# Patient Record
Sex: FEMALE | Race: WHITE | NOT HISPANIC OR LATINO | Employment: OTHER | URBAN - METROPOLITAN AREA
[De-identification: names, ages, dates, MRNs, and addresses within clinical notes are randomized per-mention and may not be internally consistent; named-entity substitution may affect disease eponyms.]

---

## 2017-09-15 ENCOUNTER — TELEPHONE (OUTPATIENT)
Dept: PRIMARY CARE CLINIC | Facility: CLINIC | Age: 77
End: 2017-09-15

## 2017-09-15 NOTE — TELEPHONE ENCOUNTER
----- Message from Bethany Scott sent at 9/15/2017 11:01 AM CDT -----  Contact: Patient  Pauline, patient [phone], Calling for refill on Rx . Does not want to give me the names of the medicines, just wants to talk to the nurse. Please advise. Thanks.

## 2017-09-15 NOTE — TELEPHONE ENCOUNTER
Spoke to pt, she just needed to know the names of the gastro dr and heart dr that Dr Waldrop referred her too. ([facility])

## 2018-01-23 RX ORDER — SULFASALAZINE 500 MG/1
TABLET ORAL
Qty: 180 TABLET | Refills: 1 | Status: SHIPPED | OUTPATIENT
Start: 2018-01-23 | End: 2018-08-31 | Stop reason: SDUPTHER

## 2018-01-23 RX ORDER — OXYBUTYNIN CHLORIDE 10 MG/1
TABLET, EXTENDED RELEASE ORAL
Qty: 90 TABLET | Refills: 1 | Status: SHIPPED | OUTPATIENT
Start: 2018-01-23 | End: 2018-08-31 | Stop reason: SDUPTHER

## 2018-01-23 RX ORDER — FOLIC ACID 1 MG/1
TABLET ORAL
Qty: 90 TABLET | Refills: 1 | Status: SHIPPED | OUTPATIENT
Start: 2018-01-23 | End: 2019-02-04 | Stop reason: SDUPTHER

## 2018-01-23 RX ORDER — AMLODIPINE BESYLATE 10 MG/1
TABLET ORAL
Qty: 90 TABLET | Refills: 1 | Status: SHIPPED | OUTPATIENT
Start: 2018-01-23 | End: 2018-08-09 | Stop reason: SDUPTHER

## 2018-03-26 RX ORDER — CARVEDILOL 6.25 MG/1
TABLET ORAL
Qty: 180 TABLET | Refills: 0 | Status: SHIPPED | OUTPATIENT
Start: 2018-03-26 | End: 2022-06-27 | Stop reason: SDUPTHER

## 2018-04-19 ENCOUNTER — OFFICE VISIT (OUTPATIENT)
Dept: PRIMARY CARE CLINIC | Facility: CLINIC | Age: 78
End: 2018-04-19
Payer: MEDICARE

## 2018-04-19 VITALS
BODY MASS INDEX: 36.91 KG/M2 | HEIGHT: 60 IN | TEMPERATURE: 98 F | DIASTOLIC BLOOD PRESSURE: 75 MMHG | SYSTOLIC BLOOD PRESSURE: 125 MMHG | OXYGEN SATURATION: 96 % | HEART RATE: 74 BPM | WEIGHT: 188 LBS | RESPIRATION RATE: 18 BRPM

## 2018-04-19 DIAGNOSIS — Z12.31 ENCOUNTER FOR SCREENING MAMMOGRAM FOR BREAST CANCER: ICD-10-CM

## 2018-04-19 DIAGNOSIS — K52.9 GASTROENTERITIS: Primary | ICD-10-CM

## 2018-04-19 DIAGNOSIS — M89.9 DISORDER OF BONE: ICD-10-CM

## 2018-04-19 DIAGNOSIS — M17.0 PRIMARY OSTEOARTHRITIS OF BOTH KNEES: ICD-10-CM

## 2018-04-19 DIAGNOSIS — Z13.820 OSTEOPOROSIS SCREENING: ICD-10-CM

## 2018-04-19 DIAGNOSIS — Z23 NEED FOR VACCINATION: ICD-10-CM

## 2018-04-19 PROBLEM — E03.9 HYPOTHYROIDISM: Status: ACTIVE | Noted: 2018-04-19

## 2018-04-19 PROBLEM — E78.5 HYPERLIPIDEMIA: Status: ACTIVE | Noted: 2018-04-19

## 2018-04-19 PROBLEM — I10 ESSENTIAL HYPERTENSION, BENIGN: Status: ACTIVE | Noted: 2018-04-19

## 2018-04-19 PROBLEM — K50.90 CROHN DISEASE: Status: ACTIVE | Noted: 2018-04-19

## 2018-04-19 PROBLEM — Z95.0 PACEMAKER: Status: ACTIVE | Noted: 2018-04-19

## 2018-04-19 PROBLEM — K21.9 GERD (GASTROESOPHAGEAL REFLUX DISEASE): Status: ACTIVE | Noted: 2018-04-19

## 2018-04-19 PROBLEM — I48.91 ATRIAL FIBRILLATION: Status: ACTIVE | Noted: 2018-04-19

## 2018-04-19 PROBLEM — N32.81 OAB (OVERACTIVE BLADDER): Status: ACTIVE | Noted: 2018-04-19

## 2018-04-19 PROCEDURE — G0009 ADMIN PNEUMOCOCCAL VACCINE: HCPCS | Mod: S$GLB,,, | Performed by: FAMILY MEDICINE

## 2018-04-19 PROCEDURE — 99999 PR PBB SHADOW E&M-EST. PATIENT-LVL III: CPT | Mod: PBBFAC,,, | Performed by: FAMILY MEDICINE

## 2018-04-19 PROCEDURE — 99214 OFFICE O/P EST MOD 30 MIN: CPT | Mod: S$GLB,,, | Performed by: FAMILY MEDICINE

## 2018-04-19 PROCEDURE — 90670 PCV13 VACCINE IM: CPT | Mod: S$GLB,,, | Performed by: FAMILY MEDICINE

## 2018-04-19 RX ORDER — LEVOTHYROXINE SODIUM 50 UG/1
1 TABLET ORAL DAILY
COMMUNITY
Start: 2018-04-02 | End: 2019-06-06 | Stop reason: ALTCHOICE

## 2018-04-19 RX ORDER — ATORVASTATIN CALCIUM 20 MG/1
1 TABLET, FILM COATED ORAL DAILY
COMMUNITY
Start: 2018-03-24 | End: 2022-06-27 | Stop reason: SDUPTHER

## 2018-04-19 RX ORDER — ESOMEPRAZOLE MAGNESIUM 40 MG/1
1 CAPSULE, DELAYED RELEASE ORAL DAILY
COMMUNITY
Start: 2018-02-20 | End: 2018-08-17 | Stop reason: SDUPTHER

## 2018-04-19 RX ORDER — SOTALOL HYDROCHLORIDE 80 MG/1
120 TABLET ORAL 2 TIMES DAILY
COMMUNITY
Start: 2018-04-03 | End: 2019-02-14

## 2018-04-19 RX ORDER — LEVOTHYROXINE SODIUM 200 UG/1
1 TABLET ORAL DAILY
COMMUNITY
Start: 2018-01-28 | End: 2018-04-22 | Stop reason: SDUPTHER

## 2018-04-19 RX ORDER — ASPIRIN 81 MG/1
81 TABLET ORAL DAILY
COMMUNITY
End: 2019-06-06 | Stop reason: ALTCHOICE

## 2018-04-19 NOTE — PROGRESS NOTES
Pt ID verified by  and Name. Allergies verified with pt. Prevnar 13 0.5mL vaccine administered to L Deltoid. Pt tolerated well. No adverse reactions noted.

## 2018-04-19 NOTE — PROGRESS NOTES
Subjective:       Patient ID: Pauline Cronin is a 78 y.o. female.    Chief Complaint: Diarrhea (x3 days); Headache (x3 days); and Mammogram (patient wants an order to get her mammogram done )    Had labs recently done at Dr. Leone's office, reportedly normal.      Diarrhea    This is a new problem. The current episode started in the past 7 days. The problem occurs 5 to 10 times per day. The problem has been gradually improving. The stool consistency is described as watery. The patient states that diarrhea does not awaken her from sleep. Associated symptoms include arthralgias, headaches and increased flatus. Pertinent negatives include no abdominal pain, chills, fever, sweats or vomiting. There are no known risk factors. She has tried nothing for the symptoms. Her past medical history is significant for inflammatory bowel disease.   Arthritis   Presents for initial visit. The disease course has been worsening. She complains of pain and stiffness. Affected locations include the left knee and right knee. Associated symptoms include diarrhea. Pertinent negatives include no fever or rash. There is no history of chronic back pain, lupus or rheumatoid arthritis.   Past treatments include nothing. Factors aggravating her arthritis include sitting and climbing stairs.     Review of Systems   Constitutional: Negative for chills and fever.   HENT: Negative for trouble swallowing.    Eyes: Negative for visual disturbance.   Respiratory: Negative for shortness of breath.    Cardiovascular: Negative for chest pain.   Gastrointestinal: Positive for diarrhea and flatus. Negative for abdominal pain, blood in stool and vomiting.   Genitourinary: Negative for difficulty urinating.   Musculoskeletal: Positive for arthralgias, arthritis and stiffness.   Skin: Negative for rash.   Neurological: Positive for headaches.   Psychiatric/Behavioral: Negative for agitation and confusion.       Objective:      Vitals:    04/19/18 1243   BP:  125/75   BP Location: Left arm   Patient Position: Sitting   BP Method: Large (Automatic)   Pulse: 74   Resp: 18   Temp: 98 °F (36.7 °C)   TempSrc: Oral   SpO2: 96%   Weight: 85.3 kg (188 lb)   Height: 5' (1.524 m)     Physical Exam   Constitutional: She is oriented to person, place, and time. She appears well-developed and well-nourished.   HENT:   Head: Normocephalic and atraumatic.   Eyes: EOM are normal.   Neck: Neck supple. No JVD present.   Cardiovascular: Normal rate and regular rhythm.    Murmur heard.   Systolic murmur is present with a grade of 4/6   Pulmonary/Chest: Effort normal and breath sounds normal.   Abdominal: Soft. Bowel sounds are normal. She exhibits no distension. There is no tenderness.   Musculoskeletal: She exhibits no edema.        Right knee: She exhibits decreased range of motion. She exhibits no swelling, no effusion and no deformity.        Left knee: She exhibits decreased range of motion. She exhibits no swelling, no effusion and no deformity.   Neurological: She is alert and oriented to person, place, and time.   Skin: Skin is warm and dry.   Psychiatric: She has a normal mood and affect. Her behavior is normal.   Nursing note and vitals reviewed.      Assessment:       1. Gastroenteritis    2. Encounter for screening mammogram for breast cancer    3. Osteoporosis screening    4. Disorder of bone     5. Need for vaccination    6. Primary osteoarthritis of both knees        Plan:       Gastroenteritis  Comments:  likely viral, treat symptomatically    Encounter for screening mammogram for breast cancer  -     Mammo Digital Screening Bilat without CA; Future; Expected date: 05/03/2018    Osteoporosis screening  -     DXA Bone Density Spine And Hip; Future; Expected date: 04/26/2018    Disorder of bone   -     DXA Bone Density Spine And Hip; Future; Expected date: 04/26/2018    Need for vaccination  -     Pneumococcal Conjugate Vaccine (13 Valent) (IM)    Primary osteoarthritis of both  knees  Comments:  can try OTC glucosamine/chondroitin      Medication List with Changes/Refills   Current Medications    AMLODIPINE (NORVASC) 10 MG TABLET    TAKE ONE TABLET BY MOUTH ONCE DAILY    ASPIRIN (ECOTRIN) 81 MG EC TABLET    Take 81 mg by mouth once daily.    ATORVASTATIN (LIPITOR) 20 MG TABLET    Take 1 tablet by mouth once daily.    CARVEDILOL (COREG) 6.25 MG TABLET    TAKE ONE TABLET BY MOUTH TWICE DAILY WITH FOOD    ESOMEPRAZOLE (NEXIUM) 40 MG CAPSULE    Take 1 capsule by mouth once daily.    FOLIC ACID (FOLVITE) 1 MG TABLET    TAKE ONE TABLET BY MOUTH ONCE DAILY    LEVOTHYROXINE (SYNTHROID) 200 MCG TABLET    Take 1 tablet by mouth once daily.    LEVOTHYROXINE (SYNTHROID) 50 MCG TABLET    Take 1 tablet by mouth once daily.    OXYBUTYNIN (DITROPAN-XL) 10 MG 24 HR TABLET    TAKE ONE TABLET BY MOUTH ONCE DAILY    SOTALOL (BETAPACE) 80 MG TABLET    Take 1 tablet by mouth 2 (two) times daily.    SULFASALAZINE (AZULFIDINE) 500 MG TAB    TAKE ONE TABLET BY MOUTH TWICE DAILY

## 2018-04-23 RX ORDER — LEVOTHYROXINE SODIUM 200 UG/1
TABLET ORAL
Qty: 90 TABLET | Refills: 3 | Status: SHIPPED | OUTPATIENT
Start: 2018-04-23 | End: 2019-06-06 | Stop reason: ALTCHOICE

## 2018-06-04 DIAGNOSIS — M81.0 OSTEOPOROSIS, UNSPECIFIED OSTEOPOROSIS TYPE, UNSPECIFIED PATHOLOGICAL FRACTURE PRESENCE: ICD-10-CM

## 2018-06-04 RX ORDER — ALENDRONATE SODIUM 70 MG/1
70 TABLET ORAL
Qty: 12 TABLET | Refills: 3 | Status: SHIPPED | OUTPATIENT
Start: 2018-06-04 | End: 2019-04-28 | Stop reason: SDUPTHER

## 2018-06-21 ENCOUNTER — OFFICE VISIT (OUTPATIENT)
Dept: PRIMARY CARE CLINIC | Facility: CLINIC | Age: 78
End: 2018-06-21
Payer: MEDICARE

## 2018-06-21 VITALS
BODY MASS INDEX: 36.32 KG/M2 | SYSTOLIC BLOOD PRESSURE: 154 MMHG | HEART RATE: 87 BPM | RESPIRATION RATE: 18 BRPM | WEIGHT: 185 LBS | DIASTOLIC BLOOD PRESSURE: 78 MMHG | OXYGEN SATURATION: 97 % | TEMPERATURE: 98 F | HEIGHT: 60 IN

## 2018-06-21 DIAGNOSIS — R13.10 DYSPHAGIA, UNSPECIFIED TYPE: Primary | ICD-10-CM

## 2018-06-21 PROCEDURE — 99999 PR PBB SHADOW E&M-EST. PATIENT-LVL IV: CPT | Mod: PBBFAC,,, | Performed by: FAMILY MEDICINE

## 2018-06-21 PROCEDURE — 99213 OFFICE O/P EST LOW 20 MIN: CPT | Mod: S$GLB,,, | Performed by: FAMILY MEDICINE

## 2018-06-21 NOTE — PROGRESS NOTES
"Subjective:       Patient ID: Pauline Cronin is a 78 y.o. female.    Chief Complaint: Gastroesophageal Reflux (patient says it hurts to swallow anything solid) and Nausea    Complains of worsening dysphagia with pills and solids for the past few weeks. Has persistent sensation that something is "stuck" in her esophagus. No prior episodes. No choking or SoB. No trouble with liquids. Takes Nexium regularly      Review of Systems   Constitutional: Negative for fever.   HENT: Positive for trouble swallowing.    Eyes: Negative for visual disturbance.   Respiratory: Negative for shortness of breath.    Cardiovascular: Negative for chest pain.   Gastrointestinal: Positive for nausea. Negative for abdominal distention, abdominal pain, blood in stool and vomiting.   Genitourinary: Negative for difficulty urinating.   Skin: Negative for rash.   Hematological: Does not bruise/bleed easily.       Objective:      Vitals:    06/21/18 0946   BP: (!) 154/78   BP Location: Left arm   Patient Position: Sitting   BP Method: Large (Automatic)   Pulse: 87   Resp: 18   Temp: 97.7 °F (36.5 °C)   TempSrc: Oral   SpO2: 97%   Weight: 83.9 kg (185 lb)   Height: 5' (1.524 m)     Physical Exam   Constitutional: She is oriented to person, place, and time. She appears well-developed and well-nourished.   HENT:   Head: Normocephalic and atraumatic.   Neck: Neck supple. No JVD present.   Cardiovascular: Normal rate, regular rhythm and normal heart sounds.    Pulmonary/Chest: Effort normal and breath sounds normal.   Abdominal: Soft. Bowel sounds are normal. She exhibits no distension. There is no tenderness.   Musculoskeletal: She exhibits no edema.   Neurological: She is alert and oriented to person, place, and time.   Skin: Skin is warm and dry.   Psychiatric: She has a normal mood and affect. Her behavior is normal.   Nursing note and vitals reviewed.      Assessment:       1. Dysphagia, unspecified type        Plan:       Dysphagia, " unspecified type  Comments:  Needs EGD. Advised to take small bites, chew food well, drink liquids after swallowing solids.  Orders:  -     Cancel: Ambulatory referral to Gastroenterology  -     Ambulatory Referral to Gastroenterology      Medication List with Changes/Refills   Current Medications    ALENDRONATE (FOSAMAX) 70 MG TABLET    Take 1 tablet (70 mg total) by mouth every 7 days.    AMLODIPINE (NORVASC) 10 MG TABLET    TAKE ONE TABLET BY MOUTH ONCE DAILY    ASPIRIN (ECOTRIN) 81 MG EC TABLET    Take 81 mg by mouth once daily.    ATORVASTATIN (LIPITOR) 20 MG TABLET    Take 1 tablet by mouth once daily.    CARVEDILOL (COREG) 6.25 MG TABLET    TAKE ONE TABLET BY MOUTH TWICE DAILY WITH FOOD    ESOMEPRAZOLE (NEXIUM) 40 MG CAPSULE    Take 1 capsule by mouth once daily.    FOLIC ACID (FOLVITE) 1 MG TABLET    TAKE ONE TABLET BY MOUTH ONCE DAILY    LEVOTHYROXINE (SYNTHROID) 200 MCG TABLET    TAKE 1 TABLET BY MOUTH ON AN EMPTY STOMACH IN THE MORNING ONCE DAILY FOR 90 DAYS    LEVOTHYROXINE (SYNTHROID) 50 MCG TABLET    Take 1 tablet by mouth once daily.    OXYBUTYNIN (DITROPAN-XL) 10 MG 24 HR TABLET    TAKE ONE TABLET BY MOUTH ONCE DAILY    SOTALOL (BETAPACE) 80 MG TABLET    Take 1 tablet by mouth 2 (two) times daily.    SULFASALAZINE (AZULFIDINE) 500 MG TAB    TAKE ONE TABLET BY MOUTH TWICE DAILY

## 2018-07-03 ENCOUNTER — OFFICE VISIT (OUTPATIENT)
Dept: GASTROENTEROLOGY | Facility: CLINIC | Age: 78
End: 2018-07-03
Payer: MEDICARE

## 2018-07-03 VITALS
BODY MASS INDEX: 36.36 KG/M2 | DIASTOLIC BLOOD PRESSURE: 66 MMHG | HEART RATE: 81 BPM | WEIGHT: 185.19 LBS | HEIGHT: 60 IN | SYSTOLIC BLOOD PRESSURE: 142 MMHG

## 2018-07-03 DIAGNOSIS — Z51.81 ENCOUNTER FOR MONITORING LONG-TERM PROTON PUMP INHIBITOR THERAPY: ICD-10-CM

## 2018-07-03 DIAGNOSIS — Z79.899 ON SULFASALAZINE THERAPY: ICD-10-CM

## 2018-07-03 DIAGNOSIS — Z98.890 HISTORY OF INTESTINAL SURGERY: ICD-10-CM

## 2018-07-03 DIAGNOSIS — Z87.19 HISTORY OF CROHN'S DISEASE: ICD-10-CM

## 2018-07-03 DIAGNOSIS — K52.9 CHRONIC DIARRHEA: ICD-10-CM

## 2018-07-03 DIAGNOSIS — Z87.19 HISTORY OF GASTROESOPHAGEAL REFLUX (GERD): ICD-10-CM

## 2018-07-03 DIAGNOSIS — R13.14 PHARYNGOESOPHAGEAL DYSPHAGIA: Primary | ICD-10-CM

## 2018-07-03 DIAGNOSIS — Z79.899 ENCOUNTER FOR MONITORING LONG-TERM PROTON PUMP INHIBITOR THERAPY: ICD-10-CM

## 2018-07-03 PROCEDURE — 99214 OFFICE O/P EST MOD 30 MIN: CPT | Mod: S$GLB,,, | Performed by: NURSE PRACTITIONER

## 2018-07-03 PROCEDURE — 99999 PR PBB SHADOW E&M-EST. PATIENT-LVL IV: CPT | Mod: PBBFAC,,, | Performed by: NURSE PRACTITIONER

## 2018-07-03 NOTE — PROGRESS NOTES
Subjective:       Patient ID: Pauline Cronin is a 78 y.o. female Body mass index is 36.17 kg/m².    Chief Complaint: Dysphagia (diff swallowing solids/pills, feels lie it gets stuck)    This patient is new to me.  Referring Provider:  Dr. Waldrop for dysphagia    GI Problem   The primary symptoms include diarrhea. Primary symptoms do not include fever, weight loss, fatigue, abdominal pain, nausea, vomiting, melena, hematemesis or hematochezia. Primary symptoms comment: CHIEF COMPLAINT: DYSPHAGIA. The illness began more than 7 days ago (started 2 weeks ago). The problem has been rapidly improving.   The diarrhea began more than 1 week ago (chronic, unchanged). The diarrhea is semi-solid and watery. The diarrhea occurs 5 to 10 times per day. Risk factors: denies recent antibiotic/hospitalization, foreign travel, ill contacts, or suspect food intake.   The illness is also significant for dysphagia (CHIEF COMPLAINT: with food and pills; eating softer diet now; denies problems with liquids). The illness does not include chills, odynophagia or constipation. Significant associated medical issues include inflammatory bowel disease (diagnosed in her 20's; SULFASALAZINE 500 MG BID), GERD (controlled on NEXIUM 40 MG DAILY) and bowel resection (in her 20's). Associated medical issues do not include PUD or irritable bowel syndrome.     Review of Systems   Constitutional: Negative for appetite change, chills, fatigue, fever, unexpected weight change and weight loss.   HENT: Negative for sore throat and trouble swallowing.    Respiratory: Negative for cough, choking and shortness of breath.    Cardiovascular: Negative for chest pain.   Gastrointestinal: Positive for diarrhea and dysphagia (CHIEF COMPLAINT: with food and pills; eating softer diet now; denies problems with liquids). Negative for abdominal pain, anal bleeding, blood in stool, constipation, hematemesis, hematochezia, melena, nausea, rectal pain and vomiting.    Neurological: Negative for weakness.       Past Medical History:   Diagnosis Date    Atrial fibrillation     Crohn disease diagnosed in her 20's    GERD (gastroesophageal reflux disease)     Hyperlipidemia     Hypertension      Past Surgical History:   Procedure Laterality Date    APPENDECTOMY      COLONOSCOPY  ~2008    normal findings per patient report    HYSTERECTOMY      SMALL INTESTINE SURGERY  in her 20's    for crohn's    TONSILLECTOMY      UPPER GASTROINTESTINAL ENDOSCOPY  ~2008    normal findings per patient report     Family History   Problem Relation Age of Onset    Cancer Mother     Breast cancer Mother     Crohn's disease Other     Colon cancer Neg Hx     Colon polyps Neg Hx     Esophageal cancer Neg Hx     Stomach cancer Neg Hx     Ulcerative colitis Neg Hx      Wt Readings from Last 10 Encounters:   07/03/18 84 kg (185 lb 3 oz)   06/21/18 83.9 kg (185 lb)   04/19/18 85.3 kg (188 lb)     Objective:      Physical Exam   Constitutional: She is oriented to person, place, and time. She appears well-developed and well-nourished. No distress.   HENT:   Mouth/Throat: Oropharynx is clear and moist and mucous membranes are normal. No oral lesions. No oropharyngeal exudate.   Eyes: Conjunctivae are normal. Pupils are equal, round, and reactive to light. No scleral icterus.   Cardiovascular: Normal rate.    Pulmonary/Chest: Effort normal and breath sounds normal. No respiratory distress. She has no wheezes.   Abdominal: Soft. Normal appearance and bowel sounds are normal. She exhibits no distension, no abdominal bruit and no mass. There is no hepatosplenomegaly. There is no tenderness. There is no rigidity, no rebound, no guarding, no tenderness at McBurney's point and negative Anne's sign. No hernia.   Well-healed surgical scars noted.   Neurological: She is alert and oriented to person, place, and time.   Skin: Skin is warm and dry. No rash noted. She is not diaphoretic. No erythema. No  pallor.   Non-jaundiced   Psychiatric: She has a normal mood and affect. Her behavior is normal. Judgment and thought content normal.   Nursing note and vitals reviewed.      Assessment:       1. Pharyngoesophageal dysphagia    2. History of Crohn's disease    3. History of intestinal surgery    4. History of gastroesophageal reflux (GERD)    5. Chronic diarrhea    6. Encounter for monitoring long-term proton pump inhibitor therapy    7. On sulfasalazine therapy        Plan:       Pharyngoesophageal dysphagia  -     Case request GI: EGD (ESOPHAGOGASTRODUODENOSCOPY), - schedule EGD, discussed procedure with patient and possible esophageal dilation may be performed during procedure if indicated, patient verbalized understanding  - educated patient to eat smaller more frequent meals and to eat slowly and advised to eat a soft diet.  - possible UGI/esophagram/esophageal manometry if symptoms persist    History of Crohn's disease  -     CBC auto differential; Future; Expected date: 07/03/2018  -     Comprehensive metabolic panel; Future; Expected date: 07/03/2018  -     Sedimentation rate; Future; Expected date: 07/03/2018  -     C-reactive protein; Future; Expected date: 07/03/2018  -     Folate; Future; Expected date: 07/03/2018  - schedule Colonoscopy, discussed procedure with the patient, patient verbalized understanding  - schedule EGD, discussed procedure with patient, patient verbalized understanding  - CONTINUE SULFASALAZINE 500 MG BID    History of gastroesophageal reflux (GERD)  -     Case request GI: EGD (ESOPHAGOGASTRODUODENOSCOPY), - schedule EGD, discussed procedure with patient, patient verbalized understanding  - CONTINUE NEXIUM 40 MG ONCE DAILY AS DIRECTED, - take in the morning 30-60 minutes before breakfast, discussed about possible long term use of medication (prefer to use lowest effective dose or discontinuing if possible) and discussed the risks & benefits with taking a reflux medication long term,  and to take OTC calcium and vitamin d supplements as directed (such as Citracal +D), pt verbalized understanding  -discussed about the different types of medications used to treat reflux and how to use them, antacids can be used PRN for breakthrough heartburn symptoms by reducing stomach acid that is already produced, H2 blockers (zantac) work by limiting the amount acid production, & PPI's work to block acid production and are taken daily, patient verbalized understanding.  -Educated patient on lifestyle modifications to help control/reduce reflux/abdominal pain including: avoid large meals, avoid eating within 2-3 hours of bedtime (avoid late night eating & lying down soon after eating), elevate head of bed if nocturnal symptoms are present, smoking cessation (if current smoker), & weight loss (if overweight).   -Educated to avoid known foods which trigger reflux symptoms & to minimize/avoid high-fat foods, chocolate, caffeine, citrus, alcohol, & tomato products.  -Advised to avoid/limit use of NSAID's, since they can cause GI upset, bleeding, and/or ulcers. If needed, take with food.     Chronic diarrhea & History of intestinal surgery  -     CBC auto differential; Future; Expected date: 07/03/2018  -     Comprehensive metabolic panel; Future; Expected date: 07/03/2018  -     Sedimentation rate; Future; Expected date: 07/03/2018  -     C-reactive protein; Future; Expected date: 07/03/2018  -     Folate; Future; Expected date: 07/03/2018  -     Stool Exam-Ova,Cysts,Parasites; Future; Expected date: 07/03/2018  -     Adenovirus Antigen EIA, Stool; Future; Expected date: 07/03/2018  -     Fecal fat, qualitative; Future; Expected date: 07/03/2018  -     Giardia / Cryptosporidum, EIA; Future; Expected date: 07/03/2018  -     Occult blood x 1, stool; Future; Expected date: 07/03/2018  -     Rotavirus antigen, stool; Future; Expected date: 07/03/2018  -     pH, stool; Future; Expected date: 07/03/2018  -     WBC, Stool;  Future; Expected date: 07/03/2018  -     Stool culture; Future; Expected date: 07/03/2018  -     Clostridium difficile EIA; Future; Expected date: 07/03/2018  -     Calprotectin; Future; Expected date: 07/03/2018  - recommended OTC probiotic, such as Culturelle, as directed  - avoid lactose  - schedule Colonoscopy, discussed procedure with the patient, patient verbalized understanding  - schedule EGD, discussed procedure with patient, patient verbalized understanding      Encounter for monitoring long-term proton pump inhibitor therapy  -     CBC auto differential; Future; Expected date: 07/03/2018  -     Comprehensive metabolic panel; Future; Expected date: 07/03/2018  -     Vitamin B12; Future; Expected date: 07/03/2018  -     Magnesium; Future; Expected date: 07/03/2018  -     Case request GI: EGD (ESOPHAGOGASTRODUODENOSCOPY), - schedule EGD, discussed procedure with patient, patient verbalized understanding  - discussed with patient about long term use of reflux medications (preference to use lowest effective dose or discontinuing if possible), the risk and benefits of using these medications long term, the risk of untreated GERD such as samuel's esophagus, and recommend a diet high in calcium and/or taking OTC calcium and vitamin d supplements as directed (such as Citracal +D), pt verbalized understanding.  - recommend annual monitoring with blood work to include CMP, CBC, vitamin B12, and magnesium.    On sulfasalazine therapy  -     Folate; Future; Expected date: 07/03/2018    Follow-up in about 1 month (around 8/3/2018), or if symptoms worsen or fail to improve.      If no improvement in symptoms or symptoms worsen, call/follow-up at clinic or go to ER.

## 2018-07-03 NOTE — PATIENT INSTRUCTIONS
Discharge Instructions: Eating a Soft Diet  You have been prescribed a soft diet (also called gastrointestinal soft diet or bland diet). This reduces the amount of work your digestive tract has to do. It also reduces the chance that your digestive tract will be irritated by the food you eat. A soft diet is prescribed for people with digestive problems. The diet consists of foods that are tender, mildly seasoned, and easy to digest. While on this diet, you should not eat fried or spicy foods, or raw fruits and vegetables. Also avoid alcoholic beverages.  General guidelines  · Eat in a calm, relaxed atmosphere. How you eat may be as important as what you eat. Dont rush while eating. Chew your food slowly and thoroughly, and swallow slowly.  · Eat small frequent meals throughout the day, but dont eat within 2 hours of bedtime.  · Avoid any foods that cause discomfort.  · Dont use NSAIDs (nonsteroidal anti-inflammatory drugs), such as aspirin, and ibuprofen. Also avoid medicine that contain aspirin. NSAIDs can cause ulcers and delay or prevent ulcer healing.  · Use antacids as needed, but keep in mind that magnesium-containing antacids may cause diarrhea.  Foods to eat  · Cream of wheat and cream of rice  · Cooked white rice  · Mashed potatoes, and boiled potatoes without skin  · Plain pasta and noodles  · Plain white crackers (such as no-salt soda crackers)  · White bread  · Applesauce  · Cooked fruits without skins or seeds  · Mild juices, such as apple and grape  · Bananas  · Cooked or mashed vegetables without stems and seeds  ¨ Carrots  ¨ Summer squash (zucchini, yellow squash)  ¨ Winter squash (acorn, butternut, spaghetti squash)  · Cottage cheese  · Mild hard or soft cheeses  · Custard  · Yogurt without seeds or nuts  · Milk (you may need lactose-free milk)  · Ice cream without seeds or nuts  · Smooth peanut butter  · Eggs  · Fish, turkey, chicken, or other meat that is not tough or stringy  · Tofu  Foods to  avoid  · Nuts and seeds  · Snack foods, such as the following:  ¨ Chocolate-containing snacks, candy, pastries, or cakes.  ¨ Potato chips (plain, barbecued, or other flavors)  ¨ Taco chips or nachos  ¨ Corn chips  ¨ Popcorn, popcorn cakes, or rice cakes  ¨ Crackers with nuts, seeds, or spicy seasonings  ¨ French fries  · Fried or greasy foods  · Whole-grain breads, rolls, and crackers  · Breads and rolls with nuts, seeds, or bran  · Bran and granola cereals  · Berries with seeds, such as strawberries, raspberries, and blackberries  · Acidic fruits, such as oranges, grapefruits, kavitha, limes, and pineapples  · Raw vegetables  · Mild or hot peppers  · Sauerkraut and pickled vegetables  · Tomatoes or tomato products, such as tomato paste, tomato sauce, and tomato juice  · Barbecue sauce  · Spicy or flavored cheeses, such as jalapeño and black pepper cheese  · Crunchy peanut butter  · Dried cooked beans, such as romero, kidney, or navy beans  · The following meats:  ¨ Fried or greasy meats  ¨ Processed, spicy meats, such as sausage, grove, ham, and lunch meats  ¨ Ribs and other meats with barbecue sauce  ¨ Tough or stringy meats, such as corned beef or beef jerky  Fluids to avoid  · Alcoholic beverages  · Coffee and regular teas  · Belén and other drinks with caffeine  · Cranberry, orange, pineapple, and grapefruit juice  · Lemonade  · Vegetable juice  · Whole milk, if you are lactose intolerant  Follow-up  Make a follow-up appointment with a dietitian as directed by our staff.  Date Last Reviewed: 6/21/2015  © 1226-0534 [employer] [address] [place] [address], [place] [address]. All rights reserved. This information is not intended as a substitute for professional medical care. Always follow your healthcare professional's instructions.        Crohns Disease    Crohns disease is inflammation of the intestinal tract that comes and goes in flare-ups. This is a chronic (long-term) illness. During a flare-up,  "intense abdominal pain and fever may be felt. Mucus, blood, or pus may appear in the stool. Between flare ups, inflammation lessens and there usually are no symptoms. Crohns disease is a form of inflammatory bowel disease (IBD).   Symptoms of Crohn's disease may include:  · Abdominal cramps and pain  · Diarrhea, usually bloody, alternating with constipation  · Mucus in stools  · Rectal bleeding  · Rectal pain  · Fever  · Low energy  · Decreased appetite and weight loss  No one knows what exactly causes Crohn's disease, and there is no cure. The goal of treatment is to control and relieve symptoms and prevent complications, so you can lead a full and active life. No single treatment works for everyone, but many things can be done to help.  Diet  Foods did not cause your Crohn's, but they can affect it. Unfortunately, there is no one diet that works for everyone. Below are some things to try. Keep a food log to figure out what you are sensitive to.  · Eat more slowly and smaller amounts at a time, but more often. Remember, you can always eat more, but cannot eat less once you've eaten too much.  · High fiber foods are complicated. While they may help constipation they can make the bloating, cramping, gas, and diarrhea worse.  · Try avoiding dairy products, sometimes this helps  · Try cutting out foods that are high in fat and fatty meats  · Eat less sugar  · Bloating or passing excess gas may be controlled. Be careful with "gassy" vegetables and fruits like beans, cabbage, broccoli, and cauliflower.  · Be careful of carbonated beverages and fruit juices. They can make the bloating and diarrhea worse.  · Caffeine, alcohol, and stimulants may worsen symptoms. These include coffee, tea, sodas, energy drinks, and chocolate.  Lifestyle  Although stress does not cause Crohn's, it is often a factor in flare-ups. It can also affect how you feel about and cope with your condition.  · Look for factors that seem to worsen your " symptoms such as stress and emotions.  · Counseling can often help relieve stress. So can self-help measures such as exercise, yoga, and meditation.  · Depression can be a part of this illness and antidepressants may be prescribed. This may actually help with diarrhea, constipation, and cramping, as well as depression.  · Smoking doesn't cause Crohn's, but can make the symptoms worse.  Medicines  Your healthcare provider may prescribe medicines. If so, take them as directed. For acute flare-ups, prescription medicines can be prescribed. Contact your provider if you need this.  · Ask your healthcare provider before taking any antidiarrheal medicines.  · Avoid anti-inflammatory medicines like ibuprofen or naproxen.  · Consider nutritional supplements. This is especially true if the diarrhea is prolonged, or you aren't eating or are losing weight.  Follow-up care  Follow up with your healthcare provider, or as advised. If a stool sample was taken or cultures were done, you will be told if your treatment needs to change. Call as directed for the results.  Support  Support groups for persons Crohns disease can be a source of useful information on how others are coping with this illness. They are available in person, on the phone, or via the Internet. Contact the following resources for more information.  · Crohns and Colitis [employer] [place] 396.271.3924 www.ccfa.org  · National Digestive Diseases Information Clearinghouse (NDDIC) 772.373.2725 www.digestive.niddk.nih.gov  When to seek medical advice  Call your healthcare provider right away if any of these occur:  · Fever of 100.4ºF (38ºC) or higher, or as directed by your healthcare provider  · Abdominal pain that doesn't get better when you take the usual measures  · Mucus, pus, or blood in the stool (dark or bright red)  · Repeated vomiting  · Abdominal swelling and pain that doesn't go away after a few hours  Call 911  Call 911 if any of these  occur:  · Trouble breathing  · Confusion  · Extreme sleepiness or trouble waking up  · Fainting or loss of consciousness  · Rapid heart rate  Date Last Reviewed: 8/31/2015  © 3783-9686 [employer]. [address] [place] [address], [place] [address]. All rights reserved. This information is not intended as a substitute for professional medical care. Always follow your healthcare professional's instructions.

## 2018-07-03 NOTE — LETTER
July 3, 2018      Ga Waldrop MD  [address]  [address]  [facility] [address]           [facility]  [address]  [place] [address]  Phone: [phone]          Patient: Pauline Cronin   MR Number: [id number]   YOB: 1940   Date of Visit: 7/3/2018       Dear Dr. Ga Waldrop:    Thank you for referring Pauline Cronin to me for evaluation. Attached you will find relevant portions of my assessment and plan of care.    If you have questions, please do not hesitate to call me. I look forward to following Pauline Cronin along with you.    Sincerely,    Darinel Hatch LPN    Enclosure  CC:  No Recipients    If you would like to receive this communication electronically, please contact [email or web address] or [phone] to request more information on [facility] access.    For providers and/or their staff who would like to refer a patient to [facility], please contact us through our one-stop-shop provider referral line, [facility], at [phone].    If you feel you have received this communication in error or would no longer like to receive these types of communications, please e-mail [email or web address]

## 2018-07-05 ENCOUNTER — TELEPHONE (OUTPATIENT)
Dept: GASTROENTEROLOGY | Facility: CLINIC | Age: 78
End: 2018-07-05

## 2018-07-05 NOTE — TELEPHONE ENCOUNTER
----- Message from RT Anthony sent at 7/5/2018  1:31 PM CDT -----  Contact: [phone] [facility]  Called [phone] [facility], pt's stool specimen had to be rejected it was all over the bag and needs to be repeated, thanks.

## 2018-07-09 ENCOUNTER — TELEPHONE (OUTPATIENT)
Dept: GASTROENTEROLOGY | Facility: CLINIC | Age: 78
End: 2018-07-09

## 2018-07-09 DIAGNOSIS — R74.01 ELEVATED AST (SGOT): ICD-10-CM

## 2018-07-09 DIAGNOSIS — R79.0 LOW MAGNESIUM LEVEL: Primary | ICD-10-CM

## 2018-07-09 RX ORDER — MAGNESIUM GLUCONATE 27.5 (500)
27.5 TABLET ORAL 2 TIMES DAILY
Refills: 0 | COMMUNITY
Start: 2018-07-09 | End: 2020-02-12

## 2018-07-09 NOTE — TELEPHONE ENCOUNTER
Please call to inform & review the results with the patient- blood work showed decreased magnesium level at 1.5 (normal 1.6-2.6) otherwise, normal electrolytes; recommend taking OTC magnesium supplement such as:    magnesium gluconate 27.5 mg (500 mg) Tab 27.5 mg, 2 times daily   Advise not to take at th same time as fosamax and synthroid (separate administration times). Recommend follow-up with Primary Care Provider for continued evaluation and management of this finding.  Kidney function levels &blood counts were unremarkable (no anemia); CRP was normal and elevated sed rate: these are inflammatory markers and can elevate with your history of Crohn's. Continue with colonoscopy as previously discussed (please schedule if not done so already). Liver function levels showed mild elevation in AST otherwise normal findings: minimize/avoid alcohol and tylenol products, & repeat level in 4-6 weeks.    Stool studies are pending, will notify once results are received and reviewed.   Continue with previous recommendations. If no improvement in symptoms or symptoms worsen, call/follow-up at clinic or go to ER.  Please release results to patient's mychart once you have discussed results and recommendations with patient.  Thanks,  Jennifer FREEMAN

## 2018-07-10 NOTE — TELEPHONE ENCOUNTER
----- Message from Aleena Layton sent at 7/10/2018  1:32 PM CDT -----  Contact: PT  Type:  Patient Returning Call    Who Called:  Pauline   Who Left Message for Patient:  Sheree   Does the patient know what this is regarding?:  n/a  Best Call Back Number:    Additional Information:   n/a

## 2018-07-13 ENCOUNTER — TELEPHONE (OUTPATIENT)
Dept: GASTROENTEROLOGY | Facility: CLINIC | Age: 78
End: 2018-07-13

## 2018-07-13 NOTE — TELEPHONE ENCOUNTER
Phone call from NO lab  Not able to add the addition orders for stool.  Specimen already discarded.

## 2018-07-16 ENCOUNTER — TELEPHONE (OUTPATIENT)
Dept: GASTROENTEROLOGY | Facility: CLINIC | Age: 78
End: 2018-07-16

## 2018-07-16 NOTE — TELEPHONE ENCOUNTER
Please call to inform & review the results with the patient- Some stool studies are still pending (occult blood, WBC & calprotectin; we will notify you once received and reviewed, but lab did not that the specimen wasted out in the bag and they may not have had enough to perform the stool studies), but the ones that have come back show negative/normal findings.  Continue with previous recommendations, including EGD and colonoscopy.    If no improvement in symptoms or symptoms worsen, call/follow-up at clinic or go to ER.  Please release results to patient's mychart once you have discussed results and recommendations with patient.  Thanks,  Jennifer FREEMAN

## 2018-07-17 ENCOUNTER — HOSPITAL ENCOUNTER (OUTPATIENT)
Facility: HOSPITAL | Age: 78
Discharge: HOME | End: 2018-07-17
Attending: INTERNAL MEDICINE | Admitting: INTERNAL MEDICINE
Payer: MEDICARE

## 2018-07-17 ENCOUNTER — ANESTHESIA (OUTPATIENT)
Dept: ENDOSCOPY | Facility: HOSPITAL | Age: 78
End: 2018-07-17
Payer: MEDICARE

## 2018-07-17 ENCOUNTER — ANESTHESIA EVENT (OUTPATIENT)
Dept: ENDOSCOPY | Facility: HOSPITAL | Age: 78
End: 2018-07-17
Payer: MEDICARE

## 2018-07-17 ENCOUNTER — SURGERY (OUTPATIENT)
Age: 78
End: 2018-07-17

## 2018-07-17 DIAGNOSIS — R13.10 DYSPHAGIA: ICD-10-CM

## 2018-07-17 PROCEDURE — 43248 EGD GUIDE WIRE INSERTION: CPT | Performed by: INTERNAL MEDICINE

## 2018-07-17 PROCEDURE — 37000008 HC ANESTHESIA 1ST 15 MINUTES: Performed by: INTERNAL MEDICINE

## 2018-07-17 PROCEDURE — 43239 EGD BIOPSY SINGLE/MULTIPLE: CPT | Mod: 59,,, | Performed by: INTERNAL MEDICINE

## 2018-07-17 PROCEDURE — D9220A PRA ANESTHESIA: Mod: CRNA,,, | Performed by: NURSE ANESTHETIST, CERTIFIED REGISTERED

## 2018-07-17 PROCEDURE — 43248 EGD GUIDE WIRE INSERTION: CPT | Mod: ,,, | Performed by: INTERNAL MEDICINE

## 2018-07-17 PROCEDURE — 63600175 PHARM REV CODE 636 W HCPCS: Performed by: NURSE ANESTHETIST, CERTIFIED REGISTERED

## 2018-07-17 PROCEDURE — D9220A PRA ANESTHESIA: Mod: ANES,,, | Performed by: ANESTHESIOLOGY

## 2018-07-17 PROCEDURE — 25000003 PHARM REV CODE 250: Performed by: INTERNAL MEDICINE

## 2018-07-17 PROCEDURE — 43239 EGD BIOPSY SINGLE/MULTIPLE: CPT | Performed by: INTERNAL MEDICINE

## 2018-07-17 PROCEDURE — 27201012 HC FORCEPS, HOT/COLD, DISP: Performed by: INTERNAL MEDICINE

## 2018-07-17 PROCEDURE — 88305 TISSUE EXAM BY PATHOLOGIST: CPT | Performed by: PATHOLOGY

## 2018-07-17 PROCEDURE — 88342 IMHCHEM/IMCYTCHM 1ST ANTB: CPT | Mod: 26,,, | Performed by: PATHOLOGY

## 2018-07-17 PROCEDURE — 37000009 HC ANESTHESIA EA ADD 15 MINS: Performed by: INTERNAL MEDICINE

## 2018-07-17 PROCEDURE — 25000003 PHARM REV CODE 250: Performed by: NURSE ANESTHETIST, CERTIFIED REGISTERED

## 2018-07-17 RX ORDER — PROPOFOL 10 MG/ML
VIAL (ML) INTRAVENOUS
Status: DISCONTINUED | OUTPATIENT
Start: 2018-07-17 | End: 2018-07-17

## 2018-07-17 RX ORDER — LIDOCAINE HYDROCHLORIDE 10 MG/ML
INJECTION, SOLUTION INTRAVENOUS
Status: DISCONTINUED | OUTPATIENT
Start: 2018-07-17 | End: 2018-07-17

## 2018-07-17 RX ORDER — SODIUM CHLORIDE 9 MG/ML
INJECTION, SOLUTION INTRAVENOUS CONTINUOUS
Status: DISCONTINUED | OUTPATIENT
Start: 2018-07-17 | End: 2018-07-17 | Stop reason: HOSPADM

## 2018-07-17 RX ADMIN — PROPOFOL 200 MG: 10 INJECTION, EMULSION INTRAVENOUS at 10:07

## 2018-07-17 RX ADMIN — SODIUM CHLORIDE 1000 ML: 0.9 INJECTION, SOLUTION INTRAVENOUS at 10:07

## 2018-07-17 RX ADMIN — LIDOCAINE HYDROCHLORIDE 50 MG: 10 INJECTION, SOLUTION INTRAVENOUS at 10:07

## 2018-07-17 RX ADMIN — PROPOFOL 50 MG: 10 INJECTION, EMULSION INTRAVENOUS at 10:07

## 2018-07-17 NOTE — DISCHARGE INSTRUCTIONS
Gastritis (Adult)    Gastritis is inflammation and irritation of the stomach lining. It can be present for a short time (acute) or be long lasting (chronic). Gastritis is often caused by infection with bacteria called H pylori. More than a third of people in the [place] have this bacteria in their bodies. In many cases, H pylori causes no problems or symptoms. In some people, though, the infection irritates the stomach lining and causes gastritis. Other causes of stomach irritation include drinking alcohol or taking pain-relieving medicines called NSAIDs (such as aspirin or ibuprofen).   Symptoms of gastritis can include:  · Abdominal pain or bloating  · Loss of appetite  · Nausea or vomiting  · Vomiting blood or having black stools  · Feeling more tired than usual  An inflamed and irritated stomach lining is more likely to develop a sore called an ulcer. To help prevent this, gastritis should be treated.  Home care  If needed, medicines may be prescribed. If you have H pylori infection, treating it will likely relieve your symptoms. Other changes can help reduce stomach irritation and help it heal.  · If you have been prescribed medicines for H pylori infection, take them as directed. Take all of the medicine until it is finished or your healthcare provider tells you to stop, even if you feel better.  · Your healthcare provider may recommend avoiding NSAIDs. If you take daily aspirin for your heart or other medical reasons, do not stop without talking to your healthcare provider first.  · Avoid drinking alcohol.  · Stop smoking. Smoking can irritate the stomach and delay healing. As much as possible, stay away from second hand smoke.  Follow-up care  Follow up with your healthcare provider, or as advised by our staff. Testing may be needed to check for inflammation or an ulcer.  When to seek medical advice  Call your healthcare provider for any of the following:  · Stomach pain that gets worse or moves to the lower  "right abdomen (appendix area)  · Chest pain that appears or gets worse, or spreads to the back, neck, shoulder, or arm  · Frequent vomiting (cant keep down liquids)  · Blood in the stool or vomit (red or black in color)  · Feeling weak or dizzy  · Fever of 100.4ºF (38ºC) or higher, or as directed by your healthcare provider  Date Last Reviewed: 6/22/2015  © 9423-3794 [employer] [address] [place] [address], [place] [address]. All rights reserved. This information is not intended as a substitute for professional medical care. Always follow your healthcare professional's instructions.      Discharge Instructions: After Your Surgery/Procedure  Youve just had surgery. During surgery you were given medicine called anesthesia to keep you relaxed and free of pain. After surgery you may have some pain or nausea. This is common. Here are some tips for feeling better and getting well after surgery.     Stay on schedule with your medication.   Going home  Your doctor or nurse will show you how to take care of yourself when you go home. He or she will also answer your questions. Have an adult family member or friend drive you home.      For your safety we recommend these precaution for the first 24 hours after your procedure:  · Do not drive or use heavy equipment.  · Do not make important decisions or sign legal papers.  · Do not drink alcohol.  · Have someone stay with you, if needed. He or she can watch for problems and help keep you safe.  · Your concentration, balance, coordination, and judgement may be impaired for many hours after anesthesia.  Use caution when ambulating or standing up.     · You may feel weak and "washed out" after anesthesia and surgery.      Subtle residual effects of general anesthesia or sedation with regional / local anesthesia can last more than 24 hours.  Rest for the remainder of the day or longer if your Doctor/Surgeon has advised you to do so.  Although you may feel normal within " the first 24 hours, your reflexes and mental ability may be impaired without you realizing it.  You may feel dizzy, lightheaded or sleepy for 24 hours or longer.      Be sure to go to all follow-up visits with your doctor. And rest after your surgery for as long as your doctor tells you to.  Coping with pain  If you have pain after surgery, pain medicine will help you feel better. Take it as told, before pain becomes severe. Also, ask your doctor or pharmacist about other ways to control pain. This might be with heat, ice, or relaxation. And follow any other instructions your surgeon or nurse gives you.  Tips for taking pain medicine  To get the best relief possible, remember these points:  · Pain medicines can upset your stomach. Taking them with a little food may help.  · Most pain relievers taken by mouth need at least 20 to 30 minutes to start to work.  · Taking medicine on a schedule can help you remember to take it. Try to time your medicine so that you can take it before starting an activity. This might be before you get dressed, go for a walk, or sit down for dinner.  · Constipation is a common side effect of pain medicines. Call your doctor before taking any medicines such as laxatives or stool softeners to help ease constipation. Also ask if you should skip any foods. Drinking lots of fluids and eating foods such as fruits and vegetables that are high in fiber can also help. Remember, do not take laxatives unless your surgeon has prescribed them.  · Drinking alcohol and taking pain medicine can cause dizziness and slow your breathing. It can even be deadly. Do not drink alcohol while taking pain medicine.  · Pain medicine can make you react more slowly to things. Do not drive or run machinery while taking pain medicine.  Your health care provider may tell you to take acetaminophen to help ease your pain. Ask him or her how much you are supposed to take each day. Acetaminophen or other pain relievers may  interact with your prescription medicines or other over-the-counter (OTC) drugs. Some prescription medicines have acetaminophen and other ingredients. Using both prescription and OTC acetaminophen for pain can cause you to overdose. Read the labels on your OTC medicines with care. This will help you to clearly know the list of ingredients, how much to take, and any warnings. It may also help you not take too much acetaminophen. If you have questions or do not understand the information, ask your pharmacist or health care provider to explain it to you before you take the OTC medicine.  Managing nausea  Some people have an upset stomach after surgery. This is often because of anesthesia, pain, or pain medicine, or the stress of surgery. These tips will help you handle nausea and eat healthy foods as you get better. If you were on a special food plan before surgery, ask your doctor if you should follow it while you get better. These tips may help:  · Do not push yourself to eat. Your body will tell you when to eat and how much.  · Start off with clear liquids and soup. They are easier to digest.  · Next try semi-solid foods, such as mashed potatoes, applesauce, and gelatin, as you feel ready.  · Slowly move to solid foods. Dont eat fatty, rich, or spicy foods at first.  · Do not force yourself to have 3 large meals a day. Instead eat smaller amounts more often.  · Take pain medicines with a small amount of solid food, such as crackers or toast, to avoid nausea.     Call your surgeon if  · You still have pain an hour after taking medicine. The medicine may not be strong enough.  · You feel too sleepy, dizzy, or groggy. The medicine may be too strong.  · You have side effects like nausea, vomiting, or skin changes, such as rash, itching, or hives.       If you have obstructive sleep apnea  You were given anesthesia medicine during surgery to keep you comfortable and free of pain. After surgery, you may have more apnea  spells because of this medicine and other medicines you were given. The spells may last longer than usual.   At home:  · Keep using the continuous positive airway pressure (CPAP) device when you sleep. Unless your health care provider tells you not to, use it when you sleep, day or night. CPAP is a common device used to treat obstructive sleep apnea.  · Talk with your provider before taking any pain medicine, muscle relaxants, or sedatives. Your provider will tell you about the possible dangers of taking these medicines.  © 9316-9461 [employer]. [address] [place] [address]. All rights reserved. This information is not intended as a substitute for professional medical care. Always follow your healthcare professional's instructions.

## 2018-07-17 NOTE — ANESTHESIA PREPROCEDURE EVALUATION
07/17/2018  Pauline Cronin is a 78 y.o., female.    Anesthesia Evaluation    I have reviewed the Patient Summary Reports.    I have reviewed the Nursing Notes.   I have reviewed the Medications.     Review of Systems  Anesthesia Hx:  Denies Family Hx of Anesthesia complications.   Denies Personal Hx of Anesthesia complications.   Cardiovascular:   Hypertension Dysrhythmias atrial fibrillation ECG has been reviewed.    Hepatic/GI:   GERD    Neurological:   Denies Neuromuscular Disease.    Endocrine:   Hypothyroidism        Physical Exam  General:  Obesity    Airway/Jaw/Neck:  Airway Findings: Mouth Opening: Normal Tongue: Normal  General Airway Assessment: Adult  Mallampati: III  Improves to II with phonation.  TM Distance: Normal, at least 6 cm      Dental:  Dental Findings: In tact, upper front caps   Chest/Lungs:  Chest/Lungs Clear    Heart/Vascular:  Heart Findings: Normal            Anesthesia Plan  Type of Anesthesia, risks & benefits discussed:  Anesthesia Type:  general  Patient's Preference:   Intra-op Monitoring Plan: standard ASA monitors  Intra-op Monitoring Plan Comments:   Post Op Pain Control Plan:   Post Op Pain Control Plan Comments:   Induction:   IV  Beta Blocker:  Patient is on a Beta-Blocker and has received one dose within the past 24 hours (No further documentation required).       Informed Consent: Patient understands risks and agrees with Anesthesia plan.  Questions answered. Anesthesia consent signed with patient.  ASA Score: 2     Day of Surgery Review of History & Physical:    H&P update referred to the provider.         Ready For Surgery From Anesthesia Perspective.

## 2018-07-17 NOTE — ANESTHESIA POSTPROCEDURE EVALUATION
Anesthesia Post Evaluation    Patient: Pauline Cronin    Procedure(s) Performed: Procedure(s) (LRB):  EGD (ESOPHAGOGASTRODUODENOSCOPY) (N/A)    Final Anesthesia Type: general  Patient location during evaluation: PACU  Patient participation: Yes- Able to Participate  Level of consciousness: awake and alert  Post-procedure vital signs: reviewed and stable  Pain management: adequate  Airway patency: patent  PONV status at discharge: No PONV  Anesthetic complications: no      Cardiovascular status: blood pressure returned to baseline  Respiratory status: unassisted  Hydration status: euvolemic  Follow-up not needed.        Visit Vitals  /65 (BP Location: Left arm, Patient Position: Lying)   Pulse 70   Temp 36.7 °C (98.1 °F) (Oral)   Resp 18   Ht 5' (1.524 m)   Wt 83.9 kg (185 lb)   SpO2 96%   Breastfeeding? No   BMI 36.13 kg/m²       Pain/Aziza Score: Pain Assessment Performed: Yes (7/17/2018 11:04 AM)  Presence of Pain: denies (7/17/2018 11:04 AM)  Aziza Score: 10 (7/17/2018 11:04 AM)  Modified Aziza Score: 20 (7/17/2018 11:04 AM)

## 2018-07-17 NOTE — PROVATION PATIENT INSTRUCTIONS
Discharge Summary/Instructions after an Endoscopic Procedure  Patient Name: Pauline Cronin  Patient MRN: [id number]  Patient YOB: 1940  Tuesday, July 17, 2018  Alondra Casiano MD  RESTRICTIONS:  During your procedure today, you received medications for sedation.  These   medications may affect your judgment, balance and coordination.  Therefore,   for 24 hours, you have the following restrictions:   - DO NOT drive a car, operate machinery, make legal/financial decisions,   sign important papers or drink alcohol.    ACTIVITY:  Today: no heavy lifting, straining or running due to procedural   sedation/anesthesia.  The following day: return to full activity including work.  DIET:  Eat and drink normally unless instructed otherwise.     TREATMENT FOR COMMON SIDE EFFECTS:  - Mild abdominal pain, nausea, belching, bloating or excessive gas:  rest,   eat lightly and use a heating pad.  - Sore Throat: treat with throat lozenges and/or gargle with warm salt   water.  - Because air was used during the procedure, expelling large amounts of air   from your rectum or belching is normal.  - If a bowel prep was taken, you may not have a bowel movement for 1-3 days.    This is normal.  SYMPTOMS TO WATCH FOR AND REPORT TO YOUR PHYSICIAN:  1. Abdominal pain or bloating, other than gas cramps.  2. Chest pain.  3. Back pain.  4. Signs of infection such as: chills or fever occurring within 24 hours   after the procedure.  5. Rectal bleeding, which would show as bright red, maroon, or black stools.   (A tablespoon of blood from the rectum is not serious, especially if   hemorrhoids are present.)  6. Vomiting.  7. Weakness or dizziness.  GO DIRECTLY TO THE NEAREST EMERGENCY ROOM IF YOU HAVE ANY OF THE FOLLOWING:      Difficulty breathing              Chills and/or fever over 101 F   Persistent vomiting and/or vomiting blood   Severe abdominal pain   Severe chest pain   Black, tarry stools   Bleeding- more than one  tablespoon   Any other symptom or condition that you feel may need urgent attention  Your doctor recommends these additional instructions:  If any biopsies were taken, your doctors clinic will contact you in 1 to 2   weeks with any results.  - Discharge patient to home (with escort).   - Patient has a contact number available for emergencies.  The signs and   symptoms of potential delayed complications were discussed with the   patient.  Return to normal activities tomorrow.  Written discharge   instructions were provided to the patient.   - Resume previous diet.   - Continue present medications.   - Await pathology results.   - Repeat upper endoscopy PRN for retreatment.   - Return to nurse practitioner PRN.  For questions, problems or results please call your physician - Alondra Casiano MD at Work:  [phone].  [facility], EMERGENCY ROOM PHONE NUMBER: [phone]  IF A COMPLICATION OR EMERGENCY SITUATION ARISES AND YOU ARE UNABLE TO REACH   YOUR PHYSICIAN - GO DIRECTLY TO THE EMERGENCY ROOM.  Alondra Casiano MD  7/17/2018 10:51:08 AM  This report has been verified and signed electronically.  PROVATION

## 2018-07-18 VITALS
TEMPERATURE: 98 F | OXYGEN SATURATION: 96 % | WEIGHT: 185 LBS | HEART RATE: 70 BPM | DIASTOLIC BLOOD PRESSURE: 65 MMHG | HEIGHT: 60 IN | RESPIRATION RATE: 18 BRPM | BODY MASS INDEX: 36.32 KG/M2 | SYSTOLIC BLOOD PRESSURE: 139 MMHG

## 2018-08-09 RX ORDER — AMLODIPINE BESYLATE 10 MG/1
TABLET ORAL
Qty: 90 TABLET | Refills: 1 | Status: SHIPPED | OUTPATIENT
Start: 2018-08-09 | End: 2019-02-04 | Stop reason: SDUPTHER

## 2018-08-17 RX ORDER — ESOMEPRAZOLE MAGNESIUM 40 MG/1
CAPSULE, DELAYED RELEASE ORAL
Qty: 30 CAPSULE | Refills: 5 | Status: SHIPPED | OUTPATIENT
Start: 2018-08-17 | End: 2019-03-24 | Stop reason: SDUPTHER

## 2018-08-31 RX ORDER — SULFASALAZINE 500 MG/1
TABLET ORAL
Qty: 180 TABLET | Refills: 1 | Status: SHIPPED | OUTPATIENT
Start: 2018-08-31 | End: 2019-03-12 | Stop reason: SDUPTHER

## 2018-08-31 RX ORDER — OXYBUTYNIN CHLORIDE 10 MG/1
TABLET, EXTENDED RELEASE ORAL
Qty: 90 TABLET | Refills: 1 | Status: SHIPPED | OUTPATIENT
Start: 2018-08-31 | End: 2019-08-29

## 2019-01-10 ENCOUNTER — CLINICAL SUPPORT (OUTPATIENT)
Dept: PRIMARY CARE CLINIC | Facility: CLINIC | Age: 79
End: 2019-01-10
Payer: MEDICARE

## 2019-01-10 DIAGNOSIS — Z23 NEED FOR PROPHYLACTIC VACCINATION AND INOCULATION AGAINST INFLUENZA: Primary | ICD-10-CM

## 2019-01-10 PROCEDURE — 90662 FLU VACCINE - HIGH DOSE (65+) PRESERVATIVE FREE IM: ICD-10-PCS | Mod: S$GLB,,, | Performed by: FAMILY MEDICINE

## 2019-01-10 PROCEDURE — 90662 IIV NO PRSV INCREASED AG IM: CPT | Mod: S$GLB,,, | Performed by: FAMILY MEDICINE

## 2019-01-10 PROCEDURE — G0008 ADMIN INFLUENZA VIRUS VAC: HCPCS | Mod: S$GLB,,, | Performed by: FAMILY MEDICINE

## 2019-01-10 PROCEDURE — G0008 FLU VACCINE - HIGH DOSE (65+) PRESERVATIVE FREE IM: ICD-10-PCS | Mod: S$GLB,,, | Performed by: FAMILY MEDICINE

## 2019-01-10 NOTE — PROGRESS NOTES
Patient ID verified by name and . NKDA. Influenza High Dose vaccine administered IM in right deltoid using aseptic technique. Aspirated with no blood noted. Patient tolerated well. Given per physicians order. No adverse reaction noted.

## 2019-02-04 RX ORDER — FOLIC ACID 1 MG/1
TABLET ORAL
Qty: 90 TABLET | Refills: 1 | Status: SHIPPED | OUTPATIENT
Start: 2019-02-04 | End: 2019-08-07 | Stop reason: SDUPTHER

## 2019-02-05 RX ORDER — AMLODIPINE BESYLATE 10 MG/1
TABLET ORAL
Qty: 90 TABLET | Refills: 1 | Status: SHIPPED | OUTPATIENT
Start: 2019-02-05 | End: 2019-08-07 | Stop reason: SDUPTHER

## 2019-02-13 ENCOUNTER — TELEPHONE (OUTPATIENT)
Dept: PRIMARY CARE CLINIC | Facility: CLINIC | Age: 79
End: 2019-02-13

## 2019-02-13 NOTE — TELEPHONE ENCOUNTER
----- Message from Bethany Scott sent at 2/13/2019 12:10 PM CST -----  Contact: Patient  Type:  Patient Returning Call    Who Called:  deysi Carpenter  Who Left Message for Patient:  ?  Does the patient know what this is regarding?:  Appointment  Best Call Back Number:  [phone]  Additional Information:  Missed your call, please call her back. Thanks.

## 2019-02-13 NOTE — TELEPHONE ENCOUNTER
Patient says her urine is really dark, like an timmy, and urinary frequency. I offered to run a urine today but she felt she needed to see the drQuinn Appointment made for her to see Dr. Waldrop tomorrow.

## 2019-02-13 NOTE — TELEPHONE ENCOUNTER
----- Message from Erika Pettit sent at 2/13/2019 10:06 AM CST -----  Type:  Same Day Appointment Request    Caller is requesting a same day appointment.  Caller declined first available appointment listed below.      Name of Caller:Patient  When is the first available appointment?  2/14/19  Symptoms:  Stomach pains/change in color of urine, dark  Best Call Back Number:  [phone] (home)     Additional Information:   Na

## 2019-02-14 ENCOUNTER — OFFICE VISIT (OUTPATIENT)
Dept: PRIMARY CARE CLINIC | Facility: CLINIC | Age: 79
End: 2019-02-14
Payer: MEDICARE

## 2019-02-14 VITALS
BODY MASS INDEX: 35.93 KG/M2 | HEART RATE: 81 BPM | WEIGHT: 183 LBS | RESPIRATION RATE: 18 BRPM | DIASTOLIC BLOOD PRESSURE: 78 MMHG | HEIGHT: 60 IN | TEMPERATURE: 98 F | SYSTOLIC BLOOD PRESSURE: 129 MMHG | OXYGEN SATURATION: 96 %

## 2019-02-14 DIAGNOSIS — R31.0 GROSS HEMATURIA: Primary | ICD-10-CM

## 2019-02-14 DIAGNOSIS — K50.919 CROHN'S DISEASE WITH COMPLICATION, UNSPECIFIED GASTROINTESTINAL TRACT LOCATION: ICD-10-CM

## 2019-02-14 DIAGNOSIS — I48.0 PAROXYSMAL ATRIAL FIBRILLATION: ICD-10-CM

## 2019-02-14 DIAGNOSIS — E66.01 SEVERE OBESITY (BMI 35.0-39.9) WITH COMORBIDITY: ICD-10-CM

## 2019-02-14 LAB
BILIRUB SERPL-MCNC: ABNORMAL MG/DL
BLOOD URINE, POC: 250
COLOR, POC UA: ABNORMAL
GLUCOSE UR QL STRIP: ABNORMAL
KETONES UR QL STRIP: ABNORMAL
LEUKOCYTE ESTERASE URINE, POC: ABNORMAL
NITRITE, POC UA: ABNORMAL
PH, POC UA: ABNORMAL
PROTEIN, POC: ABNORMAL
SPECIFIC GRAVITY, POC UA: 1.02
UROBILINOGEN, POC UA: ABNORMAL

## 2019-02-14 PROCEDURE — 87077 CULTURE AEROBIC IDENTIFY: CPT | Mod: 59

## 2019-02-14 PROCEDURE — 81001 URINALYSIS AUTO W/SCOPE: CPT | Mod: S$GLB,,, | Performed by: FAMILY MEDICINE

## 2019-02-14 PROCEDURE — 1101F PT FALLS ASSESS-DOCD LE1/YR: CPT | Mod: CPTII,S$GLB,, | Performed by: FAMILY MEDICINE

## 2019-02-14 PROCEDURE — 99999 PR PBB SHADOW E&M-EST. PATIENT-LVL IV: CPT | Mod: PBBFAC,,, | Performed by: FAMILY MEDICINE

## 2019-02-14 PROCEDURE — 87088 URINE BACTERIA CULTURE: CPT

## 2019-02-14 PROCEDURE — 1101F PR PT FALLS ASSESS DOC 0-1 FALLS W/OUT INJ PAST YR: ICD-10-PCS | Mod: CPTII,S$GLB,, | Performed by: FAMILY MEDICINE

## 2019-02-14 PROCEDURE — 87186 SC STD MICRODIL/AGAR DIL: CPT

## 2019-02-14 PROCEDURE — 99214 OFFICE O/P EST MOD 30 MIN: CPT | Mod: 25,S$GLB,, | Performed by: FAMILY MEDICINE

## 2019-02-14 PROCEDURE — 99999 PR PBB SHADOW E&M-EST. PATIENT-LVL IV: ICD-10-PCS | Mod: PBBFAC,,, | Performed by: FAMILY MEDICINE

## 2019-02-14 PROCEDURE — 87086 URINE CULTURE/COLONY COUNT: CPT

## 2019-02-14 PROCEDURE — 81001 POCT URINALYSIS, DIPSTICK OR TABLET REAGENT, AUTOMATED, WITH MICROSCOP: ICD-10-PCS | Mod: S$GLB,,, | Performed by: FAMILY MEDICINE

## 2019-02-14 PROCEDURE — 99214 PR OFFICE/OUTPT VISIT, EST, LEVL IV, 30-39 MIN: ICD-10-PCS | Mod: 25,S$GLB,, | Performed by: FAMILY MEDICINE

## 2019-02-14 RX ORDER — APIXABAN 5 MG/1
1 TABLET, FILM COATED ORAL 2 TIMES DAILY
Status: ON HOLD | COMMUNITY
Start: 2019-02-04 | End: 2021-05-10 | Stop reason: HOSPADM

## 2019-02-14 NOTE — PROGRESS NOTES
Subjective:       Patient ID: Pauline Cronin is a 78 y.o. female.    Chief Complaint: Hematuria (Patient is complaining of frequent urination and dark colored urine )    Hematuria   This is a new problem. The current episode started 1 to 4 weeks ago. The problem has been waxing and waning since onset. She describes the hematuria as gross hematuria. She reports no clotting in her urine stream. She is experiencing no pain. She describes her urine color as tea colored. Irritative symptoms do not include frequency, nocturia or urgency. Pertinent negatives include no abdominal pain, chills, dysuria, fever, flank pain, hesitancy, nausea or vomiting. Her past medical history is significant for hypertension and kidney stones. There is no history of  trauma, recent infection, STDs or UTI.     Review of Systems   Constitutional: Negative for chills and fever.   HENT: Negative for nosebleeds and trouble swallowing.    Respiratory: Negative for shortness of breath.    Cardiovascular: Negative for chest pain.   Gastrointestinal: Negative for abdominal pain, nausea and vomiting.   Genitourinary: Positive for hematuria. Negative for difficulty urinating, dysuria, flank pain, frequency, hesitancy, nocturia, pelvic pain and urgency.   Allergic/Immunologic: Negative for immunocompromised state.   Hematological: Bruises/bleeds easily.   Psychiatric/Behavioral: Negative for agitation and confusion.       Objective:      Vitals:    02/14/19 1206   BP: 129/78   BP Location: Left arm   Patient Position: Sitting   BP Method: Large (Automatic)   Pulse: 81   Resp: 18   Temp: 97.9 °F (36.6 °C)   TempSrc: Oral   SpO2: 96%   Weight: 83 kg (183 lb)   Height: 5' (1.524 m)     Physical Exam   Constitutional: She is oriented to person, place, and time. She appears well-developed and well-nourished.   HENT:   Head: Normocephalic and atraumatic.   Cardiovascular: Normal rate, regular rhythm and normal heart sounds.   Pulmonary/Chest: Effort normal  and breath sounds normal.   Abdominal: Soft. Bowel sounds are normal. She exhibits no distension. There is no tenderness. There is no CVA tenderness.   Musculoskeletal: She exhibits no edema.   Neurological: She is alert and oriented to person, place, and time.   Skin: Skin is warm and dry.   Nursing note and vitals reviewed.      Assessment:       1. Gross hematuria    2. Paroxysmal atrial fibrillation    3. Crohn's disease with complication, unspecified gastrointestinal tract location    4. Severe obesity (BMI 35.0-39.9) with comorbidity        Plan:       Gross hematuria  -     Urine culture  -     POCT urinalysis, dipstick or tablet reag  -     Ambulatory referral to Urology  -     US Retroperitoneal Complete (Kidney and; Future; Expected date: 02/14/2019  -     CBC auto differential; Future; Expected date: 02/14/2019  -     Comprehensive metabolic panel; Future; Expected date: 02/14/2019  No evidence of infection, but will send for culture.  Needs renal imaging and evaluation by Urology.  Paroxysmal atrial fibrillation  Comments:  followed by Dr. Leone    Crohn's disease with complication, unspecified gastrointestinal tract location  Comments:  no recent flares    Severe obesity (BMI 35.0-39.9) with comorbidity  Comments:  Limit carbohydrate intake to facilitate weight loss      Medication List with Changes/Refills   Current Medications    ALENDRONATE (FOSAMAX) 70 MG TABLET    Take 1 tablet (70 mg total) by mouth every 7 days.    AMLODIPINE (NORVASC) 10 MG TABLET    TAKE 1 TABLET BY MOUTH ONCE DAILY    ASPIRIN (ECOTRIN) 81 MG EC TABLET    Take 81 mg by mouth once daily.    ATORVASTATIN (LIPITOR) 20 MG TABLET    Take 1 tablet by mouth once daily.    CARVEDILOL (COREG) 6.25 MG TABLET    TAKE ONE TABLET BY MOUTH TWICE DAILY WITH FOOD    ELIQUIS 5 MG TAB    Take 1 tablet by mouth 2 (two) times daily.    ESOMEPRAZOLE (NEXIUM) 40 MG CAPSULE    TAKE ONE CAPSULE BY MOUTH ONCE DAILY    FOLIC ACID (FOLVITE) 1 MG TABLET     TAKE ONE TABLET BY MOUTH ONCE DAILY    LEVOTHYROXINE (SYNTHROID) 200 MCG TABLET    TAKE 1 TABLET BY MOUTH ON AN EMPTY STOMACH IN THE MORNING ONCE DAILY FOR 90 DAYS    LEVOTHYROXINE (SYNTHROID) 50 MCG TABLET    Take 1 tablet by mouth once daily.    MAGNESIUM GLUCONATE 27.5 MG (500 MG) TAB    Take 27.5 mg by mouth 2 (two) times daily.    OXYBUTYNIN (DITROPAN-XL) 10 MG 24 HR TABLET    TAKE ONE TABLET BY MOUTH ONCE DAILY    SOTALOL (BETAPACE) 120 MG TAB    Take 120 mg by mouth every 12 (twelve) hours.    SULFASALAZINE (AZULFIDINE) 500 MG TAB    TAKE ONE TABLET BY MOUTH TWICE DAILY   Discontinued Medications    SOTALOL (BETAPACE) 80 MG TABLET    Take 120 tablets by mouth 2 (two) times daily.

## 2019-02-18 RX ORDER — AMOXICILLIN AND CLAVULANATE POTASSIUM 500; 125 MG/1; MG/1
1 TABLET, FILM COATED ORAL 2 TIMES DAILY
Qty: 14 TABLET | Refills: 0 | Status: SHIPPED | OUTPATIENT
Start: 2019-02-18 | End: 2019-02-25

## 2019-02-20 LAB
BACTERIA UR CULT: NORMAL
BACTERIA UR CULT: NORMAL

## 2019-02-22 ENCOUNTER — TELEPHONE (OUTPATIENT)
Dept: PRIMARY CARE CLINIC | Facility: CLINIC | Age: 79
End: 2019-02-22

## 2019-02-22 RX ORDER — FLUCONAZOLE 150 MG/1
150 TABLET ORAL ONCE
Qty: 1 TABLET | Refills: 0 | Status: SHIPPED | OUTPATIENT
Start: 2019-02-22 | End: 2019-02-22

## 2019-02-22 RX ORDER — DIPHENOXYLATE HYDROCHLORIDE AND ATROPINE SULFATE 2.5; .025 MG/1; MG/1
1 TABLET ORAL 4 TIMES DAILY PRN
Qty: 20 TABLET | Refills: 0 | Status: SHIPPED | OUTPATIENT
Start: 2019-02-22 | End: 2019-10-06 | Stop reason: SDUPTHER

## 2019-02-22 NOTE — TELEPHONE ENCOUNTER
No acute abnormalities on renal ultrasound.  Prescription for Lomotil and Diflucan sent to the pharmacy.

## 2019-02-22 NOTE — TELEPHONE ENCOUNTER
I will let the patient know that the medication can cause diarrhea but it seems like she has developed a yeast infection as well. Can you send something for her?

## 2019-02-22 NOTE — TELEPHONE ENCOUNTER
----- Message from Bethany Scott sent at 2/22/2019 10:20 AM CST -----  Contact: Patient  Type: Needs Medical Advice    Who Called:  deysi Carpenter  Symptoms (please be specific):  Diarrhea, vaginal itching  How long has patient had these symptoms:  Severl days  Pharmacy name and phone #:    [facility] [id number]  [place], [place] - [address]  [address]  [facility] [address]  Phone: [phone] Fax: [phone]  Best Call Back Number: [phone]  Additional Information: Calling because Rx amoxicillin-clavulanate 500-125mg (AUGMENTIN) 500-125 mg Tab is causing diarrhea and vaginal itching. Please call her. Thanks.

## 2019-02-26 ENCOUNTER — TELEPHONE (OUTPATIENT)
Dept: PRIMARY CARE CLINIC | Facility: CLINIC | Age: 79
End: 2019-02-26

## 2019-02-26 DIAGNOSIS — R31.0 GROSS HEMATURIA: Primary | ICD-10-CM

## 2019-02-26 NOTE — TELEPHONE ENCOUNTER
Spoke with patient says she thinks the amoxicillin that she was given did not help the blood in her urine at all. She is still noticing blood in her urine atleast twice a day. She has an appointment with Dr. Benitez on 3/15 but needs to know what to do in the mean time. She is not experiencing any pain just the blood in the urine

## 2019-02-26 NOTE — TELEPHONE ENCOUNTER
----- Message from Marilou Palmer sent at 2/26/2019 11:10 AM CST -----  Contact: self   Placed call to pod, patient want to speak with a nurse regarding still having blood in her urine need some medical advice please call back at [phone] (home)

## 2019-02-27 NOTE — TELEPHONE ENCOUNTER
Spoke with patient notified her that Dr. Waldrop wants her to come in and do a repeat urine to make sure her uti resolved. She says she has not had anymore blood in her urine since yesterday evening. Says she would like to wait and see if her symptoms return then she will come do another urine sample. Nothing further discussed.

## 2019-03-07 ENCOUNTER — TELEPHONE (OUTPATIENT)
Dept: PRIMARY CARE CLINIC | Facility: CLINIC | Age: 79
End: 2019-03-07

## 2019-03-07 NOTE — TELEPHONE ENCOUNTER
----- Message from Bethany Scott sent at 3/7/2019  3:23 PM CST -----  Contact: Patient  Type: Needs Medical Advice    Who Called:  Pauline patient  Symptoms (please be specific):  Blood in urine  How long has patient had these symptoms:  Today  Pharmacy name and phone #:    [facility] [id number] - [place] - [address]  [address]  [place] [address]  Phone: [phone] Fax: [phone]  Best Call Back Number: [phone]  Additional Information: Calling because she just finished the antibiotic and has started with blood in her urine again. Please advise. Thanks.

## 2019-03-07 NOTE — TELEPHONE ENCOUNTER
Patient says was finished her antibiotics last week but now has blood in her urine. She says she does not have blood in her urine every time. She is not complaining of any other symptoms. She has an appointment with uro/gyn on the 15th. Anything she should do in the meantime?

## 2019-03-08 ENCOUNTER — CLINICAL SUPPORT (OUTPATIENT)
Dept: PRIMARY CARE CLINIC | Facility: CLINIC | Age: 79
End: 2019-03-08
Payer: MEDICARE

## 2019-03-08 DIAGNOSIS — R31.0 GROSS HEMATURIA: ICD-10-CM

## 2019-03-08 PROCEDURE — 87186 SC STD MICRODIL/AGAR DIL: CPT

## 2019-03-08 PROCEDURE — 87077 CULTURE AEROBIC IDENTIFY: CPT | Mod: 59

## 2019-03-08 PROCEDURE — 87086 URINE CULTURE/COLONY COUNT: CPT

## 2019-03-08 PROCEDURE — 87088 URINE BACTERIA CULTURE: CPT

## 2019-03-11 LAB
BACTERIA UR CULT: NORMAL
BACTERIA UR CULT: NORMAL

## 2019-03-11 RX ORDER — SULFAMETHOXAZOLE AND TRIMETHOPRIM 800; 160 MG/1; MG/1
1 TABLET ORAL 2 TIMES DAILY
Qty: 14 TABLET | Refills: 0 | Status: SHIPPED | OUTPATIENT
Start: 2019-03-11 | End: 2019-03-18

## 2019-03-12 RX ORDER — SULFASALAZINE 500 MG/1
TABLET ORAL
Qty: 180 TABLET | Refills: 1 | Status: SHIPPED | OUTPATIENT
Start: 2019-03-12 | End: 2019-09-14 | Stop reason: SDUPTHER

## 2019-03-25 RX ORDER — ESOMEPRAZOLE MAGNESIUM 40 MG/1
CAPSULE, DELAYED RELEASE ORAL
Qty: 30 CAPSULE | Refills: 5 | Status: SHIPPED | OUTPATIENT
Start: 2019-03-25 | End: 2019-09-22 | Stop reason: SDUPTHER

## 2019-04-28 DIAGNOSIS — M81.0 OSTEOPOROSIS, UNSPECIFIED OSTEOPOROSIS TYPE, UNSPECIFIED PATHOLOGICAL FRACTURE PRESENCE: ICD-10-CM

## 2019-04-29 RX ORDER — ALENDRONATE SODIUM 70 MG/1
TABLET ORAL
Qty: 12 TABLET | Refills: 3 | Status: SHIPPED | OUTPATIENT
Start: 2019-04-29 | End: 2020-04-13

## 2019-05-17 ENCOUNTER — TELEPHONE (OUTPATIENT)
Dept: PRIMARY CARE CLINIC | Facility: CLINIC | Age: 79
End: 2019-05-17

## 2019-05-17 DIAGNOSIS — Z12.39 BREAST CANCER SCREENING: Primary | ICD-10-CM

## 2019-05-17 NOTE — TELEPHONE ENCOUNTER
----- Message from Francia Fisher sent at 5/17/2019  1:08 PM CDT -----  Pt stopped in needs a order to have her yearly mammo done.

## 2019-06-05 ENCOUNTER — TELEPHONE (OUTPATIENT)
Dept: PRIMARY CARE CLINIC | Facility: CLINIC | Age: 79
End: 2019-06-05

## 2019-06-05 NOTE — TELEPHONE ENCOUNTER
When giving patient results of mammogram, patient stated that Dr. Leone's office had called her with lab work and stated that she needed her thyroid medication changed. Called [facility] per patient request. Spoke with Rodger at [facility] she is faxing lab work over to our office.

## 2019-06-06 ENCOUNTER — OFFICE VISIT (OUTPATIENT)
Dept: PRIMARY CARE CLINIC | Facility: CLINIC | Age: 79
End: 2019-06-06
Payer: MEDICARE

## 2019-06-06 VITALS
WEIGHT: 184.63 LBS | OXYGEN SATURATION: 96 % | BODY MASS INDEX: 36.25 KG/M2 | SYSTOLIC BLOOD PRESSURE: 139 MMHG | DIASTOLIC BLOOD PRESSURE: 71 MMHG | HEIGHT: 60 IN | RESPIRATION RATE: 18 BRPM | TEMPERATURE: 98 F | HEART RATE: 80 BPM

## 2019-06-06 DIAGNOSIS — E03.9 HYPOTHYROIDISM, UNSPECIFIED TYPE: Primary | ICD-10-CM

## 2019-06-06 DIAGNOSIS — R31.29 MICROSCOPIC HEMATURIA: ICD-10-CM

## 2019-06-06 DIAGNOSIS — Z23 NEED FOR VACCINATION: ICD-10-CM

## 2019-06-06 PROCEDURE — G0009 ADMIN PNEUMOCOCCAL VACCINE: HCPCS | Mod: S$GLB,,, | Performed by: NURSE PRACTITIONER

## 2019-06-06 PROCEDURE — 90732 PNEUMOCOCCAL POLYSACCHARIDE VACCINE 23-VALENT =>2YO SQ IM: ICD-10-PCS | Mod: S$GLB,,, | Performed by: NURSE PRACTITIONER

## 2019-06-06 PROCEDURE — 1101F PT FALLS ASSESS-DOCD LE1/YR: CPT | Mod: CPTII,S$GLB,, | Performed by: NURSE PRACTITIONER

## 2019-06-06 PROCEDURE — 99213 OFFICE O/P EST LOW 20 MIN: CPT | Mod: 25,S$GLB,, | Performed by: NURSE PRACTITIONER

## 2019-06-06 PROCEDURE — 1101F PR PT FALLS ASSESS DOC 0-1 FALLS W/OUT INJ PAST YR: ICD-10-PCS | Mod: CPTII,S$GLB,, | Performed by: NURSE PRACTITIONER

## 2019-06-06 PROCEDURE — 90732 PPSV23 VACC 2 YRS+ SUBQ/IM: CPT | Mod: S$GLB,,, | Performed by: NURSE PRACTITIONER

## 2019-06-06 PROCEDURE — 99999 PR PBB SHADOW E&M-EST. PATIENT-LVL IV: CPT | Mod: PBBFAC,,, | Performed by: NURSE PRACTITIONER

## 2019-06-06 PROCEDURE — 99999 PR PBB SHADOW E&M-EST. PATIENT-LVL IV: ICD-10-PCS | Mod: PBBFAC,,, | Performed by: NURSE PRACTITIONER

## 2019-06-06 PROCEDURE — G0009 PNEUMOCOCCAL POLYSACCHARIDE VACCINE 23-VALENT =>2YO SQ IM: ICD-10-PCS | Mod: S$GLB,,, | Performed by: NURSE PRACTITIONER

## 2019-06-06 PROCEDURE — 99213 PR OFFICE/OUTPT VISIT, EST, LEVL III, 20-29 MIN: ICD-10-PCS | Mod: 25,S$GLB,, | Performed by: NURSE PRACTITIONER

## 2019-06-06 RX ORDER — LEVOTHYROXINE SODIUM 150 UG/1
1 TABLET ORAL DAILY
COMMUNITY
Start: 2019-05-21 | End: 2021-07-23 | Stop reason: SDUPTHER

## 2019-06-06 NOTE — PROGRESS NOTES
Verified pt ID using name and . NKDA. Administered pneumonia 23 in right deltoid per physician order using aseptic technique. Aspirated and no blood return noted. Pt tolerated well with no adverse reactions noted.

## 2019-06-06 NOTE — PROGRESS NOTES
Chief Complaint  Chief Complaint   Patient presents with    Follow-up     lab work       HPI    Pauline Cronin is a 79 y.o. female that presents for medication adjustment.    Hypothyroidism-patient recently changed from 250-220 to 175 mcg of Synthroid due to decreased TSH on lab work.  Presents to clinic with Cardiology lab work with noted TSH of 0.17.  Patient states that she had been on 175 mcg of Synthroid for the lab work.  Had been on this dosage for approximately 2 weeks before the recheck.  Presents to clinic today to recheck -sent from Dr. Leone is office for lab work.  Discussed with patient that is not appropriate to recheck TSH, T4 when only on a new medication dosage for 2 weeks.  Should ideally check between 4-6 weeks on new dosage.  Patient reports no side effects on current dosage.  Continues to complain of generalized fatigue.  Unchanged since her dosage weaned.  No heat or cold intolerance.  No hair loss.        PAST MEDICAL HISTORY:  Past Medical History:   Diagnosis Date    Atrial fibrillation     Crohn disease diagnosed in her 20's    GERD (gastroesophageal reflux disease)     Hyperlipidemia     Hypertension        PAST SURGICAL HISTORY:  Past Surgical History:   Procedure Laterality Date    APPENDECTOMY      COLONOSCOPY  ~2008    normal findings per patient report    EGD (ESOPHAGOGASTRODUODENOSCOPY) N/A 7/17/2018    Per[facility] ENDO    HYSTERECTOMY      SMALL INTESTINE SURGERY  in her 20's    for crohn's    TONSILLECTOMY      UPPER GASTROINTESTINAL ENDOSCOPY  ~2008    normal findings per patient report       SOCIAL HISTORY:  Social History     Socioeconomic History    Marital status:      Spouse name: Not on file    Number of children: Not on file    Years of education: Not on file    Highest education level: Not on file   Occupational History    Not on file   Social Needs    Financial resource strain: Not on file    Food insecurity:      Worry: Not on file     Inability: Not on file    Transportation needs:     Medical: Not on file     Non-medical: Not on file   Tobacco Use    Smoking status: Never Smoker    Smokeless tobacco: Never Used   Substance and Sexual Activity    Alcohol use: No    Drug use: No    Sexual activity: Never   Lifestyle    Physical activity:     Days per week: Not on file     Minutes per session: Not on file    Stress: Not on file   Relationships    Social connections:     Talks on phone: Not on file     Gets together: Not on file     Attends Quaker service: Not on file     Active member of club or organization: Not on file     Attends meetings of clubs or organizations: Not on file     Relationship status: Not on file   Other Topics Concern    Not on file   Social History Narrative    Not on file       FAMILY HISTORY:  Family History   Problem Relation Age of Onset    Cancer Mother     Breast cancer Mother     Crohn's disease Other     Colon cancer Neg Hx     Colon polyps Neg Hx     Esophageal cancer Neg Hx     Stomach cancer Neg Hx     Ulcerative colitis Neg Hx        ALLERGIES AND MEDICATIONS: updated and reviewed.  Review of patient's allergies indicates:  No Known Allergies  Current Outpatient Medications   Medication Sig Dispense Refill    alendronate (FOSAMAX) 70 MG tablet TAKE 1 TABLET BY MOUTH ONCE A WEEK 12 tablet 3    amLODIPine (NORVASC) 10 MG tablet TAKE 1 TABLET BY MOUTH ONCE DAILY 90 tablet 1    atorvastatin (LIPITOR) 20 MG tablet Take 1 tablet by mouth once daily.      carvedilol (COREG) 6.25 MG tablet TAKE ONE TABLET BY MOUTH TWICE DAILY WITH FOOD 180 tablet 0    diphenoxylate-atropine 2.5-0.025 mg (LOMOTIL) 2.5-0.025 mg per tablet Take 1 tablet by mouth 4 (four) times daily as needed for Diarrhea. 20 tablet 0    ELIQUIS 5 mg Tab Take 1 tablet by mouth 2 (two) times daily.      esomeprazole (NEXIUM) 40 MG capsule TAKE 1 CAPSULE BY MOUTH ONCE DAILY 30 capsule 5    folic acid  (FOLVITE) 1 MG tablet TAKE ONE TABLET BY MOUTH ONCE DAILY 90 tablet 1    levothyroxine (SYNTHROID, LEVOTHROID) 175 MCG tablet Take 1 tablet by mouth once daily.      magnesium gluconate 27.5 mg (500 mg) Tab Take 27.5 mg by mouth 2 (two) times daily.  0    oxybutynin (DITROPAN-XL) 10 MG 24 hr tablet TAKE ONE TABLET BY MOUTH ONCE DAILY 90 tablet 1    sotalol (BETAPACE) 120 MG Tab Take 120 mg by mouth every 12 (twelve) hours.      sulfaSALAzine (AZULFIDINE) 500 mg Tab TAKE 1 TABLET BY MOUTH TWICE DAILY 180 tablet 1     No current facility-administered medications for this visit.          ROS  Review of Systems   Constitutional: Positive for fatigue. Negative for chills and fever.   HENT: Negative for ear pain, postnasal drip and sinus pain.    Respiratory: Negative for cough and shortness of breath.    Cardiovascular: Positive for palpitations. Negative for chest pain.   Gastrointestinal: Negative for diarrhea, nausea and vomiting.   Endocrine: Negative for cold intolerance and heat intolerance.           PHYSICAL EXAM  Vitals:    06/06/19 1051   BP: 139/71   BP Location: Right arm   Patient Position: Sitting   BP Method: Large (Automatic)   Pulse: 80   Resp: 18   Temp: 98.1 °F (36.7 °C)   TempSrc: Oral   SpO2: 96%   Weight: 83.7 kg (184 lb 9.6 oz)   Height: 5' (1.524 m)    Body mass index is 36.05 kg/m².  Weight: 83.7 kg (184 lb 9.6 oz)   Height: 5' (152.4 cm)     Physical Exam   Constitutional: She is oriented to person, place, and time. She appears well-developed and well-nourished.   HENT:   Head: Normocephalic.   Right Ear: Tympanic membrane normal.   Left Ear: Tympanic membrane normal.   Mouth/Throat: Uvula is midline, oropharynx is clear and moist and mucous membranes are normal.   Eyes: Conjunctivae are normal.   Cardiovascular: Normal rate, regular rhythm, normal heart sounds and normal pulses.   No murmur heard.  Pulses:       Radial pulses are 2+ on the right side, and 2+ on the left side.   No LE  swelling noted   Pulmonary/Chest: Effort normal and breath sounds normal. She has no wheezes.   Abdominal: Soft. Bowel sounds are normal. There is no tenderness.   Musculoskeletal: She exhibits no edema.   Lymphadenopathy:     She has no cervical adenopathy.   Neurological: She is alert and oriented to person, place, and time.   Skin: Skin is warm and dry. No rash noted.   Psychiatric: She has a normal mood and affect.         Health Maintenance       Date Due Completion Date    TETANUS VACCINE 03/26/1958 ---    Shingles Vaccine (2 of 3) 09/11/2017 7/17/2017    Pneumococcal Vaccine (65+ Low/Medium Risk) (2 of 2 - PPSV23) 04/19/2019 4/19/2018    Influenza Vaccine 08/01/2019 1/10/2019    DEXA SCAN 05/14/2021 5/14/2018    Lipid Panel 01/14/2024 1/14/2019 (Done)    Override on 1/14/2019: Done    Override on 3/19/2018: Done (Dr. Leone's office)            Assessment & Plan    Problem List Items Addressed This Visit        Unprioritized    Hypothyroidism - Primary    Relevant Medications    levothyroxine (SYNTHROID, LEVOTHROID) 175 MCG tablet  Recheck thyroid levels in 4 weeks for a total of 6 weeks from dosage change.    Other Relevant Orders    TSH    T4    T3      Other Visit Diagnoses     Microscopic hematuria        Relevant Orders    POCT URINE DIPSTICK WITHOUT MICROSCOPE  Patient unable to urinate at this time.  Recheck urine for hematuria at follow-up visit with Dr. Waldrop.    Need for vaccination        Relevant Orders    (In Office Administered) Pneumococcal Polysaccharide Vaccine (23 Valent) (SQ/IM)  Pneumonia vaccine today.          Follow-up: Follow up in about 1 month (around 7/4/2019) for Four weeks with Benjie.    Machelle Arnold    Medication List with Changes/Refills   Current Medications    ALENDRONATE (FOSAMAX) 70 MG TABLET    TAKE 1 TABLET BY MOUTH ONCE A WEEK    AMLODIPINE (NORVASC) 10 MG TABLET    TAKE 1 TABLET BY MOUTH ONCE DAILY    ATORVASTATIN (LIPITOR) 20 MG TABLET    Take 1 tablet by mouth  once daily.    CARVEDILOL (COREG) 6.25 MG TABLET    TAKE ONE TABLET BY MOUTH TWICE DAILY WITH FOOD    DIPHENOXYLATE-ATROPINE 2.5-0.025 MG (LOMOTIL) 2.5-0.025 MG PER TABLET    Take 1 tablet by mouth 4 (four) times daily as needed for Diarrhea.    ELIQUIS 5 MG TAB    Take 1 tablet by mouth 2 (two) times daily.    ESOMEPRAZOLE (NEXIUM) 40 MG CAPSULE    TAKE 1 CAPSULE BY MOUTH ONCE DAILY    FOLIC ACID (FOLVITE) 1 MG TABLET    TAKE ONE TABLET BY MOUTH ONCE DAILY    LEVOTHYROXINE (SYNTHROID, LEVOTHROID) 175 MCG TABLET    Take 1 tablet by mouth once daily.    MAGNESIUM GLUCONATE 27.5 MG (500 MG) TAB    Take 27.5 mg by mouth 2 (two) times daily.    OXYBUTYNIN (DITROPAN-XL) 10 MG 24 HR TABLET    TAKE ONE TABLET BY MOUTH ONCE DAILY    SOTALOL (BETAPACE) 120 MG TAB    Take 120 mg by mouth every 12 (twelve) hours.    SULFASALAZINE (AZULFIDINE) 500 MG TAB    TAKE 1 TABLET BY MOUTH TWICE DAILY   Discontinued Medications    ASPIRIN (ECOTRIN) 81 MG EC TABLET    Take 81 mg by mouth once daily.    LEVOTHYROXINE (SYNTHROID) 200 MCG TABLET    TAKE 1 TABLET BY MOUTH ON AN EMPTY STOMACH IN THE MORNING ONCE DAILY FOR 90 DAYS    LEVOTHYROXINE (SYNTHROID) 50 MCG TABLET    Take 1 tablet by mouth once daily.

## 2019-07-10 ENCOUNTER — TELEPHONE (OUTPATIENT)
Dept: GASTROENTEROLOGY | Facility: CLINIC | Age: 79
End: 2019-07-10

## 2019-07-10 NOTE — TELEPHONE ENCOUNTER
"----- Message from Boxee sent at 7/10/2019  3:50 PM CDT -----  Regarding: Cancellation of Order # [id number]  Order number [id number] for the procedure WBC, STOOL [[id number]] has  been canceled by [facility] [[id number]]. This procedure  was ordered by ARIEL Coronado [[id number]] on Jul 3, 2018   for the patient Pauline Cronin [[id number]]. The reason for   cancellation was "None".    This was a future order.  "

## 2019-07-10 NOTE — TELEPHONE ENCOUNTER
""  Canceled Other Luis, Soft [facility] Interface 7/10/19 1550   WBC Stool was cancelled on 07/10/2019 at 15:50 by PATIENCE; Specimen not sent to lab for testing      "  &  "Canceled Other Luis, Soft Lab Interface 7/10/19 1549 specimen wasted out in bag doctor office notified and will call pt back. Occult Blood, Stool (diagnostic) was cancelled on 07/10/2019 at 15:49 by PATIENCE; Specimen not sent to lab for testing"  "

## 2019-08-07 RX ORDER — FOLIC ACID 1 MG/1
TABLET ORAL
Qty: 90 TABLET | Refills: 1 | Status: SHIPPED | OUTPATIENT
Start: 2019-08-07 | End: 2020-01-31

## 2019-08-07 RX ORDER — AMLODIPINE BESYLATE 10 MG/1
TABLET ORAL
Qty: 90 TABLET | Refills: 1 | Status: SHIPPED | OUTPATIENT
Start: 2019-08-07 | End: 2020-01-31

## 2019-08-29 ENCOUNTER — TELEPHONE (OUTPATIENT)
Dept: PRIMARY CARE CLINIC | Facility: CLINIC | Age: 79
End: 2019-08-29

## 2019-08-29 RX ORDER — OXYBUTYNIN CHLORIDE 15 MG/1
15 TABLET, EXTENDED RELEASE ORAL DAILY
Qty: 30 TABLET | Refills: 5 | Status: SHIPPED | OUTPATIENT
Start: 2019-08-29 | End: 2020-03-03

## 2019-08-29 NOTE — TELEPHONE ENCOUNTER
Can patient have dosage increased of oxybutynin (DITROPAN-XL) 10 MG. Patient states it is not working anymore, and she is starting to have leakage. Please advise

## 2019-08-29 NOTE — TELEPHONE ENCOUNTER
----- Message from Sri Rosales sent at 8/29/2019 11:36 AM CDT -----  Contact: pt [phone]  Patient is calling for an RX refill or new RX.  Is this a refill or new RX:  New   RX name and strength: oxybutynin (DITROPAN-XL) 10 MG 24 hr tablet  Directions (copy/paste from chart):  TAKE ONE TABLET BY MOUTH ONCE DAILY  Is this a 30 day or 90 day RX:  30  Local pharmacy or mail order pharmacy:  Local   Pharmacy name and phone # (copy/paste from chart):   [facility] [address] - [place], [place] - [address] [phone] (Phone)  [phone] (Fax)  Comments:  Pt requesting to have dosage increased.  Please advise

## 2019-09-16 RX ORDER — SULFASALAZINE 500 MG/1
TABLET ORAL
Qty: 180 TABLET | Refills: 1 | Status: SHIPPED | OUTPATIENT
Start: 2019-09-16 | End: 2020-03-23

## 2019-09-23 RX ORDER — ESOMEPRAZOLE MAGNESIUM 40 MG/1
CAPSULE, DELAYED RELEASE ORAL
Qty: 30 CAPSULE | Refills: 5 | Status: SHIPPED | OUTPATIENT
Start: 2019-09-23 | End: 2020-03-23

## 2019-10-07 RX ORDER — DIPHENOXYLATE HYDROCHLORIDE AND ATROPINE SULFATE 2.5; .025 MG/1; MG/1
TABLET ORAL
Qty: 20 TABLET | Refills: 0 | Status: SHIPPED | OUTPATIENT
Start: 2019-10-07 | End: 2020-02-03

## 2019-10-24 LAB
A1C: 5.5
CHOLESTEROL, TOTAL: 140
HDLC SERPL-MCNC: 58 MG/DL
LDLC SERPL CALC-MCNC: 50 MG/DL
TRIGL SERPL-MCNC: 158 MG/DL

## 2020-01-23 ENCOUNTER — CLINICAL SUPPORT (OUTPATIENT)
Dept: PRIMARY CARE CLINIC | Facility: CLINIC | Age: 80
End: 2020-01-23
Payer: MEDICARE

## 2020-01-23 ENCOUNTER — OFFICE VISIT (OUTPATIENT)
Dept: PRIMARY CARE CLINIC | Facility: CLINIC | Age: 80
End: 2020-01-23
Payer: MEDICARE

## 2020-01-23 VITALS
DIASTOLIC BLOOD PRESSURE: 66 MMHG | HEART RATE: 87 BPM | SYSTOLIC BLOOD PRESSURE: 130 MMHG | OXYGEN SATURATION: 94 % | WEIGHT: 189 LBS | BODY MASS INDEX: 37.11 KG/M2 | RESPIRATION RATE: 18 BRPM | HEIGHT: 60 IN | TEMPERATURE: 98 F

## 2020-01-23 DIAGNOSIS — E03.9 HYPOTHYROIDISM, UNSPECIFIED TYPE: ICD-10-CM

## 2020-01-23 DIAGNOSIS — I48.0 PAROXYSMAL ATRIAL FIBRILLATION: ICD-10-CM

## 2020-01-23 DIAGNOSIS — R31.0 GROSS HEMATURIA: ICD-10-CM

## 2020-01-23 DIAGNOSIS — K50.919 CROHN'S DISEASE WITH COMPLICATION, UNSPECIFIED GASTROINTESTINAL TRACT LOCATION: ICD-10-CM

## 2020-01-23 DIAGNOSIS — R31.0 GROSS HEMATURIA: Primary | ICD-10-CM

## 2020-01-23 DIAGNOSIS — E66.01 SEVERE OBESITY (BMI 35.0-39.9) WITH COMORBIDITY: ICD-10-CM

## 2020-01-23 LAB
ALBUMIN SERPL BCP-MCNC: 4.3 G/DL (ref 3.5–5.2)
ALP SERPL-CCNC: 40 U/L (ref 38–126)
ALT SERPL W/O P-5'-P-CCNC: 27 U/L (ref 14–54)
ANION GAP SERPL CALC-SCNC: 10 MMOL/L (ref 8–16)
AST SERPL-CCNC: 36 U/L (ref 15–41)
BASOPHILS # BLD AUTO: 0 K/UL (ref 0–0.2)
BASOPHILS NFR BLD: 0.8 % (ref 0–1.9)
BILIRUB SERPL-MCNC: 0.9 MG/DL (ref 0.3–1.2)
BILIRUB SERPL-MCNC: ABNORMAL MG/DL
BLOOD URINE, POC: 250
BUN SERPL-MCNC: 11 MG/DL (ref 8–23)
CALCIUM SERPL-MCNC: 9.6 MG/DL (ref 8.6–10)
CHLORIDE SERPL-SCNC: 103 MMOL/L (ref 101–111)
CO2 SERPL-SCNC: 25 MMOL/L (ref 23–29)
COLOR, POC UA: ABNORMAL
CREAT SERPL-MCNC: 0.8 MG/DL (ref 0.5–1.4)
DIFFERENTIAL METHOD: ABNORMAL
EOSINOPHIL # BLD AUTO: 0.1 K/UL (ref 0–0.5)
EOSINOPHIL NFR BLD: 2.6 % (ref 0–8)
ERYTHROCYTE [DISTWIDTH] IN BLOOD BY AUTOMATED COUNT: 14.7 % (ref 11.5–14.5)
EST. GFR  (AFRICAN AMERICAN): >60 ML/MIN/1.73 M^2
EST. GFR  (NON AFRICAN AMERICAN): >60 ML/MIN/1.73 M^2
GLUCOSE SERPL-MCNC: 97 MG/DL (ref 74–118)
GLUCOSE UR QL STRIP: ABNORMAL
HCT VFR BLD AUTO: 44.1 % (ref 37–48.5)
HGB BLD-MCNC: 14.7 G/DL (ref 12–16)
KETONES UR QL STRIP: ABNORMAL
LEUKOCYTE ESTERASE URINE, POC: ABNORMAL
LYMPHOCYTES # BLD AUTO: 1.2 K/UL (ref 1–4.8)
LYMPHOCYTES NFR BLD: 29.9 % (ref 18–48)
MCH RBC QN AUTO: 31.7 PG (ref 27–31)
MCHC RBC AUTO-ENTMCNC: 33.4 G/DL (ref 32–36)
MCV RBC AUTO: 95 FL (ref 82–98)
MONOCYTES # BLD AUTO: 0.4 K/UL (ref 0.3–1)
MONOCYTES NFR BLD: 9.3 % (ref 4–15)
NEUTROPHILS # BLD AUTO: 2.4 K/UL (ref 1.8–7.7)
NEUTROPHILS NFR BLD: 57.4 % (ref 38–73)
NITRITE, POC UA: ABNORMAL
PH, POC UA: ABNORMAL
PLATELET # BLD AUTO: 191 K/UL (ref 150–350)
PMV BLD AUTO: 10.4 FL (ref 9.2–12.9)
POTASSIUM SERPL-SCNC: 3.7 MMOL/L (ref 3.5–5.1)
PROT SERPL-MCNC: 7.7 G/DL (ref 6–8.4)
PROTEIN, POC: ABNORMAL
RBC # BLD AUTO: 4.64 M/UL (ref 4–5.4)
SODIUM SERPL-SCNC: 138 MMOL/L (ref 136–145)
SPECIFIC GRAVITY, POC UA: 1
T3 SERPL-MCNC: 66 NG/DL (ref 60–180)
T4 SERPL-MCNC: 17 UG/DL (ref 4.5–11.5)
TSH SERPL DL<=0.005 MIU/L-ACNC: 0.81 UIU/ML (ref 0.45–5.33)
UROBILINOGEN, POC UA: ABNORMAL
WBC # BLD AUTO: 4.2 K/UL (ref 3.9–12.7)

## 2020-01-23 PROCEDURE — 36415 COLL VENOUS BLD VENIPUNCTURE: CPT | Mod: S$GLB,,, | Performed by: FAMILY MEDICINE

## 2020-01-23 PROCEDURE — 1101F PR PT FALLS ASSESS DOC 0-1 FALLS W/OUT INJ PAST YR: ICD-10-PCS | Mod: CPTII,S$GLB,, | Performed by: FAMILY MEDICINE

## 2020-01-23 PROCEDURE — 85025 COMPLETE CBC W/AUTO DIFF WBC: CPT

## 2020-01-23 PROCEDURE — 81001 POCT URINALYSIS, DIPSTICK OR TABLET REAGENT, AUTOMATED, WITH MICROSCOP: ICD-10-PCS | Mod: S$GLB,,, | Performed by: FAMILY MEDICINE

## 2020-01-23 PROCEDURE — 84436 ASSAY OF TOTAL THYROXINE: CPT

## 2020-01-23 PROCEDURE — 99214 PR OFFICE/OUTPT VISIT, EST, LEVL IV, 30-39 MIN: ICD-10-PCS | Mod: 25,S$GLB,, | Performed by: FAMILY MEDICINE

## 2020-01-23 PROCEDURE — 1126F PR PAIN SEVERITY QUANTIFIED, NO PAIN PRESENT: ICD-10-PCS | Mod: S$GLB,,, | Performed by: FAMILY MEDICINE

## 2020-01-23 PROCEDURE — 1159F MED LIST DOCD IN RCRD: CPT | Mod: S$GLB,,, | Performed by: FAMILY MEDICINE

## 2020-01-23 PROCEDURE — 84443 ASSAY THYROID STIM HORMONE: CPT

## 2020-01-23 PROCEDURE — 99214 OFFICE O/P EST MOD 30 MIN: CPT | Mod: 25,S$GLB,, | Performed by: FAMILY MEDICINE

## 2020-01-23 PROCEDURE — 84480 ASSAY TRIIODOTHYRONINE (T3): CPT

## 2020-01-23 PROCEDURE — 1126F AMNT PAIN NOTED NONE PRSNT: CPT | Mod: S$GLB,,, | Performed by: FAMILY MEDICINE

## 2020-01-23 PROCEDURE — 87086 URINE CULTURE/COLONY COUNT: CPT

## 2020-01-23 PROCEDURE — 99999 PR PBB SHADOW E&M-EST. PATIENT-LVL III: ICD-10-PCS | Mod: PBBFAC,,, | Performed by: FAMILY MEDICINE

## 2020-01-23 PROCEDURE — 1159F PR MEDICATION LIST DOCUMENTED IN MEDICAL RECORD: ICD-10-PCS | Mod: S$GLB,,, | Performed by: FAMILY MEDICINE

## 2020-01-23 PROCEDURE — 1101F PT FALLS ASSESS-DOCD LE1/YR: CPT | Mod: CPTII,S$GLB,, | Performed by: FAMILY MEDICINE

## 2020-01-23 PROCEDURE — 99999 PR PBB SHADOW E&M-EST. PATIENT-LVL III: CPT | Mod: PBBFAC,,, | Performed by: FAMILY MEDICINE

## 2020-01-23 PROCEDURE — 80053 COMPREHEN METABOLIC PANEL: CPT

## 2020-01-23 PROCEDURE — 81001 URINALYSIS AUTO W/SCOPE: CPT | Mod: S$GLB,,, | Performed by: FAMILY MEDICINE

## 2020-01-23 PROCEDURE — 36415 PR COLLECTION VENOUS BLOOD,VENIPUNCTURE: ICD-10-PCS | Mod: S$GLB,,, | Performed by: FAMILY MEDICINE

## 2020-01-23 NOTE — PROGRESS NOTES
"Subjective:       Patient ID: Pauline Cronin is a 79 y.o. female.    Chief Complaint: Headache (x3 days) and Hematuria    Hematuria   This is a recurrent problem. The current episode started in the past 7 days. The problem has been waxing and waning since onset. She describes the hematuria as gross hematuria. She reports no clotting in her urine stream. She is experiencing no pain. She describes her urine color as dark red. Irritative symptoms do not include frequency, nocturia or urgency. Associated symptoms include nausea. Pertinent negatives include no abdominal pain, chills, dysuria, fever, flank pain or vomiting. Her past medical history is significant for hypertension and kidney stones. There is no history of STDs.   Similar episode last year, ultimately had UTI. Renal US normal at that time, no recurrence until the past 3 days, says she just feels "yucky"  Review of Systems   Constitutional: Negative for chills and fever.   Respiratory: Negative for shortness of breath.    Cardiovascular: Negative for chest pain and palpitations.   Gastrointestinal: Positive for nausea. Negative for abdominal pain and vomiting.   Genitourinary: Positive for hematuria. Negative for dysuria, flank pain, frequency, nocturia and urgency.   Neurological: Positive for headaches.       Objective:      Vitals:    01/23/20 1000   BP: 130/66   BP Location: Left arm   Patient Position: Sitting   BP Method: Large (Manual)   Pulse: 87   Resp: 18   Temp: 97.8 °F (36.6 °C)   TempSrc: Oral   SpO2: (!) 94%   Weight: 85.7 kg (189 lb)   Height: 5' (1.524 m)     Physical Exam   Constitutional: She is oriented to person, place, and time. She appears well-developed and well-nourished.   HENT:   Head: Normocephalic and atraumatic.   Cardiovascular: Normal rate, regular rhythm and normal heart sounds.   Pulmonary/Chest: Effort normal and breath sounds normal.   Abdominal: Soft. She exhibits no distension. There is no tenderness.   Musculoskeletal: " She exhibits no edema.   Neurological: She is alert and oriented to person, place, and time.   Skin: Skin is warm and dry.   Psychiatric: She has a normal mood and affect. Her behavior is normal.   Nursing note and vitals reviewed.      Lab Results   Component Value Date    WBC 4.80 02/14/2019    HGB 14.6 02/14/2019    HCT 43.6 02/14/2019     02/14/2019    ALT 24 02/14/2019    AST 32 02/14/2019     02/14/2019    K 3.4 (L) 02/14/2019     02/14/2019    CREATININE 0.7 02/14/2019    BUN 9 02/14/2019    CO2 28 02/14/2019      Assessment:       1. Gross hematuria    2. Severe obesity (BMI 35.0-39.9) with comorbidity    3. Crohn's disease with complication, unspecified gastrointestinal tract location    4. Paroxysmal atrial fibrillation    5. Hypothyroidism, unspecified type        Plan:       Gross hematuria  -     POCT urinalysis, dipstick or tablet reag  -     Urine culture  -     CBC auto differential; Future; Expected date: 01/23/2020  -     Comprehensive metabolic panel; Future; Expected date: 01/23/2020   If culture negative and symptoms persist, will need CT urogram and urology eval  Severe obesity (BMI 35.0-39.9) with comorbidity  Low carb diet  Crohn's disease with complication, unspecified gastrointestinal tract location  -     CBC auto differential; Future; Expected date: 01/23/2020  -     Comprehensive metabolic panel; Future; Expected date: 01/23/2020  Stable, no recent flares  Paroxysmal atrial fibrillation  Stabled, followed regularly by Dr. Leone  Hypothyroidism, unspecified type  -     TSH; Future; Expected date: 01/23/2020      Medication List with Changes/Refills   Current Medications    ALENDRONATE (FOSAMAX) 70 MG TABLET    TAKE 1 TABLET BY MOUTH ONCE A WEEK    AMLODIPINE (NORVASC) 10 MG TABLET    TAKE 1 TABLET BY MOUTH ONCE DAILY    ATORVASTATIN (LIPITOR) 20 MG TABLET    Take 1 tablet by mouth once daily.    CARVEDILOL (COREG) 6.25 MG TABLET    TAKE ONE TABLET BY MOUTH TWICE DAILY  WITH FOOD    DIPHENOXYLATE-ATROPINE 2.5-0.025 MG (LOMOTIL) 2.5-0.025 MG PER TABLET    TAKE 1 TABLET BY MOUTH 4 TIMES DAILY AS NEEDED FOR DIARRHEA    ELIQUIS 5 MG TAB    Take 1 tablet by mouth 2 (two) times daily.    ESOMEPRAZOLE (NEXIUM) 40 MG CAPSULE    TAKE 1 CAPSULE BY MOUTH ONCE DAILY    FOLIC ACID (FOLVITE) 1 MG TABLET    TAKE 1 TABLET BY MOUTH ONCE DAILY    LEVOTHYROXINE (SYNTHROID, LEVOTHROID) 175 MCG TABLET    Take 1 tablet by mouth once daily.    MAGNESIUM GLUCONATE 27.5 MG (500 MG) TAB    Take 27.5 mg by mouth 2 (two) times daily.    OXYBUTYNIN (DITROPAN XL) 15 MG TR24    Take 1 tablet (15 mg total) by mouth once daily.    SOTALOL (BETAPACE) 120 MG TAB    Take 120 mg by mouth every 12 (twelve) hours.    SULFASALAZINE (AZULFIDINE) 500 MG TAB    TAKE 1 TABLET BY MOUTH TWICE DAILY

## 2020-01-25 LAB
BACTERIA UR CULT: NORMAL
BACTERIA UR CULT: NORMAL

## 2020-01-31 RX ORDER — FOLIC ACID 1 MG/1
TABLET ORAL
Qty: 90 TABLET | Refills: 1 | Status: SHIPPED | OUTPATIENT
Start: 2020-01-31 | End: 2020-08-03

## 2020-01-31 RX ORDER — AMLODIPINE BESYLATE 10 MG/1
TABLET ORAL
Qty: 90 TABLET | Refills: 1 | Status: SHIPPED | OUTPATIENT
Start: 2020-01-31 | End: 2020-08-03

## 2020-02-03 ENCOUNTER — PATIENT OUTREACH (OUTPATIENT)
Dept: ADMINISTRATIVE | Facility: HOSPITAL | Age: 80
End: 2020-02-03

## 2020-02-03 ENCOUNTER — OFFICE VISIT (OUTPATIENT)
Dept: PRIMARY CARE CLINIC | Facility: CLINIC | Age: 80
End: 2020-02-03
Payer: MEDICARE

## 2020-02-03 ENCOUNTER — CLINICAL SUPPORT (OUTPATIENT)
Dept: PRIMARY CARE CLINIC | Facility: CLINIC | Age: 80
End: 2020-02-03
Payer: MEDICARE

## 2020-02-03 VITALS
HEIGHT: 60 IN | HEART RATE: 81 BPM | TEMPERATURE: 98 F | BODY MASS INDEX: 37.27 KG/M2 | OXYGEN SATURATION: 95 % | SYSTOLIC BLOOD PRESSURE: 126 MMHG | DIASTOLIC BLOOD PRESSURE: 60 MMHG | RESPIRATION RATE: 18 BRPM | WEIGHT: 189.81 LBS

## 2020-02-03 DIAGNOSIS — R31.0 HEMATURIA, GROSS: Primary | ICD-10-CM

## 2020-02-03 DIAGNOSIS — Z79.01 CHRONIC ANTICOAGULATION: ICD-10-CM

## 2020-02-03 DIAGNOSIS — I48.0 PAROXYSMAL ATRIAL FIBRILLATION: ICD-10-CM

## 2020-02-03 DIAGNOSIS — K50.919 CROHN'S DISEASE WITH COMPLICATION, UNSPECIFIED GASTROINTESTINAL TRACT LOCATION: ICD-10-CM

## 2020-02-03 PROCEDURE — 99214 OFFICE O/P EST MOD 30 MIN: CPT | Mod: S$GLB,,, | Performed by: NURSE PRACTITIONER

## 2020-02-03 PROCEDURE — 1159F MED LIST DOCD IN RCRD: CPT | Mod: S$GLB,,, | Performed by: NURSE PRACTITIONER

## 2020-02-03 PROCEDURE — 1126F PR PAIN SEVERITY QUANTIFIED, NO PAIN PRESENT: ICD-10-PCS | Mod: S$GLB,,, | Performed by: NURSE PRACTITIONER

## 2020-02-03 PROCEDURE — 1101F PT FALLS ASSESS-DOCD LE1/YR: CPT | Mod: CPTII,S$GLB,, | Performed by: NURSE PRACTITIONER

## 2020-02-03 PROCEDURE — 1126F AMNT PAIN NOTED NONE PRSNT: CPT | Mod: S$GLB,,, | Performed by: NURSE PRACTITIONER

## 2020-02-03 PROCEDURE — 99214 PR OFFICE/OUTPT VISIT, EST, LEVL IV, 30-39 MIN: ICD-10-PCS | Mod: S$GLB,,, | Performed by: NURSE PRACTITIONER

## 2020-02-03 PROCEDURE — 99999 PR PBB SHADOW E&M-EST. PATIENT-LVL V: CPT | Mod: PBBFAC,,, | Performed by: NURSE PRACTITIONER

## 2020-02-03 PROCEDURE — 87086 URINE CULTURE/COLONY COUNT: CPT

## 2020-02-03 PROCEDURE — 1159F PR MEDICATION LIST DOCUMENTED IN MEDICAL RECORD: ICD-10-PCS | Mod: S$GLB,,, | Performed by: NURSE PRACTITIONER

## 2020-02-03 PROCEDURE — 99999 PR PBB SHADOW E&M-EST. PATIENT-LVL V: ICD-10-PCS | Mod: PBBFAC,,, | Performed by: NURSE PRACTITIONER

## 2020-02-03 PROCEDURE — 1101F PR PT FALLS ASSESS DOC 0-1 FALLS W/OUT INJ PAST YR: ICD-10-PCS | Mod: CPTII,S$GLB,, | Performed by: NURSE PRACTITIONER

## 2020-02-03 NOTE — LETTER
AUTHORIZATION FOR RELEASE OF   CONFIDENTIAL INFORMATION    Dear Dr. Leone,    We are seeing Pauline Cronin, date of birth 1940, in the clinic at [facility]. Ga Waldrop MD is the patient's PCP. Pauline Cronin has an outstanding lab/procedure at the time we reviewed her chart. In order to help keep her health information updated, she has authorized us to request the following medical record(s):        (  )  MAMMOGRAM                                      (  )  COLONOSCOPY      (  )  PAP SMEAR                                          ( X )  OUTSIDE LAB RESULTS     (  )  DEXA SCAN                                          (  )  EYE EXAM            (  )  FOOT EXAM                                          (  )  ENTIRE RECORD     (  )  OUTSIDE IMMUNIZATIONS                 (  )  _______________         Please fax records to [facility], Ryan M Truxillo, MD, [phone]      If you have any questions, please contact Bucky Larson LPN at [phone].           Patient Name: Pauline Cronin  : 1940  Patient Phone #: [phone]

## 2020-02-03 NOTE — PROGRESS NOTES
Subjective:      Pauline Cronin is a 79 y.o. female who was referred by Machelle Arnold NP for evaluation of hematuria.      Hematuria  Patient complains of gross hematuria. Onset of hematuria was 2 years ago and was unknown in onset. She reports taking an antibiotic last year and hematuria resolved. She began to notice blood in the urine again a couple of months ago. There is a history of nephrolithiasis. She reports lithotripsy x 2 for kidney stones. She reports that she has not passed a stone or had symptoms of kidney stone in over 8 years. There is not a history of urologic trauma. She denies LUTS-- no dysuria, no urgency, no frequency. She denies fever, chills, nausea, vomiting and flank pain.   She does not see blood in the urine with every void.  Patient denies history of Agent Orange exposure, chronic Kelley catheter,  surgeries, occupational exposure, tobacco use and trauma. Prior workup has been UA'S, US, spontaneous passage of urinary stones, surgical removal of stones.  She reports history of Crohn's disease, she denies blood in stool.     Current Anticoagulation:  Eliquis 5 mg    --For atrial fibrillation   --Managed by Dr Leone     Urine Culture:  2/3/20- Multiple organisms isolated. None in predominance.      The following portions of the patient's history were reviewed and updated as appropriate: allergies, current medications, past family history, past medical history, past social history, past surgical history and problem list.    Review of Systems  Constitutional: no fever or chills  ENT: no nasal congestion or sore throat  Respiratory: no cough or shortness of breath  Cardiovascular: no chest pain or palpitations  Gastrointestinal: no nausea or vomiting, tolerating diet  Genitourinary: as per HPI  Hematologic/Lymphatic: no easy bruising or lymphadenopathy  Musculoskeletal: no arthralgias or myalgias  Neurological: no seizures or tremors  Behavioral/Psych: no auditory or visual hallucinations      Objective:   Vital Signs:/77 (BP Location: Left arm, Patient Position: Sitting, BP Method: Large (Automatic))   Pulse 77   Ht 5' (1.524 m)   Wt 86.2 kg (190 lb 0.6 oz)   BMI 37.11 kg/m²     Physical Exam   General: alert and oriented, no acute distress  Head: normocephalic, atraumatic  Neck: supple, no lymphadenopathy, normal ROM, no masses  Respiratory: Symmetric expansion, non-labored breathing  Cardiovascular: regular rate and rhythm, nomal pulses, no peripheral edema  Abdomen: soft, non tender, non distended  Pelvic: deferred   Lymphatic: no inguinal nodes  Skin: normal coloration and turgor, no rashes, no suspicious skin lesions noted  Neuro: alert and oriented x3, no gross deficits  Psych: normal judgment and insight, normal mood/affect and non-anxious    Physical Exam    Lab Review   Urinalysis demonstrates positive for red blood cells, protein  Lab Results   Component Value Date    WBC 4.20 01/23/2020    HGB 14.7 01/23/2020    HCT 44.1 01/23/2020    MCV 95 01/23/2020     01/23/2020     Lab Results   Component Value Date    CREATININE 0.8 01/23/2020    BUN 11 01/23/2020     Bladder Scan PVR: 37cc     Imaging    US retroperitoneal  2/14/19  Impression       1. Small center pelvic cyst or dilated calices; no overall hydronephrosis, no  stone seen at this time.  2. No left renal or bladder abnormality seen.      Electronically signed by: Alexandro Masters II, MD  Date: 02/19/2019  Time: 12:37       Assessment:     1. Gross hematuria        Plan:     Orders Placed This Encounter    Urine culture    CT Urogram Abd Pelvis W WO    POCT URINE DIPSTICK WITHOUT MICROSCOPE    POCT Bladder Scan    --We discussed causes of hematuria including but limited to: infection, inflammation, kidney stones, tumor of  system, and trauma. A complete hematuria workup includes a 1) urine culture which will check for infection, 2) urine cytology which will identify abnormal/cancer cell, 3) CT urogram This test is a 3  phase study: non-contrast for stones, nephrographic phase for masses, and delayed phase to evaluate for filling defects in the collecting system. 4) A cystoscopy  will provide direct visualization of the bladder .   --Urologic cancer is found 5% of the time when the initial presentation is microscopic hematuria. When gross hematuria (blood in the urine that you can see) is present, there is a 23% chance of discovering urologic cancer.  If this workup is negative, we will plan to check UA yearly; after two consecutive negative UA then we do not have to continue workup.      --She is unable to provide specimen for culture. Will place order for lab collect and she can drop off at lab when she is able to provide specimen.  --Will proceed with CT urogram; follow up in 2 weeks to discuss results. Will schedule cystoscope at that time based on CT urogram results.

## 2020-02-03 NOTE — PROGRESS NOTES
Chief Complaint  Chief Complaint   Patient presents with    Hematuria       HPI    Pauline Cronin is a 79 y.o. female that presents for hematuria.    Patient seen 1/23 by Dr. Waldrop for similar complaints. POCT completed with gross hematuria. Sent for culture with no predominant noted bacteria on the culture. Of note, patient was seen in February 2019 with similar complaints and results. Presents to clinic reporting recurrence of hematuria. Reports intermittent hematuria since she was seen two weeks ago, now worsening. Presents to clinic with tea colored urine. No pain with urination. Urinating frequency with no dribbling. No fever or chills. Does report intermittent fatigue. Weight described as stable. On eliquis 5 mg BID (managed per Dr. Leone) for atrial fibrillation as she has been for several years. No noted blood her stool. Does report a h/o crohn's disease with worsening diarrhea. Previously under the care of Dr. Lane wishing to establish care at this time.     PAST MEDICAL HISTORY:  Past Medical History:   Diagnosis Date    Atrial fibrillation     Crohn disease diagnosed in her 20's    GERD (gastroesophageal reflux disease)     Hyperlipidemia     Hypertension        PAST SURGICAL HISTORY:  Past Surgical History:   Procedure Laterality Date    APPENDECTOMY      COLONOSCOPY  ~2008    normal findings per patient report    ESOPHAGOGASTRODUODENOSCOPY N/A 7/17/2018    Procedure: EGD (ESOPHAGOGASTRODUODENOSCOPY);  Surgeon: Alondra Casiano MD;  Location[facility];  Service: Endoscopy;  Laterality: N/A;    HYSTERECTOMY      SMALL INTESTINE SURGERY  in her 20's    for crohn's    TONSILLECTOMY      UPPER GASTROINTESTINAL ENDOSCOPY  ~2008    normal findings per patient report       SOCIAL HISTORY:  Social History     Socioeconomic History    Marital status:      Spouse name: Not on file    Number of children: Not on file    Years of education: Not on file    Highest education level: Not  on file   Occupational History    Not on file   Social Needs    Financial resource strain: Not on file    Food insecurity:     Worry: Not on file     Inability: Not on file    Transportation needs:     Medical: Not on file     Non-medical: Not on file   Tobacco Use    Smoking status: Never Smoker    Smokeless tobacco: Never Used   Substance and Sexual Activity    Alcohol use: No    Drug use: No    Sexual activity: Never   Lifestyle    Physical activity:     Days per week: Not on file     Minutes per session: Not on file    Stress: Not on file   Relationships    Social connections:     Talks on phone: Not on file     Gets together: Not on file     Attends Sabianism service: Not on file     Active member of club or organization: Not on file     Attends meetings of clubs or organizations: Not on file     Relationship status: Not on file   Other Topics Concern    Not on file   Social History Narrative    Not on file       FAMILY HISTORY:  Family History   Problem Relation Age of Onset    Cancer Mother     Breast cancer Mother     Crohn's disease Other     Colon cancer Neg Hx     Colon polyps Neg Hx     Esophageal cancer Neg Hx     Stomach cancer Neg Hx     Ulcerative colitis Neg Hx        ALLERGIES AND MEDICATIONS: updated and reviewed.  Review of patient's allergies indicates:  No Known Allergies  Current Outpatient Medications   Medication Sig Dispense Refill    alendronate (FOSAMAX) 70 MG tablet TAKE 1 TABLET BY MOUTH ONCE A WEEK 12 tablet 3    amLODIPine (NORVASC) 10 MG tablet TAKE 1 TABLET BY MOUTH ONCE DAILY 90 tablet 1    atorvastatin (LIPITOR) 20 MG tablet Take 1 tablet by mouth once daily.      carvedilol (COREG) 6.25 MG tablet TAKE ONE TABLET BY MOUTH TWICE DAILY WITH FOOD 180 tablet 0    ELIQUIS 5 mg Tab Take 1 tablet by mouth 2 (two) times daily.      esomeprazole (NEXIUM) 40 MG capsule TAKE 1 CAPSULE BY MOUTH ONCE DAILY 30 capsule 5    folic acid (FOLVITE) 1 MG tablet TAKE 1  TABLET BY MOUTH ONCE DAILY 90 tablet 1    levothyroxine (SYNTHROID, LEVOTHROID) 175 MCG tablet Take 1 tablet by mouth once daily.      magnesium gluconate 27.5 mg (500 mg) Tab Take 27.5 mg by mouth 2 (two) times daily.  0    oxybutynin (DITROPAN XL) 15 MG TR24 Take 1 tablet (15 mg total) by mouth once daily. 30 tablet 5    sotalol (BETAPACE) 120 MG Tab Take 120 mg by mouth every 12 (twelve) hours.      sulfaSALAzine (AZULFIDINE) 500 mg Tab TAKE 1 TABLET BY MOUTH TWICE DAILY 180 tablet 1     No current facility-administered medications for this visit.          ROS  Review of Systems   Constitutional: Negative for chills and fever.   HENT: Negative for ear pain, postnasal drip and sinus pain.    Respiratory: Negative for cough, shortness of breath and wheezing.    Cardiovascular: Negative for chest pain.   Gastrointestinal: Positive for abdominal pain. Negative for diarrhea, nausea and vomiting.   Genitourinary: Positive for hematuria. Negative for difficulty urinating, dysuria, flank pain, frequency and urgency.           PHYSICAL EXAM  Vitals:    02/03/20 1049   BP: 126/60   BP Location: Right arm   Patient Position: Sitting   BP Method: Large (Manual)   Pulse: 81   Resp: 18   Temp: 97.8 °F (36.6 °C)   TempSrc: Oral   SpO2: 95%   Weight: 86.1 kg (189 lb 13.1 oz)   Height: 5' (1.524 m)    Body mass index is 37.07 kg/m².  Weight: 86.1 kg (189 lb 13.1 oz)   Height: 5' (152.4 cm)     Physical Exam   Constitutional: She is oriented to person, place, and time. She appears well-developed and well-nourished.   HENT:   Head: Normocephalic.   Right Ear: Tympanic membrane normal.   Left Ear: Tympanic membrane normal.   Mouth/Throat: Uvula is midline, oropharynx is clear and moist and mucous membranes are normal.   Eyes: Conjunctivae are normal.   Cardiovascular: Normal rate, regular rhythm and normal pulses.   Murmur heard.  Pulses:       Radial pulses are 2+ on the right side, and 2+ on the left side.   No LE swelling  noted   Pulmonary/Chest: Effort normal and breath sounds normal. She has no wheezes.   Abdominal: Soft. Bowel sounds are normal. There is no tenderness.   No abdominal or suprapubic tenderness to palpation.    Genitourinary:   Genitourinary Comments: Urine tea colored.    Musculoskeletal: She exhibits no edema.   Lymphadenopathy:     She has no cervical adenopathy.   Neurological: She is alert and oriented to person, place, and time.   Skin: Skin is warm and dry. No rash noted.   Psychiatric: She has a normal mood and affect.         Health Maintenance       Date Due Completion Date    TETANUS VACCINE 03/26/1959 ---    Shingles Vaccine (2 of 3) 09/11/2017 7/17/2017    DEXA SCAN 05/14/2021 5/14/2018    Lipid Panel 01/14/2024 1/14/2019 (Done)    Override on 1/14/2019: Done    Override on 3/19/2018: Done (Dr. Leone's office)            Assessment & Plan    Problem List Items Addressed This Visit     None          Follow-up: No follow-ups on file.    Machelle Arnold    Medication List with Changes/Refills   Current Medications    ALENDRONATE (FOSAMAX) 70 MG TABLET    TAKE 1 TABLET BY MOUTH ONCE A WEEK    AMLODIPINE (NORVASC) 10 MG TABLET    TAKE 1 TABLET BY MOUTH ONCE DAILY    ATORVASTATIN (LIPITOR) 20 MG TABLET    Take 1 tablet by mouth once daily.    CARVEDILOL (COREG) 6.25 MG TABLET    TAKE ONE TABLET BY MOUTH TWICE DAILY WITH FOOD    ELIQUIS 5 MG TAB    Take 1 tablet by mouth 2 (two) times daily.    ESOMEPRAZOLE (NEXIUM) 40 MG CAPSULE    TAKE 1 CAPSULE BY MOUTH ONCE DAILY    FOLIC ACID (FOLVITE) 1 MG TABLET    TAKE 1 TABLET BY MOUTH ONCE DAILY    LEVOTHYROXINE (SYNTHROID, LEVOTHROID) 175 MCG TABLET    Take 1 tablet by mouth once daily.    MAGNESIUM GLUCONATE 27.5 MG (500 MG) TAB    Take 27.5 mg by mouth 2 (two) times daily.    OXYBUTYNIN (DITROPAN XL) 15 MG TR24    Take 1 tablet (15 mg total) by mouth once daily.    SOTALOL (BETAPACE) 120 MG TAB    Take 120 mg by mouth every 12 (twelve) hours.    SULFASALAZINE  (AZULFIDINE) 500 MG TAB    TAKE 1 TABLET BY MOUTH TWICE DAILY   Discontinued Medications    DIPHENOXYLATE-ATROPINE 2.5-0.025 MG (LOMOTIL) 2.5-0.025 MG PER TABLET    TAKE 1 TABLET BY MOUTH 4 TIMES DAILY AS NEEDED FOR DIARRHEA

## 2020-02-04 ENCOUNTER — PATIENT OUTREACH (OUTPATIENT)
Dept: ADMINISTRATIVE | Facility: OTHER | Age: 80
End: 2020-02-04

## 2020-02-04 NOTE — PROGRESS NOTES
Immunizations reviewed. Legacy reviewed. Care Everywhere reviewed. EFAX sent to Dr. Leone to obtain most recent labs.  Pre-visit chart review completed.

## 2020-02-05 ENCOUNTER — OFFICE VISIT (OUTPATIENT)
Dept: UROLOGY | Facility: CLINIC | Age: 80
End: 2020-02-05
Payer: MEDICARE

## 2020-02-05 ENCOUNTER — TELEPHONE (OUTPATIENT)
Dept: PRIMARY CARE CLINIC | Facility: CLINIC | Age: 80
End: 2020-02-05

## 2020-02-05 VITALS
HEART RATE: 77 BPM | BODY MASS INDEX: 37.31 KG/M2 | SYSTOLIC BLOOD PRESSURE: 137 MMHG | WEIGHT: 190.06 LBS | DIASTOLIC BLOOD PRESSURE: 77 MMHG | HEIGHT: 60 IN

## 2020-02-05 DIAGNOSIS — R31.0 GROSS HEMATURIA: ICD-10-CM

## 2020-02-05 LAB
BACTERIA UR CULT: NORMAL
BACTERIA UR CULT: NORMAL
BILIRUB SERPL-MCNC: ABNORMAL MG/DL
BLOOD URINE, POC: 250
COLOR, POC UA: ABNORMAL
GLUCOSE UR QL STRIP: ABNORMAL
KETONES UR QL STRIP: ABNORMAL
LEUKOCYTE ESTERASE URINE, POC: ABNORMAL
NITRITE, POC UA: ABNORMAL
PH, POC UA: 6
POC RESIDUAL URINE VOLUME: 37 ML (ref 0–100)
PROTEIN, POC: 30
SPECIFIC GRAVITY, POC UA: 1020
UROBILINOGEN, POC UA: ABNORMAL

## 2020-02-05 PROCEDURE — 1126F PR PAIN SEVERITY QUANTIFIED, NO PAIN PRESENT: ICD-10-PCS | Mod: S$GLB,,, | Performed by: NURSE PRACTITIONER

## 2020-02-05 PROCEDURE — 99214 PR OFFICE/OUTPT VISIT, EST, LEVL IV, 30-39 MIN: ICD-10-PCS | Mod: 25,S$GLB,, | Performed by: NURSE PRACTITIONER

## 2020-02-05 PROCEDURE — 1159F PR MEDICATION LIST DOCUMENTED IN MEDICAL RECORD: ICD-10-PCS | Mod: S$GLB,,, | Performed by: NURSE PRACTITIONER

## 2020-02-05 PROCEDURE — 81002 POCT URINE DIPSTICK WITHOUT MICROSCOPE: ICD-10-PCS | Mod: S$GLB,,, | Performed by: NURSE PRACTITIONER

## 2020-02-05 PROCEDURE — 51798 POCT BLADDER SCAN: ICD-10-PCS | Mod: S$GLB,,, | Performed by: NURSE PRACTITIONER

## 2020-02-05 PROCEDURE — 99999 PR PBB SHADOW E&M-EST. PATIENT-LVL IV: ICD-10-PCS | Mod: PBBFAC,,, | Performed by: NURSE PRACTITIONER

## 2020-02-05 PROCEDURE — 1101F PT FALLS ASSESS-DOCD LE1/YR: CPT | Mod: CPTII,S$GLB,, | Performed by: NURSE PRACTITIONER

## 2020-02-05 PROCEDURE — 81002 URINALYSIS NONAUTO W/O SCOPE: CPT | Mod: S$GLB,,, | Performed by: NURSE PRACTITIONER

## 2020-02-05 PROCEDURE — 1101F PR PT FALLS ASSESS DOC 0-1 FALLS W/OUT INJ PAST YR: ICD-10-PCS | Mod: CPTII,S$GLB,, | Performed by: NURSE PRACTITIONER

## 2020-02-05 PROCEDURE — 99214 OFFICE O/P EST MOD 30 MIN: CPT | Mod: 25,S$GLB,, | Performed by: NURSE PRACTITIONER

## 2020-02-05 PROCEDURE — 99999 PR PBB SHADOW E&M-EST. PATIENT-LVL IV: CPT | Mod: PBBFAC,,, | Performed by: NURSE PRACTITIONER

## 2020-02-05 PROCEDURE — 1126F AMNT PAIN NOTED NONE PRSNT: CPT | Mod: S$GLB,,, | Performed by: NURSE PRACTITIONER

## 2020-02-05 PROCEDURE — 51798 US URINE CAPACITY MEASURE: CPT | Mod: S$GLB,,, | Performed by: NURSE PRACTITIONER

## 2020-02-05 PROCEDURE — 1159F MED LIST DOCD IN RCRD: CPT | Mod: S$GLB,,, | Performed by: NURSE PRACTITIONER

## 2020-02-05 NOTE — LETTER
February 5, 2020      Machelle Arnold, RENATO  [address] [place] [address]           [facility]  [address], [facility] [address]  [place] [address]  Phone: [phone]  Fax: [phone]          Patient: Pauline Cronin   MR Number: [id number]   YOB: 1940   Date of Visit: 2/5/2020       Dear Machelle Arnold:    Thank you for referring Pauline Cronin to me for evaluation. Attached you will find relevant portions of my assessment and plan of care.    If you have questions, please do not hesitate to call me. I look forward to following Pauline Cronin along with you.    Sincerely,    Nicole Garduno, RENATO    Enclosure  CC:  No Recipients    If you would like to receive this communication electronically, please contact [email or web address] or [phone] to request more information on [employer] access.    For providers and/or their staff who would like to refer a patient to [facility], please contact us through our one-stop-shop provider referral line, [facility], at [phone].    If you feel you have received this communication in error or would no longer like to receive these types of communications, please e-mail [email or web address]

## 2020-02-05 NOTE — PATIENT INSTRUCTIONS
Blood in the Urine    Blood in the urine (hematuria) has many possible causes. If it occurs after an injury (such as a car accident or fall), it is most often a sign of bruising to the kidney or bladder. Common causes of blood in the urine include urinary tract infections, kidney stones, inflammation, tumors, or certain other diseases of the kidney or bladder. Menstruation can cause blood to appear in the urine sample, although it is not coming from the urinary tract.  If only a trace amount of blood is present, it will show up on the urine test, even though the urine may be yellow and not pink or red. This may occur with any of the above conditions, as well as heavy exercise or high fever. In this case, your doctor may want to repeat the urine test on another day. This will show if the blood is still present. If it is, then other tests can be done to find out the cause.  Home care  Follow these home care guidelines:  · If your urine does not appear bloody (pink, brown or red) then you do not need to restrict your activity in any way.  · If you can see blood in your urine, rest and avoid heavy exertion until your next exam. Do not use aspirin, blood thinners, or anti-platelet or anti-inflammatory medicines. These include ibuprofen and naproxen. These thin the blood and may increase bleeding.  Follow-up care  Follow up with your healthcare provider, or as advised. If you were injured and had blood in your urine, you should have a repeat urine test in 1 to 2 days. Contact your doctor for this test.  A radiologist will review any X-rays that were taken. You will be told of any new findings that may affect your care.  When to seek medical advice  Call your healthcare provider right away if any of these occur:  · Bright red blood or blood clots in the urine (if you did not have this before)  · Weakness, dizziness or fainting  · New groin, abdominal, or back pain  · Fever of 100.4ºF (38ºC) or higher, or as directed by  your healthcare provider  · Repeated vomiting  · Bleeding from the nose or gums or easy bruising  Date Last Reviewed: 9/1/2016  © 4333-4072 [employer]. [address] [place] [address], [place] [address]. All rights reserved. This information is not intended as a substitute for professional medical care. Always follow your healthcare professional's instructions.

## 2020-02-05 NOTE — TELEPHONE ENCOUNTER
Notified pt that Urine culture with multiple organisms isolated non in predominance.  Please repeat urine specimen if patient continues to have symptoms.  Patient should have an upcoming appointment with Urology.  Please ensure she keeps the appointment.  Pt went today and needs CT Scan.  ----- Message from Machelle Arnold NP sent at 2/5/2020 10:47 AM CST -----  Urine culture with multiple organisms isolated non in predominance.  Please repeat urine specimen if patient continues to have symptoms.  Patient should have an upcoming appointment with Urology.  Please ensure she keeps the appointment.

## 2020-02-06 ENCOUNTER — TELEPHONE (OUTPATIENT)
Dept: PULMONOLOGY | Facility: CLINIC | Age: 80
End: 2020-02-06

## 2020-02-06 NOTE — TELEPHONE ENCOUNTER
Spoke to the pt who had concerns about her CT on Monday. She says that she thought she was getting IV contrast but her paperwork says she'll be drinking. She's concerned that she may have diarrhea. She was informed that that would not be a problem.

## 2020-02-06 NOTE — TELEPHONE ENCOUNTER
----- Message from Clarissa Granados sent at 2/6/2020 11:36 AM CST -----  Contact: pt @[phone]  Pt called in to get an understanding of what the exam prep is. Please call back to explain @[phone]

## 2020-02-11 ENCOUNTER — PATIENT OUTREACH (OUTPATIENT)
Dept: ADMINISTRATIVE | Facility: OTHER | Age: 80
End: 2020-02-11

## 2020-02-11 ENCOUNTER — TELEPHONE (OUTPATIENT)
Dept: UROLOGY | Facility: CLINIC | Age: 80
End: 2020-02-11

## 2020-02-11 NOTE — PROGRESS NOTES
"Subjective:      Pauline Cronin is a 79 y.o. female who returns today regarding her hematuria.    Ms. Cronin returns today to discuss results from recent hematuria workup. She has been experiencing painless, intermittent gross hematuria for about 1 year.  CT urogram revealed "1.5 cm stone in a dilated right renal pelvis mild right caliectasis." She reports a history of renal stones treated with ESWL x1 and ureteroscopy x 1.  She currently denies dysuria, flank pain, N/V, fever and chills. She continues to experience intermittent gross hematuria. She denies urgency and frequency. She is currently taking Eliquis 5 mg PO BID for A. Fib.     The following portions of the patient's history were reviewed and updated as appropriate: allergies, current medications, past family history, past medical history, past social history, past surgical history and problem list.    Review of Systems  A comprehensive multipoint review of systems was negative except as otherwise stated in the HPI.     Objective:   Vitals: /73 (BP Location: Right arm, Patient Position: Sitting, BP Method: Large (Automatic))   Pulse 88   Resp 20   Ht 5' (1.524 m)   Wt 86.5 kg (190 lb 12.9 oz)   SpO2 95%   BMI 37.26 kg/m²     Physical Exam   General: alert and oriented, no acute distress  Respiratory: Symmetric expansion, non-labored breathing  Cardiovascular: no peripheral edema  Abdomen: soft, non distended  Skin: normal coloration and turgor, no rashes, no suspicious skin lesions noted  Neuro: no gross deficits  Psych: normal judgment and insight, normal mood/affect and non-anxious    Lab Review   Urinalysis demonstrates-- no specimen today   Lab Results   Component Value Date    WBC 4.20 01/23/2020    HGB 14.7 01/23/2020    HCT 44.1 01/23/2020    MCV 95 01/23/2020     01/23/2020     Lab Results   Component Value Date    CREATININE 0.8 01/23/2020    BUN 11 01/23/2020       Imaging     CT Urogram     Impression       1. 1.5 cm stone in " a dilated right renal pelvis mild right caliectasis also.  2. Cholelithiasis with possible gallbladder wall thickening.  Suggest gallbladder ultrasound.      Electronically signed by: Alexandro Masters II, MD  Date: 02/10/2020  Time: 16:01         Assessment and Plan:   1. Gross hematuria    2. Nephrolithiasis    --We discussed definitive renal stone treatment.  She wishes to proceed with ureteroscopy. The risks and benefits of ureteroscopy were discussed with the patient in detail.  The risks include but are not limited to burning with urination, bleeding, infection, pain, incomplete fragmentation of the stone, need for further procedures, injury to the kidney, ureter, bladder and need for open surgery.  The patient was informed that they may require a ureteral stent and that stents can cause irritative voiding symptoms.  They also understand that ureteral stents are temporary and must be removed or exchanged in a timely fashion as they can calcify and make more stones and become difficult to remove. Alternative treatments were also discussed with the patient in detail to include ESWL, percutaneous treatment of the stone, open surgery or observation. Patient understands these risks and has agreed to proceed with surgery.     --Discussed findings with Dr. Stanton Kruse; will proceed with ureteroscopy on February 18.  --RTC prior to surgery to sign consents.

## 2020-02-11 NOTE — TELEPHONE ENCOUNTER
----- Message from Nicole Garduno NP sent at 2/11/2020 10:43 AM CST -----  Please call pt to go over results

## 2020-02-12 ENCOUNTER — OFFICE VISIT (OUTPATIENT)
Dept: UROLOGY | Facility: CLINIC | Age: 80
End: 2020-02-12
Payer: MEDICARE

## 2020-02-12 ENCOUNTER — TELEPHONE (OUTPATIENT)
Dept: UROLOGY | Facility: CLINIC | Age: 80
End: 2020-02-12

## 2020-02-12 VITALS
RESPIRATION RATE: 20 BRPM | HEIGHT: 60 IN | BODY MASS INDEX: 37.46 KG/M2 | WEIGHT: 190.81 LBS | OXYGEN SATURATION: 95 % | SYSTOLIC BLOOD PRESSURE: 126 MMHG | DIASTOLIC BLOOD PRESSURE: 73 MMHG | HEART RATE: 88 BPM

## 2020-02-12 DIAGNOSIS — N20.0 NEPHROLITHIASIS: ICD-10-CM

## 2020-02-12 DIAGNOSIS — R31.0 GROSS HEMATURIA: Primary | ICD-10-CM

## 2020-02-12 PROCEDURE — 1159F PR MEDICATION LIST DOCUMENTED IN MEDICAL RECORD: ICD-10-PCS | Mod: S$GLB,,, | Performed by: NURSE PRACTITIONER

## 2020-02-12 PROCEDURE — 99999 PR PBB SHADOW E&M-EST. PATIENT-LVL V: ICD-10-PCS | Mod: PBBFAC,,, | Performed by: NURSE PRACTITIONER

## 2020-02-12 PROCEDURE — 99214 PR OFFICE/OUTPT VISIT, EST, LEVL IV, 30-39 MIN: ICD-10-PCS | Mod: S$GLB,,, | Performed by: NURSE PRACTITIONER

## 2020-02-12 PROCEDURE — 1159F MED LIST DOCD IN RCRD: CPT | Mod: S$GLB,,, | Performed by: NURSE PRACTITIONER

## 2020-02-12 PROCEDURE — 99214 OFFICE O/P EST MOD 30 MIN: CPT | Mod: S$GLB,,, | Performed by: NURSE PRACTITIONER

## 2020-02-12 PROCEDURE — 99999 PR PBB SHADOW E&M-EST. PATIENT-LVL V: CPT | Mod: PBBFAC,,, | Performed by: NURSE PRACTITIONER

## 2020-02-12 PROCEDURE — 1101F PT FALLS ASSESS-DOCD LE1/YR: CPT | Mod: CPTII,S$GLB,, | Performed by: NURSE PRACTITIONER

## 2020-02-12 PROCEDURE — 1101F PR PT FALLS ASSESS DOC 0-1 FALLS W/OUT INJ PAST YR: ICD-10-PCS | Mod: CPTII,S$GLB,, | Performed by: NURSE PRACTITIONER

## 2020-02-12 RX ORDER — CIPROFLOXACIN 2 MG/ML
400 INJECTION, SOLUTION INTRAVENOUS
Status: CANCELLED | OUTPATIENT
Start: 2020-02-12

## 2020-02-12 NOTE — PATIENT INSTRUCTIONS
Treating Kidney Stones: Ureteroscopic Stone Removal    Ureteroscopic stone removal may be done before, after, or instead of other treatments. If you need this procedure, your healthcare provider will discuss its risks and possible complications. You will be told how to prepare. And you will be told about anesthesia that will keep you pain-free during treatment.     A ureteroscope lets your doctor see your stone before removing it.   Removing the stone through the ureter  Ureteroscopic stone removal extracts a small stone in your ureter without an incision. Your doctor places a viewing tube (ureteroscope) in your ureter. A wire basket inserted through the tube removes the stone. Sometimes, a laser or a mechanical device is used to break up the stone. A soft tube may be left in your ureter briefly to drain urine.     The stone may be fragmented. The stone is then withdrawn or passed.   Your recovery  This is an outpatient or overnight procedure. For a few days after surgery, you may feel some pain when you urinate. Or you may need to urinate more often, or have bloody urine. You may have a ureteral stent. This is a soft tube that prevents blockage from swelling after the procedure. The stent is removed when the swelling goes down, often within days. Follow up as instructed to check for any new stones.  When to call your healthcare provider  Call your healthcare provider right away if:  · You have sudden pain or flank pain  · You have a fever over 100.4°F (38°C)  · You have nausea that lasts for days  · You have heavy bleeding when you urinate  · You have heavy bleeding through your drainage tube  · You have swelling or redness around your incision   Date Last Reviewed: 2/1/2017 © 2000-2017 [employer]. [address] [place] [address], [place] [address]. All rights reserved. This information is not intended as a substitute for professional medical care. Always follow your healthcare professional's  instructions.

## 2020-02-12 NOTE — TELEPHONE ENCOUNTER
----- Message from Nicole Garduno NP sent at 2/12/2020 11:08 AM CST -----  Can you call Ms. Cronin and schedule a pre-op appt for her ureteroscopy either tomorrow or Friday?    Thanks,  Nicole

## 2020-02-12 NOTE — TELEPHONE ENCOUNTER
----- Message from Nicole Garduno NP sent at 2/12/2020 11:01 AM CST -----  Good morning Norah,    I have put in a case request for this pt to have ureteroscopy with Dr. Kruse on Feb 18 following his first case. I will have her sign consents in the office and scan them to her chart. Once the surgery is confirmed can you contact her with the instruction? Or if you want to send me the pre-op instructions I can do that here as well.  I want to make sure I am not missing anything... you look at the order and make sure everything is ok?     Thanks so much,   Nicole

## 2020-02-13 ENCOUNTER — TELEPHONE (OUTPATIENT)
Dept: UROLOGY | Facility: CLINIC | Age: 80
End: 2020-02-13

## 2020-02-13 ENCOUNTER — ANESTHESIA EVENT (OUTPATIENT)
Dept: SURGERY | Facility: OTHER | Age: 80
End: 2020-02-13
Payer: MEDICARE

## 2020-02-13 ENCOUNTER — HOSPITAL ENCOUNTER (OUTPATIENT)
Dept: PREADMISSION TESTING | Facility: OTHER | Age: 80
Discharge: HOME | End: 2020-02-13
Attending: UROLOGY
Payer: MEDICARE

## 2020-02-13 VITALS
OXYGEN SATURATION: 97 % | SYSTOLIC BLOOD PRESSURE: 142 MMHG | HEIGHT: 60 IN | DIASTOLIC BLOOD PRESSURE: 70 MMHG | WEIGHT: 190 LBS | HEART RATE: 68 BPM | BODY MASS INDEX: 37.3 KG/M2 | RESPIRATION RATE: 16 BRPM | TEMPERATURE: 99 F

## 2020-02-13 RX ORDER — PREGABALIN 50 MG/1
50 CAPSULE ORAL
Status: CANCELLED | OUTPATIENT
Start: 2020-02-13 | End: 2020-02-13

## 2020-02-13 RX ORDER — ACETAMINOPHEN 500 MG
1000 TABLET ORAL
Status: CANCELLED | OUTPATIENT
Start: 2020-02-13 | End: 2020-02-13

## 2020-02-13 RX ORDER — LIDOCAINE HYDROCHLORIDE 10 MG/ML
0.5 INJECTION, SOLUTION EPIDURAL; INFILTRATION; INTRACAUDAL; PERINEURAL ONCE
Status: CANCELLED | OUTPATIENT
Start: 2020-02-13 | End: 2020-02-13

## 2020-02-13 RX ORDER — SODIUM CHLORIDE, SODIUM LACTATE, POTASSIUM CHLORIDE, CALCIUM CHLORIDE 600; 310; 30; 20 MG/100ML; MG/100ML; MG/100ML; MG/100ML
INJECTION, SOLUTION INTRAVENOUS CONTINUOUS
Status: CANCELLED | OUTPATIENT
Start: 2020-02-13

## 2020-02-13 NOTE — TELEPHONE ENCOUNTER
Spoke with patient and informed her that she still had to be seen to sign surgical consent forms.

## 2020-02-13 NOTE — TELEPHONE ENCOUNTER
----- Message from Sammi Mota sent at 2/13/2020  9:12 AM CST -----  Contact: LORELEI CADET    Type:  Patient Returning Call    Who Called: LORELEI CADET     Who Left Message for Patient:Norah     Does the patient know what this is regarding?yes     Best Call Back Number:[phone]    Additional Information:

## 2020-02-13 NOTE — TELEPHONE ENCOUNTER
SPOKE TO PATIENT AND INFORMED SURGERY DATE IS CORRECT SHE CHECKED WITH HER G-DAUGHTER TO SEE IF SHE WAS ABLE TO BRING HER.

## 2020-02-13 NOTE — ANESTHESIA PREPROCEDURE EVALUATION
02/13/2020  Pauline Cronin is a 79 y.o., female.    Anesthesia Evaluation    I have reviewed the Patient Summary Reports.    I have reviewed the Nursing Notes.   I have reviewed the Medications.     Review of Systems  Anesthesia Hx:  No problems with previous Anesthesia  History of prior surgery of interest to airway management or planning: Denies Family Hx of Anesthesia complications.   Denies Personal Hx of Anesthesia complications.   Social:  Non-Smoker    Hematology/Oncology:     Oncology Normal   Hematology Comments: On Eliquis   EENT/Dental:EENT/Dental Normal   Cardiovascular:   Pacemaker Dysrhythmias atrial fibrillation Hx of a fib,pacer rhythm now   Pulmonary:  Pulmonary Normal    Renal/:   renal calculi    Hepatic/GI:   GERD, well controlled    Musculoskeletal:  Musculoskeletal Normal    Neurological:  Neurology Normal    Endocrine:   Hypothyroidism    Dermatological:  Skin Normal    Psych:  Psychiatric Normal           Physical Exam  General:  Obesity    Airway/Jaw/Neck:  Airway Findings: Mouth Opening: Normal Tongue: Normal  General Airway Assessment: Adult  Mallampati: II      Dental:  Dental Findings: Molar caps        Mental Status:  Mental Status Findings:  Cooperative         Anesthesia Plan  Type of Anesthesia, risks & benefits discussed:  Anesthesia Type:  general  Patient's Preference:   Intra-op Monitoring Plan: standard ASA monitors  Intra-op Monitoring Plan Comments:   Post Op Pain Control Plan: multimodal analgesia and per primary service following discharge from PACU  Post Op Pain Control Plan Comments:   Induction:   IV  Beta Blocker:         Informed Consent: Patient understands risks and agrees with Anesthesia plan.  Questions answered. Anesthesia consent signed with patient.  ASA Score: 3     Day of Surgery Review of History & Physical:    H&P update referred to the surgeon.      Anesthesia Plan Notes: Labs in epic ok,pacer,Dr Kruse says ok to stay on Eliquis,wears hearing aids        Ready For Surgery From Anesthesia Perspective.

## 2020-02-13 NOTE — DISCHARGE INSTRUCTIONS
PRE-ADMIT TESTING -  [phone]    [address]  [place] [facility]          Your surgery has been scheduled at [facility]. We are pleased to have the opportunity to serve you. For Further Information please call [phone].    On the day of surgery please report to the Information Desk on the 1st floor.    · CONTACT YOUR PHYSICIAN'S OFFICE THE DAY PRIOR TO YOUR SURGERY TO OBTAIN YOUR ARRIVAL TIME.     · The evening before surgery do not eat anything after 9 p.m. ( this includes hard candy, chewing gum and mints).  You may only have GATORADE, POWERADE AND WATER  from 9 p.m. until you leave your home.   DO NOT DRINK ANY LIQUIDS ON THE WAY TO THE HOSPITAL.      SPECIAL MEDICATION INSTRUCTIONS: TAKE medications checked off by the Anesthesiologist on your Medication List.    Angiogram Patients: Take medications as instructed by your physician, including aspirin.     Surgery Patients:    If you take ASPIRIN - Your PHYSICIAN/SURGEON will need to inform you IF/OR when you need to stop taking aspirin prior to your surgery.     Do Not take any medications containing IBUPROFEN.  Do Not Wear any make-up or dark nail polish   (especially eye make-up) to surgery. If you come to surgery with makeup on you will be required to remove the makeup or nail polish.    Do not shave your surgical area at least 5 days prior to your surgery. The surgical prep will be performed at the hospital according to Infection Control regulations.    Leave all valuables at home.   Do Not wear any jewelry or watches, including any metal in body piercings. Jewelry must be removed prior to coming to the hospital.  There is a possibility that rings that are unable to be removed may be cut off if they are on the surgical extremity.    Contact Lens must be removed before surgery. Either do not wear the contact lens or bring a case and solution for storage.  Please bring a container for eyeglasses or dentures as required.  Bring  any paperwork your physician has provided, such as consent forms,  history and physicals, doctor's orders, etc.   Bring comfortable clothes that are loose fitting to wear upon discharge. Take into consideration the type of surgery being performed.  Maintain your diet as advised per your physician the day prior to surgery.      Adequate rest the night before surgery is advised.   Park in the Parking lot behind the hospital or in the [facility] across the street from the parking lot. Parking is complimentary.  If you will be discharged the same day as your procedure, please arrange for a responsible adult to drive you home or to accompany you if traveling by taxi.   YOU WILL NOT BE PERMITTED TO DRIVE OR TO LEAVE THE HOSPITAL ALONE AFTER SURGERY.   It is strongly recommended that you arrange for someone to remain with you for the first 24 hrs following your surgery.    The Surgeon will speak to your family/visitor after your surgery regarding the outcome of your surgery and post op care.  The Surgeon may speak to you after your surgery, but there is a possibility you may not remember the details.  Please check with your family members regarding the conversation with the Surgeon.    We strongly recommend whoever is bringing you home be present for discharge instructions.  This will ensure a thorough understanding for your post op home care.    EACH PATIENT IS ALLOWED TWO FAMILY MEMBERS OR VISITORS IN THE ROOM AND IN THE WAITING ROOMS WHILE YOU ARE IN SURGERY. ALL CHILDREN MUST ALWAYS BE ACCOMPANIED BY AN ADULT.    Thank you for your cooperation.  The Staff of [facility].                Bathing Instructions with Hibiclens     Shower the evening before and morning of your procedure with Hibiclens:   Wash your face with water and your regular face wash/soap   Apply Hibiclens directly on your skin or on a wet washcloth and wash gently. When showering: Move away from the shower stream when  applying Hibiclens to avoid rinsing off too soon.   Rinse thoroughly with warm water   Do not dilute Hibiclens         Dry off as usual, do not use any deodorant, powder, body lotions, perfume, after shave or cologne.

## 2020-02-13 NOTE — TELEPHONE ENCOUNTER
----- Message from Karen Ko MA sent at 2/13/2020  3:00 PM CST -----  Contact: [phone]     Pt said she got a message to come in tomorrow to sign her consents but she said she already did that at the [facility]

## 2020-02-14 ENCOUNTER — OFFICE VISIT (OUTPATIENT)
Dept: UROLOGY | Facility: CLINIC | Age: 80
End: 2020-02-14
Payer: MEDICARE

## 2020-02-14 VITALS
HEART RATE: 88 BPM | SYSTOLIC BLOOD PRESSURE: 126 MMHG | WEIGHT: 190.06 LBS | DIASTOLIC BLOOD PRESSURE: 73 MMHG | OXYGEN SATURATION: 96 % | BODY MASS INDEX: 37.11 KG/M2

## 2020-02-14 DIAGNOSIS — N20.0 NEPHROLITHIASIS: ICD-10-CM

## 2020-02-14 DIAGNOSIS — R31.0 GROSS HEMATURIA: ICD-10-CM

## 2020-02-14 DIAGNOSIS — N32.89 BLADDER SPASM: Primary | ICD-10-CM

## 2020-02-14 PROCEDURE — 1159F MED LIST DOCD IN RCRD: CPT | Mod: S$GLB,,, | Performed by: NURSE PRACTITIONER

## 2020-02-14 PROCEDURE — 99213 OFFICE O/P EST LOW 20 MIN: CPT | Mod: S$GLB,,, | Performed by: NURSE PRACTITIONER

## 2020-02-14 PROCEDURE — 99999 PR PBB SHADOW E&M-EST. PATIENT-LVL III: ICD-10-PCS | Mod: PBBFAC,,, | Performed by: NURSE PRACTITIONER

## 2020-02-14 PROCEDURE — 99213 PR OFFICE/OUTPT VISIT, EST, LEVL III, 20-29 MIN: ICD-10-PCS | Mod: S$GLB,,, | Performed by: NURSE PRACTITIONER

## 2020-02-14 PROCEDURE — 1101F PR PT FALLS ASSESS DOC 0-1 FALLS W/OUT INJ PAST YR: ICD-10-PCS | Mod: CPTII,S$GLB,, | Performed by: NURSE PRACTITIONER

## 2020-02-14 PROCEDURE — 1101F PT FALLS ASSESS-DOCD LE1/YR: CPT | Mod: CPTII,S$GLB,, | Performed by: NURSE PRACTITIONER

## 2020-02-14 PROCEDURE — 1159F PR MEDICATION LIST DOCUMENTED IN MEDICAL RECORD: ICD-10-PCS | Mod: S$GLB,,, | Performed by: NURSE PRACTITIONER

## 2020-02-14 PROCEDURE — 99999 PR PBB SHADOW E&M-EST. PATIENT-LVL III: CPT | Mod: PBBFAC,,, | Performed by: NURSE PRACTITIONER

## 2020-02-14 RX ORDER — PHENAZOPYRIDINE HYDROCHLORIDE 100 MG/1
200 TABLET, FILM COATED ORAL 3 TIMES DAILY PRN
Qty: 30 TABLET | Refills: 0 | Status: SHIPPED | OUTPATIENT
Start: 2020-02-14 | End: 2020-02-24 | Stop reason: SDUPTHER

## 2020-02-14 NOTE — PATIENT INSTRUCTIONS
Treating Kidney Stones: Ureteroscopic Stone Removal    Ureteroscopic stone removal may be done before, after, or instead of other treatments. If you need this procedure, your healthcare provider will discuss its risks and possible complications. You will be told how to prepare. And you will be told about anesthesia that will keep you pain-free during treatment.     A ureteroscope lets your doctor see your stone before removing it.   Removing the stone through the ureter  Ureteroscopic stone removal extracts a small stone in your ureter without an incision. Your doctor places a viewing tube (ureteroscope) in your ureter. A wire basket inserted through the tube removes the stone. Sometimes, a laser or a mechanical device is used to break up the stone. A soft tube may be left in your ureter briefly to drain urine.     The stone may be fragmented. The stone is then withdrawn or passed.   Your recovery  This is an outpatient or overnight procedure. For a few days after surgery, you may feel some pain when you urinate. Or you may need to urinate more often, or have bloody urine. You may have a ureteral stent. This is a soft tube that prevents blockage from swelling after the procedure. The stent is removed when the swelling goes down, often within days. Follow up as instructed to check for any new stones.  When to call your healthcare provider  Call your healthcare provider right away if:  · You have sudden pain or flank pain  · You have a fever over 100.4°F (38°C)  · You have nausea that lasts for days  · You have heavy bleeding when you urinate  · You have heavy bleeding through your drainage tube  · You have swelling or redness around your incision   Date Last Reviewed: 2/1/2017 © 2000-2017 [employer]. [address] [place] [address], [place] [address]. All rights reserved. This information is not intended as a substitute for professional medical care. Always follow your healthcare professional's  instructions.

## 2020-02-14 NOTE — PROGRESS NOTES
"Subjective:      Pauline Cronin is a 79 y.o. female who returns today to sign consent for ureteroscopy.    Ms. Cronin returns today to sign consents for ureteroscopy scheduled on 2/18 with Dr. Stanton Kruse at [facility]. She has completed her anesthesia pre-op.     history to date:  She has been experiencing painless, intermittent gross hematuria for about 1 year.  CT urogram revealed "1.5 cm stone in a dilated right renal pelvis mild right caliectasis." She reports a history of renal stones treated with ESWL x1 and ureteroscopy x 1.  She currently denies dysuria, flank pain, N/V, fever and chills. She continues to experience intermittent gross hematuria. She denies urgency and frequency. She is currently taking Eliquis 5 mg PO BID for A. Fib. She wishes to proceed with ureteroscopy.     The following portions of the patient's history were reviewed and updated as appropriate: allergies, current medications, past family history, past medical history, past social history, past surgical history and problem list.    Review of Systems  A comprehensive multipoint review of systems was negative except as otherwise stated in the HPI.     Objective:   Vitals: /73 (BP Location: Left arm, Patient Position: Sitting, BP Method: Large (Automatic))   Pulse 88   Wt 86.2 kg (190 lb 0.6 oz)   SpO2 96%   BMI 37.11 kg/m²     Physical Exam   General: alert and oriented, no acute distress  Respiratory: Symmetric expansion, non-labored breathing  Cardiovascular: no peripheral edema  Abdomen: soft, non distended  Skin: normal coloration and turgor, no rashes, no suspicious skin lesions noted  Neuro: no gross deficits  Psych: normal judgment and insight, normal mood/affect and non-anxious    Lab Review   Urinalysis demonstrates no specimen today  Lab Results   Component Value Date    WBC 4.20 01/23/2020    HGB 14.7 01/23/2020    HCT 44.1 01/23/2020    MCV 95 01/23/2020     01/23/2020     Lab Results   Component " Value Date    CREATININE 0.8 01/23/2020    BUN 11 01/23/2020       Imaging     Assessment and Plan:   1. Bladder spasm  - phenazopyridine (PYRIDIUM) 100 MG tablet; Take 2 tablets (200 mg total) by mouth 3 (three) times daily as needed for Pain.  Dispense: 30 tablet; Refill: 0  --Pt request medication for bladder spasms post op. She has had ureteroscopy with stent placement in the past. She reports very painful spasms related to stent and would like to have medication ready post procedure. She is currently taking Oxybutynin 15mg PO daily for urinary incontinence.

## 2020-02-17 ENCOUNTER — TELEPHONE (OUTPATIENT)
Dept: UROLOGY | Facility: CLINIC | Age: 80
End: 2020-02-17

## 2020-02-18 ENCOUNTER — ANESTHESIA (OUTPATIENT)
Dept: SURGERY | Facility: OTHER | Age: 80
End: 2020-02-18
Payer: MEDICARE

## 2020-02-18 ENCOUNTER — HOSPITAL ENCOUNTER (OUTPATIENT)
Facility: OTHER | Age: 80
Discharge: HOME | End: 2020-02-18
Attending: UROLOGY | Admitting: UROLOGY
Payer: MEDICARE

## 2020-02-18 VITALS
SYSTOLIC BLOOD PRESSURE: 159 MMHG | WEIGHT: 190 LBS | RESPIRATION RATE: 16 BRPM | BODY MASS INDEX: 37.3 KG/M2 | DIASTOLIC BLOOD PRESSURE: 79 MMHG | HEART RATE: 74 BPM | TEMPERATURE: 98 F | HEIGHT: 60 IN | OXYGEN SATURATION: 96 %

## 2020-02-18 DIAGNOSIS — N20.0 NEPHROLITHIASIS: ICD-10-CM

## 2020-02-18 PROCEDURE — C2617 STENT, NON-COR, TEM W/O DEL: HCPCS | Performed by: UROLOGY

## 2020-02-18 PROCEDURE — 74420 PR  X-RAY RETROGRADE PYELOGRAM: ICD-10-PCS | Mod: 26,,, | Performed by: UROLOGY

## 2020-02-18 PROCEDURE — 52356 CYSTO/URETERO W/LITHOTRIPSY: CPT | Mod: RT,,, | Performed by: UROLOGY

## 2020-02-18 PROCEDURE — 71000015 HC POSTOP RECOV 1ST HR: Performed by: UROLOGY

## 2020-02-18 PROCEDURE — 37000008 HC ANESTHESIA 1ST 15 MINUTES: Performed by: UROLOGY

## 2020-02-18 PROCEDURE — 36000707: Performed by: UROLOGY

## 2020-02-18 PROCEDURE — 25000003 PHARM REV CODE 250: Performed by: ANESTHESIOLOGY

## 2020-02-18 PROCEDURE — 25500020 PHARM REV CODE 255: Performed by: UROLOGY

## 2020-02-18 PROCEDURE — 63600175 PHARM REV CODE 636 W HCPCS: Performed by: NURSE PRACTITIONER

## 2020-02-18 PROCEDURE — C1769 GUIDE WIRE: HCPCS | Performed by: UROLOGY

## 2020-02-18 PROCEDURE — C1894 INTRO/SHEATH, NON-LASER: HCPCS | Performed by: UROLOGY

## 2020-02-18 PROCEDURE — 52356 PR CYSTO/URETERO W/LITHOTRIPSY: ICD-10-PCS | Mod: RT,,, | Performed by: UROLOGY

## 2020-02-18 PROCEDURE — 71000016 HC POSTOP RECOV ADDL HR: Performed by: UROLOGY

## 2020-02-18 PROCEDURE — 27201423 OPTIME MED/SURG SUP & DEVICES STERILE SUPPLY: Performed by: UROLOGY

## 2020-02-18 PROCEDURE — 63600175 PHARM REV CODE 636 W HCPCS: Performed by: ANESTHESIOLOGY

## 2020-02-18 PROCEDURE — 37000009 HC ANESTHESIA EA ADD 15 MINS: Performed by: UROLOGY

## 2020-02-18 PROCEDURE — 63600175 PHARM REV CODE 636 W HCPCS: Performed by: NURSE ANESTHETIST, CERTIFIED REGISTERED

## 2020-02-18 PROCEDURE — 36000706: Performed by: UROLOGY

## 2020-02-18 PROCEDURE — 25000003 PHARM REV CODE 250: Performed by: NURSE ANESTHETIST, CERTIFIED REGISTERED

## 2020-02-18 PROCEDURE — 25000003 PHARM REV CODE 250: Performed by: UROLOGY

## 2020-02-18 PROCEDURE — 71000033 HC RECOVERY, INTIAL HOUR: Performed by: UROLOGY

## 2020-02-18 PROCEDURE — C1758 CATHETER, URETERAL: HCPCS | Performed by: UROLOGY

## 2020-02-18 PROCEDURE — 74420 UROGRAPHY RTRGR +-KUB: CPT | Mod: 26,,, | Performed by: UROLOGY

## 2020-02-18 DEVICE — STENT URETERAL UNIV 6FR 22CM: Type: IMPLANTABLE DEVICE | Site: URETER | Status: FUNCTIONAL

## 2020-02-18 RX ORDER — LIDOCAINE HYDROCHLORIDE 20 MG/ML
INJECTION INTRAVENOUS
Status: DISCONTINUED | OUTPATIENT
Start: 2020-02-18 | End: 2020-02-18

## 2020-02-18 RX ORDER — PROPOFOL 10 MG/ML
VIAL (ML) INTRAVENOUS
Status: DISCONTINUED | OUTPATIENT
Start: 2020-02-18 | End: 2020-02-18

## 2020-02-18 RX ORDER — CIPROFLOXACIN 2 MG/ML
400 INJECTION, SOLUTION INTRAVENOUS
Status: COMPLETED | OUTPATIENT
Start: 2020-02-18 | End: 2020-02-18

## 2020-02-18 RX ORDER — TAMSULOSIN HYDROCHLORIDE 0.4 MG/1
0.4 CAPSULE ORAL NIGHTLY
Qty: 7 CAPSULE | Refills: 0 | Status: SHIPPED | OUTPATIENT
Start: 2020-02-18 | End: 2021-03-25

## 2020-02-18 RX ORDER — OXYCODONE HYDROCHLORIDE 5 MG/1
5 TABLET ORAL
Status: DISCONTINUED | OUTPATIENT
Start: 2020-02-18 | End: 2020-02-18 | Stop reason: HOSPADM

## 2020-02-18 RX ORDER — LIDOCAINE HYDROCHLORIDE 10 MG/ML
0.5 INJECTION, SOLUTION EPIDURAL; INFILTRATION; INTRACAUDAL; PERINEURAL ONCE
Status: DISCONTINUED | OUTPATIENT
Start: 2020-02-18 | End: 2020-02-18 | Stop reason: HOSPADM

## 2020-02-18 RX ORDER — FENTANYL CITRATE 50 UG/ML
INJECTION, SOLUTION INTRAMUSCULAR; INTRAVENOUS
Status: DISCONTINUED | OUTPATIENT
Start: 2020-02-18 | End: 2020-02-18

## 2020-02-18 RX ORDER — DIPHENHYDRAMINE HYDROCHLORIDE 50 MG/ML
12.5 INJECTION INTRAMUSCULAR; INTRAVENOUS EVERY 30 MIN PRN
Status: DISCONTINUED | OUTPATIENT
Start: 2020-02-18 | End: 2020-02-18 | Stop reason: HOSPADM

## 2020-02-18 RX ORDER — SULFAMETHOXAZOLE AND TRIMETHOPRIM 800; 160 MG/1; MG/1
1 TABLET ORAL 2 TIMES DAILY
Qty: 4 TABLET | Refills: 0 | Status: SHIPPED | OUTPATIENT
Start: 2020-02-18 | End: 2020-02-20

## 2020-02-18 RX ORDER — HYDROMORPHONE HYDROCHLORIDE 2 MG/ML
1 INJECTION, SOLUTION INTRAMUSCULAR; INTRAVENOUS; SUBCUTANEOUS EVERY 4 HOURS PRN
Status: DISCONTINUED | OUTPATIENT
Start: 2020-02-18 | End: 2020-02-18 | Stop reason: HOSPADM

## 2020-02-18 RX ORDER — SODIUM CHLORIDE 0.9 % (FLUSH) 0.9 %
3 SYRINGE (ML) INJECTION
Status: DISCONTINUED | OUTPATIENT
Start: 2020-02-18 | End: 2020-02-18 | Stop reason: HOSPADM

## 2020-02-18 RX ORDER — HYDROCODONE BITARTRATE AND ACETAMINOPHEN 5; 325 MG/1; MG/1
1 TABLET ORAL EVERY 4 HOURS PRN
Status: DISCONTINUED | OUTPATIENT
Start: 2020-02-18 | End: 2020-02-18 | Stop reason: HOSPADM

## 2020-02-18 RX ORDER — ONDANSETRON 2 MG/ML
4 INJECTION INTRAMUSCULAR; INTRAVENOUS EVERY 12 HOURS PRN
Status: DISCONTINUED | OUTPATIENT
Start: 2020-02-18 | End: 2020-02-18 | Stop reason: HOSPADM

## 2020-02-18 RX ORDER — MEPERIDINE HYDROCHLORIDE 25 MG/ML
12.5 INJECTION INTRAMUSCULAR; INTRAVENOUS; SUBCUTANEOUS ONCE AS NEEDED
Status: DISCONTINUED | OUTPATIENT
Start: 2020-02-18 | End: 2020-02-18 | Stop reason: HOSPADM

## 2020-02-18 RX ORDER — ONDANSETRON 2 MG/ML
4 INJECTION INTRAMUSCULAR; INTRAVENOUS DAILY PRN
Status: DISCONTINUED | OUTPATIENT
Start: 2020-02-18 | End: 2020-02-18 | Stop reason: HOSPADM

## 2020-02-18 RX ORDER — PHENAZOPYRIDINE HYDROCHLORIDE 200 MG/1
200 TABLET, FILM COATED ORAL ONCE AS NEEDED
Status: DISCONTINUED | OUTPATIENT
Start: 2020-02-18 | End: 2020-02-18 | Stop reason: HOSPADM

## 2020-02-18 RX ORDER — SODIUM CHLORIDE, SODIUM LACTATE, POTASSIUM CHLORIDE, CALCIUM CHLORIDE 600; 310; 30; 20 MG/100ML; MG/100ML; MG/100ML; MG/100ML
INJECTION, SOLUTION INTRAVENOUS CONTINUOUS
Status: DISCONTINUED | OUTPATIENT
Start: 2020-02-18 | End: 2020-02-18 | Stop reason: HOSPADM

## 2020-02-18 RX ORDER — OXYBUTYNIN CHLORIDE 5 MG/1
5 TABLET ORAL ONCE
Status: COMPLETED | OUTPATIENT
Start: 2020-02-18 | End: 2020-02-18

## 2020-02-18 RX ORDER — ACETAMINOPHEN 500 MG
1000 TABLET ORAL
Status: COMPLETED | OUTPATIENT
Start: 2020-02-18 | End: 2020-02-18

## 2020-02-18 RX ORDER — ATROPA BELLADONNA AND OPIUM 16.2; 6 MG/1; MG/1
SUPPOSITORY RECTAL
Status: DISCONTINUED | OUTPATIENT
Start: 2020-02-18 | End: 2020-02-18 | Stop reason: HOSPADM

## 2020-02-18 RX ORDER — ONDANSETRON 2 MG/ML
INJECTION INTRAMUSCULAR; INTRAVENOUS
Status: DISCONTINUED | OUTPATIENT
Start: 2020-02-18 | End: 2020-02-18

## 2020-02-18 RX ORDER — EPHEDRINE SULFATE 50 MG/ML
INJECTION, SOLUTION INTRAVENOUS
Status: DISCONTINUED | OUTPATIENT
Start: 2020-02-18 | End: 2020-02-18

## 2020-02-18 RX ORDER — PREGABALIN 50 MG/1
50 CAPSULE ORAL
Status: COMPLETED | OUTPATIENT
Start: 2020-02-18 | End: 2020-02-18

## 2020-02-18 RX ORDER — ATROPA BELLADONNA AND OPIUM 16.2; 6 MG/1; MG/1
60 SUPPOSITORY RECTAL ONCE AS NEEDED
Status: DISCONTINUED | OUTPATIENT
Start: 2020-02-18 | End: 2020-02-18 | Stop reason: HOSPADM

## 2020-02-18 RX ORDER — HYDROMORPHONE HYDROCHLORIDE 2 MG/ML
0.4 INJECTION, SOLUTION INTRAMUSCULAR; INTRAVENOUS; SUBCUTANEOUS EVERY 5 MIN PRN
Status: DISCONTINUED | OUTPATIENT
Start: 2020-02-18 | End: 2020-02-18 | Stop reason: HOSPADM

## 2020-02-18 RX ADMIN — OXYBUTYNIN CHLORIDE 5 MG: 5 TABLET ORAL at 02:02

## 2020-02-18 RX ADMIN — EPHEDRINE SULFATE 10 MG: 50 INJECTION INTRAMUSCULAR; INTRAVENOUS; SUBCUTANEOUS at 11:02

## 2020-02-18 RX ADMIN — CIPROFLOXACIN 400 MG: 2 INJECTION, SOLUTION INTRAVENOUS at 11:02

## 2020-02-18 RX ADMIN — SODIUM CHLORIDE, SODIUM LACTATE, POTASSIUM CHLORIDE, AND CALCIUM CHLORIDE: 600; 310; 30; 20 INJECTION, SOLUTION INTRAVENOUS at 09:02

## 2020-02-18 RX ADMIN — FENTANYL CITRATE 25 MCG: 50 INJECTION, SOLUTION INTRAMUSCULAR; INTRAVENOUS at 11:02

## 2020-02-18 RX ADMIN — PREGABALIN 50 MG: 50 CAPSULE ORAL at 09:02

## 2020-02-18 RX ADMIN — CARBOXYMETHYLCELLULOSE SODIUM 2 DROP: 2.5 SOLUTION/ DROPS OPHTHALMIC at 11:02

## 2020-02-18 RX ADMIN — ACETAMINOPHEN 1000 MG: 500 TABLET, FILM COATED ORAL at 09:02

## 2020-02-18 RX ADMIN — EPHEDRINE SULFATE 15 MG: 50 INJECTION INTRAMUSCULAR; INTRAVENOUS; SUBCUTANEOUS at 11:02

## 2020-02-18 RX ADMIN — PROPOFOL 200 MG: 10 INJECTION, EMULSION INTRAVENOUS at 11:02

## 2020-02-18 RX ADMIN — LIDOCAINE HYDROCHLORIDE 50 MG: 20 INJECTION, SOLUTION INTRAVENOUS at 11:02

## 2020-02-18 RX ADMIN — ONDANSETRON 4 MG: 2 INJECTION INTRAMUSCULAR; INTRAVENOUS at 12:02

## 2020-02-18 NOTE — PLAN OF CARE
Pauline Cronin has met all discharge criteria from Phase II. Vital Signs are stable, ambulating  without difficulty. Discharge instructions given, patient verbalized understanding. Discharged from facility via wheelchair in stable condition.

## 2020-02-18 NOTE — PLAN OF CARE
Patient c/o bladder spasms.  MD Called, orders noted.  Also relayed to MD that no string was visualized for patient to remove.  Nursing communication added to d/c instructions that if patient does not visualize string by Friday 2/21/20, then she is to call the office and make an appointment for removal the following week.

## 2020-02-18 NOTE — TRANSFER OF CARE
Anesthesia Transfer of Care Note    Patient: Pauline Cronin    Procedure(s) Performed: Procedure(s) (LRB):  PYELOGRAM, RETROGRADE (Right)  CYSTOURETEROSCOPY, WITH HOLMIUM LASER LITHOTRIPSY OF URETERAL CALCULUS AND STENT INSERTION (Right)    Patient location: PACU    Anesthesia Type: general    Transport from OR: Transported from OR on 2-3 L/min O2 by NC with adequate spontaneous ventilation    Post pain: adequate analgesia    Post assessment: no apparent anesthetic complications    Post vital signs: stable    Level of consciousness: responds to stimulation    Nausea/Vomiting: no nausea/vomiting    Complications: none    Transfer of care protocol was followed      Last vitals:   Visit Vitals  BP (!) 145/67 (BP Location: Right arm, Patient Position: Lying)   Pulse 70   Temp 36.3 °C (97.4 °F) (Oral)   Resp 16   Ht 5' (1.524 m)   Wt 86.2 kg (190 lb)   SpO2 95%   Breastfeeding? No   BMI 37.11 kg/m²

## 2020-02-18 NOTE — OP NOTE
Operative Note       Surgery Date: 2/18/2020     Surgeon: Stanton Kruse MD    Assistant Surgeon: None    Pre-op Diagnosis:  Nephrolithiasis [N20.0]    Post-op Diagnosis: Post-Op Diagnosis Codes:     * Nephrolithiasis [N20.0]    Procedures:  Procedure(s) (LRB):  PYELOGRAM, RETROGRADE (Right)  CYSTOURETEROSCOPY, WITH HOLMIUM LASER LITHOTRIPSY OF URETERAL CALCULUS AND STENT INSERTION (Right)    Anesthesia: General    Indications for Procedure:  The patient is a 79 y.o. year old female with a large right renal stone.  She presents today for surgical treatment with urteroscopy.  The full risks and benefits of the procedure have been explained and detail and she wishes to proceed.    Procedure Description:  The patient was brought to the operative suite and placed under general anesthesia. She was placed in lithotomy position and prepped and draped in usual sterile fashion.  Time out was performed prior to starting the procedure.     film was obtained, which confirmed the location of the stone in the right renal pelvis.  Rigid cystoscopy was performed without abnormality noted in the urethra or bladder.  The right ureteral orifice was identified and Motion wire was advanced into the renal pelvis under fluoroscopic guidance without difficulty.  Rigid ureteroscopy was then performed alongside the wire to rule out the presence of a small distal ureteral stone.  The distal half of the ureter was visualized and no stones were noted.  There was some narrowing of the distal ureter which I was able to dilate gently with the ureteroscope.  The scope was then removed leaving the wire in place.    A 2nd wire was then advanced into the renal pelvis using a dual-lumen ureteral catheter. A 28 cm ureteral access sheath was then placed over the 2nd wire under fluoroscopic guidance, leaving the initial wire in place as a safety wire. Flexible ureteroscopy was then performed and the stone was identified within the renal pelvis.  Using  the scope, I was able to easily relocate the stone into an upper pole calyx.  Laser lithotripsy was then performed using the Holmium laser and the stone was fragmented into multiple pieces.  Further lithotripsy was performed using the [employer] settings until only small submillimeter fragments remained. The entire renal pelvis was then inspected and all calices were noted to be void of any large or potentially obstructing stone fragments. The ureteral access sheath was removed under direct vision with no injuries or additional stones identified.     The dual-lumen catheter was again placed over the wire.  Contrast was instilled through the 2nd lumen to perform retrograde pyelogram.  The ureter demonstrated normal caliber with no evidence of extravasation, filling defect.    Under fluoroscopic guidance, the guidewire was then exchanged for a 6x22 double-J ureteral stent. Good coils were confirmed within the renal pelvis and the bladder using fluoroscopy and cytoscopy. The string was not removed from the stent prior to placement. The bladder was then emptied and the cystoscope removed. The patient was then awakened and transferred to PACU in stable condition. She will remove the stent at home Monday and follow-up with me in 1 month.       Complications: No    Estimated Blood Loss: 0cc         Specimens Removed:   Specimen (12h ago, onward)    None          Implants:   Implant Name Type Inv. Item Serial No.  Lot No. LRB No. Used   STENT URETERAL UNIV 6FR 22CM - [id number]  STENT URETERAL UNIV 6FR 22CM  [employer] [id number] Right 1              Disposition: PACU - hemodynamically stable.           Condition: Good    Attestation:  I performed the procedure.

## 2020-02-18 NOTE — ANESTHESIA POSTPROCEDURE EVALUATION
Anesthesia Post Evaluation    Patient: Pauline Cronin    Procedure(s) Performed: Procedure(s) (LRB):  PYELOGRAM, RETROGRADE (Right)  CYSTOURETEROSCOPY, WITH HOLMIUM LASER LITHOTRIPSY OF URETERAL CALCULUS AND STENT INSERTION (Right)    Final Anesthesia Type: general    Patient location during evaluation: PACU  Patient participation: Yes- Able to Participate  Level of consciousness: awake and alert  Post-procedure vital signs: reviewed and stable  Pain management: adequate  Airway patency: patent    PONV status at discharge: No PONV  Anesthetic complications: no      Cardiovascular status: blood pressure returned to baseline  Respiratory status: unassisted and room air  Hydration status: euvolemic  Follow-up not needed.          Vitals Value Taken Time   /60 2/18/2020 12:59 PM   Temp 36.3 °C (97.4 °F) 2/18/2020 12:59 PM   Pulse 69 2/18/2020  1:08 PM   Resp 16 2/18/2020 12:59 PM   SpO2 91 % 2/18/2020  1:08 PM   Vitals shown include unvalidated device data.      Event Time     Out of Recovery 13:11:45          Pain/Aziza Score: Pain Rating Prior to Med Admin: 0 (2/18/2020  9:04 AM)  Aziza Score: 9 (2/18/2020 12:52 PM)

## 2020-02-18 NOTE — BRIEF OP NOTE
[facility]  Brief Operative Note     SUMMARY     Surgery Date: 2/18/2020     Surgeon(s) and Role:     * Jovan Kruse MD - Primary    Assisting Surgeon: None    Pre-op Diagnosis:  Nephrolithiasis [N20.0]    Post-op Diagnosis:  Post-Op Diagnosis Codes:     * Nephrolithiasis [N20.0]    Procedure(s) (LRB):  PYELOGRAM, RETROGRADE (Right)  CYSTOURETEROSCOPY, WITH HOLMIUM LASER LITHOTRIPSY OF URETERAL CALCULUS AND STENT INSERTION (Right)    Anesthesia: * No anesthesia type entered *    Description of the findings of the procedure: No ureteral stones. 1.5cm stone in right renal pelvis. Fragmented with laser into passable fragments. 6x22 JJ stent placed with strings.    Estimated Blood Loss: 0cc         Specimens:   Specimen (12h ago, onward)    None          Discharge Note    SUMMARY     Admit Date: 2/18/2020    Discharge Date and Time: 2/18/2020    Hospital Course: Patient was admitted for elective outpatient procedure, which was uncomplicated and well tolerated.      Final Diagnosis: Post-Op Diagnosis Codes:     * Nephrolithiasis [N20.0]    Disposition: Home or Self Care    Follow Up/Patient Instructions:     Medications:  Reconciled Home Medications:   Current Discharge Medication List      START taking these medications    Details   sulfamethoxazole-trimethoprim 800-160mg (BACTRIM DS) 800-160 mg Tab Take 1 tablet by mouth 2 (two) times daily. for 2 days  Qty: 4 tablet, Refills: 0      tamsulosin (FLOMAX) 0.4 mg Cap Take 1 capsule (0.4 mg total) by mouth every evening.  Qty: 7 capsule, Refills: 0         CONTINUE these medications which have NOT CHANGED    Details   amLODIPine (NORVASC) 10 MG tablet TAKE 1 TABLET BY MOUTH ONCE DAILY  Qty: 90 tablet, Refills: 1      atorvastatin (LIPITOR) 20 MG tablet Take 1 tablet by mouth once daily.      carvedilol (COREG) 6.25 MG tablet TAKE ONE TABLET BY MOUTH TWICE DAILY WITH FOOD  Qty: 180 tablet, Refills: 0      ELIQUIS 5 mg Tab Take 1 tablet by mouth 2 (two)  times daily.      esomeprazole (NEXIUM) 40 MG capsule TAKE 1 CAPSULE BY MOUTH ONCE DAILY  Qty: 30 capsule, Refills: 5    Comments: Please consider 90 day supplies to promote better adherence      folic acid (FOLVITE) 1 MG tablet TAKE 1 TABLET BY MOUTH ONCE DAILY  Qty: 90 tablet, Refills: 1      levothyroxine (SYNTHROID, LEVOTHROID) 175 MCG tablet Take 1 tablet by mouth once daily.    Associated Diagnoses: Hypothyroidism, unspecified type      oxybutynin (DITROPAN XL) 15 MG TR24 Take 1 tablet (15 mg total) by mouth once daily.  Qty: 30 tablet, Refills: 5      sotalol (BETAPACE) 120 MG Tab Take 120 mg by mouth every 12 (twelve) hours.      sulfaSALAzine (AZULFIDINE) 500 mg Tab TAKE 1 TABLET BY MOUTH TWICE DAILY  Qty: 180 tablet, Refills: 1      alendronate (FOSAMAX) 70 MG tablet TAKE 1 TABLET BY MOUTH ONCE A WEEK  Qty: 12 tablet, Refills: 3    Comments: Please consider 90 day supplies to promote better adherence  Associated Diagnoses: Osteoporosis, unspecified osteoporosis type, unspecified pathological fracture presence      phenazopyridine (PYRIDIUM) 100 MG tablet Take 2 tablets (200 mg total) by mouth 3 (three) times daily as needed for Pain.  Qty: 30 tablet, Refills: 0    Associated Diagnoses: Bladder spasm           Discharge Procedure Orders   Diet general     Follow-up Information     Nicole Garduno NP In 1 month.    Specialty:  Urology  Why:  For post-operative follow-up   Contact information:  [address] [place] [address] [phone]

## 2020-02-18 NOTE — DISCHARGE INSTRUCTIONS
**If string is not visualized by Friday 2/21/20.  Call office to make an appointment to have string removed. **    Patient may remove stent at home by pulling string Monday.  Call office with any questions or concerns.     Treating Kidney Stones: Ureteroscopic Stone Removal  Your recovery  This is an outpatient or overnight procedure. For a few days after surgery, you may feel some pain when you urinate. Or you may need to urinate more often, or have bloody urine. You may have a ureteral stent. This is a soft tube that prevents blockage from swelling after the procedure. The stent is removed when the swelling goes down, often within days. Follow up as instructed to check for any new stones.    When to call your healthcare provider  Call your healthcare provider right away if:  · You have sudden pain or flank pain  · You have a fever over 100.4°F (38°C)  · You have nausea that lasts for days  · You have heavy bleeding when you urinate  · You have heavy bleeding through your drainage tube  · You have swelling or redness around your incision       Discharge Instructions: After Your Surgery  Youve just had surgery. During surgery, you were given medicine called anesthesia to keep you relaxed and free of pain. After surgery, you may have some pain or nausea. This is common. Here are some tips for feeling better and getting well after surgery.     Stay on schedule with your medicine.   Going home  Your healthcare provider will show you how to take care of yourself when you go home. He or she will also answer your questions. Have an adult family member or friend drive you home. For the first 24 hours after your surgery:  · Do not drive or use heavy equipment.  · Do not make important decisions or sign legal papers.  · Do not drink alcohol.  · Have someone stay with you, if needed. He or she can watch for problems and help keep you safe.  Be sure to go to all follow-up visits with your healthcare provider. And rest after  your surgery for as long as your healthcare provider tells you to.    Coping with pain  If you have pain after surgery, pain medicine will help you feel better. Take it as told, before pain becomes severe. Also, ask your healthcare provider or pharmacist about other ways to control pain. This might be with heat, ice, or relaxation. And follow any other instructions your surgeon or nurse gives you.    Tips for taking pain medicine  To get the best relief possible, remember these points:  · Pain medicines can upset your stomach. Taking them with a little food may help.  · Most pain relievers taken by mouth need at least 20 to 30 minutes to start to work.  · Taking medicine on a schedule can help you remember to take it. Try to time your medicine so that you can take it before starting an activity. This might be before you get dressed, go for a walk, or sit down for dinner.  · Constipation is a common side effect of pain medicines. Call your healthcare provider before taking any medicines such as laxatives or stool softeners to help ease constipation. Also ask if you should skip any foods. Drinking lots of fluids and eating foods such as fruits and vegetables that are high in fiber can also help. Remember, do not take laxatives unless your surgeon has prescribed them.  · Drinking alcohol and taking pain medicine can cause dizziness and slow your breathing. It can even be deadly. Do not drink alcohol while taking pain medicine.  · Pain medicine can make you react more slowly to things. Do not drive or run machinery while taking pain medicine.  Your healthcare provider may tell you to take acetaminophen to help ease your pain. Ask him or her how much you are supposed to take each day. Acetaminophen or other pain relievers may interact with your prescription medicines or other over-the-counter (OTC) medicines. Some prescription medicines have acetaminophen and other ingredients. Using both prescription and OTC  acetaminophen for pain can cause you to overdose. Read the labels on your OTC medicines with care. This will help you to clearly know the list of ingredients, how much to take, and any warnings. It may also help you not take too much acetaminophen. If you have questions or do not understand the information, ask your pharmacist or healthcare provider to explain it to you before you take the OTC medicine.    Managing nausea  Some people have an upset stomach after surgery. This is often because of anesthesia, pain, or pain medicine, or the stress of surgery. These tips will help you handle nausea and eat healthy foods as you get better. If you were on a special food plan before surgery, ask your healthcare provider if you should follow it while you get better. These tips may help:  · Do not push yourself to eat. Your body will tell you when to eat and how much.  · Start off with clear liquids and soup. They are easier to digest.  · Next try semi-solid foods, such as mashed potatoes, applesauce, and gelatin, as you feel ready.  · Slowly move to solid foods. Dont eat fatty, rich, or spicy foods at first.  · Do not force yourself to have 3 large meals a day. Instead eat smaller amounts more often.  · Take pain medicines with a small amount of solid food, such as crackers or toast, to avoid nausea.     Call your surgeon if  · You still have pain an hour after taking medicine. The medicine may not be strong enough.  · You feel too sleepy, dizzy, or groggy. The medicine may be too strong.  · You have side effects like nausea, vomiting, or skin changes, such as rash, itching, or hives.       If you have obstructive sleep apnea  You were given anesthesia medicine during surgery to keep you comfortable and free of pain. After surgery, you may have more apnea spells because of this medicine and other medicines you were given. The spells may last longer than usual.   At home:  · Keep using the continuous positive airway  pressure (CPAP) device when you sleep. Unless your healthcare provider tells you not to, use it when you sleep, day or night. CPAP is a common device used to treat obstructive sleep apnea.  · Talk with your provider before taking any pain medicine, muscle relaxants, or sedatives. Your provider will tell you about the possible dangers of taking these medicines.

## 2020-02-19 ENCOUNTER — TELEPHONE (OUTPATIENT)
Dept: UROLOGY | Facility: CLINIC | Age: 80
End: 2020-02-19

## 2020-02-19 NOTE — TELEPHONE ENCOUNTER
----- Message from Nick Suazo MA sent at 2/18/2020  3:45 PM CST -----  To call and make a one month follow up appointment.

## 2020-02-19 NOTE — TELEPHONE ENCOUNTER
----- Message from Alyson Burger MA sent at 2/18/2020  1:13 PM CST -----  Regarding: FW: Follow up appt   Michela WALTER    Can you schedule appt.  Thanks  Alyson  ----- Message -----  From: Nicole Garduno NP  Sent: 2/18/2020  12:26 PM CST  To: [facility] Staff  Subject: Follow up appt                                   Ms. Cronin had surgery today with Dr. Kruse. Can we call her before Friday and set up a 1 month follow up appt. She will need renal US prior to the appt. I will put that order in.    ThanksNicole   ----- Message -----  From: Jovan Kruse MD  Sent: 2/18/2020  12:09 PM CST  To: Nicole Garduno NP    Her surgery went great. She's going to remove the stent at home Monday. Can you make sure she gets FU in 1 month (with you) with an US prior? I'm not sure how I get that scheduled down there.

## 2020-02-20 ENCOUNTER — TELEPHONE (OUTPATIENT)
Dept: UROLOGY | Facility: CLINIC | Age: 80
End: 2020-02-20

## 2020-02-20 ENCOUNTER — PATIENT OUTREACH (OUTPATIENT)
Dept: ADMINISTRATIVE | Facility: OTHER | Age: 80
End: 2020-02-20

## 2020-02-20 NOTE — TELEPHONE ENCOUNTER
----- Message from Nicole Garduno NP sent at 2/20/2020  8:20 AM CST -----  Urine culture did not reveal infection. No intervention needed at this time

## 2020-02-20 NOTE — TELEPHONE ENCOUNTER
----- Message from Amber Salazar sent at 2/20/2020  8:47 AM CST -----  Contact: patient  Please call above patient at [phone] returning a call to the nurse thanks.

## 2020-02-20 NOTE — TELEPHONE ENCOUNTER
Spoke with pt and she had a verbal understanding that her   Urine culture did not reveal infection. No intervention needed at this time    Pt is requesting Nicole to speak with Dr. Kruse to remove her stent Friday instead of Monday. 2/20/20 benji

## 2020-02-20 NOTE — TELEPHONE ENCOUNTER
Spoke with pt and she had a verbal understanding that her   Urine culture did not reveal infection. No intervention needed at this time      2/20/20 benji

## 2020-02-21 ENCOUNTER — OFFICE VISIT (OUTPATIENT)
Dept: UROLOGY | Facility: CLINIC | Age: 80
End: 2020-02-21
Payer: MEDICARE

## 2020-02-21 VITALS
DIASTOLIC BLOOD PRESSURE: 72 MMHG | WEIGHT: 188.94 LBS | BODY MASS INDEX: 36.9 KG/M2 | SYSTOLIC BLOOD PRESSURE: 127 MMHG | HEART RATE: 80 BPM | OXYGEN SATURATION: 96 %

## 2020-02-21 DIAGNOSIS — N32.89 BLADDER SPASM: ICD-10-CM

## 2020-02-21 DIAGNOSIS — N20.0 NEPHROLITHIASIS: Primary | ICD-10-CM

## 2020-02-21 PROCEDURE — 1159F PR MEDICATION LIST DOCUMENTED IN MEDICAL RECORD: ICD-10-PCS | Mod: S$GLB,,, | Performed by: NURSE PRACTITIONER

## 2020-02-21 PROCEDURE — 1101F PT FALLS ASSESS-DOCD LE1/YR: CPT | Mod: CPTII,S$GLB,, | Performed by: NURSE PRACTITIONER

## 2020-02-21 PROCEDURE — 1126F AMNT PAIN NOTED NONE PRSNT: CPT | Mod: S$GLB,,, | Performed by: NURSE PRACTITIONER

## 2020-02-21 PROCEDURE — 99213 OFFICE O/P EST LOW 20 MIN: CPT | Mod: S$GLB,,, | Performed by: NURSE PRACTITIONER

## 2020-02-21 PROCEDURE — 1126F PR PAIN SEVERITY QUANTIFIED, NO PAIN PRESENT: ICD-10-PCS | Mod: S$GLB,,, | Performed by: NURSE PRACTITIONER

## 2020-02-21 PROCEDURE — 99999 PR PBB SHADOW E&M-EST. PATIENT-LVL IV: CPT | Mod: PBBFAC,,, | Performed by: NURSE PRACTITIONER

## 2020-02-21 PROCEDURE — 99999 PR PBB SHADOW E&M-EST. PATIENT-LVL IV: ICD-10-PCS | Mod: PBBFAC,,, | Performed by: NURSE PRACTITIONER

## 2020-02-21 PROCEDURE — 1159F MED LIST DOCD IN RCRD: CPT | Mod: S$GLB,,, | Performed by: NURSE PRACTITIONER

## 2020-02-21 PROCEDURE — 1101F PR PT FALLS ASSESS DOC 0-1 FALLS W/OUT INJ PAST YR: ICD-10-PCS | Mod: CPTII,S$GLB,, | Performed by: NURSE PRACTITIONER

## 2020-02-21 PROCEDURE — 99213 PR OFFICE/OUTPT VISIT, EST, LEVL III, 20-29 MIN: ICD-10-PCS | Mod: S$GLB,,, | Performed by: NURSE PRACTITIONER

## 2020-02-21 NOTE — PATIENT INSTRUCTIONS
Treating Kidney Stones: Ureteroscopic Stone Removal    Ureteroscopic stone removal may be done before, after, or instead of other treatments. If you need this procedure, your healthcare provider will discuss its risks and possible complications. You will be told how to prepare. And you will be told about anesthesia that will keep you pain-free during treatment.     A ureteroscope lets your doctor see your stone before removing it.   Removing the stone through the ureter  Ureteroscopic stone removal extracts a small stone in your ureter without an incision. Your doctor places a viewing tube (ureteroscope) in your ureter. A wire basket inserted through the tube removes the stone. Sometimes, a laser or a mechanical device is used to break up the stone. A soft tube may be left in your ureter briefly to drain urine.     The stone may be fragmented. The stone is then withdrawn or passed.   Your recovery  This is an outpatient or overnight procedure. For a few days after surgery, you may feel some pain when you urinate. Or you may need to urinate more often, or have bloody urine. You may have a ureteral stent. This is a soft tube that prevents blockage from swelling after the procedure. The stent is removed when the swelling goes down, often within days. Follow up as instructed to check for any new stones.  When to call your healthcare provider  Call your healthcare provider right away if:  · You have sudden pain or flank pain  · You have a fever over 100.4°F (38°C)  · You have nausea that lasts for days  · You have heavy bleeding when you urinate  · You have heavy bleeding through your drainage tube  · You have swelling or redness around your incision   Date Last Reviewed: 2/1/2017 © 2000-2017 [employer]. [address] [place] [address], [place] [address]. All rights reserved. This information is not intended as a substitute for professional medical care. Always follow your healthcare professional's  instructions.

## 2020-02-21 NOTE — PROGRESS NOTES
Subjective:      Pauline Cronin is a 79 y.o. female who returns today regarding her nephrolithiasis.    Ms. Cronin is s/p ureteroscopy for management of 1.5cm right renal pelvis stone on 2/18/20. She was discharged with plans to pull stent herself on Monday 2/24. She presents today to discuss follow up management and to visualize stent string so that she can pull the stent at home next week. She reports feeling well. Reports occasional bladder spasms which are controlled with medication. She denies significiant discomfort from stent. She does report hematuria; remains on eliquis 5 mg BID.    The following portions of the patient's history were reviewed and updated as appropriate: allergies, current medications, past family history, past medical history, past social history, past surgical history and problem list.    Review of Systems  A comprehensive multipoint review of systems was negative except as otherwise stated in the HPI.     Objective:   Vitals: /72 (BP Location: Right arm, Patient Position: Sitting, BP Method: Large (Automatic))   Pulse 80   Wt 85.7 kg (188 lb 15 oz)   SpO2 96%   BMI 36.90 kg/m²     Physical Exam   General: alert and oriented, no acute distress  Respiratory: Symmetric expansion, non-labored breathing  Cardiovascular: no peripheral edema  Abdomen: soft, non distended  Skin: normal coloration and turgor, no rashes, no suspicious skin lesions noted  Neuro: no gross deficits  Psych: normal judgment and insight, normal mood/affect and non-anxious    Lab Review   Urinalysis demonstrates no specimen today  Lab Results   Component Value Date    WBC 4.20 01/23/2020    HGB 14.7 01/23/2020    HCT 44.1 01/23/2020    MCV 95 01/23/2020     01/23/2020     Lab Results   Component Value Date    CREATININE 0.8 01/23/2020    BUN 11 01/23/2020       Imaging     Assessment and Plan:   1. Nephrolithiasis  2. Bladder spasm     --Pt was placed in lithotomy for visulaization of stent strings. Pt  is unable to see and reach strings. Suture string was tied to renal stent strings so that pt can reach strings. We reviewed stent removal instructions. All questions answered.   --Continue current medications   --For any complications the pt was instructed to contact [facility] for appt.   --Follow up in 3 weeks with renal ultrasound

## 2020-02-23 ENCOUNTER — PATIENT OUTREACH (OUTPATIENT)
Dept: ADMINISTRATIVE | Facility: HOSPITAL | Age: 80
End: 2020-02-23

## 2020-02-24 ENCOUNTER — TELEPHONE (OUTPATIENT)
Dept: UROLOGY | Facility: CLINIC | Age: 80
End: 2020-02-24

## 2020-02-24 DIAGNOSIS — N32.89 BLADDER SPASM: ICD-10-CM

## 2020-02-24 RX ORDER — PHENAZOPYRIDINE HYDROCHLORIDE 100 MG/1
100 TABLET, FILM COATED ORAL 3 TIMES DAILY PRN
Qty: 30 TABLET | Refills: 0 | Status: SHIPPED | OUTPATIENT
Start: 2020-02-24 | End: 2020-03-05

## 2020-02-24 NOTE — TELEPHONE ENCOUNTER
----- Message from Duarte Alvarez sent at 2/24/2020  1:32 PM CST -----  Contact: pt  Please call pt at [phone] after calling the pharmacy    Refill request for phenazopyridine (PYRIDIUM) 100 MG tablet    Use [facility] - [place], [place] - [address] [facility]    Patient is out of medication    Thank you

## 2020-03-03 RX ORDER — OXYBUTYNIN CHLORIDE 15 MG/1
TABLET, EXTENDED RELEASE ORAL
Qty: 90 TABLET | Refills: 3 | Status: SHIPPED | OUTPATIENT
Start: 2020-03-03 | End: 2021-01-15

## 2020-03-05 RX ORDER — DIPHENOXYLATE HYDROCHLORIDE AND ATROPINE SULFATE 2.5; .025 MG/1; MG/1
TABLET ORAL
Qty: 20 TABLET | Refills: 1 | Status: SHIPPED | OUTPATIENT
Start: 2020-03-05 | End: 2021-03-30

## 2020-03-23 RX ORDER — SULFASALAZINE 500 MG/1
TABLET ORAL
Qty: 180 TABLET | Refills: 0 | Status: SHIPPED | OUTPATIENT
Start: 2020-03-23 | End: 2020-06-29

## 2020-03-23 RX ORDER — ESOMEPRAZOLE MAGNESIUM 40 MG/1
CAPSULE, DELAYED RELEASE ORAL
Qty: 90 CAPSULE | Refills: 0 | Status: SHIPPED | OUTPATIENT
Start: 2020-03-23 | End: 2020-06-22

## 2020-04-11 DIAGNOSIS — M81.0 OSTEOPOROSIS, UNSPECIFIED OSTEOPOROSIS TYPE, UNSPECIFIED PATHOLOGICAL FRACTURE PRESENCE: ICD-10-CM

## 2020-04-13 RX ORDER — ALENDRONATE SODIUM 70 MG/1
TABLET ORAL
Qty: 12 TABLET | Refills: 0 | Status: SHIPPED | OUTPATIENT
Start: 2020-04-13 | End: 2020-07-06

## 2020-06-29 RX ORDER — SULFASALAZINE 500 MG/1
TABLET ORAL
Qty: 180 TABLET | Refills: 1 | Status: SHIPPED | OUTPATIENT
Start: 2020-06-29 | End: 2021-01-11

## 2020-07-03 DIAGNOSIS — Z12.31 ENCOUNTER FOR SCREENING MAMMOGRAM FOR BREAST CANCER: ICD-10-CM

## 2020-07-05 DIAGNOSIS — M81.0 OSTEOPOROSIS, UNSPECIFIED OSTEOPOROSIS TYPE, UNSPECIFIED PATHOLOGICAL FRACTURE PRESENCE: ICD-10-CM

## 2020-07-06 RX ORDER — ALENDRONATE SODIUM 70 MG/1
TABLET ORAL
Qty: 12 TABLET | Refills: 3 | Status: SHIPPED | OUTPATIENT
Start: 2020-07-06 | End: 2021-06-14

## 2020-08-03 RX ORDER — FOLIC ACID 1 MG/1
TABLET ORAL
Qty: 90 TABLET | Refills: 1 | Status: SHIPPED | OUTPATIENT
Start: 2020-08-03 | End: 2021-01-15

## 2020-09-21 RX ORDER — ESOMEPRAZOLE MAGNESIUM 40 MG/1
CAPSULE, DELAYED RELEASE ORAL
Qty: 90 CAPSULE | Refills: 1 | Status: SHIPPED | OUTPATIENT
Start: 2020-09-21 | End: 2021-01-15

## 2020-12-16 LAB — HBA1C MFR BLD: 5.6 %

## 2021-01-04 ENCOUNTER — TELEPHONE (OUTPATIENT)
Dept: PRIMARY CARE CLINIC | Facility: CLINIC | Age: 81
End: 2021-01-04

## 2021-01-04 RX ORDER — FLUCONAZOLE 150 MG/1
150 TABLET ORAL ONCE
Qty: 1 TABLET | Refills: 0 | Status: SHIPPED | OUTPATIENT
Start: 2021-01-04 | End: 2021-01-04

## 2021-01-09 ENCOUNTER — IMMUNIZATION (OUTPATIENT)
Dept: PRIMARY CARE CLINIC | Facility: CLINIC | Age: 81
End: 2021-01-09
Payer: MEDICARE

## 2021-01-09 DIAGNOSIS — Z23 NEED FOR VACCINATION: ICD-10-CM

## 2021-01-09 PROCEDURE — 91300 COVID-19, MRNA, LNP-S, PF, 30 MCG/0.3 ML DOSE VACCINE: CPT | Mod: PBBFAC | Performed by: FAMILY MEDICINE

## 2021-01-11 RX ORDER — SULFASALAZINE 500 MG/1
TABLET ORAL
Qty: 180 TABLET | Refills: 0 | Status: SHIPPED | OUTPATIENT
Start: 2021-01-11 | End: 2021-04-12

## 2021-01-30 ENCOUNTER — IMMUNIZATION (OUTPATIENT)
Dept: PRIMARY CARE CLINIC | Facility: CLINIC | Age: 81
End: 2021-01-30
Payer: MEDICARE

## 2021-01-30 DIAGNOSIS — Z23 NEED FOR VACCINATION: Primary | ICD-10-CM

## 2021-01-30 PROCEDURE — 0002A COVID-19, MRNA, LNP-S, PF, 30 MCG/0.3 ML DOSE VACCINE: CPT | Mod: PBBFAC | Performed by: EMERGENCY MEDICINE

## 2021-01-30 PROCEDURE — 91300 COVID-19, MRNA, LNP-S, PF, 30 MCG/0.3 ML DOSE VACCINE: CPT | Mod: PBBFAC | Performed by: EMERGENCY MEDICINE

## 2021-03-25 ENCOUNTER — TELEPHONE (OUTPATIENT)
Dept: PRIMARY CARE CLINIC | Facility: CLINIC | Age: 81
End: 2021-03-25

## 2021-03-25 ENCOUNTER — OFFICE VISIT (OUTPATIENT)
Dept: PRIMARY CARE CLINIC | Facility: CLINIC | Age: 81
End: 2021-03-25
Payer: MEDICARE

## 2021-03-25 VITALS
WEIGHT: 183 LBS | SYSTOLIC BLOOD PRESSURE: 136 MMHG | DIASTOLIC BLOOD PRESSURE: 84 MMHG | HEIGHT: 60 IN | BODY MASS INDEX: 35.93 KG/M2 | OXYGEN SATURATION: 95 % | RESPIRATION RATE: 18 BRPM | HEART RATE: 72 BPM

## 2021-03-25 DIAGNOSIS — N32.81 OAB (OVERACTIVE BLADDER): ICD-10-CM

## 2021-03-25 DIAGNOSIS — K50.919 CROHN'S DISEASE WITH COMPLICATION, UNSPECIFIED GASTROINTESTINAL TRACT LOCATION: ICD-10-CM

## 2021-03-25 DIAGNOSIS — I48.0 PAROXYSMAL ATRIAL FIBRILLATION: ICD-10-CM

## 2021-03-25 DIAGNOSIS — E66.01 SEVERE OBESITY (BMI 35.0-39.9) WITH COMORBIDITY: ICD-10-CM

## 2021-03-25 DIAGNOSIS — M17.11 ARTHRITIS OF RIGHT KNEE: Primary | ICD-10-CM

## 2021-03-25 PROCEDURE — 99999 PR PBB SHADOW E&M-EST. PATIENT-LVL III: CPT | Mod: PBBFAC,,, | Performed by: FAMILY MEDICINE

## 2021-03-25 PROCEDURE — 1159F MED LIST DOCD IN RCRD: CPT | Mod: S$GLB,,, | Performed by: FAMILY MEDICINE

## 2021-03-25 PROCEDURE — 1101F PT FALLS ASSESS-DOCD LE1/YR: CPT | Mod: CPTII,S$GLB,, | Performed by: FAMILY MEDICINE

## 2021-03-25 PROCEDURE — 1159F PR MEDICATION LIST DOCUMENTED IN MEDICAL RECORD: ICD-10-PCS | Mod: S$GLB,,, | Performed by: FAMILY MEDICINE

## 2021-03-25 PROCEDURE — 1125F AMNT PAIN NOTED PAIN PRSNT: CPT | Mod: S$GLB,,, | Performed by: FAMILY MEDICINE

## 2021-03-25 PROCEDURE — 99214 PR OFFICE/OUTPT VISIT, EST, LEVL IV, 30-39 MIN: ICD-10-PCS | Mod: S$GLB,,, | Performed by: FAMILY MEDICINE

## 2021-03-25 PROCEDURE — 1125F PR PAIN SEVERITY QUANTIFIED, PAIN PRESENT: ICD-10-PCS | Mod: S$GLB,,, | Performed by: FAMILY MEDICINE

## 2021-03-25 PROCEDURE — 99214 OFFICE O/P EST MOD 30 MIN: CPT | Mod: S$GLB,,, | Performed by: FAMILY MEDICINE

## 2021-03-25 PROCEDURE — 3288F PR FALLS RISK ASSESSMENT DOCUMENTED: ICD-10-PCS | Mod: CPTII,S$GLB,, | Performed by: FAMILY MEDICINE

## 2021-03-25 PROCEDURE — 3288F FALL RISK ASSESSMENT DOCD: CPT | Mod: CPTII,S$GLB,, | Performed by: FAMILY MEDICINE

## 2021-03-25 PROCEDURE — 1101F PR PT FALLS ASSESS DOC 0-1 FALLS W/OUT INJ PAST YR: ICD-10-PCS | Mod: CPTII,S$GLB,, | Performed by: FAMILY MEDICINE

## 2021-03-25 PROCEDURE — 99999 PR PBB SHADOW E&M-EST. PATIENT-LVL III: ICD-10-PCS | Mod: PBBFAC,,, | Performed by: FAMILY MEDICINE

## 2021-03-25 RX ORDER — TOLTERODINE 4 MG/1
4 CAPSULE, EXTENDED RELEASE ORAL DAILY
Qty: 90 CAPSULE | Refills: 3 | Status: SHIPPED | OUTPATIENT
Start: 2021-03-25 | End: 2021-04-30 | Stop reason: CLARIF

## 2021-03-25 RX ORDER — DICLOFENAC SODIUM 10 MG/G
2 GEL TOPICAL 4 TIMES DAILY
Qty: 100 G | Refills: 5 | Status: SHIPPED | OUTPATIENT
Start: 2021-03-25 | End: 2021-04-30 | Stop reason: CLARIF

## 2021-03-25 RX ORDER — MAGNESIUM GLUCONATE 27.5 (500)
1 TABLET ORAL DAILY
COMMUNITY
End: 2022-06-27 | Stop reason: SDUPTHER

## 2021-03-30 RX ORDER — DIPHENOXYLATE HYDROCHLORIDE AND ATROPINE SULFATE 2.5; .025 MG/1; MG/1
TABLET ORAL
Qty: 20 TABLET | Refills: 1 | Status: SHIPPED | OUTPATIENT
Start: 2021-03-30 | End: 2022-02-28

## 2021-04-12 RX ORDER — SULFASALAZINE 500 MG/1
TABLET ORAL
Qty: 180 TABLET | Refills: 3 | Status: SHIPPED | OUTPATIENT
Start: 2021-04-12 | End: 2022-05-02

## 2021-04-30 RX ORDER — AMLODIPINE BESYLATE 10 MG/1
TABLET ORAL
Qty: 90 TABLET | Refills: 1 | Status: SHIPPED | OUTPATIENT
Start: 2021-04-30 | End: 2021-10-25

## 2021-05-04 ENCOUNTER — OFFICE VISIT (OUTPATIENT)
Dept: PRIMARY CARE CLINIC | Facility: CLINIC | Age: 81
End: 2021-05-04
Payer: MEDICARE

## 2021-05-04 VITALS
HEART RATE: 80 BPM | RESPIRATION RATE: 16 BRPM | HEIGHT: 60 IN | BODY MASS INDEX: 36.68 KG/M2 | WEIGHT: 186.81 LBS | DIASTOLIC BLOOD PRESSURE: 80 MMHG | SYSTOLIC BLOOD PRESSURE: 124 MMHG | OXYGEN SATURATION: 95 %

## 2021-05-04 DIAGNOSIS — M17.11 ARTHRITIS OF RIGHT KNEE: Primary | ICD-10-CM

## 2021-05-04 DIAGNOSIS — M81.0 AGE-RELATED OSTEOPOROSIS WITHOUT CURRENT PATHOLOGICAL FRACTURE: ICD-10-CM

## 2021-05-04 PROCEDURE — 1126F AMNT PAIN NOTED NONE PRSNT: CPT | Mod: S$GLB,,, | Performed by: FAMILY MEDICINE

## 2021-05-04 PROCEDURE — 3288F FALL RISK ASSESSMENT DOCD: CPT | Mod: CPTII,S$GLB,, | Performed by: FAMILY MEDICINE

## 2021-05-04 PROCEDURE — 99999 PR PBB SHADOW E&M-EST. PATIENT-LVL V: ICD-10-PCS | Mod: PBBFAC,,, | Performed by: FAMILY MEDICINE

## 2021-05-04 PROCEDURE — 3288F PR FALLS RISK ASSESSMENT DOCUMENTED: ICD-10-PCS | Mod: CPTII,S$GLB,, | Performed by: FAMILY MEDICINE

## 2021-05-04 PROCEDURE — 1159F PR MEDICATION LIST DOCUMENTED IN MEDICAL RECORD: ICD-10-PCS | Mod: S$GLB,,, | Performed by: FAMILY MEDICINE

## 2021-05-04 PROCEDURE — 1101F PT FALLS ASSESS-DOCD LE1/YR: CPT | Mod: CPTII,S$GLB,, | Performed by: FAMILY MEDICINE

## 2021-05-04 PROCEDURE — 99213 PR OFFICE/OUTPT VISIT, EST, LEVL III, 20-29 MIN: ICD-10-PCS | Mod: S$GLB,,, | Performed by: FAMILY MEDICINE

## 2021-05-04 PROCEDURE — 99213 OFFICE O/P EST LOW 20 MIN: CPT | Mod: S$GLB,,, | Performed by: FAMILY MEDICINE

## 2021-05-04 PROCEDURE — 1101F PR PT FALLS ASSESS DOC 0-1 FALLS W/OUT INJ PAST YR: ICD-10-PCS | Mod: CPTII,S$GLB,, | Performed by: FAMILY MEDICINE

## 2021-05-04 PROCEDURE — 99999 PR PBB SHADOW E&M-EST. PATIENT-LVL V: CPT | Mod: PBBFAC,,, | Performed by: FAMILY MEDICINE

## 2021-05-04 PROCEDURE — 1126F PR PAIN SEVERITY QUANTIFIED, NO PAIN PRESENT: ICD-10-PCS | Mod: S$GLB,,, | Performed by: FAMILY MEDICINE

## 2021-05-04 PROCEDURE — 1159F MED LIST DOCD IN RCRD: CPT | Mod: S$GLB,,, | Performed by: FAMILY MEDICINE

## 2021-05-10 PROBLEM — I44.2 ATRIOVENTRICULAR BLOCK, COMPLETE: Status: ACTIVE | Noted: 2021-05-10

## 2021-05-10 PROBLEM — I49.5 SICK SINUS SYNDROME: Status: ACTIVE | Noted: 2021-05-10

## 2021-05-10 RX ORDER — FOLIC ACID 1 MG/1
TABLET ORAL
Qty: 90 TABLET | Refills: 1 | Status: SHIPPED | OUTPATIENT
Start: 2021-05-10 | End: 2021-10-25

## 2021-05-24 ENCOUNTER — PATIENT OUTREACH (OUTPATIENT)
Dept: ADMINISTRATIVE | Facility: OTHER | Age: 81
End: 2021-05-24

## 2021-05-26 ENCOUNTER — OFFICE VISIT (OUTPATIENT)
Dept: ORTHOPEDICS | Facility: CLINIC | Age: 81
End: 2021-05-26
Payer: MEDICARE

## 2021-05-26 VITALS
BODY MASS INDEX: 36.84 KG/M2 | SYSTOLIC BLOOD PRESSURE: 147 MMHG | HEIGHT: 60 IN | DIASTOLIC BLOOD PRESSURE: 84 MMHG | WEIGHT: 187.63 LBS | RESPIRATION RATE: 16 BRPM

## 2021-05-26 DIAGNOSIS — M17.11 PRIMARY OSTEOARTHRITIS OF RIGHT KNEE: Primary | ICD-10-CM

## 2021-05-26 PROCEDURE — 1159F PR MEDICATION LIST DOCUMENTED IN MEDICAL RECORD: ICD-10-PCS | Mod: S$GLB,,, | Performed by: ORTHOPAEDIC SURGERY

## 2021-05-26 PROCEDURE — 1101F PT FALLS ASSESS-DOCD LE1/YR: CPT | Mod: CPTII,S$GLB,, | Performed by: ORTHOPAEDIC SURGERY

## 2021-05-26 PROCEDURE — 1159F MED LIST DOCD IN RCRD: CPT | Mod: S$GLB,,, | Performed by: ORTHOPAEDIC SURGERY

## 2021-05-26 PROCEDURE — 99999 PR PBB SHADOW E&M-EST. PATIENT-LVL IV: CPT | Mod: PBBFAC,,, | Performed by: ORTHOPAEDIC SURGERY

## 2021-05-26 PROCEDURE — 99203 PR OFFICE/OUTPT VISIT, NEW, LEVL III, 30-44 MIN: ICD-10-PCS | Mod: S$GLB,,, | Performed by: ORTHOPAEDIC SURGERY

## 2021-05-26 PROCEDURE — 99203 OFFICE O/P NEW LOW 30 MIN: CPT | Mod: S$GLB,,, | Performed by: ORTHOPAEDIC SURGERY

## 2021-05-26 PROCEDURE — 1126F PR PAIN SEVERITY QUANTIFIED, NO PAIN PRESENT: ICD-10-PCS | Mod: S$GLB,,, | Performed by: ORTHOPAEDIC SURGERY

## 2021-05-26 PROCEDURE — 3288F FALL RISK ASSESSMENT DOCD: CPT | Mod: CPTII,S$GLB,, | Performed by: ORTHOPAEDIC SURGERY

## 2021-05-26 PROCEDURE — 3288F PR FALLS RISK ASSESSMENT DOCUMENTED: ICD-10-PCS | Mod: CPTII,S$GLB,, | Performed by: ORTHOPAEDIC SURGERY

## 2021-05-26 PROCEDURE — 1126F AMNT PAIN NOTED NONE PRSNT: CPT | Mod: S$GLB,,, | Performed by: ORTHOPAEDIC SURGERY

## 2021-05-26 PROCEDURE — 1101F PR PT FALLS ASSESS DOC 0-1 FALLS W/OUT INJ PAST YR: ICD-10-PCS | Mod: CPTII,S$GLB,, | Performed by: ORTHOPAEDIC SURGERY

## 2021-05-26 PROCEDURE — 99999 PR PBB SHADOW E&M-EST. PATIENT-LVL IV: ICD-10-PCS | Mod: PBBFAC,,, | Performed by: ORTHOPAEDIC SURGERY

## 2021-05-26 RX ORDER — FLUCONAZOLE 100 MG/1
TABLET ORAL
COMMUNITY
Start: 2021-05-17 | End: 2022-03-16

## 2021-05-26 RX ORDER — HYDROCODONE BITARTRATE AND ACETAMINOPHEN 5; 325 MG/1; MG/1
TABLET ORAL
COMMUNITY
Start: 2021-05-10 | End: 2021-09-20

## 2021-05-26 RX ORDER — APIXABAN 5 MG/1
5 TABLET, FILM COATED ORAL 2 TIMES DAILY
COMMUNITY
Start: 2021-05-24 | End: 2022-05-04 | Stop reason: SDUPTHER

## 2021-05-26 RX ORDER — CEPHALEXIN 500 MG/1
CAPSULE ORAL
COMMUNITY
Start: 2021-05-10 | End: 2022-03-16

## 2021-06-13 DIAGNOSIS — M81.0 OSTEOPOROSIS, UNSPECIFIED OSTEOPOROSIS TYPE, UNSPECIFIED PATHOLOGICAL FRACTURE PRESENCE: ICD-10-CM

## 2021-06-14 RX ORDER — ESOMEPRAZOLE MAGNESIUM 40 MG/1
CAPSULE, DELAYED RELEASE ORAL
Qty: 90 CAPSULE | Refills: 3 | Status: SHIPPED | OUTPATIENT
Start: 2021-06-14 | End: 2022-06-07

## 2021-06-14 RX ORDER — ALENDRONATE SODIUM 70 MG/1
TABLET ORAL
Qty: 12 TABLET | Refills: 3 | Status: SHIPPED | OUTPATIENT
Start: 2021-06-14 | End: 2022-05-16

## 2021-07-14 ENCOUNTER — TELEPHONE (OUTPATIENT)
Dept: PRIMARY CARE CLINIC | Facility: CLINIC | Age: 81
End: 2021-07-14

## 2021-07-22 ENCOUNTER — TELEPHONE (OUTPATIENT)
Dept: PRIMARY CARE CLINIC | Facility: CLINIC | Age: 81
End: 2021-07-22

## 2021-07-22 DIAGNOSIS — E03.9 HYPOTHYROIDISM, UNSPECIFIED TYPE: ICD-10-CM

## 2021-07-23 ENCOUNTER — TELEPHONE (OUTPATIENT)
Dept: PRIMARY CARE CLINIC | Facility: CLINIC | Age: 81
End: 2021-07-23

## 2021-07-23 RX ORDER — LEVOTHYROXINE SODIUM 175 UG/1
175 TABLET ORAL DAILY
Qty: 90 TABLET | Refills: 0 | Status: SHIPPED | OUTPATIENT
Start: 2021-07-23 | End: 2022-02-28 | Stop reason: SDUPTHER

## 2021-09-08 ENCOUNTER — TELEPHONE (OUTPATIENT)
Dept: PRIMARY CARE CLINIC | Facility: CLINIC | Age: 81
End: 2021-09-08

## 2021-09-21 ENCOUNTER — TELEPHONE (OUTPATIENT)
Dept: ORTHOPEDICS | Facility: CLINIC | Age: 81
End: 2021-09-21

## 2021-09-27 ENCOUNTER — OFFICE VISIT (OUTPATIENT)
Dept: ORTHOPEDICS | Facility: CLINIC | Age: 81
End: 2021-09-27
Payer: MEDICARE

## 2021-09-27 VITALS
HEART RATE: 80 BPM | DIASTOLIC BLOOD PRESSURE: 80 MMHG | SYSTOLIC BLOOD PRESSURE: 135 MMHG | BODY MASS INDEX: 35.74 KG/M2 | RESPIRATION RATE: 17 BRPM | OXYGEN SATURATION: 98 % | HEIGHT: 60 IN

## 2021-09-27 DIAGNOSIS — S82.831A CLOSED FRACTURE OF DISTAL END OF RIGHT FIBULA, UNSPECIFIED FRACTURE MORPHOLOGY, INITIAL ENCOUNTER: Primary | ICD-10-CM

## 2021-09-27 PROCEDURE — 3079F DIAST BP 80-89 MM HG: CPT | Mod: CPTII,S$GLB,, | Performed by: ORTHOPAEDIC SURGERY

## 2021-09-27 PROCEDURE — 1100F PTFALLS ASSESS-DOCD GE2>/YR: CPT | Mod: CPTII,S$GLB,, | Performed by: ORTHOPAEDIC SURGERY

## 2021-09-27 PROCEDURE — 1159F MED LIST DOCD IN RCRD: CPT | Mod: CPTII,S$GLB,, | Performed by: ORTHOPAEDIC SURGERY

## 2021-09-27 PROCEDURE — 3288F PR FALLS RISK ASSESSMENT DOCUMENTED: ICD-10-PCS | Mod: CPTII,S$GLB,, | Performed by: ORTHOPAEDIC SURGERY

## 2021-09-27 PROCEDURE — 99999 PR PBB SHADOW E&M-EST. PATIENT-LVL IV: ICD-10-PCS | Mod: PBBFAC,,, | Performed by: ORTHOPAEDIC SURGERY

## 2021-09-27 PROCEDURE — 3075F SYST BP GE 130 - 139MM HG: CPT | Mod: CPTII,S$GLB,, | Performed by: ORTHOPAEDIC SURGERY

## 2021-09-27 PROCEDURE — 99213 OFFICE O/P EST LOW 20 MIN: CPT | Mod: S$GLB,,, | Performed by: ORTHOPAEDIC SURGERY

## 2021-09-27 PROCEDURE — 1100F PR PT FALLS ASSESS DOC 2+ FALLS/FALL W/INJURY/YR: ICD-10-PCS | Mod: CPTII,S$GLB,, | Performed by: ORTHOPAEDIC SURGERY

## 2021-09-27 PROCEDURE — 1160F PR REVIEW ALL MEDS BY PRESCRIBER/CLIN PHARMACIST DOCUMENTED: ICD-10-PCS | Mod: CPTII,S$GLB,, | Performed by: ORTHOPAEDIC SURGERY

## 2021-09-27 PROCEDURE — 3288F FALL RISK ASSESSMENT DOCD: CPT | Mod: CPTII,S$GLB,, | Performed by: ORTHOPAEDIC SURGERY

## 2021-09-27 PROCEDURE — 99999 PR PBB SHADOW E&M-EST. PATIENT-LVL IV: CPT | Mod: PBBFAC,,, | Performed by: ORTHOPAEDIC SURGERY

## 2021-09-27 PROCEDURE — 3075F PR MOST RECENT SYSTOLIC BLOOD PRESS GE 130-139MM HG: ICD-10-PCS | Mod: CPTII,S$GLB,, | Performed by: ORTHOPAEDIC SURGERY

## 2021-09-27 PROCEDURE — 1160F RVW MEDS BY RX/DR IN RCRD: CPT | Mod: CPTII,S$GLB,, | Performed by: ORTHOPAEDIC SURGERY

## 2021-09-27 PROCEDURE — 3079F PR MOST RECENT DIASTOLIC BLOOD PRESSURE 80-89 MM HG: ICD-10-PCS | Mod: CPTII,S$GLB,, | Performed by: ORTHOPAEDIC SURGERY

## 2021-09-27 PROCEDURE — 1125F AMNT PAIN NOTED PAIN PRSNT: CPT | Mod: CPTII,S$GLB,, | Performed by: ORTHOPAEDIC SURGERY

## 2021-09-27 PROCEDURE — 1159F PR MEDICATION LIST DOCUMENTED IN MEDICAL RECORD: ICD-10-PCS | Mod: CPTII,S$GLB,, | Performed by: ORTHOPAEDIC SURGERY

## 2021-09-27 PROCEDURE — 1125F PR PAIN SEVERITY QUANTIFIED, PAIN PRESENT: ICD-10-PCS | Mod: CPTII,S$GLB,, | Performed by: ORTHOPAEDIC SURGERY

## 2021-09-27 PROCEDURE — 99213 PR OFFICE/OUTPT VISIT, EST, LEVL III, 20-29 MIN: ICD-10-PCS | Mod: S$GLB,,, | Performed by: ORTHOPAEDIC SURGERY

## 2021-09-27 RX ORDER — TRAMADOL HYDROCHLORIDE 50 MG/1
50 TABLET ORAL EVERY 6 HOURS PRN
Qty: 20 TABLET | Refills: 0 | Status: SHIPPED | OUTPATIENT
Start: 2021-09-27 | End: 2022-03-16

## 2021-10-25 RX ORDER — FOLIC ACID 1 MG/1
TABLET ORAL
Qty: 90 TABLET | Refills: 1 | Status: SHIPPED | OUTPATIENT
Start: 2021-10-25 | End: 2022-04-26

## 2021-10-25 RX ORDER — AMLODIPINE BESYLATE 10 MG/1
TABLET ORAL
Qty: 90 TABLET | Refills: 1 | Status: SHIPPED | OUTPATIENT
Start: 2021-10-25 | End: 2022-02-28 | Stop reason: SDUPTHER

## 2021-11-02 ENCOUNTER — PATIENT OUTREACH (OUTPATIENT)
Dept: ADMINISTRATIVE | Facility: OTHER | Age: 81
End: 2021-11-02
Payer: MEDICARE

## 2021-11-08 ENCOUNTER — OFFICE VISIT (OUTPATIENT)
Dept: ORTHOPEDICS | Facility: CLINIC | Age: 81
End: 2021-11-08
Payer: MEDICARE

## 2021-11-08 VITALS
SYSTOLIC BLOOD PRESSURE: 125 MMHG | RESPIRATION RATE: 18 BRPM | BODY MASS INDEX: 35.93 KG/M2 | HEIGHT: 60 IN | HEART RATE: 90 BPM | DIASTOLIC BLOOD PRESSURE: 61 MMHG | WEIGHT: 183 LBS

## 2021-11-08 DIAGNOSIS — S82.831D CLOSED FRACTURE OF DISTAL END OF RIGHT FIBULA WITH ROUTINE HEALING, UNSPECIFIED FRACTURE MORPHOLOGY, SUBSEQUENT ENCOUNTER: Primary | ICD-10-CM

## 2021-11-08 PROCEDURE — 1160F RVW MEDS BY RX/DR IN RCRD: CPT | Mod: CPTII,S$GLB,, | Performed by: ORTHOPAEDIC SURGERY

## 2021-11-08 PROCEDURE — 1100F PTFALLS ASSESS-DOCD GE2>/YR: CPT | Mod: CPTII,S$GLB,, | Performed by: ORTHOPAEDIC SURGERY

## 2021-11-08 PROCEDURE — 99999 PR PBB SHADOW E&M-EST. PATIENT-LVL IV: ICD-10-PCS | Mod: PBBFAC,,, | Performed by: ORTHOPAEDIC SURGERY

## 2021-11-08 PROCEDURE — 3288F FALL RISK ASSESSMENT DOCD: CPT | Mod: CPTII,S$GLB,, | Performed by: ORTHOPAEDIC SURGERY

## 2021-11-08 PROCEDURE — 3078F DIAST BP <80 MM HG: CPT | Mod: CPTII,S$GLB,, | Performed by: ORTHOPAEDIC SURGERY

## 2021-11-08 PROCEDURE — 3288F PR FALLS RISK ASSESSMENT DOCUMENTED: ICD-10-PCS | Mod: CPTII,S$GLB,, | Performed by: ORTHOPAEDIC SURGERY

## 2021-11-08 PROCEDURE — 99999 PR PBB SHADOW E&M-EST. PATIENT-LVL IV: CPT | Mod: PBBFAC,,, | Performed by: ORTHOPAEDIC SURGERY

## 2021-11-08 PROCEDURE — 1160F PR REVIEW ALL MEDS BY PRESCRIBER/CLIN PHARMACIST DOCUMENTED: ICD-10-PCS | Mod: CPTII,S$GLB,, | Performed by: ORTHOPAEDIC SURGERY

## 2021-11-08 PROCEDURE — 1126F PR PAIN SEVERITY QUANTIFIED, NO PAIN PRESENT: ICD-10-PCS | Mod: CPTII,S$GLB,, | Performed by: ORTHOPAEDIC SURGERY

## 2021-11-08 PROCEDURE — 3074F PR MOST RECENT SYSTOLIC BLOOD PRESSURE < 130 MM HG: ICD-10-PCS | Mod: CPTII,S$GLB,, | Performed by: ORTHOPAEDIC SURGERY

## 2021-11-08 PROCEDURE — 99024 PR POST-OP FOLLOW-UP VISIT: ICD-10-PCS | Mod: S$GLB,,, | Performed by: ORTHOPAEDIC SURGERY

## 2021-11-08 PROCEDURE — 1159F PR MEDICATION LIST DOCUMENTED IN MEDICAL RECORD: ICD-10-PCS | Mod: CPTII,S$GLB,, | Performed by: ORTHOPAEDIC SURGERY

## 2021-11-08 PROCEDURE — 1159F MED LIST DOCD IN RCRD: CPT | Mod: CPTII,S$GLB,, | Performed by: ORTHOPAEDIC SURGERY

## 2021-11-08 PROCEDURE — 3078F PR MOST RECENT DIASTOLIC BLOOD PRESSURE < 80 MM HG: ICD-10-PCS | Mod: CPTII,S$GLB,, | Performed by: ORTHOPAEDIC SURGERY

## 2021-11-08 PROCEDURE — 3074F SYST BP LT 130 MM HG: CPT | Mod: CPTII,S$GLB,, | Performed by: ORTHOPAEDIC SURGERY

## 2021-11-08 PROCEDURE — 1126F AMNT PAIN NOTED NONE PRSNT: CPT | Mod: CPTII,S$GLB,, | Performed by: ORTHOPAEDIC SURGERY

## 2021-11-08 PROCEDURE — 99024 POSTOP FOLLOW-UP VISIT: CPT | Mod: S$GLB,,, | Performed by: ORTHOPAEDIC SURGERY

## 2021-11-08 PROCEDURE — 1100F PR PT FALLS ASSESS DOC 2+ FALLS/FALL W/INJURY/YR: ICD-10-PCS | Mod: CPTII,S$GLB,, | Performed by: ORTHOPAEDIC SURGERY

## 2021-12-09 ENCOUNTER — IMMUNIZATION (OUTPATIENT)
Dept: PRIMARY CARE CLINIC | Facility: CLINIC | Age: 81
End: 2021-12-09
Payer: MEDICARE

## 2021-12-09 DIAGNOSIS — Z23 NEED FOR VACCINATION: Primary | ICD-10-CM

## 2021-12-09 PROCEDURE — 0064A COVID-19, MRNA, LNP-S, PF, 100 MCG/0.25 ML DOSE VACCINE (MODERNA BOOSTER): CPT | Mod: PBBFAC | Performed by: EMERGENCY MEDICINE

## 2022-02-28 ENCOUNTER — TELEPHONE (OUTPATIENT)
Dept: PRIMARY CARE CLINIC | Facility: CLINIC | Age: 82
End: 2022-02-28
Payer: MEDICARE

## 2022-02-28 DIAGNOSIS — E03.9 HYPOTHYROIDISM, UNSPECIFIED TYPE: ICD-10-CM

## 2022-02-28 RX ORDER — TOLTERODINE 4 MG/1
CAPSULE, EXTENDED RELEASE ORAL
Qty: 30 CAPSULE | Refills: 0 | Status: SHIPPED | OUTPATIENT
Start: 2022-02-28 | End: 2022-04-12

## 2022-02-28 RX ORDER — DIPHENOXYLATE HYDROCHLORIDE AND ATROPINE SULFATE 2.5; .025 MG/1; MG/1
TABLET ORAL
Qty: 20 TABLET | Refills: 0 | Status: SHIPPED | OUTPATIENT
Start: 2022-02-28 | End: 2022-04-27 | Stop reason: SDUPTHER

## 2022-02-28 RX ORDER — AMLODIPINE BESYLATE 10 MG/1
10 TABLET ORAL DAILY
Qty: 30 TABLET | Refills: 0 | Status: SHIPPED | OUTPATIENT
Start: 2022-02-28 | End: 2022-04-26

## 2022-02-28 RX ORDER — LEVOTHYROXINE SODIUM 175 UG/1
175 TABLET ORAL DAILY
Qty: 30 TABLET | Refills: 0 | Status: SHIPPED | OUTPATIENT
Start: 2022-02-28 | End: 2022-05-27 | Stop reason: SDUPTHER

## 2022-02-28 NOTE — TELEPHONE ENCOUNTER
I spoke with the patient and let her know she's due back for an appointment to discuss her refills. Patient scheduled fro the soonest, march 16th, and asked that we at least send enough refills to last her until then.

## 2022-02-28 NOTE — TELEPHONE ENCOUNTER
I spoke with the patient and let her know a temporary supply of her meds were sent in, but she needs to keep her appt on the 16th for future refills.

## 2022-02-28 NOTE — TELEPHONE ENCOUNTER
Care Due:                  Date            Visit Type   Department     Provider  --------------------------------------------------------------------------------                                EP -                              PRIMARY [facility]  Last Visit: 05-      CARE ([facility])   PRIMARY CARE   Ga Waldrop                              [facility]                              PRIMARY      [facility]  Next Visit: 03-      CARE ([facility])   PRIMARY CARE   Ga Waldrop                                                            Last  Test          Frequency    Reason                     Performed    Due Date  --------------------------------------------------------------------------------    TSH.........  12 months..  levothyroxine............  Not Found    Overdue    Powered by [employer]. Reference number: [id number].   2/28/2022 10:42:55 AM CST

## 2022-03-16 ENCOUNTER — OFFICE VISIT (OUTPATIENT)
Dept: PRIMARY CARE CLINIC | Facility: CLINIC | Age: 82
End: 2022-03-16
Payer: MEDICARE

## 2022-03-16 VITALS
WEIGHT: 184.63 LBS | OXYGEN SATURATION: 94 % | BODY MASS INDEX: 36.25 KG/M2 | SYSTOLIC BLOOD PRESSURE: 112 MMHG | HEIGHT: 60 IN | DIASTOLIC BLOOD PRESSURE: 72 MMHG | HEART RATE: 105 BPM | RESPIRATION RATE: 18 BRPM

## 2022-03-16 DIAGNOSIS — M81.0 OSTEOPOROSIS WITHOUT CURRENT PATHOLOGICAL FRACTURE, UNSPECIFIED OSTEOPOROSIS TYPE: ICD-10-CM

## 2022-03-16 DIAGNOSIS — E66.01 SEVERE OBESITY (BMI 35.0-39.9) WITH COMORBIDITY: ICD-10-CM

## 2022-03-16 DIAGNOSIS — I10 ESSENTIAL HYPERTENSION, BENIGN: ICD-10-CM

## 2022-03-16 DIAGNOSIS — E78.5 HYPERLIPIDEMIA, UNSPECIFIED HYPERLIPIDEMIA TYPE: ICD-10-CM

## 2022-03-16 DIAGNOSIS — E03.9 HYPOTHYROIDISM, UNSPECIFIED TYPE: ICD-10-CM

## 2022-03-16 DIAGNOSIS — Z23 NEED FOR VACCINATION: ICD-10-CM

## 2022-03-16 DIAGNOSIS — I48.0 PAROXYSMAL ATRIAL FIBRILLATION: ICD-10-CM

## 2022-03-16 DIAGNOSIS — K50.10 CROHN'S DISEASE OF LARGE INTESTINE WITHOUT COMPLICATION: Primary | ICD-10-CM

## 2022-03-16 PROCEDURE — 99214 OFFICE O/P EST MOD 30 MIN: CPT | Mod: S$GLB,,, | Performed by: FAMILY MEDICINE

## 2022-03-16 PROCEDURE — 99999 PR PBB SHADOW E&M-EST. PATIENT-LVL IV: CPT | Mod: PBBFAC,,, | Performed by: FAMILY MEDICINE

## 2022-03-16 PROCEDURE — 99999 PR PBB SHADOW E&M-EST. PATIENT-LVL IV: ICD-10-PCS | Mod: PBBFAC,,, | Performed by: FAMILY MEDICINE

## 2022-03-16 PROCEDURE — 3078F DIAST BP <80 MM HG: CPT | Mod: CPTII,S$GLB,, | Performed by: FAMILY MEDICINE

## 2022-03-16 PROCEDURE — 1100F PTFALLS ASSESS-DOCD GE2>/YR: CPT | Mod: CPTII,S$GLB,, | Performed by: FAMILY MEDICINE

## 2022-03-16 PROCEDURE — 1126F AMNT PAIN NOTED NONE PRSNT: CPT | Mod: CPTII,S$GLB,, | Performed by: FAMILY MEDICINE

## 2022-03-16 PROCEDURE — 99499 RISK ADDL DX/OHS AUDIT: ICD-10-PCS | Mod: S$GLB,,, | Performed by: FAMILY MEDICINE

## 2022-03-16 PROCEDURE — 3074F PR MOST RECENT SYSTOLIC BLOOD PRESSURE < 130 MM HG: ICD-10-PCS | Mod: CPTII,S$GLB,, | Performed by: FAMILY MEDICINE

## 2022-03-16 PROCEDURE — 1159F MED LIST DOCD IN RCRD: CPT | Mod: CPTII,S$GLB,, | Performed by: FAMILY MEDICINE

## 2022-03-16 PROCEDURE — 99214 PR OFFICE/OUTPT VISIT, EST, LEVL IV, 30-39 MIN: ICD-10-PCS | Mod: S$GLB,,, | Performed by: FAMILY MEDICINE

## 2022-03-16 PROCEDURE — 1159F PR MEDICATION LIST DOCUMENTED IN MEDICAL RECORD: ICD-10-PCS | Mod: CPTII,S$GLB,, | Performed by: FAMILY MEDICINE

## 2022-03-16 PROCEDURE — 1160F PR REVIEW ALL MEDS BY PRESCRIBER/CLIN PHARMACIST DOCUMENTED: ICD-10-PCS | Mod: CPTII,S$GLB,, | Performed by: FAMILY MEDICINE

## 2022-03-16 PROCEDURE — 3288F PR FALLS RISK ASSESSMENT DOCUMENTED: ICD-10-PCS | Mod: CPTII,S$GLB,, | Performed by: FAMILY MEDICINE

## 2022-03-16 PROCEDURE — 3288F FALL RISK ASSESSMENT DOCD: CPT | Mod: CPTII,S$GLB,, | Performed by: FAMILY MEDICINE

## 2022-03-16 PROCEDURE — 1100F PR PT FALLS ASSESS DOC 2+ FALLS/FALL W/INJURY/YR: ICD-10-PCS | Mod: CPTII,S$GLB,, | Performed by: FAMILY MEDICINE

## 2022-03-16 PROCEDURE — 99499 UNLISTED E&M SERVICE: CPT | Mod: S$GLB,,, | Performed by: FAMILY MEDICINE

## 2022-03-16 PROCEDURE — 1160F RVW MEDS BY RX/DR IN RCRD: CPT | Mod: CPTII,S$GLB,, | Performed by: FAMILY MEDICINE

## 2022-03-16 PROCEDURE — 3078F PR MOST RECENT DIASTOLIC BLOOD PRESSURE < 80 MM HG: ICD-10-PCS | Mod: CPTII,S$GLB,, | Performed by: FAMILY MEDICINE

## 2022-03-16 PROCEDURE — 3074F SYST BP LT 130 MM HG: CPT | Mod: CPTII,S$GLB,, | Performed by: FAMILY MEDICINE

## 2022-03-16 PROCEDURE — 1126F PR PAIN SEVERITY QUANTIFIED, NO PAIN PRESENT: ICD-10-PCS | Mod: CPTII,S$GLB,, | Performed by: FAMILY MEDICINE

## 2022-03-16 RX ORDER — ZOSTER VACCINE RECOMBINANT, ADJUVANTED 50 MCG/0.5
0.5 KIT INTRAMUSCULAR ONCE
Qty: 1 EACH | Refills: 1 | Status: SHIPPED | OUTPATIENT
Start: 2022-03-16 | End: 2022-03-16

## 2022-03-16 NOTE — PROGRESS NOTES
Subjective:       Patient ID: Pauline Cronin is a 81 y.o. female.    Chief Complaint: Follow-up (Here for check up ) and Diarrhea (Pt states she has been having more diarrhea lately )    Hx of chronic diarrhea due to Crohn's, but for the past few months her stool has been more watery. No change in diet. No bleeding, no abdominal pain. Hasn't been under the care of GI in years.   Followed by cardiology every 6 months, overdue for labs    Review of Systems   Constitutional: Negative for chills, fatigue and fever.   HENT: Negative for congestion.    Eyes: Negative for visual disturbance.   Respiratory: Negative for cough and shortness of breath.    Cardiovascular: Negative for chest pain.   Gastrointestinal: Negative for abdominal pain, nausea and vomiting.   Genitourinary: Negative for difficulty urinating.   Musculoskeletal: Positive for arthralgias.   Skin: Negative for rash.   Allergic/Immunologic: Negative for immunocompromised state.   Neurological: Negative for dizziness.   Psychiatric/Behavioral: Negative for sleep disturbance.       Objective:      Vitals:    03/16/22 1115   BP: 112/72   BP Location: Right arm   Patient Position: Sitting   BP Method: Large (Manual)   Pulse: 105   Resp: 18   SpO2: (!) 94%   Weight: 83.7 kg (184 lb 10.2 oz)   Height: 5' (1.524 m)     Physical Exam  Vitals and nursing note reviewed.   Constitutional:       Appearance: She is well-developed.   HENT:      Head: Normocephalic and atraumatic.   Cardiovascular:      Rate and Rhythm: Normal rate and regular rhythm.      Heart sounds: Murmur heard.   Pulmonary:      Effort: Pulmonary effort is normal.      Breath sounds: Normal breath sounds.   Abdominal:      General: Bowel sounds are normal. There is no distension.      Tenderness: There is no abdominal tenderness.   Musculoskeletal:      Right lower leg: No edema.      Left lower leg: No edema.   Skin:     General: Skin is warm and dry.   Neurological:      Mental Status: She is  alert and oriented to person, place, and time.   Psychiatric:         Mood and Affect: Mood normal.         Behavior: Behavior normal.         Lab Results   Component Value Date    WBC 4.80 05/07/2021    HGB 14.0 05/07/2021    HCT 41.4 05/07/2021     05/07/2021    TRIG 158 (H) 10/24/2019    HDL 58 10/24/2019    ALT 19 05/07/2021    AST 28 05/07/2021     05/07/2021    K 3.4 (L) 05/07/2021     05/07/2021    CREATININE 0.9 05/07/2021    BUN 10 05/07/2021    CO2 26 05/07/2021    TSH 0.81 01/23/2020    INR 1.1 05/07/2021    HGBA1C 5.6 12/16/2020      Assessment:       1. Crohn's disease of large intestine without complication    2. Paroxysmal atrial fibrillation    3. Essential hypertension, benign    4. Hypothyroidism, unspecified type    5. Severe obesity (BMI 35.0-39.9) with comorbidity    6. Osteoporosis without current pathological fracture, unspecified osteoporosis type    7. Hyperlipidemia, unspecified hyperlipidemia type    8. Need for vaccination        Plan:       Crohn's disease of large intestine without complication  -     CBC Auto Differential; Future; Expected date: 03/16/2022  -     Ambulatory referral/consult to Gastroenterology; Future; Expected date: 03/23/2022  Needs to get reestablished with GI  Paroxysmal atrial fibrillation  Stable on current regimen  Essential hypertension, benign  Well controlled  Hypothyroidism, unspecified type  -     TSH; Future; Expected date: 03/16/2022  Due for labs  Severe obesity (BMI 35.0-39.9) with comorbidity  Low carb diet  Osteoporosis without current pathological fracture, unspecified osteoporosis type  -     DXA Bone Density Spine And Hip; Future; Expected date: 03/23/2022    Hyperlipidemia, unspecified hyperlipidemia type  -     Comprehensive Metabolic Panel; Future; Expected date: 03/16/2022  -     Lipid Panel; Future; Expected date: 03/16/2022      Medication List with Changes/Refills   New Medications    DIPHTH,PERTUS,ACELL,,TETANUS  (BOOSTRIX) 2.5-8-5 LF-MCG-LF/0.5ML SUSP    Inject 0.5 mLs into the muscle once. for 1 dose    VARICELLA-ZOSTER GE-AS01B, PF, (SHINGRIX, PF,) 50 MCG/0.5 ML INJECTION    Inject 0.5 mLs into the muscle once. for 1 dose   Current Medications    ALENDRONATE (FOSAMAX) 70 MG TABLET    Take 1 tablet by mouth once a week    AMLODIPINE (NORVASC) 10 MG TABLET    Take 1 tablet (10 mg total) by mouth once daily.    ATORVASTATIN (LIPITOR) 20 MG TABLET    Take 1 tablet by mouth once daily.    CARVEDILOL (COREG) 6.25 MG TABLET    TAKE ONE TABLET BY MOUTH TWICE DAILY WITH FOOD    DIPHENOXYLATE-ATROPINE 2.5-0.025 MG (LOMOTIL) 2.5-0.025 MG PER TABLET    TAKE 1 TABLET BY MOUTH 4 TIMES DAILY AS NEEDED FOR DIARRHEA    ELIQUIS 5 MG TAB    Take 5 mg by mouth 2 (two) times daily.    ESOMEPRAZOLE (NEXIUM) 40 MG CAPSULE    Take 1 capsule by mouth once daily    FOLIC ACID (FOLVITE) 1 MG TABLET    Take 1 tablet by mouth once daily    LEVOTHYROXINE (SYNTHROID, LEVOTHROID) 175 MCG TABLET    Take 1 tablet (175 mcg total) by mouth once daily.    MAGNESIUM GLUCONATE 27.5 MG MAGNE- SIUM (500 MG) TAB    Take 1 tablet by mouth once daily.    SOTALOL (BETAPACE) 120 MG TAB    Take 120 mg by mouth every 12 (twelve) hours.    SULFASALAZINE (AZULFIDINE) 500 MG TAB    Take 1 tablet by mouth twice daily    TOLTERODINE (DETROL LA) 4 MG 24 HR CAPSULE    Take 1 capsule by mouth once daily   Discontinued Medications    CEPHALEXIN (KEFLEX) 500 MG CAPSULE        FLUCONAZOLE (DIFLUCAN) 100 MG TABLET        TRAMADOL (ULTRAM) 50 MG TABLET    Take 1 tablet (50 mg total) by mouth every 6 (six) hours as needed for Pain.

## 2022-03-28 ENCOUNTER — TELEPHONE (OUTPATIENT)
Dept: PRIMARY CARE CLINIC | Facility: CLINIC | Age: 82
End: 2022-03-28
Payer: MEDICARE

## 2022-03-28 DIAGNOSIS — I48.0 PAROXYSMAL ATRIAL FIBRILLATION: Primary | ICD-10-CM

## 2022-03-28 DIAGNOSIS — I10 ESSENTIAL HYPERTENSION, BENIGN: ICD-10-CM

## 2022-03-28 NOTE — TELEPHONE ENCOUNTER
----- Message from Vinh Ferreira MA sent at 3/28/2022 11:03 AM CDT -----  Contact: self [phone]    ----- Message -----  From: Nicole Gomez  Sent: 3/28/2022  10:55 AM CDT  To: Benjie Cantu Staff    Patient is requesting a recommendation for a cardiologist in the [facility], Please advise

## 2022-03-28 NOTE — TELEPHONE ENCOUNTER
I spoke with the patient and gave her the contact to venita with Dr. Rivers since Dr. Guerra is leaving [place]

## 2022-04-10 ENCOUNTER — PATIENT OUTREACH (OUTPATIENT)
Dept: ADMINISTRATIVE | Facility: OTHER | Age: 82
End: 2022-04-10
Payer: MEDICARE

## 2022-04-10 NOTE — PROGRESS NOTES
LINKS immunization registry updated  Care Everywhere updated  Health Maintenance updated  Chart reviewed for overdue [facility]active [facility] ([facility]) health maintenance testing (CRS, Breast Ca, Diabetic Eye Exam)   Orders entered:N/A

## 2022-04-12 RX ORDER — TOLTERODINE 4 MG/1
CAPSULE, EXTENDED RELEASE ORAL
Qty: 30 CAPSULE | Refills: 5 | Status: SHIPPED | OUTPATIENT
Start: 2022-04-12 | End: 2022-04-14

## 2022-04-14 ENCOUNTER — TELEPHONE (OUTPATIENT)
Dept: CARDIOLOGY | Facility: CLINIC | Age: 82
End: 2022-04-14

## 2022-04-14 ENCOUNTER — OFFICE VISIT (OUTPATIENT)
Dept: CARDIOLOGY | Facility: CLINIC | Age: 82
End: 2022-04-14
Payer: MEDICARE

## 2022-04-14 VITALS
HEART RATE: 78 BPM | DIASTOLIC BLOOD PRESSURE: 70 MMHG | SYSTOLIC BLOOD PRESSURE: 132 MMHG | BODY MASS INDEX: 36.05 KG/M2 | OXYGEN SATURATION: 97 % | HEIGHT: 60 IN | WEIGHT: 183.63 LBS

## 2022-04-14 DIAGNOSIS — I44.7 LBBB (LEFT BUNDLE BRANCH BLOCK): ICD-10-CM

## 2022-04-14 DIAGNOSIS — I49.5 SICK SINUS SYNDROME: ICD-10-CM

## 2022-04-14 DIAGNOSIS — I44.2 ATRIOVENTRICULAR BLOCK, COMPLETE: Primary | ICD-10-CM

## 2022-04-14 DIAGNOSIS — Z95.0 PACEMAKER: ICD-10-CM

## 2022-04-14 DIAGNOSIS — I10 ESSENTIAL HYPERTENSION, BENIGN: ICD-10-CM

## 2022-04-14 DIAGNOSIS — K50.00 CROHN'S DISEASE OF SMALL INTESTINE WITHOUT COMPLICATION: ICD-10-CM

## 2022-04-14 DIAGNOSIS — E78.2 MIXED HYPERLIPIDEMIA: ICD-10-CM

## 2022-04-14 DIAGNOSIS — E89.0 POSTABLATIVE HYPOTHYROIDISM: ICD-10-CM

## 2022-04-14 DIAGNOSIS — I48.0 PAROXYSMAL ATRIAL FIBRILLATION: ICD-10-CM

## 2022-04-14 DIAGNOSIS — R60.0 LOCALIZED EDEMA: ICD-10-CM

## 2022-04-14 PROCEDURE — 99999 PR PBB SHADOW E&M-EST. PATIENT-LVL IV: ICD-10-PCS | Mod: PBBFAC,,, | Performed by: INTERNAL MEDICINE

## 2022-04-14 PROCEDURE — 3288F PR FALLS RISK ASSESSMENT DOCUMENTED: ICD-10-PCS | Mod: CPTII,S$GLB,, | Performed by: INTERNAL MEDICINE

## 2022-04-14 PROCEDURE — 3075F SYST BP GE 130 - 139MM HG: CPT | Mod: CPTII,S$GLB,, | Performed by: INTERNAL MEDICINE

## 2022-04-14 PROCEDURE — 3288F FALL RISK ASSESSMENT DOCD: CPT | Mod: CPTII,S$GLB,, | Performed by: INTERNAL MEDICINE

## 2022-04-14 PROCEDURE — 3075F PR MOST RECENT SYSTOLIC BLOOD PRESS GE 130-139MM HG: ICD-10-PCS | Mod: CPTII,S$GLB,, | Performed by: INTERNAL MEDICINE

## 2022-04-14 PROCEDURE — 99205 PR OFFICE/OUTPT VISIT, NEW, LEVL V, 60-74 MIN: ICD-10-PCS | Mod: S$GLB,,, | Performed by: INTERNAL MEDICINE

## 2022-04-14 PROCEDURE — 1100F PTFALLS ASSESS-DOCD GE2>/YR: CPT | Mod: CPTII,S$GLB,, | Performed by: INTERNAL MEDICINE

## 2022-04-14 PROCEDURE — 1160F PR REVIEW ALL MEDS BY PRESCRIBER/CLIN PHARMACIST DOCUMENTED: ICD-10-PCS | Mod: CPTII,S$GLB,, | Performed by: INTERNAL MEDICINE

## 2022-04-14 PROCEDURE — 3078F PR MOST RECENT DIASTOLIC BLOOD PRESSURE < 80 MM HG: ICD-10-PCS | Mod: CPTII,S$GLB,, | Performed by: INTERNAL MEDICINE

## 2022-04-14 PROCEDURE — 1100F PR PT FALLS ASSESS DOC 2+ FALLS/FALL W/INJURY/YR: ICD-10-PCS | Mod: CPTII,S$GLB,, | Performed by: INTERNAL MEDICINE

## 2022-04-14 PROCEDURE — 99205 OFFICE O/P NEW HI 60 MIN: CPT | Mod: S$GLB,,, | Performed by: INTERNAL MEDICINE

## 2022-04-14 PROCEDURE — 1126F PR PAIN SEVERITY QUANTIFIED, NO PAIN PRESENT: ICD-10-PCS | Mod: CPTII,S$GLB,, | Performed by: INTERNAL MEDICINE

## 2022-04-14 PROCEDURE — 1160F RVW MEDS BY RX/DR IN RCRD: CPT | Mod: CPTII,S$GLB,, | Performed by: INTERNAL MEDICINE

## 2022-04-14 PROCEDURE — 3078F DIAST BP <80 MM HG: CPT | Mod: CPTII,S$GLB,, | Performed by: INTERNAL MEDICINE

## 2022-04-14 PROCEDURE — 1159F PR MEDICATION LIST DOCUMENTED IN MEDICAL RECORD: ICD-10-PCS | Mod: CPTII,S$GLB,, | Performed by: INTERNAL MEDICINE

## 2022-04-14 PROCEDURE — 1126F AMNT PAIN NOTED NONE PRSNT: CPT | Mod: CPTII,S$GLB,, | Performed by: INTERNAL MEDICINE

## 2022-04-14 PROCEDURE — 99999 PR PBB SHADOW E&M-EST. PATIENT-LVL IV: CPT | Mod: PBBFAC,,, | Performed by: INTERNAL MEDICINE

## 2022-04-14 PROCEDURE — 1159F MED LIST DOCD IN RCRD: CPT | Mod: CPTII,S$GLB,, | Performed by: INTERNAL MEDICINE

## 2022-04-14 NOTE — PROGRESS NOTES
"  Subjective:      Patient ID: Pauline Cronin is a 82 y.o. female.    Chief Complaint: Hypertension, Atrial Fibrillation, and Hyperlipidemia    HPI:   Pt referred by Dr Waldrop.    Pt used to see Dr Leone who moved to [place]    Pt has a Medtronic pacemaker and underwent battery change last year.    Pt has a hx of atrial fibrillation and had open heart surgery for an ablation in [facility] around 2009.  No hx of MI or CABG or PTCA or valve problem.    No hx of PTCA.    Review of Systems   Cardiovascular: Positive for chest pain (Pt experiences left anterior chest discomfort during sustained episodes of fast heart beat.), dyspnea on exertion (Pt feels short of breath when walking 3 blocks.), leg swelling (Hx of chronic swelling of both feet.) and palpitations ("Sometimes I get a fast heart beat" which lasts for hours.   Pt has sustained fast heart beats about once a month). Negative for claudication, irregular heartbeat, near-syncope, orthopnea and syncope.        Past Medical History:   Diagnosis Date    Anticoagulant long-term use     Atrial fibrillation     Crohn disease diagnosed in her 20's    GERD (gastroesophageal reflux disease)     Hyperlipidemia     Hypertension     Thyroid disease     s/p I 131        Past Surgical History:   Procedure Laterality Date    APPENDECTOMY      CARDIAC SURGERY  2014    pacemaker    COLONOSCOPY  ~2008    normal findings per patient report    ESOPHAGOGASTRODUODENOSCOPY N/A 7/17/2018    Procedure: EGD (ESOPHAGOGASTRODUODENOSCOPY);  Surgeon: Alondra Casiano MD;  Location: [facility];  Service: Endoscopy;  Laterality: N/A;    HYSTERECTOMY      INSERTION OF IMPLANTABLE CARDIOVERTER-DEFIBRILLATOR (ICD) GENERATOR WITH TWO EXISTING LEADS Left 5/10/2021    Procedure: INSERTION, PULSE GENERATOR WITH 2 EXISTING LEADS, ICD;  Surgeon: Bo Leone MD;  Location: [facility] CATH LAB;  Service: Cardiology;  Laterality: Left;    INSERTION OF PACEMAKER      REPLACEMENT " OF PACEMAKER GENERATOR  05/10/2021    RETROGRADE PYELOGRAPHY Right 2/18/2020    Procedure: PYELOGRAM, RETROGRADE;  Surgeon: Jovan Kruse MD;  Location: [facility];  Service: Urology;  Laterality: Right;    SMALL INTESTINE SURGERY  in her 20's    for crohn's    TONSILLECTOMY      UPPER GASTROINTESTINAL ENDOSCOPY  ~2008    normal findings per patient report       Family History   Problem Relation Age of Onset    Cancer Mother     Breast cancer Mother     Crohn's disease Other     Colon cancer Neg Hx     Colon polyps Neg Hx     Esophageal cancer Neg Hx     Stomach cancer Neg Hx     Ulcerative colitis Neg Hx        Social History     Socioeconomic History    Marital status:    Tobacco Use    Smoking status: Never Smoker    Smokeless tobacco: Never Used   Substance and Sexual Activity    Alcohol use: No    Drug use: No    Sexual activity: Never       Current Outpatient Medications on File Prior to Visit   Medication Sig Dispense Refill    alendronate (FOSAMAX) 70 MG tablet Take 1 tablet by mouth once a week 12 tablet 3    amLODIPine (NORVASC) 10 MG tablet Take 1 tablet (10 mg total) by mouth once daily. 30 tablet 0    atorvastatin (LIPITOR) 20 MG tablet Take 1 tablet by mouth once daily.      carvedilol (COREG) 6.25 MG tablet TAKE ONE TABLET BY MOUTH TWICE DAILY WITH FOOD 180 tablet 0    diphenoxylate-atropine 2.5-0.025 mg (LOMOTIL) 2.5-0.025 mg per tablet TAKE 1 TABLET BY MOUTH 4 TIMES DAILY AS NEEDED FOR DIARRHEA 20 tablet 0    ELIQUIS 5 mg Tab Take 5 mg by mouth 2 (two) times daily.      esomeprazole (NEXIUM) 40 MG capsule Take 1 capsule by mouth once daily 90 capsule 3    folic acid (FOLVITE) 1 MG tablet Take 1 tablet by mouth once daily 90 tablet 1    levothyroxine (SYNTHROID, LEVOTHROID) 175 MCG tablet Take 1 tablet (175 mcg total) by mouth once daily. 30 tablet 0    magnesium gluconate 27.5 mg magne- sium (500 mg) Tab Take 1 tablet by mouth once daily.      sotalol (BETAPACE)  120 MG Tab Take 120 mg by mouth every 12 (twelve) hours.      sulfaSALAzine (AZULFIDINE) 500 mg Tab Take 1 tablet by mouth twice daily 180 tablet 3    [DISCONTINUED] tolterodine (DETROL LA) 4 MG 24 hr capsule Take 1 capsule by mouth once daily (Patient not taking: Reported on 4/14/2022) 30 capsule 5     Current Facility-Administered Medications on File Prior to Visit   Medication Dose Route Frequency Provider Last Rate Last Admin    0.9%  NaCl infusion   Intravenous Continuous Bo Leone MD 10 mL/hr at 05/10/21 0808 10 mL/hr at 05/10/21 0808    vancomycin (VANCOCIN) 1,000 mg in sodium chloride 0.9% 1,000 mL IRRIGATION  1,000 mg Irrigation On Call Procedure Bo Leone MD   1,000 mg at 05/10/21 0808       Review of patient's allergies indicates:   Allergen Reactions    Lisinopril Other (See Comments)     cough     Objective:     Vitals:    04/14/22 1103 04/14/22 1147   BP: (!) 151/69 132/70   BP Location: Right arm Right arm   Patient Position: Sitting Sitting   BP Method: Large (Automatic)    Pulse: 78    SpO2: 97%    Weight: 83.3 kg (183 lb 10.3 oz)    Height: 5' (1.524 m)         Physical Exam  Vitals reviewed.   Constitutional:       Appearance: She is well-developed.   Eyes:      General: No scleral icterus.  Neck:      Vascular: No carotid bruit or JVD.   Cardiovascular:      Rate and Rhythm: Normal rate and regular rhythm.      Heart sounds: No murmur heard.    No gallop.   Pulmonary:      Breath sounds: Normal breath sounds.   Chest:       Musculoskeletal:      Right lower leg: Edema (trivial) present.      Left lower leg: Edema (trivial) present.   Skin:     General: Skin is warm and dry.   Neurological:      Mental Status: She is alert and oriented to person, place, and time.   Psychiatric:         Behavior: Behavior normal.         Thought Content: Thought content normal.         Judgment: Judgment normal.              ECG today:  Atrial pacing with native AV conduction, LBBB, Appropriate  magnet response.    No visits with results within 6 Month(s) from this visit.   Latest known visit with results is:   Hospital Outpatient Visit on 05/07/2021   Component Date Value Ref Range Status    SARS-CoV2 (COVID-19) Qualitative P* 05/07/2021 Not Detected  Not Detected Final   (        Lab 10/19:  HDL 58, LDL 50,   Assessment:     1. Atrioventricular block, complete    2. Paroxysmal atrial fibrillation    3. Essential hypertension, benign    4. Mixed hyperlipidemia    5. Pacemaker    6. Sick sinus syndrome    7. Postablative hypothyroidism    8. Crohn's disease of small intestine without complication    9. Localized edema    10. LBBB (left bundle branch block)      Plan:   Pauline was seen today for hypertension, atrial fibrillation and hyperlipidemia.    Diagnoses and all orders for this visit:    Atrioventricular block, complete    Paroxysmal atrial fibrillation  -     Ambulatory referral/consult to Cardiology    Essential hypertension, benign  -     Ambulatory referral/consult to Cardiology    Mixed hyperlipidemia    Pacemaker    Sick sinus syndrome    Postablative hypothyroidism    Crohn's disease of small intestine without complication    Localized edema    LBBB (left bundle branch block)     Pt is clinically quite stable    The minor pedal edema is a side effect of amlodpine    Will obtain records from Dr Leone--to include any stress test and holter and echo reports    Will obtain [facility] from [facility]--including operative reports and angiogram reports and discharge summary    Same meds for now    Will continue transtelephonic pacemaker checks in our office    RTC 1 to 3 months with Medtronic interrogation    Due to persistent intermittent palpitations will consider referral to electrophysiologist    Follow up in about 3 months (around 7/14/2022).

## 2022-04-18 ENCOUNTER — PATIENT MESSAGE (OUTPATIENT)
Dept: PRIMARY CARE CLINIC | Facility: CLINIC | Age: 82
End: 2022-04-18
Payer: MEDICARE

## 2022-04-18 RX ORDER — TOLTERODINE 4 MG/1
CAPSULE, EXTENDED RELEASE ORAL
Qty: 30 CAPSULE | Refills: 0 | OUTPATIENT
Start: 2022-04-18

## 2022-04-19 ENCOUNTER — TELEPHONE (OUTPATIENT)
Dept: PRIMARY CARE CLINIC | Facility: CLINIC | Age: 82
End: 2022-04-19
Payer: MEDICARE

## 2022-04-19 RX ORDER — TOLTERODINE 4 MG/1
4 CAPSULE, EXTENDED RELEASE ORAL DAILY
Qty: 90 CAPSULE | Refills: 3 | Status: SHIPPED | OUTPATIENT
Start: 2022-04-19 | End: 2023-04-11

## 2022-04-19 NOTE — TELEPHONE ENCOUNTER
----- Message from Isabel Gomez sent at 4/19/2022  9:40 AM CDT -----  Contact: [phone]  Pt is calling she states she sent a message yesterday in regards to her medication and someone was suppose to call her back please advise and give return call

## 2022-04-25 NOTE — TELEPHONE ENCOUNTER
No new care gaps identified.  Powered by [employer]. Reference number: [id number].   4/25/2022 8:31:27 AM CDT

## 2022-04-26 RX ORDER — AMLODIPINE BESYLATE 10 MG/1
10 TABLET ORAL DAILY
Qty: 90 TABLET | Refills: 3 | Status: SHIPPED | OUTPATIENT
Start: 2022-04-26 | End: 2023-03-16 | Stop reason: DRUGHIGH

## 2022-04-26 RX ORDER — FOLIC ACID 1 MG/1
TABLET ORAL
Qty: 90 TABLET | Refills: 1 | Status: SHIPPED | OUTPATIENT
Start: 2022-04-26 | End: 2022-10-25

## 2022-04-26 NOTE — TELEPHONE ENCOUNTER
Refill Routing Note   Medication(s) are not appropriate for processing by [facility] for the following reason(s):      - Outside of protocol    [facility] action(s):  Route  Approve       Medication Therapy Plan: FOV;  Medication reconciliation completed: No     Appointments  past 12m or future 3m with PCP    Date Provider   Last Visit   3/16/2022 Ga Waldrop MD   Next Visit   9/16/2022 Ga Waldrop MD   ED visits in past 90 days: 0        Note composed:12:09 PM 04/26/2022

## 2022-04-27 ENCOUNTER — OFFICE VISIT (OUTPATIENT)
Dept: GASTROENTEROLOGY | Facility: CLINIC | Age: 82
End: 2022-04-27
Payer: MEDICARE

## 2022-04-27 VITALS
WEIGHT: 183.19 LBS | DIASTOLIC BLOOD PRESSURE: 77 MMHG | SYSTOLIC BLOOD PRESSURE: 165 MMHG | HEIGHT: 60 IN | BODY MASS INDEX: 35.96 KG/M2 | OXYGEN SATURATION: 96 % | HEART RATE: 97 BPM

## 2022-04-27 DIAGNOSIS — K52.9 CHRONIC DIARRHEA: Primary | ICD-10-CM

## 2022-04-27 DIAGNOSIS — R53.82 CHRONIC FATIGUE, UNSPECIFIED: ICD-10-CM

## 2022-04-27 DIAGNOSIS — K50.10 CROHN'S DISEASE OF LARGE INTESTINE WITHOUT COMPLICATION: ICD-10-CM

## 2022-04-27 PROCEDURE — 3288F PR FALLS RISK ASSESSMENT DOCUMENTED: ICD-10-PCS | Mod: CPTII,S$GLB,, | Performed by: INTERNAL MEDICINE

## 2022-04-27 PROCEDURE — 3077F SYST BP >= 140 MM HG: CPT | Mod: CPTII,S$GLB,, | Performed by: INTERNAL MEDICINE

## 2022-04-27 PROCEDURE — 99204 OFFICE O/P NEW MOD 45 MIN: CPT | Mod: S$GLB,,, | Performed by: INTERNAL MEDICINE

## 2022-04-27 PROCEDURE — 1159F PR MEDICATION LIST DOCUMENTED IN MEDICAL RECORD: ICD-10-PCS | Mod: CPTII,S$GLB,, | Performed by: INTERNAL MEDICINE

## 2022-04-27 PROCEDURE — 1159F MED LIST DOCD IN RCRD: CPT | Mod: CPTII,S$GLB,, | Performed by: INTERNAL MEDICINE

## 2022-04-27 PROCEDURE — 1101F PR PT FALLS ASSESS DOC 0-1 FALLS W/OUT INJ PAST YR: ICD-10-PCS | Mod: CPTII,S$GLB,, | Performed by: INTERNAL MEDICINE

## 2022-04-27 PROCEDURE — 1126F PR PAIN SEVERITY QUANTIFIED, NO PAIN PRESENT: ICD-10-PCS | Mod: CPTII,S$GLB,, | Performed by: INTERNAL MEDICINE

## 2022-04-27 PROCEDURE — 99999 PR PBB SHADOW E&M-EST. PATIENT-LVL IV: ICD-10-PCS | Mod: PBBFAC,,, | Performed by: INTERNAL MEDICINE

## 2022-04-27 PROCEDURE — 3288F FALL RISK ASSESSMENT DOCD: CPT | Mod: CPTII,S$GLB,, | Performed by: INTERNAL MEDICINE

## 2022-04-27 PROCEDURE — 3077F PR MOST RECENT SYSTOLIC BLOOD PRESSURE >= 140 MM HG: ICD-10-PCS | Mod: CPTII,S$GLB,, | Performed by: INTERNAL MEDICINE

## 2022-04-27 PROCEDURE — 1126F AMNT PAIN NOTED NONE PRSNT: CPT | Mod: CPTII,S$GLB,, | Performed by: INTERNAL MEDICINE

## 2022-04-27 PROCEDURE — 3078F DIAST BP <80 MM HG: CPT | Mod: CPTII,S$GLB,, | Performed by: INTERNAL MEDICINE

## 2022-04-27 PROCEDURE — 99999 PR PBB SHADOW E&M-EST. PATIENT-LVL IV: CPT | Mod: PBBFAC,,, | Performed by: INTERNAL MEDICINE

## 2022-04-27 PROCEDURE — 1101F PT FALLS ASSESS-DOCD LE1/YR: CPT | Mod: CPTII,S$GLB,, | Performed by: INTERNAL MEDICINE

## 2022-04-27 PROCEDURE — 99204 PR OFFICE/OUTPT VISIT, NEW, LEVL IV, 45-59 MIN: ICD-10-PCS | Mod: S$GLB,,, | Performed by: INTERNAL MEDICINE

## 2022-04-27 PROCEDURE — 3078F PR MOST RECENT DIASTOLIC BLOOD PRESSURE < 80 MM HG: ICD-10-PCS | Mod: CPTII,S$GLB,, | Performed by: INTERNAL MEDICINE

## 2022-04-27 RX ORDER — DIPHENOXYLATE HYDROCHLORIDE AND ATROPINE SULFATE 2.5; .025 MG/1; MG/1
TABLET ORAL
Qty: 60 TABLET | Refills: 3 | Status: ON HOLD | OUTPATIENT
Start: 2022-04-27 | End: 2023-03-23 | Stop reason: CLARIF

## 2022-04-27 RX ORDER — MONTELUKAST SODIUM 4 MG/1
1 TABLET, CHEWABLE ORAL 2 TIMES DAILY
Qty: 180 TABLET | Refills: 3 | Status: SHIPPED | OUTPATIENT
Start: 2022-04-27 | End: 2023-03-27

## 2022-04-27 NOTE — PROGRESS NOTES
GASTROENTEROLOGY CLINIC NOTE    Reason for visit: The primary encounter diagnosis was Chronic diarrhea. Diagnoses of Crohn's disease of large intestine without complication and Chronic fatigue, unspecified  were also pertinent to this visit.  Referring provider/PCP: Ga Waldrop MD    HPI:  Pauline Cronin is a 82 y.o. female here today for diarrhea  Hx of crohns disease.    Ongoing diarrhea, about 4-6 times / day, ongoing since her 20s. Initially had surgery in her 20s for crohns, but now hasnt had the severe cramping she used to have. But symptoms have been ongoing since that time. No vomiting. No severe pains. No blood in stool. No radiating symptoms.   She asks about 'new crohns' medications.    Reviewed her chart. All stool studies negative 2018 , including calprotectin which was normal. CT noncon was without any bowel inflammation in 2020.   She had regular GI doc in [facility] that did EGd and colon about 7-10 years ago and kept her on sulfasazline and didn't seem to comment on worsening crohns disease.     IBD history:  Diagnosed in her 20s, operation for abd pain and diarrhea, had small intestinal resection.   Originally was on questran. Seemed to help  Then put on sulfasalazine since 2005 or so.        Prior Endoscopy:  EGD:  2018  Impression:          - Benign-appearing esophageal stenosis. Dilated.                        - Small hiatal hernia.                        - Gastritis. Biopsied.                        - Normal examined duodenum.                        -History of mild intermittent dysphagia to solids,                        likely due to lower esophageal stenosis that was                        dilated today     Colon: maybe 7 years ago. She doesn't recall MD name, it was in [facility], but apparently colon was fine.    (Portions of this note were dictated using voice recognition software and may contain dictation related errors in spelling/grammar/syntax not found on text  review)    Review of Systems   Constitutional: Negative for fever and malaise/fatigue.   Respiratory: Negative for cough and shortness of breath.    Cardiovascular: Negative for chest pain and palpitations.   Gastrointestinal: Positive for diarrhea. Negative for abdominal pain, blood in stool, nausea and vomiting.   Neurological: Negative for dizziness and headaches.       Past Medical History: has a past medical history of Anticoagulant long-term use, Atrial fibrillation, Crohn disease, GERD (gastroesophageal reflux disease), Hyperlipidemia, Hypertension, and Thyroid disease.    Past Surgical History: has a past surgical history that includes Tonsillectomy; Hysterectomy; Appendectomy; Colonoscopy (~2008); Upper gastrointestinal endoscopy (~2008); Small intestine surgery (in her 20's); Esophagogastroduodenoscopy (N/A, 7/17/2018); Cardiac surgery (2014); Retrograde pyelography (Right, 2/18/2020); Insertion of pacemaker; Replacement of pacemaker generator (05/10/2021); and Insertion of implantable cardioverter-defibrillator (ICD) generator with two existing leads (Left, 5/10/2021).    Family History:family history includes Breast cancer in her mother; Cancer in her mother; Crohn's disease in her other.    Allergies:   Review of patient's allergies indicates:   Allergen Reactions    Lisinopril Other (See Comments)     cough       Social History: reports that she has never smoked. She has never used smokeless tobacco. She reports that she does not drink alcohol and does not use drugs.    Home medications:   Current Outpatient Medications on File Prior to Visit   Medication Sig Dispense Refill    alendronate (FOSAMAX) 70 MG tablet Take 1 tablet by mouth once a week 12 tablet 3    amLODIPine (NORVASC) 10 MG tablet Take 1 tablet (10 mg total) by mouth once daily. 90 tablet 3    atorvastatin (LIPITOR) 20 MG tablet Take 1 tablet by mouth once daily.      carvedilol (COREG) 6.25 MG tablet TAKE ONE TABLET BY MOUTH TWICE  DAILY WITH FOOD 180 tablet 0    ELIQUIS 5 mg Tab Take 5 mg by mouth 2 (two) times daily.      esomeprazole (NEXIUM) 40 MG capsule Take 1 capsule by mouth once daily 90 capsule 3    folic acid (FOLVITE) 1 MG tablet Take 1 tablet by mouth once daily 90 tablet 1    levothyroxine (SYNTHROID, LEVOTHROID) 175 MCG tablet Take 1 tablet (175 mcg total) by mouth once daily. 30 tablet 0    magnesium gluconate 27.5 mg magne- sium (500 mg) Tab Take 1 tablet by mouth once daily.      sotalol (BETAPACE) 120 MG Tab Take 120 mg by mouth every 12 (twelve) hours.      sulfaSALAzine (AZULFIDINE) 500 mg Tab Take 1 tablet by mouth twice daily 180 tablet 3    tolterodine (DETROL LA) 4 MG 24 hr capsule Take 1 capsule (4 mg total) by mouth once daily. 90 capsule 3    [DISCONTINUED] diphenoxylate-atropine 2.5-0.025 mg (LOMOTIL) 2.5-0.025 mg per tablet TAKE 1 TABLET BY MOUTH 4 TIMES DAILY AS NEEDED FOR DIARRHEA 20 tablet 0     Current Facility-Administered Medications on File Prior to Visit   Medication Dose Route Frequency Provider Last Rate Last Admin    0.9%  NaCl infusion   Intravenous Continuous Bo Leone MD 10 mL/hr at 05/10/21 0808 10 mL/hr at 05/10/21 0808    vancomycin (VANCOCIN) 1,000 mg in sodium chloride 0.9% 1,000 mL IRRIGATION  1,000 mg Irrigation On Call Procedure Bo Leone MD   1,000 mg at 05/10/21 0808       Vital signs:  BP (!) 165/77 (BP Location: Left arm, Patient Position: Sitting, BP Method: Large (Automatic))   Pulse 97   Ht 5' (1.524 m)   Wt 83.1 kg (183 lb 3.2 oz)   SpO2 96%   BMI 35.78 kg/m²     Physical Exam  Vitals reviewed.   Constitutional:       General: She is not in acute distress.  HENT:      Head: Normocephalic and atraumatic.   Eyes:      General: No scleral icterus.     Conjunctiva/sclera: Conjunctivae normal.   Abdominal:      General: There is no distension.      Palpations: Abdomen is soft.      Tenderness: There is no abdominal tenderness.   Skin:     General: Skin is  warm.      Coloration: Skin is not pale.      Findings: No rash.   Neurological:      Mental Status: She is oriented to person, place, and time.      Gait: Gait normal.   Psychiatric:         Mood and Affect: Mood normal.         Behavior: Behavior normal.         I have reviewed prior labs, imaging, notes from last month    Assessment:  1. Chronic diarrhea    2. Crohn's disease of large intestine without complication    3. Chronic fatigue, unspecified       Ongoing diarhrea for 60 years with ? crohns surgery in her 20s.  Unclear if actually has active crohns disease, by CT in 2020 and negative calprotectin.    She strongly desires to avoid colonoscopy if possible, and I explained that treating crohns is very difficult without any disease activity assesment, and the best way to do that is colonoscopy. Other modalities including CT and calprotectin , have actually NOT supported active crohns at this time based on recent past testing.    I think we should empirically try to treat her diarrhea other ways while obtaining stool and blood work    Plan:  Orders Placed This Encounter    Clostridium difficile EIA    Stool culture    Tissue Transglutaminase, IgA    IgA    C-Reactive Protein    Giardia / Cryptosporidum, EIA    Calprotectin, Stool    Stool Exam-Ova,Cysts,Parasites    WBC, Stool    Pancreatic elastase, fecal    Fecal fat, qualitative    CBC Auto Differential    TSH    Comprehensive Metabolic Panel    colestipoL (COLESTID) 1 gram Tab    diphenoxylate-atropine 2.5-0.025 mg (LOMOTIL) 2.5-0.025 mg per tablet     Stool studies  Basic labs    Trial of colestid (she has previously used questran but wanted a nonpowder version)  Refill lomotil  Advised trial of metamucil as well    I asked her to try and find the name of prior GI doc and we can request records but she cannot recall the name or group name.    RTC 2-3 months    Kareem Blackman MD  [facility] [place]

## 2022-05-02 RX ORDER — SULFASALAZINE 500 MG/1
TABLET ORAL
Qty: 180 TABLET | Refills: 1 | Status: SHIPPED | OUTPATIENT
Start: 2022-05-02 | End: 2022-11-07

## 2022-05-04 DIAGNOSIS — I48.0 PAROXYSMAL ATRIAL FIBRILLATION: Primary | ICD-10-CM

## 2022-05-04 RX ORDER — APIXABAN 5 MG/1
5 TABLET, FILM COATED ORAL 2 TIMES DAILY
Qty: 60 TABLET | Refills: 11 | Status: SHIPPED | OUTPATIENT
Start: 2022-05-04 | End: 2022-11-07 | Stop reason: SDUPTHER

## 2022-05-16 DIAGNOSIS — M81.0 OSTEOPOROSIS, UNSPECIFIED OSTEOPOROSIS TYPE, UNSPECIFIED PATHOLOGICAL FRACTURE PRESENCE: ICD-10-CM

## 2022-05-16 RX ORDER — ALENDRONATE SODIUM 70 MG/1
TABLET ORAL
Qty: 12 TABLET | Refills: 3 | Status: SHIPPED | OUTPATIENT
Start: 2022-05-16 | End: 2022-11-07 | Stop reason: SDUPTHER

## 2022-05-18 ENCOUNTER — PATIENT OUTREACH (OUTPATIENT)
Dept: ADMINISTRATIVE | Facility: OTHER | Age: 82
End: 2022-05-18
Payer: MEDICARE

## 2022-05-18 ENCOUNTER — TELEPHONE (OUTPATIENT)
Dept: GASTROENTEROLOGY | Facility: CLINIC | Age: 82
End: 2022-05-18
Payer: MEDICARE

## 2022-05-18 NOTE — TELEPHONE ENCOUNTER
I call the patient and will call her back as soon as I get the August schedule .    ----- Message from Ann Rodríguez MA sent at 5/18/2022  1:37 PM CDT -----    ----- Message -----  From: Lisa Payne  Sent: 5/18/2022  12:03 PM CDT  To: [facility]UT[facility] Staff    Name Of Caller: Pauline     Provider Name: Kareem Blackman    Does patient feel the need to be seen today? No     Relationship to the Pt?: pt    Contact Preference?: [phone]    What is the nature of the call?:Pt called and would like to schedule her 3 month fallow it supposed to be in July no openings coming up for me please call her back for appt

## 2022-05-18 NOTE — PROGRESS NOTES
Health Maintenance Due   Topic Date Due    TETANUS VACCINE  Never done    Shingles Vaccine (2 of 3) 09/11/2017    COVID-19 Vaccine (4 - Booster for Pfizer series) 04/09/2022     Updates were requested from care everywhere.  Chart was reviewed for overdue Proactive [facility] ([facility]) topics (CRS, Breast Cancer Screening, Eye exam)  Health Maintenance has been updated.  LINKS immunization registry triggered.  Immunizations were reconciled.

## 2022-05-23 ENCOUNTER — OFFICE VISIT (OUTPATIENT)
Dept: CARDIOLOGY | Facility: CLINIC | Age: 82
End: 2022-05-23
Payer: MEDICARE

## 2022-05-23 VITALS
OXYGEN SATURATION: 95 % | SYSTOLIC BLOOD PRESSURE: 131 MMHG | HEIGHT: 60 IN | HEART RATE: 71 BPM | DIASTOLIC BLOOD PRESSURE: 63 MMHG | BODY MASS INDEX: 35.78 KG/M2

## 2022-05-23 DIAGNOSIS — I44.7 LBBB (LEFT BUNDLE BRANCH BLOCK): ICD-10-CM

## 2022-05-23 DIAGNOSIS — Z95.0 PACEMAKER: ICD-10-CM

## 2022-05-23 DIAGNOSIS — I10 ESSENTIAL HYPERTENSION, BENIGN: Primary | ICD-10-CM

## 2022-05-23 DIAGNOSIS — R60.0 LOCALIZED EDEMA: ICD-10-CM

## 2022-05-23 DIAGNOSIS — E78.2 MIXED HYPERLIPIDEMIA: ICD-10-CM

## 2022-05-23 DIAGNOSIS — I49.5 SICK SINUS SYNDROME: ICD-10-CM

## 2022-05-23 DIAGNOSIS — I48.0 PAROXYSMAL ATRIAL FIBRILLATION: ICD-10-CM

## 2022-05-23 DIAGNOSIS — E03.9 HYPOTHYROIDISM, UNSPECIFIED TYPE: ICD-10-CM

## 2022-05-23 PROCEDURE — 99213 PR OFFICE/OUTPT VISIT, EST, LEVL III, 20-29 MIN: ICD-10-PCS | Mod: 25,S$GLB,, | Performed by: INTERNAL MEDICINE

## 2022-05-23 PROCEDURE — 3288F PR FALLS RISK ASSESSMENT DOCUMENTED: ICD-10-PCS | Mod: CPTII,S$GLB,, | Performed by: INTERNAL MEDICINE

## 2022-05-23 PROCEDURE — 3078F DIAST BP <80 MM HG: CPT | Mod: CPTII,S$GLB,, | Performed by: INTERNAL MEDICINE

## 2022-05-23 PROCEDURE — 3288F FALL RISK ASSESSMENT DOCD: CPT | Mod: CPTII,S$GLB,, | Performed by: INTERNAL MEDICINE

## 2022-05-23 PROCEDURE — 1159F PR MEDICATION LIST DOCUMENTED IN MEDICAL RECORD: ICD-10-PCS | Mod: CPTII,S$GLB,, | Performed by: INTERNAL MEDICINE

## 2022-05-23 PROCEDURE — 3078F PR MOST RECENT DIASTOLIC BLOOD PRESSURE < 80 MM HG: ICD-10-PCS | Mod: CPTII,S$GLB,, | Performed by: INTERNAL MEDICINE

## 2022-05-23 PROCEDURE — 99999 PR PBB SHADOW E&M-EST. PATIENT-LVL III: ICD-10-PCS | Mod: PBBFAC,,, | Performed by: INTERNAL MEDICINE

## 2022-05-23 PROCEDURE — 3075F SYST BP GE 130 - 139MM HG: CPT | Mod: CPTII,S$GLB,, | Performed by: INTERNAL MEDICINE

## 2022-05-23 PROCEDURE — 1101F PR PT FALLS ASSESS DOC 0-1 FALLS W/OUT INJ PAST YR: ICD-10-PCS | Mod: CPTII,S$GLB,, | Performed by: INTERNAL MEDICINE

## 2022-05-23 PROCEDURE — 1101F PT FALLS ASSESS-DOCD LE1/YR: CPT | Mod: CPTII,S$GLB,, | Performed by: INTERNAL MEDICINE

## 2022-05-23 PROCEDURE — 1160F RVW MEDS BY RX/DR IN RCRD: CPT | Mod: CPTII,S$GLB,, | Performed by: INTERNAL MEDICINE

## 2022-05-23 PROCEDURE — 93280 PM DEVICE PROGR EVAL DUAL: CPT | Mod: 26,,, | Performed by: INTERNAL MEDICINE

## 2022-05-23 PROCEDURE — 1159F MED LIST DOCD IN RCRD: CPT | Mod: CPTII,S$GLB,, | Performed by: INTERNAL MEDICINE

## 2022-05-23 PROCEDURE — 1160F PR REVIEW ALL MEDS BY PRESCRIBER/CLIN PHARMACIST DOCUMENTED: ICD-10-PCS | Mod: CPTII,S$GLB,, | Performed by: INTERNAL MEDICINE

## 2022-05-23 PROCEDURE — 99999 PR PBB SHADOW E&M-EST. PATIENT-LVL III: CPT | Mod: PBBFAC,,, | Performed by: INTERNAL MEDICINE

## 2022-05-23 PROCEDURE — 99213 OFFICE O/P EST LOW 20 MIN: CPT | Mod: 25,S$GLB,, | Performed by: INTERNAL MEDICINE

## 2022-05-23 PROCEDURE — 3075F PR MOST RECENT SYSTOLIC BLOOD PRESS GE 130-139MM HG: ICD-10-PCS | Mod: CPTII,S$GLB,, | Performed by: INTERNAL MEDICINE

## 2022-05-23 PROCEDURE — 1126F PR PAIN SEVERITY QUANTIFIED, NO PAIN PRESENT: ICD-10-PCS | Mod: CPTII,S$GLB,, | Performed by: INTERNAL MEDICINE

## 2022-05-23 PROCEDURE — 1126F AMNT PAIN NOTED NONE PRSNT: CPT | Mod: CPTII,S$GLB,, | Performed by: INTERNAL MEDICINE

## 2022-05-23 PROCEDURE — 93280 PR PROGRAM EVAL (IN PERSON) IMPLANT DEVICE,PACEMAKER,2 LEAD: ICD-10-PCS | Mod: 26,,, | Performed by: INTERNAL MEDICINE

## 2022-05-23 NOTE — PROGRESS NOTES
Subjective:      Patient ID: Pauline Cronin is a 82 y.o. female.    Chief Complaint: Follow-up    HPI:  Pt has rare palpitations and left shoulder pain, longest can last hours, occurs less than once a month    Pt cooks daily    Review of Systems   Cardiovascular: Positive for chest pain (pt has left shoulder pain during palpitations), dyspnea on exertion, leg swelling and palpitations. Negative for claudication, irregular heartbeat, near-syncope, orthopnea and syncope.      No bleeding on Eliquis      Past Medical History:   Diagnosis Date    Anticoagulant long-term use     Atrial fibrillation     Crohn disease diagnosed in her 20's    GERD (gastroesophageal reflux disease)     Hyperlipidemia     Hypertension     Thyroid disease     s/p I 131        Past Surgical History:   Procedure Laterality Date    APPENDECTOMY      CARDIAC SURGERY  2014    pacemaker    COLONOSCOPY  ~2008    normal findings per patient report    ESOPHAGOGASTRODUODENOSCOPY N/A 7/17/2018    Procedure: EGD (ESOPHAGOGASTRODUODENOSCOPY);  Surgeon: Alondra Casiano MD;  Location: [facility];  Service: Endoscopy;  Laterality: N/A;    HYSTERECTOMY      INSERTION OF IMPLANTABLE CARDIOVERTER-DEFIBRILLATOR (ICD) GENERATOR WITH TWO EXISTING LEADS Left 5/10/2021    Procedure: INSERTION, PULSE GENERATOR WITH 2 EXISTING LEADS, ICD;  Surgeon: Bo Loene MD;  Location: [facility] CATH LAB;  Service: Cardiology;  Laterality: Left;    INSERTION OF PACEMAKER      REPLACEMENT OF PACEMAKER GENERATOR  05/10/2021    RETROGRADE PYELOGRAPHY Right 2/18/2020    Procedure: PYELOGRAM, RETROGRADE;  Surgeon: Jovan Kruse MD;  Location: [facility] OR;  Service: Urology;  Laterality: Right;    SMALL INTESTINE SURGERY  in her 20's    for crohn's    TONSILLECTOMY      UPPER GASTROINTESTINAL ENDOSCOPY  ~2008    normal findings per patient report       Family History   Problem Relation Age of Onset    Cancer Mother     Breast cancer Mother     Crohn's  disease Other     Colon cancer Neg Hx     Colon polyps Neg Hx     Esophageal cancer Neg Hx     Stomach cancer Neg Hx     Ulcerative colitis Neg Hx        Social History     Socioeconomic History    Marital status:    Tobacco Use    Smoking status: Never Smoker    Smokeless tobacco: Never Used   Substance and Sexual Activity    Alcohol use: No    Drug use: No    Sexual activity: Never       Current Outpatient Medications on File Prior to Visit   Medication Sig Dispense Refill    alendronate (FOSAMAX) 70 MG tablet Take 1 tablet by mouth once a week 12 tablet 3    amLODIPine (NORVASC) 10 MG tablet Take 1 tablet (10 mg total) by mouth once daily. 90 tablet 3    atorvastatin (LIPITOR) 20 MG tablet Take 1 tablet by mouth once daily.      carvedilol (COREG) 6.25 MG tablet TAKE ONE TABLET BY MOUTH TWICE DAILY WITH FOOD 180 tablet 0    colestipoL (COLESTID) 1 gram Tab Take 1 tablet (1 g total) by mouth 2 (two) times daily. 180 tablet 3    diphenoxylate-atropine 2.5-0.025 mg (LOMOTIL) 2.5-0.025 mg per tablet TAKE 1 TABLET BY MOUTH 4 TIMES DAILY AS NEEDED FOR DIARRHEA 60 tablet 3    ELIQUIS 5 mg Tab Take 1 tablet (5 mg total) by mouth 2 (two) times daily. 60 tablet 11    esomeprazole (NEXIUM) 40 MG capsule Take 1 capsule by mouth once daily 90 capsule 3    folic acid (FOLVITE) 1 MG tablet Take 1 tablet by mouth once daily 90 tablet 1    levothyroxine (SYNTHROID, LEVOTHROID) 175 MCG tablet Take 1 tablet (175 mcg total) by mouth once daily. 30 tablet 0    magnesium gluconate 27.5 mg magne- sium (500 mg) Tab Take 1 tablet by mouth once daily.      sotalol (BETAPACE) 120 MG Tab Take 120 mg by mouth every 12 (twelve) hours.      sulfaSALAzine (AZULFIDINE) 500 mg Tab Take 1 tablet by mouth twice daily 180 tablet 1    tolterodine (DETROL LA) 4 MG 24 hr capsule Take 1 capsule (4 mg total) by mouth once daily. 90 capsule 3     Current Facility-Administered Medications on File Prior to Visit   Medication  Dose Route Frequency Provider Last Rate Last Admin    0.9%  NaCl infusion   Intravenous Continuous Bo Leone MD 10 mL/hr at 05/10/21 0808 10 mL/hr at 05/10/21 0808    vancomycin (VANCOCIN) 1,000 mg in sodium chloride 0.9% 1,000 mL IRRIGATION  1,000 mg Irrigation On Call Procedure Bo Leone MD   1,000 mg at 05/10/21 0808       Review of patient's allergies indicates:   Allergen Reactions    Lisinopril Other (See Comments)     cough     Objective:     Vitals:    05/23/22 1053   BP: 131/63   BP Location: Right arm   Patient Position: Sitting   BP Method: Large (Automatic)   Pulse: 71   SpO2: 95%   Height: 5' (1.524 m)        Physical Exam  Constitutional:       Appearance: She is well-developed.   Eyes:      General: No scleral icterus.  Neck:      Vascular: No carotid bruit or JVD.   Cardiovascular:      Rate and Rhythm: Normal rate and regular rhythm.      Heart sounds: No murmur heard.    No gallop.   Pulmonary:      Breath sounds: Normal breath sounds.   Musculoskeletal:      Right lower leg: No edema.      Left lower leg: No edema.   Skin:     General: Skin is warm and dry.   Neurological:      Mental Status: She is alert and oriented to person, place, and time.   Psychiatric:         Behavior: Behavior normal.         Thought Content: Thought content normal.         Judgment: Judgment normal.          [employer]tr[employer] pacemaker interrogation in office with rep:  Longest AF 4 hrs and 44 minute  A fib burden 0.5%  AGATA 10.2 years  4 NS VT  Leads OK  ATP appropriate      Lab Visit on 04/28/2022   Component Date Value Ref Range Status    C. diff Antigen 04/28/2022 Negative  Negative Final    C difficile Toxins A+B, EIA 04/28/2022 Negative  Negative Final    Giardia Antigen - EIA 04/28/2022 Negative  Negative Final    Cryptosporidium Antigen 04/28/2022 Negative  Negative Final    Calprotectin 04/28/2022 65.9 (A) <50 mcg/g Final    Stool Culture 04/28/2022 No  Salmonella,Shigella,Vibrio,Campylobacter,Yersinia isolated.   Final    Stool Exam-Ova,Cysts,Parasites 04/28/2022 FINAL 05/02/2022 1603   Final    Stool WBC 04/28/2022 No neutrophils seen  No neutrophils seen Final    Elastase 1, Fecal 04/28/2022 378  >200 (Normal) mcg/g Final    Fat Qual Neutral, Stl 04/28/2022 Normal  Normal Final    FECAL SPLIT FAT 04/28/2022 Normal  Normal Final    Shiga Toxin 1 E.coli 04/28/2022 Negative   Final    Shiga Toxin 2 E.coli 04/28/2022 Negative   Final   Lab Visit on 04/27/2022   Component Date Value Ref Range Status    TTG IgA 04/27/2022 9  <20 UNITS Final    IgA 04/27/2022 319  40 - 350 mg/dL Final    CRP 04/27/2022 0.8  0.0 - 8.2 mg/L Final    WBC 04/27/2022 5.74  3.90 - 12.70 K/uL Final    RBC 04/27/2022 4.52  4.00 - 5.40 M/uL Final    Hemoglobin 04/27/2022 14.3  12.0 - 16.0 g/dL Final    Hematocrit 04/27/2022 44.1  37.0 - 48.5 % Final    MCV 04/27/2022 98  82 - 98 fL Final    MCH 04/27/2022 31.6 (A) 27.0 - 31.0 pg Final    MCHC 04/27/2022 32.4  32.0 - 36.0 g/dL Final    RDW 04/27/2022 14.1  11.5 - 14.5 % Final    Platelets 04/27/2022 231  150 - 450 K/uL Final    MPV 04/27/2022 10.9  9.2 - 12.9 fL Final    Immature Granulocytes 04/27/2022 0.5  0.0 - 0.5 % Final    Gran # (ANC) 04/27/2022 3.2  1.8 - 7.7 K/uL Final    Immature Grans (Abs) 04/27/2022 0.03  0.00 - 0.04 K/uL Final    Lymph # 04/27/2022 1.9  1.0 - 4.8 K/uL Final    Mono # 04/27/2022 0.5  0.3 - 1.0 K/uL Final    Eos # 04/27/2022 0.1  0.0 - 0.5 K/uL Final    Baso # 04/27/2022 0.03  0.00 - 0.20 K/uL Final    nRBC 04/27/2022 0  0 /100 WBC Final    Gran % 04/27/2022 55.4  38.0 - 73.0 % Final    Lymph % 04/27/2022 32.4  18.0 - 48.0 % Final    Mono % 04/27/2022 9.1  4.0 - 15.0 % Final    Eosinophil % 04/27/2022 2.1  0.0 - 8.0 % Final    Basophil % 04/27/2022 0.5  0.0 - 1.9 % Final    Differential Method 04/27/2022 Automated   Final    TSH 04/27/2022 2.198  0.400 - 4.000 uIU/mL Final     Sodium 04/27/2022 141  136 - 145 mmol/L Final    Potassium 04/27/2022 3.7  3.5 - 5.1 mmol/L Final    Chloride 04/27/2022 108  95 - 110 mmol/L Final    CO2 04/27/2022 22 (A) 23 - 29 mmol/L Final    Glucose 04/27/2022 98  70 - 110 mg/dL Final    BUN 04/27/2022 10  8 - 23 mg/dL Final    Creatinine 04/27/2022 0.8  0.5 - 1.4 mg/dL Final    Calcium 04/27/2022 8.9  8.7 - 10.5 mg/dL Final    Total Protein 04/27/2022 7.6  6.0 - 8.4 g/dL Final    Albumin 04/27/2022 3.8  3.5 - 5.2 g/dL Final    Total Bilirubin 04/27/2022 0.5  0.1 - 1.0 mg/dL Final    Alkaline Phosphatase 04/27/2022 54 (A) 55 - 135 U/L Final    AST 04/27/2022 24  10 - 40 U/L Final    ALT 04/27/2022 19  10 - 44 U/L Final    Anion Gap 04/27/2022 11  8 - 16 mmol/L Final    eGFR if African American 04/27/2022 >60.0  >60 mL/min/1.73 m^2 Final    eGFR if non African American 04/27/2022 >60.0  >60 mL/min/1.73 m^2 Final   (    Assessment:     1. Essential hypertension, benign    2. Mixed hyperlipidemia    3. LBBB (left bundle branch block)    4. Pacemaker    5. Paroxysmal atrial fibrillation    6. Sick sinus syndrome    7. Hypothyroidism, unspecified type    8. Localized edema      Plan:   Pauline was seen today for follow-up.    Diagnoses and all orders for this visit:    Essential hypertension, benign    Mixed hyperlipidemia    LBBB (left bundle branch block)    Pacemaker    Paroxysmal atrial fibrillation    Sick sinus syndrome    Hypothyroidism, unspecified type    Localized edema         Edema due to amlodipine    Dyspnea due to deconditioning    Note echocardiogram by Dr Leone was normal in 2020    Due to shoulder pain during palpitations will get Lexiscan Cardiolite stress test--pt declines    Consult electrophysiology--pt declines at this point    Follow up in about 4 months (around 9/23/2022).

## 2022-05-27 DIAGNOSIS — E03.9 HYPOTHYROIDISM, UNSPECIFIED TYPE: ICD-10-CM

## 2022-05-27 RX ORDER — LEVOTHYROXINE SODIUM 175 UG/1
175 TABLET ORAL DAILY
Qty: 30 TABLET | Refills: 5 | Status: SHIPPED | OUTPATIENT
Start: 2022-05-27 | End: 2022-12-14

## 2022-05-27 NOTE — TELEPHONE ENCOUNTER
No new care gaps identified.  [facility] Embedded Care Gaps. Reference number: [id number]. 5/27/2022   8:03:18 AM ISIDROT

## 2022-06-06 NOTE — TELEPHONE ENCOUNTER
No new care gaps identified.  [facility] Embedded Care Gaps. Reference number: [id number]. 6/06/2022   10:18:31 AM ISIDROT

## 2022-06-07 RX ORDER — ESOMEPRAZOLE MAGNESIUM 40 MG/1
40 CAPSULE, DELAYED RELEASE ORAL DAILY
Qty: 90 CAPSULE | Refills: 3 | Status: SHIPPED | OUTPATIENT
Start: 2022-06-07 | End: 2023-07-07 | Stop reason: SDUPTHER

## 2022-06-07 NOTE — TELEPHONE ENCOUNTER
Refill Routing Note   Medication(s) are not appropriate for processing by [facility] for the following reason(s):      - Drug-Disease Interaction (Osteoporosis )    [facility] action(s):  Defer Medication-related problems identified: Drug-disease interaction     Medication Therapy Plan: FOV;  Medication reconciliation completed: No     Appointments  past 12m or future 3m with PCP    Date Provider   Last Visit   3/16/2022 Ga Waldrop MD   Next Visit   9/16/2022 Ga Waldrop MD   ED visits in past 90 days: 0        Note composed:12:27 PM 06/07/2022

## 2022-06-16 ENCOUNTER — CLINICAL SUPPORT (OUTPATIENT)
Dept: CARDIOLOGY | Facility: CLINIC | Age: 82
End: 2022-06-16
Payer: MEDICARE

## 2022-06-16 DIAGNOSIS — Z95.0 PACEMAKER: Primary | ICD-10-CM

## 2022-06-16 PROCEDURE — 93296 REM INTERROG EVL PM/IDS: CPT | Mod: S$GLB,,, | Performed by: INTERNAL MEDICINE

## 2022-06-16 PROCEDURE — 93294 PR INTERROG EVAL, REMOTE, UP TO 90 DAYS,PACEMAKER: ICD-10-PCS | Mod: S$GLB,,, | Performed by: INTERNAL MEDICINE

## 2022-06-16 PROCEDURE — 93296 PR INTERROG EVAL, REMOTE, UP TO 90 DAYS, PACER/DEFIB,TECH REVIEW: ICD-10-PCS | Mod: S$GLB,,, | Performed by: INTERNAL MEDICINE

## 2022-06-16 PROCEDURE — 93294 REM INTERROG EVL PM/LDLS PM: CPT | Mod: S$GLB,,, | Performed by: INTERNAL MEDICINE

## 2022-06-16 NOTE — PROGRESS NOTES
Subjective:      Patient ID: Pauline Cronin is a 82 y.o. female.    Chief Complaint: No chief complaint on file.    HPI:    ROS Medtronic pacemaker remote interrogation report downloaded and prepared by medical assistant and interpreted by me and full report scanned into Epic media.    Battery OK with ERI9.2 years.  Leads OK.  One brief a fib on Eliquis and carvedilol.  Normal device function.    Past Medical History:   Diagnosis Date    Anticoagulant long-term use     Atrial fibrillation     Crohn disease diagnosed in her 20's    GERD (gastroesophageal reflux disease)     Hyperlipidemia     Hypertension     Thyroid disease     s/p I 131        Past Surgical History:   Procedure Laterality Date    APPENDECTOMY      CARDIAC SURGERY  2014    pacemaker    COLONOSCOPY  ~2008    normal findings per patient report    ESOPHAGOGASTRODUODENOSCOPY N/A 7/17/2018    Procedure: EGD (ESOPHAGOGASTRODUODENOSCOPY);  Surgeon: Alondra Casiano MD;  Location: [facility];  Service: Endoscopy;  Laterality: N/A;    HYSTERECTOMY      INSERTION OF IMPLANTABLE CARDIOVERTER-DEFIBRILLATOR (ICD) GENERATOR WITH TWO EXISTING LEADS Left 5/10/2021    Procedure: INSERTION, PULSE GENERATOR WITH 2 EXISTING LEADS, ICD;  Surgeon: Bo Leone MD;  Location: [facility] CATH LAB;  Service: Cardiology;  Laterality: Left;    INSERTION OF PACEMAKER      REPLACEMENT OF PACEMAKER GENERATOR  05/10/2021    RETROGRADE PYELOGRAPHY Right 2/18/2020    Procedure: PYELOGRAM, RETROGRADE;  Surgeon: Jovan Kruse MD;  Location: [facility] OR;  Service: Urology;  Laterality: Right;    SMALL INTESTINE SURGERY  in her 20's    for crohn's    TONSILLECTOMY      UPPER GASTROINTESTINAL ENDOSCOPY  ~2008    normal findings per patient report       Family History   Problem Relation Age of Onset    Cancer Mother     Breast cancer Mother     Crohn's disease Other     Colon cancer Neg Hx     Colon polyps Neg Hx     Esophageal cancer Neg Hx     Stomach  cancer Neg Hx     Ulcerative colitis Neg Hx        Social History     Socioeconomic History    Marital status:    Tobacco Use    Smoking status: Never Smoker    Smokeless tobacco: Never Used   Substance and Sexual Activity    Alcohol use: No    Drug use: No    Sexual activity: Never       Current Outpatient Medications on File Prior to Visit   Medication Sig Dispense Refill    alendronate (FOSAMAX) 70 MG tablet Take 1 tablet by mouth once a week 12 tablet 3    amLODIPine (NORVASC) 10 MG tablet Take 1 tablet (10 mg total) by mouth once daily. 90 tablet 3    atorvastatin (LIPITOR) 20 MG tablet Take 1 tablet by mouth once daily.      carvedilol (COREG) 6.25 MG tablet TAKE ONE TABLET BY MOUTH TWICE DAILY WITH FOOD 180 tablet 0    colestipoL (COLESTID) 1 gram Tab Take 1 tablet (1 g total) by mouth 2 (two) times daily. 180 tablet 3    diphenoxylate-atropine 2.5-0.025 mg (LOMOTIL) 2.5-0.025 mg per tablet TAKE 1 TABLET BY MOUTH 4 TIMES DAILY AS NEEDED FOR DIARRHEA 60 tablet 3    ELIQUIS 5 mg Tab Take 1 tablet (5 mg total) by mouth 2 (two) times daily. 60 tablet 11    esomeprazole (NEXIUM) 40 MG capsule Take 1 capsule (40 mg total) by mouth once daily. 90 capsule 3    folic acid (FOLVITE) 1 MG tablet Take 1 tablet by mouth once daily 90 tablet 1    levothyroxine (SYNTHROID, LEVOTHROID) 175 MCG tablet Take 1 tablet (175 mcg total) by mouth once daily. 30 tablet 5    magnesium gluconate 27.5 mg magne- sium (500 mg) Tab Take 1 tablet by mouth once daily.      sotalol (BETAPACE) 120 MG Tab Take 120 mg by mouth every 12 (twelve) hours.      sulfaSALAzine (AZULFIDINE) 500 mg Tab Take 1 tablet by mouth twice daily 180 tablet 1    tolterodine (DETROL LA) 4 MG 24 hr capsule Take 1 capsule (4 mg total) by mouth once daily. 90 capsule 3     Current Facility-Administered Medications on File Prior to Visit   Medication Dose Route Frequency Provider Last Rate Last Admin    0.9%  NaCl infusion   Intravenous  Continuous Bo Leone MD 10 mL/hr at 05/10/21 0808 10 mL/hr at 05/10/21 0808    vancomycin (VANCOCIN) 1,000 mg in sodium chloride 0.9% 1,000 mL IRRIGATION  1,000 mg Irrigation On Call Procedure Bo Leone MD   1,000 mg at 05/10/21 0808       Review of patient's allergies indicates:   Allergen Reactions    Lisinopril Other (See Comments)     cough     Objective:   There were no vitals filed for this visit.     Physical Exam     Assessment:     1. Pacemaker      Plan:   Diagnoses and all orders for this visit:    Pacemaker         No follow-ups on file.

## 2022-06-27 DIAGNOSIS — E83.42 HYPOMAGNESEMIA: Primary | ICD-10-CM

## 2022-06-27 RX ORDER — SOTALOL HYDROCHLORIDE 120 MG/1
120 TABLET ORAL EVERY 12 HOURS
Qty: 180 TABLET | Refills: 3 | Status: ON HOLD | OUTPATIENT
Start: 2022-06-27 | End: 2023-05-07 | Stop reason: SDUPTHER

## 2022-06-27 RX ORDER — ATORVASTATIN CALCIUM 20 MG/1
20 TABLET, FILM COATED ORAL DAILY
Qty: 90 TABLET | Refills: 3 | Status: SHIPPED | OUTPATIENT
Start: 2022-06-27 | End: 2023-09-21

## 2022-06-27 RX ORDER — CARVEDILOL 6.25 MG/1
6.25 TABLET ORAL 2 TIMES DAILY WITH MEALS
Qty: 180 TABLET | Refills: 3 | Status: ON HOLD | OUTPATIENT
Start: 2022-06-27 | End: 2023-04-16 | Stop reason: HOSPADM

## 2022-06-27 RX ORDER — MAGNESIUM GLUCONATE 27.5 (500)
1 TABLET ORAL DAILY
Qty: 90 TABLET | Refills: 3 | Status: ON HOLD | OUTPATIENT
Start: 2022-06-27 | End: 2023-03-23 | Stop reason: CLARIF

## 2022-06-27 NOTE — TELEPHONE ENCOUNTER
----- Message from Genesis Vaughan sent at 6/27/2022  8:38 AM CDT -----  Type:  RX Refill Request    Who Called: pt  Refill or New Rx:refill  RX Name and Strength:atorvastatin (LIPITOR) 20 MG tablet  How is the patient currently taking it? (ex. 1XDay):Take 1 tablet by mouth once daily.   Is this a 30 day or 90 day RX:90  Preferred Pharmacy with phone number:[facility] [address] [facility] [address] [facility]   Phone:  [phone]  Fax:  [phone]        Local or Mail Order:  Ordering Provider:Dr Barbosa  Would the patient rather a call back or a response via [facility]? call  Best Call Back Number:[phone]  Additional Information:   Type:  RX Refill Request    Who Called: pt  Refill or New Rx:refill  RX Name and Strength:sotalol (BETAPACE) 120 MG Tab  How is the patient currently taking it? (ex. 1XDay):Take 120 mg by mouth every 12 (twelve) hours  Is this a 30 day or 90 day RX:90      Type:  RX Refill Request    Who Called: pt  Refill or New Rx:refill  RX Name and Strength:magnesium gluconate 27.5 mg magne- sium (500 mg) Tab  How is the patient currently taking it? (ex. 1XDay):Take 1 tablet by mouth once daily. - Oral  Is this a 30 day or 90 day RX:90

## 2022-06-28 RX ORDER — ATORVASTATIN CALCIUM 20 MG/1
20 TABLET, FILM COATED ORAL DAILY
Qty: 90 TABLET | Refills: 3 | Status: CANCELLED | OUTPATIENT
Start: 2022-06-28

## 2022-09-16 ENCOUNTER — CLINICAL SUPPORT (OUTPATIENT)
Dept: CARDIOLOGY | Facility: CLINIC | Age: 82
End: 2022-09-16
Payer: MEDICARE

## 2022-09-16 ENCOUNTER — OFFICE VISIT (OUTPATIENT)
Dept: PRIMARY CARE CLINIC | Facility: CLINIC | Age: 82
End: 2022-09-16
Payer: MEDICARE

## 2022-09-16 VITALS
HEART RATE: 91 BPM | DIASTOLIC BLOOD PRESSURE: 82 MMHG | BODY MASS INDEX: 36.66 KG/M2 | OXYGEN SATURATION: 96 % | RESPIRATION RATE: 18 BRPM | SYSTOLIC BLOOD PRESSURE: 124 MMHG | WEIGHT: 186.75 LBS | HEIGHT: 60 IN

## 2022-09-16 DIAGNOSIS — I10 ESSENTIAL HYPERTENSION, BENIGN: Primary | ICD-10-CM

## 2022-09-16 DIAGNOSIS — Z23 NEED FOR VACCINATION: ICD-10-CM

## 2022-09-16 DIAGNOSIS — E03.9 HYPOTHYROIDISM, UNSPECIFIED TYPE: ICD-10-CM

## 2022-09-16 DIAGNOSIS — E78.5 HYPERLIPIDEMIA, UNSPECIFIED HYPERLIPIDEMIA TYPE: ICD-10-CM

## 2022-09-16 DIAGNOSIS — E66.01 SEVERE OBESITY (BMI 35.0-39.9) WITH COMORBIDITY: ICD-10-CM

## 2022-09-16 DIAGNOSIS — K50.10 CROHN'S DISEASE OF LARGE INTESTINE WITHOUT COMPLICATION: ICD-10-CM

## 2022-09-16 DIAGNOSIS — I48.0 PAROXYSMAL ATRIAL FIBRILLATION: ICD-10-CM

## 2022-09-16 DIAGNOSIS — Z95.0 PACEMAKER: Primary | ICD-10-CM

## 2022-09-16 PROCEDURE — 1159F MED LIST DOCD IN RCRD: CPT | Mod: CPTII,S$GLB,, | Performed by: FAMILY MEDICINE

## 2022-09-16 PROCEDURE — G0008 ADMIN INFLUENZA VIRUS VAC: HCPCS | Mod: S$GLB,,, | Performed by: FAMILY MEDICINE

## 2022-09-16 PROCEDURE — G0008 FLU VACCINE - QUADRIVALENT - ADJUVANTED: ICD-10-PCS | Mod: S$GLB,,, | Performed by: FAMILY MEDICINE

## 2022-09-16 PROCEDURE — 90694 FLU VACCINE - QUADRIVALENT - ADJUVANTED: ICD-10-PCS | Mod: S$GLB,,, | Performed by: FAMILY MEDICINE

## 2022-09-16 PROCEDURE — 1100F PR PT FALLS ASSESS DOC 2+ FALLS/FALL W/INJURY/YR: ICD-10-PCS | Mod: CPTII,S$GLB,, | Performed by: FAMILY MEDICINE

## 2022-09-16 PROCEDURE — 3079F DIAST BP 80-89 MM HG: CPT | Mod: CPTII,S$GLB,, | Performed by: FAMILY MEDICINE

## 2022-09-16 PROCEDURE — 93294 PR INTERROG EVAL, REMOTE, UP TO 90 DAYS,PACEMAKER: ICD-10-PCS | Mod: S$GLB,,, | Performed by: INTERNAL MEDICINE

## 2022-09-16 PROCEDURE — 99214 PR OFFICE/OUTPT VISIT, EST, LEVL IV, 30-39 MIN: ICD-10-PCS | Mod: S$GLB,,, | Performed by: FAMILY MEDICINE

## 2022-09-16 PROCEDURE — 93294 REM INTERROG EVL PM/LDLS PM: CPT | Mod: S$GLB,,, | Performed by: INTERNAL MEDICINE

## 2022-09-16 PROCEDURE — 3079F PR MOST RECENT DIASTOLIC BLOOD PRESSURE 80-89 MM HG: ICD-10-PCS | Mod: CPTII,S$GLB,, | Performed by: FAMILY MEDICINE

## 2022-09-16 PROCEDURE — 93296 REM INTERROG EVL PM/IDS: CPT | Mod: S$GLB,,, | Performed by: INTERNAL MEDICINE

## 2022-09-16 PROCEDURE — 3074F SYST BP LT 130 MM HG: CPT | Mod: CPTII,S$GLB,, | Performed by: FAMILY MEDICINE

## 2022-09-16 PROCEDURE — 1126F PR PAIN SEVERITY QUANTIFIED, NO PAIN PRESENT: ICD-10-PCS | Mod: CPTII,S$GLB,, | Performed by: FAMILY MEDICINE

## 2022-09-16 PROCEDURE — 3288F FALL RISK ASSESSMENT DOCD: CPT | Mod: CPTII,S$GLB,, | Performed by: FAMILY MEDICINE

## 2022-09-16 PROCEDURE — 1159F PR MEDICATION LIST DOCUMENTED IN MEDICAL RECORD: ICD-10-PCS | Mod: CPTII,S$GLB,, | Performed by: FAMILY MEDICINE

## 2022-09-16 PROCEDURE — 3074F PR MOST RECENT SYSTOLIC BLOOD PRESSURE < 130 MM HG: ICD-10-PCS | Mod: CPTII,S$GLB,, | Performed by: FAMILY MEDICINE

## 2022-09-16 PROCEDURE — 99999 PR PBB SHADOW E&M-EST. PATIENT-LVL IV: CPT | Mod: PBBFAC,,, | Performed by: FAMILY MEDICINE

## 2022-09-16 PROCEDURE — 1126F AMNT PAIN NOTED NONE PRSNT: CPT | Mod: CPTII,S$GLB,, | Performed by: FAMILY MEDICINE

## 2022-09-16 PROCEDURE — 90694 VACC AIIV4 NO PRSRV 0.5ML IM: CPT | Mod: S$GLB,,, | Performed by: FAMILY MEDICINE

## 2022-09-16 PROCEDURE — 93296 PR INTERROG EVAL, REMOTE, UP TO 90 DAYS, PACER/DEFIB,TECH REVIEW: ICD-10-PCS | Mod: S$GLB,,, | Performed by: INTERNAL MEDICINE

## 2022-09-16 PROCEDURE — 99999 PR PBB SHADOW E&M-EST. PATIENT-LVL IV: ICD-10-PCS | Mod: PBBFAC,,, | Performed by: FAMILY MEDICINE

## 2022-09-16 PROCEDURE — 1100F PTFALLS ASSESS-DOCD GE2>/YR: CPT | Mod: CPTII,S$GLB,, | Performed by: FAMILY MEDICINE

## 2022-09-16 PROCEDURE — 99214 OFFICE O/P EST MOD 30 MIN: CPT | Mod: S$GLB,,, | Performed by: FAMILY MEDICINE

## 2022-09-16 PROCEDURE — 1160F PR REVIEW ALL MEDS BY PRESCRIBER/CLIN PHARMACIST DOCUMENTED: ICD-10-PCS | Mod: CPTII,S$GLB,, | Performed by: FAMILY MEDICINE

## 2022-09-16 PROCEDURE — 3288F PR FALLS RISK ASSESSMENT DOCUMENTED: ICD-10-PCS | Mod: CPTII,S$GLB,, | Performed by: FAMILY MEDICINE

## 2022-09-16 PROCEDURE — 1160F RVW MEDS BY RX/DR IN RCRD: CPT | Mod: CPTII,S$GLB,, | Performed by: FAMILY MEDICINE

## 2022-09-16 RX ORDER — OXYBUTYNIN CHLORIDE 15 MG/1
5 TABLET, EXTENDED RELEASE ORAL DAILY
COMMUNITY
End: 2022-09-16

## 2022-09-16 RX ORDER — ZOSTER VACCINE RECOMBINANT, ADJUVANTED 50 MCG/0.5
0.5 KIT INTRAMUSCULAR ONCE
Qty: 1 EACH | Refills: 1 | Status: SHIPPED | OUTPATIENT
Start: 2022-09-16 | End: 2022-09-16

## 2022-09-16 NOTE — PROGRESS NOTES
Subjective:       Patient ID: Pauline Cronin is a 82 y.o. female.    Chief Complaint: Follow-up and Immunizations (Wants Flu, shingles and tetanus)    Has been getting short of breath with activity for the past 4+ months.  Denies chest pain or palpitations.  Last echocardiogram by Dr Leone was normal in 2020, and she has a follow-up scheduled with her new cardiologist next week.  No increased lower extremity swelling.  Has been having some nocturia, but daytime symptoms well controlled on Detrol.  No dysuria or gross hematuria.    Review of Systems   Constitutional:  Negative for chills and fever.   Respiratory:  Positive for shortness of breath. Negative for cough and wheezing.    Cardiovascular:  Negative for chest pain, palpitations and leg swelling.   Gastrointestinal:  Negative for abdominal pain, blood in stool, nausea and vomiting.   Genitourinary:  Positive for frequency. Negative for dysuria.   Neurological:  Negative for weakness.   Psychiatric/Behavioral:  Negative for agitation and confusion.      Objective:      Vitals:    09/16/22 0934   BP: 124/82   BP Location: Right arm   Patient Position: Sitting   BP Method: Large (Manual)   Pulse: 91   Resp: 18   SpO2: 96%   Weight: 84.7 kg (186 lb 11.7 oz)   Height: 5' (1.524 m)     Physical Exam  Vitals and nursing note reviewed.   Constitutional:       General: She is not in acute distress.     Appearance: Normal appearance. She is well-developed.   HENT:      Head: Normocephalic and atraumatic.   Cardiovascular:      Rate and Rhythm: Normal rate and regular rhythm.      Heart sounds: Normal heart sounds.   Pulmonary:      Effort: Pulmonary effort is normal.      Breath sounds: Normal breath sounds.   Musculoskeletal:      Right lower leg: No edema.      Left lower leg: No edema.   Skin:     General: Skin is warm and dry.   Neurological:      Mental Status: She is alert and oriented to person, place, and time.   Psychiatric:         Mood and Affect: Mood  normal.         Behavior: Behavior normal.       Lab Results   Component Value Date    WBC 5.74 04/27/2022    HGB 14.3 04/27/2022    HCT 44.1 04/27/2022     04/27/2022    TRIG 158 (H) 10/24/2019    HDL 58 10/24/2019    ALT 19 04/27/2022    AST 24 04/27/2022     04/27/2022    K 3.7 04/27/2022     04/27/2022    CREATININE 0.8 04/27/2022    BUN 10 04/27/2022    CO2 22 (L) 04/27/2022    TSH 2.198 04/27/2022    INR 1.1 05/07/2021    HGBA1C 5.6 12/16/2020      Assessment:       1. Essential hypertension, benign    2. Need for vaccination    3. Paroxysmal atrial fibrillation    4. Hyperlipidemia, unspecified hyperlipidemia type    5. Hypothyroidism, unspecified type    6. Severe obesity (BMI 35.0-39.9) with comorbidity    7. Crohn's disease of large intestine without complication          Plan:       Essential hypertension, benign  -     CBC Auto Differential; Future; Expected date: 03/16/2023  Well controlled  Need for vaccination  -     Influenza (FLUAD) - Quadrivalent (Adjuvanted) *Preferred* (65+) (PF)  -     varicella-zoster gE-AS01B, PF, (SHINGRIX, PF,) 50 mcg/0.5 mL injection; Inject 0.5 mLs into the muscle once. for 1 dose  Dispense: 1 each; Refill: 1  -     tetanus and diphther. tox, PF, (TENIVAC, PF,) 5-2 Lf unit/0.5 mL Syrg; Inject 0.5 mLs into the muscle once. for 1 dose  Dispense: 0.5 mL; Refill: 0    Paroxysmal atrial fibrillation  Stable, follow-up with cardiology next week as scheduled  Hyperlipidemia, unspecified hyperlipidemia type  -     Comprehensive Metabolic Panel; Future; Expected date: 03/16/2023  -     Lipid Panel; Future; Expected date: 03/16/2023    Hypothyroidism, unspecified type  -     TSH; Future; Expected date: 03/16/2023  Stable  Severe obesity (BMI 35.0-39.9) with comorbidity  Low carb diet  Crohn's disease of large intestine without complication  Well controlled on current regimen    Medication List with Changes/Refills   New Medications    TETANUS AND DIPHTHER. TOX,  PF, (TENIVAC, PF,) 5-2 LF UNIT/0.5 ML SYRG    Inject 0.5 mLs into the muscle once. for 1 dose    VARICELLA-ZOSTER GE-AS01B, PF, (SHINGRIX, PF,) 50 MCG/0.5 ML INJECTION    Inject 0.5 mLs into the muscle once. for 1 dose   Current Medications    ALENDRONATE (FOSAMAX) 70 MG TABLET    Take 1 tablet by mouth once a week    AMLODIPINE (NORVASC) 10 MG TABLET    Take 1 tablet (10 mg total) by mouth once daily.    ATORVASTATIN (LIPITOR) 20 MG TABLET    Take 1 tablet (20 mg total) by mouth once daily.    CARVEDILOL (COREG) 6.25 MG TABLET    Take 1 tablet (6.25 mg total) by mouth 2 (two) times daily with meals.    COLESTIPOL (COLESTID) 1 GRAM TAB    Take 1 tablet (1 g total) by mouth 2 (two) times daily.    DIPHENOXYLATE-ATROPINE 2.5-0.025 MG (LOMOTIL) 2.5-0.025 MG PER TABLET    TAKE 1 TABLET BY MOUTH 4 TIMES DAILY AS NEEDED FOR DIARRHEA    ELIQUIS 5 MG TAB    Take 1 tablet (5 mg total) by mouth 2 (two) times daily.    ESOMEPRAZOLE (NEXIUM) 40 MG CAPSULE    Take 1 capsule (40 mg total) by mouth once daily.    FOLIC ACID (FOLVITE) 1 MG TABLET    Take 1 tablet by mouth once daily    LEVOTHYROXINE (SYNTHROID, LEVOTHROID) 175 MCG TABLET    Take 1 tablet (175 mcg total) by mouth once daily.    MAGNESIUM GLUCONATE 27.5 MG MAGNE- SIUM (500 MG) TAB    Take 500 mg by mouth once daily.    SOTALOL (BETAPACE) 120 MG TAB    Take 1 tablet (120 mg total) by mouth every 12 (twelve) hours.    SULFASALAZINE (AZULFIDINE) 500 MG TAB    Take 1 tablet by mouth twice daily    TOLTERODINE (DETROL LA) 4 MG 24 HR CAPSULE    Take 1 capsule (4 mg total) by mouth once daily.   Discontinued Medications    OXYBUTYNIN (DITROPAN XL) 15 MG TR24    Take 5 mg by mouth once daily.

## 2022-09-16 NOTE — PROGRESS NOTES
Verified pt by name and . Allergies verified by pt. Per physician orders pt was administered Influenza Vaccine Adjuvanted IM to right deltoid using aseptic technique. Pt tolerated well. No adverse effects or pain reported. MD notified.

## 2022-09-20 NOTE — PROGRESS NOTES
Subjective:      Patient ID: Pauline Cronin is a 82 y.o. female.    Chief Complaint: No chief complaint on file.    HPI:    ROS Medtronic pacemaker remote interrogation report downloaded and prepared by medical assistant and interpreted by me and full report scanned into Epic media.  Battery OK withERI at least 8.7 years. Leads OK.  No A fib.   No VT.  0.4% RV pacing.  Normal device function.    Past Medical History:   Diagnosis Date    Anticoagulant long-term use     Atrial fibrillation     Crohn disease diagnosed in her 20's    GERD (gastroesophageal reflux disease)     Hyperlipidemia     Hypertension     Thyroid disease     s/p I 131        Past Surgical History:   Procedure Laterality Date    APPENDECTOMY      CARDIAC SURGERY  2014    pacemaker    COLONOSCOPY  ~2008    normal findings per patient report    ESOPHAGOGASTRODUODENOSCOPY N/A 7/17/2018    Procedure: EGD (ESOPHAGOGASTRODUODENOSCOPY);  Surgeon: Alondra Casiano MD;  Location: [facility];  Service: Endoscopy;  Laterality: N/A;    HYSTERECTOMY      INSERTION OF IMPLANTABLE CARDIOVERTER-DEFIBRILLATOR (ICD) GENERATOR WITH TWO EXISTING LEADS Left 5/10/2021    Procedure: INSERTION, PULSE GENERATOR WITH 2 EXISTING LEADS, ICD;  Surgeon: Bo Leone MD;  Location: [facility] CATH LAB;  Service: Cardiology;  Laterality: Left;    INSERTION OF PACEMAKER      REPLACEMENT OF PACEMAKER GENERATOR  05/10/2021    RETROGRADE PYELOGRAPHY Right 2/18/2020    Procedure: PYELOGRAM, RETROGRADE;  Surgeon: Jovan Kruse MD;  Location: [facility] OR;  Service: Urology;  Laterality: Right;    SMALL INTESTINE SURGERY  in her 20's    for crohn's    TONSILLECTOMY      UPPER GASTROINTESTINAL ENDOSCOPY  ~2008    normal findings per patient report       Family History   Problem Relation Age of Onset    Cancer Mother     Breast cancer Mother     Crohn's disease Other     Colon cancer Neg Hx     Colon polyps Neg Hx     Esophageal cancer Neg Hx     Stomach cancer Neg Hx     Ulcerative  colitis Neg Hx        Social History     Socioeconomic History    Marital status:    Tobacco Use    Smoking status: Never    Smokeless tobacco: Never   Substance and Sexual Activity    Alcohol use: No    Drug use: No    Sexual activity: Never       Current Outpatient Medications on File Prior to Visit   Medication Sig Dispense Refill    alendronate (FOSAMAX) 70 MG tablet Take 1 tablet by mouth once a week 12 tablet 3    amLODIPine (NORVASC) 10 MG tablet Take 1 tablet (10 mg total) by mouth once daily. 90 tablet 3    atorvastatin (LIPITOR) 20 MG tablet Take 1 tablet (20 mg total) by mouth once daily. 90 tablet 3    carvediloL (COREG) 6.25 MG tablet Take 1 tablet (6.25 mg total) by mouth 2 (two) times daily with meals. 180 tablet 3    colestipoL (COLESTID) 1 gram Tab Take 1 tablet (1 g total) by mouth 2 (two) times daily. 180 tablet 3    diphenoxylate-atropine 2.5-0.025 mg (LOMOTIL) 2.5-0.025 mg per tablet TAKE 1 TABLET BY MOUTH 4 TIMES DAILY AS NEEDED FOR DIARRHEA 60 tablet 3    ELIQUIS 5 mg Tab Take 1 tablet (5 mg total) by mouth 2 (two) times daily. 60 tablet 11    esomeprazole (NEXIUM) 40 MG capsule Take 1 capsule (40 mg total) by mouth once daily. 90 capsule 3    folic acid (FOLVITE) 1 MG tablet Take 1 tablet by mouth once daily 90 tablet 1    levothyroxine (SYNTHROID, LEVOTHROID) 175 MCG tablet Take 1 tablet (175 mcg total) by mouth once daily. 30 tablet 5    magnesium gluconate 27.5 mg magne- sium (500 mg) Tab Take 500 mg by mouth once daily. 90 tablet 3    sotaloL (BETAPACE) 120 MG Tab Take 1 tablet (120 mg total) by mouth every 12 (twelve) hours. 180 tablet 3    sulfaSALAzine (AZULFIDINE) 500 mg Tab Take 1 tablet by mouth twice daily 180 tablet 1    tolterodine (DETROL LA) 4 MG 24 hr capsule Take 1 capsule (4 mg total) by mouth once daily. 90 capsule 3     No current facility-administered medications on file prior to visit.       Review of patient's allergies indicates:   Allergen Reactions     Lisinopril Other (See Comments)     cough     Objective:   There were no vitals filed for this visit.     Physical Exam     Assessment:     1. Pacemaker      Plan:   Diagnoses and all orders for this visit:    Pacemaker       No follow-ups on file.

## 2022-09-27 ENCOUNTER — PATIENT MESSAGE (OUTPATIENT)
Dept: PRIMARY CARE CLINIC | Facility: CLINIC | Age: 82
End: 2022-09-27
Payer: MEDICARE

## 2022-11-07 DIAGNOSIS — M81.0 OSTEOPOROSIS, UNSPECIFIED OSTEOPOROSIS TYPE, UNSPECIFIED PATHOLOGICAL FRACTURE PRESENCE: ICD-10-CM

## 2022-11-07 RX ORDER — ALENDRONATE SODIUM 70 MG/1
70 TABLET ORAL
Qty: 12 TABLET | Refills: 3 | Status: SHIPPED | OUTPATIENT
Start: 2022-11-07 | End: 2023-08-30

## 2022-11-07 RX ORDER — SULFASALAZINE 500 MG/1
500 TABLET ORAL 2 TIMES DAILY
Qty: 180 TABLET | Refills: 1 | OUTPATIENT
Start: 2022-11-07

## 2022-12-16 ENCOUNTER — CLINICAL SUPPORT (OUTPATIENT)
Dept: CARDIOLOGY | Facility: CLINIC | Age: 82
End: 2022-12-16
Payer: MEDICARE

## 2022-12-16 DIAGNOSIS — Z95.0 PACEMAKER: Primary | ICD-10-CM

## 2022-12-16 PROCEDURE — 93294 PR INTERROG EVAL, REMOTE, UP TO 90 DAYS,PACEMAKER: ICD-10-PCS | Mod: HCNC,S$GLB,, | Performed by: INTERNAL MEDICINE

## 2022-12-16 PROCEDURE — 93296 REM INTERROG EVL PM/IDS: CPT | Mod: HCNC,S$GLB,, | Performed by: INTERNAL MEDICINE

## 2022-12-16 PROCEDURE — 93296 PR INTERROG EVAL, REMOTE, UP TO 90 DAYS, PACER/DEFIB,TECH REVIEW: ICD-10-PCS | Mod: HCNC,S$GLB,, | Performed by: INTERNAL MEDICINE

## 2022-12-16 PROCEDURE — 93294 REM INTERROG EVL PM/LDLS PM: CPT | Mod: HCNC,S$GLB,, | Performed by: INTERNAL MEDICINE

## 2022-12-22 NOTE — PROGRESS NOTES
Subjective:      Patient ID: Pauline Cronin is a 82 y.o. female.    Chief Complaint: No chief complaint on file.    HPI:    ROS Medtronic pacemaker remote interrogation report downloaded and prepared by medical assistant and interpreted by me and full report scanned into Epic media.  Battery OK with AGATA 9.9 years.  Leads OK.  Nonsustained VT noted. ATP terminated a fib noted.  Pt is on sotalol and carvedilol and Eliquis.  Normal device function.    Past Medical History:   Diagnosis Date    Anticoagulant long-term use     Atrial fibrillation     Crohn disease diagnosed in her 20's    GERD (gastroesophageal reflux disease)     Hyperlipidemia     Hypertension     Thyroid disease     s/p I 131        Past Surgical History:   Procedure Laterality Date    APPENDECTOMY      CARDIAC SURGERY  2014    pacemaker    COLONOSCOPY  ~2008    normal findings per patient report    ESOPHAGOGASTRODUODENOSCOPY N/A 7/17/2018    Procedure: EGD (ESOPHAGOGASTRODUODENOSCOPY);  Surgeon: Alondra Casiano MD;  Location: [facility];  Service: Endoscopy;  Laterality: N/A;    HYSTERECTOMY      INSERTION OF IMPLANTABLE CARDIOVERTER-DEFIBRILLATOR (ICD) GENERATOR WITH TWO EXISTING LEADS Left 5/10/2021    Procedure: INSERTION, PULSE GENERATOR WITH 2 EXISTING LEADS, ICD;  Surgeon: Bo Leone MD;  Location: [facility] CATH LAB;  Service: Cardiology;  Laterality: Left;    INSERTION OF PACEMAKER      REPLACEMENT OF PACEMAKER GENERATOR  05/10/2021    RETROGRADE PYELOGRAPHY Right 2/18/2020    Procedure: PYELOGRAM, RETROGRADE;  Surgeon: Jovan Kruse MD;  Location: [facility] OR;  Service: Urology;  Laterality: Right;    SMALL INTESTINE SURGERY  in her 20's    for crohn's    TONSILLECTOMY      UPPER GASTROINTESTINAL ENDOSCOPY  ~2008    normal findings per patient report       Family History   Problem Relation Age of Onset    Cancer Mother     Breast cancer Mother     Crohn's disease Other     Colon cancer Neg Hx     Colon polyps Neg Hx     Esophageal  cancer Neg Hx     Stomach cancer Neg Hx     Ulcerative colitis Neg Hx        Social History     Socioeconomic History    Marital status:    Tobacco Use    Smoking status: Never    Smokeless tobacco: Never   Substance and Sexual Activity    Alcohol use: No    Drug use: No    Sexual activity: Never       Current Outpatient Medications on File Prior to Visit   Medication Sig Dispense Refill    sulfaSALAzine (AZULFIDINE) 500 mg Tab Take 1 tablet by mouth twice daily 180 tablet 3    alendronate (FOSAMAX) 70 MG tablet Take 1 tablet (70 mg total) by mouth every 7 days. 12 tablet 3    amLODIPine (NORVASC) 10 MG tablet Take 1 tablet (10 mg total) by mouth once daily. 90 tablet 3    atorvastatin (LIPITOR) 20 MG tablet Take 1 tablet (20 mg total) by mouth once daily. 90 tablet 3    carvediloL (COREG) 6.25 MG tablet Take 1 tablet (6.25 mg total) by mouth 2 (two) times daily with meals. 180 tablet 3    colestipoL (COLESTID) 1 gram Tab Take 1 tablet (1 g total) by mouth 2 (two) times daily. 180 tablet 3    diphenoxylate-atropine 2.5-0.025 mg (LOMOTIL) 2.5-0.025 mg per tablet TAKE 1 TABLET BY MOUTH 4 TIMES DAILY AS NEEDED FOR DIARRHEA 60 tablet 3    ELIQUIS 5 mg Tab Take 1 tablet (5 mg total) by mouth 2 (two) times daily. 180 tablet 0    esomeprazole (NEXIUM) 40 MG capsule Take 1 capsule (40 mg total) by mouth once daily. 90 capsule 3    folic acid (FOLVITE) 1 MG tablet Take 1 tablet by mouth once daily 90 tablet 1    levothyroxine (SYNTHROID, LEVOTHROID) 175 MCG tablet Take 1 tablet by mouth once daily 30 tablet 5    magnesium gluconate 27.5 mg magne- sium (500 mg) Tab Take 500 mg by mouth once daily. 90 tablet 3    ondansetron (ZOFRAN) 4 MG tablet Take 1 tablet (4 mg total) by mouth every 8 (eight) hours as needed for Nausea. 15 tablet 0    sotaloL (BETAPACE) 120 MG Tab Take 1 tablet (120 mg total) by mouth every 12 (twelve) hours. 180 tablet 3    tolterodine (DETROL LA) 4 MG 24 hr capsule Take 1 capsule (4 mg total) by  mouth once daily. 90 capsule 3     No current facility-administered medications on file prior to visit.       Review of patient's allergies indicates:   Allergen Reactions    Lisinopril Other (See Comments)     cough     Objective:   There were no vitals filed for this visit.     Physical Exam     Assessment:     1. Pacemaker      Plan:   Diagnoses and all orders for this visit:    Pacemaker         No follow-ups on file.

## 2023-02-07 DIAGNOSIS — Z00.00 ENCOUNTER FOR MEDICARE ANNUAL WELLNESS EXAM: ICD-10-CM

## 2023-02-09 DIAGNOSIS — Z00.00 ENCOUNTER FOR MEDICARE ANNUAL WELLNESS EXAM: ICD-10-CM

## 2023-03-01 ENCOUNTER — TELEPHONE (OUTPATIENT)
Dept: PRIMARY CARE CLINIC | Facility: CLINIC | Age: 83
End: 2023-03-01
Payer: MEDICARE

## 2023-03-01 ENCOUNTER — OFFICE VISIT (OUTPATIENT)
Dept: PRIMARY CARE CLINIC | Facility: CLINIC | Age: 83
End: 2023-03-01
Payer: MEDICARE

## 2023-03-01 VITALS
DIASTOLIC BLOOD PRESSURE: 60 MMHG | BODY MASS INDEX: 35.31 KG/M2 | WEIGHT: 179.88 LBS | TEMPERATURE: 97 F | SYSTOLIC BLOOD PRESSURE: 124 MMHG | RESPIRATION RATE: 16 BRPM | HEIGHT: 60 IN | HEART RATE: 98 BPM | OXYGEN SATURATION: 98 %

## 2023-03-01 DIAGNOSIS — R79.9 ABNORMAL FINDING OF BLOOD CHEMISTRY, UNSPECIFIED: ICD-10-CM

## 2023-03-01 DIAGNOSIS — D53.9 NUTRITIONAL ANEMIA, UNSPECIFIED: ICD-10-CM

## 2023-03-01 DIAGNOSIS — I44.2 ATRIOVENTRICULAR BLOCK, COMPLETE: ICD-10-CM

## 2023-03-01 DIAGNOSIS — I48.0 PAROXYSMAL ATRIAL FIBRILLATION: ICD-10-CM

## 2023-03-01 DIAGNOSIS — Z00.00 ROUTINE PHYSICAL EXAMINATION: Primary | ICD-10-CM

## 2023-03-01 DIAGNOSIS — Z79.01 CHRONIC ANTICOAGULATION: ICD-10-CM

## 2023-03-01 DIAGNOSIS — R53.83 FATIGUE, UNSPECIFIED TYPE: ICD-10-CM

## 2023-03-01 DIAGNOSIS — G25.81 RLS (RESTLESS LEGS SYNDROME): ICD-10-CM

## 2023-03-01 DIAGNOSIS — Z95.0 PACEMAKER: ICD-10-CM

## 2023-03-01 DIAGNOSIS — I10 ESSENTIAL HYPERTENSION, BENIGN: ICD-10-CM

## 2023-03-01 DIAGNOSIS — R53.83 FATIGUE, UNSPECIFIED TYPE: Primary | ICD-10-CM

## 2023-03-01 DIAGNOSIS — I44.7 LBBB (LEFT BUNDLE BRANCH BLOCK): ICD-10-CM

## 2023-03-01 DIAGNOSIS — D53.8 OTHER SPECIFIED NUTRITIONAL ANEMIAS: ICD-10-CM

## 2023-03-01 PROCEDURE — 93010 ELECTROCARDIOGRAM REPORT: CPT | Mod: HCNC,S$GLB,, | Performed by: INTERNAL MEDICINE

## 2023-03-01 PROCEDURE — 93005 ELECTROCARDIOGRAM TRACING: CPT | Mod: HCNC,S$GLB,, | Performed by: NURSE PRACTITIONER

## 2023-03-01 PROCEDURE — 1101F PT FALLS ASSESS-DOCD LE1/YR: CPT | Mod: HCNC,CPTII,S$GLB, | Performed by: NURSE PRACTITIONER

## 2023-03-01 PROCEDURE — 1126F AMNT PAIN NOTED NONE PRSNT: CPT | Mod: HCNC,CPTII,S$GLB, | Performed by: NURSE PRACTITIONER

## 2023-03-01 PROCEDURE — 99214 PR OFFICE/OUTPT VISIT, EST, LEVL IV, 30-39 MIN: ICD-10-PCS | Mod: HCNC,S$GLB,, | Performed by: NURSE PRACTITIONER

## 2023-03-01 PROCEDURE — 99999 PR PBB SHADOW E&M-EST. PATIENT-LVL IV: CPT | Mod: PBBFAC,HCNC,, | Performed by: NURSE PRACTITIONER

## 2023-03-01 PROCEDURE — 3288F FALL RISK ASSESSMENT DOCD: CPT | Mod: HCNC,CPTII,S$GLB, | Performed by: NURSE PRACTITIONER

## 2023-03-01 PROCEDURE — 1159F PR MEDICATION LIST DOCUMENTED IN MEDICAL RECORD: ICD-10-PCS | Mod: HCNC,CPTII,S$GLB, | Performed by: NURSE PRACTITIONER

## 2023-03-01 PROCEDURE — 93005 EKG 12-LEAD: ICD-10-PCS | Mod: HCNC,S$GLB,, | Performed by: NURSE PRACTITIONER

## 2023-03-01 PROCEDURE — 99214 OFFICE O/P EST MOD 30 MIN: CPT | Mod: HCNC,S$GLB,, | Performed by: NURSE PRACTITIONER

## 2023-03-01 PROCEDURE — 1126F PR PAIN SEVERITY QUANTIFIED, NO PAIN PRESENT: ICD-10-PCS | Mod: HCNC,CPTII,S$GLB, | Performed by: NURSE PRACTITIONER

## 2023-03-01 PROCEDURE — 3078F PR MOST RECENT DIASTOLIC BLOOD PRESSURE < 80 MM HG: ICD-10-PCS | Mod: HCNC,CPTII,S$GLB, | Performed by: NURSE PRACTITIONER

## 2023-03-01 PROCEDURE — 3288F PR FALLS RISK ASSESSMENT DOCUMENTED: ICD-10-PCS | Mod: HCNC,CPTII,S$GLB, | Performed by: NURSE PRACTITIONER

## 2023-03-01 PROCEDURE — 1101F PR PT FALLS ASSESS DOC 0-1 FALLS W/OUT INJ PAST YR: ICD-10-PCS | Mod: HCNC,CPTII,S$GLB, | Performed by: NURSE PRACTITIONER

## 2023-03-01 PROCEDURE — 99999 PR PBB SHADOW E&M-EST. PATIENT-LVL IV: ICD-10-PCS | Mod: PBBFAC,HCNC,, | Performed by: NURSE PRACTITIONER

## 2023-03-01 PROCEDURE — 3074F PR MOST RECENT SYSTOLIC BLOOD PRESSURE < 130 MM HG: ICD-10-PCS | Mod: HCNC,CPTII,S$GLB, | Performed by: NURSE PRACTITIONER

## 2023-03-01 PROCEDURE — 93010 EKG 12-LEAD: ICD-10-PCS | Mod: HCNC,S$GLB,, | Performed by: INTERNAL MEDICINE

## 2023-03-01 PROCEDURE — 1159F MED LIST DOCD IN RCRD: CPT | Mod: HCNC,CPTII,S$GLB, | Performed by: NURSE PRACTITIONER

## 2023-03-01 PROCEDURE — 3078F DIAST BP <80 MM HG: CPT | Mod: HCNC,CPTII,S$GLB, | Performed by: NURSE PRACTITIONER

## 2023-03-01 PROCEDURE — 3074F SYST BP LT 130 MM HG: CPT | Mod: HCNC,CPTII,S$GLB, | Performed by: NURSE PRACTITIONER

## 2023-03-01 NOTE — TELEPHONE ENCOUNTER
MsQuinn Mainor called [facility] in [place] at [phone], due to they need to be called for any stat orders. They will call patient and get her in for the CT

## 2023-03-01 NOTE — PROGRESS NOTES
Patient, Pauline Cronin (MRN #[id number]), presented with a recorded BMI of 35.13 kg/m^2 and a documented comorbidity(s):  - Hypertension  - Atrial Fibrillation  to which the severe obesity is a contributing factor. This is consistent with the definition of severe obesity (BMI 35.0-39.9) with comorbidity (ICD-10 E66.01, Z68.35). The patient's severe obesity was monitored, evaluated, addressed and/or treated. This addendum to the medical record is made on 03/01/2023.

## 2023-03-01 NOTE — PROGRESS NOTES
Chief Complaint  Chief Complaint   Patient presents with    Leg Pain    Weight Loss    Fatigue       HPI    Patient with decreased energy over the past few months. Has been worsening. Shortness of breath which has been worsening over the past few weeks. No cough. No wheezing. No asthma or COPD. Non-smoker. H/o atrial fibrillation which was treated but occasionally returns. Continues on eliquis for anticoagulation. No chest pain. No lower extremity swelling. Recent lab work in November with no anemia but noted AILEEN in lab work. Denies any urinary discomfort though her urine is very concentrated. Hydrates poorly 1 bottle of water a day. Denies any headaches or dizziness. Just generalized weakness. Decreased appetite with noted weight loss. Complaining of lower extremity discomfort only at night. Feels like she constantly needs to move her legs. As a result she is not sleeping well over the past three weeks since this started. Was previously prescribed magnesium supplementation by Dr. Barbosa in June which she stopped approximately 1 month ago.     PAST MEDICAL HISTORY:  Past Medical History:   Diagnosis Date    Anticoagulant long-term use     Atrial fibrillation     Crohn disease diagnosed in her 20's    GERD (gastroesophageal reflux disease)     Hyperlipidemia     Hypertension     Thyroid disease     s/p I 131       PAST SURGICAL HISTORY:  Past Surgical History:   Procedure Laterality Date    APPENDECTOMY      CARDIAC SURGERY  2014    pacemaker    COLONOSCOPY  ~2008    normal findings per patient report    ESOPHAGOGASTRODUODENOSCOPY N/A 7/17/2018    Procedure: EGD (ESOPHAGOGASTRODUODENOSCOPY);  Surgeon: Alondra Casiano MD;  Location: [facility];  Service: Endoscopy;  Laterality: N/A;    HYSTERECTOMY      INSERTION OF IMPLANTABLE CARDIOVERTER-DEFIBRILLATOR (ICD) GENERATOR WITH TWO EXISTING LEADS Left 5/10/2021    Procedure: INSERTION, PULSE GENERATOR WITH 2 EXISTING LEADS, ICD;  Surgeon: Bo Leone MD;   Location: [facility] CATH LAB;  Service: Cardiology;  Laterality: Left;    INSERTION OF PACEMAKER      REPLACEMENT OF PACEMAKER GENERATOR  05/10/2021    RETROGRADE PYELOGRAPHY Right 2/18/2020    Procedure: PYELOGRAM, RETROGRADE;  Surgeon: Jovan Kruse MD;  Location: [facility] OR;  Service: Urology;  Laterality: Right;    SMALL INTESTINE SURGERY  in her 20's    for crohn's    TONSILLECTOMY      UPPER GASTROINTESTINAL ENDOSCOPY  ~2008    normal findings per patient report       SOCIAL HISTORY:  Social History     Socioeconomic History    Marital status:    Tobacco Use    Smoking status: Never    Smokeless tobacco: Never   Substance and Sexual Activity    Alcohol use: No    Drug use: No    Sexual activity: Never       FAMILY HISTORY:  Family History   Problem Relation Age of Onset    Cancer Mother     Breast cancer Mother     Crohn's disease Other     Colon cancer Neg Hx     Colon polyps Neg Hx     Esophageal cancer Neg Hx     Stomach cancer Neg Hx     Ulcerative colitis Neg Hx        ALLERGIES AND MEDICATIONS: updated and reviewed.  Review of patient's allergies indicates:   Allergen Reactions    Lisinopril Other (See Comments)     cough     Current Outpatient Medications   Medication Sig Dispense Refill    alendronate (FOSAMAX) 70 MG tablet Take 1 tablet (70 mg total) by mouth every 7 days. 12 tablet 3    amLODIPine (NORVASC) 10 MG tablet Take 1 tablet (10 mg total) by mouth once daily. 90 tablet 3    atorvastatin (LIPITOR) 20 MG tablet Take 1 tablet (20 mg total) by mouth once daily. 90 tablet 3    carvediloL (COREG) 6.25 MG tablet Take 1 tablet (6.25 mg total) by mouth 2 (two) times daily with meals. 180 tablet 3    colestipoL (COLESTID) 1 gram Tab Take 1 tablet (1 g total) by mouth 2 (two) times daily. 180 tablet 3    diphenoxylate-atropine 2.5-0.025 mg (LOMOTIL) 2.5-0.025 mg per tablet TAKE 1 TABLET BY MOUTH 4 TIMES DAILY AS NEEDED FOR DIARRHEA 60 tablet 3    ELIQUIS 5 mg Tab Take 1 tablet (5 mg total) by mouth  2 (two) times daily. 180 tablet 0    esomeprazole (NEXIUM) 40 MG capsule Take 1 capsule (40 mg total) by mouth once daily. 90 capsule 3    folic acid (FOLVITE) 1 MG tablet Take 1 tablet by mouth once daily 90 tablet 1    levothyroxine (SYNTHROID, LEVOTHROID) 175 MCG tablet Take 1 tablet by mouth once daily 30 tablet 5    magnesium gluconate 27.5 mg magne- sium (500 mg) Tab Take 500 mg by mouth once daily. 90 tablet 3    sotaloL (BETAPACE) 120 MG Tab Take 1 tablet (120 mg total) by mouth every 12 (twelve) hours. 180 tablet 3    sulfaSALAzine (AZULFIDINE) 500 mg Tab Take 1 tablet by mouth twice daily 180 tablet 3    tolterodine (DETROL LA) 4 MG 24 hr capsule Take 1 capsule (4 mg total) by mouth once daily. 90 capsule 3     No current facility-administered medications for this visit.         ROS  Review of Systems   Constitutional:  Negative for chills and fever.   HENT:  Negative for ear pain, postnasal drip and sinus pain.    Respiratory:  Negative for cough and shortness of breath.    Cardiovascular:  Negative for chest pain.   Gastrointestinal:  Negative for diarrhea, nausea and vomiting.         PHYSICAL EXAM  Vitals:    03/01/23 0859   BP: 124/60   BP Location: Left arm   Patient Position: Sitting   BP Method: Medium (Manual)   Pulse: 98   Resp: 16   Temp: 97.3 °F (36.3 °C)   TempSrc: Temporal   SpO2: 98%   Weight: 81.6 kg (179 lb 14.3 oz)   Height: 5' (1.524 m)    Body mass index is 35.13 kg/m².  Weight: 81.6 kg (179 lb 14.3 oz)   Height: 5' (152.4 cm)     Physical Exam  Constitutional:       Appearance: She is well-developed.   HENT:      Head: Normocephalic.      Mouth/Throat:      Pharynx: Uvula midline.   Eyes:      Conjunctiva/sclera: Conjunctivae normal.   Cardiovascular:      Rate and Rhythm: Normal rate and regular rhythm.      Pulses: Normal pulses.           Radial pulses are 2+ on the right side and 2+ on the left side.      Heart sounds: Normal heart sounds. No murmur heard.  Pulmonary:      Effort:  Pulmonary effort is normal.      Breath sounds: Normal breath sounds. No wheezing.   Lymphadenopathy:      Cervical: No cervical adenopathy.   Skin:     General: Skin is warm and dry.      Findings: No rash.   Neurological:      Mental Status: She is alert and oriented to person, place, and time.         Health Maintenance         Date Due Completion Date    COVID-19 Vaccine (4 - Booster for Pfizer series) 02/03/2022 12/9/2021    Shingles Vaccine (3 of 3) 12/10/2022 10/15/2022    Lipid Panel 10/24/2024 10/24/2019    DEXA Scan 03/23/2025 3/23/2022    TETANUS VACCINE 10/15/2032 10/15/2022          Assessment & Plan    Problem List Items Addressed This Visit          Unprioritized    Atrioventricular block, complete    Relevant Orders    IN OFFICE EKG 12-LEAD (to Muse)  EKG stable.       Chronic anticoagulation  CBC today r/o anemia.       Essential hypertension, benign  Blood pressure looks good today.       LBBB (left bundle branch block)  Stable.       Pacemaker    Paroxysmal atrial fibrillation    Overview     Followed by Dr. Leone         Relevant Orders    IN OFFICE EKG 12-LEAD (to Walsh)  Rate controlled. Continue eliquis.      Other Visit Diagnoses       Routine physical examination    -  Primary    Relevant Orders    CBC Auto Differential (Completed)    Comprehensive Metabolic Panel (Completed)    Lipid Panel (Completed)    TSH    Fatigue, unspecified type        Relevant Orders    IN OFFICE EKG 12-LEAD (to Muse)    X-Ray Chest PA And Lateral    POCT URINE DIPSTICK WITHOUT MICROSCOPE    Iron and TIBC  CXR r/o PNA.   CBC in light of chronic anticoag.   Mg deficiency in past.   Will bring urine cup home and return to clinic.       RLS (restless legs syndrome)        Relevant Orders    Magnesium (Completed)    Abnormal finding of blood chemistry, unspecified        Relevant Orders    CBC Auto Differential (Completed)    Lipid Panel (Completed)    Nutritional anemia, unspecified        Relevant Orders    TSH     Other specified nutritional anemias        Relevant Orders    Iron and TIBC            Follow-up: Follow up in about 4 weeks (around 3/29/2023).    Machelle Arnold    Medication List with Changes/Refills   Current Medications    ALENDRONATE (FOSAMAX) 70 MG TABLET    Take 1 tablet (70 mg total) by mouth every 7 days.    AMLODIPINE (NORVASC) 10 MG TABLET    Take 1 tablet (10 mg total) by mouth once daily.    ATORVASTATIN (LIPITOR) 20 MG TABLET    Take 1 tablet (20 mg total) by mouth once daily.    CARVEDILOL (COREG) 6.25 MG TABLET    Take 1 tablet (6.25 mg total) by mouth 2 (two) times daily with meals.    COLESTIPOL (COLESTID) 1 GRAM TAB    Take 1 tablet (1 g total) by mouth 2 (two) times daily.    DIPHENOXYLATE-ATROPINE 2.5-0.025 MG (LOMOTIL) 2.5-0.025 MG PER TABLET    TAKE 1 TABLET BY MOUTH 4 TIMES DAILY AS NEEDED FOR DIARRHEA    ELIQUIS 5 MG TAB    Take 1 tablet (5 mg total) by mouth 2 (two) times daily.    ESOMEPRAZOLE (NEXIUM) 40 MG CAPSULE    Take 1 capsule (40 mg total) by mouth once daily.    FOLIC ACID (FOLVITE) 1 MG TABLET    Take 1 tablet by mouth once daily    LEVOTHYROXINE (SYNTHROID, LEVOTHROID) 175 MCG TABLET    Take 1 tablet by mouth once daily    MAGNESIUM GLUCONATE 27.5 MG MAGNE- SIUM (500 MG) TAB    Take 500 mg by mouth once daily.    SOTALOL (BETAPACE) 120 MG TAB    Take 1 tablet (120 mg total) by mouth every 12 (twelve) hours.    SULFASALAZINE (AZULFIDINE) 500 MG TAB    Take 1 tablet by mouth twice daily    TOLTERODINE (DETROL LA) 4 MG 24 HR CAPSULE    Take 1 capsule (4 mg total) by mouth once daily.   Discontinued Medications    ONDANSETRON (ZOFRAN) 4 MG TABLET    Take 1 tablet (4 mg total) by mouth every 8 (eight) hours as needed for Nausea.

## 2023-03-01 NOTE — TELEPHONE ENCOUNTER
----- Message from Machelle Arnold NP sent at 3/1/2023  3:11 PM CST -----  THIS PATIENT NEEDS TO BE CALLED.    Elevated liver enzymes and decreased blood count on lab work. Dr. Waldrop would like her to complete a CT abdomen pelvis with contrast STAT for further evaluation. It has been ordered. Please schedule.     Machelle

## 2023-03-02 ENCOUNTER — TELEPHONE (OUTPATIENT)
Dept: SURGERY | Facility: CLINIC | Age: 83
End: 2023-03-02
Payer: MEDICARE

## 2023-03-02 DIAGNOSIS — K86.89 MASS OF PANCREAS: Primary | ICD-10-CM

## 2023-03-02 NOTE — TELEPHONE ENCOUNTER
----- Message from Keeley Wilson RN sent at 3/2/2023 10:00 AM CST -----  Contact: [phone]    ----- Message -----  From: Dolores August MA  Sent: 3/2/2023   9:55 AM CST  To: Edda Bonds Staff    Patient's daughter in law Karen is calling to schedule appt for pt  Pt access tried but no appts are available  Karen would like to be reached at  [phone] and  call the pt as well [phone]

## 2023-03-03 ENCOUNTER — TELEPHONE (OUTPATIENT)
Dept: ENDOSCOPY | Facility: HOSPITAL | Age: 83
End: 2023-03-03
Payer: MEDICARE

## 2023-03-03 ENCOUNTER — TELEPHONE (OUTPATIENT)
Dept: ENDOSCOPY | Facility: HOSPITAL | Age: 83
End: 2023-03-03

## 2023-03-03 ENCOUNTER — PATIENT MESSAGE (OUTPATIENT)
Dept: ENDOSCOPY | Facility: HOSPITAL | Age: 83
End: 2023-03-03
Payer: MEDICARE

## 2023-03-03 DIAGNOSIS — R90.89 ABNORMAL IMAGING OF CENTRAL NERVOUS SYSTEM: Primary | ICD-10-CM

## 2023-03-03 DIAGNOSIS — R93.89 ABNORMAL FINDING ON IMAGING: ICD-10-CM

## 2023-03-03 NOTE — TELEPHONE ENCOUNTER
Telephoned pt to schedule EUS.  Spoke with pt's daughter-in-law, Karen, and procedure scheduled for 3/14/23 at 1:00pm at [facility].  Earlier date offered, however this was the date date for pt and family.  Reviewed history and medications.  Approval to hold Eliquis requested from Dr. Barbosa-pending approval (see telephone encounter 3/3/23).  Instructed on procedure and preparation.  Karen verbalized understanding.  Instructions sent via patient portal.  Instructed Karen on how to locate prep instructions within the portal.  She verbalized understanding.

## 2023-03-03 NOTE — TELEPHONE ENCOUNTER
----- Message from Andrei Swartz MD sent at 3/3/2023 12:12 PM CST -----  Need EUS (linear) for pancreas mass. Urgent. Tylor Bai. 45 minutes.  Andrei Swartz MD  ----- Message -----  From: Crystal Vann MA  Sent: 3/2/2023   7:17 PM CST  To: Andrei Swartz MD      ----- Message -----  From: Peggy Mejia RN  Sent: 3/2/2023   1:09 PM CST  To: Crystal Vann MA    New pancreas mass.    Needs EUS/bx    Pt has pacemaker and is on Eliquis.    I'm calling her daughter to let her know the plan.  She's in [place].    Mariza

## 2023-03-03 NOTE — TELEPHONE ENCOUNTER
Good afternoon,    Pt is scheduled for an EUS with Dr. Swartz on 3/14/23.  Can she hold her Eliquis for 2 days prior to procedure per endoscopy protocol/  Please advise.    Thank you

## 2023-03-06 ENCOUNTER — OFFICE VISIT (OUTPATIENT)
Dept: SURGERY | Facility: CLINIC | Age: 83
End: 2023-03-06
Payer: MEDICARE

## 2023-03-06 VITALS
BODY MASS INDEX: 34.57 KG/M2 | SYSTOLIC BLOOD PRESSURE: 125 MMHG | WEIGHT: 177 LBS | DIASTOLIC BLOOD PRESSURE: 60 MMHG | HEART RATE: 87 BPM | OXYGEN SATURATION: 96 %

## 2023-03-06 DIAGNOSIS — K86.89 MASS OF PANCREAS: ICD-10-CM

## 2023-03-06 PROCEDURE — 1126F AMNT PAIN NOTED NONE PRSNT: CPT | Mod: HCNC,CPTII,S$GLB,

## 2023-03-06 PROCEDURE — 1159F PR MEDICATION LIST DOCUMENTED IN MEDICAL RECORD: ICD-10-PCS | Mod: HCNC,CPTII,S$GLB,

## 2023-03-06 PROCEDURE — 99205 PR OFFICE/OUTPT VISIT, NEW, LEVL V, 60-74 MIN: ICD-10-PCS | Mod: HCNC,S$GLB,,

## 2023-03-06 PROCEDURE — 3074F SYST BP LT 130 MM HG: CPT | Mod: HCNC,CPTII,S$GLB,

## 2023-03-06 PROCEDURE — 3078F PR MOST RECENT DIASTOLIC BLOOD PRESSURE < 80 MM HG: ICD-10-PCS | Mod: HCNC,CPTII,S$GLB,

## 2023-03-06 PROCEDURE — 99999 PR PBB SHADOW E&M-EST. PATIENT-LVL III: CPT | Mod: PBBFAC,HCNC,,

## 2023-03-06 PROCEDURE — 1126F PR PAIN SEVERITY QUANTIFIED, NO PAIN PRESENT: ICD-10-PCS | Mod: HCNC,CPTII,S$GLB,

## 2023-03-06 PROCEDURE — 3288F PR FALLS RISK ASSESSMENT DOCUMENTED: ICD-10-PCS | Mod: HCNC,CPTII,S$GLB,

## 2023-03-06 PROCEDURE — 1159F MED LIST DOCD IN RCRD: CPT | Mod: HCNC,CPTII,S$GLB,

## 2023-03-06 PROCEDURE — 3078F DIAST BP <80 MM HG: CPT | Mod: HCNC,CPTII,S$GLB,

## 2023-03-06 PROCEDURE — 3074F PR MOST RECENT SYSTOLIC BLOOD PRESSURE < 130 MM HG: ICD-10-PCS | Mod: HCNC,CPTII,S$GLB,

## 2023-03-06 PROCEDURE — 1101F PR PT FALLS ASSESS DOC 0-1 FALLS W/OUT INJ PAST YR: ICD-10-PCS | Mod: HCNC,CPTII,S$GLB,

## 2023-03-06 PROCEDURE — 3288F FALL RISK ASSESSMENT DOCD: CPT | Mod: HCNC,CPTII,S$GLB,

## 2023-03-06 PROCEDURE — 1101F PT FALLS ASSESS-DOCD LE1/YR: CPT | Mod: HCNC,CPTII,S$GLB,

## 2023-03-06 PROCEDURE — 99205 OFFICE O/P NEW HI 60 MIN: CPT | Mod: HCNC,S$GLB,,

## 2023-03-06 PROCEDURE — 99999 PR PBB SHADOW E&M-EST. PATIENT-LVL III: ICD-10-PCS | Mod: PBBFAC,HCNC,,

## 2023-03-06 NOTE — PROGRESS NOTES
Encounter Date:  3/6/2023    Patient ID: Pauline Cronin  Age:  82 y.o. :  1940    Chief Complaint   Patient presents with    Consult     History:    Ms. Cronin is a 82 y.o. female who presents with a pancreatic mass and partial gastric outlet obstruction. She presented to her PCP with abdominal pain, 10 lb unintentional weight loss, and decreased energy. CT revealed a pancreatic head abutting the 2nd portion of duodenum with narrowing of the lumen. She is scheduled for EUS on 3/14/23.     She reports that she is weak, has little energy, and appetite. She has lost 10 lbs in 1 month. Denies vomiting. She is frequently nauseous. Has intermittent diarrhea but states that's her baseline d/t her Crohns. Denies jaundice, SOB, chest pain, oily stools.    Referred by: Machelle Arnold NP    Prior abd surgery: appendectomy, hysterectomy, SBR  Family hx of CA  On anticoagulation-eliquis  Never smoked    Data:     Radiology:  Dr. Santamaria and I personally reviewed these images:   3/1/23: CT A/P:  Impression:  1. Interval development of mass at the level of the pancreatic head that abuts or possibly arises from the 2nd portion of the duodenum.  This results in interval dilation of the pancreatic duct, common duct, as well as several intrahepatic biliary ducts.  Finding is most concerning for malignancy.  Correlation is advised.  2. Above mass narrows the lumen of the 2nd portion of the duodenum, concerning for gastric outlet obstruction.  3. Nodular contour of the hepatic parenchyma, correlation with any history of hepatic disease or cirrhosis.  4. Cholelithiasis without secondary findings to suggest acute cholecystitis.  5. Calculus possibly within the distal aspect of the right ureter versus adjacent phlebolith.  No secondary findings to suggest obstructive uropathy.  This may reflect chronic finding.  6. Please see above for several additional findings.    Labs:    Lab Results   Component Value Date    WBC 4.50  03/01/2023    HGB 9.3 (L) 03/01/2023    HCT 31.5 (L) 03/01/2023    MCV 87 03/01/2023     03/01/2023       Chemistry        Component Value Date/Time     03/01/2023 1038    K 3.9 03/01/2023 1038     03/01/2023 1038    CO2 23 03/01/2023 1038    BUN 14 03/01/2023 1038    CREATININE 1.1 03/01/2023 1038     (H) 03/01/2023 1038        Component Value Date/Time    CALCIUM 9.5 03/01/2023 1038    ALKPHOS 241 (H) 03/01/2023 1038     (H) 03/01/2023 1038     (H) 03/01/2023 1038    BILITOT 0.7 03/01/2023 1038    ESTGFRAFRICA >60.0 04/27/2022 1150    EGFRNONAA >60.0 04/27/2022 1150        Lab Results   Component Value Date    LABA1C 5.5 10/24/2019    HGBA1C 5.6 12/16/2020     Past Medical History:   Diagnosis Date    Anticoagulant long-term use     Atrial fibrillation     Crohn disease diagnosed in her 20's    GERD (gastroesophageal reflux disease)     Hyperlipidemia     Hypertension     Thyroid disease     s/p I 131     Past Surgical History:   Procedure Laterality Date    APPENDECTOMY      CARDIAC SURGERY  2014    pacemaker    COLONOSCOPY  ~2008    normal findings per patient report    ESOPHAGOGASTRODUODENOSCOPY N/A 7/17/2018    Procedure: EGD (ESOPHAGOGASTRODUODENOSCOPY);  Surgeon: Alondra Casiano MD;  [facility];  Service: Endoscopy;  Laterality: N/A;    HYSTERECTOMY      INSERTION OF IMPLANTABLE CARDIOVERTER-DEFIBRILLATOR (ICD) GENERATOR WITH TWO EXISTING LEADS Left 5/10/2021    Procedure: INSERTION, PULSE GENERATOR WITH 2 EXISTING LEADS, ICD;  Surgeon: Bo Leone MD;  Location: [facility] CATH LAB;  Service: Cardiology;  Laterality: Left;    INSERTION OF PACEMAKER      REPLACEMENT OF PACEMAKER GENERATOR  05/10/2021    RETROGRADE PYELOGRAPHY Right 2/18/2020    Procedure: PYELOGRAM, RETROGRADE;  Surgeon: Jovan Kruse MD;  Location: [facility] OR;  Service: Urology;  Laterality: Right;    SMALL INTESTINE SURGERY  in her 20's    for crohn's    TONSILLECTOMY      UPPER  GASTROINTESTINAL ENDOSCOPY  ~2008    normal findings per patient report     Current Outpatient Medications on File Prior to Visit   Medication Sig Dispense Refill    alendronate (FOSAMAX) 70 MG tablet Take 1 tablet (70 mg total) by mouth every 7 days. 12 tablet 3    amLODIPine (NORVASC) 10 MG tablet Take 1 tablet (10 mg total) by mouth once daily. 90 tablet 3    atorvastatin (LIPITOR) 20 MG tablet Take 1 tablet (20 mg total) by mouth once daily. 90 tablet 3    carvediloL (COREG) 6.25 MG tablet Take 1 tablet (6.25 mg total) by mouth 2 (two) times daily with meals. 180 tablet 3    colestipoL (COLESTID) 1 gram Tab Take 1 tablet (1 g total) by mouth 2 (two) times daily. 180 tablet 3    diphenoxylate-atropine 2.5-0.025 mg (LOMOTIL) 2.5-0.025 mg per tablet TAKE 1 TABLET BY MOUTH 4 TIMES DAILY AS NEEDED FOR DIARRHEA 60 tablet 3    ELIQUIS 5 mg Tab Take 1 tablet (5 mg total) by mouth 2 (two) times daily. 180 tablet 0    esomeprazole (NEXIUM) 40 MG capsule Take 1 capsule (40 mg total) by mouth once daily. 90 capsule 3    folic acid (FOLVITE) 1 MG tablet Take 1 tablet by mouth once daily 90 tablet 1    levothyroxine (SYNTHROID, LEVOTHROID) 175 MCG tablet Take 1 tablet by mouth once daily 30 tablet 5    magnesium gluconate 27.5 mg magne- sium (500 mg) Tab Take 500 mg by mouth once daily. 90 tablet 3    sotaloL (BETAPACE) 120 MG Tab Take 1 tablet (120 mg total) by mouth every 12 (twelve) hours. 180 tablet 3    sulfaSALAzine (AZULFIDINE) 500 mg Tab Take 1 tablet by mouth twice daily 180 tablet 3    tolterodine (DETROL LA) 4 MG 24 hr capsule Take 1 capsule (4 mg total) by mouth once daily. 90 capsule 3     No current facility-administered medications on file prior to visit.     Review of patient's allergies indicates:   Allergen Reactions    Lisinopril Other (See Comments)     cough     Family History:  Her family history includes Breast cancer in her mother; Cancer in her mother; Crohn's disease in an other family member.      Social History:   reports that she quit smoking about 46 years ago. Her smoking use included cigarettes. She has never used smokeless tobacco. She reports that she does not drink alcohol and does not use drugs.     ROS:  Pertinent positive/negatives detailed in HPI, all other systems negative.     Physical Exam:  /60   Pulse 87   Wt 80.3 kg (177 lb)   SpO2 96%   BMI 34.57 kg/m²     Constitutional:  Non-toxic, no acute distress.    Eyes:  Sclerae anicteric, gaze symmetrical  Resp:  Even and unlabored resp  Abd:  Soft, non-tender, no masses, no ascites, no superficial varices  Musculoskeletal:  unsteady on feet, arrived in wheelchair  Neuro:  No gross deficits  Psych:  Awake, alert, oriented.  Answers and asks questions appropriately      ICD-10-CM ICD-9-CM    1. Mass of pancreas  K86.89 577.8 Ambulatory referral/consult to Surgical Oncology        Plan:  Ms. Cronin is a 82 y.o. female who presents with a pancreatic mass and partial gastric outlet obstruction. She presented to her PCP with abdominal pain, 10 lb unintentional weight loss, and decreased energy. CT revealed a pancreatic head abutting the 2nd portion of duodenum with narrowing of the lumen. She is scheduled for EUS on 3/14/23.   She has lost 10 lbs in 1 month. She is eating very little- discussed small frequwnt meals throughout the day regardless of appetite. She is not vomiting at this time although she is frequently nauseated. She is able to eat at this time and does not require surgical intervention emergently. She has an EUS scheduled with [facility] on 3/14/23. We discussed with her that the presentation of the mass is highly suspicious for malignancy.   Questions were asked and answered to patient's satisfaction.  We discussed the need for continued clinical/radiographic/endoscopic follow-up. Will f/u after EUS and pathology results.     Follow up in about 3 weeks (around 3/27/2023), or if symptoms worsen or fail to improve.    This patient  was seen in conjunction with Dr. Santamaria.       Marcella Condon NP  Upper GI / Hepatobiliary Surgical Oncology  [facility] [place], [place]  Office: [phone]  Fax: [phone]

## 2023-03-07 NOTE — TELEPHONE ENCOUNTER
MD Jose L Rich, LPN 20 hours ago (4:59 PM)       Yes Ms Cronin may hold her Eliquis for 2 days prior to endoscopy.

## 2023-03-09 ENCOUNTER — TELEPHONE (OUTPATIENT)
Dept: CARDIOLOGY | Facility: CLINIC | Age: 83
End: 2023-03-09
Payer: MEDICARE

## 2023-03-09 NOTE — TELEPHONE ENCOUNTER
Left voicemail for Karen informing that she can keep appointment with Dr. Barbosa on 3/16/23 for now, and call to cancel if patient is not feeling well.  Call back number supplied on voicemail.

## 2023-03-09 NOTE — TELEPHONE ENCOUNTER
----- Message from Lorena Elizabeth sent at 3/9/2023 10:57 AM CST -----  Regarding: pancreatic mass  Patient recently found out she has a pancreatic mass is going in for biopsy on 3/14 - isn't sure how patient will feel on 3/16 regarding the f/u appt w/Dr. Barbosa  Please reach out to daughter in law she will talk to the doctor doing the bx   Please call daughter in law (Karen)  at your earliest opportunity to advice    @# [phone] (Karen) - if no answer please leave a message and she will call back

## 2023-03-10 ENCOUNTER — TELEPHONE (OUTPATIENT)
Dept: ENDOSCOPY | Facility: HOSPITAL | Age: 83
End: 2023-03-10
Payer: MEDICARE

## 2023-03-10 NOTE — TELEPHONE ENCOUNTER
Left message instructing patient to call dept @ [phone] between 8am-3pm.      Arrival time to be given @ 1200, earlier arrival of 1130 available.  Upper EUS    (Message sent via [facility] portal)

## 2023-03-13 ENCOUNTER — TELEPHONE (OUTPATIENT)
Dept: ENDOSCOPY | Facility: HOSPITAL | Age: 83
End: 2023-03-13
Payer: MEDICARE

## 2023-03-13 NOTE — TELEPHONE ENCOUNTER
Left message instructing patient to call dept @ [phone] between 8am-3pm.       Arrival time to be given @ 1200, earlier arrival of 1130 available.  Upper EUS

## 2023-03-13 NOTE — TELEPHONE ENCOUNTER
Spoke with patient's family member  about arrival time @ 1130.   Upper EUS    NPO status reviewed: Patient may eat until 7:00pm.  After 7pm, pt may have CLEAR liquids ONLY until 0730.    Medications: Do not take Insulin or oral diabetic medications the day of the procedure.    Take as prescribed: heart, seizure and blood pressure medication in the morning with a sip of water (less than an ounce).  Take any breathing medications and bring inhalers to hospital with you.     Leave all valuables and jewelry at home. Wear comfortable clothes to procedure to change into hospital gown.   You cannot drive for 24 hours after your procedure because you will receive sedation for your procedure to make you comfortable.    A ride must be provided at discharge.

## 2023-03-14 ENCOUNTER — TELEPHONE (OUTPATIENT)
Dept: ENDOSCOPY | Facility: HOSPITAL | Age: 83
End: 2023-03-14

## 2023-03-14 ENCOUNTER — HOSPITAL ENCOUNTER (OUTPATIENT)
Facility: HOSPITAL | Age: 83
Discharge: HOME | End: 2023-03-14
Attending: INTERNAL MEDICINE | Admitting: INTERNAL MEDICINE
Payer: MEDICARE

## 2023-03-14 ENCOUNTER — TELEPHONE (OUTPATIENT)
Dept: GASTROENTEROLOGY | Facility: CLINIC | Age: 83
End: 2023-03-14
Payer: MEDICARE

## 2023-03-14 ENCOUNTER — ANESTHESIA (OUTPATIENT)
Dept: ENDOSCOPY | Facility: HOSPITAL | Age: 83
End: 2023-03-14
Payer: MEDICARE

## 2023-03-14 ENCOUNTER — ANESTHESIA EVENT (OUTPATIENT)
Dept: ENDOSCOPY | Facility: HOSPITAL | Age: 83
End: 2023-03-14
Payer: MEDICARE

## 2023-03-14 DIAGNOSIS — K83.1 BILIARY STRICTURE: Primary | ICD-10-CM

## 2023-03-14 DIAGNOSIS — K86.89 MASS OF PANCREAS: Primary | ICD-10-CM

## 2023-03-14 PROCEDURE — 88342 IMHCHEM/IMCYTCHM 1ST ANTB: CPT | Mod: 26,HCNC,, | Performed by: PATHOLOGY

## 2023-03-14 PROCEDURE — 88341 IMHCHEM/IMCYTCHM EA ADD ANTB: CPT | Mod: 59,HCNC | Performed by: PATHOLOGY

## 2023-03-14 PROCEDURE — 43242 EGD US FINE NEEDLE BX/ASPIR: CPT | Mod: HCNC | Performed by: INTERNAL MEDICINE

## 2023-03-14 PROCEDURE — 43242 PR UPGI ENDOSCOPY,FN NEEDLE BX,GUIDED: ICD-10-PCS | Mod: HCNC,,, | Performed by: INTERNAL MEDICINE

## 2023-03-14 PROCEDURE — D9220A PRA ANESTHESIA: ICD-10-PCS | Mod: HCNC,ANES,, | Performed by: ANESTHESIOLOGY

## 2023-03-14 PROCEDURE — 37000008 HC ANESTHESIA 1ST 15 MINUTES: Mod: HCNC | Performed by: INTERNAL MEDICINE

## 2023-03-14 PROCEDURE — D9220A PRA ANESTHESIA: ICD-10-PCS | Mod: HCNC,CRNA,, | Performed by: NURSE ANESTHETIST, CERTIFIED REGISTERED

## 2023-03-14 PROCEDURE — 88305 TISSUE EXAM BY PATHOLOGIST: ICD-10-PCS | Mod: 26,HCNC,, | Performed by: PATHOLOGY

## 2023-03-14 PROCEDURE — 43239 PR EGD, FLEX, W/BIOPSY, SGL/MULTI: ICD-10-PCS | Mod: 59,HCNC,, | Performed by: INTERNAL MEDICINE

## 2023-03-14 PROCEDURE — D9220A PRA ANESTHESIA: Mod: HCNC,CRNA,, | Performed by: NURSE ANESTHETIST, CERTIFIED REGISTERED

## 2023-03-14 PROCEDURE — 43239 EGD BIOPSY SINGLE/MULTIPLE: CPT | Mod: 59,HCNC | Performed by: INTERNAL MEDICINE

## 2023-03-14 PROCEDURE — 43239 EGD BIOPSY SINGLE/MULTIPLE: CPT | Mod: 59,HCNC,, | Performed by: INTERNAL MEDICINE

## 2023-03-14 PROCEDURE — 43242 EGD US FINE NEEDLE BX/ASPIR: CPT | Mod: HCNC,,, | Performed by: INTERNAL MEDICINE

## 2023-03-14 PROCEDURE — 88342 CHG IMMUNOCYTOCHEMISTRY: ICD-10-PCS | Mod: 26,HCNC,, | Performed by: PATHOLOGY

## 2023-03-14 PROCEDURE — 88305 TISSUE EXAM BY PATHOLOGIST: ICD-10-PCS | Mod: 26,HCNC,59, | Performed by: PATHOLOGY

## 2023-03-14 PROCEDURE — 25000003 PHARM REV CODE 250: Mod: HCNC | Performed by: INTERNAL MEDICINE

## 2023-03-14 PROCEDURE — 88173 CYTOPATH EVAL FNA REPORT: CPT | Mod: 26,HCNC,, | Performed by: PATHOLOGY

## 2023-03-14 PROCEDURE — 25000003 PHARM REV CODE 250: Mod: HCNC | Performed by: NURSE ANESTHETIST, CERTIFIED REGISTERED

## 2023-03-14 PROCEDURE — 88305 TISSUE EXAM BY PATHOLOGIST: CPT | Mod: 26,HCNC,, | Performed by: PATHOLOGY

## 2023-03-14 PROCEDURE — 88341 IMHCHEM/IMCYTCHM EA ADD ANTB: CPT | Mod: 26,HCNC,, | Performed by: PATHOLOGY

## 2023-03-14 PROCEDURE — 63600175 PHARM REV CODE 636 W HCPCS: Mod: HCNC | Performed by: NURSE ANESTHETIST, CERTIFIED REGISTERED

## 2023-03-14 PROCEDURE — 88305 TISSUE EXAM BY PATHOLOGIST: CPT | Mod: HCNC | Performed by: PATHOLOGY

## 2023-03-14 PROCEDURE — 88173 CYTOPATH EVAL FNA REPORT: CPT | Mod: HCNC | Performed by: PATHOLOGY

## 2023-03-14 PROCEDURE — 37000009 HC ANESTHESIA EA ADD 15 MINS: Mod: HCNC | Performed by: INTERNAL MEDICINE

## 2023-03-14 PROCEDURE — 88341 PR IHC OR ICC EACH ADD'L SINGLE ANTIBODY  STAINPR: ICD-10-PCS | Mod: 26,HCNC,, | Performed by: PATHOLOGY

## 2023-03-14 PROCEDURE — 88305 TISSUE EXAM BY PATHOLOGIST: CPT | Mod: 59,HCNC | Performed by: PATHOLOGY

## 2023-03-14 PROCEDURE — 88305 TISSUE EXAM BY PATHOLOGIST: CPT | Mod: 26,HCNC,59, | Performed by: PATHOLOGY

## 2023-03-14 PROCEDURE — D9220A PRA ANESTHESIA: Mod: HCNC,ANES,, | Performed by: ANESTHESIOLOGY

## 2023-03-14 PROCEDURE — 27202131 HC NEEDLE, FNB SINGLE (ANY): Mod: HCNC | Performed by: INTERNAL MEDICINE

## 2023-03-14 PROCEDURE — 88342 IMHCHEM/IMCYTCHM 1ST ANTB: CPT | Mod: HCNC | Performed by: PATHOLOGY

## 2023-03-14 PROCEDURE — 88173 PR  INTERPRETATION OF FNA SMEAR: ICD-10-PCS | Mod: 26,HCNC,, | Performed by: PATHOLOGY

## 2023-03-14 RX ORDER — SODIUM CHLORIDE 9 MG/ML
INJECTION, SOLUTION INTRAVENOUS CONTINUOUS
Status: DISCONTINUED | OUTPATIENT
Start: 2023-03-14 | End: 2023-03-14 | Stop reason: HOSPADM

## 2023-03-14 RX ORDER — LIDOCAINE HYDROCHLORIDE 20 MG/ML
INJECTION INTRAVENOUS
Status: DISCONTINUED | OUTPATIENT
Start: 2023-03-14 | End: 2023-03-14

## 2023-03-14 RX ORDER — PROPOFOL 10 MG/ML
VIAL (ML) INTRAVENOUS
Status: DISCONTINUED | OUTPATIENT
Start: 2023-03-14 | End: 2023-03-14

## 2023-03-14 RX ORDER — SODIUM CHLORIDE 0.9 % (FLUSH) 0.9 %
10 SYRINGE (ML) INJECTION
Status: DISCONTINUED | OUTPATIENT
Start: 2023-03-14 | End: 2023-03-14 | Stop reason: HOSPADM

## 2023-03-14 RX ADMIN — PROPOFOL 25 MG: 10 INJECTION, EMULSION INTRAVENOUS at 12:03

## 2023-03-14 RX ADMIN — TOPICAL ANESTHETIC 1 EACH: 200 SPRAY DENTAL; PERIODONTAL at 12:03

## 2023-03-14 RX ADMIN — PROPOFOL 10 MG: 10 INJECTION, EMULSION INTRAVENOUS at 12:03

## 2023-03-14 RX ADMIN — SODIUM CHLORIDE: 0.9 INJECTION, SOLUTION INTRAVENOUS at 12:03

## 2023-03-14 RX ADMIN — LIDOCAINE HYDROCHLORIDE 100 MG: 20 INJECTION, SOLUTION INTRAVENOUS at 12:03

## 2023-03-14 RX ADMIN — SODIUM CHLORIDE, SODIUM LACTATE, POTASSIUM CHLORIDE, AND CALCIUM CHLORIDE: .6; .31; .03; .02 INJECTION, SOLUTION INTRAVENOUS at 12:03

## 2023-03-14 NOTE — ANESTHESIA POSTPROCEDURE EVALUATION
Anesthesia Post Evaluation    Patient: Pauline Cronin    Procedure(s) Performed: Procedure(s) (LRB):  ULTRASOUND, UPPER GI TRACT, ENDOSCOPIC (N/A)    Final Anesthesia Type: general      Patient location during evaluation: GI PACU  Patient participation: Yes- Able to Participate  Level of consciousness: awake and alert  Post-procedure vital signs: reviewed and stable  Pain management: adequate  Airway patency: patent    PONV status at discharge: No PONV  Anesthetic complications: no      Cardiovascular status: blood pressure returned to baseline, hemodynamically stable and stable  Respiratory status: unassisted, spontaneous ventilation and room air  Hydration status: euvolemic  Follow-up not needed.          Vitals Value Taken Time   .57 03/14/23 1301   Temp 36.8 °C (98.2 °F) 03/14/23 1300   Pulse 91 03/14/23 1300   Resp 22 03/14/23 1300   SpO2 97% 03/14/23 1300         No case tracking events are documented in the log.      Pain/Aziza Score: Aziza Score: 9 (3/14/2023  1:01 PM)

## 2023-03-14 NOTE — TRANSFER OF CARE
Anesthesia Transfer of Care Note    Patient: Pauline Cronin    Procedure(s) Performed: Procedure(s) (LRB):  ULTRASOUND, UPPER GI TRACT, ENDOSCOPIC (N/A)    Patient location: GI    Anesthesia Type: general    Transport from OR: Transported from OR on room air with adequate spontaneous ventilation    Post pain: adequate analgesia    Post assessment: no apparent anesthetic complications and tolerated procedure well    Post vital signs: stable    Level of consciousness: awake    Nausea/Vomiting: no nausea/vomiting    Complications: none    Transfer of care protocol was followed      Last vitals:   Visit Vitals  BP (!) 143/66 (BP Location: Left arm, Patient Position: Lying)   Pulse 91   Temp 36.8 °C (98.2 °F)   Resp (!) 22   Ht 5' (1.524 m)   Wt 80.3 kg (177 lb)   SpO2 (!) 5%   Breastfeeding No   BMI 34.57 kg/m²

## 2023-03-14 NOTE — PROVATION PATIENT INSTRUCTIONS
Discharge Summary/Instructions after an Endoscopic Procedure  Patient Name: Pauline Cronin  Patient MRN: [id number]  Patient YOB: 1940  Tuesday, March 14, 2023  Andrei Swartz MD  Dear patient,  As a result of recent federal legislation (The Federal Cures Act), you may   receive lab or pathology results from your procedure in your [facility]   account before your physician is able to contact you. Your physician or   their representative will relay the results to you with their   recommendations at their soonest availability.  Thank you,  Your health is very important to us during the Covid Crisis. Following your   procedure today, you will receive a daily text for 2 weeks asking about   signs or symptoms of Covid 19.  Please respond to this text when you   receive it so we can follow up and keep you as safe as possible.   RESTRICTIONS:  During your procedure today, you received medications for sedation.  These   medications may affect your judgment, balance and coordination.  Therefore,   for 24 hours, you have the following restrictions:   - DO NOT drive a car, operate machinery, make legal/financial decisions,   sign important papers or drink alcohol.    ACTIVITY:  Today: no heavy lifting, straining or running due to procedural   sedation/anesthesia.  The following day: return to full activity including work.  DIET:  Eat and drink normally unless instructed otherwise.     TREATMENT FOR COMMON SIDE EFFECTS:  - Mild abdominal pain, nausea, belching, bloating or excessive gas:  rest,   eat lightly and use a heating pad.  - Sore Throat: treat with throat lozenges and/or gargle with warm salt   water.  - Because air was used during the procedure, expelling large amounts of air   from your rectum or belching is normal.  - If a bowel prep was taken, you may not have a bowel movement for 1-3 days.    This is normal.  SYMPTOMS TO WATCH FOR AND REPORT TO YOUR PHYSICIAN:  1. Abdominal pain or bloating, other than  gas cramps.  2. Chest pain.  3. Back pain.  4. Signs of infection such as: chills or fever occurring within 24 hours   after the procedure.  5. Rectal bleeding, which would show as bright red, maroon, or black stools.   (A tablespoon of blood from the rectum is not serious, especially if   hemorrhoids are present.)  6. Vomiting.  7. Weakness or dizziness.  GO DIRECTLY TO THE NEAREST EMERGENCY ROOM IF YOU HAVE ANY OF THE FOLLOWING:      Difficulty breathing              Chills and/or fever over 101 F   Persistent vomiting and/or vomiting blood   Severe abdominal pain   Severe chest pain   Black, tarry stools   Bleeding- more than one tablespoon   Any other symptom or condition that you feel may need urgent attention  Your doctor recommends these additional instructions:  If any biopsies were taken, your doctors clinic will contact you in 1 to 2   weeks with any results.  - Discharge patient to home (ambulatory).   - Await cytology results and await path results.   - Return to referring physician.   - Perform an ERCP at appointment to be scheduled.  For questions, problems or results please call your physician - Andrei Swartz MD.  EMERGENCY PHONE NUMBER: [phone],  LAB RESULTS: [phone]  IF A COMPLICATION OR EMERGENCY SITUATION ARISES AND YOU ARE UNABLE TO REACH   YOUR PHYSICIAN - GO DIRECTLY TO THE EMERGENCY ROOM.  Andrei Swartz MD  3/14/2023 1:20:10 PM  This report has been verified and signed electronically.  Dear patient,  As a result of recent federal legislation (The Federal Cures Act), you may   receive lab or pathology results from your procedure in your [facility]   account before your physician is able to contact you. Your physician or   their representative will relay the results to you with their   recommendations at their soonest availability.  Thank you,  PROVATION

## 2023-03-14 NOTE — H&P
History & Physical - Short Stay  Gastroenterology      SUBJECTIVE:     Procedure: EUS    Chief Complaint/Indication for Procedure: Pancreas mass    History of Present Illness:  Patient is a 82 y.o. female presents with pancreas mass on recent CT scan.     PTA Medications   Medication Sig    alendronate (FOSAMAX) 70 MG tablet Take 1 tablet (70 mg total) by mouth every 7 days.    amLODIPine (NORVASC) 10 MG tablet Take 1 tablet (10 mg total) by mouth once daily.    atorvastatin (LIPITOR) 20 MG tablet Take 1 tablet (20 mg total) by mouth once daily.    carvediloL (COREG) 6.25 MG tablet Take 1 tablet (6.25 mg total) by mouth 2 (two) times daily with meals.    colestipoL (COLESTID) 1 gram Tab Take 1 tablet (1 g total) by mouth 2 (two) times daily.    diphenoxylate-atropine 2.5-0.025 mg (LOMOTIL) 2.5-0.025 mg per tablet TAKE 1 TABLET BY MOUTH 4 TIMES DAILY AS NEEDED FOR DIARRHEA    ELIQUIS 5 mg Tab Take 1 tablet (5 mg total) by mouth 2 (two) times daily.    esomeprazole (NEXIUM) 40 MG capsule Take 1 capsule (40 mg total) by mouth once daily.    folic acid (FOLVITE) 1 MG tablet Take 1 tablet by mouth once daily    levothyroxine (SYNTHROID, LEVOTHROID) 175 MCG tablet Take 1 tablet by mouth once daily    magnesium gluconate 27.5 mg magne- sium (500 mg) Tab Take 500 mg by mouth once daily.    sotaloL (BETAPACE) 120 MG Tab Take 1 tablet (120 mg total) by mouth every 12 (twelve) hours.    sulfaSALAzine (AZULFIDINE) 500 mg Tab Take 1 tablet by mouth twice daily    tolterodine (DETROL LA) 4 MG 24 hr capsule Take 1 capsule (4 mg total) by mouth once daily.       Review of patient's allergies indicates:   Allergen Reactions    Lisinopril Other (See Comments)     cough        Past Medical History:   Diagnosis Date    Anticoagulant long-term use     Atrial fibrillation     Crohn disease diagnosed in her 20's    GERD (gastroesophageal reflux disease)     Hyperlipidemia     Hypertension     Thyroid disease     s/p I 131     Past  Surgical History:   Procedure Laterality Date    APPENDECTOMY      CARDIAC SURGERY  2014    pacemaker    COLONOSCOPY  ~    normal findings per patient report    ESOPHAGOGASTRODUODENOSCOPY N/A 2018    Procedure: EGD (ESOPHAGOGASTRODUODENOSCOPY);  Surgeon: Alondra Casiano MD;  Location: [facility];  Service: Endoscopy;  Laterality: N/A;    HYSTERECTOMY      INSERTION OF IMPLANTABLE CARDIOVERTER-DEFIBRILLATOR (ICD) GENERATOR WITH TWO EXISTING LEADS Left 5/10/2021    Procedure: INSERTION, PULSE GENERATOR WITH 2 EXISTING LEADS, ICD;  Surgeon: Bo Leone MD;  Location[facility] CATH LAB;  Service: Cardiology;  Laterality: Left;    INSERTION OF PACEMAKER      REPLACEMENT OF PACEMAKER GENERATOR  05/10/2021    RETROGRADE PYELOGRAPHY Right 2020    Procedure: PYELOGRAM, RETROGRADE;  Surgeon: Jovan Kruse MD;  Location: [facility];  Service: Urology;  Laterality: Right;    SMALL INTESTINE SURGERY  in her 20's    for crohn's    TONSILLECTOMY      UPPER GASTROINTESTINAL ENDOSCOPY  ~    normal findings per patient report     Family History   Problem Relation Age of Onset    Cancer Mother     Breast cancer Mother     Crohn's disease Other     Colon cancer Neg Hx     Colon polyps Neg Hx     Esophageal cancer Neg Hx     Stomach cancer Neg Hx     Ulcerative colitis Neg Hx      Social History     Tobacco Use    Smoking status: Former     Types: Cigarettes     Quit date:      Years since quittin.2    Smokeless tobacco: Never   Substance Use Topics    Alcohol use: No    Drug use: No       Review of Systems:  Constitutional: no fever or chills  Respiratory: no cough or shortness of breath  Cardiovascular: no chest pain or palpitations    OBJECTIVE:     Vital Signs (Most Recent)       Physical Exam:  General: well developed, well nourished  Lungs:  normal respiratory effort  Heart: regular rate, S1, S2 normal    Laboratory  CBC: No results for input(s): WBC, RBC, HGB, HCT, PLT, MCV, MCH, MCHC in the last  168 hours.  CMP: No results for input(s): GLU, CALCIUM, ALBUMIN, PROT, NA, K, CO2, CL, BUN, CREATININE, ALKPHOS, ALT, AST, BILITOT in the last 168 hours.  Coagulation: No results for input(s): LABPROT, INR, APTT in the last 168 hours.    Diagnostic Results:      ASSESSMENT/PLAN:     Pancreas mass    Plan: EUS    Anesthesia Plan: MAC    ASA Grade: ASA 3 - Patient with moderate systemic disease with functional limitations    The impression and plan was discussed in detail with the patient and family. All questions have been answered and the patient voices understanding of our plan at this point. The risk of the procedure was discussed in detail which includes but not limited to bleeding, infection, perforation in some cases requiring surgery with its spectrum of complications.

## 2023-03-14 NOTE — BRIEF OP NOTE
Discharge Summary/Instructions after an Endoscopic Procedure    Patient Name: aPuline Cronin  Patient MRN: [id number]  Patient YOB: 1940    Tuesday, March 14, 2023  Andrei Swartz MD  Dear patient,  As a result of recent federal legislation (The Federal Cures Act), you may receive lab or pathology results from your procedure in your [facility] account before your physician is able to contact you. Your physician or their representative will relay the results to you with their recommendations at their soonest availability.  Thank you,    Your health is very important to us during the Covid Crisis. Following your procedure today, you will receive a daily text for 2 weeks asking about signs or symptoms of Covid 19.  Please respond to this text when you receive it so we can follow up and keep you as safe as possible.     RESTRICTIONS:  During your procedure today, you received medications for sedation.  These medications may affect your judgment, balance and coordination.  Therefore, for 24 hours, you have the following restrictions:     - DO NOT drive a car, operate machinery, make legal/financial decisions, sign important papers or drink alcohol.      ACTIVITY:  Today: no heavy lifting, straining or running due to procedural sedation/anesthesia.  The following day: return to full activity including work.    DIET:  Eat and drink normally unless instructed otherwise.     TREATMENT FOR COMMON SIDE EFFECTS:  - Mild abdominal pain, nausea, belching, bloating or excessive gas:  rest, eat lightly and use a heating pad.  - Sore Throat: treat with throat lozenges and/or gargle with warm salt water.  - Because air was used during the procedure, expelling large amounts of air from your rectum or belching is normal.  - If a bowel prep was taken, you may not have a bowel movement for 1-3 days.  This is normal.      SYMPTOMS TO WATCH FOR AND REPORT TO YOUR PHYSICIAN:  1. Abdominal pain or bloating, other than gas cramps.  2.  Chest pain.  3. Back pain.  4. Signs of infection such as: chills or fever occurring within 24 hours after the procedure.  5. Rectal bleeding, which would show as bright red, maroon, or black stools. (A tablespoon of blood from the rectum is not serious, especially if hemorrhoids are present.)  6. Vomiting.  7. Weakness or dizziness.      GO DIRECTLY TO THE NEAREST EMERGENCY ROOM IF YOU HAVE ANY OF THE FOLLOWING:     Difficulty breathing              Chills and/or fever over 101 F   Persistent vomiting and/or vomiting blood   Severe abdominal pain   Severe chest pain   Black, tarry stools   Bleeding- more than one tablespoon   Any other symptom or condition that you feel may need urgent attention    Your doctor recommends these additional instructions:  If any biopsies were taken, your doctors clinic will contact you in 1 to 2 weeks with any results.    - Discharge patient to home (ambulatory).   - Await cytology results and await path results.   - Return to referring physician.   - Perform an ERCP at appointment to be scheduled.    For questions, problems or results please call your physician - Andrei Swartz MD.    EMERGENCY PHONE NUMBER: [phone],  LAB RESULTS: [phone]    IF A COMPLICATION OR EMERGENCY SITUATION ARISES AND YOU ARE UNABLE TO REACH YOUR PHYSICIAN - GO DIRECTLY TO THE EMERGENCY ROOM.

## 2023-03-14 NOTE — ANESTHESIA PREPROCEDURE EVALUATION
03/14/2023     Pauline Cronin is a 82 y.o., female here for EUS      Pre-op Assessment    I have reviewed the Patient Summary Reports.     I have reviewed the Nursing Notes. I have reviewed the NPO Status.      Review of Systems  Anesthesia Hx:   Denies Personal Hx of Anesthesia complications.   Cardiovascular:   Pacemaker (ICD) Hypertension Dysrhythmias (SSS)    Pulmonary:   Shortness of breath    Renal/:   Chronic Renal Disease    Hepatic/GI:   GERD    Endocrine:   Hypothyroidism  Obesity / BMI > 30      Physical Exam  General: Well nourished    Airway:  Mallampati: II   Mouth Opening: Normal  TM Distance: Normal  Neck ROM: Normal ROM        Anesthesia Plan  Type of Anesthesia, risks & benefits discussed:    Anesthesia Type: Gen Natural Airway  ASA Score: 3  Anesthesia Plan Notes: [employer] Samanta XT DR COLLINS   Dual chamber. Pacemaker dependant    Ready For Surgery From Anesthesia Perspective.     .

## 2023-03-15 ENCOUNTER — PATIENT MESSAGE (OUTPATIENT)
Dept: ENDOSCOPY | Facility: HOSPITAL | Age: 83
End: 2023-03-15
Payer: MEDICARE

## 2023-03-15 ENCOUNTER — TELEPHONE (OUTPATIENT)
Dept: CARDIOLOGY | Facility: CLINIC | Age: 83
End: 2023-03-15
Payer: MEDICARE

## 2023-03-15 ENCOUNTER — TELEPHONE (OUTPATIENT)
Dept: ENDOSCOPY | Facility: HOSPITAL | Age: 83
End: 2023-03-15
Payer: MEDICARE

## 2023-03-15 ENCOUNTER — TELEPHONE (OUTPATIENT)
Dept: SURGERY | Facility: CLINIC | Age: 83
End: 2023-03-15
Payer: MEDICARE

## 2023-03-15 VITALS
SYSTOLIC BLOOD PRESSURE: 101 MMHG | TEMPERATURE: 98 F | HEIGHT: 60 IN | BODY MASS INDEX: 34.75 KG/M2 | OXYGEN SATURATION: 99 % | WEIGHT: 177 LBS | HEART RATE: 75 BPM | RESPIRATION RATE: 20 BRPM | DIASTOLIC BLOOD PRESSURE: 54 MMHG

## 2023-03-15 NOTE — TELEPHONE ENCOUNTER
Left voicemail for patient's family Karen, informing that cardiology appointment is needed prior to clearance for endoscopy.  Asked for a call back to schedule with Dr. Barbosa, call back number supplied on voicemail.

## 2023-03-15 NOTE — TELEPHONE ENCOUNTER
Pt needs to be scheduled for an ERCP.  Pt is currently taking Eliquis.  Per endoscopy protocol it should be held x 2 days prior. Ok to hold?  Please advise.  Thanks

## 2023-03-15 NOTE — TELEPHONE ENCOUNTER
Follow up call placed to pt. Pt does report she is jaundice. She denies fever,  pain, and pale stools. Endorses dark urine. Advised pt to proceed to the ED immediately. Pt reluctant to go to the ED and reports she has cardiology appt tomorrow and procedure scheduled for 3/22. Advised pt per NOL[place] DAVIS pt needs to proceed to the ED for further evaluation. Pt will try and reach her daughter as this is her only means of transportation. This RN left detailed message for her daughter Karen as well. Call back number provided. Pt will try and reach her daughter.

## 2023-03-15 NOTE — TELEPHONE ENCOUNTER
----- Message from Peggy Mejia RN sent at 3/15/2023 11:01 AM CDT -----  Pt of [facility] who had EUS 3/14.  Scheduled for ERCP 3/22.  Looks like [facility] is trying to get her in to see her external cardiologist for clearance.    I spoke with her daughter, Karen Roberto- directed her to speak with YUMIKO.    She says she is jaundiced and wonders if she needs labs.    She is sheduled for f/u 3/27 as virtual but Karen says she can't do that type of visit and needs it rescheduled maybe sooner?      Mariza

## 2023-03-15 NOTE — TELEPHONE ENCOUNTER
Call placed to pt daughter. Left detailed message advising to proceed to the ER with pt immediately for jaundice. Call back number provided.

## 2023-03-15 NOTE — TELEPHONE ENCOUNTER
Left voicemail for patient's family Karen,  asking for a call back if patient is able to come @ 2:00 PM with Dr. Barbosa.

## 2023-03-15 NOTE — TELEPHONE ENCOUNTER
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

## 2023-03-15 NOTE — TELEPHONE ENCOUNTER
March 15, 2023  Jovan Barbosa MD  to Me        11:47 AM   OK to hold the Eliquis for 2 days prior to procedure and resume the Eliquis after the procedure when there is no longer a risk of nelida-procedural bleeding.  The small risk of stroke from holding the Eliquis is outweighed by the benefit of the procedure.   Me     MS    11:21 AM  Note  March 15, 2023        KAL    10:57 AM  Mariza Wheatley MA routed this conversation to UMM Roberto MA     KAL    10:56 AM  Note  Left voicemail for patient's family Karen, informing that cardiology appointment is needed prior to clearance for endoscopy.  Asked for a call back to schedule with Dr. Barbosa, call back number supplied on voicemail.                KAL    10:54 AM    Mariza Wheatley MA contacted Karen Mejia (Emergency Contact)          MS    10:23 AM  You routed this conversation to MD Chelsey Rich    Me     MS    10:22 AM  Note  Pt needs to be scheduled for an ERCP.  Pt is currently taking Eliquis.  Per endoscopy protocol it should be held x 2 days prior. Ok to hold?  Please advise.  Thanks

## 2023-03-15 NOTE — TELEPHONE ENCOUNTER
March 15, 2023       KAL    10:57 AM  Mariza Wheatley MA routed this conversation to UMM Roberto MA     KAL    10:56 AM  Note  Left voicemail for patient's family Karen, informing that cardiology appointment is needed prior to clearance for endoscopy.  Asked for a call back to schedule with Dr. Barbosa, call back number supplied on voicemail.              KAL    10:54 AM    Mariza Wheatley MA contacted Karen Mejia (Emergency Contact)         MS    10:23 AM  You routed this conversation to MD Chelsey Rich    Me     MS    10:22 AM  Note  Pt needs to be scheduled for an ERCP.  Pt is currently taking Eliquis.  Per endoscopy protocol it should be held x 2 days prior. Ok to hold?  Please advise.  Thanks

## 2023-03-16 ENCOUNTER — CLINICAL SUPPORT (OUTPATIENT)
Dept: CARDIOLOGY | Facility: CLINIC | Age: 83
End: 2023-03-16
Payer: MEDICARE

## 2023-03-16 ENCOUNTER — TELEPHONE (OUTPATIENT)
Dept: CARDIOLOGY | Facility: CLINIC | Age: 83
End: 2023-03-16

## 2023-03-16 ENCOUNTER — OFFICE VISIT (OUTPATIENT)
Dept: CARDIOLOGY | Facility: CLINIC | Age: 83
End: 2023-03-16
Payer: MEDICARE

## 2023-03-16 VITALS
BODY MASS INDEX: 34.93 KG/M2 | WEIGHT: 177.94 LBS | HEIGHT: 60 IN | TEMPERATURE: 96 F | SYSTOLIC BLOOD PRESSURE: 121 MMHG | DIASTOLIC BLOOD PRESSURE: 56 MMHG | OXYGEN SATURATION: 94 % | HEART RATE: 72 BPM

## 2023-03-16 DIAGNOSIS — Z79.01 CHRONIC ANTICOAGULATION: ICD-10-CM

## 2023-03-16 DIAGNOSIS — I44.7 LBBB (LEFT BUNDLE BRANCH BLOCK): ICD-10-CM

## 2023-03-16 DIAGNOSIS — E03.9 ACQUIRED HYPOTHYROIDISM: ICD-10-CM

## 2023-03-16 DIAGNOSIS — I10 ESSENTIAL HYPERTENSION, BENIGN: ICD-10-CM

## 2023-03-16 DIAGNOSIS — Z95.0 PACEMAKER: ICD-10-CM

## 2023-03-16 DIAGNOSIS — Z95.0 PACEMAKER: Primary | ICD-10-CM

## 2023-03-16 DIAGNOSIS — E78.2 MIXED HYPERLIPIDEMIA: ICD-10-CM

## 2023-03-16 DIAGNOSIS — I44.2 ATRIOVENTRICULAR BLOCK, COMPLETE: Primary | ICD-10-CM

## 2023-03-16 DIAGNOSIS — I48.0 PAROXYSMAL ATRIAL FIBRILLATION: ICD-10-CM

## 2023-03-16 PROCEDURE — 1126F PR PAIN SEVERITY QUANTIFIED, NO PAIN PRESENT: ICD-10-PCS | Mod: HCNC,CPTII,S$GLB, | Performed by: INTERNAL MEDICINE

## 2023-03-16 PROCEDURE — 1101F PR PT FALLS ASSESS DOC 0-1 FALLS W/OUT INJ PAST YR: ICD-10-PCS | Mod: HCNC,CPTII,S$GLB, | Performed by: INTERNAL MEDICINE

## 2023-03-16 PROCEDURE — 93000 EKG 12-LEAD: ICD-10-PCS | Mod: HCNC,S$GLB,, | Performed by: INTERNAL MEDICINE

## 2023-03-16 PROCEDURE — 3288F PR FALLS RISK ASSESSMENT DOCUMENTED: ICD-10-PCS | Mod: HCNC,CPTII,S$GLB, | Performed by: INTERNAL MEDICINE

## 2023-03-16 PROCEDURE — 3074F PR MOST RECENT SYSTOLIC BLOOD PRESSURE < 130 MM HG: ICD-10-PCS | Mod: HCNC,CPTII,S$GLB, | Performed by: INTERNAL MEDICINE

## 2023-03-16 PROCEDURE — 99999 PR PBB SHADOW E&M-EST. PATIENT-LVL IV: ICD-10-PCS | Mod: PBBFAC,HCNC,, | Performed by: INTERNAL MEDICINE

## 2023-03-16 PROCEDURE — 1160F PR REVIEW ALL MEDS BY PRESCRIBER/CLIN PHARMACIST DOCUMENTED: ICD-10-PCS | Mod: HCNC,CPTII,S$GLB, | Performed by: INTERNAL MEDICINE

## 2023-03-16 PROCEDURE — 3074F SYST BP LT 130 MM HG: CPT | Mod: HCNC,CPTII,S$GLB, | Performed by: INTERNAL MEDICINE

## 2023-03-16 PROCEDURE — 3078F DIAST BP <80 MM HG: CPT | Mod: HCNC,CPTII,S$GLB, | Performed by: INTERNAL MEDICINE

## 2023-03-16 PROCEDURE — 1159F MED LIST DOCD IN RCRD: CPT | Mod: HCNC,CPTII,S$GLB, | Performed by: INTERNAL MEDICINE

## 2023-03-16 PROCEDURE — 1160F RVW MEDS BY RX/DR IN RCRD: CPT | Mod: HCNC,CPTII,S$GLB, | Performed by: INTERNAL MEDICINE

## 2023-03-16 PROCEDURE — 99999 PR PBB SHADOW E&M-EST. PATIENT-LVL IV: CPT | Mod: PBBFAC,HCNC,, | Performed by: INTERNAL MEDICINE

## 2023-03-16 PROCEDURE — 3078F PR MOST RECENT DIASTOLIC BLOOD PRESSURE < 80 MM HG: ICD-10-PCS | Mod: HCNC,CPTII,S$GLB, | Performed by: INTERNAL MEDICINE

## 2023-03-16 PROCEDURE — 93000 ELECTROCARDIOGRAM COMPLETE: CPT | Mod: HCNC,S$GLB,, | Performed by: INTERNAL MEDICINE

## 2023-03-16 PROCEDURE — 1101F PT FALLS ASSESS-DOCD LE1/YR: CPT | Mod: HCNC,CPTII,S$GLB, | Performed by: INTERNAL MEDICINE

## 2023-03-16 PROCEDURE — 3288F FALL RISK ASSESSMENT DOCD: CPT | Mod: HCNC,CPTII,S$GLB, | Performed by: INTERNAL MEDICINE

## 2023-03-16 PROCEDURE — 99214 OFFICE O/P EST MOD 30 MIN: CPT | Mod: 25,HCNC,S$GLB, | Performed by: INTERNAL MEDICINE

## 2023-03-16 PROCEDURE — 99214 PR OFFICE/OUTPT VISIT, EST, LEVL IV, 30-39 MIN: ICD-10-PCS | Mod: 25,HCNC,S$GLB, | Performed by: INTERNAL MEDICINE

## 2023-03-16 PROCEDURE — 1126F AMNT PAIN NOTED NONE PRSNT: CPT | Mod: HCNC,CPTII,S$GLB, | Performed by: INTERNAL MEDICINE

## 2023-03-16 PROCEDURE — 1159F PR MEDICATION LIST DOCUMENTED IN MEDICAL RECORD: ICD-10-PCS | Mod: HCNC,CPTII,S$GLB, | Performed by: INTERNAL MEDICINE

## 2023-03-16 RX ORDER — AMLODIPINE BESYLATE 5 MG/1
5 TABLET ORAL DAILY
Qty: 30 TABLET | Refills: 11 | Status: SHIPPED | OUTPATIENT
Start: 2023-03-16 | End: 2023-08-30 | Stop reason: DRUGHIGH

## 2023-03-16 NOTE — TELEPHONE ENCOUNTER
"Seeing Dr. Waldrop tomorrow. Informed pt daughter this RN attempted to reach her yesterday and advised pt to report to ER for jaundice. Daughter reports she received the message but was having phone issues yesterday. Daughter reports pt is jaundice "but it is not bad enough to go to ED". Denies fever, n/v, diarrhea and pale stools. Daughter reports pt has appt with cardiology for clearance for ERCP tomorrow. She will ask cardiologist if they think pt needs to go to ED. Reinforced importance of reporting to ED. Pt daughter will go to ED if she thinks it is necessary or if pt condition worsens.     "

## 2023-03-16 NOTE — PROGRESS NOTES
"  Subjective:      Patient ID: Pauline Cronin is a 82 y.o. female.    Chief Complaint: Pre-op Exam    HPI:  Pt c/o shortness of breath walking just a few feet for the past month  No swelling of legs.    No chest pain.    "I have restless legs"    No bleeding on Eliquis.    Pt dx with pancreatic mass and is scheduled for a biliary stent.    Eating regular food    Review of Systems   Cardiovascular:  Positive for dyspnea on exertion. Negative for chest pain, claudication, irregular heartbeat, leg swelling, near-syncope, orthopnea, palpitations and syncope.      Past Medical History:   Diagnosis Date    Anticoagulant long-term use     Atrial fibrillation     Crohn disease diagnosed in her 20's    GERD (gastroesophageal reflux disease)     Hyperlipidemia     Hypertension     Thyroid disease     s/p I 131        Past Surgical History:   Procedure Laterality Date    APPENDECTOMY      CARDIAC SURGERY  2014    pacemaker    COLONOSCOPY  ~2008    normal findings per patient report    ENDOSCOPIC ULTRASOUND OF UPPER GASTROINTESTINAL TRACT N/A 3/14/2023    Procedure: ULTRASOUND, UPPER GI TRACT, ENDOSCOPIC;  Surgeon: Andrei Swartz MD;  Location: [facility] ENDO;  Service: Endoscopy;  Laterality: N/A;  Approval to hold Eliquis rec'd from Dr. Barbosa (see telephone encounter 3/3/23)-DS  [employer] AICD/PPM  3/3/23-Instructions via portal-DS    ESOPHAGOGASTRODUODENOSCOPY N/A 7/17/2018    Procedure: EGD (ESOPHAGOGASTRODUODENOSCOPY);  Surgeon: Alondra Casiano MD;  Location: [facility] ENDO;  Service: Endoscopy;  Laterality: N/A;    HYSTERECTOMY      INSERTION OF IMPLANTABLE CARDIOVERTER-DEFIBRILLATOR (ICD) GENERATOR WITH TWO EXISTING LEADS Left 5/10/2021    Procedure: INSERTION, PULSE GENERATOR WITH 2 EXISTING LEADS, ICD;  Surgeon: Bo Leone MD;  Location: [facility] CATH LAB;  Service: Cardiology;  Laterality: Left;    INSERTION OF PACEMAKER      REPLACEMENT OF PACEMAKER GENERATOR  05/10/2021    RETROGRADE PYELOGRAPHY Right 2/18/2020    " Procedure: PYELOGRAM, RETROGRADE;  Surgeon: Jovan Kruse MD;  Location: [facility];  Service: Urology;  Laterality: Right;    SMALL INTESTINE SURGERY  in her 20's    for crohn's    TONSILLECTOMY      UPPER GASTROINTESTINAL ENDOSCOPY  ~    normal findings per patient report       Family History   Problem Relation Age of Onset    Cancer Mother     Breast cancer Mother     Crohn's disease Other     Colon cancer Neg Hx     Colon polyps Neg Hx     Esophageal cancer Neg Hx     Stomach cancer Neg Hx     Ulcerative colitis Neg Hx        Social History     Socioeconomic History    Marital status:    Tobacco Use    Smoking status: Former     Types: Cigarettes     Quit date:      Years since quittin.2    Smokeless tobacco: Never   Substance and Sexual Activity    Alcohol use: No    Drug use: No    Sexual activity: Never       Current Outpatient Medications on File Prior to Visit   Medication Sig Dispense Refill    alendronate (FOSAMAX) 70 MG tablet Take 1 tablet (70 mg total) by mouth every 7 days. 12 tablet 3    atorvastatin (LIPITOR) 20 MG tablet Take 1 tablet (20 mg total) by mouth once daily. 90 tablet 3    carvediloL (COREG) 6.25 MG tablet Take 1 tablet (6.25 mg total) by mouth 2 (two) times daily with meals. 180 tablet 3    colestipoL (COLESTID) 1 gram Tab Take 1 tablet (1 g total) by mouth 2 (two) times daily. 180 tablet 3    diphenoxylate-atropine 2.5-0.025 mg (LOMOTIL) 2.5-0.025 mg per tablet TAKE 1 TABLET BY MOUTH 4 TIMES DAILY AS NEEDED FOR DIARRHEA 60 tablet 3    ELIQUIS 5 mg Tab Take 1 tablet (5 mg total) by mouth 2 (two) times daily. 180 tablet 0    esomeprazole (NEXIUM) 40 MG capsule Take 1 capsule (40 mg total) by mouth once daily. 90 capsule 3    folic acid (FOLVITE) 1 MG tablet Take 1 tablet by mouth once daily 90 tablet 1    levothyroxine (SYNTHROID, LEVOTHROID) 175 MCG tablet Take 1 tablet by mouth once daily 30 tablet 5    magnesium gluconate 27.5 mg magne- sium (500 mg) Tab Take 500  mg by mouth once daily. 90 tablet 3    sotaloL (BETAPACE) 120 MG Tab Take 1 tablet (120 mg total) by mouth every 12 (twelve) hours. 180 tablet 3    sulfaSALAzine (AZULFIDINE) 500 mg Tab Take 1 tablet by mouth twice daily 180 tablet 3    tolterodine (DETROL LA) 4 MG 24 hr capsule Take 1 capsule (4 mg total) by mouth once daily. 90 capsule 3    [DISCONTINUED] amLODIPine (NORVASC) 10 MG tablet Take 1 tablet (10 mg total) by mouth once daily. 90 tablet 3     No current facility-administered medications on file prior to visit.       Review of patient's allergies indicates:   Allergen Reactions    Lisinopril Other (See Comments)     cough     Objective:     Vitals:    03/16/23 0837   BP: (!) 121/56   BP Location: Left arm   Patient Position: Sitting   BP Method: Large (Automatic)   Pulse: 72   Temp: 96.1 °F (35.6 °C)   SpO2: (!) 94%   Weight: 80.7 kg (177 lb 14.6 oz)   Height: 5' (1.524 m)        Physical Exam  Constitutional:       Appearance: Normal appearance. She is well-developed.   Eyes:      General: No scleral icterus.  Neck:      Vascular: No carotid bruit or JVD.   Cardiovascular:      Rate and Rhythm: Normal rate and regular rhythm.      Heart sounds: Murmur (II/VI systolic) heard.     No gallop.   Pulmonary:      Breath sounds: Normal breath sounds.   Musculoskeletal:      Right lower leg: No edema.      Left lower leg: No edema.   Skin:     General: Skin is warm and dry.   Neurological:      Mental Status: She is alert and oriented to person, place, and time.   Psychiatric:         Behavior: Behavior normal.         Thought Content: Thought content normal.         Judgment: Judgment normal.            ECG today: atrial paced rhythm with prolonged AV conduction, LBBB      Lab Visit on 03/02/2023   Component Date Value Ref Range Status    Iron 03/02/2023 19 (L)  30 - 160 ug/dL Final    Transferrin 03/02/2023 348  200 - 375 mg/dL Final    TIBC 03/02/2023 515 (H)  250 - 450 ug/dL Final    Saturated Iron  03/02/2023 4 (L)  20 - 50 % Final   Lab Visit on 03/01/2023   Component Date Value Ref Range Status    Specimen UA 03/01/2023 Urine, Clean Catch   Final    Color, UA 03/01/2023 Yellow  Yellow, Straw, Marilyn Final    Appearance, UA 03/01/2023 Hazy (A)  Clear Final    pH, UA 03/01/2023 6.0  5.0 - 8.0 Final    Specific Gravity, UA 03/01/2023 1.020  1.005 - 1.030 Final    Protein, UA 03/01/2023 Negative  Negative Final    Glucose, UA 03/01/2023 Negative  Negative Final    Ketones, UA 03/01/2023 Negative  Negative Final    Bilirubin (UA) 03/01/2023 Negative  Negative Final    Occult Blood UA 03/01/2023 Negative  Negative Final    Nitrite, UA 03/01/2023 Negative  Negative Final    Urobilinogen, UA 03/01/2023 Negative  Negative EU/dL Final    Leukocytes, UA 03/01/2023 Trace (A)  Negative Final    RBC, UA 03/01/2023 0  0 - 4 /hpf Final    WBC, UA 03/01/2023 6 (H)  0 - 5 /hpf Final    Bacteria 03/01/2023 Rare  None-Occ /hpf Final    Squam Epithel, UA 03/01/2023 1  /hpf Final    Hyaline Casts, UA 03/01/2023 7 (A)  0-1/lpf /lpf Final    Microscopic Comment 03/01/2023 SEE COMMENT   Final   Lab Visit on 03/01/2023   Component Date Value Ref Range Status    WBC 03/01/2023 4.50  3.90 - 12.70 K/uL Final    RBC 03/01/2023 3.62 (L)  4.00 - 5.40 M/uL Final    Hemoglobin 03/01/2023 9.3 (L)  12.0 - 16.0 g/dL Final    Hematocrit 03/01/2023 31.5 (L)  37.0 - 48.5 % Final    MCV 03/01/2023 87  82 - 98 fL Final    MCH 03/01/2023 25.7 (L)  27.0 - 31.0 pg Final    MCHC 03/01/2023 29.5 (L)  32.0 - 36.0 g/dL Final    RDW 03/01/2023 16.7 (H)  11.5 - 14.5 % Final    Platelets 03/01/2023 251  150 - 450 K/uL Final    MPV 03/01/2023 11.9  9.2 - 12.9 fL Final    Immature Granulocytes 03/01/2023 0.2  0.0 - 0.5 % Final    Gran # (ANC) 03/01/2023 3.0  1.8 - 7.7 K/uL Final    Immature Grans (Abs) 03/01/2023 0.01  0.00 - 0.04 K/uL Final    Lymph # 03/01/2023 0.9 (L)  1.0 - 4.8 K/uL Final    Mono # 03/01/2023 0.5  0.3 - 1.0 K/uL Final    Eos # 03/01/2023 0.1   0.0 - 0.5 K/uL Final    Baso # 03/01/2023 0.03  0.00 - 0.20 K/uL Final    nRBC 03/01/2023 0  0 /100 WBC Final    Gran % 03/01/2023 66.4  38.0 - 73.0 % Final    Lymph % 03/01/2023 19.6  18.0 - 48.0 % Final    Mono % 03/01/2023 10.7  4.0 - 15.0 % Final    Eosinophil % 03/01/2023 2.4  0.0 - 8.0 % Final    Basophil % 03/01/2023 0.7  0.0 - 1.9 % Final    Differential Method 03/01/2023 Automated   Final    Sodium 03/01/2023 139  136 - 145 mmol/L Final    Potassium 03/01/2023 3.9  3.5 - 5.1 mmol/L Final    Chloride 03/01/2023 107  95 - 110 mmol/L Final    CO2 03/01/2023 23  23 - 29 mmol/L Final    Glucose 03/01/2023 201 (H)  70 - 110 mg/dL Final    BUN 03/01/2023 14  8 - 23 mg/dL Final    Creatinine 03/01/2023 1.1  0.5 - 1.4 mg/dL Final    Calcium 03/01/2023 9.5  8.7 - 10.5 mg/dL Final    Total Protein 03/01/2023 7.4  6.0 - 8.4 g/dL Final    Albumin 03/01/2023 3.2 (L)  3.5 - 5.2 g/dL Final    Total Bilirubin 03/01/2023 0.7  0.1 - 1.0 mg/dL Final    Alkaline Phosphatase 03/01/2023 241 (H)  55 - 135 U/L Final    AST 03/01/2023 237 (H)  10 - 40 U/L Final    ALT 03/01/2023 196 (H)  10 - 44 U/L Final    Anion Gap 03/01/2023 9  8 - 16 mmol/L Final    eGFR 03/01/2023 50.5 (A)  >60 mL/min/1.73 m^2 Final    Cholesterol 03/01/2023 151  120 - 199 mg/dL Final    Triglycerides 03/01/2023 80  30 - 150 mg/dL Final    HDL 03/01/2023 66  40 - 75 mg/dL Final    LDL Cholesterol 03/01/2023 69.0  63.0 - 159.0 mg/dL Final    HDL/Cholesterol Ratio 03/01/2023 43.7  20.0 - 50.0 % Final    Total Cholesterol/HDL Ratio 03/01/2023 2.3  2.0 - 5.0 Final    Non-HDL Cholesterol 03/01/2023 85  mg/dL Final    TSH 03/01/2023 1.379  0.400 - 4.000 uIU/mL Final    Magnesium 03/01/2023 1.6  1.6 - 2.6 mg/dL Final   Admission on 11/08/2022, Discharged on 11/08/2022   Component Date Value Ref Range Status    WBC 11/08/2022 5.72  3.90 - 12.70 K/uL Final    RBC 11/08/2022 4.35  4.00 - 5.40 M/uL Final    Hemoglobin 11/08/2022 14.2  12.0 - 16.0 g/dL Final     Hematocrit 11/08/2022 42.4  37.0 - 48.5 % Final    MCV 11/08/2022 98  82 - 98 fL Final    MCH 11/08/2022 32.6 (H)  27.0 - 31.0 pg Final    MCHC 11/08/2022 33.5  32.0 - 36.0 g/dL Final    RDW 11/08/2022 13.8  11.5 - 14.5 % Final    Platelets 11/08/2022 169  150 - 450 K/uL Final    MPV 11/08/2022 11.6  9.2 - 12.9 fL Final    Immature Granulocytes 11/08/2022 0.3  0.0 - 0.5 % Final    Gran # (ANC) 11/08/2022 3.3  1.8 - 7.7 K/uL Final    Immature Grans (Abs) 11/08/2022 0.02  0.00 - 0.04 K/uL Final    Lymph # 11/08/2022 1.7  1.0 - 4.8 K/uL Final    Mono # 11/08/2022 0.5  0.3 - 1.0 K/uL Final    Eos # 11/08/2022 0.2  0.0 - 0.5 K/uL Final    Baso # 11/08/2022 0.04  0.00 - 0.20 K/uL Final    nRBC 11/08/2022 0  0 /100 WBC Final    Gran % 11/08/2022 57.2  38.0 - 73.0 % Final    Lymph % 11/08/2022 29.7  18.0 - 48.0 % Final    Mono % 11/08/2022 9.3  4.0 - 15.0 % Final    Eosinophil % 11/08/2022 2.8  0.0 - 8.0 % Final    Basophil % 11/08/2022 0.7  0.0 - 1.9 % Final    Differential Method 11/08/2022 Automated   Final    Sodium 11/08/2022 138  136 - 145 mmol/L Final    Potassium 11/08/2022 4.3  3.5 - 5.1 mmol/L Final    Chloride 11/08/2022 104  95 - 110 mmol/L Final    CO2 11/08/2022 24  23 - 29 mmol/L Final    Glucose 11/08/2022 106  70 - 110 mg/dL Final    BUN 11/08/2022 12  8 - 23 mg/dL Final    Creatinine 11/08/2022 1.2  0.5 - 1.4 mg/dL Final    Calcium 11/08/2022 9.4  8.7 - 10.5 mg/dL Final    Total Protein 11/08/2022 7.7  6.0 - 8.4 g/dL Final    Albumin 11/08/2022 3.7  3.5 - 5.2 g/dL Final    Total Bilirubin 11/08/2022 0.4  0.1 - 1.0 mg/dL Final    Alkaline Phosphatase 11/08/2022 54 (L)  55 - 135 U/L Final    AST 11/08/2022 23  10 - 40 U/L Final    ALT 11/08/2022 17  10 - 44 U/L Final    Anion Gap 11/08/2022 10  8 - 16 mmol/L Final    eGFR 11/08/2022 45.5 (A)  >60 mL/min/1.73 m^2 Final    Lipase 11/08/2022 47  4 - 60 U/L Final    Troponin I 11/08/2022 <0.006  0.000 - 0.026 ng/mL Final    POC Rapid COVID 11/08/2022 Negative   Negative Final     Acceptable 11/08/2022 Yes   Final    POC Molecular Influenza A Ag 11/08/2022 Negative  Negative, Not Reported Final    POC Molecular Influenza B Ag 11/08/2022 Negative  Negative, Not Reported Final     Acceptable 11/08/2022 Yes   Final   (         Discussed results of abnormal CT scan with patient personally including concerning pancreatic mass found at the head of the pancreas.  Urgent referral placed to eulalia ferreira with surgical oncology and hematology oncology. Phone numbers given to patient for reference. Questions addressed             PACS Images for [employer] Viewer     Show images for CT Abdomen Pelvis With Contrast      CT Abdomen Pelvis With Contrast (Order [id number]) - Reflex for Order [id number]      Urinalysis, Reflex to Urine Culture Urine, Clean Catch (Order [id number])  All Reviewers List    Machelle Arnold NP on 3/2/2023 09:25      Contains abnormal data CT Abdomen Pelvis With Contrast  Order: [id number] - Reflex for Order [id number]  Status: Final result      Visible to patient: Yes (seen)       Next appt: Today at 01:00 PM in Cardiology ([facility]ARDIOLOGY [facility])       Dx: Transaminitis; Elevated alkaline phos...       1 Result Note      Details    Reading Physician Reading Date Result Priority   Waylon Reid MD  [phone]  [phone] 3/1/2023 STAT     Narrative & Impression  EXAMINATION:  CT ABDOMEN PELVIS WITH CONTRAST     CLINICAL HISTORY:  Abdominal abscess/infection suspected; Elevation of levels of liver transaminase levels     TECHNIQUE:  Low dose axial images, sagittal and coronal reformations were obtained from the lung bases to the pubic symphysis following the IV administration of 75 mL of Omnipaque 350 and the oral administration of 450 mL of Readi-Cat.     COMPARISON:  02/22/2020     FINDINGS:  Images of the lower thorax are remarkable for a few 2-3 mm pulmonary nodules within the left lower lobe,  stable.  Pacer wires noted.     The liver is hypoattenuating suggesting steatosis, correlation with LFTs recommended.  The liver has a nodular contour.  There is intrahepatic biliary dilation.  The gallbladder is distended noting the common duct is dilated measuring up to 1.1 cm.  There is cholelithiasis.  The spleen and adrenal glands are unremarkable.  There is atrophic change of the pancreas noting pancreatic ductal dilation, increased since the previous examination.  There is a mass at the level of the pancreatic head, that abuts or invades the adjacent 2nd portion of the duodenum.  Measurement is difficult given relative amorphous appearance however the region in question measures approximately 3.3 x 2.8 cm.  The stomach is distended with ingested contrast and food products.  There is some degree of narrowing of the 2nd portion of the duodenum near the mass, suggesting some degree of gastric outlet obstruction.  The portal vein, splenic vein, SMV, celiac axis and SMA all are patent.  There are several scattered nonenlarged to upper limit of normal in caliber abdominal lymph nodes.     The kidneys enhance symmetrically without hydronephrosis or nephrolithiasis.  There are a few scattered subcentimeter low attenuating lesions within the kidneys bilaterally, all of which too small for characterization.  There is a possible calculus within the distal aspect of the right ureter versus adjacent phlebolith measuring 4 mm.  The left ureter is unremarkable.  The urinary bladder is unremarkable.  The uterus is absent the adnexa is unremarkable.     There are a few scattered colonic diverticula without inflammation.  Oral contrast reaches the terminal ileum.  The appendix is not identified.  The small bowel other than what was described earlier, is grossly unremarkable.  There is atherosclerotic calcification of the aorta and its branches.  Several scattered shotty periaortic and paracaval lymph nodes noted.  No focal  organized pelvic fluid collection.     There is osteopenia.  There are degenerative changes of the bilateral sacroiliac joints and spine.  No significant inguinal lymphadenopathy.  There are surgical changes of the anterior abdominal wall.     Impression:     This report was flagged in Epic as abnormal.     1. Interval development of mass at the level of the pancreatic head that abuts or possibly arises from the 2nd portion of the duodenum.  This results in interval dilation of the pancreatic duct, common duct, as well as several intrahepatic biliary ducts.  Finding is most concerning for malignancy.  Correlation is advised.  2. Above mass narrows the lumen of the 2nd portion of the duodenum, concerning for gastric outlet obstruction.  3. Nodular contour of the hepatic parenchyma, correlation with any history of hepatic disease or cirrhosis.  4. Cholelithiasis without secondary findings to suggest acute cholecystitis.  5. Calculus possibly within the distal aspect of the right ureter versus adjacent phlebolith.  No secondary findings to suggest obstructive uropathy.  This may reflect chronic finding.  6. Please see above for several additional findings.        Electronically signed by: Waylon Reid MD  Date:                                            03/01/2023  Time:                                           17:50       Assessment:     1. Atrioventricular block, complete    2. Essential hypertension, benign    3. Mixed hyperlipidemia    4. LBBB (left bundle branch block)    5. Pacemaker    6. Paroxysmal atrial fibrillation    7. Chronic anticoagulation    8. Acquired hypothyroidism      Plan:   Pauline was seen today for pre-op exam.    Diagnoses and all orders for this visit:    Atrioventricular block, complete  -     IN OFFICE EKG 12-LEAD (to Muse)  -     X-Ray Chest PA And Lateral; Future  -     Echo Saline Bubble? No; Future    Essential hypertension, benign  -     IN OFFICE EKG 12-LEAD (to Muse)  -     X-Ray  Chest PA And Lateral; Future  -     Echo Saline Bubble? No; Future    Mixed hyperlipidemia  -     IN OFFICE EKG 12-LEAD (to Muse)  -     X-Ray Chest PA And Lateral; Future  -     Echo Saline Bubble? No; Future    LBBB (left bundle branch block)  -     IN OFFICE EKG 12-LEAD (to Muse)  -     X-Ray Chest PA And Lateral; Future  -     Echo Saline Bubble? No; Future    Pacemaker  -     IN OFFICE EKG 12-LEAD (to Muse)  -     X-Ray Chest PA And Lateral; Future  -     Echo Saline Bubble? No; Future    Paroxysmal atrial fibrillation  -     IN OFFICE EKG 12-LEAD (to Muse)  -     X-Ray Chest PA And Lateral; Future  -     Echo Saline Bubble? No; Future    Chronic anticoagulation  -     IN OFFICE EKG 12-LEAD (to Muse)  -     X-Ray Chest PA And Lateral; Future  -     Echo Saline Bubble? No; Future    Acquired hypothyroidism  -     IN OFFICE EKG 12-LEAD (to Muse)  -     X-Ray Chest PA And Lateral; Future  -     Echo Saline Bubble? No; Future    Other orders  -     amLODIPine (NORVASC) 5 MG tablet; Take 1 tablet (5 mg total) by mouth once daily.     Discussed with pt the small risk of stroke from holding the Eliquis for 2 days prior to endoscopic procedure to place biliary stent.  However benefit of procedure outweighs risk.  Pt is medically stable for procedure.    Results of pancreatic biopsy are pending    Will reduce the amlodipine to 5 mg daily due to low blood pressure reading 3/14/23 documented on the day of the pancreatic biopsy    Etiology of shortness of breath is unclear.  Will repeat the CXR and echocardiogram    Follow up in about 4 weeks (around 4/13/2023).

## 2023-03-16 NOTE — TELEPHONE ENCOUNTER
Ok, could we also notify Dr. Swartz's team. She may need ERCP with stent placement. Looks like he was able to visualize the duodenum on the EUS earlier this week

## 2023-03-17 ENCOUNTER — TELEPHONE (OUTPATIENT)
Dept: ENDOSCOPY | Facility: HOSPITAL | Age: 83
End: 2023-03-17

## 2023-03-17 ENCOUNTER — HOSPITAL ENCOUNTER (INPATIENT)
Facility: HOSPITAL | Age: 83
LOS: 6 days | Discharge: HOME | DRG: 435 | End: 2023-03-23
Attending: EMERGENCY MEDICINE | Admitting: HOSPITALIST
Payer: MEDICARE

## 2023-03-17 DIAGNOSIS — I49.9 ARRHYTHMIA: ICD-10-CM

## 2023-03-17 DIAGNOSIS — R07.9 CHEST PAIN: ICD-10-CM

## 2023-03-17 DIAGNOSIS — K83.1 BILIARY OBSTRUCTION: Primary | ICD-10-CM

## 2023-03-17 DIAGNOSIS — C25.9 PANCREATIC ADENOCARCINOMA: ICD-10-CM

## 2023-03-17 PROBLEM — D64.9 ANEMIA: Status: ACTIVE | Noted: 2023-03-17

## 2023-03-17 LAB
ALBUMIN SERPL BCP-MCNC: 2.7 G/DL (ref 3.5–5.2)
ALP SERPL-CCNC: 308 U/L (ref 55–135)
ALT SERPL W/O P-5'-P-CCNC: 157 U/L (ref 10–44)
ANION GAP SERPL CALC-SCNC: 11 MMOL/L (ref 8–16)
AST SERPL-CCNC: 232 U/L (ref 10–40)
BASOPHILS # BLD AUTO: 0.05 K/UL (ref 0–0.2)
BASOPHILS NFR BLD: 1.1 % (ref 0–1.9)
BILIRUB SERPL-MCNC: 4.2 MG/DL (ref 0.1–1)
BILIRUB UR QL STRIP: ABNORMAL
BUN SERPL-MCNC: 7 MG/DL (ref 8–23)
CALCIUM SERPL-MCNC: 8.9 MG/DL (ref 8.7–10.5)
CHLORIDE SERPL-SCNC: 105 MMOL/L (ref 95–110)
CLARITY UR REFRACT.AUTO: CLEAR
CO2 SERPL-SCNC: 25 MMOL/L (ref 23–29)
COLOR UR AUTO: YELLOW
CREAT SERPL-MCNC: 0.7 MG/DL (ref 0.5–1.4)
DIFFERENTIAL METHOD: ABNORMAL
EOSINOPHIL # BLD AUTO: 0.1 K/UL (ref 0–0.5)
EOSINOPHIL NFR BLD: 2.9 % (ref 0–8)
ERYTHROCYTE [DISTWIDTH] IN BLOOD BY AUTOMATED COUNT: 18.2 % (ref 11.5–14.5)
EST. GFR  (NO RACE VARIABLE): >60 ML/MIN/1.73 M^2
GLUCOSE SERPL-MCNC: 114 MG/DL (ref 70–110)
GLUCOSE UR QL STRIP: NEGATIVE
HCT VFR BLD AUTO: 28.8 % (ref 37–48.5)
HGB BLD-MCNC: 8.9 G/DL (ref 12–16)
HGB UR QL STRIP: NEGATIVE
IMM GRANULOCYTES # BLD AUTO: 0.01 K/UL (ref 0–0.04)
IMM GRANULOCYTES NFR BLD AUTO: 0.2 % (ref 0–0.5)
KETONES UR QL STRIP: NEGATIVE
LACTATE SERPL-SCNC: 1.2 MMOL/L (ref 0.5–2.2)
LEUKOCYTE ESTERASE UR QL STRIP: NEGATIVE
LIPASE SERPL-CCNC: 699 U/L (ref 4–60)
LYMPHOCYTES # BLD AUTO: 1 K/UL (ref 1–4.8)
LYMPHOCYTES NFR BLD: 21.9 % (ref 18–48)
MCH RBC QN AUTO: 24 PG (ref 27–31)
MCHC RBC AUTO-ENTMCNC: 30.9 G/DL (ref 32–36)
MCV RBC AUTO: 78 FL (ref 82–98)
MONOCYTES # BLD AUTO: 0.6 K/UL (ref 0.3–1)
MONOCYTES NFR BLD: 12.7 % (ref 4–15)
NEUTROPHILS # BLD AUTO: 2.7 K/UL (ref 1.8–7.7)
NEUTROPHILS NFR BLD: 61.2 % (ref 38–73)
NITRITE UR QL STRIP: NEGATIVE
NRBC BLD-RTO: 0 /100 WBC
PH UR STRIP: 6 [PH] (ref 5–8)
PLATELET # BLD AUTO: 245 K/UL (ref 150–450)
PMV BLD AUTO: 11.7 FL (ref 9.2–12.9)
POTASSIUM SERPL-SCNC: 3.1 MMOL/L (ref 3.5–5.1)
PROT SERPL-MCNC: 7.1 G/DL (ref 6–8.4)
PROT UR QL STRIP: NEGATIVE
RBC # BLD AUTO: 3.71 M/UL (ref 4–5.4)
SODIUM SERPL-SCNC: 141 MMOL/L (ref 136–145)
SP GR UR STRIP: >1.03 (ref 1–1.03)
URN SPEC COLLECT METH UR: ABNORMAL
WBC # BLD AUTO: 4.42 K/UL (ref 3.9–12.7)

## 2023-03-17 PROCEDURE — 85025 COMPLETE CBC W/AUTO DIFF WBC: CPT | Mod: HCNC | Performed by: EMERGENCY MEDICINE

## 2023-03-17 PROCEDURE — 25500020 PHARM REV CODE 255: Mod: HCNC | Performed by: EMERGENCY MEDICINE

## 2023-03-17 PROCEDURE — 25000003 PHARM REV CODE 250: Mod: HCNC

## 2023-03-17 PROCEDURE — 81003 URINALYSIS AUTO W/O SCOPE: CPT | Mod: HCNC | Performed by: EMERGENCY MEDICINE

## 2023-03-17 PROCEDURE — 99235 PR OBSERV/HOSP SAME DATE,LEVL IV: ICD-10-PCS | Mod: HCNC,,, | Performed by: HOSPITALIST

## 2023-03-17 PROCEDURE — 99285 PR EMERGENCY DEPT VISIT,LEVEL V: ICD-10-PCS | Mod: ,,, | Performed by: EMERGENCY MEDICINE

## 2023-03-17 PROCEDURE — 94761 N-INVAS EAR/PLS OXIMETRY MLT: CPT | Mod: HCNC

## 2023-03-17 PROCEDURE — 20600001 HC STEP DOWN PRIVATE ROOM: Mod: HCNC

## 2023-03-17 PROCEDURE — 25000003 PHARM REV CODE 250: Mod: HCNC | Performed by: EMERGENCY MEDICINE

## 2023-03-17 PROCEDURE — 83690 ASSAY OF LIPASE: CPT | Mod: HCNC | Performed by: EMERGENCY MEDICINE

## 2023-03-17 PROCEDURE — 99285 EMERGENCY DEPT VISIT HI MDM: CPT | Mod: ,,, | Performed by: EMERGENCY MEDICINE

## 2023-03-17 PROCEDURE — 99285 EMERGENCY DEPT VISIT HI MDM: CPT | Mod: 25,HCNC

## 2023-03-17 PROCEDURE — 93010 EKG 12-LEAD: ICD-10-PCS | Mod: HCNC,,, | Performed by: INTERNAL MEDICINE

## 2023-03-17 PROCEDURE — 93010 ELECTROCARDIOGRAM REPORT: CPT | Mod: HCNC,,, | Performed by: INTERNAL MEDICINE

## 2023-03-17 PROCEDURE — 93005 ELECTROCARDIOGRAM TRACING: CPT | Mod: HCNC

## 2023-03-17 PROCEDURE — 99235 HOSP IP/OBS SAME DATE MOD 70: CPT | Mod: HCNC,,, | Performed by: HOSPITALIST

## 2023-03-17 PROCEDURE — 80053 COMPREHEN METABOLIC PANEL: CPT | Mod: HCNC | Performed by: EMERGENCY MEDICINE

## 2023-03-17 PROCEDURE — 83605 ASSAY OF LACTIC ACID: CPT | Mod: HCNC | Performed by: EMERGENCY MEDICINE

## 2023-03-17 RX ORDER — NALOXONE HCL 0.4 MG/ML
0.02 VIAL (ML) INJECTION
Status: DISCONTINUED | OUTPATIENT
Start: 2023-03-17 | End: 2023-03-23 | Stop reason: HOSPADM

## 2023-03-17 RX ORDER — AMLODIPINE BESYLATE 5 MG/1
5 TABLET ORAL DAILY
Status: DISCONTINUED | OUTPATIENT
Start: 2023-03-18 | End: 2023-03-23 | Stop reason: HOSPADM

## 2023-03-17 RX ORDER — IBUPROFEN 200 MG
16 TABLET ORAL
Status: DISCONTINUED | OUTPATIENT
Start: 2023-03-17 | End: 2023-03-17

## 2023-03-17 RX ORDER — GLUCAGON 1 MG
1 KIT INJECTION
Status: DISCONTINUED | OUTPATIENT
Start: 2023-03-17 | End: 2023-03-23 | Stop reason: HOSPADM

## 2023-03-17 RX ORDER — SODIUM CHLORIDE 0.9 % (FLUSH) 0.9 %
10 SYRINGE (ML) INJECTION
Status: DISCONTINUED | OUTPATIENT
Start: 2023-03-17 | End: 2023-03-23 | Stop reason: HOSPADM

## 2023-03-17 RX ORDER — OXYBUTYNIN CHLORIDE 10 MG/1
10 TABLET, EXTENDED RELEASE ORAL DAILY
Status: DISCONTINUED | OUTPATIENT
Start: 2023-03-18 | End: 2023-03-23 | Stop reason: HOSPADM

## 2023-03-17 RX ORDER — SULFASALAZINE 500 MG/1
500 TABLET ORAL 2 TIMES DAILY
Status: DISCONTINUED | OUTPATIENT
Start: 2023-03-17 | End: 2023-03-23 | Stop reason: HOSPADM

## 2023-03-17 RX ORDER — DEXTROSE 40 %
30 GEL (GRAM) ORAL
Status: DISCONTINUED | OUTPATIENT
Start: 2023-03-17 | End: 2023-03-23 | Stop reason: HOSPADM

## 2023-03-17 RX ORDER — PANTOPRAZOLE SODIUM 40 MG/1
40 TABLET, DELAYED RELEASE ORAL DAILY
Status: DISCONTINUED | OUTPATIENT
Start: 2023-03-18 | End: 2023-03-23 | Stop reason: HOSPADM

## 2023-03-17 RX ORDER — DEXTROSE 40 %
15 GEL (GRAM) ORAL
Status: DISCONTINUED | OUTPATIENT
Start: 2023-03-17 | End: 2023-03-23 | Stop reason: HOSPADM

## 2023-03-17 RX ORDER — TALC
6 POWDER (GRAM) TOPICAL NIGHTLY PRN
Status: DISCONTINUED | OUTPATIENT
Start: 2023-03-17 | End: 2023-03-23 | Stop reason: HOSPADM

## 2023-03-17 RX ORDER — IBUPROFEN 200 MG
24 TABLET ORAL
Status: DISCONTINUED | OUTPATIENT
Start: 2023-03-17 | End: 2023-03-17

## 2023-03-17 RX ORDER — POTASSIUM CHLORIDE 750 MG/1
50 CAPSULE, EXTENDED RELEASE ORAL ONCE
Status: COMPLETED | OUTPATIENT
Start: 2023-03-17 | End: 2023-03-17

## 2023-03-17 RX ORDER — SODIUM CHLORIDE 0.9 % (FLUSH) 0.9 %
10 SYRINGE (ML) INJECTION EVERY 12 HOURS PRN
Status: DISCONTINUED | OUTPATIENT
Start: 2023-03-17 | End: 2023-03-23 | Stop reason: HOSPADM

## 2023-03-17 RX ORDER — MONTELUKAST SODIUM 4 MG/1
1 TABLET, CHEWABLE ORAL 2 TIMES DAILY
Status: DISCONTINUED | OUTPATIENT
Start: 2023-03-17 | End: 2023-03-18

## 2023-03-17 RX ORDER — CARVEDILOL 6.25 MG/1
6.25 TABLET ORAL 2 TIMES DAILY WITH MEALS
Status: DISCONTINUED | OUTPATIENT
Start: 2023-03-17 | End: 2023-03-23 | Stop reason: HOSPADM

## 2023-03-17 RX ORDER — ONDANSETRON 8 MG/1
8 TABLET, ORALLY DISINTEGRATING ORAL EVERY 8 HOURS PRN
Status: DISCONTINUED | OUTPATIENT
Start: 2023-03-17 | End: 2023-03-23 | Stop reason: HOSPADM

## 2023-03-17 RX ORDER — SOTALOL HYDROCHLORIDE 120 MG/1
120 TABLET ORAL EVERY 12 HOURS
Status: DISCONTINUED | OUTPATIENT
Start: 2023-03-17 | End: 2023-03-23 | Stop reason: HOSPADM

## 2023-03-17 RX ORDER — ACETAMINOPHEN 325 MG/1
650 TABLET ORAL EVERY 4 HOURS PRN
Status: DISCONTINUED | OUTPATIENT
Start: 2023-03-17 | End: 2023-03-23 | Stop reason: HOSPADM

## 2023-03-17 RX ORDER — ATORVASTATIN CALCIUM 20 MG/1
20 TABLET, FILM COATED ORAL DAILY
Status: DISCONTINUED | OUTPATIENT
Start: 2023-03-18 | End: 2023-03-17

## 2023-03-17 RX ORDER — POLYETHYLENE GLYCOL 3350 17 G/17G
17 POWDER, FOR SOLUTION ORAL 2 TIMES DAILY PRN
Status: DISCONTINUED | OUTPATIENT
Start: 2023-03-17 | End: 2023-03-23 | Stop reason: HOSPADM

## 2023-03-17 RX ADMIN — Medication 6 MG: at 10:03

## 2023-03-17 RX ADMIN — SULFASALAZINE 500 MG: 500 TABLET ORAL at 10:03

## 2023-03-17 RX ADMIN — SOTALOL HYDROCHLORIDE 120 MG: 120 TABLET ORAL at 10:03

## 2023-03-17 RX ADMIN — POTASSIUM CHLORIDE 50 MEQ: 10 CAPSULE, COATED, EXTENDED RELEASE ORAL at 06:03

## 2023-03-17 RX ADMIN — CARVEDILOL 6.25 MG: 6.25 TABLET, FILM COATED ORAL at 06:03

## 2023-03-17 RX ADMIN — MONTELUKAST SODIUM 1 G: 4 TABLET, CHEWABLE ORAL at 09:03

## 2023-03-17 RX ADMIN — IOHEXOL 75 ML: 350 INJECTION, SOLUTION INTRAVENOUS at 01:03

## 2023-03-17 NOTE — TELEPHONE ENCOUNTER
----- Message from Stephanie Cortes sent at 3/17/2023  8:48 AM CDT -----  Regarding: Patient advice  Contact: Anna [phone]  Patient daughter resquesting a call back states she was told to bring patient to [facility] ER but is unsure of what to say when she arrives Please call to discuss further Did not want to speak Dr. Chelsey morse

## 2023-03-17 NOTE — ED PROVIDER NOTES
Encounter Date: 3/17/2023       History     Chief Complaint   Patient presents with    Abnormal Lab     Sent by outside provider for high bili. Hx of biliary obstruction. Scleral jaundice noted.      82-year-old female, with history of AFib, on Eliquis, pacemaker in-situ, Crohn's disease, hyperlipidemia, hypertension, presents to ED for elevated bilirubin.  Patient reports that she has been diagnosed with pancreatic tumor, plan to have a ERCP for stent placement on March 29.  Patient was seen by her cardiologist yesterday, on the lab work, found to have elevated bilirubin, patient also appears jaundice and scleral icterus, they recommended her to come to the ED for further evaluation.  Patient denies fever, abdominal pain, nausea, vomiting, dysuria, or diarrhea.      Review of patient's allergies indicates:   Allergen Reactions    Lisinopril Other (See Comments)     cough     Past Medical History:   Diagnosis Date    Anticoagulant long-term use     Atrial fibrillation     Crohn disease diagnosed in her 20's    GERD (gastroesophageal reflux disease)     Hyperlipidemia     Hypertension     Thyroid disease     s/p I 131     Past Surgical History:   Procedure Laterality Date    APPENDECTOMY      CARDIAC SURGERY  2014    pacemaker    COLONOSCOPY  ~2008    normal findings per patient report    ENDOSCOPIC ULTRASOUND OF UPPER GASTROINTESTINAL TRACT N/A 3/14/2023    Procedure: ULTRASOUND, UPPER GI TRACT, ENDOSCOPIC;  Surgeon: Andrei Swartz MD;  Location: [facility];  Service: Endoscopy;  Laterality: N/A;  Approval to hold Eliquis rec'd from Dr. Barbosa (see telephone encounter 3/3/23)-DS  [employer] AICD/PPM  3/3/23-Instructions via portal-DS    ESOPHAGOGASTRODUODENOSCOPY N/A 7/17/2018    Procedure: EGD (ESOPHAGOGASTRODUODENOSCOPY);  Surgeon: Alondra Casiano MD;  Location: [facility];  Service: Endoscopy;  Laterality: N/A;    HYSTERECTOMY      INSERTION OF IMPLANTABLE CARDIOVERTER-DEFIBRILLATOR (ICD) GENERATOR WITH TWO  EXISTING LEADS Left 5/10/2021    Procedure: INSERTION, PULSE GENERATOR WITH 2 EXISTING LEADS, ICD;  Surgeon: Bo eLone MD;  Location: [facility] CATH LAB;  Service: Cardiology;  Laterality: Left;    INSERTION OF PACEMAKER      REPLACEMENT OF PACEMAKER GENERATOR  05/10/2021    RETROGRADE PYELOGRAPHY Right 2020    Procedure: PYELOGRAM, RETROGRADE;  Surgeon: Jovan Kruse MD;  Location: [facility] OR;  Service: Urology;  Laterality: Right;    SMALL INTESTINE SURGERY  in her 20's    for crohn's    TONSILLECTOMY      UPPER GASTROINTESTINAL ENDOSCOPY  ~    normal findings per patient report     Family History   Problem Relation Age of Onset    Cancer Mother     Breast cancer Mother     Crohn's disease Other     Colon cancer Neg Hx     Colon polyps Neg Hx     Esophageal cancer Neg Hx     Stomach cancer Neg Hx     Ulcerative colitis Neg Hx      Social History     Tobacco Use    Smoking status: Former     Types: Cigarettes     Quit date:      Years since quittin.2    Smokeless tobacco: Never   Substance Use Topics    Alcohol use: No    Drug use: No     Review of Systems   Gastrointestinal:  Negative for abdominal pain, nausea and vomiting.   Skin:  Positive for color change (Jaundice).     Physical Exam     Initial Vitals [23 1011]   BP Pulse Resp Temp SpO2   (!) 122/59 87 20 98.3 °F (36.8 °C) 97 %      MAP       --         Physical Exam    Nursing note and vitals reviewed.  Constitutional: She appears well-developed. No distress.   HENT:   Head: Normocephalic and atraumatic.   Mouth/Throat: Oropharynx is clear and moist.   Eyes: Conjunctivae and EOM are normal. Scleral icterus is present.   Neck: No JVD present.   Normal range of motion.  Cardiovascular:  Normal rate, regular rhythm, normal heart sounds and intact distal pulses.           Pulmonary/Chest: Breath sounds normal. No respiratory distress.   Abdominal: Abdomen is soft. She exhibits no distension. There is no abdominal tenderness.    Musculoskeletal:         General: No tenderness or edema. Normal range of motion.      Cervical back: Normal range of motion.     Neurological: She is alert and oriented to person, place, and time. She has normal strength.   Skin: Skin is warm and dry. Capillary refill takes less than 2 seconds.   Jaundice       ED Course   Procedures  Labs Reviewed   CBC W/ AUTO DIFFERENTIAL - Abnormal; Notable for the following components:       Result Value    RBC 3.71 (*)     Hemoglobin 8.9 (*)     Hematocrit 28.8 (*)     MCV 78 (*)     MCH 24.0 (*)     MCHC 30.9 (*)     RDW 18.2 (*)     All other components within normal limits   COMPREHENSIVE METABOLIC PANEL - Abnormal; Notable for the following components:    Potassium 3.1 (*)     Glucose 114 (*)     BUN 7 (*)     Albumin 2.7 (*)     Total Bilirubin 4.2 (*)     Alkaline Phosphatase 308 (*)      (*)      (*)     All other components within normal limits   LIPASE - Abnormal; Notable for the following components:    Lipase 699 (*)     All other components within normal limits   URINALYSIS, REFLEX TO URINE CULTURE - Abnormal; Notable for the following components:    Specific Gravity, UA >1.030 (*)     Bilirubin (UA) 1+ (*)     All other components within normal limits    Narrative:     Specimen Source->Urine   LACTIC ACID, PLASMA   ISTAT CHEM8          Imaging Results               CT Abdomen Pelvis With Contrast (Final result)  Result time 03/17/23 14:52:13      Final result by Amor Boudreaux MD (03/17/23 14:52:13)                   Impression:      1. Ill-defined mass of the pancreatic head measures up to 3.5 cm.   Associated dilatation of the pancreatic duct and biliary ducts, concerning for biliary obstruction.  Overall appearance similar to prior.  2. Associated mass effect with narrowing of the adjacent duodenum, though without definite evidence of gastric outlet obstruction.  3. Cholelithiasis.  4.  Additional findings as above.  This report was flagged in  Epic as abnormal.    Electronically signed by resident: Nitesh Lagos  Date:    03/17/2023  Time:    13:56    Electronically signed by: Amor Boudreaux MD  Date:    03/17/2023  Time:    14:52               Narrative:    EXAMINATION:  CT ABDOMEN PELVIS WITH CONTRAST    CLINICAL HISTORY:  Pancreatic cancer, assess treatment response;    TECHNIQUE:  Low dose axial images were obtained from the lung bases to the pubic symphysis following the intravenous administration of  of Omnipaque 350.  Sagittal and coronal reformats were provided.    COMPARISON:  CT abdomen pelvis 03/01/2023.  CT renal stone 02/22/2020.      FINDINGS:  Heart: Postoperative change with partially visualized pacer leads.  Mildly prominent size.  No pericardial effusion.    Lung bases: Minimal bandlike subsegmental atelectasis versus scarring.  No consolidation, pleural effusion, or pneumothorax.    Liver: Normal in size and attenuation without suspicious focal hepatic abnormality.  Subcentimeter calcified granuloma of the hepatic dome.    Gallbladder: Layering calcified gallstones.  Gallbladder is mildly distended.  No other secondary signs to suggest acute cholecystitis.    Bile Ducts: Mild intrahepatic biliary ductal dilatation.  Dilatation of the extrahepatic common duct up to 1.1 cm.    Pancreas: Ill-defined mass of the pancreatic head measures approximately 3.5 x 3.5 x 2.7 (series 601, image 82; series 2, image 63), previously 3.3 x 2.8 cm.  Prominence of the pancreatic duct up to 6 mm at the head (coronal image 87), with abrupt tapering at the level of the pancreatic head mass (coronal image 79).  Minimal associated stranding.  Overall appearance similar to prior.    Spleen: Unremarkable.    Adrenals: Unremarkable.    Kidneys/ Ureters: Normal in size and location.  Kidneys enhance symmetrically.  Scattered subcentimeter hypodensities, too small to characterize.  No solid renal mass or nephrolithiasis.  No hydroureteronephrosis.    Bladder: Smooth  contours without bladder wall thickening.    Reproductive organs: Uterus is surgically absent.    GI Tract/Mesentery: The stomach is unremarkable.  Postoperative change of partial small-bowel resection.  Pancreatic head lesion and gallbladder exert mass effect upon the adjacent duodenal D2 segment, appears moderately narrowed at level.  Stomach is not distended.  Appendix surgically absent.    Peritoneal Space: No intraperitoneal free fluid or free air.    Lymph nodes: Prominent retroperitoneal lymph nodes, nonenlarged by size criteria.    Abdominal wall/extraperitoneal soft tissues: Postoperative change of the anterior abdominopelvic wall.  Subcutaneous calcifications overlies the bilateral quadratus lumborum, likely injection related.    Vasculature: Abdominal aorta is normal in caliber, contour, and course .  Mild aortoiliac calcific atherosclerosis.    Bones: Degenerative changes without acute fracture or bone destructive process.                                       Medications   iohexoL (OMNIPAQUE 350) injection 75 mL (75 mLs Intravenous Given 3/17/23 1310)     Medical Decision Making:   History:   Old Medical Records: I decided to obtain old medical records.  Initial Assessment:   82-year-old female, with history of AFib, on Eliquis, pacemaker in-situ, Crohn's disease, hyperlipidemia, hypertension, presents to ED for elevated bilirubin.  Patient is well-appearing, alert and oriented, physical exam significant for scleral icterus and jaundice.  No abdominal pain.  Hemodynamically stable, afebrile.  Differential Diagnosis:   Biliary obstruction secondary to pancreatic mass, choledocholithiasis, liver failure, doubt cholecystitis, cholangitis, hemolysis  Clinical Tests:   Lab Tests: Ordered and Reviewed  Radiological Study: Ordered and Reviewed          Attending Attestation:   Physician Attestation Statement for Resident:  As the supervising MD   Physician Attestation Statement: I have personally seen and  examined this patient.   I agree with the above history.  -: No abdominal pain or tenderness.  No fevers, weakness, lightheadedness.  Do not suspect ascending cholangitis.  Suspect biliary obstruction secondary to malignancy.  Repeat labs and CT ordered.  Will admit for advanced endoscopy services evaluation.   As the supervising MD I agree with the above PE.     As the supervising MD I agree with the above treatment, course, plan, and disposition.                  ED Course as of 03/17/23 1519   Fri Mar 17, 2023   1307 Comp. Metabolic Panel(!)  Hypokalemia, elevated bilirubin, alk phos, and liver enzymes.  Concerning for biliary obstruction [NC]   1307 CBC W/ AUTO DIFFERENTIAL(!)  No leukocytosis, stable anemia. [NC]   1307 Lipase(!): 699  Pancreatitis [NC]   1308 Lactate, Piyush: 1.2 [NC]   1509 CT Abdomen Pelvis With Contrast(!)  Dilated pancreatic duct and common bile duct, concerning for biliary obstruction [NC]   1509 Discussed with Hospital Medicine, agreed to admit patient for biliary obstruction management, likely AES consultation [NC]      ED Course User Index  [NC] Doc Barahona MD                   Clinical Impression:   Final diagnoses:  [K83.1] Biliary obstruction (Primary)        ED Disposition Condition    Admit Stable                Doc Barahona MD  Resident  03/17/23 1520

## 2023-03-17 NOTE — ASSESSMENT & PLAN NOTE
82 yoF w/ newly diagnosed pancreatic head mass s/p ERCP w/ biopsy with results pending presenting with elevated bilirubin, elevated lipase, jaundice, icterus but otherwise asymptomatic. No abdominal pain on exam. Imaging w/ 3.5 cm pancreatic head mass overall similar in appearance. Mild associated pancreatic stranding. Had planned for outpatient ERCP with stent placement on 3/29 but developed hyperbilirubinemia and presented to the ED. AES consulted on admission. Plan to monitor patient overnight; if labs improve then will plan for expedited ERCP as an outpatient. If labs worsen then possible ERCP w/ stent placement this admission.    -- AES consulted; appreciate recs  -- Full diet tonight; NPO at midnight  -- Hold apixaban (last dose was 3/16)

## 2023-03-17 NOTE — SUBJECTIVE & OBJECTIVE
Past Medical History:   Diagnosis Date    Anticoagulant long-term use     Atrial fibrillation     Crohn disease diagnosed in her 20's    GERD (gastroesophageal reflux disease)     Hyperlipidemia     Hypertension     Thyroid disease     s/p I 131       Past Surgical History:   Procedure Laterality Date    APPENDECTOMY      CARDIAC SURGERY  2014    pacemaker    COLONOSCOPY  ~2008    normal findings per patient report    ENDOSCOPIC ULTRASOUND OF UPPER GASTROINTESTINAL TRACT N/A 3/14/2023    Procedure: ULTRASOUND, UPPER GI TRACT, ENDOSCOPIC;  Surgeon: Andrei Swartz MD;  Location: [facility] ENDO;  Service: Endoscopy;  Laterality: N/A;  Approval to hold Eliquis rec'd from Dr. Barbosa (see telephone encounter 3/3/23)-DS  [employer] AICD/PPM  3/3/23-Instructions via portal-DS    ESOPHAGOGASTRODUODENOSCOPY N/A 7/17/2018    Procedure: EGD (ESOPHAGOGASTRODUODENOSCOPY);  Surgeon: Alondra Casiano MD;  Location: [facility] ENDO;  Service: Endoscopy;  Laterality: N/A;    HYSTERECTOMY      INSERTION OF IMPLANTABLE CARDIOVERTER-DEFIBRILLATOR (ICD) GENERATOR WITH TWO EXISTING LEADS Left 5/10/2021    Procedure: INSERTION, PULSE GENERATOR WITH 2 EXISTING LEADS, ICD;  Surgeon: Bo Leone MD;  Location: [facility] CATH LAB;  Service: Cardiology;  Laterality: Left;    INSERTION OF PACEMAKER      REPLACEMENT OF PACEMAKER GENERATOR  05/10/2021    RETROGRADE PYELOGRAPHY Right 2/18/2020    Procedure: PYELOGRAM, RETROGRADE;  Surgeon: Jovan Kruse MD;  Location: [facility] OR;  Service: Urology;  Laterality: Right;    SMALL INTESTINE SURGERY  in her 20's    for crohn's    TONSILLECTOMY      UPPER GASTROINTESTINAL ENDOSCOPY  ~2008    normal findings per patient report       Review of patient's allergies indicates:   Allergen Reactions    Lisinopril Other (See Comments)     cough       No current facility-administered medications on file prior to encounter.     Current Outpatient Medications on File Prior to Encounter   Medication Sig    alendronate  (FOSAMAX) 70 MG tablet Take 1 tablet (70 mg total) by mouth every 7 days.    amLODIPine (NORVASC) 5 MG tablet Take 1 tablet (5 mg total) by mouth once daily.    atorvastatin (LIPITOR) 20 MG tablet Take 1 tablet (20 mg total) by mouth once daily.    carvediloL (COREG) 6.25 MG tablet Take 1 tablet (6.25 mg total) by mouth 2 (two) times daily with meals.    colestipoL (COLESTID) 1 gram Tab Take 1 tablet (1 g total) by mouth 2 (two) times daily.    diphenoxylate-atropine 2.5-0.025 mg (LOMOTIL) 2.5-0.025 mg per tablet TAKE 1 TABLET BY MOUTH 4 TIMES DAILY AS NEEDED FOR DIARRHEA    esomeprazole (NEXIUM) 40 MG capsule Take 1 capsule (40 mg total) by mouth once daily.    folic acid (FOLVITE) 1 MG tablet Take 1 tablet by mouth once daily    levothyroxine (SYNTHROID, LEVOTHROID) 175 MCG tablet Take 1 tablet by mouth once daily    magnesium gluconate 27.5 mg magne- sium (500 mg) Tab Take 500 mg by mouth once daily.    sotaloL (BETAPACE) 120 MG Tab Take 1 tablet (120 mg total) by mouth every 12 (twelve) hours.    sulfaSALAzine (AZULFIDINE) 500 mg Tab Take 1 tablet by mouth twice daily    tolterodine (DETROL LA) 4 MG 24 hr capsule Take 1 capsule (4 mg total) by mouth once daily.    ELIQUIS 5 mg Tab Take 1 tablet (5 mg total) by mouth 2 (two) times daily.     Family History       Problem Relation (Age of Onset)    Breast cancer Mother    Cancer Mother    Crohn's disease Other          Tobacco Use    Smoking status: Former     Types: Cigarettes     Quit date:      Years since quittin.2    Smokeless tobacco: Never   Substance and Sexual Activity    Alcohol use: No    Drug use: No    Sexual activity: Never     Review of Systems   Constitutional:  Positive for unexpected weight change. Negative for chills, diaphoresis and fever.   HENT:  Negative for rhinorrhea, sinus pressure and trouble swallowing.    Respiratory:  Negative for cough, shortness of breath and wheezing.    Cardiovascular:  Negative for chest pain,  palpitations and leg swelling.   Gastrointestinal:  Negative for abdominal distention, abdominal pain, constipation, diarrhea, nausea and vomiting.   Genitourinary:  Negative for dysuria, flank pain and hematuria.   Musculoskeletal:  Negative for joint swelling and myalgias.   Skin:  Positive for color change.   Neurological:  Negative for weakness, numbness and headaches.   Psychiatric/Behavioral:  Negative for agitation. The patient is not hyperactive.    Objective:     Vital Signs (Most Recent):  Temp: 98 °F (36.7 °C) (03/17/23 1657)  Pulse: 82 (03/17/23 1657)  Resp: 18 (03/17/23 1657)  BP: 136/64 (03/17/23 1657)  SpO2: 95 % (03/17/23 1657) Vital Signs (24h Range):  Temp:  [98 °F (36.7 °C)-98.3 °F (36.8 °C)] 98 °F (36.7 °C)  Pulse:  [69-87] 82  Resp:  [18-20] 18  SpO2:  [95 %-97 %] 95 %  BP: (105-136)/(55-64) 136/64     Weight: 80.3 kg (177 lb)  Body mass index is 34.57 kg/m².    Physical Exam  Constitutional:       General: She is not in acute distress.     Appearance: Normal appearance. She is obese.   HENT:      Head: Normocephalic and atraumatic.      Right Ear: External ear normal.      Left Ear: External ear normal.      Nose: No congestion or rhinorrhea.      Mouth/Throat:      Mouth: Mucous membranes are moist.      Pharynx: Oropharynx is clear.   Eyes:      General: Scleral icterus present.      Extraocular Movements: Extraocular movements intact.      Conjunctiva/sclera: Conjunctivae normal.   Cardiovascular:      Rate and Rhythm: Normal rate and regular rhythm.      Pulses: Normal pulses.      Heart sounds: Normal heart sounds. No murmur heard.  Pulmonary:      Effort: Pulmonary effort is normal.      Breath sounds: Normal breath sounds. No wheezing or rales.   Abdominal:      General: Abdomen is flat. Bowel sounds are normal. There is no distension.      Palpations: Abdomen is soft.      Tenderness: There is no abdominal tenderness. There is no guarding.   Musculoskeletal:         General: No  swelling.      Cervical back: Normal range of motion.      Right lower leg: No edema.      Left lower leg: No edema.   Skin:     General: Skin is warm and dry.      Capillary Refill: Capillary refill takes less than 2 seconds.      Coloration: Skin is jaundiced.      Findings: No rash.   Neurological:      Mental Status: She is alert and oriented to person, place, and time. Mental status is at baseline.      Sensory: No sensory deficit.      Motor: No weakness.   Psychiatric:         Mood and Affect: Mood normal.         Behavior: Behavior normal.        Significant Labs: All pertinent labs within the past 24 hours have been reviewed.  Bilirubin:   Recent Labs   Lab 03/01/23  1038 03/16/23  0952 03/17/23  1123   BILITOT 0.7 4.3* 4.2*     CBC:   Recent Labs   Lab 03/16/23  0952 03/17/23  1123   WBC 4.49 4.42   HGB 8.9* 8.9*   HCT 29.8* 28.8*    245     CMP:   Recent Labs   Lab 03/16/23  0952 03/17/23  1123    141   K 3.4* 3.1*    105   CO2 24 25   * 114*   BUN 10 7*   CREATININE 0.8 0.7   CALCIUM 8.9 8.9   PROT 7.1 7.1   ALBUMIN 2.7* 2.7*   BILITOT 4.3* 4.2*   ALKPHOS 274* 308*   * 232*   * 157*   ANIONGAP 11 11     Lipase:   Recent Labs   Lab 03/17/23  1123   LIPASE 699*     TSH:   Recent Labs   Lab 03/16/23  0952   TSH 0.450       Significant Imaging: I have reviewed all pertinent imaging results/findings within the past 24 hours.

## 2023-03-17 NOTE — H&P
[facility] Medicine  History & Physical    Patient Name: Pauline Cronin  MRN: [id number]  Patient Class: IP- Inpatient  Admission Date: 3/17/2023  Attending Physician: Juliana Cordoba MD   Primary Care Provider: Ga Waldrop MD    Patient information was obtained from patient, past medical records and ER records.     Subjective:     Principal Problem:Biliary obstruction    Chief Complaint:   Chief Complaint   Patient presents with    Abnormal Lab     Sent by outside provider for high bili. Hx of biliary obstruction. Scleral jaundice noted.         HPI: The patient is an 82 year old female with a history of A-fib (on Eliquis, last dose 3/16), sick sinus syndrome (s/p PPM), Crohn's disease, HTN, HLD, and recently diagnosed pancreatic head mass who presents to the [facility] ED after being found to have an elevated bilirubin (4.3) by her outpatient cardiologist. She is jaundiced with scleral icterus but otherwise reports no acute symptoms. She denies fever, chills, headache, nausea, vomiting, itching, abdominal pain, diarrhea, or constipation.    Oncologic history: The patient presented to her PCP on March 1st reporting fatigue and 10 pounds of unintentional weight loss. She was sent for a CT abdomen/pelvis on 3/1/2023 which demonstrated a pancreatic head mass. She underwent ERCP w/ biopsy of the mass on 3/14, biopsy results are pending at the time of admission. Plan for outpatient ERCP w/ stent placement was planned for 3/29. She was instructed to present to the ED due to elevated bilirubin, jaundice, and icterus noted on outpatient labs on 3/16.    In the [facility] ED the patient is hemodynamically stable, initial evaluation significant for WBC 4.42, Hgb 8.9, plts 245, Na 141, K 3.1, Bicab 25, Cr 0.7 (at baseline), Bili 4.2, alk phos 308, , .      Past Medical History:   Diagnosis Date    Anticoagulant long-term use     Atrial fibrillation     Crohn disease diagnosed in her 20's    GERD  (gastroesophageal reflux disease)     Hyperlipidemia     Hypertension     Thyroid disease     s/p I 131       Past Surgical History:   Procedure Laterality Date    APPENDECTOMY      CARDIAC SURGERY  2014    pacemaker    COLONOSCOPY  ~2008    normal findings per patient report    ENDOSCOPIC ULTRASOUND OF UPPER GASTROINTESTINAL TRACT N/A 3/14/2023    Procedure: ULTRASOUND, UPPER GI TRACT, ENDOSCOPIC;  Surgeon: Andrei Swartz MD;  Location: [facility] ENDO;  Service: Endoscopy;  Laterality: N/A;  Approval to hold Eliquis rec'd from Dr. Barbosa (see telephone encounter 3/3/23)-DS  [employer] AICD/PPM  3/3/23-Instructions via portal-DS    ESOPHAGOGASTRODUODENOSCOPY N/A 7/17/2018    Procedure: EGD (ESOPHAGOGASTRODUODENOSCOPY);  Surgeon: Alondra Casiano MD;  Location: [facility] ENDO;  Service: Endoscopy;  Laterality: N/A;    HYSTERECTOMY      INSERTION OF IMPLANTABLE CARDIOVERTER-DEFIBRILLATOR (ICD) GENERATOR WITH TWO EXISTING LEADS Left 5/10/2021    Procedure: INSERTION, PULSE GENERATOR WITH 2 EXISTING LEADS, ICD;  Surgeon: Bo Leone MD;  Location: [facility] CATH LAB;  Service: Cardiology;  Laterality: Left;    INSERTION OF PACEMAKER      REPLACEMENT OF PACEMAKER GENERATOR  05/10/2021    RETROGRADE PYELOGRAPHY Right 2/18/2020    Procedure: PYELOGRAM, RETROGRADE;  Surgeon: Jovan Kruse MD;  Location: [facility];  Service: Urology;  Laterality: Right;    SMALL INTESTINE SURGERY  in her 20's    for crohn's    TONSILLECTOMY      UPPER GASTROINTESTINAL ENDOSCOPY  ~2008    normal findings per patient report       Review of patient's allergies indicates:   Allergen Reactions    Lisinopril Other (See Comments)     cough       No current facility-administered medications on file prior to encounter.     Current Outpatient Medications on File Prior to Encounter   Medication Sig    alendronate (FOSAMAX) 70 MG tablet Take 1 tablet (70 mg total) by mouth every 7 days.    amLODIPine (NORVASC) 5 MG tablet Take 1 tablet (5 mg total) by  mouth once daily.    atorvastatin (LIPITOR) 20 MG tablet Take 1 tablet (20 mg total) by mouth once daily.    carvediloL (COREG) 6.25 MG tablet Take 1 tablet (6.25 mg total) by mouth 2 (two) times daily with meals.    colestipoL (COLESTID) 1 gram Tab Take 1 tablet (1 g total) by mouth 2 (two) times daily.    diphenoxylate-atropine 2.5-0.025 mg (LOMOTIL) 2.5-0.025 mg per tablet TAKE 1 TABLET BY MOUTH 4 TIMES DAILY AS NEEDED FOR DIARRHEA    esomeprazole (NEXIUM) 40 MG capsule Take 1 capsule (40 mg total) by mouth once daily.    folic acid (FOLVITE) 1 MG tablet Take 1 tablet by mouth once daily    levothyroxine (SYNTHROID, LEVOTHROID) 175 MCG tablet Take 1 tablet by mouth once daily    magnesium gluconate 27.5 mg magne- sium (500 mg) Tab Take 500 mg by mouth once daily.    sotaloL (BETAPACE) 120 MG Tab Take 1 tablet (120 mg total) by mouth every 12 (twelve) hours.    sulfaSALAzine (AZULFIDINE) 500 mg Tab Take 1 tablet by mouth twice daily    tolterodine (DETROL LA) 4 MG 24 hr capsule Take 1 capsule (4 mg total) by mouth once daily.    ELIQUIS 5 mg Tab Take 1 tablet (5 mg total) by mouth 2 (two) times daily.     Family History       Problem Relation (Age of Onset)    Breast cancer Mother    Cancer Mother    Crohn's disease Other          Tobacco Use    Smoking status: Former     Types: Cigarettes     Quit date:      Years since quittin.2    Smokeless tobacco: Never   Substance and Sexual Activity    Alcohol use: No    Drug use: No    Sexual activity: Never     Review of Systems   Constitutional:  Positive for unexpected weight change. Negative for chills, diaphoresis and fever.   HENT:  Negative for rhinorrhea, sinus pressure and trouble swallowing.    Respiratory:  Negative for cough, shortness of breath and wheezing.    Cardiovascular:  Negative for chest pain, palpitations and leg swelling.   Gastrointestinal:  Negative for abdominal distention, abdominal pain, constipation, diarrhea, nausea and vomiting.    Genitourinary:  Negative for dysuria, flank pain and hematuria.   Musculoskeletal:  Negative for joint swelling and myalgias.   Skin:  Positive for color change.   Neurological:  Negative for weakness, numbness and headaches.   Psychiatric/Behavioral:  Negative for agitation. The patient is not hyperactive.    Objective:     Vital Signs (Most Recent):  Temp: 98 °F (36.7 °C) (03/17/23 1657)  Pulse: 82 (03/17/23 1657)  Resp: 18 (03/17/23 1657)  BP: 136/64 (03/17/23 1657)  SpO2: 95 % (03/17/23 1657) Vital Signs (24h Range):  Temp:  [98 °F (36.7 °C)-98.3 °F (36.8 °C)] 98 °F (36.7 °C)  Pulse:  [69-87] 82  Resp:  [18-20] 18  SpO2:  [95 %-97 %] 95 %  BP: (105-136)/(55-64) 136/64     Weight: 80.3 kg (177 lb)  Body mass index is 34.57 kg/m².    Physical Exam  Constitutional:       General: She is not in acute distress.     Appearance: Normal appearance. She is obese.   HENT:      Head: Normocephalic and atraumatic.      Right Ear: External ear normal.      Left Ear: External ear normal.      Nose: No congestion or rhinorrhea.      Mouth/Throat:      Mouth: Mucous membranes are moist.      Pharynx: Oropharynx is clear.   Eyes:      General: Scleral icterus present.      Extraocular Movements: Extraocular movements intact.      Conjunctiva/sclera: Conjunctivae normal.   Cardiovascular:      Rate and Rhythm: Normal rate and regular rhythm.      Pulses: Normal pulses.      Heart sounds: Normal heart sounds. No murmur heard.  Pulmonary:      Effort: Pulmonary effort is normal.      Breath sounds: Normal breath sounds. No wheezing or rales.   Abdominal:      General: Abdomen is flat. Bowel sounds are normal. There is no distension.      Palpations: Abdomen is soft.      Tenderness: There is no abdominal tenderness. There is no guarding.   Musculoskeletal:         General: No swelling.      Cervical back: Normal range of motion.      Right lower leg: No edema.      Left lower leg: No edema.   Skin:     General: Skin is warm and  dry.      Capillary Refill: Capillary refill takes less than 2 seconds.      Coloration: Skin is jaundiced.      Findings: No rash.   Neurological:      Mental Status: She is alert and oriented to person, place, and time. Mental status is at baseline.      Sensory: No sensory deficit.      Motor: No weakness.   Psychiatric:         Mood and Affect: Mood normal.         Behavior: Behavior normal.        Significant Labs: All pertinent labs within the past 24 hours have been reviewed.  Bilirubin:   Recent Labs   Lab 03/01/23  1038 03/16/23  0952 03/17/23  1123   BILITOT 0.7 4.3* 4.2*     CBC:   Recent Labs   Lab 03/16/23  0952 03/17/23  1123   WBC 4.49 4.42   HGB 8.9* 8.9*   HCT 29.8* 28.8*    245     CMP:   Recent Labs   Lab 03/16/23  0952 03/17/23  1123    141   K 3.4* 3.1*    105   CO2 24 25   * 114*   BUN 10 7*   CREATININE 0.8 0.7   CALCIUM 8.9 8.9   PROT 7.1 7.1   ALBUMIN 2.7* 2.7*   BILITOT 4.3* 4.2*   ALKPHOS 274* 308*   * 232*   * 157*   ANIONGAP 11 11     Lipase:   Recent Labs   Lab 03/17/23  1123   LIPASE 699*     TSH:   Recent Labs   Lab 03/16/23  0952   TSH 0.450       Significant Imaging: I have reviewed all pertinent imaging results/findings within the past 24 hours.    Assessment/Plan:     * Biliary obstruction  82 yoF w/ newly diagnosed pancreatic head mass s/p ERCP w/ biopsy with results pending presenting with elevated bilirubin, elevated lipase, jaundice, icterus but otherwise asymptomatic. No abdominal pain on exam. Imaging w/ 3.5 cm pancreatic head mass overall similar in appearance. Mild associated pancreatic stranding. Had planned for outpatient ERCP with stent placement on 3/29 but developed hyperbilirubinemia and presented to the ED. AES consulted on admission. Plan to monitor patient overnight; if labs improve then will plan for expedited ERCP as an outpatient. If labs worsen then possible ERCP w/ stent placement this admission.    -- AES consulted;  appreciate recs  -- Full diet tonight; NPO at midnight  -- Hold apixaban (last dose was 3/16)    Paroxysmal atrial fibrillation  H/o A-fib. Anticoagulated w/ apixaban (last dose 3/16). Rate controlled w/ carvedilol 6.25 mg daily and sotalol 120 mg BID.    -- Check ECG to establish Qtc on sotalol  -- Hold apixaban for possible ERCP  -- Continue sotalol and carvedilol      Anemia  Hgb 8.9 on admission. Recently decreased from 14.2 four months ago. No evidence of bleeding on exam. Outpatient serologic evaluation significant for normal B12 and folate. Iron low at 19, ferritin not checked.    -- Trend CBC  -- Check ferritin; if low then will start iron supplementation on discharge    Chronic anticoagulation  On apixaban outpatient for A-fib; will hold given possible procedure.    Sick sinus syndrome  S/p PPM. Stable.    GERD (gastroesophageal reflux disease)  Takes esomeprazole 40 mg daily at home    -- Continue home PPI    OAB (overactive bladder)  Patient takes tolterodine at home (not on formulary)    -- Oxybutynin 10 mg daily while admitted    Pacemaker  H/o PPM secondary to sick sinus syndrome and complete heart block. Follows w/ cardiology. No acute concerns.    Hypothyroidism  Remote history of hyperthyroidism w/ radioactive iodine ablation, now takes synthroid daily. TSH normal on admission, free T4 1.97.    -- Continue levothyroxine 175 mcg daily    Essential hypertension, benign  Patient takes amlodipine 5 mg daily and carvedilol 6.25 mg BID at home.    -- Continue home antihypertensives    Hyperlipidemia  Last LDL 69 on 3/1/23; takes atorvastatin 20 mg daily at home.    -- Hold atorvastatin given elevated aminotransferases on admission      Crohn's disease of large intestine without complication  Chronic and stable. Patient takes colestipol, sulfasalazine at home.    -- Continue sulfasalazine and colestipol      VTE Risk Mitigation (From admission, onward)           Ordered     Reason for No Pharmacological  VTE Prophylaxis  Once        Question:  Reasons:  Answer:  Physician Provided (leave comment)  Comment:  Pending possible procedure    03/17/23 1628     IP VTE HIGH RISK PATIENT  Once         03/17/23 1628     Place sequential compression device  Until discontinued         03/17/23 1628     Place sequential compression device  Until discontinued         03/17/23 1554                    On 03/17/2023, patient should be placed in hospital observation services under my care.      Dave Ortega MD  Department of Hospital Medicine  [facility]                      03/17/2023                             STAFF PHYSICIAN NOTE                                   Attending Attestation for Rounds with Resident  I have reviewed and concur with the resident's history, physical, assessment, and plan.  I have personally interviewed and examined the patient at bedside and agree with the resident's findings.                  82-year-old female, with history of AFib, on Eliquis, pacemaker in-situ, Crohn's disease, hyperlipidemia, hypertension, known pancreatic mass and plan for ERCP 3/29, presents to ED for elevated bilirubin- 4.2.                        Juliana Cordoba MD  Senior Hospitalist  Department of Hospital Medicine  [facility]  [id number], [phone]

## 2023-03-17 NOTE — ASSESSMENT & PLAN NOTE
Remote history of hyperthyroidism w/ radioactive iodine ablation, now takes synthroid daily. TSH normal on admission, free T4 1.97.    -- Continue levothyroxine 175 mcg daily

## 2023-03-17 NOTE — ASSESSMENT & PLAN NOTE
Chronic and stable. Patient takes colestipol, sulfasalazine at home.    -- Continue sulfasalazine and colestipol

## 2023-03-17 NOTE — ASSESSMENT & PLAN NOTE
H/o PPM secondary to sick sinus syndrome and complete heart block. Follows w/ cardiology. No acute concerns.

## 2023-03-17 NOTE — TELEPHONE ENCOUNTER
I spoke to pt's daughter in law (pt is napping)  CXR OK  Lab shows elevated bilirubin indicating that the pancreatic mass is blocking the flow of bile.    Recommend: discontinue the Eliquis and go to the [facility] ER since biliary stenting is likely going to have to be performed more urgently.

## 2023-03-17 NOTE — HPI
The patient is an 82 year old female with a history of A-fib (on Eliquis, last dose 3/16), sick sinus syndrome (s/p PPM), Crohn's disease, HTN, HLD, and recently diagnosed pancreatic head mass who presents to the [facility] ED after being found to have an elevated bilirubin (4.3) by her outpatient cardiologist. She is jaundiced with scleral icterus but otherwise reports no acute symptoms. She denies fever, chills, headache, nausea, vomiting, itching, abdominal pain, diarrhea, or constipation.    Oncologic history: The patient presented to her PCP on March 1st reporting fatigue and 10 pounds of unintentional weight loss. She was sent for a CT abdomen/pelvis on 3/1/2023 which demonstrated a pancreatic head mass. She underwent ERCP w/ biopsy of the mass on 3/14, biopsy results are pending at the time of admission. Plan for outpatient ERCP w/ stent placement was planned for 3/29. She was instructed to present to the ED due to elevated bilirubin, jaundice, and icterus noted on outpatient labs on 3/16.    In the [facility] ED the patient is hemodynamically stable, initial evaluation significant for WBC 4.42, Hgb 8.9, plts 245, Na 141, K 3.1, Bicab 25, Cr 0.7 (at baseline), Bili 4.2, alk phos 308, , .

## 2023-03-17 NOTE — ASSESSMENT & PLAN NOTE
Patient takes amlodipine 5 mg daily and carvedilol 6.25 mg BID at home.    -- Continue home antihypertensives

## 2023-03-17 NOTE — ASSESSMENT & PLAN NOTE
Last LDL 69 on 3/1/23; takes atorvastatin 20 mg daily at home.    -- Hold atorvastatin given elevated aminotransferases on admission

## 2023-03-17 NOTE — ASSESSMENT & PLAN NOTE
Hgb 8.9 on admission. Recently decreased from 14.2 four months ago. No evidence of bleeding on exam. Outpatient serologic evaluation significant for normal B12 and folate. Iron low at 19, ferritin not checked.    -- Trend CBC  -- Check ferritin; if low then will start iron supplementation on discharge

## 2023-03-17 NOTE — ASSESSMENT & PLAN NOTE
H/o A-fib. Anticoagulated w/ apixaban (last dose 3/16). Rate controlled w/ carvedilol 6.25 mg daily and sotalol 120 mg BID.    -- Check ECG to establish Qtc on sotalol  -- Hold apixaban for possible ERCP  -- Continue sotalol and carvedilol

## 2023-03-18 LAB
ALBUMIN SERPL BCP-MCNC: 2.3 G/DL (ref 3.5–5.2)
ALP SERPL-CCNC: 261 U/L (ref 55–135)
ALT SERPL W/O P-5'-P-CCNC: 129 U/L (ref 10–44)
ANION GAP SERPL CALC-SCNC: 7 MMOL/L (ref 8–16)
AST SERPL-CCNC: 179 U/L (ref 10–40)
BASOPHILS # BLD AUTO: 0.03 K/UL (ref 0–0.2)
BASOPHILS NFR BLD: 0.8 % (ref 0–1.9)
BILIRUB DIRECT SERPL-MCNC: 3.2 MG/DL (ref 0.1–0.3)
BILIRUB SERPL-MCNC: 4 MG/DL (ref 0.1–1)
BUN SERPL-MCNC: 10 MG/DL (ref 8–23)
CALCIUM SERPL-MCNC: 8.5 MG/DL (ref 8.7–10.5)
CHLORIDE SERPL-SCNC: 109 MMOL/L (ref 95–110)
CO2 SERPL-SCNC: 24 MMOL/L (ref 23–29)
CREAT SERPL-MCNC: 0.8 MG/DL (ref 0.5–1.4)
DIFFERENTIAL METHOD: ABNORMAL
EOSINOPHIL # BLD AUTO: 0.1 K/UL (ref 0–0.5)
EOSINOPHIL NFR BLD: 3.1 % (ref 0–8)
ERYTHROCYTE [DISTWIDTH] IN BLOOD BY AUTOMATED COUNT: 18.3 % (ref 11.5–14.5)
EST. GFR  (NO RACE VARIABLE): >60 ML/MIN/1.73 M^2
FERRITIN SERPL-MCNC: 28 NG/ML (ref 20–300)
GLUCOSE SERPL-MCNC: 116 MG/DL (ref 70–110)
HCT VFR BLD AUTO: 26.3 % (ref 37–48.5)
HGB BLD-MCNC: 7.9 G/DL (ref 12–16)
IMM GRANULOCYTES # BLD AUTO: 0.01 K/UL (ref 0–0.04)
IMM GRANULOCYTES NFR BLD AUTO: 0.3 % (ref 0–0.5)
INR PPP: 1.2 (ref 0.8–1.2)
LYMPHOCYTES # BLD AUTO: 0.7 K/UL (ref 1–4.8)
LYMPHOCYTES NFR BLD: 20.6 % (ref 18–48)
MAGNESIUM SERPL-MCNC: 1.1 MG/DL (ref 1.6–2.6)
MCH RBC QN AUTO: 23.7 PG (ref 27–31)
MCHC RBC AUTO-ENTMCNC: 30 G/DL (ref 32–36)
MCV RBC AUTO: 79 FL (ref 82–98)
MONOCYTES # BLD AUTO: 0.5 K/UL (ref 0.3–1)
MONOCYTES NFR BLD: 14.9 % (ref 4–15)
NEUTROPHILS # BLD AUTO: 2.1 K/UL (ref 1.8–7.7)
NEUTROPHILS NFR BLD: 60.3 % (ref 38–73)
NRBC BLD-RTO: 0 /100 WBC
PHOSPHATE SERPL-MCNC: 3.1 MG/DL (ref 2.7–4.5)
PLATELET # BLD AUTO: 194 K/UL (ref 150–450)
PMV BLD AUTO: 11.7 FL (ref 9.2–12.9)
POTASSIUM SERPL-SCNC: 3.6 MMOL/L (ref 3.5–5.1)
PROT SERPL-MCNC: 6 G/DL (ref 6–8.4)
PROTHROMBIN TIME: 12.6 SEC (ref 9–12.5)
RBC # BLD AUTO: 3.33 M/UL (ref 4–5.4)
SODIUM SERPL-SCNC: 140 MMOL/L (ref 136–145)
WBC # BLD AUTO: 3.55 K/UL (ref 3.9–12.7)

## 2023-03-18 PROCEDURE — 63600175 PHARM REV CODE 636 W HCPCS: Mod: HCNC

## 2023-03-18 PROCEDURE — 80053 COMPREHEN METABOLIC PANEL: CPT | Mod: HCNC

## 2023-03-18 PROCEDURE — 99233 PR SUBSEQUENT HOSPITAL CARE,LEVL III: ICD-10-PCS | Mod: HCNC,,, | Performed by: HOSPITALIST

## 2023-03-18 PROCEDURE — 83735 ASSAY OF MAGNESIUM: CPT | Mod: HCNC

## 2023-03-18 PROCEDURE — 99222 1ST HOSP IP/OBS MODERATE 55: CPT | Mod: HCNC,GC,, | Performed by: INTERNAL MEDICINE

## 2023-03-18 PROCEDURE — 94761 N-INVAS EAR/PLS OXIMETRY MLT: CPT | Mod: HCNC

## 2023-03-18 PROCEDURE — 85025 COMPLETE CBC W/AUTO DIFF WBC: CPT | Mod: HCNC

## 2023-03-18 PROCEDURE — 97161 PT EVAL LOW COMPLEX 20 MIN: CPT | Mod: HCNC

## 2023-03-18 PROCEDURE — 84100 ASSAY OF PHOSPHORUS: CPT | Mod: HCNC

## 2023-03-18 PROCEDURE — 82728 ASSAY OF FERRITIN: CPT | Mod: HCNC

## 2023-03-18 PROCEDURE — 25000003 PHARM REV CODE 250: Mod: HCNC

## 2023-03-18 PROCEDURE — 99222 PR INITIAL HOSPITAL CARE,LEVL II: ICD-10-PCS | Mod: HCNC,GC,, | Performed by: INTERNAL MEDICINE

## 2023-03-18 PROCEDURE — 20600001 HC STEP DOWN PRIVATE ROOM: Mod: HCNC

## 2023-03-18 PROCEDURE — 36415 COLL VENOUS BLD VENIPUNCTURE: CPT | Mod: HCNC

## 2023-03-18 PROCEDURE — 25000003 PHARM REV CODE 250: Mod: HCNC | Performed by: EMERGENCY MEDICINE

## 2023-03-18 PROCEDURE — 99233 SBSQ HOSP IP/OBS HIGH 50: CPT | Mod: HCNC,,, | Performed by: HOSPITALIST

## 2023-03-18 PROCEDURE — 85610 PROTHROMBIN TIME: CPT | Mod: HCNC

## 2023-03-18 PROCEDURE — 82248 BILIRUBIN DIRECT: CPT | Mod: HCNC

## 2023-03-18 RX ORDER — MAGNESIUM SULFATE HEPTAHYDRATE 40 MG/ML
2 INJECTION, SOLUTION INTRAVENOUS ONCE
Status: COMPLETED | OUTPATIENT
Start: 2023-03-18 | End: 2023-03-18

## 2023-03-18 RX ORDER — ENOXAPARIN SODIUM 100 MG/ML
40 INJECTION SUBCUTANEOUS EVERY 24 HOURS
Status: COMPLETED | OUTPATIENT
Start: 2023-03-18 | End: 2023-03-18

## 2023-03-18 RX ADMIN — LEVOTHYROXINE SODIUM 175 MCG: 150 TABLET ORAL at 06:03

## 2023-03-18 RX ADMIN — PANTOPRAZOLE SODIUM 40 MG: 40 TABLET, DELAYED RELEASE ORAL at 09:03

## 2023-03-18 RX ADMIN — MAGNESIUM SULFATE 2 G: 2 INJECTION INTRAVENOUS at 09:03

## 2023-03-18 RX ADMIN — ENOXAPARIN SODIUM 40 MG: 40 INJECTION SUBCUTANEOUS at 05:03

## 2023-03-18 RX ADMIN — AMLODIPINE BESYLATE 5 MG: 5 TABLET ORAL at 09:03

## 2023-03-18 RX ADMIN — SOTALOL HYDROCHLORIDE 120 MG: 120 TABLET ORAL at 10:03

## 2023-03-18 RX ADMIN — SULFASALAZINE 500 MG: 500 TABLET ORAL at 10:03

## 2023-03-18 RX ADMIN — CARVEDILOL 6.25 MG: 6.25 TABLET, FILM COATED ORAL at 05:03

## 2023-03-18 RX ADMIN — Medication 6 MG: at 10:03

## 2023-03-18 RX ADMIN — POTASSIUM BICARBONATE 20 MEQ: 391 TABLET, EFFERVESCENT ORAL at 09:03

## 2023-03-18 RX ADMIN — CARVEDILOL 6.25 MG: 6.25 TABLET, FILM COATED ORAL at 08:03

## 2023-03-18 RX ADMIN — POTASSIUM BICARBONATE 20 MEQ: 391 TABLET, EFFERVESCENT ORAL at 02:03

## 2023-03-18 RX ADMIN — OXYBUTYNIN CHLORIDE 10 MG: 10 TABLET, EXTENDED RELEASE ORAL at 09:03

## 2023-03-18 RX ADMIN — SOTALOL HYDROCHLORIDE 120 MG: 120 TABLET ORAL at 09:03

## 2023-03-18 RX ADMIN — SULFASALAZINE 500 MG: 500 TABLET ORAL at 09:03

## 2023-03-18 NOTE — PLAN OF CARE
Patient transferred from ED around 1745    POC reviewed with patient who verbalized understanding. VSS on RA. AAOX4. Remains free of falls and injury.     - Tolerating regular diet, denies nausea. NPO at midnight    Pain controlled with PRN medications per MAR. Patient denies chest pain & SOB. No acute events. No distress noted. Bed in lowest position, call light within reach, frequent rounds made for safety.      Problem: Adult Inpatient Plan of Care  Goal: Plan of Care Review  Outcome: Ongoing, Progressing  Goal: Patient-Specific Goal (Individualized)  Outcome: Ongoing, Progressing  Goal: Absence of Hospital-Acquired Illness or Injury  Outcome: Ongoing, Progressing  Goal: Optimal Comfort and Wellbeing  Outcome: Ongoing, Progressing     Problem: Fall Injury Risk  Goal: Absence of Fall and Fall-Related Injury  Outcome: Ongoing, Progressing

## 2023-03-18 NOTE — PROGRESS NOTES
[facility] Medicine  Progress Note    Patient Name: Pauline Cronin  MRN: [id number]  Patient Class: IP- Inpatient   Admission Date: 3/17/2023  Length of Stay: 1 days  Attending Physician: Juliana Cordoba MD  Primary Care Provider: Ga Waldrop MD      Subjective:     Principal Problem:Biliary obstruction    HPI:  The patient is an 82 year old female with a history of A-fib (on Eliquis, last dose 3/16), sick sinus syndrome (s/p PPM), Crohn's disease, HTN, HLD, and recently diagnosed pancreatic head mass who [facility] after being found to have an elevated bilirubin (4.3) by her outpatient cardiologist. She is jaundiced with scleral icterus but otherwise reports no acute symptoms. She denies fever, chills, headache, nausea, vomiting, itching, abdominal pain, diarrhea, or constipation.    Oncologic history: The patient presented to her PCP on March 1st reporting fatigue and 10 pounds of unintentional weight loss. She was sent for a CT abdomen/pelvis on 3/1/2023 which demonstrated a pancreatic head mass. She underwent ERCP w/ biopsy of the mass on 3/14, biopsy results are pending at the time of admission. Plan for outpatient ERCP w/ stent placement was planned for 3/29. She was instructed to present to the ED due to elevated bilirubin, jaundice, and icterus noted on outpatient labs on 3/16.    In [facility] the patient is hemodynamically stable, initial evaluation significant for WBC 4.42, Hgb 8.9, plts 245, Na 141, K 3.1, Bicab 25, Cr 0.7 (at baseline), Bili 4.2, alk phos 308, , .      Overview/Hospital Course:  The patient was admitted to hospital medicine for further management of her pancreatic head mass. Advanced endoscopy services was consulted on admission. Plan for ERCP w/ stenting on Monday, 3/20/23. The patient's bilirubin was trended and she was monitored for worsening abdominal pain throughout her hospital stay. Her anticoagulation was held in anticipation of the  ERCP.      Interval History: No acute overnight events. Bilirubin, alkaline phosphatase, AST, and ALT remain elevated but slightly down-trended. Remains without abdominal pain. Discussed w/ AES with tentative plans for ERCP w/ stenting on Monday.    Review of Systems   Constitutional:  Positive for unexpected weight change. Negative for chills, diaphoresis and fever.   HENT:  Negative for rhinorrhea, sinus pressure and trouble swallowing.    Respiratory:  Negative for cough, shortness of breath and wheezing.    Cardiovascular:  Negative for chest pain, palpitations and leg swelling.   Gastrointestinal:  Negative for abdominal distention, abdominal pain, constipation, diarrhea, nausea and vomiting.   Genitourinary:  Negative for dysuria, flank pain and hematuria.   Musculoskeletal:  Negative for joint swelling and myalgias.   Skin:  Positive for color change.   Neurological:  Negative for weakness, numbness and headaches.   Psychiatric/Behavioral:  Negative for agitation. The patient is not hyperactive.    Objective:     Vital Signs (Most Recent):  Temp: 98.1 °F (36.7 °C) (03/18/23 1205)  Pulse: 79 (03/18/23 1205)  Resp: 17 (03/18/23 1205)  BP: (!) 115/53 (03/18/23 1205)  SpO2: 95 % (03/18/23 1205)   Vital Signs (24h Range):  Temp:  [97.4 °F (36.3 °C)-98.9 °F (37.2 °C)] 98.1 °F (36.7 °C)  Pulse:  [69-88] 79  Resp:  [16-18] 17  SpO2:  [91 %-97 %] 95 %  BP: (109-189)/(53-75) 115/53     Weight: 80.3 kg (177 lb)  Body mass index is 34.57 kg/m².    Intake/Output Summary (Last 24 hours) at 3/18/2023 1334  Last data filed at 3/17/2023 1900  Gross per 24 hour   Intake 240 ml   Output 200 ml   Net 40 ml      Physical Exam  Constitutional:       General: She is not in acute distress.     Appearance: Normal appearance. She is obese.   HENT:      Head: Normocephalic and atraumatic.      Right Ear: External ear normal.      Left Ear: External ear normal.      Nose: No congestion or rhinorrhea.      Mouth/Throat:      Mouth:  Mucous membranes are moist.      Pharynx: Oropharynx is clear.   Eyes:      General: Scleral icterus present.      Extraocular Movements: Extraocular movements intact.      Conjunctiva/sclera: Conjunctivae normal.   Cardiovascular:      Rate and Rhythm: Normal rate and regular rhythm.      Pulses: Normal pulses.      Heart sounds: Normal heart sounds. No murmur heard.  Pulmonary:      Effort: Pulmonary effort is normal.      Breath sounds: Normal breath sounds. No wheezing or rales.   Abdominal:      General: Abdomen is flat. Bowel sounds are normal. There is no distension.      Palpations: Abdomen is soft.      Tenderness: There is no abdominal tenderness. There is no guarding.   Musculoskeletal:         General: No swelling.      Cervical back: Normal range of motion.      Right lower leg: No edema.      Left lower leg: No edema.   Skin:     General: Skin is warm and dry.      Capillary Refill: Capillary refill takes less than 2 seconds.      Coloration: Skin is jaundiced.      Findings: No rash.   Neurological:      Mental Status: She is alert and oriented to person, place, and time. Mental status is at baseline.      Sensory: No sensory deficit.      Motor: No weakness.   Psychiatric:         Mood and Affect: Mood normal.         Behavior: Behavior normal.     Significant Labs: All pertinent labs within the past 24 hours have been reviewed.  CBC:   Recent Labs   Lab 03/17/23  1123 03/18/23  0607   WBC 4.42 3.55*   HGB 8.9* 7.9*   HCT 28.8* 26.3*    194     CMP:   Recent Labs   Lab 03/17/23  1123 03/18/23  0607    140   K 3.1* 3.6    109   CO2 25 24   * 116*   BUN 7* 10   CREATININE 0.7 0.8   CALCIUM 8.9 8.5*   PROT 7.1 6.0   ALBUMIN 2.7* 2.3*   BILITOT 4.2* 4.0*   ALKPHOS 308* 261*   * 179*   * 129*   ANIONGAP 11 7*       Significant Imaging: I have reviewed all pertinent imaging results/findings within the past 24 hours.      Assessment/Plan:      * Biliary  obstruction  82 yoF w/ newly diagnosed pancreatic head mass s/p ERCP w/ biopsy with results pending presenting with elevated bilirubin, elevated lipase, jaundice, icterus but otherwise asymptomatic. No abdominal pain on exam. Imaging w/ 3.5 cm pancreatic head mass overall similar in appearance. Mild associated pancreatic stranding. Had planned for outpatient ERCP with stent placement on 3/29 but developed hyperbilirubinemia and presented to the ED. [facility] consulted on admission, tentative plans for ERCP w/ stent placement on Monday.    -- [facility] consulted; appreciate recs. Plan for ERCP w/ stent on Monday  -- Full diet for now; NPO at Sunday at midnight  -- Hold apixaban (last dose was 3/16)    Paroxysmal atrial fibrillation  H/o A-fib. Anticoagulated w/ apixaban (last dose 3/16). Rate controlled w/ carvedilol 6.25 mg daily and sotalol 120 mg BID. QTc on admission 517 (paced rhythm)    -- Hold apixaban for possible ERCP  -- Continue sotalol and carvedilol    Anemia  Hgb 8.9 on admission. Recently decreased from 14.2 four months ago. No evidence of bleeding on exam. Outpatient serologic evaluation significant for normal B12 and folate. Iron low at 19, ferritin low normal.    -- Trend CBC  -- Plan to start iron supplementation on discharge    Chronic anticoagulation  On apixaban outpatient for A-fib; will hold given possible procedure.    Sick sinus syndrome  S/p PPM. Stable.    GERD (gastroesophageal reflux disease)  Takes esomeprazole 40 mg daily at home    -- Continue home PPI    OAB (overactive bladder)  Patient takes tolterodine at home (not on formulary)    -- Oxybutynin 10 mg daily while admitted     Pacemaker  H/o PPM secondary to sick sinus syndrome and complete heart block. Follows w/ cardiology. No acute concerns.    Hypothyroidism  Remote history of hyperthyroidism w/ radioactive iodine ablation, now takes synthroid daily. TSH normal on admission, free T4 1.97.    -- Continue levothyroxine 175 mcg  daily    Essential hypertension, benign  Patient takes amlodipine 5 mg daily and carvedilol 6.25 mg BID at home.    -- Continue home antihypertensives     Hyperlipidemia  Last LDL 69 on 3/1/23; takes atorvastatin 20 mg daily at home.    -- Hold atorvastatin given elevated aminotransferases on admission       Crohn's disease of large intestine without complication  Chronic and stable. Patient takes colestipol, sulfasalazine at home.    -- Continue sulfasalazine  -- Colestipol not on formulary, will hold for now. Will ask family to bring in home supply if indicated      VTE Risk Mitigation (From admission, onward)           Ordered     enoxaparin injection 40 mg  Daily         03/18/23 1342     IP VTE HIGH RISK PATIENT  Once         03/18/23 1342     Reason for No Pharmacological VTE Prophylaxis  Once        Question:  Reasons:  Answer:  Physician Provided (leave comment)  Comment:  Pending possible procedure    03/17/23 1628     Place sequential compression device  Until discontinued         03/17/23 1628     Place sequential compression device  Until discontinued         03/17/23 1554                    Discharge Planning   UMA: 3/20/2023     Code Status: Full Code   Is the patient medically ready for discharge?: No    Reason for patient still in hospital (select all that apply): Patient trending condition           Dave Ortega MD  Department of Hospital Medicine   [facility]                      03/18/2023                             STAFF PHYSICIAN NOTE                                   Attending Attestation for Rounds with Resident  I have reviewed and concur with the resident's history, physical, assessment, and plan.  I have personally interviewed and examined the patient at bedside and agree with the resident's findings.         82-year-old female, with history of AFib, on Eliquis, pacemaker in-situ, Crohn's disease, hyperlipidemia, hypertension, known pancreatic mass, presents to ED for elevated  bilirubin- 4.2. AES planning EUS on 3/20. Has oncology f/u. Will arrange for PC f/u as well.                                Juliana Cordoba MD  Senior Hospitalist  Department of [facility] Medicine  [facility]  [id number], [phone]

## 2023-03-18 NOTE — PLAN OF CARE
Problem: Physical Therapy  Goal: Physical Therapy Goal  Outcome: Met     PT evaluation complete and no goals established. Pt is functioning at high level and does not required acute care physical therapy services. Education provided to ambulate in hallway 3x/day with supervision in order to maintain strength and endurance. Pt verbalized understanding. Please re-consult if functional mobility changes.

## 2023-03-18 NOTE — CONSULTS
[facility]  Gastroenterology  Consult Note    Patient Name: Pauline Cronin  MRN: [id number]  Admission Date: 3/17/2023  Hospital Length of Stay: 1 days  Code Status: Full Code   Attending Provider:  Dr. Celina Toney  Consulting Provider: Thai Paul MD  Primary Care Physician: Ga Waldrop MD  Principal Problem:Biliary obstruction    Inpatient consult to [facility] ([facility])  Consult performed by: Thai Paul MD  Consult ordered by: Dave Ortega MD      Subjective:     HPI: Pauline Cronin is a 82 y.o. female with history of paroxysmal atrial fibrillation (on Eliquis), anemia, status post pacemaker placement for sick sinus syndrome, hypothyroidism and Crohn's disease presented to the ER with elevated bilirubin, elevated lipase, and jaundice after her EUS with pancreatic head mass biopsy on 03/14. In the [facility] ED the patient is hemodynamically stable, initial evaluation significant for WBC 4.42, Hgb 8.9, plts 245, Na 141, K 3.1, Bicab 25, Cr 0.7 (at baseline), Bili 4.2, alk phos 308, , .  CT scan of the abdomen and pelvis with contrast showed ill-defined pancreatic head mass (3.5 cm), dilation of pancreatic (6 mm) and biliary ducts (1.1 cm) and cholelithiasis.    The patient denies any symptoms apart from jaundice. No fever, chills, abdominal pain, nausea, vomiting, steatorrhea or change in bowel habits. Last dose of Eliquis was 3/16/23.     Endo Hx:  EUS (3/14/23):                        - A mass was identified in the pancreatic head.                          This was staged T2 N0 M0 by endosonographic                          criteria. The staging applies if malignancy is                          confirmed. Fine needle biopsy performed.                          - There was dilation in the common bile duct which                          measured up to 11 mm.                          - There was no evidence of significant pathology                          in  the visualized portion of the liver.                          - Duodenal mass. Biopsied.     EGD (7/17/18):                       - Benign-appearing esophageal stenosis. Dilated.                        - Small hiatal hernia.                        - Gastritis. Biopsied.                        - Normal examined duodenum.                        -History of mild intermittent dysphagia to solids,                        likely due to lower esophageal stenosis that was                        dilated today         Past Medical History:   Diagnosis Date    Anticoagulant long-term use     Atrial fibrillation     Crohn disease diagnosed in her 20's    GERD (gastroesophageal reflux disease)     Hyperlipidemia     Hypertension     Thyroid disease     s/p I 131       Past Surgical History:   Procedure Laterality Date    APPENDECTOMY      CARDIAC SURGERY  2014    pacemaker    COLONOSCOPY  ~2008    normal findings per patient report    ENDOSCOPIC ULTRASOUND OF UPPER GASTROINTESTINAL TRACT N/A 3/14/2023    Procedure: ULTRASOUND, UPPER GI TRACT, ENDOSCOPIC;  Surgeon: Andrei Swartz MD;  Location: [facility] ENDO;  Service: Endoscopy;  Laterality: N/A;  Approval to hold Eliquis rec'd from Dr. Barbosa (see telephone encounter 3/3/23)-DS  [employer] AICD/PPM  3/3/23-Instructions via portal-DS    ESOPHAGOGASTRODUODENOSCOPY N/A 7/17/2018    Procedure: EGD (ESOPHAGOGASTRODUODENOSCOPY);  Surgeon: Alondra Casiano MD;  Location: [facility] ENDO;  Service: Endoscopy;  Laterality: N/A;    HYSTERECTOMY      INSERTION OF IMPLANTABLE CARDIOVERTER-DEFIBRILLATOR (ICD) GENERATOR WITH TWO EXISTING LEADS Left 5/10/2021    Procedure: INSERTION, PULSE GENERATOR WITH 2 EXISTING LEADS, ICD;  Surgeon: Bo Leone MD;  Location: [facility] CATH LAB;  Service: Cardiology;  Laterality: Left;    INSERTION OF PACEMAKER      REPLACEMENT OF PACEMAKER GENERATOR  05/10/2021    RETROGRADE PYELOGRAPHY Right 2/18/2020    Procedure: PYELOGRAM, RETROGRADE;  Surgeon: Jovan VERDUZCO  MD Uriah;  Location: [facility];  Service: Urology;  Laterality: Right;    SMALL INTESTINE SURGERY  in her 20's    for crohn's    TONSILLECTOMY      UPPER GASTROINTESTINAL ENDOSCOPY  ~    normal findings per patient report       Family History   Problem Relation Age of Onset    Cancer Mother     Breast cancer Mother     Crohn's disease Other     Colon cancer Neg Hx     Colon polyps Neg Hx     Esophageal cancer Neg Hx     Stomach cancer Neg Hx     Ulcerative colitis Neg Hx        Social History     Socioeconomic History    Marital status:    Tobacco Use    Smoking status: Former     Types: Cigarettes     Quit date:      Years since quittin.2    Smokeless tobacco: Never   Substance and Sexual Activity    Alcohol use: No    Drug use: No    Sexual activity: Never       No current facility-administered medications on file prior to encounter.     Current Outpatient Medications on File Prior to Encounter   Medication Sig Dispense Refill    alendronate (FOSAMAX) 70 MG tablet Take 1 tablet (70 mg total) by mouth every 7 days. 12 tablet 3    amLODIPine (NORVASC) 5 MG tablet Take 1 tablet (5 mg total) by mouth once daily. 30 tablet 11    atorvastatin (LIPITOR) 20 MG tablet Take 1 tablet (20 mg total) by mouth once daily. 90 tablet 3    carvediloL (COREG) 6.25 MG tablet Take 1 tablet (6.25 mg total) by mouth 2 (two) times daily with meals. 180 tablet 3    colestipoL (COLESTID) 1 gram Tab Take 1 tablet (1 g total) by mouth 2 (two) times daily. 180 tablet 3    diphenoxylate-atropine 2.5-0.025 mg (LOMOTIL) 2.5-0.025 mg per tablet TAKE 1 TABLET BY MOUTH 4 TIMES DAILY AS NEEDED FOR DIARRHEA 60 tablet 3    esomeprazole (NEXIUM) 40 MG capsule Take 1 capsule (40 mg total) by mouth once daily. 90 capsule 3    folic acid (FOLVITE) 1 MG tablet Take 1 tablet by mouth once daily 90 tablet 1    levothyroxine (SYNTHROID, LEVOTHROID) 175 MCG tablet Take 1 tablet by mouth once daily 30 tablet 5    magnesium gluconate 27.5 mg  magne- sium (500 mg) Tab Take 500 mg by mouth once daily. 90 tablet 3    sotaloL (BETAPACE) 120 MG Tab Take 1 tablet (120 mg total) by mouth every 12 (twelve) hours. 180 tablet 3    sulfaSALAzine (AZULFIDINE) 500 mg Tab Take 1 tablet by mouth twice daily 180 tablet 3    tolterodine (DETROL LA) 4 MG 24 hr capsule Take 1 capsule (4 mg total) by mouth once daily. 90 capsule 3    ELIQUIS 5 mg Tab Take 1 tablet (5 mg total) by mouth 2 (two) times daily. 180 tablet 0       Review of patient's allergies indicates:   Allergen Reactions    Lisinopril Other (See Comments)     cough       Review of Systems   Constitutional:  Positive for unexpected weight change. Negative for chills, diaphoresis and fever.   HENT:  Negative for rhinorrhea, sinus pressure and trouble swallowing.    Respiratory:  Negative for cough, shortness of breath and wheezing.    Cardiovascular:  Negative for chest pain, palpitations and leg swelling.   Gastrointestinal:  Negative for abdominal distention, abdominal pain, constipation, diarrhea, nausea and vomiting.   Genitourinary:  Negative for dysuria, flank pain and hematuria.   Musculoskeletal:  Negative for joint swelling and myalgias.   Skin:  Positive for color change.   Neurological:  Negative for weakness, numbness and headaches.   Psychiatric/Behavioral:  Negative for agitation. The patient is not hyperactive.      Objective:     Vitals:    03/18/23 0325   BP: (!) 116/55   Pulse: 86   Resp: 16   Temp: 98.8 °F (37.1 °C)       Physical Exam  Constitutional:       General: She is not in acute distress.     Appearance: Normal appearance. She is obese.   HENT:      Head: Normocephalic and atraumatic.      Right Ear: External ear normal.      Left Ear: External ear normal.      Nose: No congestion or rhinorrhea.      Mouth/Throat:      Mouth: Mucous membranes are moist.      Pharynx: Oropharynx is clear.   Eyes:      General: Scleral icterus present.      Extraocular Movements: Extraocular movements  intact.      Conjunctiva/sclera: Conjunctivae normal.   Cardiovascular:      Rate and Rhythm: Normal rate and regular rhythm.      Pulses: Normal pulses.      Heart sounds: Normal heart sounds. No murmur heard.  Pulmonary:      Effort: Pulmonary effort is normal.      Breath sounds: Normal breath sounds. No wheezing or rales.   Abdominal:      General: Abdomen is flat. Bowel sounds are normal. There is no distension.      Palpations: Abdomen is soft.      Tenderness: There is no abdominal tenderness. There is no guarding.   Musculoskeletal:         General: No swelling.      Cervical back: Normal range of motion.      Right lower leg: No edema.      Left lower leg: No edema.   Skin:     General: Skin is warm and dry.      Capillary Refill: Capillary refill takes less than 2 seconds.      Coloration: Skin is jaundiced.      Findings: No rash.   Neurological:      Mental Status: She is alert and oriented to person, place, and time. Mental status is at baseline.      Sensory: No sensory deficit.      Motor: No weakness.   Psychiatric:         Mood and Affect: Mood normal.         Behavior: Behavior normal.     Significant Labs:  Recent Labs   Lab 03/16/23  0952 03/17/23  1123 03/18/23  0607   HGB 8.9* 8.9* 7.9*       Lab Results   Component Value Date    WBC 3.55 (L) 03/18/2023    HGB 7.9 (L) 03/18/2023    HCT 26.3 (L) 03/18/2023    MCV 79 (L) 03/18/2023     03/18/2023       Lab Results   Component Value Date     03/18/2023    K 3.6 03/18/2023     03/18/2023    CO2 24 03/18/2023    BUN 10 03/18/2023    CREATININE 0.8 03/18/2023    CALCIUM 8.5 (L) 03/18/2023    ANIONGAP 7 (L) 03/18/2023    ESTGFRAFRICA >60.0 04/27/2022    EGFRNONAA >60.0 04/27/2022       Lab Results   Component Value Date     (H) 03/18/2023     (H) 03/18/2023    ALKPHOS 261 (H) 03/18/2023    BILITOT 4.0 (H) 03/18/2023       Lab Results   Component Value Date    INR 1.2 03/18/2023    INR 1.1 05/07/2021       Significant  Imaging:  Reviewed pertinent radiology findings.       Assessment/Plan:     Pauline Cronin is a 82 y.o. female with history of A-fib (on Eliquis, last dose 3/16), sick sinus syndrome (s/p PPM), Crohn's disease, HTN, HLD, and recently diagnosed pancreatic head mass who presented with elevated LFTs. Labs significant for Bili 4.2, alk phos 308, , . No symptoms apart from jaundice. She underwent EUS w/ biopsy of the mass on 3/14, biopsy results are pending at the time of admission. Plan for outpatient ERCP w/ stent placement was planned for 3/29. Last dose of Eliquis was 3/16/23.     Problem List:  Pancreatic head mass  Extra and intrahepatic biliary ductal dilation  Cholelithiasis    Recommendations:  Plan on ERCP Monday. NPO midnight before the procedure.     Continue holding Eliquis till ERCP is complete.     Daily CBC, CMP & INR    Follow pathology results    Thank you for involving us in the care of Pauline Cronin. Please call with any additional questions, concerns or changes in the patient's clinical status. We will continue to follow.     Thai Paul MD  Gastroenterology Fellow PGY IV  [facility]

## 2023-03-18 NOTE — ASSESSMENT & PLAN NOTE
H/o A-fib. Anticoagulated w/ apixaban (last dose 3/16). Rate controlled w/ carvedilol 6.25 mg daily and sotalol 120 mg BID. QTc on admission 517 (paced rhythm)    -- Hold apixaban for possible ERCP  -- Continue sotalol and carvedilol

## 2023-03-18 NOTE — PT/OT/SLP EVAL
Physical Therapy Evaluation and Discharge Note    Patient Name:  Pauline Cronin   MRN:  [id number]  Admitting Diagnosis:  Biliary obstruction   Recent Surgery: * No surgery found *    Admit Date: 3/17/2023  Length of Stay: 1 days    Recommendations:     Discharge Recommendations:  other (see comments)   Discharge Equipment Recommendations: none   Barriers to discharge: None    Assessment:   Pt reports they are at baseline mobility with all mobility and ADLs. Pt denies any dizziness, SOB, or recent falls. Additionally, upon PT evaluation patient appears to be functioning at a high level with no safety concerns at this time. All questions answered within PT scope of practice. PT provided education to pt regarding importance of maintaining frequent activity and ambulating while admitted to maintain current level of mobility. Pt does not require further acute skilled therapy intervention. Please re-consult if pt experiences a change in status.     Problem List: impaired endurance.  Rehab Prognosis: Good; patient would benefit from acute skilled PT services to address these deficits and reach maximum level of function.      Goals: none identified at [facility]    Subjective     RN notified prior to session. No family/friends present upon PT entrance into room. Patient agreeable to PT evaluation.    Chief Complaint: Abnormal Lab (Sent by outside provider for high bili. Hx of biliary obstruction. Scleral jaundice noted. )  Patient/Family Comments/goals: return home  Pain/Comfort:  Pain Rating 1: 0/10  Pain Rating Post-Intervention 1: 0/10    Social History:  Residence: lives alone 1st level apartment with 0 KEM[address]. Pt's bathroom has a tub/shower with shower chair and grab bars.  Support available: family  Equipment Used: grab bar, shower chair  Equipment Owned (not using): None  Prior level of function: independent  Hand Dominance: right.   Work: Retired.   Drive: yes.   Managing Medicines/Managing Home: yes.   Hobbies: watching  movies, doing puzzles on iPad    Patient reports they will have assistance from family upon discharge.    Objective:     Additional staff present:  N/A    Patient found HOB elevated with peripheral IV upon PT entry to room.    General Precautions: Standard, fall   Orthopedic Precautions:N/A   Braces: N/A   Body mass index is 34.57 kg/m².  Oxygen Device: Room Air  Vitals: BP (!) 115/53 (BP Location: Right arm, Patient Position: Sitting)   Pulse 79   Temp 98.1 °F (36.7 °C) (Oral)   Resp 17   Ht 5' (1.524 m)   Wt 80.3 kg (177 lb)   SpO2 95%   BMI 34.57 kg/m²     Exam:  Cognition:   Alert and Cooperative  AxOx4  Command following: Follows multistep verbal commands  Fluency: clear/fluent  Skin Integrity: Visible skin intact  Edema: None noted   Sensation: Intact  Hearing: Intact  Vision:  Intact  Postural Assessment: no deviations noted  Coordination: grossly WFL  Range of Motion:  RUE: WNL  LUE: WNL  RLE: WNL  LLE: WNL  Strength Exam:  Bilateral Upper Extremity Strength: grossly WFL  Bilateral Lower Extremity Strength: grossly WFL    Outcome Measures:  AM-PAC 6 CLICK MOBILITY  Turning over in bed (including adjusting bedclothes, sheets and blankets)?: 4  Sitting down on and standing up from a chair with arms (e.g., wheelchair, bedside commode, etc.): 4  Moving from lying on back to sitting on the side of the bed?: 4  Moving to and from a bed to a chair (including a wheelchair)?: 4  Need to walk in hospital room?: 4  Climbing 3-5 steps with a railing?: 4  Basic Mobility Total Score: 24     Functional Mobility:  Bed Mobility:   Supine to Sit: independence and with  HOB elevated ; from Lt side of bed  Scooting anteriorly to EOB to have both feet planted on floor: supervision    Sitting Balance at Edge of Bed:  Static Sitting Balance: Normal : able to maintain balance against maximal resistance  Dynamic Sitting Balance: Normal : able to sit unsupported and weight shift across midline maximally  Assistance Level  Required: Supervision    Transfers:   Sit <> Stand Transfer: supervision with no assistive device   Stand <> Sit Transfer: supervision with no assistive device   [id number] from EOB and [id number] from toliet    Standing Balance:  Static Standing Balance: Normal : able to maintain standing balance against maximal resistance  Dynamic Standing Balance: Good : able to stand independently unsupported and cross midline moderately  Assistance Level Required: Supervision  Patient used: no assistive device     Gait:   Patient ambulated: ~140 ft   Patient required: supervision  Patient used:  no assistive device   Gait Pattern observed: reciprocal gait  Gait Deviation(s): steady gait and decreased betty  Impairments due to:  impaired endurance  all lines remained intact throughout ambulation trial  Comments: Pt was able to perform vertical/horizontal head turns, 180 degree turns while ambulating, and avoid hallway obstacles without LOB. Gait trial ended 2/2 pt reporting needing to use the restroom.    Education:  Time provided for education, counseling and discussion of health disposition in regards to patient's current status  All questions answered within PT scope of practice and to patient's satisfaction  PT role in POC to address current functional deficits  Pt educated on proper body mechanics, safety techniques, and energy conservation with PT facilitation and cueing throughout session  Call nursing/pct to transfer to chair/use bathroom. Pt stated understanding.  Whiteboard updated with therapist name and pt's current mobility status documented above  Safe to perform step transfer to/from chair/bedside commode supervision and no AD w/ nursing/PCT present  Pt to ambulate 2-3x/day supervision and no AD with nsg/family in order to maintain functional mobility  Importance of OOB tolerance prn hrs/day to improve lung ventilation and expansion as well as strengthen postural musculature    Patient left up in chair with all  lines intact, call button in reach, and MD present.    History:     Past Medical History:   Diagnosis Date    Anticoagulant long-term use     Atrial fibrillation     Crohn disease diagnosed in her 20's    GERD (gastroesophageal reflux disease)     Hyperlipidemia     Hypertension     Thyroid disease     s/p I 131       Past Surgical History:   Procedure Laterality Date    APPENDECTOMY      CARDIAC SURGERY  2014    pacemaker    COLONOSCOPY  ~2008    normal findings per patient report    ENDOSCOPIC ULTRASOUND OF UPPER GASTROINTESTINAL TRACT N/A 3/14/2023    Procedure: ULTRASOUND, UPPER GI TRACT, ENDOSCOPIC;  Surgeon: Andrei Swartz MD;  Location: [facility];  Service: Endoscopy;  Laterality: N/A;  Approval to hold Eliquis rec'd from Dr. Barbosa (see telephone encounter 3/3/23)-DS  [employer] AICD/PPM  3/3/23-Instructions via portal-DS    ESOPHAGOGASTRODUODENOSCOPY N/A 7/17/2018    Procedure: EGD (ESOPHAGOGASTRODUODENOSCOPY);  Surgeon: Alondra Casiano MD;  Location: [facility];  Service: Endoscopy;  Laterality: N/A;    HYSTERECTOMY      INSERTION OF IMPLANTABLE CARDIOVERTER-DEFIBRILLATOR (ICD) GENERATOR WITH TWO EXISTING LEADS Left 5/10/2021    Procedure: INSERTION, PULSE GENERATOR WITH 2 EXISTING LEADS, ICD;  Surgeon: Bo Leone MD;  Location: [facility] CATH LAB;  Service: Cardiology;  Laterality: Left;    INSERTION OF PACEMAKER      REPLACEMENT OF PACEMAKER GENERATOR  05/10/2021    RETROGRADE PYELOGRAPHY Right 2/18/2020    Procedure: PYELOGRAM, RETROGRADE;  Surgeon: Jovan Kruse MD;  Location: [facility];  Service: Urology;  Laterality: Right;    SMALL INTESTINE SURGERY  in her 20's    for crohn's    TONSILLECTOMY      UPPER GASTROINTESTINAL ENDOSCOPY  ~2008    normal findings per patient report       Family History   Problem Relation Age of Onset    Cancer Mother     Breast cancer Mother     Crohn's disease Other     Colon cancer Neg Hx     Colon polyps Neg Hx     Esophageal cancer Neg Hx     Stomach cancer  Neg Hx     Ulcerative colitis Neg Hx        Social History     Socioeconomic History    Marital status:    Tobacco Use    Smoking status: Former     Types: Cigarettes     Quit date:      Years since quittin.2    Smokeless tobacco: Never   Substance and Sexual Activity    Alcohol use: No    Drug use: No    Sexual activity: Never       Time Tracking:     PT Received On: 23  PT Start Time: 1130     PT Stop Time: 1141  PT Total Time (min): 11 min     Billable Minutes: Evaluation 11 minutes    Renetta Garcia PT, DPT  3/18/2023  Pager: [phone]

## 2023-03-18 NOTE — SUBJECTIVE & OBJECTIVE
Interval History: No acute overnight events. Bilirubin, alkaline phosphatase, AST, and ALT remain elevated but slightly down-trended. Remains without abdominal pain. Discussed w/ [facility] with tentative plans for ERCP w/ stenting on Monday.    Review of Systems   Constitutional:  Positive for unexpected weight change. Negative for chills, diaphoresis and fever.   HENT:  Negative for rhinorrhea, sinus pressure and trouble swallowing.    Respiratory:  Negative for cough, shortness of breath and wheezing.    Cardiovascular:  Negative for chest pain, palpitations and leg swelling.   Gastrointestinal:  Negative for abdominal distention, abdominal pain, constipation, diarrhea, nausea and vomiting.   Genitourinary:  Negative for dysuria, flank pain and hematuria.   Musculoskeletal:  Negative for joint swelling and myalgias.   Skin:  Positive for color change.   Neurological:  Negative for weakness, numbness and headaches.   Psychiatric/Behavioral:  Negative for agitation. The patient is not hyperactive.    Objective:     Vital Signs (Most Recent):  Temp: 98.1 °F (36.7 °C) (03/18/23 1205)  Pulse: 79 (03/18/23 1205)  Resp: 17 (03/18/23 1205)  BP: (!) 115/53 (03/18/23 1205)  SpO2: 95 % (03/18/23 1205)   Vital Signs (24h Range):  Temp:  [97.4 °F (36.3 °C)-98.9 °F (37.2 °C)] 98.1 °F (36.7 °C)  Pulse:  [69-88] 79  Resp:  [16-18] 17  SpO2:  [91 %-97 %] 95 %  BP: (109-189)/(53-75) 115/53     Weight: 80.3 kg (177 lb)  Body mass index is 34.57 kg/m².    Intake/Output Summary (Last 24 hours) at 3/18/2023 1334  Last data filed at 3/17/2023 1900  Gross per 24 hour   Intake 240 ml   Output 200 ml   Net 40 ml      Physical Exam  Constitutional:       General: She is not in acute distress.     Appearance: Normal appearance. She is obese.   HENT:      Head: Normocephalic and atraumatic.      Right Ear: External ear normal.      Left Ear: External ear normal.      Nose: No congestion or rhinorrhea.      Mouth/Throat:      Mouth: Mucous membranes  are moist.      Pharynx: Oropharynx is clear.   Eyes:      General: Scleral icterus present.      Extraocular Movements: Extraocular movements intact.      Conjunctiva/sclera: Conjunctivae normal.   Cardiovascular:      Rate and Rhythm: Normal rate and regular rhythm.      Pulses: Normal pulses.      Heart sounds: Normal heart sounds. No murmur heard.  Pulmonary:      Effort: Pulmonary effort is normal.      Breath sounds: Normal breath sounds. No wheezing or rales.   Abdominal:      General: Abdomen is flat. Bowel sounds are normal. There is no distension.      Palpations: Abdomen is soft.      Tenderness: There is no abdominal tenderness. There is no guarding.   Musculoskeletal:         General: No swelling.      Cervical back: Normal range of motion.      Right lower leg: No edema.      Left lower leg: No edema.   Skin:     General: Skin is warm and dry.      Capillary Refill: Capillary refill takes less than 2 seconds.      Coloration: Skin is jaundiced.      Findings: No rash.   Neurological:      Mental Status: She is alert and oriented to person, place, and time. Mental status is at baseline.      Sensory: No sensory deficit.      Motor: No weakness.   Psychiatric:         Mood and Affect: Mood normal.         Behavior: Behavior normal.     Significant Labs: All pertinent labs within the past 24 hours have been reviewed.  CBC:   Recent Labs   Lab 03/17/23  1123 03/18/23  0607   WBC 4.42 3.55*   HGB 8.9* 7.9*   HCT 28.8* 26.3*    194     CMP:   Recent Labs   Lab 03/17/23  1123 03/18/23  0607    140   K 3.1* 3.6    109   CO2 25 24   * 116*   BUN 7* 10   CREATININE 0.7 0.8   CALCIUM 8.9 8.5*   PROT 7.1 6.0   ALBUMIN 2.7* 2.3*   BILITOT 4.2* 4.0*   ALKPHOS 308* 261*   * 179*   * 129*   ANIONGAP 11 7*       Significant Imaging: I have reviewed all pertinent imaging results/findings within the past 24 hours.

## 2023-03-18 NOTE — HOSPITAL COURSE
The patient was admitted to hospital medicine for further management of her pancreatic head mass. Advanced endoscopy services was consulted on admission. Plan for ERCP w/ stenting on Monday 3/20/23 but was delayed due to emergent GI cases. The patient's bilirubin was trended and she was monitored for worsening abdominal pain throughout her hospital stay. Her anticoagulation was held in anticipation of the ERCP. On 3/21/23 she underwent ERCP with stenting. During her hospitalization her pancreatic head biopsy resulted with pancreatic adenocarcinoma. She was referred on oncology on discharge. The patient developed abdominal pain, nausea/vomiting after her ERCP and was monitored on 3/22 for her symptoms. She was given analgesics and IV fluids for concern of post-ERCP pancreatitis. On 3/23 the patient's abdominal pain had resolved. She was tolerating PO foods and liquids. She was instructed to follow-up with oncology as an outpatient as scheduled. She was given strict return precautions in the event of fever, chills, abdominal pain, or inability to tolerate PO intake.

## 2023-03-18 NOTE — PLAN OF CARE
POC reviewed with patient who verbalized understanding. VSS on RA. AAOX4. Remains free of falls and injury.     - Tolerating regular diet, denies nausea    Denies pain. Patient denies chest pain & SOB. No acute events. No distress noted. Bed in lowest position, call light within reach, frequent rounds made for safety.      Problem: Adult Inpatient Plan of Care  Goal: Plan of Care Review  Outcome: Ongoing, Progressing  Goal: Patient-Specific Goal (Individualized)  Outcome: Ongoing, Progressing  Goal: Absence of Hospital-Acquired Illness or Injury  Outcome: Ongoing, Progressing  Goal: Optimal Comfort and Wellbeing  Outcome: Ongoing, Progressing     Problem: Fall Injury Risk  Goal: Absence of Fall and Fall-Related Injury  Outcome: Ongoing, Progressing

## 2023-03-18 NOTE — ASSESSMENT & PLAN NOTE
Hgb 8.9 on admission. Recently decreased from 14.2 four months ago. No evidence of bleeding on exam. Outpatient serologic evaluation significant for normal B12 and folate. Iron low at 19, ferritin low normal.    -- Trend CBC  -- Plan to start iron supplementation on discharge

## 2023-03-18 NOTE — ASSESSMENT & PLAN NOTE
Chronic and stable. Patient takes colestipol, sulfasalazine at home.    -- Continue sulfasalazine  -- Colestipol not on formulary, will hold for now. Will ask family to bring in home supply if indicated

## 2023-03-18 NOTE — ASSESSMENT & PLAN NOTE
82 yoF w/ newly diagnosed pancreatic head mass s/p ERCP w/ biopsy with results pending presenting with elevated bilirubin, elevated lipase, jaundice, icterus but otherwise asymptomatic. No abdominal pain on exam. Imaging w/ 3.5 cm pancreatic head mass overall similar in appearance. Mild associated pancreatic stranding. Had planned for outpatient ERCP with stent placement on 3/29 but developed hyperbilirubinemia and presented to the ED. [facility] consulted on admission, tentative plans for ERCP w/ stent placement on Monday.    -- [facility] consulted; appreciate recs. Plan for ERCP w/ stent on Monday  -- Full diet for now; NPO at Sunday at midnight  -- Hold apixaban (last dose was 3/16)

## 2023-03-19 LAB
ALBUMIN SERPL BCP-MCNC: 2.4 G/DL (ref 3.5–5.2)
ALP SERPL-CCNC: 287 U/L (ref 55–135)
ALT SERPL W/O P-5'-P-CCNC: 134 U/L (ref 10–44)
ANION GAP SERPL CALC-SCNC: 12 MMOL/L (ref 8–16)
AST SERPL-CCNC: 198 U/L (ref 10–40)
BASOPHILS # BLD AUTO: 0.04 K/UL (ref 0–0.2)
BASOPHILS NFR BLD: 1.1 % (ref 0–1.9)
BILIRUB SERPL-MCNC: 4.2 MG/DL (ref 0.1–1)
BUN SERPL-MCNC: 8 MG/DL (ref 8–23)
CALCIUM SERPL-MCNC: 8.5 MG/DL (ref 8.7–10.5)
CHLORIDE SERPL-SCNC: 107 MMOL/L (ref 95–110)
CO2 SERPL-SCNC: 21 MMOL/L (ref 23–29)
CREAT SERPL-MCNC: 0.8 MG/DL (ref 0.5–1.4)
DIFFERENTIAL METHOD: ABNORMAL
EOSINOPHIL # BLD AUTO: 0.1 K/UL (ref 0–0.5)
EOSINOPHIL NFR BLD: 3.9 % (ref 0–8)
ERYTHROCYTE [DISTWIDTH] IN BLOOD BY AUTOMATED COUNT: 18.5 % (ref 11.5–14.5)
EST. GFR  (NO RACE VARIABLE): >60 ML/MIN/1.73 M^2
GLUCOSE SERPL-MCNC: 91 MG/DL (ref 70–110)
HCT VFR BLD AUTO: 27.8 % (ref 37–48.5)
HGB BLD-MCNC: 8.2 G/DL (ref 12–16)
IMM GRANULOCYTES # BLD AUTO: 0 K/UL (ref 0–0.04)
IMM GRANULOCYTES NFR BLD AUTO: 0 % (ref 0–0.5)
LYMPHOCYTES # BLD AUTO: 0.8 K/UL (ref 1–4.8)
LYMPHOCYTES NFR BLD: 22.6 % (ref 18–48)
MAGNESIUM SERPL-MCNC: 1.6 MG/DL (ref 1.6–2.6)
MCH RBC QN AUTO: 23.4 PG (ref 27–31)
MCHC RBC AUTO-ENTMCNC: 29.5 G/DL (ref 32–36)
MCV RBC AUTO: 79 FL (ref 82–98)
MONOCYTES # BLD AUTO: 0.5 K/UL (ref 0.3–1)
MONOCYTES NFR BLD: 13.5 % (ref 4–15)
NEUTROPHILS # BLD AUTO: 2.1 K/UL (ref 1.8–7.7)
NEUTROPHILS NFR BLD: 58.9 % (ref 38–73)
NRBC BLD-RTO: 1 /100 WBC
PHOSPHATE SERPL-MCNC: 2.4 MG/DL (ref 2.7–4.5)
PLATELET # BLD AUTO: 209 K/UL (ref 150–450)
PMV BLD AUTO: 11.9 FL (ref 9.2–12.9)
POTASSIUM SERPL-SCNC: 3.6 MMOL/L (ref 3.5–5.1)
PROT SERPL-MCNC: 6.3 G/DL (ref 6–8.4)
RBC # BLD AUTO: 3.51 M/UL (ref 4–5.4)
SODIUM SERPL-SCNC: 140 MMOL/L (ref 136–145)
WBC # BLD AUTO: 3.63 K/UL (ref 3.9–12.7)

## 2023-03-19 PROCEDURE — 20600001 HC STEP DOWN PRIVATE ROOM: Mod: HCNC

## 2023-03-19 PROCEDURE — 94761 N-INVAS EAR/PLS OXIMETRY MLT: CPT | Mod: HCNC

## 2023-03-19 PROCEDURE — 80053 COMPREHEN METABOLIC PANEL: CPT | Mod: HCNC

## 2023-03-19 PROCEDURE — 25000003 PHARM REV CODE 250: Mod: HCNC

## 2023-03-19 PROCEDURE — 36415 COLL VENOUS BLD VENIPUNCTURE: CPT | Mod: HCNC

## 2023-03-19 PROCEDURE — 84100 ASSAY OF PHOSPHORUS: CPT | Mod: HCNC

## 2023-03-19 PROCEDURE — 85025 COMPLETE CBC W/AUTO DIFF WBC: CPT | Mod: HCNC

## 2023-03-19 PROCEDURE — 83735 ASSAY OF MAGNESIUM: CPT | Mod: HCNC

## 2023-03-19 PROCEDURE — 99232 PR SUBSEQUENT HOSPITAL CARE,LEVL II: ICD-10-PCS | Mod: HCNC,,, | Performed by: HOSPITALIST

## 2023-03-19 PROCEDURE — 99232 SBSQ HOSP IP/OBS MODERATE 35: CPT | Mod: HCNC,,, | Performed by: HOSPITALIST

## 2023-03-19 PROCEDURE — 63600175 PHARM REV CODE 636 W HCPCS: Mod: HCNC

## 2023-03-19 RX ORDER — MAGNESIUM SULFATE HEPTAHYDRATE 40 MG/ML
2 INJECTION, SOLUTION INTRAVENOUS ONCE
Status: COMPLETED | OUTPATIENT
Start: 2023-03-19 | End: 2023-03-19

## 2023-03-19 RX ORDER — SODIUM,POTASSIUM PHOSPHATES 280-250MG
2 POWDER IN PACKET (EA) ORAL ONCE
Status: COMPLETED | OUTPATIENT
Start: 2023-03-19 | End: 2023-03-19

## 2023-03-19 RX ADMIN — POTASSIUM BICARBONATE 20 MEQ: 391 TABLET, EFFERVESCENT ORAL at 09:03

## 2023-03-19 RX ADMIN — POTASSIUM & SODIUM PHOSPHATES POWDER PACK 280-160-250 MG 2 PACKET: 280-160-250 PACK at 07:03

## 2023-03-19 RX ADMIN — OXYBUTYNIN CHLORIDE 10 MG: 10 TABLET, EXTENDED RELEASE ORAL at 09:03

## 2023-03-19 RX ADMIN — CARVEDILOL 6.25 MG: 6.25 TABLET, FILM COATED ORAL at 05:03

## 2023-03-19 RX ADMIN — MAGNESIUM SULFATE 2 G: 2 INJECTION INTRAVENOUS at 08:03

## 2023-03-19 RX ADMIN — ACETAMINOPHEN 650 MG: 325 TABLET ORAL at 07:03

## 2023-03-19 RX ADMIN — SULFASALAZINE 500 MG: 500 TABLET ORAL at 09:03

## 2023-03-19 RX ADMIN — POTASSIUM BICARBONATE 20 MEQ: 391 TABLET, EFFERVESCENT ORAL at 02:03

## 2023-03-19 RX ADMIN — ACETAMINOPHEN 650 MG: 325 TABLET ORAL at 09:03

## 2023-03-19 RX ADMIN — AMLODIPINE BESYLATE 5 MG: 5 TABLET ORAL at 09:03

## 2023-03-19 RX ADMIN — SOTALOL HYDROCHLORIDE 120 MG: 120 TABLET ORAL at 09:03

## 2023-03-19 RX ADMIN — LEVOTHYROXINE SODIUM 175 MCG: 150 TABLET ORAL at 07:03

## 2023-03-19 RX ADMIN — PANTOPRAZOLE SODIUM 40 MG: 40 TABLET, DELAYED RELEASE ORAL at 09:03

## 2023-03-19 RX ADMIN — CARVEDILOL 6.25 MG: 6.25 TABLET, FILM COATED ORAL at 07:03

## 2023-03-19 NOTE — SUBJECTIVE & OBJECTIVE
Interval History: No acute overnight events. Aminotransferases, bilirubin, and alk phos remain stable. Patient still without abdominal pain. Plan for ERCP w/ stent placement tomorrow. NPO at midnight. Holding apixaban.    Review of Systems   Constitutional:  Positive for unexpected weight change. Negative for chills, diaphoresis and fever.   HENT:  Negative for rhinorrhea, sinus pressure and trouble swallowing.    Respiratory:  Negative for cough, shortness of breath and wheezing.    Cardiovascular:  Negative for chest pain, palpitations and leg swelling.   Gastrointestinal:  Negative for abdominal distention, abdominal pain, constipation, diarrhea, nausea and vomiting.   Genitourinary:  Negative for dysuria, flank pain and hematuria.   Musculoskeletal:  Negative for joint swelling and myalgias.   Skin:  Positive for color change.   Neurological:  Negative for weakness, numbness and headaches.   Psychiatric/Behavioral:  Negative for agitation. The patient is not hyperactive.    Objective:     Vital Signs (Most Recent):  Temp: 96.2 °F (35.7 °C) (03/19/23 1219)  Pulse: 74 (03/19/23 1219)  Resp: 16 (03/19/23 1219)  BP: (!) 107/53 (03/19/23 1219)  SpO2: 96 % (03/19/23 1219)   Vital Signs (24h Range):  Temp:  [96.1 °F (35.6 °C)-98.6 °F (37 °C)] 96.2 °F (35.7 °C)  Pulse:  [68-78] 74  Resp:  [16-20] 16  SpO2:  [92 %-98 %] 96 %  BP: (107-141)/(53-66) 107/53     Weight: 80.3 kg (177 lb)  Body mass index is 34.57 kg/m².    Intake/Output Summary (Last 24 hours) at 3/19/2023 1339  Last data filed at 3/19/2023 1259  Gross per 24 hour   Intake 290 ml   Output --   Net 290 ml      Physical Exam  Constitutional:       General: She is not in acute distress.     Appearance: Normal appearance. She is obese.   HENT:      Head: Normocephalic and atraumatic.      Right Ear: External ear normal.      Left Ear: External ear normal.      Nose: No congestion or rhinorrhea.      Mouth/Throat:      Mouth: Mucous membranes are moist.       Pharynx: Oropharynx is clear.   Eyes:      General: Scleral icterus present.      Extraocular Movements: Extraocular movements intact.      Conjunctiva/sclera: Conjunctivae normal.   Cardiovascular:      Rate and Rhythm: Normal rate and regular rhythm.      Pulses: Normal pulses.      Heart sounds: Normal heart sounds. No murmur heard.  Pulmonary:      Effort: Pulmonary effort is normal.      Breath sounds: Normal breath sounds. No wheezing or rales.   Abdominal:      General: Abdomen is flat. Bowel sounds are normal. There is no distension.      Palpations: Abdomen is soft.      Tenderness: There is no abdominal tenderness. There is no guarding.   Musculoskeletal:         General: No swelling.      Cervical back: Normal range of motion.      Right lower leg: No edema.      Left lower leg: No edema.   Skin:     General: Skin is warm and dry.      Capillary Refill: Capillary refill takes less than 2 seconds.      Coloration: Skin is jaundiced.      Findings: No rash.   Neurological:      Mental Status: She is alert and oriented to person, place, and time. Mental status is at baseline.      Sensory: No sensory deficit.      Motor: No weakness.   Psychiatric:         Mood and Affect: Mood normal.         Behavior: Behavior normal.     Significant Labs: All pertinent labs within the past 24 hours have been reviewed.  CBC:   Recent Labs   Lab 03/18/23  0607 03/19/23  0224   WBC 3.55* 3.63*   HGB 7.9* 8.2*   HCT 26.3* 27.8*    209     CMP:   Recent Labs   Lab 03/18/23  0607 03/19/23  0224    140   K 3.6 3.6    107   CO2 24 21*   * 91   BUN 10 8   CREATININE 0.8 0.8   CALCIUM 8.5* 8.5*   PROT 6.0 6.3   ALBUMIN 2.3* 2.4*   BILITOT 4.0* 4.2*   ALKPHOS 261* 287*   * 198*   * 134*   ANIONGAP 7* 12       Significant Imaging: I have reviewed all pertinent imaging results/findings within the past 24 hours.

## 2023-03-19 NOTE — PROGRESS NOTES
[facility]  Progress Note    Patient Name: Pauline Cronin  MRN: [id number]  Patient Class: IP- Inpatient   Admission Date: 3/17/2023  Length of Stay: 2 days  Attending Physician: Juliana Cordoba MD  Primary Care Provider: Ga Waldrop MD    Subjective:     Principal Problem:Biliary obstruction    HPI:  The patient is an 82 year old female with a history of A-fib (on Eliquis, last dose 3/16), sick sinus syndrome (s/p PPM), Crohn's disease, HTN, HLD, and recently diagnosed pancreatic head mass who [place] [facility] [place] being found to have an elevated bilirubin (4.3) by her outpatient cardiologist. She is jaundiced with scleral icterus but otherwise reports no acute symptoms. She denies fever, chills, headache, nausea, vomiting, itching, abdominal pain, diarrhea, or constipation.    Oncologic history: The patient presented to her PCP on March 1st reporting fatigue and 10 pounds of unintentional weight loss. She was sent for a CT abdomen/pelvis on 3/1/2023 which demonstrated a pancreatic head mass. She underwent ERCP w/ biopsy of the mass on 3/14, biopsy results are pending at the time of admission. Plan for outpatient ERCP w/ stent placement was planned for 3/29. She was instructed to present to the ED due to elevated bilirubin, jaundice, and icterus noted on outpatient labs on 3/16.    [place] [facility] [place] patient is hemodynamically stable, initial evaluation significant for WBC 4.42, Hgb 8.9, plts 245, Na 141, K 3.1, Bicab 25, Cr 0.7 (at baseline), Bili 4.2, alk phos 308, , .      Overview/Hospital Course:  The patient was admitted to hospital medicine for further management of her pancreatic head mass. Advanced endoscopy services was consulted on admission. Plan for ERCP w/ stenting on Monday, 3/20/23. The patient's bilirubin was trended and she was monitored for worsening abdominal pain throughout her hospital stay. Her anticoagulation was held in anticipation of the  ERCP.      Interval History: No acute overnight events. Aminotransferases, bilirubin, and alk phos remain stable. Patient still without abdominal pain. Plan for ERCP w/ stent placement tomorrow. NPO at midnight. Holding apixaban.    Review of Systems   Constitutional:  Positive for unexpected weight change. Negative for chills, diaphoresis and fever.   HENT:  Negative for rhinorrhea, sinus pressure and trouble swallowing.    Respiratory:  Negative for cough, shortness of breath and wheezing.    Cardiovascular:  Negative for chest pain, palpitations and leg swelling.   Gastrointestinal:  Negative for abdominal distention, abdominal pain, constipation, diarrhea, nausea and vomiting.   Genitourinary:  Negative for dysuria, flank pain and hematuria.   Musculoskeletal:  Negative for joint swelling and myalgias.   Skin:  Positive for color change.   Neurological:  Negative for weakness, numbness and headaches.   Psychiatric/Behavioral:  Negative for agitation. The patient is not hyperactive.    Objective:     Vital Signs (Most Recent):  Temp: 96.2 °F (35.7 °C) (03/19/23 1219)  Pulse: 74 (03/19/23 1219)  Resp: 16 (03/19/23 1219)  BP: (!) 107/53 (03/19/23 1219)  SpO2: 96 % (03/19/23 1219)   Vital Signs (24h Range):  Temp:  [96.1 °F (35.6 °C)-98.6 °F (37 °C)] 96.2 °F (35.7 °C)  Pulse:  [68-78] 74  Resp:  [16-20] 16  SpO2:  [92 %-98 %] 96 %  BP: (107-141)/(53-66) 107/53     Weight: 80.3 kg (177 lb)  Body mass index is 34.57 kg/m².    Intake/Output Summary (Last 24 hours) at 3/19/2023 1339  Last data filed at 3/19/2023 1259  Gross per 24 hour   Intake 290 ml   Output --   Net 290 ml      Physical Exam  Constitutional:       General: She is not in acute distress.     Appearance: Normal appearance. She is obese.   HENT:      Head: Normocephalic and atraumatic.      Right Ear: External ear normal.      Left Ear: External ear normal.      Nose: No congestion or rhinorrhea.      Mouth/Throat:      Mouth: Mucous membranes are moist.       Pharynx: Oropharynx is clear.   Eyes:      General: Scleral icterus present.      Extraocular Movements: Extraocular movements intact.      Conjunctiva/sclera: Conjunctivae normal.   Cardiovascular:      Rate and Rhythm: Normal rate and regular rhythm.      Pulses: Normal pulses.      Heart sounds: Normal heart sounds. No murmur heard.  Pulmonary:      Effort: Pulmonary effort is normal.      Breath sounds: Normal breath sounds. No wheezing or rales.   Abdominal:      General: Abdomen is flat. Bowel sounds are normal. There is no distension.      Palpations: Abdomen is soft.      Tenderness: There is no abdominal tenderness. There is no guarding.   Musculoskeletal:         General: No swelling.      Cervical back: Normal range of motion.      Right lower leg: No edema.      Left lower leg: No edema.   Skin:     General: Skin is warm and dry.      Capillary Refill: Capillary refill takes less than 2 seconds.      Coloration: Skin is jaundiced.      Findings: No rash.   Neurological:      Mental Status: She is alert and oriented to person, place, and time. Mental status is at baseline.      Sensory: No sensory deficit.      Motor: No weakness.   Psychiatric:         Mood and Affect: Mood normal.         Behavior: Behavior normal.     Significant Labs: All pertinent labs within the past 24 hours have been reviewed.  CBC:   Recent Labs   Lab 03/18/23  0607 03/19/23  0224   WBC 3.55* 3.63*   HGB 7.9* 8.2*   HCT 26.3* 27.8*    209     CMP:   Recent Labs   Lab 03/18/23  0607 03/19/23  0224    140   K 3.6 3.6    107   CO2 24 21*   * 91   BUN 10 8   CREATININE 0.8 0.8   CALCIUM 8.5* 8.5*   PROT 6.0 6.3   ALBUMIN 2.3* 2.4*   BILITOT 4.0* 4.2*   ALKPHOS 261* 287*   * 198*   * 134*   ANIONGAP 7* 12       Significant Imaging: I have reviewed all pertinent imaging results/findings within the past 24 hours.      Assessment/Plan:      * Biliary obstruction  82 yoF w/ newly diagnosed  pancreatic head mass s/p ERCP w/ biopsy with results pending presenting with elevated bilirubin, elevated lipase, jaundice, icterus but otherwise asymptomatic. No abdominal pain on exam. Imaging w/ 3.5 cm pancreatic head mass overall similar in appearance. Mild associated pancreatic stranding. Had planned for outpatient ERCP with stent placement on 3/29 but developed hyperbilirubinemia and presented to the ED. AES consulted on admission, plans for ERCP w/ stent placement on Monday.    -- AES consulted; appreciate recs. Plan for ERCP w/ stent on Monday  -- Full diet for now; NPO at Sunday at midnight  -- Hold apixaban (last dose was 3/16)    Paroxysmal atrial fibrillation  H/o A-fib. Anticoagulated w/ apixaban (last dose 3/16). Rate controlled w/ carvedilol 6.25 mg daily and sotalol 120 mg BID. QTc on admission 517 (paced rhythm)    -- Hold apixaban for possible ERCP  -- Continue sotalol and carvedilol    Anemia  Hgb 8.9 on admission. Recently decreased from 14.2 four months ago. No evidence of bleeding on exam. Outpatient serologic evaluation significant for normal B12 and folate. Iron low at 19, ferritin low normal.     -- Trend CBC  -- Plan to start iron supplementation on discharge     Chronic anticoagulation  On apixaban outpatient for A-fib; will hold given possible procedure.    Sick sinus syndrome  S/p PPM. Stable.     GERD (gastroesophageal reflux disease)  Takes esomeprazole 40 mg daily at home    -- Continue home PPI     OAB (overactive bladder)  Patient takes tolterodine at home (not on formulary)    -- Oxybutynin 10 mg daily while admitted     Pacemaker  H/o PPM secondary to sick sinus syndrome and complete heart block. Follows w/ cardiology. No acute concerns.    Hypothyroidism  Remote history of hyperthyroidism w/ radioactive iodine ablation, now takes synthroid daily. TSH normal on admission, free T4 1.97.    -- Continue levothyroxine 175 mcg daily    Essential hypertension, benign  Patient takes  amlodipine 5 mg daily and carvedilol 6.25 mg BID at home.    -- Continue home antihypertensives     Hyperlipidemia  Last LDL 69 on 3/1/23; takes atorvastatin 20 mg daily at home.    -- Hold atorvastatin given elevated aminotransferases on admission       Crohn's disease of large intestine without complication  Chronic and stable. Patient takes colestipol, sulfasalazine at home.    -- Continue sulfasalazine  -- Colestipol not on formulary, will hold for now. Will ask family to bring in home supply if indicated      VTE Risk Mitigation (From admission, onward)           Ordered     IP VTE HIGH RISK PATIENT  Once         03/18/23 1342     Reason for No Pharmacological VTE Prophylaxis  Once        Question:  Reasons:  Answer:  Physician Provided (leave comment)  Comment:  Pending possible procedure    03/17/23 1628     Place sequential compression device  Until discontinued         03/17/23 1628     Place sequential compression device  Until discontinued         03/17/23 1554                    Discharge Planning   UMA: 3/20/2023     Code Status: Full Code   Is the patient medically ready for discharge?: No    Reason for patient still in hospital (select all that apply): Patient trending condition      Discharge Delays: (!) Procedure Scheduling (IR, OR, Labs, Echo, Cath, Echo, EEG)      Dave Ortega MD  Department of Hospital Medicine   [facility]                      03/19/2023                             STAFF PHYSICIAN NOTE                                   Attending Attestation for Rounds with Resident  I have reviewed and concur with the resident's history, physical, assessment, and plan.  I have personally interviewed and examined the patient at bedside and agree with the resident's findings.           82-year-old female, with history of AFib, on Eliquis, pacemaker in-situ, Crohn's disease, hyperlipidemia, hypertension, known pancreatic mass, presents to ED for elevated bilirubin- 4.2. AES planning EUS on  3/20. Has oncology f/u. Will arrange for PC f/u as well.                              Juliana Cordoba MD  Senior Hospitalist  Department of Hospital Medicine [facility]  [address], [phone]

## 2023-03-19 NOTE — ASSESSMENT & PLAN NOTE
82 yoF w/ newly diagnosed pancreatic head mass s/p ERCP w/ biopsy with results pending presenting with elevated bilirubin, elevated lipase, jaundice, icterus but otherwise asymptomatic. No abdominal pain on exam. Imaging w/ 3.5 cm pancreatic head mass overall similar in appearance. Mild associated pancreatic stranding. Had planned for outpatient ERCP with stent placement on 3/29 but developed hyperbilirubinemia and presented to the ED. AES consulted on admission, plans for ERCP w/ stent placement on Monday.    -- AES consulted; appreciate recs. Plan for ERCP w/ stent on Monday  -- Full diet for now; NPO at Sunday at midnight  -- Hold apixaban (last dose was 3/16)

## 2023-03-19 NOTE — PLAN OF CARE
Problem: Adult Inpatient Plan of Care  Goal: Optimal Comfort and Wellbeing  Outcome: Ongoing, Progressing     Problem: Fall Injury Risk  Goal: Absence of Fall and Fall-Related Injury  Outcome: Ongoing, Progressing     Problem: Adult Inpatient Plan of Care  Goal: Plan of Care Review  Outcome: Ongoing, Progressing

## 2023-03-20 ENCOUNTER — ANESTHESIA EVENT (OUTPATIENT)
Dept: ENDOSCOPY | Facility: HOSPITAL | Age: 83
DRG: 435 | End: 2023-03-20
Payer: MEDICARE

## 2023-03-20 LAB
ALBUMIN SERPL BCP-MCNC: 2.5 G/DL (ref 3.5–5.2)
ALP SERPL-CCNC: 279 U/L (ref 55–135)
ALT SERPL W/O P-5'-P-CCNC: 129 U/L (ref 10–44)
ANION GAP SERPL CALC-SCNC: 7 MMOL/L (ref 8–16)
AST SERPL-CCNC: 183 U/L (ref 10–40)
BASOPHILS # BLD AUTO: 0.03 K/UL (ref 0–0.2)
BASOPHILS NFR BLD: 0.9 % (ref 0–1.9)
BILIRUB SERPL-MCNC: 4.5 MG/DL (ref 0.1–1)
BUN SERPL-MCNC: 7 MG/DL (ref 8–23)
BUN SERPL-MCNC: 71 MG/DL (ref 6–30)
CALCIUM SERPL-MCNC: 8.6 MG/DL (ref 8.7–10.5)
CHLORIDE SERPL-SCNC: 107 MMOL/L (ref 95–110)
CHLORIDE SERPL-SCNC: 98 MMOL/L (ref 95–110)
CO2 SERPL-SCNC: 26 MMOL/L (ref 23–29)
CREAT SERPL-MCNC: 0.7 MG/DL (ref 0.5–1.4)
CREAT SERPL-MCNC: 6.9 MG/DL (ref 0.5–1.4)
DIFFERENTIAL METHOD: ABNORMAL
EOSINOPHIL # BLD AUTO: 0.1 K/UL (ref 0–0.5)
EOSINOPHIL NFR BLD: 3.4 % (ref 0–8)
ERYTHROCYTE [DISTWIDTH] IN BLOOD BY AUTOMATED COUNT: 18.5 % (ref 11.5–14.5)
EST. GFR  (NO RACE VARIABLE): >60 ML/MIN/1.73 M^2
GLUCOSE SERPL-MCNC: 130 MG/DL (ref 70–110)
GLUCOSE SERPL-MCNC: 97 MG/DL (ref 70–110)
HCT VFR BLD AUTO: 28.1 % (ref 37–48.5)
HCT VFR BLD CALC: 42 %PCV (ref 36–54)
HGB BLD-MCNC: 8.3 G/DL (ref 12–16)
IMM GRANULOCYTES # BLD AUTO: 0 K/UL (ref 0–0.04)
IMM GRANULOCYTES NFR BLD AUTO: 0 % (ref 0–0.5)
LYMPHOCYTES # BLD AUTO: 0.9 K/UL (ref 1–4.8)
LYMPHOCYTES NFR BLD: 25.3 % (ref 18–48)
MAGNESIUM SERPL-MCNC: 1.7 MG/DL (ref 1.6–2.6)
MCH RBC QN AUTO: 23.2 PG (ref 27–31)
MCHC RBC AUTO-ENTMCNC: 29.5 G/DL (ref 32–36)
MCV RBC AUTO: 79 FL (ref 82–98)
MONOCYTES # BLD AUTO: 0.5 K/UL (ref 0.3–1)
MONOCYTES NFR BLD: 14.2 % (ref 4–15)
NEUTROPHILS # BLD AUTO: 2 K/UL (ref 1.8–7.7)
NEUTROPHILS NFR BLD: 56.2 % (ref 38–73)
NRBC BLD-RTO: 0 /100 WBC
PHOSPHATE SERPL-MCNC: 2.4 MG/DL (ref 2.7–4.5)
PLATELET # BLD AUTO: 210 K/UL (ref 150–450)
PMV BLD AUTO: 12.1 FL (ref 9.2–12.9)
POC IONIZED CALCIUM: 1.15 MMOL/L (ref 1.06–1.42)
POC TCO2 (MEASURED): 32 MMOL/L (ref 23–29)
POTASSIUM BLD-SCNC: 4.9 MMOL/L (ref 3.5–5.1)
POTASSIUM SERPL-SCNC: 4 MMOL/L (ref 3.5–5.1)
PROT SERPL-MCNC: 6.4 G/DL (ref 6–8.4)
RBC # BLD AUTO: 3.58 M/UL (ref 4–5.4)
SAMPLE: ABNORMAL
SODIUM BLD-SCNC: 137 MMOL/L (ref 136–145)
SODIUM SERPL-SCNC: 140 MMOL/L (ref 136–145)
WBC # BLD AUTO: 3.52 K/UL (ref 3.9–12.7)

## 2023-03-20 PROCEDURE — 84100 ASSAY OF PHOSPHORUS: CPT | Mod: HCNC

## 2023-03-20 PROCEDURE — 85025 COMPLETE CBC W/AUTO DIFF WBC: CPT | Mod: HCNC

## 2023-03-20 PROCEDURE — 99232 PR SUBSEQUENT HOSPITAL CARE,LEVL II: ICD-10-PCS | Mod: HCNC,,, | Performed by: HOSPITALIST

## 2023-03-20 PROCEDURE — 99232 SBSQ HOSP IP/OBS MODERATE 35: CPT | Mod: HCNC,,, | Performed by: HOSPITALIST

## 2023-03-20 PROCEDURE — 80053 COMPREHEN METABOLIC PANEL: CPT | Mod: HCNC

## 2023-03-20 PROCEDURE — 36415 COLL VENOUS BLD VENIPUNCTURE: CPT | Mod: HCNC

## 2023-03-20 PROCEDURE — 25000003 PHARM REV CODE 250: Mod: HCNC

## 2023-03-20 PROCEDURE — 20600001 HC STEP DOWN PRIVATE ROOM: Mod: HCNC

## 2023-03-20 PROCEDURE — 83735 ASSAY OF MAGNESIUM: CPT | Mod: HCNC

## 2023-03-20 RX ADMIN — ACETAMINOPHEN 650 MG: 325 TABLET ORAL at 11:03

## 2023-03-20 RX ADMIN — PANTOPRAZOLE SODIUM 40 MG: 40 TABLET, DELAYED RELEASE ORAL at 11:03

## 2023-03-20 RX ADMIN — CARVEDILOL 6.25 MG: 6.25 TABLET, FILM COATED ORAL at 05:03

## 2023-03-20 RX ADMIN — SOTALOL HYDROCHLORIDE 120 MG: 120 TABLET ORAL at 11:03

## 2023-03-20 RX ADMIN — SULFASALAZINE 500 MG: 500 TABLET ORAL at 09:03

## 2023-03-20 RX ADMIN — AMLODIPINE BESYLATE 5 MG: 5 TABLET ORAL at 11:03

## 2023-03-20 RX ADMIN — SOTALOL HYDROCHLORIDE 120 MG: 120 TABLET ORAL at 09:03

## 2023-03-20 RX ADMIN — SULFASALAZINE 500 MG: 500 TABLET ORAL at 11:03

## 2023-03-20 RX ADMIN — OXYBUTYNIN CHLORIDE 10 MG: 10 TABLET, EXTENDED RELEASE ORAL at 11:03

## 2023-03-20 RX ADMIN — CARVEDILOL 6.25 MG: 6.25 TABLET, FILM COATED ORAL at 11:03

## 2023-03-20 NOTE — ASSESSMENT & PLAN NOTE
82 yoF w/ newly diagnosed pancreatic head mass s/p ERCP w/ biopsy with results pending presenting with elevated bilirubin, elevated lipase, jaundice, icterus but otherwise asymptomatic. No abdominal pain on exam. Imaging w/ 3.5 cm pancreatic head mass overall similar in appearance. Mild associated pancreatic stranding. Had planned for outpatient ERCP with stent placement on 3/29 but developed hyperbilirubinemia and presented to the ED. AES consulted on admission, plans for ERCP w/ stent placement on Monday.    -- AES consulted; appreciate recs.   -- Full diet for now; NPO at midnight for plan ERCP on Tuesday  -- Hold apixaban (last dose was 3/16)

## 2023-03-20 NOTE — PROGRESS NOTES
[facility]  Progress Note    Patient Name: Pauline Cronin  MRN: [id number]  Patient Class: IP- Inpatient   Admission Date: 3/17/2023  Length of Stay: 3 days  Attending Physician: Juliana Cordoba MD  Primary Care Provider: Ga Waldrop MD        Subjective:     Principal Problem:Biliary obstruction        HPI:  The patient is an 82 year old female with a history of A-fib (on Eliquis, last dose 3/16), sick sinus syndrome (s/p PPM), Crohn's disease, HTN, HLD, and recently diagnosed pancreatic head mass who presents to the [facility] ED after being found to have an elevated bilirubin (4.3) by her outpatient cardiologist. She is jaundiced with scleral icterus but otherwise reports no acute symptoms. She denies fever, chills, headache, nausea, vomiting, itching, abdominal pain, diarrhea, or constipation.    Oncologic history: The patient presented to her PCP on March 1st reporting fatigue and 10 pounds of unintentional weight loss. She was sent for a CT abdomen/pelvis on 3/1/2023 which demonstrated a pancreatic head mass. She underwent ERCP w/ biopsy of the mass on 3/14, biopsy results are pending at the time of admission. Plan for outpatient ERCP w/ stent placement was planned for 3/29. She was instructed to present to the ED due to elevated bilirubin, jaundice, and icterus noted on outpatient labs on 3/16.    In the [facility] ED the patient is hemodynamically stable, initial evaluation significant for WBC 4.42, Hgb 8.9, plts 245, Na 141, K 3.1, Bicab 25, Cr 0.7 (at baseline), Bili 4.2, alk phos 308, , .      Overview/Hospital Course:  The patient was admitted to hospital medicine for further management of her pancreatic head mass. Advanced endoscopy services was consulted on admission. Plan for ERCP w/ stenting on Monday, 3/20/23. The patient's bilirubin was trended and she was monitored for worsening abdominal pain throughout her hospital stay. Her anticoagulation was held in anticipation  of the ERCP.      Interval History: No acute events overnight.  Patient awaiting ERCP.  Feeling well    Review of Systems   Constitutional:  Positive for unexpected weight change. Negative for chills, diaphoresis and fever.   HENT:  Negative for rhinorrhea, sinus pressure and trouble swallowing.    Respiratory:  Negative for cough, shortness of breath and wheezing.    Cardiovascular:  Negative for chest pain, palpitations and leg swelling.   Gastrointestinal:  Negative for abdominal distention, abdominal pain, constipation, diarrhea, nausea and vomiting.   Genitourinary:  Negative for dysuria, flank pain and hematuria.   Musculoskeletal:  Negative for joint swelling and myalgias.   Skin:  Positive for color change.   Neurological:  Negative for weakness, numbness and headaches.   Psychiatric/Behavioral:  Negative for agitation. The patient is not hyperactive.    Objective:     Vital Signs (Most Recent):  Temp: 97.7 °F (36.5 °C) (03/20/23 1218)  Pulse: 72 (03/20/23 1218)  Resp: 18 (03/20/23 1218)  BP: 139/62 (03/20/23 1218)  SpO2: (!) 93 % (03/20/23 0833)   Vital Signs (24h Range):  Temp:  [96.4 °F (35.8 °C)-98.9 °F (37.2 °C)] 97.7 °F (36.5 °C)  Pulse:  [69-94] 72  Resp:  [16-20] 18  SpO2:  [92 %-96 %] 93 %  BP: (123-150)/(58-79) 139/62     Weight: 80.3 kg (177 lb)  Body mass index is 34.57 kg/m².    Intake/Output Summary (Last 24 hours) at 3/20/2023 1226  Last data filed at 3/19/2023 1748  Gross per 24 hour   Intake 530 ml   Output --   Net 530 ml      Physical Exam  Constitutional:       General: She is not in acute distress.     Appearance: Normal appearance. She is obese.   HENT:      Head: Normocephalic and atraumatic.      Right Ear: External ear normal.      Left Ear: External ear normal.      Nose: No congestion or rhinorrhea.      Mouth/Throat:      Mouth: Mucous membranes are moist.      Pharynx: Oropharynx is clear.   Eyes:      General: Scleral icterus present.      Extraocular Movements: Extraocular  movements intact.      Conjunctiva/sclera: Conjunctivae normal.   Cardiovascular:      Rate and Rhythm: Normal rate and regular rhythm.      Pulses: Normal pulses.      Heart sounds: Normal heart sounds. No murmur heard.  Pulmonary:      Effort: Pulmonary effort is normal.      Breath sounds: Normal breath sounds. No wheezing or rales.   Abdominal:      General: Abdomen is flat. Bowel sounds are normal. There is no distension.      Palpations: Abdomen is soft.      Tenderness: There is no abdominal tenderness. There is no guarding.   Musculoskeletal:         General: No swelling.      Cervical back: Normal range of motion.      Right lower leg: No edema.      Left lower leg: No edema.   Skin:     General: Skin is warm and dry.      Capillary Refill: Capillary refill takes less than 2 seconds.      Coloration: Skin is jaundiced.      Findings: No rash.   Neurological:      Mental Status: She is alert and oriented to person, place, and time. Mental status is at baseline.      Sensory: No sensory deficit.      Motor: No weakness.   Psychiatric:         Mood and Affect: Mood normal.         Behavior: Behavior normal.       Significant Labs: All pertinent labs within the past 24 hours have been reviewed.  CBC:   Recent Labs   Lab 03/19/23 0224 03/20/23  0546   WBC 3.63* 3.52*   HGB 8.2* 8.3*   HCT 27.8* 28.1*    210     CMP:   Recent Labs   Lab 03/19/23 0224 03/20/23  0546    140   K 3.6 4.0    107   CO2 21* 26   GLU 91 97   BUN 8 7*   CREATININE 0.8 0.7   CALCIUM 8.5* 8.6*   PROT 6.3 6.4   ALBUMIN 2.4* 2.5*   BILITOT 4.2* 4.5*   ALKPHOS 287* 279*   * 183*   * 129*   ANIONGAP 12 7*       Significant Imaging: I have reviewed all pertinent imaging results/findings within the past 24 hours.      Assessment/Plan:      * Biliary obstruction  82 yoF w/ newly diagnosed pancreatic head mass s/p ERCP w/ biopsy with results pending presenting with elevated bilirubin, elevated lipase, jaundice,  icterus but otherwise asymptomatic. No abdominal pain on exam. Imaging w/ 3.5 cm pancreatic head mass overall similar in appearance. Mild associated pancreatic stranding. Had planned for outpatient ERCP with stent placement on 3/29 but developed hyperbilirubinemia and presented to the ED. AES consulted on admission, plans for ERCP w/ stent placement on Monday.    -- AES consulted; appreciate recs.   -- Full diet for now; NPO at midnight for plan ERCP on Tuesday  -- Hold apixaban (last dose was 3/16)    Anemia  Hgb 8.9 on admission. Recently decreased from 14.2 four months ago. No evidence of bleeding on exam. Outpatient serologic evaluation significant for normal B12 and folate. Iron low at 19, ferritin low normal.     -- Trend CBC  -- Plan to start iron supplementation on discharge     Chronic anticoagulation  On apixaban outpatient for A-fib; will hold given possible procedure.    Sick sinus syndrome  S/p PPM. Stable.     GERD (gastroesophageal reflux disease)  Takes esomeprazole 40 mg daily at home    -- Continue home PPI     OAB (overactive bladder)  Patient takes tolterodine at home (not on formulary)    -- Oxybutynin 10 mg daily while admitted     Pacemaker  H/o PPM secondary to sick sinus syndrome and complete heart block. Follows w/ cardiology. No acute concerns.    Hypothyroidism  Remote history of hyperthyroidism w/ radioactive iodine ablation, now takes synthroid daily. TSH normal on admission, free T4 1.97.    -- Continue levothyroxine 175 mcg daily    Essential hypertension, benign  Patient takes amlodipine 5 mg daily and carvedilol 6.25 mg BID at home.    -- Continue home antihypertensives     Hyperlipidemia  Last LDL 69 on 3/1/23; takes atorvastatin 20 mg daily at home.    -- Hold atorvastatin given elevated aminotransferases on admission       Crohn's disease of large intestine without complication  Chronic and stable. Patient takes colestipol, sulfasalazine at home.    -- Continue sulfasalazine  --  Colestipol not on formulary, will hold for now. Will ask family to bring in home supply if indicated    Paroxysmal atrial fibrillation  H/o A-fib. Anticoagulated w/ apixaban (last dose 3/16). Rate controlled w/ carvedilol 6.25 mg daily and sotalol 120 mg BID. QTc on admission 517 (paced rhythm)    -- Hold apixaban for possible ERCP  -- Continue sotalol and carvedilol      VTE Risk Mitigation (From admission, onward)           Ordered     IP VTE HIGH RISK PATIENT  Once         03/18/23 1342     Reason for No Pharmacological VTE Prophylaxis  Once        Question:  Reasons:  Answer:  Physician Provided (leave comment)  Comment:  Pending possible procedure    03/17/23 1628     Place sequential compression device  Until discontinued         03/17/23 1628     Place sequential compression device  Until discontinued         03/17/23 1554                    Discharge Planning   UMA: 3/24/2023     Code Status: Full Code   Is the patient medically ready for discharge?: No    Reason for patient still in hospital (select all that apply): Patient trending condition and Consult recommendations  Discharge Plan A: Home   Discharge Delays: (!) Procedure Scheduling (IR, OR, Labs, Echo, Cath, Echo, EEG)      Ryan Garcia MD  Department of Hospital Medicine   [facility]                      03/20/2023                             STAFF PHYSICIAN NOTE                                   Attending Attestation for Rounds with Resident  I have reviewed and concur with the resident's history, physical, assessment, and plan.  I have personally interviewed and examined the patient at bedside and agree with the resident's findings.         82-year-old female, with history of AFib, on Eliquis, pacemaker in-situ, Crohn's disease, hyperlipidemia, hypertension, known pancreatic mass, presents to ED for elevated bilirubin- 4.2. AES planning EUS on 3/21. Has oncology f/u. Will arrange for PC f/u as well.                                   Jluiana Cordoba MD  Senior Hospitalist  Department of Hospital Medicine  [facility]  [address], [phone]

## 2023-03-20 NOTE — ANESTHESIA PREPROCEDURE EVALUATION
[facility]  Anesthesia Pre-Operative Evaluation         Patient Name: Pauline Cronin  YOB: 1940  MRN: [id number]    SUBJECTIVE:     Pre-operative evaluation for Procedure(s) (LRB):  ERCP (ENDOSCOPIC RETROGRADE CHOLANGIOPANCREATOGRAPHY) (N/A)     03/20/2023    Pauline Cronin is a 82 y.o. female w/ a significant PMHx of A-fib (on Eliquis, last dose 3/16), sick sinus syndrome (s/p PPM), Crohn's disease, HTN, HLD, and recently diagnosed pancreatic head mass who presents to the [facility] ED after being found to have an elevated bilirubin (4.3) .    Patient now presents for the above procedure(s).      LDA:        Peripheral IV - Single Lumen 03/19/23 1341 20 G;1 3/4 in Anterior;Left Forearm (Active)   Site Assessment Clean;Dry;Intact 03/20/23 0400   Extremity Assessment Distal to IV No abnormal discoloration;No redness;No swelling;No warmth 03/19/23 1341   Line Status Blood return noted;Flushed;Saline locked 03/20/23 0400   Dressing Status Clean;Dry;Intact 03/20/23 0400   Dressing Intervention Integrity maintained 03/20/23 0400   Site Change Due 03/23/23 03/19/23 1341   Number of days: 1       Prev airway: None documented.    Drips: None documented.      Patient Active Problem List   Diagnosis    Paroxysmal atrial fibrillation    Crohn's disease of large intestine without complication    Hyperlipidemia    Essential hypertension, benign    Hypothyroidism    Pacemaker    OAB (overactive bladder)    GERD (gastroesophageal reflux disease)    Osteoporosis    Dysphagia    Severe obesity (BMI 35.0-39.9) with comorbidity    Nephrolithiasis    Arthritis of right knee    Atrioventricular block, complete    Sick sinus syndrome    Closed fracture of right distal fibula    Localized edema    LBBB (left bundle branch block)    Chronic anticoagulation    Biliary obstruction    Anemia       Review of patient's allergies indicates:   Allergen Reactions    Lisinopril Other (See Comments)      cough       Current Inpatient Medications:   amLODIPine  5 mg Oral Daily    carvediloL  6.25 mg Oral BID WM    levothyroxine  175 mcg Oral Daily    oxybutynin  10 mg Oral Daily    pantoprazole  40 mg Oral Daily    sotaloL  120 mg Oral Q12H    sulfaSALAzine  500 mg Oral BID       No current facility-administered medications on file prior to encounter.     Current Outpatient Medications on File Prior to Encounter   Medication Sig Dispense Refill    alendronate (FOSAMAX) 70 MG tablet Take 1 tablet (70 mg total) by mouth every 7 days. 12 tablet 3    amLODIPine (NORVASC) 5 MG tablet Take 1 tablet (5 mg total) by mouth once daily. 30 tablet 11    atorvastatin (LIPITOR) 20 MG tablet Take 1 tablet (20 mg total) by mouth once daily. 90 tablet 3    carvediloL (COREG) 6.25 MG tablet Take 1 tablet (6.25 mg total) by mouth 2 (two) times daily with meals. 180 tablet 3    colestipoL (COLESTID) 1 gram Tab Take 1 tablet (1 g total) by mouth 2 (two) times daily. 180 tablet 3    diphenoxylate-atropine 2.5-0.025 mg (LOMOTIL) 2.5-0.025 mg per tablet TAKE 1 TABLET BY MOUTH 4 TIMES DAILY AS NEEDED FOR DIARRHEA 60 tablet 3    esomeprazole (NEXIUM) 40 MG capsule Take 1 capsule (40 mg total) by mouth once daily. 90 capsule 3    folic acid (FOLVITE) 1 MG tablet Take 1 tablet by mouth once daily 90 tablet 1    levothyroxine (SYNTHROID, LEVOTHROID) 175 MCG tablet Take 1 tablet by mouth once daily 30 tablet 5    magnesium gluconate 27.5 mg magne- sium (500 mg) Tab Take 500 mg by mouth once daily. 90 tablet 3    sotaloL (BETAPACE) 120 MG Tab Take 1 tablet (120 mg total) by mouth every 12 (twelve) hours. 180 tablet 3    sulfaSALAzine (AZULFIDINE) 500 mg Tab Take 1 tablet by mouth twice daily 180 tablet 3    tolterodine (DETROL LA) 4 MG 24 hr capsule Take 1 capsule (4 mg total) by mouth once daily. 90 capsule 3    ELIQUIS 5 mg Tab Take 1 tablet (5 mg total) by mouth 2 (two) times daily. 180 tablet 0       Past Surgical  History:   Procedure Laterality Date    APPENDECTOMY      CARDIAC SURGERY  2014    pacemaker    COLONOSCOPY  ~    normal findings per patient report    ENDOSCOPIC ULTRASOUND OF UPPER GASTROINTESTINAL TRACT N/A 3/14/2023    Procedure: ULTRASOUND, UPPER GI TRACT, ENDOSCOPIC;  Surgeon: Andrei Swartz MD;  Location[facility] ENDO;  Service: Endoscopy;  Laterality: N/A;  Approval to hold Eliquis rec'd from Dr. Barbosa (see telephone encounter 3/3/23)-DS  Medtronic AICD/PPM  3/3/23-Instructions via portal-DS    ESOPHAGOGASTRODUODENOSCOPY N/A 2018    Procedure: EGD (ESOPHAGOGASTRODUODENOSCOPY);  Surgeon: Alondra Casiano MD;  Location: [facility] ENDO;  Service: Endoscopy;  Laterality: N/A;    HYSTERECTOMY      INSERTION OF IMPLANTABLE CARDIOVERTER-DEFIBRILLATOR (ICD) GENERATOR WITH TWO EXISTING LEADS Left 5/10/2021    Procedure: INSERTION, PULSE GENERATOR WITH 2 EXISTING LEADS, ICD;  Surgeon: Bo Leone MD;  Location: [facility] C[facility] LAB;  Service: Cardiology;  Laterality: Left;    INSERTION OF PACEMAKER      REPLACEMENT OF PACEMAKER GENERATOR  05/10/2021    RETROGRADE PYELOGRAPHY Right 2020    Procedure: PYELOGRAM, RETROGRADE;  Surgeon: Jovan Kruse MD;  Location: [facility];  Service: Urology;  Laterality: Right;    SMALL INTESTINE SURGERY  in her 20's    for crohn's    TONSILLECTOMY      UPPER GASTROINTESTINAL ENDOSCOPY  ~    normal findings per patient report       Social History     Socioeconomic History    Marital status:    Tobacco Use    Smoking status: Former     Types: Cigarettes     Quit date:      Years since quittin.2    Smokeless tobacco: Never   Substance and Sexual Activity    Alcohol use: No    Drug use: No    Sexual activity: Never       OBJECTIVE:     Vital Signs Range (Last 24H):  Temp:  [36.5 °C (97.7 °F)-37.2 °C (98.9 °F)]   Pulse:  [69-94]   Resp:  [16-20]   BP: (123-150)/(58-71)   SpO2:  [92 %-97 %]       Significant Labs:  Lab Results   Component  Value Date    WBC 3.52 (L) 03/20/2023    HGB 8.3 (L) 03/20/2023    HCT 28.1 (L) 03/20/2023     03/20/2023    CHOL 151 03/01/2023    TRIG 80 03/01/2023    HDL 66 03/01/2023     (H) 03/20/2023     (H) 03/20/2023     03/20/2023    K 4.0 03/20/2023     03/20/2023    CREATININE 0.7 03/20/2023    BUN 7 (L) 03/20/2023    CO2 26 03/20/2023    TSH 0.450 03/16/2023    INR 1.2 03/18/2023    HGBA1C 5.6 12/16/2020       Diagnostic Studies: No relevant studies.    EKG:   Results for orders placed or performed during the hospital encounter of 03/17/23   EKG 12-lead    Collection Time: 03/17/23  6:24 PM    Narrative    Test Reason : I49.9,    Vent. Rate : 071 BPM     Atrial Rate : 071 BPM     P-R Int : 224 ms          QRS Dur : 140 ms      QT Int : 476 ms       P-R-T Axes : 069 -06 171 degrees     QTc Int : 517 ms    Atrial-paced rhythm with prolonged AV conduction  LVH with QRS widening and repolarization abnormality ( Charlie product )  IVCD  Anterolateral infarct (cited on or before 16-MAR-2023)vs due to conduction  Abnormal ECG  When compared with ECG of 16-MAR-2023 08:38,  The axis Shifted right  Criteria for Inferior infarct are no longer Present  Serial changes of Anterior infarct Present  Confirmed by Joaquin MCKINLEY MD ([id number]) on 3/18/2023 9:28:09 AM    Referred By: [email or web address]   SELF           Confirmed By:Joaquin MCKINLEY MD       2D ECHO:  TTE:  No results found for this or any previous visit.    MIHAELA:  No results found for this or any previous visit.    ASSESSMENT/PLAN:           Pre-op Assessment    I have reviewed the Patient Summary Reports.     I have reviewed the Nursing Notes. I have reviewed the NPO Status.      Review of Systems  Anesthesia Hx:  History of prior surgery of interest to airway management or planning:  Denies Personal Hx of Anesthesia complications.   Cardiovascular:   Pacemaker (ICD) Hypertension Dysrhythmias (SSS)    Pulmonary:   Shortness of breath    Renal/:   Chronic Renal  Disease    Hepatic/GI:   GERD    Endocrine:   Hypothyroidism  Obesity / BMI > 30      Physical Exam  General: Well nourished    Airway:  Mallampati: II   Mouth Opening: Normal  TM Distance: Normal  Neck ROM: Normal ROM        Anesthesia Plan  Type of Anesthesia, risks & benefits discussed:    Anesthesia Type: Gen Natural Airway, Gen ETT, Gen Supraglottic Airway  Intra-op Monitoring Plan: Standard ASA Monitors  Post Op Pain Control Plan: multimodal analgesia and IV/PO Opioids PRN  Induction:  IV  Airway Plan: Direct and Video, Post-Induction  Informed Consent: Informed consent signed with the Patient and all parties understand the risks and agree with anesthesia plan.  All questions answered.   ASA Score: 3  Day of Surgery Review of History & Physical: H&P Update referred to the surgeon/provider.  Anesthesia Plan Notes: Medtronic Samanta XT  MRI   Dual chamber. Pacemaker dependant    Ready For Surgery From Anesthesia Perspective.     .

## 2023-03-20 NOTE — PLAN OF CARE
[address] [facility] [facility]  Initial Discharge Assessment       Primary Care Provider: Ga Waldrop MD    Admission Diagnosis: Arrhythmia [I49.9]  Biliary obstruction [K83.1]  Chest pain [R07.9]    Admission Date: 3/17/2023  Expected Discharge Date: 3/24/2023    Discharge Barriers Identified: None    Payor: [employer] MANAGED MEDICARE / Plan: [employer] MEDICARE HMO / Product Type: Capitation /     Extended Emergency Contact Information  Primary Emergency Contact: MejiaKaren   [facility]  Home Phone: [phone]  Mobile Phone: [phone]  Relation: Relative  Secondary Emergency Contact: PeteWIL  Home Phone: [phone]  Mobile Phone: [phone]  Relation: Grandchild  Preferred language: English   needed? No    Discharge Plan A: Home  Discharge Plan B: Home Health      [facility] - [place], [place] - [address]  [address]  [place] [address]  Phone: [phone] Fax: [phone]    CM at bedside to discuss discharge planning assessment.   Pepe lives alone in a one-story home.   Independent with ADL and does not use medical equipment.  Explained CM services during patient's stay in hospital.   My Health packet discussed and left with patient.     Initial Assessment (most recent)       Adult Discharge Assessment - 03/20/23 1150          Discharge Assessment    Assessment Type Discharge Planning Assessment     Confirmed/corrected address, phone number and insurance Yes     Confirmed Demographics Correct on Facesheet     Source of Information patient     Reason For Admission arrhythmia     People in Home alone     Do you expect to return to your current living situation? Yes     Prior to hospitilization cognitive status: Alert/Oriented     Current cognitive status: Alert/Oriented     Home Layout Able to live on 1st floor     Equipment Currently Used at Home shower chair;grab bar     Readmission within 30 days? No     Do you currently have  service(s) that help you manage your care at home? No     Do you take prescription medications? Yes     Do you have prescription coverage? Yes     Coverage [employer] MANAGED MEDICARE     Do you have any problems affording any of your prescribed medications? No     Is the patient taking medications as prescribed? yes     How do you get to doctors appointments? family or friend will provide     Are you on dialysis? No     Do you take coumadin? No     Discharge Plan A Home     Discharge Plan B Home Health     DME Needed Upon Discharge  --   TBD    Discharge Plan discussed with: Patient     Discharge Barriers Identified None

## 2023-03-20 NOTE — SUBJECTIVE & OBJECTIVE
Interval History: No acute events overnight.  Patient awaiting ERCP.  Feeling well    Review of Systems   Constitutional:  Positive for unexpected weight change. Negative for chills, diaphoresis and fever.   HENT:  Negative for rhinorrhea, sinus pressure and trouble swallowing.    Respiratory:  Negative for cough, shortness of breath and wheezing.    Cardiovascular:  Negative for chest pain, palpitations and leg swelling.   Gastrointestinal:  Negative for abdominal distention, abdominal pain, constipation, diarrhea, nausea and vomiting.   Genitourinary:  Negative for dysuria, flank pain and hematuria.   Musculoskeletal:  Negative for joint swelling and myalgias.   Skin:  Positive for color change.   Neurological:  Negative for weakness, numbness and headaches.   Psychiatric/Behavioral:  Negative for agitation. The patient is not hyperactive.    Objective:     Vital Signs (Most Recent):  Temp: 97.7 °F (36.5 °C) (03/20/23 1218)  Pulse: 72 (03/20/23 1218)  Resp: 18 (03/20/23 1218)  BP: 139/62 (03/20/23 1218)  SpO2: (!) 93 % (03/20/23 0833)   Vital Signs (24h Range):  Temp:  [96.4 °F (35.8 °C)-98.9 °F (37.2 °C)] 97.7 °F (36.5 °C)  Pulse:  [69-94] 72  Resp:  [16-20] 18  SpO2:  [92 %-96 %] 93 %  BP: (123-150)/(58-79) 139/62     Weight: 80.3 kg (177 lb)  Body mass index is 34.57 kg/m².    Intake/Output Summary (Last 24 hours) at 3/20/2023 1226  Last data filed at 3/19/2023 1748  Gross per 24 hour   Intake 530 ml   Output --   Net 530 ml      Physical Exam  Constitutional:       General: She is not in acute distress.     Appearance: Normal appearance. She is obese.   HENT:      Head: Normocephalic and atraumatic.      Right Ear: External ear normal.      Left Ear: External ear normal.      Nose: No congestion or rhinorrhea.      Mouth/Throat:      Mouth: Mucous membranes are moist.      Pharynx: Oropharynx is clear.   Eyes:      General: Scleral icterus present.      Extraocular Movements: Extraocular movements intact.       Conjunctiva/sclera: Conjunctivae normal.   Cardiovascular:      Rate and Rhythm: Normal rate and regular rhythm.      Pulses: Normal pulses.      Heart sounds: Normal heart sounds. No murmur heard.  Pulmonary:      Effort: Pulmonary effort is normal.      Breath sounds: Normal breath sounds. No wheezing or rales.   Abdominal:      General: Abdomen is flat. Bowel sounds are normal. There is no distension.      Palpations: Abdomen is soft.      Tenderness: There is no abdominal tenderness. There is no guarding.   Musculoskeletal:         General: No swelling.      Cervical back: Normal range of motion.      Right lower leg: No edema.      Left lower leg: No edema.   Skin:     General: Skin is warm and dry.      Capillary Refill: Capillary refill takes less than 2 seconds.      Coloration: Skin is jaundiced.      Findings: No rash.   Neurological:      Mental Status: She is alert and oriented to person, place, and time. Mental status is at baseline.      Sensory: No sensory deficit.      Motor: No weakness.   Psychiatric:         Mood and Affect: Mood normal.         Behavior: Behavior normal.       Significant Labs: All pertinent labs within the past 24 hours have been reviewed.  CBC:   Recent Labs   Lab 03/19/23 0224 03/20/23  0546   WBC 3.63* 3.52*   HGB 8.2* 8.3*   HCT 27.8* 28.1*    210     CMP:   Recent Labs   Lab 03/19/23 0224 03/20/23  0546    140   K 3.6 4.0    107   CO2 21* 26   GLU 91 97   BUN 8 7*   CREATININE 0.8 0.7   CALCIUM 8.5* 8.6*   PROT 6.3 6.4   ALBUMIN 2.4* 2.5*   BILITOT 4.2* 4.5*   ALKPHOS 287* 279*   * 183*   * 129*   ANIONGAP 12 7*       Significant Imaging: I have reviewed all pertinent imaging results/findings within the past 24 hours.

## 2023-03-21 ENCOUNTER — ANESTHESIA (OUTPATIENT)
Dept: ENDOSCOPY | Facility: HOSPITAL | Age: 83
DRG: 435 | End: 2023-03-21
Payer: MEDICARE

## 2023-03-21 PROBLEM — C25.9 PANCREATIC ADENOCARCINOMA: Status: ACTIVE | Noted: 2023-03-21

## 2023-03-21 LAB
ALBUMIN SERPL BCP-MCNC: 2.3 G/DL (ref 3.5–5.2)
ALP SERPL-CCNC: 257 U/L (ref 55–135)
ALT SERPL W/O P-5'-P-CCNC: 115 U/L (ref 10–44)
ANION GAP SERPL CALC-SCNC: 10 MMOL/L (ref 8–16)
AST SERPL-CCNC: 149 U/L (ref 10–40)
BASOPHILS # BLD AUTO: 0.02 K/UL (ref 0–0.2)
BASOPHILS NFR BLD: 0.6 % (ref 0–1.9)
BILIRUB SERPL-MCNC: 4 MG/DL (ref 0.1–1)
BUN SERPL-MCNC: 8 MG/DL (ref 8–23)
CALCIUM SERPL-MCNC: 8.3 MG/DL (ref 8.7–10.5)
CHLORIDE SERPL-SCNC: 110 MMOL/L (ref 95–110)
CO2 SERPL-SCNC: 19 MMOL/L (ref 23–29)
COMMENT: NORMAL
CREAT SERPL-MCNC: 0.8 MG/DL (ref 0.5–1.4)
DIFFERENTIAL METHOD: ABNORMAL
EOSINOPHIL # BLD AUTO: 0.1 K/UL (ref 0–0.5)
EOSINOPHIL NFR BLD: 3.4 % (ref 0–8)
ERYTHROCYTE [DISTWIDTH] IN BLOOD BY AUTOMATED COUNT: 18.7 % (ref 11.5–14.5)
EST. GFR  (NO RACE VARIABLE): >60 ML/MIN/1.73 M^2
FINAL PATHOLOGIC DIAGNOSIS: NORMAL
GLUCOSE SERPL-MCNC: 186 MG/DL (ref 70–110)
GROSS: NORMAL
HCT VFR BLD AUTO: 27.4 % (ref 37–48.5)
HGB BLD-MCNC: 7.8 G/DL (ref 12–16)
IMM GRANULOCYTES # BLD AUTO: 0.01 K/UL (ref 0–0.04)
IMM GRANULOCYTES NFR BLD AUTO: 0.3 % (ref 0–0.5)
LYMPHOCYTES # BLD AUTO: 0.9 K/UL (ref 1–4.8)
LYMPHOCYTES NFR BLD: 24.7 % (ref 18–48)
Lab: NORMAL
MAGNESIUM SERPL-MCNC: 1.5 MG/DL (ref 1.6–2.6)
MCH RBC QN AUTO: 23 PG (ref 27–31)
MCHC RBC AUTO-ENTMCNC: 28.5 G/DL (ref 32–36)
MCV RBC AUTO: 81 FL (ref 82–98)
MONOCYTES # BLD AUTO: 0.5 K/UL (ref 0.3–1)
MONOCYTES NFR BLD: 14.8 % (ref 4–15)
NEUTROPHILS # BLD AUTO: 2 K/UL (ref 1.8–7.7)
NEUTROPHILS NFR BLD: 56.2 % (ref 38–73)
NRBC BLD-RTO: 1 /100 WBC
PHOSPHATE SERPL-MCNC: 2.2 MG/DL (ref 2.7–4.5)
PLATELET # BLD AUTO: 203 K/UL (ref 150–450)
PMV BLD AUTO: 12.4 FL (ref 9.2–12.9)
POTASSIUM SERPL-SCNC: 3.8 MMOL/L (ref 3.5–5.1)
PROT SERPL-MCNC: 6 G/DL (ref 6–8.4)
RBC # BLD AUTO: 3.39 M/UL (ref 4–5.4)
SODIUM SERPL-SCNC: 139 MMOL/L (ref 136–145)
WBC # BLD AUTO: 3.52 K/UL (ref 3.9–12.7)

## 2023-03-21 PROCEDURE — 99239 PR HOSPITAL DISCHARGE DAY,>30 MIN: ICD-10-PCS | Mod: HCNC,,, | Performed by: HOSPITALIST

## 2023-03-21 PROCEDURE — 20600001 HC STEP DOWN PRIVATE ROOM: Mod: HCNC

## 2023-03-21 PROCEDURE — D9220A PRA ANESTHESIA: ICD-10-PCS | Mod: HCNC,ANES,, | Performed by: ANESTHESIOLOGY

## 2023-03-21 PROCEDURE — 83735 ASSAY OF MAGNESIUM: CPT | Mod: HCNC

## 2023-03-21 PROCEDURE — D9220A PRA ANESTHESIA: ICD-10-PCS | Mod: HCNC,CRNA,, | Performed by: REGISTERED NURSE

## 2023-03-21 PROCEDURE — 63600175 PHARM REV CODE 636 W HCPCS: Mod: HCNC | Performed by: REGISTERED NURSE

## 2023-03-21 PROCEDURE — 63600175 PHARM REV CODE 636 W HCPCS: Mod: HCNC

## 2023-03-21 PROCEDURE — 43274 ERCP DUCT STENT PLACEMENT: CPT | Mod: HCNC,,, | Performed by: INTERNAL MEDICINE

## 2023-03-21 PROCEDURE — 97165 OT EVAL LOW COMPLEX 30 MIN: CPT | Mod: HCNC

## 2023-03-21 PROCEDURE — C1769 GUIDE WIRE: HCPCS | Mod: HCNC | Performed by: INTERNAL MEDICINE

## 2023-03-21 PROCEDURE — 25000003 PHARM REV CODE 250: Mod: HCNC | Performed by: REGISTERED NURSE

## 2023-03-21 PROCEDURE — 25000003 PHARM REV CODE 250: Mod: HCNC

## 2023-03-21 PROCEDURE — 84100 ASSAY OF PHOSPHORUS: CPT | Mod: HCNC

## 2023-03-21 PROCEDURE — 43274 PR ERCP W/STENT PLCMNT BILIARY/PANCREATIC DUCT: ICD-10-PCS | Mod: HCNC,,, | Performed by: INTERNAL MEDICINE

## 2023-03-21 PROCEDURE — 74328 X-RAY BILE DUCT ENDOSCOPY: CPT | Mod: TC,HCNC | Performed by: INTERNAL MEDICINE

## 2023-03-21 PROCEDURE — D9220A PRA ANESTHESIA: Mod: HCNC,CRNA,, | Performed by: REGISTERED NURSE

## 2023-03-21 PROCEDURE — 37000009 HC ANESTHESIA EA ADD 15 MINS: Mod: HCNC | Performed by: INTERNAL MEDICINE

## 2023-03-21 PROCEDURE — 25500020 PHARM REV CODE 255: Mod: HCNC | Performed by: INTERNAL MEDICINE

## 2023-03-21 PROCEDURE — 43274 ERCP DUCT STENT PLACEMENT: CPT | Mod: HCNC | Performed by: INTERNAL MEDICINE

## 2023-03-21 PROCEDURE — 80053 COMPREHEN METABOLIC PANEL: CPT | Mod: HCNC

## 2023-03-21 PROCEDURE — 74328 X-RAY BILE DUCT ENDOSCOPY: CPT | Mod: 26,HCNC,, | Performed by: INTERNAL MEDICINE

## 2023-03-21 PROCEDURE — 99239 HOSP IP/OBS DSCHRG MGMT >30: CPT | Mod: HCNC,,, | Performed by: HOSPITALIST

## 2023-03-21 PROCEDURE — 74328 PR  X-RAY FOR BILE DUCT ENDOSCOPY: ICD-10-PCS | Mod: 26,HCNC,, | Performed by: INTERNAL MEDICINE

## 2023-03-21 PROCEDURE — D9220A PRA ANESTHESIA: Mod: HCNC,ANES,, | Performed by: ANESTHESIOLOGY

## 2023-03-21 PROCEDURE — 25000003 PHARM REV CODE 250: Mod: HCNC | Performed by: EMERGENCY MEDICINE

## 2023-03-21 PROCEDURE — 36415 COLL VENOUS BLD VENIPUNCTURE: CPT | Mod: HCNC

## 2023-03-21 PROCEDURE — 37000008 HC ANESTHESIA 1ST 15 MINUTES: Mod: HCNC | Performed by: INTERNAL MEDICINE

## 2023-03-21 PROCEDURE — 85025 COMPLETE CBC W/AUTO DIFF WBC: CPT | Mod: HCNC

## 2023-03-21 PROCEDURE — 27201674 HC SPHINCTERTOME: Mod: HCNC | Performed by: INTERNAL MEDICINE

## 2023-03-21 PROCEDURE — 25000003 PHARM REV CODE 250: Mod: HCNC | Performed by: INTERNAL MEDICINE

## 2023-03-21 RX ORDER — INDOMETHACIN 50 MG/1
SUPPOSITORY RECTAL
Status: COMPLETED | OUTPATIENT
Start: 2023-03-21 | End: 2023-03-21

## 2023-03-21 RX ORDER — PROPOFOL 10 MG/ML
VIAL (ML) INTRAVENOUS
Status: DISCONTINUED | OUTPATIENT
Start: 2023-03-21 | End: 2023-03-21

## 2023-03-21 RX ORDER — LIDOCAINE HYDROCHLORIDE 20 MG/ML
INJECTION INTRAVENOUS
Status: DISCONTINUED | OUTPATIENT
Start: 2023-03-21 | End: 2023-03-21

## 2023-03-21 RX ORDER — PROPOFOL 10 MG/ML
VIAL (ML) INTRAVENOUS CONTINUOUS PRN
Status: DISCONTINUED | OUTPATIENT
Start: 2023-03-21 | End: 2023-03-21

## 2023-03-21 RX ORDER — PHENYLEPHRINE HYDROCHLORIDE 10 MG/ML
INJECTION INTRAVENOUS
Status: DISCONTINUED | OUTPATIENT
Start: 2023-03-21 | End: 2023-03-21

## 2023-03-21 RX ORDER — FERROUS SULFATE 325(65) MG
325 TABLET ORAL
Qty: 90 TABLET | Refills: 3 | Status: SHIPPED | OUTPATIENT
Start: 2023-03-21 | End: 2023-04-18

## 2023-03-21 RX ORDER — VASOPRESSIN 20 [USP'U]/ML
INJECTION, SOLUTION INTRAMUSCULAR; SUBCUTANEOUS
Status: DISCONTINUED | OUTPATIENT
Start: 2023-03-21 | End: 2023-03-21

## 2023-03-21 RX ORDER — SODIUM,POTASSIUM PHOSPHATES 280-250MG
2 POWDER IN PACKET (EA) ORAL ONCE
Status: COMPLETED | OUTPATIENT
Start: 2023-03-21 | End: 2023-03-21

## 2023-03-21 RX ORDER — MAGNESIUM SULFATE HEPTAHYDRATE 40 MG/ML
2 INJECTION, SOLUTION INTRAVENOUS ONCE
Status: COMPLETED | OUTPATIENT
Start: 2023-03-21 | End: 2023-03-21

## 2023-03-21 RX ORDER — ONDANSETRON 2 MG/ML
INJECTION INTRAMUSCULAR; INTRAVENOUS
Status: DISCONTINUED | OUTPATIENT
Start: 2023-03-21 | End: 2023-03-21

## 2023-03-21 RX ORDER — PROCHLORPERAZINE EDISYLATE 5 MG/ML
2.5 INJECTION INTRAMUSCULAR; INTRAVENOUS EVERY 6 HOURS PRN
Status: DISCONTINUED | OUTPATIENT
Start: 2023-03-21 | End: 2023-03-22

## 2023-03-21 RX ORDER — SODIUM CHLORIDE 0.9 % (FLUSH) 0.9 %
3 SYRINGE (ML) INJECTION
Status: DISCONTINUED | OUTPATIENT
Start: 2023-03-21 | End: 2023-03-21 | Stop reason: HOSPADM

## 2023-03-21 RX ADMIN — ONDANSETRON 4 MG: 2 INJECTION INTRAMUSCULAR; INTRAVENOUS at 11:03

## 2023-03-21 RX ADMIN — ONDANSETRON 8 MG: 8 TABLET, ORALLY DISINTEGRATING ORAL at 01:03

## 2023-03-21 RX ADMIN — POTASSIUM & SODIUM PHOSPHATES POWDER PACK 280-160-250 MG 2 PACKET: 280-160-250 PACK at 08:03

## 2023-03-21 RX ADMIN — ACETAMINOPHEN 650 MG: 325 TABLET ORAL at 07:03

## 2023-03-21 RX ADMIN — VASOPRESSIN 1 UNITS: 20 INJECTION INTRAVENOUS at 12:03

## 2023-03-21 RX ADMIN — LIDOCAINE HYDROCHLORIDE 40 MG: 20 INJECTION INTRAVENOUS at 11:03

## 2023-03-21 RX ADMIN — PHENYLEPHRINE HYDROCHLORIDE 300 MCG: 10 INJECTION INTRAVENOUS at 11:03

## 2023-03-21 RX ADMIN — PANTOPRAZOLE SODIUM 40 MG: 40 TABLET, DELAYED RELEASE ORAL at 08:03

## 2023-03-21 RX ADMIN — PHENYLEPHRINE HYDROCHLORIDE 100 MCG: 10 INJECTION INTRAVENOUS at 11:03

## 2023-03-21 RX ADMIN — CARVEDILOL 6.25 MG: 6.25 TABLET, FILM COATED ORAL at 05:03

## 2023-03-21 RX ADMIN — PHENYLEPHRINE HYDROCHLORIDE 200 MCG: 10 INJECTION INTRAVENOUS at 12:03

## 2023-03-21 RX ADMIN — PROPOFOL 125 MCG/KG/MIN: 10 INJECTION, EMULSION INTRAVENOUS at 11:03

## 2023-03-21 RX ADMIN — Medication 6 MG: at 12:03

## 2023-03-21 RX ADMIN — PHENYLEPHRINE HYDROCHLORIDE 300 MCG: 10 INJECTION INTRAVENOUS at 12:03

## 2023-03-21 RX ADMIN — SULFASALAZINE 500 MG: 500 TABLET ORAL at 09:03

## 2023-03-21 RX ADMIN — MAGNESIUM SULFATE 2 G: 2 INJECTION INTRAVENOUS at 08:03

## 2023-03-21 RX ADMIN — AMLODIPINE BESYLATE 5 MG: 5 TABLET ORAL at 08:03

## 2023-03-21 RX ADMIN — LEVOTHYROXINE SODIUM 175 MCG: 150 TABLET ORAL at 06:03

## 2023-03-21 RX ADMIN — Medication 6 MG: at 09:03

## 2023-03-21 RX ADMIN — SOTALOL HYDROCHLORIDE 120 MG: 120 TABLET ORAL at 09:03

## 2023-03-21 RX ADMIN — SULFASALAZINE 500 MG: 500 TABLET ORAL at 08:03

## 2023-03-21 RX ADMIN — OXYBUTYNIN CHLORIDE 10 MG: 10 TABLET, EXTENDED RELEASE ORAL at 08:03

## 2023-03-21 RX ADMIN — PHENYLEPHRINE HYDROCHLORIDE 200 MCG: 10 INJECTION INTRAVENOUS at 11:03

## 2023-03-21 RX ADMIN — SOTALOL HYDROCHLORIDE 120 MG: 120 TABLET ORAL at 08:03

## 2023-03-21 RX ADMIN — PROCHLORPERAZINE EDISYLATE 2.5 MG: 5 INJECTION INTRAMUSCULAR; INTRAVENOUS at 03:03

## 2023-03-21 RX ADMIN — Medication 20 MG: at 11:03

## 2023-03-21 RX ADMIN — SODIUM CHLORIDE: 9 INJECTION, SOLUTION INTRAVENOUS at 11:03

## 2023-03-21 RX ADMIN — SODIUM CHLORIDE 500 ML: 9 INJECTION, SOLUTION INTRAVENOUS at 01:03

## 2023-03-21 RX ADMIN — CARVEDILOL 6.25 MG: 6.25 TABLET, FILM COATED ORAL at 08:03

## 2023-03-21 NOTE — NURSING
Pt was placed on tele for hx of a-fib. Monitor showed A-Paced. After about an hour pt was adamant monitor be removed. Dr. Bryson made aware of pt's refusal.

## 2023-03-21 NOTE — DISCHARGE SUMMARY
[facility]  Hospital Medicine  Discharge Summary      Patient Name: Pauline Cronin  MRN: [id number]  RICK: [id number]  Patient Class: IP- Inpatient  Admission Date: 3/17/2023  Hospital Length of Stay: 4 days  Discharge Date and Time:  03/21/2023 2:14 PM  Attending Physician: Juliana Cordoba MD   Discharging Provider: Dave Ortega MD  Primary Care Provider: Ga Waldrop MD  Hospital Medicine Team: [facility] HOSP MED 2 Dave Ortega MD  Primary Care Team: [facility] 2    HPI:   The patient is an 82 year old female with a history of A-fib (on Eliquis, last dose 3/16), sick sinus syndrome (s/p PPM), Crohn's disease, HTN, HLD, and recently diagnosed pancreatic head mass who presents to the [facility] ED after being found to have an elevated bilirubin (4.3) by her outpatient cardiologist. She is jaundiced with scleral icterus but otherwise reports no acute symptoms. She denies fever, chills, headache, nausea, vomiting, itching, abdominal pain, diarrhea, or constipation.    Oncologic history: The patient presented to her PCP on March 1st reporting fatigue and 10 pounds of unintentional weight loss. She was sent for a CT abdomen/pelvis on 3/1/2023 which demonstrated a pancreatic head mass. She underwent ERCP w/ biopsy of the mass on 3/14, biopsy results are pending at the time of admission. Plan for outpatient ERCP w/ stent placement was planned for 3/29. She was instructed to present to the ED due to elevated bilirubin, jaundice, and icterus noted on outpatient labs on 3/16.    In the [facility] ED the patient is hemodynamically stable, initial evaluation significant for WBC 4.42, Hgb 8.9, plts 245, Na 141, K 3.1, Bicab 25, Cr 0.7 (at baseline), Bili 4.2, alk phos 308, , .      Procedure(s) (LRB):  ERCP (ENDOSCOPIC RETROGRADE CHOLANGIOPANCREATOGRAPHY) (N/A)      Hospital Course:   The patient was admitted to hospital medicine for further management of her pancreatic head mass. Advanced endoscopy services was  consulted on admission. Plan for ERCP w/ stenting on Monday 3/20/23 but was delayed due to emergent GI cases. The patient's bilirubin was trended and she was monitored for worsening abdominal pain throughout her hospital stay. Her anticoagulation was held in anticipation of the ERCP. On 3/21/23 she underwent ERCP with stenting. During her hospitalization her pancreatic head biopsy resulted with pancreatic adenocarcinoma. She was referred on oncology on discharge. On 3/21 the patient was tolerating a diet after her ERCP and was medically ready for discharge w/ follow-up with oncology, surgical oncology, and her PCP. Her home medications were unchanged with the exception of PO iron for iron deficiency anemia.       Goals of Care Treatment Preferences:  Code Status: Full Code      Consults:   Consults (From admission, onward)          Status Ordering Provider     Inpatient consult to Midline team  Once        Provider:  (Not yet assigned)    Completed PRATIBHA MAYFIELD     Inpatient consult to Social Work  Once        Provider:  (Not yet assigned)    Acknowledged ANNIKA KAUR     Inpatient consult to [facility] ([facility])  Once        Provider:  (Not yet assigned)    Completed ANNIKA KAUR            Cardiac/Vascular  Sick sinus syndrome  S/p PPM. Stable.     Pacemaker  H/o PPM secondary to sick sinus syndrome and complete heart block. Follows w/ cardiology. No acute concerns.    Essential hypertension, benign  Patient takes amlodipine 5 mg daily and carvedilol 6.25 mg BID at home.    -- Continue home antihypertensives      Hyperlipidemia  Last LDL 69 on 3/1/23; takes atorvastatin 20 mg daily at home.    -- Hold atorvastatin given elevated aminotransferases on admission       Paroxysmal atrial fibrillation  H/o A-fib. Anticoagulated w/ apixaban (last dose 3/16). Rate controlled w/ carvedilol 6.25 mg daily and sotalol 120 mg BID. QTc on admission 517 (paced rhythm)    -- Hold apixaban for possible  ERCP  -- Continue sotalol and carvedilol    Renal/  OAB (overactive bladder)  Patient takes tolterodine at home (not on formulary)    -- Oxybutynin 10 mg daily while admitted     Hematology  Chronic anticoagulation  On apixaban outpatient for A-fib; will hold given possible procedure.    Oncology  Pancreatic adenocarcinoma  3/14 FNA biopsy results of pancreatic head mass resulted with pancreatic adenocarcinoma. Will plan for outpatient oncology referral now that pathology results have been obtained.    -- Outpatient oncology referral and surgical oncology on discharge    Anemia  Hgb 8.9 on admission. Recently decreased from 14.2 four months ago. No evidence of bleeding on exam. Outpatient serologic evaluation significant for normal B12 and folate. Iron low at 19, ferritin low normal.     -- Trend CBC  -- Plan to start iron supplementation on discharge     Endocrine  Hypothyroidism  Remote history of hyperthyroidism w/ radioactive iodine ablation, now takes synthroid daily. TSH normal on admission, free T4 1.97.    -- Continue levothyroxine 175 mcg daily    GI  * Biliary obstruction  82 yoF w/ newly diagnosed pancreatic head mass s/p ERCP w/ biopsy with results pending presenting with elevated bilirubin, elevated lipase, jaundice, icterus but otherwise asymptomatic. No abdominal pain on exam. Imaging w/ 3.5 cm pancreatic head mass overall similar in appearance. Mild associated pancreatic stranding. Had planned for outpatient ERCP with stent placement on 3/29 but developed hyperbilirubinemia and presented to the ED. [facility] consulted on admission, performed ERCP on 3/21.    -- [facility] consulted; appreciate recs.  -- Plan for discharge post ERCP if patient tolerates diet  -- Hold apixaban (last dose was 3/16); will resume tomorrow    GERD (gastroesophageal reflux disease)  Takes esomeprazole 40 mg daily at home    -- Continue home PPI     Crohn's disease of large intestine without complication  Chronic and stable. Patient  takes colestipol, sulfasalazine at home.    -- Continue sulfasalazine  -- Colestipol not on formulary, will hold for now      Final Active Diagnoses:    Diagnosis Date Noted POA    PRINCIPAL PROBLEM:  Biliary obstruction [K83.1] 03/17/2023 Yes    Paroxysmal atrial fibrillation [I48.0] 04/19/2018 Yes    Anemia [D64.9] 03/17/2023 Yes    Pancreatic adenocarcinoma [C25.9] 03/21/2023 Yes    Chronic anticoagulation [Z79.01] 03/01/2023 Not Applicable    Sick sinus syndrome [I49.5] 05/10/2021 Yes    Crohn's disease of large intestine without complication [K50.10] 04/19/2018 Yes    Hyperlipidemia [E78.5] 04/19/2018 Yes    Essential hypertension, benign [I10] 04/19/2018 Yes    Hypothyroidism [E03.9] 04/19/2018 Yes    Pacemaker [Z95.0] 04/19/2018 Yes    OAB (overactive bladder) [N32.81] 04/19/2018 Yes    GERD (gastroesophageal reflux disease) [K21.9] 04/19/2018 Yes      Problems Resolved During this Admission:       Discharged Condition: fair    Disposition: Home or Self Care    Follow Up:   Follow-up Information       Ga Waldrop MD .    Specialty: Family Medicine  Contact information:  [address]  [address] [address] [phone]               [facility] HEMATOLOGY/ONCOLOGY. Go to.    Specialty: Hematology and Oncology  Contact information:  [address] [facility] [address]  [phone]                         Patient Instructions:      Ambulatory referral/consult to [facility] Palliative Care   Standing Status: Future   Referral Priority: Routine Referral Type: Consultation   Requested Specialty: Palliative Medicine   Number of Visits Requested: 1     Ambulatory referral/consult to Hematology / Oncology   Standing Status: Future   Referral Priority: Routine Referral Type: Consultation   Referral Reason: Specialty Services Required   Requested Specialty: Hematology and Oncology   Number of Visits Requested: 1     Ambulatory referral/consult to Oncology   Standing Status: Future    Referral Priority: Routine Referral Type: Consultation   Referral Reason: Specialty Services Required   Requested Specialty: Oncology   Number of Visits Requested: 1       Significant Diagnostic Studies:   Specimen (24h ago, onward)      None            Pending Diagnostic Studies:       None           Medications:  Reconciled Home Medications:      Medication List        START taking these medications      ferrous sulfate 325 mg (65 mg iron) Tab tablet  Commonly known as: IRON  Take 1 tablet (325 mg total) by mouth daily with breakfast.            CONTINUE taking these medications      alendronate 70 MG tablet  Commonly known as: FOSAMAX  Take 1 tablet (70 mg total) by mouth every 7 days.     amLODIPine 5 MG tablet  Commonly known as: NORVASC  Take 1 tablet (5 mg total) by mouth once daily.     atorvastatin 20 MG tablet  Commonly known as: LIPITOR  Take 1 tablet (20 mg total) by mouth once daily.     carvediloL 6.25 MG tablet  Commonly known as: COREG  Take 1 tablet (6.25 mg total) by mouth 2 (two) times daily with meals.     colestipoL 1 gram Tab  Commonly known as: COLESTID  Take 1 tablet (1 g total) by mouth 2 (two) times daily.     diphenoxylate-atropine 2.5-0.025 mg 2.5-0.025 mg per tablet  Commonly known as: LOMOTIL  TAKE 1 TABLET BY MOUTH 4 TIMES DAILY AS NEEDED FOR DIARRHEA     ELIQUIS 5 mg Tab  Generic drug: apixaban  Take 1 tablet (5 mg total) by mouth 2 (two) times daily.     esomeprazole 40 MG capsule  Commonly known as: NEXIUM  Take 1 capsule (40 mg total) by mouth once daily.     folic acid 1 MG tablet  Commonly known as: FOLVITE  Take 1 tablet by mouth once daily     levothyroxine 175 MCG tablet  Commonly known as: SYNTHROID, LEVOTHROID  Take 1 tablet by mouth once daily     magnesium gluconate 27.5 mg magne- sium (500 mg) Tab  Take 500 mg by mouth once daily.     sotaloL 120 MG Tab  Commonly known as: BETAPACE  Take 1 tablet (120 mg total) by mouth every 12 (twelve) hours.     sulfaSALAzine 500 mg  Tab  Commonly known as: AZULFIDINE  Take 1 tablet by mouth twice daily     tolterodine 4 MG 24 hr capsule  Commonly known as: DETROL LA  Take 1 capsule (4 mg total) by mouth once daily.              Indwelling Lines/Drains at time of discharge:   Lines/Drains/Airways       None                   Time spent on the discharge of patient: 30 minutes         Dave Ortega MD  Department of Hospital Medicine  [facility] - Endoscopy

## 2023-03-21 NOTE — SUBJECTIVE & OBJECTIVE
Interval History: No acute overnight events. ERCP w/ stent placement with [facility] today. Prior pancreatic head biopsy resulted w/ pancreatic adenocarcinoma. Will plan to discharge patient after ERCP w/ follow-up with oncology.    Review of Systems   Constitutional:  Positive for unexpected weight change. Negative for chills, diaphoresis and fever.   HENT:  Negative for rhinorrhea, sinus pressure and trouble swallowing.    Respiratory:  Negative for cough, shortness of breath and wheezing.    Cardiovascular:  Negative for chest pain, palpitations and leg swelling.   Gastrointestinal:  Negative for abdominal distention, abdominal pain, constipation, diarrhea, nausea and vomiting.   Genitourinary:  Negative for dysuria, flank pain and hematuria.   Musculoskeletal:  Negative for joint swelling and myalgias.   Skin:  Positive for color change.   Neurological:  Negative for weakness, numbness and headaches.   Psychiatric/Behavioral:  Negative for agitation. The patient is not hyperactive.    Objective:     Vital Signs (Most Recent):  Temp: 98.1 °F (36.7 °C) (03/21/23 1245)  Pulse: 70 (03/21/23 1330)  Resp: 17 (03/21/23 1330)  BP: 128/63 (03/21/23 1330)  SpO2: 99 % (03/21/23 1330) Vital Signs (24h Range):  Temp:  [97.6 °F (36.4 °C)-98.3 °F (36.8 °C)] 98.1 °F (36.7 °C)  Pulse:  [69-89] 70  Resp:  [16-20] 17  SpO2:  [91 %-99 %] 99 %  BP: ()/(44-75) 128/63     Weight: 80.3 kg (177 lb)  Body mass index is 34.57 kg/m².    Intake/Output Summary (Last 24 hours) at 3/21/2023 1404  Last data filed at 3/21/2023 1234  Gross per 24 hour   Intake 800 ml   Output --   Net 800 ml      Physical Exam  Constitutional:       General: She is not in acute distress.     Appearance: Normal appearance. She is obese.   HENT:      Head: Normocephalic and atraumatic.      Right Ear: External ear normal.      Left Ear: External ear normal.      Nose: No congestion or rhinorrhea.      Mouth/Throat:      Mouth: Mucous membranes are moist.       Pharynx: Oropharynx is clear.   Eyes:      General: Scleral icterus present.      Extraocular Movements: Extraocular movements intact.      Conjunctiva/sclera: Conjunctivae normal.   Cardiovascular:      Rate and Rhythm: Normal rate and regular rhythm.      Pulses: Normal pulses.      Heart sounds: Normal heart sounds. No murmur heard.  Pulmonary:      Effort: Pulmonary effort is normal.      Breath sounds: Normal breath sounds. No wheezing or rales.   Abdominal:      General: Abdomen is flat. Bowel sounds are normal. There is no distension.      Palpations: Abdomen is soft.      Tenderness: There is no abdominal tenderness. There is no guarding.   Musculoskeletal:         General: No swelling.      Cervical back: Normal range of motion.      Right lower leg: No edema.      Left lower leg: No edema.   Skin:     General: Skin is warm and dry.      Capillary Refill: Capillary refill takes less than 2 seconds.      Coloration: Skin is jaundiced.      Findings: No rash.   Neurological:      Mental Status: She is alert and oriented to person, place, and time. Mental status is at baseline.      Sensory: No sensory deficit.      Motor: No weakness.   Psychiatric:         Mood and Affect: Mood normal.         Behavior: Behavior normal.     Significant Labs: All pertinent labs within the past 24 hours have been reviewed.  CBC:   Recent Labs   Lab 03/20/23  0546 03/21/23  0237   WBC 3.52* 3.52*   HGB 8.3* 7.8*   HCT 28.1* 27.4*    203     CMP:   Recent Labs   Lab 03/20/23  0546 03/21/23  0237    139   K 4.0 3.8    110   CO2 26 19*   GLU 97 186*   BUN 7* 8   CREATININE 0.7 0.8   CALCIUM 8.6* 8.3*   PROT 6.4 6.0   ALBUMIN 2.5* 2.3*   BILITOT 4.5* 4.0*   ALKPHOS 279* 257*   * 149*   * 115*   ANIONGAP 7* 10       Significant Imaging: I have reviewed all pertinent imaging results/findings within the past 24 hours.

## 2023-03-21 NOTE — ASSESSMENT & PLAN NOTE
82 yoF w/ newly diagnosed pancreatic head mass s/p ERCP w/ biopsy with results pending presenting with elevated bilirubin, elevated lipase, jaundice, icterus but otherwise asymptomatic. No abdominal pain on exam. Imaging w/ 3.5 cm pancreatic head mass overall similar in appearance. Mild associated pancreatic stranding. Had planned for outpatient ERCP with stent placement on 3/29 but developed hyperbilirubinemia and presented to the ED. [facility] consulted on admission, performed ERCP on 3/21.    -- [facility] consulted; appreciate recs.  -- Plan for discharge post ERCP if patient tolerates diet  -- Hold apixaban (last dose was 3/16); will resume tomorrow

## 2023-03-21 NOTE — H&P
Short Stay Endoscopy History and Physical    PCP - Ga Waldrop MD  Referring Physician - Aaareferral Self  No address on file    Procedure - ERCP  ASA - per anesthesia  Mallampati - per anesthesia  History of Anesthesia problems - per anesthesia  Family history Anesthesia problems -  per anesthesia   Plan of anesthesia - per anesthesia    HPI:  This is a 82 y.o. female here for evaluation of: ERCP for obstructive jaundice from known pancreas head mass recently biopsied with pathology still pending; attempt to place covered metal biliary stent today for jaundice and elevated liver tests.    Reflux - no  Dysphagia - no  Abdominal pain - no  Diarrhea - no    ROS:  Constitutional: No fevers, chills, No weight loss  CV: No chest pain  Pulm: No cough, No shortness of breath  Ophtho: No vision changes  GI: see HPI  Derm: No rash    Medical History:  has a past medical history of Anticoagulant long-term use, Atrial fibrillation, Crohn disease (diagnosed in her 20's), GERD (gastroesophageal reflux disease), Hyperlipidemia, Hypertension, and Thyroid disease.    Surgical History:  has a past surgical history that includes Tonsillectomy; Hysterectomy; Appendectomy; Colonoscopy (~2008); Upper gastrointestinal endoscopy (~2008); Small intestine surgery (in her 20's); Esophagogastroduodenoscopy (N/A, 7/17/2018); Cardiac surgery (2014); Retrograde pyelography (Right, 2/18/2020); Insertion of pacemaker; Replacement of pacemaker generator (05/10/2021); Insertion of implantable cardioverter-defibrillator (ICD) generator with two existing leads (Left, 5/10/2021); and Endoscopic ultrasound of upper gastrointestinal tract (N/A, 3/14/2023).    Family History: family history includes Breast cancer in her mother; Cancer in her mother; Crohn's disease in an other family member..    Social History:  reports that she quit smoking about 46 years ago. Her smoking use included cigarettes. She has never used smokeless tobacco. She reports  that she does not drink alcohol and does not use drugs.    Review of patient's allergies indicates:   Allergen Reactions    Lisinopril Other (See Comments)     cough       Medications:   Medications Prior to Admission   Medication Sig Dispense Refill Last Dose    alendronate (FOSAMAX) 70 MG tablet Take 1 tablet (70 mg total) by mouth every 7 days. 12 tablet 3 3/17/2023    amLODIPine (NORVASC) 5 MG tablet Take 1 tablet (5 mg total) by mouth once daily. 30 tablet 11 3/17/2023    atorvastatin (LIPITOR) 20 MG tablet Take 1 tablet (20 mg total) by mouth once daily. 90 tablet 3 3/17/2023    carvediloL (COREG) 6.25 MG tablet Take 1 tablet (6.25 mg total) by mouth 2 (two) times daily with meals. 180 tablet 3 3/17/2023    colestipoL (COLESTID) 1 gram Tab Take 1 tablet (1 g total) by mouth 2 (two) times daily. 180 tablet 3 3/17/2023    diphenoxylate-atropine 2.5-0.025 mg (LOMOTIL) 2.5-0.025 mg per tablet TAKE 1 TABLET BY MOUTH 4 TIMES DAILY AS NEEDED FOR DIARRHEA 60 tablet 3 3/17/2023    esomeprazole (NEXIUM) 40 MG capsule Take 1 capsule (40 mg total) by mouth once daily. 90 capsule 3 3/17/2023    folic acid (FOLVITE) 1 MG tablet Take 1 tablet by mouth once daily 90 tablet 1 3/17/2023    levothyroxine (SYNTHROID, LEVOTHROID) 175 MCG tablet Take 1 tablet by mouth once daily 30 tablet 5 3/17/2023    magnesium gluconate 27.5 mg magne- sium (500 mg) Tab Take 500 mg by mouth once daily. 90 tablet 3 3/17/2023    sotaloL (BETAPACE) 120 MG Tab Take 1 tablet (120 mg total) by mouth every 12 (twelve) hours. 180 tablet 3 3/17/2023    sulfaSALAzine (AZULFIDINE) 500 mg Tab Take 1 tablet by mouth twice daily 180 tablet 3 3/17/2023    tolterodine (DETROL LA) 4 MG 24 hr capsule Take 1 capsule (4 mg total) by mouth once daily. 90 capsule 3 3/17/2023    ELIQUIS 5 mg Tab Take 1 tablet (5 mg total) by mouth 2 (two) times daily. 180 tablet 0        Physical Exam:    Vital Signs:   Vitals:    03/21/23 1015   BP: (!) 124/58   Pulse: 72   Resp: 16    Temp: 97.9 °F (36.6 °C)       General Appearance: Well appearing in no acute distress; jaundice    Labs:  Lab Results   Component Value Date    WBC 3.52 (L) 03/21/2023    HGB 7.8 (L) 03/21/2023    HCT 27.4 (L) 03/21/2023     03/21/2023    CHOL 151 03/01/2023    TRIG 80 03/01/2023    HDL 66 03/01/2023     (H) 03/21/2023     (H) 03/21/2023     03/21/2023    K 3.8 03/21/2023     03/21/2023    CREATININE 0.8 03/21/2023    BUN 8 03/21/2023    CO2 19 (L) 03/21/2023    TSH 0.450 03/16/2023    INR 1.2 03/18/2023    HGBA1C 5.6 12/16/2020       I have explained the risks and benefits of this endoscopic procedure to the patient including but not limited to bleeding, inflammation, infection, perforation, pancreatitis, missing a lesion and death.      Celina Toney MD

## 2023-03-21 NOTE — TREATMENT PLAN
Brief GI treatment plan    ERCP performed today.  Findings:      Impression:            - The major papilla was severely distorted with                          mass-like change related to known pancreas head                          malignancy (pathology results positive for                          pancreas head malignancy today).                          - The major papilla was poorly visualized due to                          mass effect/change in the second portion of                          duodenum as described above; this caused                          significant difficulty gaining wire access of the                          biled duct.                          - A single localized severe biliary stricture was                          found in the lower third of the main bile duct                          related to known pancreas head malignant tumor.                          The stricture was malignant appearing.                          - The upper third of the main bile duct and common                          hepatic duct were mildly dilated, with the                          pancreas head mass causing the obstruction.                          - A biliary sphincterotomy was performed.                          - One 10mm x 6cm uncovered metal biliary stent was                          placed into the distal common bile duct, which                          will remain permanently.                          - Indomethacin given to decrease risk of post-ERCP                          pancreatitis.   Recommendation:        - Return patient to [facility] for ongoing care.                          - Resume previous diet.                          - The patient should not require a repeat ERCP                          unless future complication occurs requiring                          further therapy. .                          - Return to referring physician.                          - Watch for  pancreatitis, bleeding, perforation,                          and cholangitis.                          - Refer to an oncologist and surgical oncology at                          the next available appointment.           Please see full endoscopy report for details.    GI will continue follow.  Please call questions.    Christopher Andino MD  GI Fellow

## 2023-03-21 NOTE — NURSING TRANSFER
Nursing Transfer Note      3/21/2023     Reason patient is being transferred: post op    Transfer To: 1055    Transfer via stretcher    Transfer with none    Transported by PCT    Medicines sent: None    Any special needs or follow-up needed: None    Chart send with patient: Yes    Notified: pt relative     Patient reassessed at: 3/21/2023 1345    Upon arrival to floor: patient oriented to room, call bell in reach, and bed in lowest position

## 2023-03-21 NOTE — ASSESSMENT & PLAN NOTE
Chronic and stable. Patient takes colestipol, sulfasalazine at home.    -- Continue sulfasalazine  -- Colestipol not on formulary, will hold for now

## 2023-03-21 NOTE — ANESTHESIA POSTPROCEDURE EVALUATION
Anesthesia Post Evaluation    Patient: Pauline Cronin    Procedure(s) Performed: Procedure(s) (LRB):  ERCP (ENDOSCOPIC RETROGRADE CHOLANGIOPANCREATOGRAPHY) (N/A)    Final Anesthesia Type: general      Patient location during evaluation: PACU  Patient participation: Yes- Able to Participate  Level of consciousness: awake and alert and awake  Post-procedure vital signs: reviewed and stable  Pain management: adequate  Airway patency: patent    PONV status at discharge: No PONV  Anesthetic complications: no      Cardiovascular status: blood pressure returned to baseline  Respiratory status: unassisted and spontaneous ventilation  Hydration status: euvolemic  Follow-up not needed.          Vitals Value Taken Time   /65 03/21/23 1302   Temp 36.7 °C (98.1 °F) 03/21/23 1245   Pulse 70 03/21/23 1304   Resp 26 03/21/23 1304   SpO2 97 % 03/21/23 1304   Vitals shown include unvalidated device data.      No case tracking events are documented in the log.      Pain/Aziza Score: Pain Rating Prior to Med Admin: 7 (3/21/2023  7:13 AM)

## 2023-03-21 NOTE — PLAN OF CARE
SSC met with patient/family at bedside. Patient experience rounding completed and reviewed the following.     Do you know your discharge plan? Yes or No,    If yes, what is the plan? (Home, Home Health, Rehab, SNF, LTAC, or Other)     Home    If you are discharging home, do you have help at home? Yes or No       Yes    Do you think you will need help at home at discharge? Yes or No      Yes    Have you discussed your needs and preferences with your SW/CM? Yes or No    Yes    Assigned SW/CM notified of any patient/family needs or concerns.      Alyx Pierce [facility]  [facility]   [phone]

## 2023-03-21 NOTE — ASSESSMENT & PLAN NOTE
H/o A-fib. Anticoagulated w/ apixaban (last dose 3/16). Rate controlled w/ carvedilol 6.25 mg daily and sotalol 120 mg BID. QTc on admission 517 (paced rhythm)    -- Apixaban 5 mg BID  -- Continue sotalol and carvedilol

## 2023-03-21 NOTE — PROVATION PATIENT INSTRUCTIONS
Discharge Summary/Instructions after an Endoscopic Procedure  Patient Name: Pauline Cronin  Patient MRN: [id number]  Patient YOB: 1940  Tuesday, March 21, 2023  Celina Toney MD  Dear patient,  As a result of recent federal legislation (The Federal Cures Act), you may   receive lab or pathology results from your procedure in your [facility]   account before your physician is able to contact you. Your physician or   their representative will relay the results to you with their   recommendations at their soonest availability.  Thank you,  RESTRICTIONS:  During your procedure today, you received medications for sedation.  These   medications may affect your judgment, balance and coordination.  Therefore,   for 24 hours, you have the following restrictions:   - DO NOT drive a car, operate machinery, make legal/financial decisions,   sign important papers or drink alcohol.    ACTIVITY:  Today: no heavy lifting, straining or running due to procedural   sedation/anesthesia.  The following day: return to full activity including work.  DIET:  Eat and drink normally unless instructed otherwise.     TREATMENT FOR COMMON SIDE EFFECTS:  - Mild abdominal pain, nausea, belching, bloating or excessive gas:  rest,   eat lightly and use a heating pad.  - Sore Throat: treat with throat lozenges and/or gargle with warm salt   water.  - Because air was used during the procedure, expelling large amounts of air   from your rectum or belching is normal.  - If a bowel prep was taken, you may not have a bowel movement for 1-3 days.    This is normal.  SYMPTOMS TO WATCH FOR AND REPORT TO YOUR PHYSICIAN:  1. Abdominal pain or bloating, other than gas cramps.  2. Chest pain.  3. Back pain.  4. Signs of infection such as: chills or fever occurring within 24 hours   after the procedure.  5. Rectal bleeding, which would show as bright red, maroon, or black stools.   (A tablespoon of blood from the rectum is not serious, especially if    hemorrhoids are present.)  6. Vomiting.  7. Weakness or dizziness.  GO DIRECTLY TO THE NEAREST EMERGENCY ROOM IF YOU HAVE ANY OF THE FOLLOWING:      Difficulty breathing              Chills and/or fever over 101 F   Persistent vomiting and/or vomiting blood   Severe abdominal pain   Severe chest pain   Black, tarry stools   Bleeding- more than one tablespoon   Any other symptom or condition that you feel may need urgent attention  Your doctor recommends these additional instructions:  If any biopsies were taken, your doctors clinic will contact you in 1 to 2   weeks with any results.  - Return patient to [facility] for ongoing care.   - Resume previous diet.   - The patient should not require a repeat ERCP unless future complication   occurs requiring further therapy. .   - Return to referring physician.   - Watch for pancreatitis, bleeding, perforation, and cholangitis.   - Refer to an oncologist and surgical oncology at the next available   appointment.  For questions, problems or results please call your physician - Celina Toney MD at Work:  [phone].  [facility] [place], EMERGENCY ROOM PHONE NUMBER: [phone]  IF A COMPLICATION OR EMERGENCY SITUATION ARISES AND YOU ARE UNABLE TO REACH   YOUR PHYSICIAN - GO DIRECTLY TO THE EMERGENCY ROOM.  Celina Toney MD  3/21/2023 12:54:12 PM  This report has been verified and signed electronically.  Dear patient,  As a result of recent federal legislation (The Federal Cures Act), you may   receive lab or pathology results from your procedure in your [facility]   account before your physician is able to contact you. Your physician or   their representative will relay the results to you with their   recommendations at their soonest availability.  Thank you,  PROVATION

## 2023-03-21 NOTE — PROGRESS NOTES
Subjective:      Patient ID: Pauline Cronin is a 82 y.o. female.    Chief Complaint: No chief complaint on file.    HPI:    ROS Medtronic remote pacemaker interrogation report downloaded and prepared by medical assistant and interpreted by me and full report scanned into Epic media.  Battery OK with AGATA 8.7 years.  Leads OK.  12 episodes of a fib which were treated. Pt is taking Coreg and Eliquis.   2 monitored episodes of ventricular tachycardia.  Normal device function.    Past Medical History:   Diagnosis Date    Anticoagulant long-term use     Atrial fibrillation     Crohn disease diagnosed in her 20's    GERD (gastroesophageal reflux disease)     Hyperlipidemia     Hypertension     Thyroid disease     s/p I 131        Past Surgical History:   Procedure Laterality Date    APPENDECTOMY      CARDIAC SURGERY  2014    pacemaker    COLONOSCOPY  ~2008    normal findings per patient report    ENDOSCOPIC ULTRASOUND OF UPPER GASTROINTESTINAL TRACT N/A 3/14/2023    Procedure: ULTRASOUND, UPPER GI TRACT, ENDOSCOPIC;  Surgeon: Andrei Swartz MD;  Location: [facility] ENDO;  Service: Endoscopy;  Laterality: N/A;  Approval to hold Eliquis rec'd from Dr. Barbosa (see telephone encounter 3/3/23)-DS  Medtronic AICD/PPM  3/3/23-Instructions via portal-DS    ESOPHAGOGASTRODUODENOSCOPY N/A 7/17/2018    Procedure: EGD (ESOPHAGOGASTRODUODENOSCOPY);  Surgeon: Alondra Casiano MD;  Location: [facility] ENDO;  Service: Endoscopy;  Laterality: N/A;    HYSTERECTOMY      INSERTION OF IMPLANTABLE CARDIOVERTER-DEFIBRILLATOR (ICD) GENERATOR WITH TWO EXISTING LEADS Left 5/10/2021    Procedure: INSERTION, PULSE GENERATOR WITH 2 EXISTING LEADS, ICD;  Surgeon: Bo Leone MD;  Location: [facility] CATH LAB;  Service: Cardiology;  Laterality: Left;    INSERTION OF PACEMAKER      REPLACEMENT OF PACEMAKER GENERATOR  05/10/2021    RETROGRADE PYELOGRAPHY Right 2/18/2020    Procedure: PYELOGRAM, RETROGRADE;  Surgeon: Jovan Kruse MD;  Location: [facility] [place]  OR;  Service: Urology;  Laterality: Right;    SMALL INTESTINE SURGERY  in her 20's    for crohn's    TONSILLECTOMY      UPPER GASTROINTESTINAL ENDOSCOPY  ~    normal findings per patient report       Family History   Problem Relation Age of Onset    Cancer Mother     Breast cancer Mother     Crohn's disease Other     Colon cancer Neg Hx     Colon polyps Neg Hx     Esophageal cancer Neg Hx     Stomach cancer Neg Hx     Ulcerative colitis Neg Hx        Social History     Socioeconomic History    Marital status:    Tobacco Use    Smoking status: Former     Types: Cigarettes     Quit date:      Years since quittin.2    Smokeless tobacco: Never   Substance and Sexual Activity    Alcohol use: No    Drug use: No    Sexual activity: Never       No current facility-administered medications on file prior to visit.     Current Outpatient Medications on File Prior to Visit   Medication Sig Dispense Refill    alendronate (FOSAMAX) 70 MG tablet Take 1 tablet (70 mg total) by mouth every 7 days. 12 tablet 3    atorvastatin (LIPITOR) 20 MG tablet Take 1 tablet (20 mg total) by mouth once daily. 90 tablet 3    carvediloL (COREG) 6.25 MG tablet Take 1 tablet (6.25 mg total) by mouth 2 (two) times daily with meals. 180 tablet 3    colestipoL (COLESTID) 1 gram Tab Take 1 tablet (1 g total) by mouth 2 (two) times daily. 180 tablet 3    diphenoxylate-atropine 2.5-0.025 mg (LOMOTIL) 2.5-0.025 mg per tablet TAKE 1 TABLET BY MOUTH 4 TIMES DAILY AS NEEDED FOR DIARRHEA 60 tablet 3    ELIQUIS 5 mg Tab Take 1 tablet (5 mg total) by mouth 2 (two) times daily. 180 tablet 0    esomeprazole (NEXIUM) 40 MG capsule Take 1 capsule (40 mg total) by mouth once daily. 90 capsule 3    folic acid (FOLVITE) 1 MG tablet Take 1 tablet by mouth once daily 90 tablet 1    levothyroxine (SYNTHROID, LEVOTHROID) 175 MCG tablet Take 1 tablet by mouth once daily 30 tablet 5    magnesium gluconate 27.5 mg magne- sium (500 mg) Tab Take 500 mg by  mouth once daily. 90 tablet 3    sotaloL (BETAPACE) 120 MG Tab Take 1 tablet (120 mg total) by mouth every 12 (twelve) hours. 180 tablet 3    sulfaSALAzine (AZULFIDINE) 500 mg Tab Take 1 tablet by mouth twice daily 180 tablet 3    tolterodine (DETROL LA) 4 MG 24 hr capsule Take 1 capsule (4 mg total) by mouth once daily. 90 capsule 3       Review of patient's allergies indicates:   Allergen Reactions    Lisinopril Other (See Comments)     cough     Objective:   There were no vitals filed for this visit.     Physical Exam     Assessment:     1. Pacemaker      Plan:   Diagnoses and all orders for this visit:    Pacemaker         No follow-ups on file.

## 2023-03-21 NOTE — ASSESSMENT & PLAN NOTE
82 yoF w/ newly diagnosed pancreatic head mass s/p ERCP w/ biopsy with results pending presenting with elevated bilirubin, elevated lipase, jaundice, icterus but otherwise asymptomatic. No abdominal pain on exam. Imaging w/ 3.5 cm pancreatic head mass overall similar in appearance. Mild associated pancreatic stranding. Had planned for outpatient ERCP with stent placement on 3/29 but developed hyperbilirubinemia and presented to the ED. AES consulted on admission, performed ERCP on 3/21. Patient developed post-op abdominal pain and nausea/vomiting. Concern for post-ERCP pancreatitis. Will start IVF and analgesics.    -- AES consulted; appreciate recs  --  cc/hr x 16 hours  -- Diet as tolerated  -- PO oxycodone 10 mg q4h PRN 1st line, IV morphine 1 mg q4h PRN for breakthrough pain

## 2023-03-21 NOTE — PT/OT/SLP EVAL
Occupational Therapy   Evaluation and Discharge Note    Name: Pauline Cronin  MRN: [id number]  Admitting Diagnosis: Biliary obstruction  Recent Surgery: Procedure(s) (LRB):  ERCP (ENDOSCOPIC RETROGRADE CHOLANGIOPANCREATOGRAPHY) (N/A) Day of Surgery    Recommendations:     Discharge Recommendations: home  Discharge Equipment Recommendations:    Barriers to discharge:  None    Assessment:     Pauline Cronin is a 82 y.o. female with a medical diagnosis of Biliary obstruction. At this time, patient is functioning at their prior level of function and does not require further acute OT services.     Plan:     During this hospitalization, patient does not require further acute OT services.  Please re-consult if situation changes.    Plan of Care Reviewed with: patient    Subjective     Occupational Profile:  Living Environment: lives alone 1st level apartment with 0 KEMAR. Pt's bathroom has a tub/shower with shower chair and grab bars.  Support available: family  Equipment Used: grab bar, shower chair  Equipment Owned (not using): None  Prior level of function: independent  Hand Dominance: right.   Work: Retired.   Drive: yes.   Managing Medicines/Managing Home: yes.   Hobbies: watching movies, doing puzzles on iPad     Patient reports they will have assistance from family upon discharge.  Pain/Comfort:  Pain Rating 1: 0/10    Patients cultural, spiritual, Mandaen conflicts given the current situation:      Objective:     Communicated with: rn prior to session.  Patient found supine with peripheral IV upon OT entry to room.    General Precautions: Standard,    Orthopedic Precautions:    Braces:    Respiratory Status: Room air     Occupational Performance:    Bed Mobility:    Independent    Functional Mobility/Transfers:  Independent    Activities of Daily Living:  Independent    Physical Exam:  BUE AROM/MMT: WNL    AMPAC 6 Click ADL:  AMPAC Total Score: 24    Treatment & Education:  UE ROM/MMT  Bed mobility training /  assessment  Functional mobility assessment  Sit/standing balance assessment  Educated on importance of sitting OOB in bedside chair to promote increased strength, endurance & breathing.  Discussed D/C of OT    Patient left supine with all lines intact and call button in reach    GOALS:   Multidisciplinary Problems       Occupational Therapy Goals       Not on file                    History:     Past Medical History:   Diagnosis Date    Anticoagulant long-term use     Atrial fibrillation     Crohn disease diagnosed in her 20's    GERD (gastroesophageal reflux disease)     Hyperlipidemia     Hypertension     Pancreatic adenocarcinoma 3/21/2023    Thyroid disease     s/p I 131         Past Surgical History:   Procedure Laterality Date    APPENDECTOMY      CARDIAC SURGERY  2014    pacemaker    COLONOSCOPY  ~2008    normal findings per patient report    ENDOSCOPIC ULTRASOUND OF UPPER GASTROINTESTINAL TRACT N/A 3/14/2023    Procedure: ULTRASOUND, UPPER GI TRACT, ENDOSCOPIC;  Surgeon: Andrei Swartz MD;  Location: [facility] ENDO;  Service: Endoscopy;  Laterality: N/A;  Approval to hold Eliquis rec'd from Dr. Barbosa (see telephone encounter 3/3/23)-DS  Medtronic AICD/PPM  3/3/23-Instructions via portal-DS    ESOPHAGOGASTRODUODENOSCOPY N/A 7/17/2018    Procedure: EGD (ESOPHAGOGASTRODUODENOSCOPY);  Surgeon: Alondra Casiano MD;  Location: [facility] ENDO;  Service: Endoscopy;  Laterality: N/A;    HYSTERECTOMY      INSERTION OF IMPLANTABLE CARDIOVERTER-DEFIBRILLATOR (ICD) GENERATOR WITH TWO EXISTING LEADS Left 5/10/2021    Procedure: INSERTION, PULSE GENERATOR WITH 2 EXISTING LEADS, ICD;  Surgeon: Bo Leone MD;  Location[facility] CATH LAB;  Service: Cardiology;  Laterality: Left;    INSERTION OF PACEMAKER      REPLACEMENT OF PACEMAKER GENERATOR  05/10/2021    RETROGRADE PYELOGRAPHY Right 2/18/2020    Procedure: PYELOGRAM, RETROGRADE;  Surgeon: Jovan Kruse MD;  Location: [facility] OR;  Service: Urology;  Laterality: Right;     SMALL INTESTINE SURGERY  in her 20's    for crohn's    TONSILLECTOMY      UPPER GASTROINTESTINAL ENDOSCOPY  ~2008    normal findings per patient report       Time Tracking:     OT Date of Treatment: 03/21/23  OT Start Time: 0825  OT Stop Time: 0831  OT Total Time (min): 6 min    Billable Minutes:Evaluation 6    3/21/2023

## 2023-03-21 NOTE — TRANSFER OF CARE
Anesthesia Transfer of Care Note    Patient: Pauline Cronin    Procedure(s) Performed: Procedure(s) (LRB):  ERCP (ENDOSCOPIC RETROGRADE CHOLANGIOPANCREATOGRAPHY) (N/A)    Patient location: PACU    Anesthesia Type: general    Transport from OR: Transported from OR on 2-3 L/min O2 by NC with adequate spontaneous ventilation    Post pain: adequate analgesia    Post vital signs: stable    Level of consciousness: sedated    Nausea/Vomiting: no nausea/vomiting    Complications: none    Transfer of care protocol was followedComments: Nurse at bedside, VSS, spont reg resp noted  125/84 sat 94% 2 L NC      Last vitals:   Visit Vitals  BP (!) 125/58 (BP Location: Right arm, Patient Position: Lying)   Pulse 78   Temp 36.7 °C (98.1 °F) (Temporal)   Resp 17   Ht 5' (1.524 m)   Wt 80.3 kg (177 lb)   SpO2 96%   Breastfeeding No   BMI 34.57 kg/m²

## 2023-03-21 NOTE — PROGRESS NOTES
[facility] - Endoscopy  [facility] Medicine  Progress Note    Patient Name: Pauline Cronin  MRN: [id number]  Patient Class: IP- Inpatient   Admission Date: 3/17/2023  Length of Stay: 4 days  Attending Physician: Juliana Cordoba MD  Primary Care Provider: Ga Waldrop MD    Subjective:     Principal Problem:Biliary obstruction    HPI:  The patient is an 82 year old female with a history of A-fib (on Eliquis, last dose 3/16), sick sinus syndrome (s/p PPM), Crohn's disease, HTN, HLD, and recently diagnosed pancreatic head mass who presents to the [facility] ED after being found to have an elevated bilirubin (4.3) by her outpatient cardiologist. She is jaundiced with scleral icterus but otherwise reports no acute symptoms. She denies fever, chills, headache, nausea, vomiting, itching, abdominal pain, diarrhea, or constipation.    Oncologic history: The patient presented to her PCP on March 1st reporting fatigue and 10 pounds of unintentional weight loss. She was sent for a CT abdomen/pelvis on 3/1/2023 which demonstrated a pancreatic head mass. She underwent ERCP w/ biopsy of the mass on 3/14, biopsy results are pending at the time of admission. Plan for outpatient ERCP w/ stent placement was planned for 3/29. She was instructed to present to the ED due to elevated bilirubin, jaundice, and icterus noted on outpatient labs on 3/16.    In the [facility] ED the patient is hemodynamically stable, initial evaluation significant for WBC 4.42, Hgb 8.9, plts 245, Na 141, K 3.1, Bicab 25, Cr 0.7 (at baseline), Bili 4.2, alk phos 308, , .      Overview/Hospital Course:  The patient was admitted to hospital medicine for further management of her pancreatic head mass. Advanced endoscopy services was consulted on admission. Plan for ERCP w/ stenting on Monday 3/20/23 but was delayed due to emergent GI cases. The patient's bilirubin was trended and she was monitored for worsening abdominal pain throughout her hospital stay. Her  anticoagulation was held in anticipation of the ERCP. On 3/21/23 she underwent ERCP with stenting. During her hospitalization her pancreatic head biopsy resulted with pancreatic adenocarcinoma. She was referred on oncology on discharge.      Interval History: No acute overnight events. ERCP w/ stent placement with [facility] today. Prior pancreatic head biopsy resulted w/ pancreatic adenocarcinoma. Will plan to discharge patient after ERCP w/ follow-up with oncology.    Review of Systems   Constitutional:  Positive for unexpected weight change. Negative for chills, diaphoresis and fever.   HENT:  Negative for rhinorrhea, sinus pressure and trouble swallowing.    Respiratory:  Negative for cough, shortness of breath and wheezing.    Cardiovascular:  Negative for chest pain, palpitations and leg swelling.   Gastrointestinal:  Negative for abdominal distention, abdominal pain, constipation, diarrhea, nausea and vomiting.   Genitourinary:  Negative for dysuria, flank pain and hematuria.   Musculoskeletal:  Negative for joint swelling and myalgias.   Skin:  Positive for color change.   Neurological:  Negative for weakness, numbness and headaches.   Psychiatric/Behavioral:  Negative for agitation. The patient is not hyperactive.    Objective:     Vital Signs (Most Recent):  Temp: 98.1 °F (36.7 °C) (03/21/23 1245)  Pulse: 70 (03/21/23 1330)  Resp: 17 (03/21/23 1330)  BP: 128/63 (03/21/23 1330)  SpO2: 99 % (03/21/23 1330) Vital Signs (24h Range):  Temp:  [97.6 °F (36.4 °C)-98.3 °F (36.8 °C)] 98.1 °F (36.7 °C)  Pulse:  [69-89] 70  Resp:  [16-20] 17  SpO2:  [91 %-99 %] 99 %  BP: ()/(44-75) 128/63     Weight: 80.3 kg (177 lb)  Body mass index is 34.57 kg/m².    Intake/Output Summary (Last 24 hours) at 3/21/2023 1404  Last data filed at 3/21/2023 1234  Gross per 24 hour   Intake 800 ml   Output --   Net 800 ml      Physical Exam  Constitutional:       General: She is not in acute distress.     Appearance: Normal appearance. She  is obese.   HENT:      Head: Normocephalic and atraumatic.      Right Ear: External ear normal.      Left Ear: External ear normal.      Nose: No congestion or rhinorrhea.      Mouth/Throat:      Mouth: Mucous membranes are moist.      Pharynx: Oropharynx is clear.   Eyes:      General: Scleral icterus present.      Extraocular Movements: Extraocular movements intact.      Conjunctiva/sclera: Conjunctivae normal.   Cardiovascular:      Rate and Rhythm: Normal rate and regular rhythm.      Pulses: Normal pulses.      Heart sounds: Normal heart sounds. No murmur heard.  Pulmonary:      Effort: Pulmonary effort is normal.      Breath sounds: Normal breath sounds. No wheezing or rales.   Abdominal:      General: Abdomen is flat. Bowel sounds are normal. There is no distension.      Palpations: Abdomen is soft.      Tenderness: There is no abdominal tenderness. There is no guarding.   Musculoskeletal:         General: No swelling.      Cervical back: Normal range of motion.      Right lower leg: No edema.      Left lower leg: No edema.   Skin:     General: Skin is warm and dry.      Capillary Refill: Capillary refill takes less than 2 seconds.      Coloration: Skin is jaundiced.      Findings: No rash.   Neurological:      Mental Status: She is alert and oriented to person, place, and time. Mental status is at baseline.      Sensory: No sensory deficit.      Motor: No weakness.   Psychiatric:         Mood and Affect: Mood normal.         Behavior: Behavior normal.     Significant Labs: All pertinent labs within the past 24 hours have been reviewed.  CBC:   Recent Labs   Lab 03/20/23  0546 03/21/23  0237   WBC 3.52* 3.52*   HGB 8.3* 7.8*   HCT 28.1* 27.4*    203     CMP:   Recent Labs   Lab 03/20/23  0546 03/21/23  0237    139   K 4.0 3.8    110   CO2 26 19*   GLU 97 186*   BUN 7* 8   CREATININE 0.7 0.8   CALCIUM 8.6* 8.3*   PROT 6.4 6.0   ALBUMIN 2.5* 2.3*   BILITOT 4.5* 4.0*   ALKPHOS 279* 257*   AST  183* 149*   * 115*   ANIONGAP 7* 10       Significant Imaging: I have reviewed all pertinent imaging results/findings within the past 24 hours.      Assessment/Plan:      * Biliary obstruction  82 yoF w/ newly diagnosed pancreatic head mass s/p ERCP w/ biopsy with results pending presenting with elevated bilirubin, elevated lipase, jaundice, icterus but otherwise asymptomatic. No abdominal pain on exam. Imaging w/ 3.5 cm pancreatic head mass overall similar in appearance. Mild associated pancreatic stranding. Had planned for outpatient ERCP with stent placement on 3/29 but developed hyperbilirubinemia and presented to the ED. AES consulted on admission, performed ERCP on 3/21.    -- [facility] consulted; appreciate recs.  -- Plan for discharge post ERCP if patient tolerates diet  -- Hold apixaban (last dose was 3/16); will resume tomorrow    Paroxysmal atrial fibrillation  H/o A-fib. Anticoagulated w/ apixaban (last dose 3/16). Rate controlled w/ carvedilol 6.25 mg daily and sotalol 120 mg BID. QTc on admission 517 (paced rhythm)    -- Hold apixaban for possible ERCP  -- Continue sotalol and carvedilol    Anemia  Hgb 8.9 on admission. Recently decreased from 14.2 four months ago. No evidence of bleeding on exam. Outpatient serologic evaluation significant for normal B12 and folate. Iron low at 19, ferritin low normal.     -- Trend CBC  -- Plan to start iron supplementation on discharge     Pancreatic adenocarcinoma  3/14 FNA biopsy results of pancreatic head mass resulted with pancreatic adenocarcinoma. Will plan for outpatient oncology referral now that pathology results have been obtained.    -- Outpatient oncology referral on discharge    Chronic anticoagulation  On apixaban outpatient for A-fib; will hold given possible procedure.    Sick sinus syndrome  S/p PPM. Stable.     GERD (gastroesophageal reflux disease)  Takes esomeprazole 40 mg daily at home    -- Continue home PPI     OAB (overactive  bladder)  Patient takes tolterodine at home (not on formulary)    -- Oxybutynin 10 mg daily while admitted     Pacemaker  H/o PPM secondary to sick sinus syndrome and complete heart block. Follows w/ cardiology. No acute concerns.    Hypothyroidism  Remote history of hyperthyroidism w/ radioactive iodine ablation, now takes synthroid daily. TSH normal on admission, free T4 1.97.    -- Continue levothyroxine 175 mcg daily    Essential hypertension, benign  Patient takes amlodipine 5 mg daily and carvedilol 6.25 mg BID at home.    -- Continue home antihypertensives      Hyperlipidemia  Last LDL 69 on 3/1/23; takes atorvastatin 20 mg daily at home.    -- Hold atorvastatin given elevated aminotransferases on admission       Crohn's disease of large intestine without complication  Chronic and stable. Patient takes colestipol, sulfasalazine at home.    -- Continue sulfasalazine  -- Colestipol not on formulary, will hold for now      VTE Risk Mitigation (From admission, onward)           Ordered     IP VTE HIGH RISK PATIENT  Once         03/18/23 1342     Reason for No Pharmacological VTE Prophylaxis  Once        Question:  Reasons:  Answer:  Physician Provided (leave comment)  Comment:  Pending possible procedure    03/17/23 1628     Place sequential compression device  Until discontinued         03/17/23 1628     Place sequential compression device  Until discontinued         03/17/23 1554                    Discharge Planning   UMA: 3/21/2023     Code Status: Full Code   Is the patient medically ready for discharge?: No    Reason for patient still in hospital (select all that apply): Patient trending condition  Discharge Plan A: Home   Discharge Delays: (!) Procedure Scheduling (IR, OR, Labs, Echo, Cath, Echo, EEG)      Dave Ortega MD  Department of Hospital Medicine   [facility] - Endoscopy                      03/21/2023                             STAFF PHYSICIAN NOTE                                   Attending  Attestation for Rounds with Resident  I have reviewed and concur with the resident's history, physical, assessment, and plan.  I have personally interviewed and examined the patient at bedside and agree with the resident's findings.           82-year-old female, with history of AFib, on Eliquis, pacemaker in-situ, Crohn's disease, hyperlipidemia, hypertension, known pancreatic mass, presents to ED for elevated bilirubin- 4.2. [facility] EUS on 3/22. Has oncology f/u. Will arrange for PC f/u as well.                              Juliana Cordoba MD  Senior Hospitalist  Department of Hospital Medicine  [facility]  [id number], [phone]

## 2023-03-21 NOTE — ASSESSMENT & PLAN NOTE
3/14 FNA biopsy results of pancreatic head mass resulted with pancreatic adenocarcinoma. Will plan for outpatient oncology referral now that pathology results have been obtained.    -- Outpatient oncology referral on discharge

## 2023-03-22 LAB
ADEQUACY: ABNORMAL
ALBUMIN SERPL BCP-MCNC: 2.5 G/DL (ref 3.5–5.2)
ALP SERPL-CCNC: 282 U/L (ref 55–135)
ALT SERPL W/O P-5'-P-CCNC: 108 U/L (ref 10–44)
ANION GAP SERPL CALC-SCNC: 9 MMOL/L (ref 8–16)
AST SERPL-CCNC: 125 U/L (ref 10–40)
BASOPHILS # BLD AUTO: 0.03 K/UL (ref 0–0.2)
BASOPHILS NFR BLD: 0.5 % (ref 0–1.9)
BILIRUB SERPL-MCNC: 3.3 MG/DL (ref 0.1–1)
BUN SERPL-MCNC: 8 MG/DL (ref 8–23)
CALCIUM SERPL-MCNC: 9.1 MG/DL (ref 8.7–10.5)
CHLORIDE SERPL-SCNC: 109 MMOL/L (ref 95–110)
CO2 SERPL-SCNC: 20 MMOL/L (ref 23–29)
CREAT SERPL-MCNC: 0.9 MG/DL (ref 0.5–1.4)
DIFFERENTIAL METHOD: ABNORMAL
EOSINOPHIL # BLD AUTO: 0.2 K/UL (ref 0–0.5)
EOSINOPHIL NFR BLD: 2.7 % (ref 0–8)
ERYTHROCYTE [DISTWIDTH] IN BLOOD BY AUTOMATED COUNT: 18.7 % (ref 11.5–14.5)
EST. GFR  (NO RACE VARIABLE): >60 ML/MIN/1.73 M^2
FINAL PATHOLOGIC DIAGNOSIS: ABNORMAL
GLUCOSE SERPL-MCNC: 130 MG/DL (ref 70–110)
HCT VFR BLD AUTO: 27.7 % (ref 37–48.5)
HGB BLD-MCNC: 8 G/DL (ref 12–16)
IMM GRANULOCYTES # BLD AUTO: 0.03 K/UL (ref 0–0.04)
IMM GRANULOCYTES NFR BLD AUTO: 0.5 % (ref 0–0.5)
LIPASE SERPL-CCNC: 1095 U/L (ref 4–60)
LYMPHOCYTES # BLD AUTO: 0.7 K/UL (ref 1–4.8)
LYMPHOCYTES NFR BLD: 12 % (ref 18–48)
MAGNESIUM SERPL-MCNC: 2 MG/DL (ref 1.6–2.6)
MCH RBC QN AUTO: 22.8 PG (ref 27–31)
MCHC RBC AUTO-ENTMCNC: 28.9 G/DL (ref 32–36)
MCV RBC AUTO: 79 FL (ref 82–98)
MONOCYTES # BLD AUTO: 0.7 K/UL (ref 0.3–1)
MONOCYTES NFR BLD: 12 % (ref 4–15)
NEUTROPHILS # BLD AUTO: 4.5 K/UL (ref 1.8–7.7)
NEUTROPHILS NFR BLD: 72.3 % (ref 38–73)
NRBC BLD-RTO: 0 /100 WBC
PHOSPHATE SERPL-MCNC: 2.8 MG/DL (ref 2.7–4.5)
PLATELET # BLD AUTO: 244 K/UL (ref 150–450)
PMV BLD AUTO: 12.3 FL (ref 9.2–12.9)
POTASSIUM SERPL-SCNC: 4.1 MMOL/L (ref 3.5–5.1)
PROT SERPL-MCNC: 6.6 G/DL (ref 6–8.4)
RBC # BLD AUTO: 3.51 M/UL (ref 4–5.4)
SODIUM SERPL-SCNC: 138 MMOL/L (ref 136–145)
SUPPLEMENTAL DIAGNOSIS: ABNORMAL
WBC # BLD AUTO: 6.19 K/UL (ref 3.9–12.7)

## 2023-03-22 PROCEDURE — 36415 COLL VENOUS BLD VENIPUNCTURE: CPT | Mod: HCNC | Performed by: INTERNAL MEDICINE

## 2023-03-22 PROCEDURE — 25000003 PHARM REV CODE 250: Mod: HCNC

## 2023-03-22 PROCEDURE — 20600001 HC STEP DOWN PRIVATE ROOM: Mod: HCNC

## 2023-03-22 PROCEDURE — 83735 ASSAY OF MAGNESIUM: CPT | Mod: HCNC

## 2023-03-22 PROCEDURE — 83690 ASSAY OF LIPASE: CPT | Mod: HCNC | Performed by: INTERNAL MEDICINE

## 2023-03-22 PROCEDURE — 84100 ASSAY OF PHOSPHORUS: CPT | Mod: HCNC

## 2023-03-22 PROCEDURE — 99233 SBSQ HOSP IP/OBS HIGH 50: CPT | Mod: HCNC,,, | Performed by: HOSPITALIST

## 2023-03-22 PROCEDURE — 63600175 PHARM REV CODE 636 W HCPCS: Mod: HCNC

## 2023-03-22 PROCEDURE — 85025 COMPLETE CBC W/AUTO DIFF WBC: CPT | Mod: HCNC

## 2023-03-22 PROCEDURE — 99233 PR SUBSEQUENT HOSPITAL CARE,LEVL III: ICD-10-PCS | Mod: HCNC,,, | Performed by: HOSPITALIST

## 2023-03-22 PROCEDURE — 36415 COLL VENOUS BLD VENIPUNCTURE: CPT | Mod: HCNC

## 2023-03-22 PROCEDURE — 80053 COMPREHEN METABOLIC PANEL: CPT | Mod: HCNC

## 2023-03-22 RX ORDER — PROCHLORPERAZINE EDISYLATE 5 MG/ML
5 INJECTION INTRAMUSCULAR; INTRAVENOUS EVERY 6 HOURS PRN
Status: DISCONTINUED | OUTPATIENT
Start: 2023-03-22 | End: 2023-03-23 | Stop reason: HOSPADM

## 2023-03-22 RX ORDER — MORPHINE SULFATE 2 MG/ML
2 INJECTION, SOLUTION INTRAMUSCULAR; INTRAVENOUS EVERY 4 HOURS PRN
Status: DISCONTINUED | OUTPATIENT
Start: 2023-03-22 | End: 2023-03-23 | Stop reason: HOSPADM

## 2023-03-22 RX ORDER — OXYCODONE HYDROCHLORIDE 5 MG/1
5 TABLET ORAL EVERY 4 HOURS PRN
Status: DISCONTINUED | OUTPATIENT
Start: 2023-03-22 | End: 2023-03-22

## 2023-03-22 RX ORDER — MORPHINE SULFATE 2 MG/ML
1 INJECTION, SOLUTION INTRAMUSCULAR; INTRAVENOUS EVERY 4 HOURS PRN
Status: DISCONTINUED | OUTPATIENT
Start: 2023-03-22 | End: 2023-03-22

## 2023-03-22 RX ORDER — SODIUM CHLORIDE, SODIUM LACTATE, POTASSIUM CHLORIDE, CALCIUM CHLORIDE 600; 310; 30; 20 MG/100ML; MG/100ML; MG/100ML; MG/100ML
INJECTION, SOLUTION INTRAVENOUS CONTINUOUS
Status: DISCONTINUED | OUTPATIENT
Start: 2023-03-22 | End: 2023-03-23 | Stop reason: HOSPADM

## 2023-03-22 RX ORDER — OXYCODONE HYDROCHLORIDE 5 MG/1
5 TABLET ORAL ONCE
Status: COMPLETED | OUTPATIENT
Start: 2023-03-22 | End: 2023-03-22

## 2023-03-22 RX ORDER — OXYCODONE HYDROCHLORIDE 10 MG/1
10 TABLET ORAL EVERY 4 HOURS PRN
Status: DISCONTINUED | OUTPATIENT
Start: 2023-03-22 | End: 2023-03-23 | Stop reason: HOSPADM

## 2023-03-22 RX ORDER — SIMETHICONE 80 MG
1 TABLET,CHEWABLE ORAL 3 TIMES DAILY PRN
Status: DISCONTINUED | OUTPATIENT
Start: 2023-03-22 | End: 2023-03-23 | Stop reason: HOSPADM

## 2023-03-22 RX ADMIN — MORPHINE SULFATE 2 MG: 2 INJECTION, SOLUTION INTRAMUSCULAR; INTRAVENOUS at 02:03

## 2023-03-22 RX ADMIN — MORPHINE SULFATE 2 MG: 2 INJECTION, SOLUTION INTRAMUSCULAR; INTRAVENOUS at 10:03

## 2023-03-22 RX ADMIN — SULFASALAZINE 500 MG: 500 TABLET ORAL at 09:03

## 2023-03-22 RX ADMIN — SIMETHICONE 80 MG: 80 TABLET, CHEWABLE ORAL at 05:03

## 2023-03-22 RX ADMIN — SODIUM CHLORIDE, POTASSIUM CHLORIDE, SODIUM LACTATE AND CALCIUM CHLORIDE: 600; 310; 30; 20 INJECTION, SOLUTION INTRAVENOUS at 11:03

## 2023-03-22 RX ADMIN — OXYCODONE HYDROCHLORIDE 5 MG: 5 TABLET ORAL at 04:03

## 2023-03-22 RX ADMIN — SODIUM CHLORIDE, POTASSIUM CHLORIDE, SODIUM LACTATE AND CALCIUM CHLORIDE: 600; 310; 30; 20 INJECTION, SOLUTION INTRAVENOUS at 03:03

## 2023-03-22 RX ADMIN — OXYCODONE HYDROCHLORIDE 10 MG: 10 TABLET ORAL at 08:03

## 2023-03-22 RX ADMIN — PROCHLORPERAZINE EDISYLATE 5 MG: 5 INJECTION INTRAMUSCULAR; INTRAVENOUS at 02:03

## 2023-03-22 RX ADMIN — SOTALOL HYDROCHLORIDE 120 MG: 120 TABLET ORAL at 10:03

## 2023-03-22 RX ADMIN — APIXABAN 5 MG: 5 TABLET, FILM COATED ORAL at 09:03

## 2023-03-22 RX ADMIN — AMLODIPINE BESYLATE 5 MG: 5 TABLET ORAL at 10:03

## 2023-03-22 RX ADMIN — PROCHLORPERAZINE EDISYLATE 5 MG: 5 INJECTION INTRAMUSCULAR; INTRAVENOUS at 08:03

## 2023-03-22 RX ADMIN — SODIUM CHLORIDE, POTASSIUM CHLORIDE, SODIUM LACTATE AND CALCIUM CHLORIDE: 600; 310; 30; 20 INJECTION, SOLUTION INTRAVENOUS at 07:03

## 2023-03-22 RX ADMIN — PANTOPRAZOLE SODIUM 40 MG: 40 TABLET, DELAYED RELEASE ORAL at 09:03

## 2023-03-22 RX ADMIN — APIXABAN 5 MG: 5 TABLET, FILM COATED ORAL at 10:03

## 2023-03-22 RX ADMIN — SULFASALAZINE 500 MG: 500 TABLET ORAL at 10:03

## 2023-03-22 RX ADMIN — SOTALOL HYDROCHLORIDE 120 MG: 120 TABLET ORAL at 09:03

## 2023-03-22 RX ADMIN — CARVEDILOL 6.25 MG: 6.25 TABLET, FILM COATED ORAL at 06:03

## 2023-03-22 RX ADMIN — ONDANSETRON 8 MG: 8 TABLET, ORALLY DISINTEGRATING ORAL at 04:03

## 2023-03-22 RX ADMIN — OXYBUTYNIN CHLORIDE 10 MG: 10 TABLET, EXTENDED RELEASE ORAL at 10:03

## 2023-03-22 NOTE — NURSING
Patient complaint of back, abdominal pain, with nausea, Md on call notified and assess patient at bedside. Zofran and pain medication given. WCTM

## 2023-03-22 NOTE — SUBJECTIVE & OBJECTIVE
Interval History: Patient with abdominal pain radiating to the back, nausea/vomiting after her ERCP. Will treat with PO oxycodone and IV morphine for breakthrough. Start IV fluids 125 cc/hr. Bilirubin down-trending this AM.    Review of Systems   Constitutional:  Positive for unexpected weight change. Negative for chills, diaphoresis and fever.   HENT:  Negative for rhinorrhea, sinus pressure and trouble swallowing.    Respiratory:  Negative for cough, shortness of breath and wheezing.    Cardiovascular:  Negative for chest pain, palpitations and leg swelling.   Gastrointestinal:  Positive for abdominal pain, nausea and vomiting. Negative for abdominal distention, constipation and diarrhea.   Genitourinary:  Negative for dysuria, flank pain and hematuria.   Musculoskeletal:  Negative for joint swelling and myalgias.   Skin:  Positive for color change.   Neurological:  Negative for weakness, numbness and headaches.   Psychiatric/Behavioral:  Negative for agitation. The patient is not hyperactive.    Objective:     Vital Signs (Most Recent):  Temp: 97.7 °F (36.5 °C) (03/22/23 0737)  Pulse: 74 (03/22/23 0737)  Resp: 20 (03/22/23 0737)  BP: (!) 144/66 (03/22/23 0737)  SpO2: (!) 93 % (03/22/23 0737)   Vital Signs (24h Range):  Temp:  [97.3 °F (36.3 °C)-98.1 °F (36.7 °C)] 97.7 °F (36.5 °C)  Pulse:  [69-93] 74  Resp:  [16-20] 20  SpO2:  [90 %-99 %] 93 %  BP: (109-185)/(55-79) 144/66     Weight: 80.3 kg (177 lb)  Body mass index is 34.57 kg/m².    Intake/Output Summary (Last 24 hours) at 3/22/2023 0817  Last data filed at 3/21/2023 1234  Gross per 24 hour   Intake 800 ml   Output --   Net 800 ml      Physical Exam  Constitutional:       General: She is not in acute distress.     Appearance: Normal appearance. She is obese.   HENT:      Head: Normocephalic and atraumatic.      Right Ear: External ear normal.      Left Ear: External ear normal.      Nose: No congestion or rhinorrhea.      Mouth/Throat:      Mouth: Mucous  membranes are moist.      Pharynx: Oropharynx is clear.   Eyes:      General: Scleral icterus present.      Extraocular Movements: Extraocular movements intact.      Conjunctiva/sclera: Conjunctivae normal.   Cardiovascular:      Rate and Rhythm: Normal rate and regular rhythm.      Pulses: Normal pulses.      Heart sounds: Normal heart sounds. No murmur heard.  Pulmonary:      Effort: Pulmonary effort is normal.      Breath sounds: Normal breath sounds. No wheezing or rales.   Abdominal:      General: Abdomen is flat. Bowel sounds are normal. There is no distension.      Palpations: Abdomen is soft.      Tenderness: There is abdominal tenderness (epigastric; radiates to back). There is no guarding.   Musculoskeletal:         General: No swelling.      Cervical back: Normal range of motion.      Right lower leg: No edema.      Left lower leg: No edema.   Skin:     General: Skin is warm and dry.      Capillary Refill: Capillary refill takes less than 2 seconds.      Coloration: Skin is jaundiced.      Findings: No rash.   Neurological:      Mental Status: She is alert and oriented to person, place, and time. Mental status is at baseline.      Sensory: No sensory deficit.      Motor: No weakness.   Psychiatric:         Mood and Affect: Mood normal.         Behavior: Behavior normal.     Significant Labs: All pertinent labs within the past 24 hours have been reviewed.  CBC:   Recent Labs   Lab 03/21/23  0237 03/22/23  0345   WBC 3.52* 6.19   HGB 7.8* 8.0*   HCT 27.4* 27.7*    244     CMP:   Recent Labs   Lab 03/21/23  0237 03/22/23  0345    138   K 3.8 4.1    109   CO2 19* 20*   * 130*   BUN 8 8   CREATININE 0.8 0.9   CALCIUM 8.3* 9.1   PROT 6.0 6.6   ALBUMIN 2.3* 2.5*   BILITOT 4.0* 3.3*   ALKPHOS 257* 282*   * 125*   * 108*   ANIONGAP 10 9       Significant Imaging: I have reviewed all pertinent imaging results/findings within the past 24 hours.

## 2023-03-22 NOTE — TREATMENT PLAN
GI Treatment Plan    Pauline Cronin is a 82 y.o. female admitted to hospital 3/17/2023 (Hospital Day: 6) due to Biliary obstruction.     Interval History  Patient developed abdominal pain and N/V post-procedure.  Consistent with post-ERCP pancreatitis    Objective  Temp:  [97.3 °F (36.3 °C)-98.1 °F (36.7 °C)] 97.7 °F (36.5 °C) (03/22 0737)  Pulse:  [69-93] 74 (03/22 0737)  BP: (109-185)/(55-79) 144/66 (03/22 0737)  Resp:  [16-20] 17 (03/22 0829)  SpO2:  [90 %-99 %] 93 % (03/22 0737)    General: Alert, Oriented x3, no distress  Abdomen: Normoactive bowel sounds. Non-distended. Normal tympany. Soft. Tender to palpation mid-epigastric pain with radiation to back . No peritoneal signs.    Laboratory    Recent Labs   Lab 03/20/23  0546 03/21/23  0237 03/22/23  0345   HGB 8.3* 7.8* 8.0*         Assessment and Plan  - continue IVF for post-procedure pancreatitis  - Continue pain control PRN  - ADAT once pain controlled  - f/u lipase  - trend CBC, CMP, INR  - We will continue to follow.    Thank you for involving us in the care of Pauline Cronin. Please call with any additional questions, concerns or changes in the patient's clinical status.    Christopher Andino MD  Gastroenterology Fellow, PGY IV

## 2023-03-22 NOTE — PLAN OF CARE
Problem: Adult Inpatient Plan of Care  Goal: Plan of Care Review  Outcome: Ongoing, Progressing  Goal: Patient-Specific Goal (Individualized)  Outcome: Ongoing, Progressing  Goal: Absence of Hospital-Acquired Illness or Injury  Outcome: Ongoing, Progressing  Goal: Optimal Comfort and Wellbeing  Outcome: Ongoing, Progressing  Goal: Readiness for Transition of Care  Outcome: Ongoing, Progressing     POV reviewed with patient. PT with increased pain and nausea tx with MS 2mg IVP and nausea tx Compazine IVP with good relief. IVF at 125/hr. To BR with assistance. VSS with HTN at times. WCTM

## 2023-03-22 NOTE — PROGRESS NOTES
[facility]  Progress Note    Patient Name: Pauline Cronin  MRN: [id number]  Patient Class: IP- Inpatient   Admission Date: 3/17/2023  Length of Stay: 5 days  Attending Physician: Juliana Cordoba MD  Primary Care Provider: Ga Waldrop MD      Subjective:     Principal Problem:Biliary obstruction      HPI:  The patient is an 82 year old female with a history of A-fib (on Eliquis, last dose 3/16), sick sinus syndrome (s/p PPM), Crohn's disease, HTN, HLD, and recently diagnosed pancreatic head mass who presents to the [facility] ED after being found to have an elevated bilirubin (4.3) by her outpatient cardiologist. She is jaundiced with scleral icterus but otherwise reports no acute symptoms. She denies fever, chills, headache, nausea, vomiting, itching, abdominal pain, diarrhea, or constipation.    Oncologic history: The patient presented to her PCP on March 1st reporting fatigue and 10 pounds of unintentional weight loss. She was sent for a CT abdomen/pelvis on 3/1/2023 which demonstrated a pancreatic head mass. She underwent ERCP w/ biopsy of the mass on 3/14, biopsy results are pending at the time of admission. Plan for outpatient ERCP w/ stent placement was planned for 3/29. She was instructed to present to the ED due to elevated bilirubin, jaundice, and icterus noted on outpatient labs on 3/16.    In the [facility] ED the patient is hemodynamically stable, initial evaluation significant for WBC 4.42, Hgb 8.9, plts 245, Na 141, K 3.1, Bicab 25, Cr 0.7 (at baseline), Bili 4.2, alk phos 308, , .      Overview/Hospital Course:  The patient was admitted to hospital medicine for further management of her pancreatic head mass. Advanced endoscopy services was consulted on admission. Plan for ERCP w/ stenting on Monday 3/20/23 but was delayed due to emergent GI cases. The patient's bilirubin was trended and she was monitored for worsening abdominal pain throughout her hospital stay. Her  anticoagulation was held in anticipation of the ERCP. On 3/21/23 she underwent ERCP with stenting. During her hospitalization her pancreatic head biopsy resulted with pancreatic adenocarcinoma. She was referred on oncology on discharge. The patient developed abdominal pain, nausea/vomiting after her ERCP and was monitored on 3/22 for her symptoms. She was given analgesics and IV fluids for concern of post-ERCP pancreatitis.      Interval History: Patient with abdominal pain radiating to the back, nausea/vomiting after her ERCP. Will treat with PO oxycodone and IV morphine for breakthrough. Start IV fluids 125 cc/hr. Bilirubin down-trending this AM.    Review of Systems   Constitutional:  Positive for unexpected weight change. Negative for chills, diaphoresis and fever.   HENT:  Negative for rhinorrhea, sinus pressure and trouble swallowing.    Respiratory:  Negative for cough, shortness of breath and wheezing.    Cardiovascular:  Negative for chest pain, palpitations and leg swelling.   Gastrointestinal:  Positive for abdominal pain, nausea and vomiting. Negative for abdominal distention, constipation and diarrhea.   Genitourinary:  Negative for dysuria, flank pain and hematuria.   Musculoskeletal:  Negative for joint swelling and myalgias.   Skin:  Positive for color change.   Neurological:  Negative for weakness, numbness and headaches.   Psychiatric/Behavioral:  Negative for agitation. The patient is not hyperactive.    Objective:     Vital Signs (Most Recent):  Temp: 97.7 °F (36.5 °C) (03/22/23 0737)  Pulse: 74 (03/22/23 0737)  Resp: 20 (03/22/23 0737)  BP: (!) 144/66 (03/22/23 0737)  SpO2: (!) 93 % (03/22/23 0737)   Vital Signs (24h Range):  Temp:  [97.3 °F (36.3 °C)-98.1 °F (36.7 °C)] 97.7 °F (36.5 °C)  Pulse:  [69-93] 74  Resp:  [16-20] 20  SpO2:  [90 %-99 %] 93 %  BP: (109-185)/(55-79) 144/66     Weight: 80.3 kg (177 lb)  Body mass index is 34.57 kg/m².    Intake/Output Summary (Last 24 hours) at 3/22/2023  0817  Last data filed at 3/21/2023 1234  Gross per 24 hour   Intake 800 ml   Output --   Net 800 ml      Physical Exam  Constitutional:       General: She is not in acute distress.     Appearance: Normal appearance. She is obese.   HENT:      Head: Normocephalic and atraumatic.      Right Ear: External ear normal.      Left Ear: External ear normal.      Nose: No congestion or rhinorrhea.      Mouth/Throat:      Mouth: Mucous membranes are moist.      Pharynx: Oropharynx is clear.   Eyes:      General: Scleral icterus present.      Extraocular Movements: Extraocular movements intact.      Conjunctiva/sclera: Conjunctivae normal.   Cardiovascular:      Rate and Rhythm: Normal rate and regular rhythm.      Pulses: Normal pulses.      Heart sounds: Normal heart sounds. No murmur heard.  Pulmonary:      Effort: Pulmonary effort is normal.      Breath sounds: Normal breath sounds. No wheezing or rales.   Abdominal:      General: Abdomen is flat. Bowel sounds are normal. There is no distension.      Palpations: Abdomen is soft.      Tenderness: There is abdominal tenderness (epigastric; radiates to back). There is no guarding.   Musculoskeletal:         General: No swelling.      Cervical back: Normal range of motion.      Right lower leg: No edema.      Left lower leg: No edema.   Skin:     General: Skin is warm and dry.      Capillary Refill: Capillary refill takes less than 2 seconds.      Coloration: Skin is jaundiced.      Findings: No rash.   Neurological:      Mental Status: She is alert and oriented to person, place, and time. Mental status is at baseline.      Sensory: No sensory deficit.      Motor: No weakness.   Psychiatric:         Mood and Affect: Mood normal.         Behavior: Behavior normal.     Significant Labs: All pertinent labs within the past 24 hours have been reviewed.  CBC:   Recent Labs   Lab 03/21/23  0237 03/22/23  0345   WBC 3.52* 6.19   HGB 7.8* 8.0*   HCT 27.4* 27.7*    244     CMP:    Recent Labs   Lab 03/21/23  0237 03/22/23  0345    138   K 3.8 4.1    109   CO2 19* 20*   * 130*   BUN 8 8   CREATININE 0.8 0.9   CALCIUM 8.3* 9.1   PROT 6.0 6.6   ALBUMIN 2.3* 2.5*   BILITOT 4.0* 3.3*   ALKPHOS 257* 282*   * 125*   * 108*   ANIONGAP 10 9       Significant Imaging: I have reviewed all pertinent imaging results/findings within the past 24 hours.      Assessment/Plan:      * Biliary obstruction  82 yoF w/ newly diagnosed pancreatic head mass s/p ERCP w/ biopsy with results pending presenting with elevated bilirubin, elevated lipase, jaundice, icterus but otherwise asymptomatic. No abdominal pain on exam. Imaging w/ 3.5 cm pancreatic head mass overall similar in appearance. Mild associated pancreatic stranding. Had planned for outpatient ERCP with stent placement on 3/29 but developed hyperbilirubinemia and presented to the ED. AES consulted on admission, performed ERCP on 3/21. Patient developed post-op abdominal pain and nausea/vomiting. Concern for post-ERCP pancreatitis. Will start IVF and analgesics.    -- AES consulted; appreciate recs  --  cc/hr x 16 hours  -- Diet as tolerated  -- PO oxycodone 10 mg q4h PRN 1st line, IV morphine 1 mg q4h PRN for breakthrough pain    Paroxysmal atrial fibrillation  H/o A-fib. Anticoagulated w/ apixaban (last dose 3/16). Rate controlled w/ carvedilol 6.25 mg daily and sotalol 120 mg BID. QTc on admission 517 (paced rhythm)    -- Apixaban 5 mg BID  -- Continue sotalol and carvedilol    Anemia  Hgb 8.9 on admission. Recently decreased from 14.2 four months ago. No evidence of bleeding on exam. Outpatient serologic evaluation significant for normal B12 and folate. Iron low at 19, ferritin low normal.     -- Trend CBC  -- Plan to start iron supplementation on discharge     Pancreatic adenocarcinoma  3/14 FNA biopsy results of pancreatic head mass resulted with pancreatic adenocarcinoma. Will plan for outpatient oncology  referral now that pathology results have been obtained.    -- Outpatient oncology referral on discharge    Chronic anticoagulation  On apixaban outpatient for A-fib; held on admission for ERCP, will resume.    -- Resume Apixaban    Sick sinus syndrome  S/p PPM. Stable.     GERD (gastroesophageal reflux disease)  Takes esomeprazole 40 mg daily at home    -- Continue home PPI     OAB (overactive bladder)  Patient takes tolterodine at home (not on formulary)    -- Oxybutynin 10 mg daily while admitted     Pacemaker  H/o PPM secondary to sick sinus syndrome and complete heart block. Follows w/ cardiology. No acute concerns.    Hypothyroidism  Remote history of hyperthyroidism w/ radioactive iodine ablation, now takes synthroid daily. TSH normal on admission, free T4 1.97.    -- Continue levothyroxine 175 mcg daily    Essential hypertension, benign  Patient takes amlodipine 5 mg daily and carvedilol 6.25 mg BID at home.    -- Continue home antihypertensives       Hyperlipidemia  Last LDL 69 on 3/1/23; takes atorvastatin 20 mg daily at home.    -- Hold atorvastatin given elevated aminotransferases on admission       Crohn's disease of large intestine without complication  Chronic and stable. Patient takes colestipol, sulfasalazine at home.    -- Continue sulfasalazine  -- Colestipol not on formulary, will hold for now      VTE Risk Mitigation (From admission, onward)           Ordered     apixaban tablet 5 mg  2 times daily         03/22/23 0823     IP VTE HIGH RISK PATIENT  Once         03/18/23 1342     Reason for No Pharmacological VTE Prophylaxis  Once        Question:  Reasons:  Answer:  Physician Provided (leave comment)  Comment:  Pending possible procedure    03/17/23 1628     Place sequential compression device  Until discontinued         03/17/23 1628     Place sequential compression device  Until discontinued         03/17/23 1554                    Discharge Planning   UMA: 3/21/2023     Code Status: Full Code    Is the patient medically ready for discharge?: No    Reason for patient still in hospital (select all that apply): Patient trending condition  Discharge Plan A: Home   Discharge Delays: (!) Procedure Scheduling (IR, OR, Labs, Echo, Cath, Echo, EEG)      Dave Ortega MD  Department of Hospital Medicine   [facility]                      03/22/2023                             STAFF PHYSICIAN NOTE                                   Attending Attestation for Rounds with Resident  I have reviewed and concur with the resident's history, physical, assessment, and plan.  I have personally interviewed and examined the patient at bedside and agree with the resident's findings.            82-year-old female, with history of AFib, on Eliquis, pacemaker in-situ, Crohn's disease, hyperlipidemia, hypertension, known pancreatic mass, presents to ED for elevated bilirubin- 4.2. AES EUS on 3/22. She developed post-procedure pancreatitis. Here for IVFs, pain management. Has oncology f/u. Will arrange for PC f/u as well.                             Juliana Cordoba MD  Senior Hospitalist  Department of Hospital Medicine  [facility]  [id number], [phone]

## 2023-03-22 NOTE — NURSING
Patient refused morning medications. Patient states pain or nausea medications are not working. Md on call contacted and will put another pain and nausea medication on board. JEET

## 2023-03-23 ENCOUNTER — TELEPHONE (OUTPATIENT)
Dept: PRIMARY CARE CLINIC | Facility: CLINIC | Age: 83
End: 2023-03-23
Payer: MEDICARE

## 2023-03-23 VITALS
SYSTOLIC BLOOD PRESSURE: 109 MMHG | TEMPERATURE: 99 F | RESPIRATION RATE: 18 BRPM | BODY MASS INDEX: 34.75 KG/M2 | WEIGHT: 177 LBS | OXYGEN SATURATION: 94 % | HEART RATE: 71 BPM | DIASTOLIC BLOOD PRESSURE: 53 MMHG | HEIGHT: 60 IN

## 2023-03-23 LAB
ALBUMIN SERPL BCP-MCNC: 2.1 G/DL (ref 3.5–5.2)
ALP SERPL-CCNC: 215 U/L (ref 55–135)
ALT SERPL W/O P-5'-P-CCNC: 66 U/L (ref 10–44)
ANION GAP SERPL CALC-SCNC: 12 MMOL/L (ref 8–16)
AST SERPL-CCNC: 62 U/L (ref 10–40)
BASOPHILS # BLD AUTO: 0.04 K/UL (ref 0–0.2)
BASOPHILS NFR BLD: 0.3 % (ref 0–1.9)
BILIRUB SERPL-MCNC: 2.5 MG/DL (ref 0.1–1)
BUN SERPL-MCNC: 6 MG/DL (ref 8–23)
CALCIUM SERPL-MCNC: 8.8 MG/DL (ref 8.7–10.5)
CHLORIDE SERPL-SCNC: 105 MMOL/L (ref 95–110)
CO2 SERPL-SCNC: 19 MMOL/L (ref 23–29)
CREAT SERPL-MCNC: 0.8 MG/DL (ref 0.5–1.4)
DIFFERENTIAL METHOD: ABNORMAL
EOSINOPHIL # BLD AUTO: 0.1 K/UL (ref 0–0.5)
EOSINOPHIL NFR BLD: 0.7 % (ref 0–8)
ERYTHROCYTE [DISTWIDTH] IN BLOOD BY AUTOMATED COUNT: 18.5 % (ref 11.5–14.5)
EST. GFR  (NO RACE VARIABLE): >60 ML/MIN/1.73 M^2
GLUCOSE SERPL-MCNC: 69 MG/DL (ref 70–110)
HCT VFR BLD AUTO: 26.7 % (ref 37–48.5)
HGB BLD-MCNC: 8.3 G/DL (ref 12–16)
IMM GRANULOCYTES # BLD AUTO: 0.08 K/UL (ref 0–0.04)
IMM GRANULOCYTES NFR BLD AUTO: 0.6 % (ref 0–0.5)
LACTATE SERPL-SCNC: 1.5 MMOL/L (ref 0.5–2.2)
LYMPHOCYTES # BLD AUTO: 0.9 K/UL (ref 1–4.8)
LYMPHOCYTES NFR BLD: 7 % (ref 18–48)
MAGNESIUM SERPL-MCNC: 1.4 MG/DL (ref 1.6–2.6)
MCH RBC QN AUTO: 23.6 PG (ref 27–31)
MCHC RBC AUTO-ENTMCNC: 31.1 G/DL (ref 32–36)
MCV RBC AUTO: 76 FL (ref 82–98)
MONOCYTES # BLD AUTO: 1.1 K/UL (ref 0.3–1)
MONOCYTES NFR BLD: 8.7 % (ref 4–15)
NEUTROPHILS # BLD AUTO: 10.5 K/UL (ref 1.8–7.7)
NEUTROPHILS NFR BLD: 82.7 % (ref 38–73)
NRBC BLD-RTO: 1 /100 WBC
PHOSPHATE SERPL-MCNC: 2.8 MG/DL (ref 2.7–4.5)
PLATELET # BLD AUTO: 221 K/UL (ref 150–450)
PMV BLD AUTO: 13.4 FL (ref 9.2–12.9)
POTASSIUM SERPL-SCNC: 4 MMOL/L (ref 3.5–5.1)
PROT SERPL-MCNC: 6.1 G/DL (ref 6–8.4)
RBC # BLD AUTO: 3.51 M/UL (ref 4–5.4)
SODIUM SERPL-SCNC: 136 MMOL/L (ref 136–145)
WBC # BLD AUTO: 12.75 K/UL (ref 3.9–12.7)

## 2023-03-23 PROCEDURE — 85025 COMPLETE CBC W/AUTO DIFF WBC: CPT | Mod: HCNC

## 2023-03-23 PROCEDURE — 99239 HOSP IP/OBS DSCHRG MGMT >30: CPT | Mod: HCNC,GC,, | Performed by: STUDENT IN AN ORGANIZED HEALTH CARE EDUCATION/TRAINING PROGRAM

## 2023-03-23 PROCEDURE — 1111F PR DISCHARGE MEDS RECONCILED W/ CURRENT OUTPATIENT MED LIST: ICD-10-PCS | Mod: HCNC,CPTII,GC, | Performed by: STUDENT IN AN ORGANIZED HEALTH CARE EDUCATION/TRAINING PROGRAM

## 2023-03-23 PROCEDURE — 25000003 PHARM REV CODE 250: Mod: HCNC

## 2023-03-23 PROCEDURE — 83735 ASSAY OF MAGNESIUM: CPT | Mod: HCNC

## 2023-03-23 PROCEDURE — 36415 COLL VENOUS BLD VENIPUNCTURE: CPT | Mod: HCNC | Performed by: PHYSICIAN ASSISTANT

## 2023-03-23 PROCEDURE — 1111F DSCHRG MED/CURRENT MED MERGE: CPT | Mod: HCNC,CPTII,GC, | Performed by: STUDENT IN AN ORGANIZED HEALTH CARE EDUCATION/TRAINING PROGRAM

## 2023-03-23 PROCEDURE — 83605 ASSAY OF LACTIC ACID: CPT | Mod: HCNC | Performed by: PHYSICIAN ASSISTANT

## 2023-03-23 PROCEDURE — 80053 COMPREHEN METABOLIC PANEL: CPT | Mod: HCNC

## 2023-03-23 PROCEDURE — 99239 PR HOSPITAL DISCHARGE DAY,>30 MIN: ICD-10-PCS | Mod: HCNC,GC,, | Performed by: STUDENT IN AN ORGANIZED HEALTH CARE EDUCATION/TRAINING PROGRAM

## 2023-03-23 PROCEDURE — 36415 COLL VENOUS BLD VENIPUNCTURE: CPT | Mod: HCNC

## 2023-03-23 PROCEDURE — 84100 ASSAY OF PHOSPHORUS: CPT | Mod: HCNC

## 2023-03-23 RX ORDER — FERROUS SULFATE 325(65) MG
325 TABLET ORAL
Status: ON HOLD | COMMUNITY
End: 2023-03-23 | Stop reason: HOSPADM

## 2023-03-23 RX ADMIN — PANTOPRAZOLE SODIUM 40 MG: 40 TABLET, DELAYED RELEASE ORAL at 09:03

## 2023-03-23 RX ADMIN — LEVOTHYROXINE SODIUM 175 MCG: 150 TABLET ORAL at 07:03

## 2023-03-23 RX ADMIN — APIXABAN 5 MG: 5 TABLET, FILM COATED ORAL at 09:03

## 2023-03-23 RX ADMIN — CARVEDILOL 6.25 MG: 6.25 TABLET, FILM COATED ORAL at 09:03

## 2023-03-23 RX ADMIN — AMLODIPINE BESYLATE 5 MG: 5 TABLET ORAL at 09:03

## 2023-03-23 RX ADMIN — SOTALOL HYDROCHLORIDE 120 MG: 120 TABLET ORAL at 09:03

## 2023-03-23 RX ADMIN — OXYBUTYNIN CHLORIDE 10 MG: 10 TABLET, EXTENDED RELEASE ORAL at 09:03

## 2023-03-23 RX ADMIN — SULFASALAZINE 500 MG: 500 TABLET ORAL at 09:03

## 2023-03-23 NOTE — TREATMENT PLAN
[facility] Follow-up Note     Pauline Cronin was seen and examined.   2 Days Post-Op s/p ERCP   No abdominal discomfort. Tolerating diet.    Vitals stable. Afebrile.     Lab Results   Component Value Date    ALT 66 (H) 03/23/2023    AST 62 (H) 03/23/2023    ALKPHOS 215 (H) 03/23/2023    BILITOT 2.5 (H) 03/23/2023    BILITOT 3.3 (H) 03/22/2023    BILITOT 4.0 (H) 03/21/2023       ERCP pancreatitis appears to have resolved.  Tolerating diet and would like to discharge today.    OK to d/c from AES standpoint.   AES will sign off. Please call if any questions/concerns.  Patient will be contacted with follow-up information.     -f/u with oncology as outpatient    Christopher Andino MD  Gastroenterology & Hepatology Fellow

## 2023-03-23 NOTE — DISCHARGE SUMMARY
[facility] Medicine  Discharge Summary      Patient Name: Pauline Cronin  MRN: [id number]  RICK: [id number]  Patient Class: IP- Inpatient  Admission Date: 3/17/2023  Hospital Length of Stay: 6 days  Discharge Date and Time:  03/23/2023 12:51 PM  Attending Physician: Pedrito Faye DO   Discharging Provider: Dave Ortega MD  Primary Care Provider: Ga Waldrop MD  Hospital Medicine Team: [facility] HOSP MED 2 Dave Ortega MD  Primary Care Team: [facility] 2    HPI:   The patient is an 82 year old female with a history of A-fib (on Eliquis, last dose 3/16), sick sinus syndrome (s/p PPM), Crohn's disease, HTN, HLD, and recently diagnosed pancreatic head mass who presents to the [facility] ED after being found to have an elevated bilirubin (4.3) by her outpatient cardiologist. She is jaundiced with scleral icterus but otherwise reports no acute symptoms. She denies fever, chills, headache, nausea, vomiting, itching, abdominal pain, diarrhea, or constipation.    Oncologic history: The patient presented to her PCP on March 1st reporting fatigue and 10 pounds of unintentional weight loss. She was sent for a CT abdomen/pelvis on 3/1/2023 which demonstrated a pancreatic head mass. She underwent ERCP w/ biopsy of the mass on 3/14, biopsy results are pending at the time of admission. Plan for outpatient ERCP w/ stent placement was planned for 3/29. She was instructed to present to the ED due to elevated bilirubin, jaundice, and icterus noted on outpatient labs on 3/16.    In the [facility] ED the patient is hemodynamically stable, initial evaluation significant for WBC 4.42, Hgb 8.9, plts 245, Na 141, K 3.1, Bicab 25, Cr 0.7 (at baseline), Bili 4.2, alk phos 308, , .      Procedure(s) (LRB):  ERCP (ENDOSCOPIC RETROGRADE CHOLANGIOPANCREATOGRAPHY) (N/A)      Hospital Course:   The patient was admitted to hospital medicine for further management of her pancreatic head mass. Advanced endoscopy services  was consulted on admission. Plan for ERCP w/ stenting on Monday 3/20/23 but was delayed due to emergent GI cases. The patient's bilirubin was trended and she was monitored for worsening abdominal pain throughout her hospital stay. Her anticoagulation was held in anticipation of the ERCP. On 3/21/23 she underwent ERCP with stenting. During her hospitalization her pancreatic head biopsy resulted with pancreatic adenocarcinoma. She was referred on oncology on discharge. The patient developed abdominal pain, nausea/vomiting after her ERCP and was monitored on 3/22 for her symptoms. She was given analgesics and IV fluids for concern of post-ERCP pancreatitis. On 3/23 the patient's abdominal pain had resolved. She was tolerating PO foods and liquids. She was instructed to follow-up with oncology as an outpatient as scheduled. She was given strict return precautions in the event of fever, chills, abdominal pain, or inability to tolerate PO intake.       Goals of Care Treatment Preferences:  Code Status: Full Code      Consults:   Consults (From admission, onward)        Status Ordering Provider     Inpatient consult to Midline team  Once        Provider:  (Not yet assigned)    Completed PRATIBHA MAYFIELD     Inpatient consult to Social Work  Once        Provider:  (Not yet assigned)    Acknowledged ANNIKA KAUR     Inpatient consult to [facility] (AES)  Once        Provider:  (Not yet assigned)    Completed ANNIKA KAUR        Vital Signs (Most Recent):  Temp: 98.3 °F (36.8 °C) (03/23/23 1127)  Pulse: 90 (03/23/23 1127)  Resp: 18 (03/23/23 1127)  BP: (!) 107/55 (03/23/23 1127)  SpO2: (!) 91 % (03/23/23 1127)   Vital Signs (24h Range):  Temp:  [97.8 °F (36.6 °C)-99.2 °F (37.3 °C)] 98.3 °F (36.8 °C)  Pulse:  [] 90  Resp:  [16-20] 18  SpO2:  [90 %-95 %] 91 %  BP: (107-133)/(55-71) 107/55       Physical Exam  Constitutional:       General: She is not in acute distress.     Appearance: Normal  appearance. She is obese.   HENT:      Head: Normocephalic and atraumatic.      Right Ear: External ear normal.      Left Ear: External ear normal.      Nose: No congestion or rhinorrhea.      Mouth/Throat:      Mouth: Mucous membranes are moist.      Pharynx: Oropharynx is clear.   Eyes:      General: Scleral icterus present.      Extraocular Movements: Extraocular movements intact.      Conjunctiva/sclera: Conjunctivae normal.   Cardiovascular:      Rate and Rhythm: Normal rate and regular rhythm.      Pulses: Normal pulses.      Heart sounds: Normal heart sounds. No murmur heard.  Pulmonary:      Effort: Pulmonary effort is normal.      Breath sounds: Normal breath sounds. No wheezing or rales.   Abdominal:      General: Abdomen is flat. Bowel sounds are normal. There is no distension.      Palpations: Abdomen is soft.      Tenderness: There is no abdominal tenderness. There is no guarding.   Musculoskeletal:         General: No swelling.      Cervical back: Normal range of motion.      Right lower leg: No edema.      Left lower leg: No edema.   Skin:     General: Skin is warm and dry.      Capillary Refill: Capillary refill takes less than 2 seconds.      Coloration: Skin is not jaundiced.      Findings: No rash.   Neurological:      Mental Status: She is alert and oriented to person, place, and time. Mental status is at baseline.      Sensory: No sensory deficit.      Motor: No weakness.   Psychiatric:         Mood and Affect: Mood normal.         Behavior: Behavior normal.     Cardiac/Vascular  Sick sinus syndrome  S/p PPM. Stable.     Pacemaker  H/o PPM secondary to sick sinus syndrome and complete heart block. Follows w/ cardiology. No acute concerns.    Essential hypertension, benign  Patient takes amlodipine 5 mg daily and carvedilol 6.25 mg BID at home.    -- Continue home antihypertensives       Hyperlipidemia  Last LDL 69 on 3/1/23; takes atorvastatin 20 mg daily at home.    -- Hold atorvastatin given  elevated aminotransferases on admission; will resume on discharge    Paroxysmal atrial fibrillation  H/o A-fib. Anticoagulated w/ apixaban (last dose 3/16). Rate controlled w/ carvedilol 6.25 mg daily and sotalol 120 mg BID. QTc on admission 517 (paced rhythm)    -- Apixaban 5 mg BID  -- Continue sotalol and carvedilol    Renal/  OAB (overactive bladder)  Patient takes tolterodine at home (not on formulary)    -- Oxybutynin 10 mg daily while admitted     Hematology  Chronic anticoagulation  On apixaban outpatient for A-fib; held on admission for ERCP, will resume.    -- Resume Apixaban    Oncology  Pancreatic adenocarcinoma  3/14 FNA biopsy results of pancreatic head mass resulted with pancreatic adenocarcinoma. Will plan for outpatient oncology referral now that pathology results have been obtained.    -- Outpatient oncology referral on discharge    Anemia  Hgb 8.9 on admission. Recently decreased from 14.2 four months ago. No evidence of bleeding on exam. Outpatient serologic evaluation significant for normal B12 and folate. Iron low at 19, ferritin low normal.     -- Trend CBC  --Will strat iron supplementation on discharge     Endocrine  Hypothyroidism  Remote history of hyperthyroidism w/ radioactive iodine ablation, now takes synthroid daily. TSH normal on admission, free T4 1.97.    -- Continue levothyroxine 175 mcg daily    GI  * Biliary obstruction  82 yoF w/ newly diagnosed pancreatic head mass s/p ERCP w/ biopsy with results pending presenting with elevated bilirubin, elevated lipase, jaundice, icterus but otherwise asymptomatic. No abdominal pain on exam. Imaging w/ 3.5 cm pancreatic head mass overall similar in appearance. Mild associated pancreatic stranding. Had planned for outpatient ERCP with stent placement on 3/29 but developed hyperbilirubinemia and presented to the ED. AES consulted on admission, performed ERCP on 3/21. Patient developed post-op abdominal pain and nausea/vomiting. Concern for  post-ERCP pancreatitis. Treated with IVF and analgesics. On 3/23 the patient's abdominal pain resolved and she was tolerating oral intake.    -- AES consulted; appreciate recs  -- Diet as tolerated  -- Instructed to follow-up with oncology as scheduled    GERD (gastroesophageal reflux disease)  Takes esomeprazole 40 mg daily at home    -- Continue home PPI     Crohn's disease of large intestine without complication  Chronic and stable. Patient takes colestipol, sulfasalazine at home.    -- Continue sulfasalazine  -- Colestipol not on formulary, will hold for now; resume at discharge      Final Active Diagnoses:    Diagnosis Date Noted POA    PRINCIPAL PROBLEM:  Biliary obstruction [K83.1] 03/17/2023 Yes    Paroxysmal atrial fibrillation [I48.0] 04/19/2018 Yes    Anemia [D64.9] 03/17/2023 Yes    Pancreatic adenocarcinoma [C25.9] 03/21/2023 Yes    Chronic anticoagulation [Z79.01] 03/01/2023 Not Applicable    Sick sinus syndrome [I49.5] 05/10/2021 Yes    Crohn's disease of large intestine without complication [K50.10] 04/19/2018 Yes    Hyperlipidemia [E78.5] 04/19/2018 Yes    Essential hypertension, benign [I10] 04/19/2018 Yes    Hypothyroidism [E03.9] 04/19/2018 Yes    Pacemaker [Z95.0] 04/19/2018 Yes    OAB (overactive bladder) [N32.81] 04/19/2018 Yes    GERD (gastroesophageal reflux disease) [K21.9] 04/19/2018 Yes      Problems Resolved During this Admission:       Discharged Condition: fair    Disposition: Home or Self Care    Follow Up:   Follow-up Information     Ga Waldrop MD .    Specialty: Family Medicine  Contact information:  [address]  [address]  [facility] [id number] [phone]             [facility] HEMATOLOGY/ONCOLOGY. Go to.    Specialty: Hematology and Oncology  Contact information:  [address]  [facility] [address] [phone]                     Patient Instructions:      Ambulatory referral/consult to CLINIC Palliative Care   Standing Status: Future    Referral Priority: Routine Referral Type: Consultation   Requested Specialty: Palliative Medicine   Number of Visits Requested: 1     Ambulatory referral/consult to Hematology / Oncology   Standing Status: Future   Referral Priority: Routine Referral Type: Consultation   Referral Reason: Specialty Services Required   Requested Specialty: Hematology and Oncology   Number of Visits Requested: 1     Ambulatory referral/consult to Oncology   Standing Status: Future   Referral Priority: Routine Referral Type: Consultation   Referral Reason: Specialty Services Required   Requested Specialty: Oncology   Number of Visits Requested: 1     Ambulatory referral/consult to Surgical Oncology   Standing Status: Future   Referral Priority: Routine Referral Type: Consultation   Referral Reason: Specialty Services Required   Requested Specialty: Surgical Oncology   Number of Visits Requested: 1       Significant Diagnostic Studies:   Specimen (24h ago, onward)    None          Pending Diagnostic Studies:     None         Medications:  Reconciled Home Medications:      Medication List      CONTINUE taking these medications    alendronate 70 MG tablet  Commonly known as: FOSAMAX  Take 1 tablet (70 mg total) by mouth every 7 days.     amLODIPine 5 MG tablet  Commonly known as: NORVASC  Take 1 tablet (5 mg total) by mouth once daily.     atorvastatin 20 MG tablet  Commonly known as: LIPITOR  Take 1 tablet (20 mg total) by mouth once daily.     carvediloL 6.25 MG tablet  Commonly known as: COREG  Take 1 tablet (6.25 mg total) by mouth 2 (two) times daily with meals.     colestipoL 1 gram Tab  Commonly known as: COLESTID  Take 1 tablet (1 g total) by mouth 2 (two) times daily.     ELIQUIS 5 mg Tab  Generic drug: apixaban  Take 1 tablet (5 mg total) by mouth 2 (two) times daily.     esomeprazole 40 MG capsule  Commonly known as: NEXIUM  Take 1 capsule (40 mg total) by mouth once daily.     FeroSuL 325 mg (65 mg iron) Tab tablet  Generic  drug: ferrous sulfate  Take 1 tablet (325 mg total) by mouth daily with breakfast.     folic acid 1 MG tablet  Commonly known as: FOLVITE  Take 1 tablet by mouth once daily     levothyroxine 175 MCG tablet  Commonly known as: SYNTHROID, LEVOTHROID  Take 1 tablet by mouth once daily     sotaloL 120 MG Tab  Commonly known as: BETAPACE  Take 1 tablet (120 mg total) by mouth every 12 (twelve) hours.     sulfaSALAzine 500 mg Tab  Commonly known as: AZULFIDINE  Take 1 tablet by mouth twice daily     tolterodine 4 MG 24 hr capsule  Commonly known as: DETROL LA  Take 1 capsule (4 mg total) by mouth once daily.            Indwelling Lines/Drains at time of discharge:   Lines/Drains/Airways     None                 Time spent on the discharge of patient: 30 minutes         Dave Ortega MD  Department of Hospital Medicine  [facility]

## 2023-03-23 NOTE — ASSESSMENT & PLAN NOTE
Chronic and stable. Patient takes colestipol, sulfasalazine at home.    -- Continue sulfasalazine  -- Colestipol not on formulary, will hold for now; resume at discharge

## 2023-03-23 NOTE — PLAN OF CARE
SSC met with patient/family at bedside. Patient experience rounding completed and reviewed the following.     Do you know your discharge plan? Yes or No,    If yes, what is the plan? (Home, Home Health, Rehab, SNF, LTAC, or Other)    Home    If you are discharging home, do you have help at home? Yes or No      Yes    Do you think you will need help at home at discharge? Yes or No      No    Have you discussed your needs and preferences with your SW/CM? Yes or No        Yes    Assigned SW/CM notified of any patient/family needs or concerns.     Alyx Pierce [facility]  Case Management   [phone]

## 2023-03-23 NOTE — AI DETERIORATION ALERT
Evaluation of Early Detection of Sepsis Risk     Patient Name: Pauline Cronin  MRN:  [id number]  Primary Care Team: [facility] HOSP MED 2  Date of Admission:  3/17/2023     Principal Problem:  Biliary obstruction   Date of Review: 03/23/2023    Patient reviewed for elevated sepsis risk score. Sepsis Screen Positive  Will check screening lactate and notify attending team for additional orders as indicated. Please notify Rapid Response Team for any hypotension or decompensation.    Sepsis Screen Results     IP Sepsis Screen (most recent)       Sepsis Screen (IP) - 03/23/23 0913       Is the patient's history or complaint suggestive of a possible infection? Yes  -SS    Are there at least two of the following signs and symptoms present? Yes  -SS    Sepsis signs/symptoms - Tachycardia Tachycardia     >90  -SS    Sepsis signs/symptoms - WBC WBC < 4,000 or WBC > 12,000  -SS    Are any of the following organ dysfunction criteria present and not considered to be due to a chronic condition? Yes  -SS    Organ Dysfunction Criteria Total Bilirubin > 2.0 Platelet count < 100,000  -SS    Organ Dysfunction Criteria - O2 O2 Saturation < 95% on room air  -SS    Initiate Sepsis Protocol Yes  -SS              User Key  (r) = Recorded By, (t) = Taken By, (c) = Cosigned By      Initials Name     LOLA Crowley PA-C [facility]

## 2023-03-23 NOTE — TELEPHONE ENCOUNTER
----- Message from Hue Wiggins sent at 3/23/2023  1:32 PM CDT -----  Contact: Pt [phone]  Caller is requesting an earlier appointment then we can schedule.  Caller is requesting a message be sent to the provider.  If this is for urgent care symptoms, did you offer other providers at this location, providers at other locations, or [facility]? (yes, no, n/a): na   If this is for the patients physical, did you offer to schedule next available and put on wait list, or to see NP or PA for their physical?  (yes, no, n/a):  no  When is the next available appointment with their provider:  4/174/23  Reason for the appointment:  HOSPITAL FOLLOW UP  Patient preference of timeframe to be scheduled:  7 days  Would the patient like a call back, or a response through their [facility] portal?:  Call back  Comments:    Patient is discharging from main campus today 3/23/23.    Please call and advise.    Thank You

## 2023-03-23 NOTE — ASSESSMENT & PLAN NOTE
Last LDL 69 on 3/1/23; takes atorvastatin 20 mg daily at home.    -- Hold atorvastatin given elevated aminotransferases on admission; will resume on discharge

## 2023-03-23 NOTE — PLAN OF CARE
[facility]  Discharge Final Note    Primary Care Provider: Ga Waldrop MD    Expected Discharge Date: 3/23/2023    Final Discharge Note (most recent)       Final Note - 03/23/23 1007          Final Note    Assessment Type Final Discharge Note     Anticipated Discharge Disposition Home or Self Care     Hospital Resources/Appts/Education Provided Provided patient/caregiver with written discharge plan information        Post-Acute Status    Discharge Delays None known at this time                     Important Message from Medicare  Important Message from Medicare regarding Discharge Appeal Rights: Given to patient/caregiver, Explained to patient/caregiver, Signed/date by patient/caregiver     Date IMM was signed: 03/23/23  Time IMM was signed: 0927    Contact Info       Ga Waldrop MD   Specialty: Family Medicine   Relationship: PCP - General    [address]  [address]  [facility] [address]   Phone: [phone]       Next Steps: Follow up    [facility] HEMATOLOGY/ONCOLOGY   Specialty: Hematology and Oncology    [address] [facility] [address]   Phone: [phone]       Next Steps: Go to          Pt d/c home with family. No d/c needs reported by medical team at this time.       Alyx Watson LCSW  Case Management/[facility]  [phone]

## 2023-03-23 NOTE — ASSESSMENT & PLAN NOTE
Hgb 8.9 on admission. Recently decreased from 14.2 four months ago. No evidence of bleeding on exam. Outpatient serologic evaluation significant for normal B12 and folate. Iron low at 19, ferritin low normal.     -- Trend CBC  --Will strat iron supplementation on discharge

## 2023-03-23 NOTE — ASSESSMENT & PLAN NOTE
82 yoF w/ newly diagnosed pancreatic head mass s/p ERCP w/ biopsy with results pending presenting with elevated bilirubin, elevated lipase, jaundice, icterus but otherwise asymptomatic. No abdominal pain on exam. Imaging w/ 3.5 cm pancreatic head mass overall similar in appearance. Mild associated pancreatic stranding. Had planned for outpatient ERCP with stent placement on 3/29 but developed hyperbilirubinemia and presented to the ED. AES consulted on admission, performed ERCP on 3/21. Patient developed post-op abdominal pain and nausea/vomiting. Concern for post-ERCP pancreatitis. Treated with IVF and analgesics. On 3/23 the patient's abdominal pain resolved and she was tolerating oral intake.    -- AES consulted; appreciate recs  -- Diet as tolerated  -- Instructed to follow-up with oncology as scheduled

## 2023-03-23 NOTE — PLAN OF CARE
Patient AAO lying in bed with respiratory unlabored. VSS. No N/V.  Patient refuse telemetry. Continuous pulse in place. Patient states no pain overnight. Continuous fluids infusing. Ambulated to restroom X 3. Adequate urine output and no BM's. Bed in lowest position, wheels locked , with call light at bedside. No distress noted.     Problem: Adult Inpatient Plan of Care  Goal: Plan of Care Review  Outcome: Ongoing, Progressing  Goal: Patient-Specific Goal (Individualized)  Outcome: Ongoing, Progressing  Goal: Absence of Hospital-Acquired Illness or Injury  Outcome: Ongoing, Progressing  Goal: Optimal Comfort and Wellbeing  Outcome: Ongoing, Progressing  Goal: Readiness for Transition of Care  Outcome: Ongoing, Progressing     Problem: Fall Injury Risk  Goal: Absence of Fall and Fall-Related Injury  Outcome: Ongoing, Progressing

## 2023-03-23 NOTE — TELEPHONE ENCOUNTER
Called pt regarding message. Pt wanted to schedule hosp f/u appt. Pt was scheduled for 04/03 with Machelle Arnold NP.

## 2023-03-23 NOTE — PLAN OF CARE
Hue will send Message to PCP office to make follow up appointment They will call Patient          Michelle Pierce [facility]  Case Management   [phone]

## 2023-03-24 ENCOUNTER — PATIENT OUTREACH (OUTPATIENT)
Dept: ADMINISTRATIVE | Facility: CLINIC | Age: 83
End: 2023-03-24
Payer: MEDICARE

## 2023-03-24 ENCOUNTER — TELEPHONE (OUTPATIENT)
Dept: SURGERY | Facility: CLINIC | Age: 83
End: 2023-03-24
Payer: MEDICARE

## 2023-03-24 ENCOUNTER — TELEPHONE (OUTPATIENT)
Dept: HEMATOLOGY/ONCOLOGY | Facility: CLINIC | Age: 83
End: 2023-03-24
Payer: MEDICARE

## 2023-03-24 NOTE — PROGRESS NOTES
C3 nurse spoke with Pauline Cronin for a TCC post hospital discharge follow up call. The patient has a scheduled HOSFU appointment with Machelle Arnold NP on 04/03/23 @ 1030.

## 2023-03-24 NOTE — TELEPHONE ENCOUNTER
----- Message from Alyson Espinosa RN sent at 3/24/2023 12:55 PM CDT -----  Regarding: FW: Results  Contact: [phone]    ----- Message -----  From: Renata Ward  Sent: 3/24/2023  12:48 PM CDT  To: Marcella Condon Staff  Subject: Results                                          Pt is calling for test results from last week.  Please call.  Pt is very concerned.

## 2023-03-27 ENCOUNTER — TELEPHONE (OUTPATIENT)
Dept: HEMATOLOGY/ONCOLOGY | Facility: CLINIC | Age: 83
End: 2023-03-27
Payer: MEDICARE

## 2023-03-27 ENCOUNTER — HOSPITAL ENCOUNTER (INPATIENT)
Facility: HOSPITAL | Age: 83
LOS: 1 days | Discharge: HOME | DRG: 308 | End: 2023-03-28
Attending: EMERGENCY MEDICINE | Admitting: HOSPITALIST
Payer: MEDICARE

## 2023-03-27 DIAGNOSIS — I48.91 ATRIAL FIBRILLATION WITH RVR: Primary | ICD-10-CM

## 2023-03-27 DIAGNOSIS — I48.92 ATRIAL FIBRILLATION AND FLUTTER: ICD-10-CM

## 2023-03-27 DIAGNOSIS — I48.91 ATRIAL FIBRILLATION: ICD-10-CM

## 2023-03-27 DIAGNOSIS — R53.81 DEBILITY: ICD-10-CM

## 2023-03-27 DIAGNOSIS — Z51.5 PALLIATIVE CARE ENCOUNTER: ICD-10-CM

## 2023-03-27 DIAGNOSIS — Z71.89 GOALS OF CARE, COUNSELING/DISCUSSION: ICD-10-CM

## 2023-03-27 DIAGNOSIS — I48.91 ATRIAL FIBRILLATION AND FLUTTER: ICD-10-CM

## 2023-03-27 DIAGNOSIS — R07.9 CHEST PAIN: ICD-10-CM

## 2023-03-27 DIAGNOSIS — Z71.89 ADVANCED CARE PLANNING/COUNSELING DISCUSSION: ICD-10-CM

## 2023-03-27 DIAGNOSIS — I50.9 CONGESTIVE HEART FAILURE, UNSPECIFIED HF CHRONICITY, UNSPECIFIED HEART FAILURE TYPE: ICD-10-CM

## 2023-03-27 LAB
ALBUMIN SERPL BCP-MCNC: 2.3 G/DL (ref 3.5–5.2)
ALP SERPL-CCNC: 155 U/L (ref 55–135)
ALT SERPL W/O P-5'-P-CCNC: 26 U/L (ref 10–44)
ANION GAP SERPL CALC-SCNC: 13 MMOL/L (ref 8–16)
ANISOCYTOSIS BLD QL SMEAR: SLIGHT
AST SERPL-CCNC: 31 U/L (ref 10–40)
BASOPHILS # BLD AUTO: 0.04 K/UL (ref 0–0.2)
BASOPHILS NFR BLD: 0.3 % (ref 0–1.9)
BILIRUB SERPL-MCNC: 1.6 MG/DL (ref 0.1–1)
BNP SERPL-MCNC: 1186 PG/ML (ref 0–99)
BUN SERPL-MCNC: 6 MG/DL (ref 8–23)
CALCIUM SERPL-MCNC: 9 MG/DL (ref 8.7–10.5)
CHLORIDE SERPL-SCNC: 103 MMOL/L (ref 95–110)
CO2 SERPL-SCNC: 23 MMOL/L (ref 23–29)
CREAT SERPL-MCNC: 1 MG/DL (ref 0.5–1.4)
DIFFERENTIAL METHOD: ABNORMAL
EOSINOPHIL # BLD AUTO: 0.2 K/UL (ref 0–0.5)
EOSINOPHIL NFR BLD: 1.2 % (ref 0–8)
ERYTHROCYTE [DISTWIDTH] IN BLOOD BY AUTOMATED COUNT: 22 % (ref 11.5–14.5)
EST. GFR  (NO RACE VARIABLE): 56.2 ML/MIN/1.73 M^2
GLUCOSE SERPL-MCNC: 95 MG/DL (ref 70–110)
HCT VFR BLD AUTO: 29.9 % (ref 37–48.5)
HCV AB SERPL QL IA: NORMAL
HGB BLD-MCNC: 8.8 G/DL (ref 12–16)
HIV 1+2 AB+HIV1 P24 AG SERPL QL IA: NORMAL
IMM GRANULOCYTES # BLD AUTO: 0.06 K/UL (ref 0–0.04)
IMM GRANULOCYTES NFR BLD AUTO: 0.5 % (ref 0–0.5)
LYMPHOCYTES # BLD AUTO: 1.5 K/UL (ref 1–4.8)
LYMPHOCYTES NFR BLD: 11.6 % (ref 18–48)
MAGNESIUM SERPL-MCNC: 1.3 MG/DL (ref 1.6–2.6)
MCH RBC QN AUTO: 23.3 PG (ref 27–31)
MCHC RBC AUTO-ENTMCNC: 29.4 G/DL (ref 32–36)
MCV RBC AUTO: 79 FL (ref 82–98)
MONOCYTES # BLD AUTO: 1.3 K/UL (ref 0.3–1)
MONOCYTES NFR BLD: 10.1 % (ref 4–15)
NEUTROPHILS # BLD AUTO: 9.7 K/UL (ref 1.8–7.7)
NEUTROPHILS NFR BLD: 76.3 % (ref 38–73)
NRBC BLD-RTO: 1 /100 WBC
PLATELET # BLD AUTO: 411 K/UL (ref 150–450)
PLATELET BLD QL SMEAR: ABNORMAL
PMV BLD AUTO: 11.6 FL (ref 9.2–12.9)
POC CARDIAC TROPONIN I: 0.04 NG/ML (ref 0–0.08)
POLYCHROMASIA BLD QL SMEAR: ABNORMAL
POTASSIUM SERPL-SCNC: 3 MMOL/L (ref 3.5–5.1)
PROT SERPL-MCNC: 7.1 G/DL (ref 6–8.4)
RBC # BLD AUTO: 3.78 M/UL (ref 4–5.4)
SAMPLE: NORMAL
SODIUM SERPL-SCNC: 139 MMOL/L (ref 136–145)
TROPONIN I SERPL DL<=0.01 NG/ML-MCNC: 0.04 NG/ML (ref 0–0.03)
TROPONIN I SERPL DL<=0.01 NG/ML-MCNC: 0.04 NG/ML (ref 0–0.03)
TSH SERPL DL<=0.005 MIU/L-ACNC: 2.02 UIU/ML (ref 0.4–4)
WBC # BLD AUTO: 12.74 K/UL (ref 3.9–12.7)

## 2023-03-27 PROCEDURE — 84443 ASSAY THYROID STIM HORMONE: CPT | Mod: HCNC | Performed by: HOSPITALIST

## 2023-03-27 PROCEDURE — 25000003 PHARM REV CODE 250: Mod: HCNC | Performed by: HOSPITALIST

## 2023-03-27 PROCEDURE — 20600001 HC STEP DOWN PRIVATE ROOM: Mod: HCNC

## 2023-03-27 PROCEDURE — 80053 COMPREHEN METABOLIC PANEL: CPT | Mod: HCNC | Performed by: EMERGENCY MEDICINE

## 2023-03-27 PROCEDURE — 87389 HIV-1 AG W/HIV-1&-2 AB AG IA: CPT | Mod: HCNC | Performed by: EMERGENCY MEDICINE

## 2023-03-27 PROCEDURE — 86803 HEPATITIS C AB TEST: CPT | Mod: HCNC | Performed by: EMERGENCY MEDICINE

## 2023-03-27 PROCEDURE — 84484 ASSAY OF TROPONIN QUANT: CPT | Mod: HCNC | Performed by: EMERGENCY MEDICINE

## 2023-03-27 PROCEDURE — 96375 TX/PRO/DX INJ NEW DRUG ADDON: CPT | Mod: HCNC

## 2023-03-27 PROCEDURE — 99285 EMERGENCY DEPT VISIT HI MDM: CPT | Mod: 25,HCNC

## 2023-03-27 PROCEDURE — 93010 EKG 12-LEAD: ICD-10-PCS | Mod: HCNC,,, | Performed by: INTERNAL MEDICINE

## 2023-03-27 PROCEDURE — 93005 ELECTROCARDIOGRAM TRACING: CPT | Mod: HCNC

## 2023-03-27 PROCEDURE — 96374 THER/PROPH/DIAG INJ IV PUSH: CPT | Mod: HCNC

## 2023-03-27 PROCEDURE — 83880 ASSAY OF NATRIURETIC PEPTIDE: CPT | Mod: HCNC | Performed by: EMERGENCY MEDICINE

## 2023-03-27 PROCEDURE — 99291 CRITICAL CARE FIRST HOUR: CPT | Mod: ,,, | Performed by: EMERGENCY MEDICINE

## 2023-03-27 PROCEDURE — 99222 1ST HOSP IP/OBS MODERATE 55: CPT | Mod: HCNC,,, | Performed by: INTERNAL MEDICINE

## 2023-03-27 PROCEDURE — 85025 COMPLETE CBC W/AUTO DIFF WBC: CPT | Mod: HCNC | Performed by: EMERGENCY MEDICINE

## 2023-03-27 PROCEDURE — 25000003 PHARM REV CODE 250: Mod: HCNC | Performed by: STUDENT IN AN ORGANIZED HEALTH CARE EDUCATION/TRAINING PROGRAM

## 2023-03-27 PROCEDURE — 87040 BLOOD CULTURE FOR BACTERIA: CPT | Mod: 59,HCNC | Performed by: HOSPITALIST

## 2023-03-27 PROCEDURE — 99223 PR INITIAL HOSPITAL CARE,LEVL III: ICD-10-PCS | Mod: AI,HCNC,, | Performed by: HOSPITALIST

## 2023-03-27 PROCEDURE — 25000003 PHARM REV CODE 250: Mod: HCNC | Performed by: EMERGENCY MEDICINE

## 2023-03-27 PROCEDURE — 83735 ASSAY OF MAGNESIUM: CPT | Mod: HCNC | Performed by: EMERGENCY MEDICINE

## 2023-03-27 PROCEDURE — 96376 TX/PRO/DX INJ SAME DRUG ADON: CPT | Mod: HCNC

## 2023-03-27 PROCEDURE — 93010 ELECTROCARDIOGRAM REPORT: CPT | Mod: HCNC,,, | Performed by: INTERNAL MEDICINE

## 2023-03-27 PROCEDURE — 99291 PR CRITICAL CARE, E/M 30-74 MINUTES: ICD-10-PCS | Mod: ,,, | Performed by: EMERGENCY MEDICINE

## 2023-03-27 PROCEDURE — 63600175 PHARM REV CODE 636 W HCPCS: Mod: HCNC | Performed by: EMERGENCY MEDICINE

## 2023-03-27 PROCEDURE — 99222 PR INITIAL HOSPITAL CARE,LEVL II: ICD-10-PCS | Mod: HCNC,,, | Performed by: INTERNAL MEDICINE

## 2023-03-27 PROCEDURE — 99223 1ST HOSP IP/OBS HIGH 75: CPT | Mod: AI,HCNC,, | Performed by: HOSPITALIST

## 2023-03-27 PROCEDURE — 63600175 PHARM REV CODE 636 W HCPCS: Mod: HCNC | Performed by: HOSPITALIST

## 2023-03-27 RX ORDER — ACETAMINOPHEN 500 MG
1000 TABLET ORAL EVERY 6 HOURS PRN
Status: DISCONTINUED | OUTPATIENT
Start: 2023-03-27 | End: 2023-03-28 | Stop reason: HOSPADM

## 2023-03-27 RX ORDER — PANTOPRAZOLE SODIUM 40 MG/1
40 TABLET, DELAYED RELEASE ORAL DAILY
Status: DISCONTINUED | OUTPATIENT
Start: 2023-03-28 | End: 2023-03-28 | Stop reason: HOSPADM

## 2023-03-27 RX ORDER — LABETALOL HCL 20 MG/4 ML
5 SYRINGE (ML) INTRAVENOUS
Status: COMPLETED | OUTPATIENT
Start: 2023-03-27 | End: 2023-03-27

## 2023-03-27 RX ORDER — SULFASALAZINE 500 MG/1
1000 TABLET ORAL 4 TIMES DAILY
Status: DISCONTINUED | OUTPATIENT
Start: 2023-03-27 | End: 2023-03-28

## 2023-03-27 RX ORDER — TALC
6 POWDER (GRAM) TOPICAL NIGHTLY PRN
Status: DISCONTINUED | OUTPATIENT
Start: 2023-03-27 | End: 2023-03-28 | Stop reason: HOSPADM

## 2023-03-27 RX ORDER — POTASSIUM CHLORIDE 20 MEQ/1
20 TABLET, EXTENDED RELEASE ORAL ONCE
Status: DISCONTINUED | OUTPATIENT
Start: 2023-03-27 | End: 2023-03-27

## 2023-03-27 RX ORDER — CARVEDILOL 3.12 MG/1
6.25 TABLET ORAL 2 TIMES DAILY WITH MEALS
Status: DISCONTINUED | OUTPATIENT
Start: 2023-03-27 | End: 2023-03-27

## 2023-03-27 RX ORDER — HYDROCODONE BITARTRATE AND ACETAMINOPHEN 5; 325 MG/1; MG/1
1 TABLET ORAL EVERY 6 HOURS PRN
Status: DISCONTINUED | OUTPATIENT
Start: 2023-03-27 | End: 2023-03-28 | Stop reason: HOSPADM

## 2023-03-27 RX ORDER — FOLIC ACID 1 MG/1
1 TABLET ORAL DAILY
Status: DISCONTINUED | OUTPATIENT
Start: 2023-03-28 | End: 2023-03-28 | Stop reason: HOSPADM

## 2023-03-27 RX ORDER — ONDANSETRON 8 MG/1
8 TABLET, ORALLY DISINTEGRATING ORAL EVERY 8 HOURS PRN
Status: DISCONTINUED | OUTPATIENT
Start: 2023-03-27 | End: 2023-03-28 | Stop reason: HOSPADM

## 2023-03-27 RX ORDER — METOPROLOL TARTRATE 1 MG/ML
5 INJECTION, SOLUTION INTRAVENOUS EVERY 5 MIN PRN
Status: DISCONTINUED | OUTPATIENT
Start: 2023-03-27 | End: 2023-03-28 | Stop reason: HOSPADM

## 2023-03-27 RX ORDER — PROCHLORPERAZINE 25 MG
25 SUPPOSITORY, RECTAL RECTAL EVERY 12 HOURS PRN
Status: DISCONTINUED | OUTPATIENT
Start: 2023-03-27 | End: 2023-03-28 | Stop reason: HOSPADM

## 2023-03-27 RX ORDER — OXYBUTYNIN CHLORIDE 5 MG/1
5 TABLET ORAL DAILY
Status: DISCONTINUED | OUTPATIENT
Start: 2023-03-28 | End: 2023-03-28

## 2023-03-27 RX ORDER — AMOXICILLIN 250 MG
1 CAPSULE ORAL EVERY 12 HOURS
Status: DISCONTINUED | OUTPATIENT
Start: 2023-03-27 | End: 2023-03-28 | Stop reason: HOSPADM

## 2023-03-27 RX ORDER — ATORVASTATIN CALCIUM 20 MG/1
20 TABLET, FILM COATED ORAL DAILY
Status: DISCONTINUED | OUTPATIENT
Start: 2023-03-28 | End: 2023-03-28 | Stop reason: HOSPADM

## 2023-03-27 RX ORDER — SODIUM CHLORIDE 0.9 % (FLUSH) 0.9 %
10 SYRINGE (ML) INJECTION
Status: DISCONTINUED | OUTPATIENT
Start: 2023-03-27 | End: 2023-03-28 | Stop reason: HOSPADM

## 2023-03-27 RX ORDER — FUROSEMIDE 10 MG/ML
40 INJECTION INTRAMUSCULAR; INTRAVENOUS
Status: COMPLETED | OUTPATIENT
Start: 2023-03-27 | End: 2023-03-27

## 2023-03-27 RX ORDER — ACETAMINOPHEN 325 MG/1
650 TABLET ORAL DAILY PRN
COMMUNITY

## 2023-03-27 RX ORDER — FERROUS GLUCONATE 324(37.5)
324 TABLET ORAL
Status: DISCONTINUED | OUTPATIENT
Start: 2023-03-28 | End: 2023-03-28 | Stop reason: HOSPADM

## 2023-03-27 RX ORDER — METOPROLOL TARTRATE 1 MG/ML
5 INJECTION, SOLUTION INTRAVENOUS
Status: COMPLETED | OUTPATIENT
Start: 2023-03-27 | End: 2023-03-27

## 2023-03-27 RX ORDER — FUROSEMIDE 10 MG/ML
40 INJECTION INTRAMUSCULAR; INTRAVENOUS ONCE
Status: COMPLETED | OUTPATIENT
Start: 2023-03-27 | End: 2023-03-27

## 2023-03-27 RX ORDER — SOTALOL HYDROCHLORIDE 120 MG/1
120 TABLET ORAL 2 TIMES DAILY
Status: DISCONTINUED | OUTPATIENT
Start: 2023-03-27 | End: 2023-03-28 | Stop reason: HOSPADM

## 2023-03-27 RX ORDER — ONDANSETRON 2 MG/ML
4 INJECTION INTRAMUSCULAR; INTRAVENOUS
Status: COMPLETED | OUTPATIENT
Start: 2023-03-27 | End: 2023-03-27

## 2023-03-27 RX ORDER — ACETAMINOPHEN 325 MG/1
650 TABLET ORAL EVERY 6 HOURS PRN
Status: DISCONTINUED | OUTPATIENT
Start: 2023-03-27 | End: 2023-03-28 | Stop reason: HOSPADM

## 2023-03-27 RX ORDER — METOPROLOL TARTRATE 25 MG/1
25 TABLET, FILM COATED ORAL EVERY 6 HOURS
Status: DISCONTINUED | OUTPATIENT
Start: 2023-03-28 | End: 2023-03-28 | Stop reason: HOSPADM

## 2023-03-27 RX ORDER — MAGNESIUM SULFATE HEPTAHYDRATE 40 MG/ML
2 INJECTION, SOLUTION INTRAVENOUS ONCE
Status: COMPLETED | OUTPATIENT
Start: 2023-03-27 | End: 2023-03-27

## 2023-03-27 RX ADMIN — LABETALOL HYDROCHLORIDE 5 MG: 5 INJECTION, SOLUTION INTRAVENOUS at 12:03

## 2023-03-27 RX ADMIN — POTASSIUM BICARBONATE 50 MEQ: 978 TABLET, EFFERVESCENT ORAL at 04:03

## 2023-03-27 RX ADMIN — POTASSIUM BICARBONATE 50 MEQ: 978 TABLET, EFFERVESCENT ORAL at 10:03

## 2023-03-27 RX ADMIN — MAGNESIUM SULFATE HEPTAHYDRATE 2 G: 40 INJECTION, SOLUTION INTRAVENOUS at 02:03

## 2023-03-27 RX ADMIN — ONDANSETRON 4 MG: 2 INJECTION INTRAMUSCULAR; INTRAVENOUS at 07:03

## 2023-03-27 RX ADMIN — HYDROCODONE BITARTRATE AND ACETAMINOPHEN 1 TABLET: 5; 325 TABLET ORAL at 07:03

## 2023-03-27 RX ADMIN — FUROSEMIDE 40 MG: 10 INJECTION, SOLUTION INTRAMUSCULAR; INTRAVENOUS at 12:03

## 2023-03-27 RX ADMIN — SOTALOL HYDROCHLORIDE 120 MG: 120 TABLET ORAL at 10:03

## 2023-03-27 RX ADMIN — METOROPROLOL TARTRATE 5 MG: 5 INJECTION, SOLUTION INTRAVENOUS at 02:03

## 2023-03-27 RX ADMIN — SULFASALAZINE 1000 MG: 500 TABLET ORAL at 10:03

## 2023-03-27 RX ADMIN — FUROSEMIDE 40 MG: 10 INJECTION, SOLUTION INTRAMUSCULAR; INTRAVENOUS at 07:03

## 2023-03-27 RX ADMIN — Medication 6 MG: at 10:03

## 2023-03-27 RX ADMIN — METOROPROLOL TARTRATE 5 MG: 5 INJECTION, SOLUTION INTRAVENOUS at 01:03

## 2023-03-27 RX ADMIN — CARVEDILOL 6.25 MG: 3.12 TABLET, FILM COATED ORAL at 04:03

## 2023-03-27 RX ADMIN — SENNOSIDES AND DOCUSATE SODIUM 1 TABLET: 50; 8.6 TABLET ORAL at 10:03

## 2023-03-27 RX ADMIN — AMPICILLIN AND SULBACTAM 3 G: 2; 1 INJECTION, POWDER, FOR SOLUTION INTRAMUSCULAR; INTRAVENOUS at 05:03

## 2023-03-27 RX ADMIN — APIXABAN 5 MG: 5 TABLET, FILM COATED ORAL at 10:03

## 2023-03-27 NOTE — ED PROVIDER NOTES
Encounter Date: 3/27/2023       History     Chief Complaint   Patient presents with    Chest Pain     Pt from home c/o chest pain, SOB that started about an hour ago. HX of pacemaker and a-fib. Abnormal EKG with EMS. Given 324 mg ASA and 2 spray of nitro.      82-year-old past medical history of AFib on Eliquis, Crohn's disease, sick sinus syndrome status post PPM., hyperlipidemia, hypertension, pancreatic head mass with recent hospitalization for ERCP and post ER CP pancreatitis discharge 3/23/23 presenting with chest pain and palpitations.  Patient mentions starting at 5 a.m. this morning noted palpitations with chest pain on left-sided chest radiating to left arm.  Patient noted having associated shortness of breath with mild nausea.  Denies any lightheadedness, nausea, vomiting, diarrhea, extremity swelling.  Patient mentions pain feels similar to prior AFib RVR episodes.  Patient is compliant with medications on Coreg and sotalol.  Patient mentions having episode of diarrhea earlier today as well as his lower right upper quadrant soreness since recent hospital visit with no worsening of pain.        Review of patient's allergies indicates:   Allergen Reactions    Lisinopril Other (See Comments)     cough     Past Medical History:   Diagnosis Date    Anticoagulant long-term use     Atrial fibrillation     Crohn disease diagnosed in her 20's    GERD (gastroesophageal reflux disease)     Hyperlipidemia     Hypertension     Pancreatic adenocarcinoma 3/21/2023    Thyroid disease     s/p I 131     Past Surgical History:   Procedure Laterality Date    APPENDECTOMY      CARDIAC SURGERY  2014    pacemaker    COLONOSCOPY  ~2008    normal findings per patient report    ENDOSCOPIC ULTRASOUND OF UPPER GASTROINTESTINAL TRACT N/A 3/14/2023    Procedure: ULTRASOUND, UPPER GI TRACT, ENDOSCOPIC;  Surgeon: Andrei Swartz MD;  Location: [facility];  Service: Endoscopy;  Laterality: N/A;  Approval to hold Eliquis rec'd from   [place] (see telephone encounter 3/3/23)-DS  [employer] AICD/PPM  3/3/23-Instructions via portal-DS    ERCP N/A 3/21/2023    Procedure: ERCP (ENDOSCOPIC RETROGRADE CHOLANGIOPANCREATOGRAPHY);  Surgeon: Celina Toney MD;  [facility]  Service: Endoscopy;  Laterality: N/A;    ESOPHAGOGASTRODUODENOSCOPY N/A 2018    Procedure: EGD (ESOPHAGOGASTRODUODENOSCOPY);  Surgeon: Alondra Casiano MD;  Location: [facility] ENDO;  Service: Endoscopy;  Laterality: N/A;    HYSTERECTOMY      INSERTION OF IMPLANTABLE CARDIOVERTER-DEFIBRILLATOR (ICD) GENERATOR WITH TWO EXISTING LEADS Left 5/10/2021    Procedure: INSERTION, PULSE GENERATOR WITH 2 EXISTING LEADS, ICD;  Surgeon: Bo Leoen MD;  Location: [facility] CATH LAB;  Service: Cardiology;  Laterality: Left;    INSERTION OF PACEMAKER      REPLACEMENT OF PACEMAKER GENERATOR  05/10/2021    RETROGRADE PYELOGRAPHY Right 2020    Procedure: PYELOGRAM, RETROGRADE;  Surgeon: Jovan Kruse MD;  Location: [facility];  Service: Urology;  Laterality: Right;    SMALL INTESTINE SURGERY  in her 20's    for crohn's    TONSILLECTOMY      UPPER GASTROINTESTINAL ENDOSCOPY  ~    normal findings per patient report     Family History   Problem Relation Age of Onset    Cancer Mother     Breast cancer Mother     Crohn's disease Other     Colon cancer Neg Hx     Colon polyps Neg Hx     Esophageal cancer Neg Hx     Stomach cancer Neg Hx     Ulcerative colitis Neg Hx      Social History     Tobacco Use    Smoking status: Former     Types: Cigarettes     Quit date:      Years since quittin.2    Smokeless tobacco: Never   Substance Use Topics    Alcohol use: No    Drug use: No     Review of Systems    Physical Exam     Initial Vitals [23 1058]   BP Pulse Resp Temp SpO2   110/84 (!) 120 (!) 34 98.6 °F (37 °C) 96 %      MAP       --         Physical Exam    Constitutional: She appears well-developed and well-nourished. She is not diaphoretic. No distress.   HENT:    Head: Normocephalic and atraumatic.   Eyes: EOM are normal. Pupils are equal, round, and reactive to light. Right eye exhibits no discharge. Left eye exhibits no discharge. Scleral icterus is present.   Pale conjunctiva   Neck: Neck supple.   Normal range of motion.  Cardiovascular:      Exam reveals no friction rub.       No murmur heard.  Irregularly irregular     Pulmonary/Chest: Breath sounds normal. No respiratory distress. She has no wheezes. She has no rales.   Abdominal: Abdomen is soft. Bowel sounds are normal. She exhibits no distension. There is abdominal tenderness.   Tenderness palpation right upper quadrant. There is no rebound and no guarding.   Musculoskeletal:         General: No edema. Normal range of motion.      Cervical back: Normal range of motion and neck supple.     Neurological: She is alert and oriented to person, place, and time. No cranial nerve deficit or sensory deficit. GCS score is 15. GCS eye subscore is 4. GCS verbal subscore is 5. GCS motor subscore is 6.   Skin: Skin is warm and dry. No erythema. No pallor.   Psychiatric: She has a normal mood and affect. Her behavior is normal. Judgment and thought content normal.       ED Course   Procedures  Labs Reviewed   CBC W/ AUTO DIFFERENTIAL - Abnormal; Notable for the following components:       Result Value    WBC 12.74 (*)     RBC 3.78 (*)     Hemoglobin 8.8 (*)     Hematocrit 29.9 (*)     MCV 79 (*)     MCH 23.3 (*)     MCHC 29.4 (*)     RDW 22.0 (*)     Gran # (ANC) 9.7 (*)     Immature Grans (Abs) 0.06 (*)     Mono # 1.3 (*)     nRBC 1 (*)     Gran % 76.3 (*)     Lymph % 11.6 (*)     Platelet Estimate Increased (*)     All other components within normal limits    Narrative:     Add on MG per RN ANGELA Epic order [id number]  03/27/2023  12:03    TROPONIN I - Abnormal; Notable for the following components:    Troponin I 0.042 (*)     All other components within normal limits    Narrative:     Add on MG per RN ANGELA Epic order  [id number]  03/27/2023  12:03    TROPONIN I - Abnormal; Notable for the following components:    Troponin I 0.043 (*)     All other components within normal limits   B-TYPE NATRIURETIC PEPTIDE - Abnormal; Notable for the following components:    BNP 1,186 (*)     All other components within normal limits    Narrative:     Add on MG per RN ANGELA Jo order [id number]  03/27/2023  12:03    COMPREHENSIVE METABOLIC PANEL - Abnormal; Notable for the following components:    Potassium 3.0 (*)     BUN 6 (*)     Albumin 2.3 (*)     Total Bilirubin 1.6 (*)     Alkaline Phosphatase 155 (*)     eGFR 56.2 (*)     All other components within normal limits   MAGNESIUM - Abnormal; Notable for the following components:    Magnesium 1.3 (*)     All other components within normal limits   CULTURE, BLOOD   CULTURE, BLOOD   HIV 1 / 2 ANTIBODY    Narrative:     Release to patient->Immediate   HEPATITIS C ANTIBODY    Narrative:     Release to patient->Immediate   MAGNESIUM   LIPASE   LIPASE   TROPONIN ISTAT   TSH   URINALYSIS, REFLEX TO URINE CULTURE   POCT GLUCOSE, HAND-HELD DEVICE   POCT TROPONIN   POCT TROPONIN     EKG Readings: (Independently Interpreted)   Initial Reading: No STEMI. Rhythm: Atrial Fibrillation. Heart Rate: 146. Conduction: LBBB. ST Segments: Normal ST Segments. Axis: Left Axis Deviation. Clinical Impression: Atrial Fibrillation with RVR   ECG Results              EKG 12-lead (Final result)  Result time 03/27/23 11:54:41      Final result by Interface, Lab In [facility] (03/27/23 11:54:41)                   Narrative:    Test Reason : R07.9,    Vent. Rate : 146 BPM     Atrial Rate : 141 BPM     P-R Int : 000 ms          QRS Dur : 128 ms      QT Int : 298 ms       P-R-T Axes : 000 -33 148 degrees     QTc Int : 464 ms    Atrial fibrillation with rapid ventricular response  Left axis deviation  Left bundle branch block  Inferior infarct ,age undetermined  Marked T wave abnormality, consider lateral ischemia  possible  demand.  Abnormal ECG  When compared with ECG of 17-MAR-2023 18:24,  Significant changes have occurred  Confirmed by Derik Brantley MD ([id number]) on 3/27/2023 11:54:33 AM    Referred By:             Confirmed By:Derik Brantley MD                                  Imaging Results              X-Ray Chest AP Portable (Final result)  Result time 03/27/23 13:35:09      Final result by Farhan Lange MD (03/27/23 13:35:09)                   Impression:      See above      Electronically signed by: Farhan Lange MD  Date:    03/27/2023  Time:    13:35               Narrative:    EXAMINATION:  XR CHEST AP PORTABLE    CLINICAL HISTORY:  Chest Pain;    TECHNIQUE:  Single frontal view of the chest was performed.    COMPARISON:  N 03/16/2023 one    FINDINGS:  Postoperative changes and pacemaker as before.  Heart size normal.  Minimal subsegmental atelectasis noted at the left lung base.  Otherwise lungs are clear.  No pleural effusion.                                    X-Rays:   Independently Interpreted Readings:   Other Readings:  Noted mild increased interstitial infiltrates   Medications   potassium bicarbonate disintegrating tablet 50 mEq (50 mEq Oral Given 3/27/23 1658)   atorvastatin tablet 20 mg (has no administration in time range)   carvediloL tablet 6.25 mg (6.25 mg Oral Given 3/27/23 1658)   apixaban tablet 5 mg (has no administration in time range)   oxybutynin tablet 5 mg (has no administration in time range)   levothyroxine tablet 175 mcg (has no administration in time range)   pantoprazole EC tablet 40 mg (has no administration in time range)   sulfaSALAzine tablet 1,000 mg (has no administration in time range)   ferrous gluconate tablet 324 mg (has no administration in time range)   folic acid tablet 1 mg (has no administration in time range)   sotaloL tablet 120 mg (has no administration in time range)   sodium chloride 0.9% flush 10 mL (has no administration in time range)   acetaminophen tablet 650 mg (has no  administration in time range)   acetaminophen tablet 1,000 mg (has no administration in time range)   HYDROcodone-acetaminophen 5-325 mg per tablet 1 tablet (has no administration in time range)   ondansetron disintegrating tablet 8 mg (has no administration in time range)   prochlorperazine suppository 25 mg (has no administration in time range)   senna-docusate 8.6-50 mg per tablet 1 tablet (has no administration in time range)   metoprolol injection 5 mg (has no administration in time range)   ampicillin-sulbactam (UNASYN) 3 g in sodium chloride 0.9 % 100 mL IVPB (MB+) (has no administration in time range)   labetalol 20 mg/4 mL (5 mg/mL) IV syring (5 mg Intravenous Given 3/27/23 1230)   furosemide injection 40 mg (40 mg Intravenous Given 3/27/23 1253)   metoprolol injection 5 mg (5 mg Intravenous Given 3/27/23 1323)   magnesium sulfate 2g in water 50mL IVPB (premix) (0 g Intravenous Stopped 3/27/23 1641)   metoprolol injection 5 mg (5 mg Intravenous Given 3/27/23 1436)     Medical Decision Making:   History:   I obtained history from: someone other than patient and EMS provider.       <> Summary of History: Patient history obtained via patient and patient's daughter at bedside mentions today chest pain became left worse radiating to left arm.  As per EMS patient received nitro aspirin on route to hospital  Old Medical Records: I decided to obtain old medical records.  Old Records Summarized: records from previous admission(s).       <> Summary of Records: Recent admission 03/2023 for further management evaluation of pancreatic mass requiring ERCP post ERCP pancreatitis.  Initial Assessment:   D 3-year-old presenting with worsening left-sided chest pain radiating to left shoulder.  Differential Diagnosis:   Ddx includes but not limited to:   ACS, pneumonia, arrhythmia, PE, pleurisy, costochondritis. Considered aortic dissection , esophageal rupture and pneumothorax although much lower likelihood at this time  considering patient's history and presentation.       Clinical Tests:   Lab Tests: Ordered and Reviewed  Radiological Study: Reviewed and Ordered  Medical Tests: Reviewed and Ordered  ED Management:  Plan:  Obtain CBC, CMP, troponin BNP, chest x-ray likely hospital admission for further management evaluation.          Attending Attestation:         Attending Critical Care:   Critical Care Times:   Direct Patient Care (initial evaluation, reassessments, and time considering the case)................................................................10 minutes.   Additional History from reviewing old medical records or taking additional history from the family, EMS, PCP, etc.......................5 minutes.   Ordering, Reviewing, and Interpreting Diagnostic Studies...............................................................................................................7 minutes.   Documentation..................................................................................................................................................................................5 minutes.   Consultation with the patient's family directly relating to the patient's condition, care, and DNR status (when patient unable)......6 minutes.   ==============================================================  Total Critical Care Time - exclusive of procedural time: 33 minutes.  ==============================================================  Critical Care Condition: potentially life-threatening         ED Course as of 03/27/23 1704   Mon Mar 27, 2023   1307 Pt noted to be hypoxic on RA to 93%  [DC]      ED Course User Index  [DC] Anjel Melgar Jr., MD             Workup noted for elevated BNP eleven 83 patient's workup noted for patient's workup noted for elevated BNP and mildly elevated troponin.  AFib RVR improved heart rate now 114, patient treated with IV metoprolol and Lasix.  Plan for inpatient care for further  management.Discussed case with hospital medicine.   Clinical Impression:   Final diagnoses:  [I48.91] Atrial fibrillation with RVR (Primary)  [I50.9] Congestive heart failure, unspecified HF chronicity, unspecified heart failure type        ED Disposition Condition    Admit Stable                Anjel Melgar Jr., MD  03/27/23 1682       Anjel Melgar Jr., MD  03/27/23 5782

## 2023-03-27 NOTE — PROGRESS NOTES
Pharmacist Renal Dose Adjustment Note    Pauline Cronin is a 83 y.o. female being treated with ampicillin / sulbactam for lower respiratory infection.     Patient Data:    Vital Signs (Most Recent):  Temp: 98.6 °F (37 °C) (03/27/23 1058)  Pulse: (!) 130 (03/27/23 1430)  Resp: (!) 30 (03/27/23 1430)  BP: 132/78 (03/27/23 1430)  SpO2: 98 % (03/27/23 1430)   Vital Signs (72h Range):  Temp:  [98.6 °F (37 °C)]   Pulse:  [114-130]   Resp:  [25-34]   BP: ()/(61-84)   SpO2:  [93 %-99 %]      Recent Labs   Lab 03/22/23  0345 03/23/23  0406 03/27/23  1255   CREATININE 0.9 0.8 1.0     Serum creatinine:  1 mg/dL 03/27/23 1255  Estimated creatinine clearance:  41.7 mL/min    Ampicillin / sulbactam 1.5 grams IVPB every 8 hours every will be changed to ampicillin / sulbactam 3 grams every 8 hours.    Pharmacist's Name:  Von Tam  Pharmacist's Extension:  [phone]

## 2023-03-27 NOTE — PLAN OF CARE
Cardiology Plan of Care Note:     Ms Pauline Cronin is an 83 year old female with PMH of paroxysmal atrial fibrillation, s/p dual chamber PPM (uncertain indication), HTN, HLD, LBBB, pancreatic mass, Crohn's disease who presents to [facility] with chest pain and dyspnea which woke her up from sleep around 3 AM. EKG notable for atrial fibrillation and RVR in the 140s. Hypokalemic and hypomagnesemic on chemistry. BNP elevated at 1186. Troponin minimally elevated 0.042 - 0.043. On my assessment she is chest pain free. Crackles to bilateral lungs and +JVD.     Plan:   Atrial fibrillation with RVR:   -Transition carvedilol to metoprolol tartrate 25 mg every 6 hours  -Continue home apixaban, sotalol   -Check echocardiogram    Troponin elevation:   -Likely type II in the setting of RVR  -Continue to trend to peak     Volume overload:   -Check echo as above, denies history of heart failure  -Give additional lasix 40 mg IV x1    Cardiology will continue to follow.     Asher Anne MD  [facility]  Cardiovascular Disease, PGY-V

## 2023-03-27 NOTE — ED TRIAGE NOTES
Pauline Cronin, a 83 y.o. female presents to the ED w/ complaint of chest pain      Triage note:  Chief Complaint   Patient presents with    Chest Pain     Pt from home c/o chest pain, SOB that started about an hour ago. HX of pacemaker and a-fib. Abnormal EKG with EMS. Given 324 mg ASA and 2 spray of nitro.      Review of patient's allergies indicates:   Allergen Reactions    Lisinopril Other (See Comments)     cough     Past Medical History:   Diagnosis Date    Anticoagulant long-term use     Atrial fibrillation     Crohn disease diagnosed in her 20's    GERD (gastroesophageal reflux disease)     Hyperlipidemia     Hypertension     Pancreatic adenocarcinoma 3/21/2023    Thyroid disease     s/p I 131

## 2023-03-28 VITALS
HEIGHT: 60 IN | HEART RATE: 91 BPM | BODY MASS INDEX: 33.93 KG/M2 | WEIGHT: 172.81 LBS | OXYGEN SATURATION: 93 % | SYSTOLIC BLOOD PRESSURE: 126 MMHG | DIASTOLIC BLOOD PRESSURE: 60 MMHG | RESPIRATION RATE: 19 BRPM | TEMPERATURE: 99 F

## 2023-03-28 PROBLEM — I27.20 PULMONARY HYPERTENSION: Status: ACTIVE | Noted: 2023-03-28

## 2023-03-28 PROBLEM — Z71.89 ADVANCED CARE PLANNING/COUNSELING DISCUSSION: Status: ACTIVE | Noted: 2023-03-28

## 2023-03-28 PROBLEM — Z51.5 PALLIATIVE CARE ENCOUNTER: Status: ACTIVE | Noted: 2023-03-28

## 2023-03-28 PROBLEM — Z71.89 GOALS OF CARE, COUNSELING/DISCUSSION: Status: ACTIVE | Noted: 2023-03-28

## 2023-03-28 PROBLEM — R53.81 DEBILITY: Status: ACTIVE | Noted: 2023-03-28

## 2023-03-28 PROBLEM — I50.31 ACUTE HEART FAILURE WITH PRESERVED EJECTION FRACTION (HFPEF): Status: ACTIVE | Noted: 2023-03-28

## 2023-03-28 LAB
ALBUMIN SERPL BCP-MCNC: 2 G/DL (ref 3.5–5.2)
ALP SERPL-CCNC: 137 U/L (ref 55–135)
ALT SERPL W/O P-5'-P-CCNC: 20 U/L (ref 10–44)
ANION GAP SERPL CALC-SCNC: 14 MMOL/L (ref 8–16)
ASCENDING AORTA: 2.55 CM
AST SERPL-CCNC: 24 U/L (ref 10–40)
AV INDEX (PROSTH): 0.77
AV MEAN GRADIENT: 11 MMHG
AV PEAK GRADIENT: 19 MMHG
AV VALVE AREA: 2.26 CM2
AV VELOCITY RATIO: 0.71
BASOPHILS # BLD AUTO: 0.05 K/UL (ref 0–0.2)
BASOPHILS NFR BLD: 0.4 % (ref 0–1.9)
BILIRUB SERPL-MCNC: 1.3 MG/DL (ref 0.1–1)
BSA FOR ECHO PROCEDURE: 1.82 M2
BUN SERPL-MCNC: 9 MG/DL (ref 8–23)
CALCIUM SERPL-MCNC: 8.4 MG/DL (ref 8.7–10.5)
CHLORIDE SERPL-SCNC: 100 MMOL/L (ref 95–110)
CO2 SERPL-SCNC: 26 MMOL/L (ref 23–29)
CREAT SERPL-MCNC: 0.9 MG/DL (ref 0.5–1.4)
CV ECHO LV RWT: 0.52 CM
DIFFERENTIAL METHOD: ABNORMAL
DOP CALC AO PEAK VEL: 2.19 M/S
DOP CALC AO VTI: 47.31 CM
DOP CALC LVOT AREA: 3 CM2
DOP CALC LVOT DIAMETER: 1.94 CM
DOP CALC LVOT PEAK VEL: 1.56 M/S
DOP CALC LVOT STROKE VOLUME: 107.04 CM3
DOP CALCLVOT PEAK VEL VTI: 36.23 CM
E WAVE DECELERATION TIME: 204.63 MSEC
E/A RATIO: 0.96
E/E' RATIO: 12.71 M/S
ECHO LV POSTERIOR WALL: 1.04 CM (ref 0.6–1.1)
EJECTION FRACTION: 60 %
EOSINOPHIL # BLD AUTO: 0.1 K/UL (ref 0–0.5)
EOSINOPHIL NFR BLD: 0.4 % (ref 0–8)
ERYTHROCYTE [DISTWIDTH] IN BLOOD BY AUTOMATED COUNT: 21.1 % (ref 11.5–14.5)
EST. GFR  (NO RACE VARIABLE): >60 ML/MIN/1.73 M^2
FRACTIONAL SHORTENING: 26 % (ref 28–44)
GLUCOSE SERPL-MCNC: 96 MG/DL (ref 70–110)
HCT VFR BLD AUTO: 28.7 % (ref 37–48.5)
HGB BLD-MCNC: 8.4 G/DL (ref 12–16)
IMM GRANULOCYTES # BLD AUTO: 0.09 K/UL (ref 0–0.04)
IMM GRANULOCYTES NFR BLD AUTO: 0.7 % (ref 0–0.5)
INTERVENTRICULAR SEPTUM: 1.11 CM (ref 0.6–1.1)
LA MAJOR: 6.07 CM
LA MINOR: 6.47 CM
LA WIDTH: 3.89 CM
LEFT ATRIUM SIZE: 4.88 CM
LEFT ATRIUM VOLUME INDEX MOD: 41 ML/M2
LEFT ATRIUM VOLUME INDEX: 57.8 ML/M2
LEFT ATRIUM VOLUME MOD: 71.67 CM3
LEFT ATRIUM VOLUME: 101.07 CM3
LEFT INTERNAL DIMENSION IN SYSTOLE: 2.95 CM (ref 2.1–4)
LEFT VENTRICLE DIASTOLIC VOLUME INDEX: 39.47 ML/M2
LEFT VENTRICLE DIASTOLIC VOLUME: 69.07 ML
LEFT VENTRICLE MASS INDEX: 80 G/M2
LEFT VENTRICLE SYSTOLIC VOLUME INDEX: 19.2 ML/M2
LEFT VENTRICLE SYSTOLIC VOLUME: 33.59 ML
LEFT VENTRICULAR INTERNAL DIMENSION IN DIASTOLE: 3.98 CM (ref 3.5–6)
LEFT VENTRICULAR MASS: 139.79 G
LV LATERAL E/E' RATIO: 9.89 M/S
LV SEPTAL E/E' RATIO: 17.8 M/S
LYMPHOCYTES # BLD AUTO: 1.3 K/UL (ref 1–4.8)
LYMPHOCYTES NFR BLD: 9.8 % (ref 18–48)
MCH RBC QN AUTO: 23.3 PG (ref 27–31)
MCHC RBC AUTO-ENTMCNC: 29.3 G/DL (ref 32–36)
MCV RBC AUTO: 80 FL (ref 82–98)
MONOCYTES # BLD AUTO: 1.3 K/UL (ref 0.3–1)
MONOCYTES NFR BLD: 9.4 % (ref 4–15)
MV A" WAVE DURATION": 11.8 MSEC
MV PEAK A VEL: 0.93 M/S
MV PEAK E VEL: 0.89 M/S
MV STENOSIS PRESSURE HALF TIME: 59.34 MS
MV VALVE AREA P 1/2 METHOD: 3.71 CM2
NEUTROPHILS # BLD AUTO: 10.7 K/UL (ref 1.8–7.7)
NEUTROPHILS NFR BLD: 79.3 % (ref 38–73)
NRBC BLD-RTO: 1 /100 WBC
PISA TR MAX VEL: 2.98 M/S
PLATELET # BLD AUTO: 298 K/UL (ref 150–450)
PMV BLD AUTO: 11.5 FL (ref 9.2–12.9)
POCT GLUCOSE: 104 MG/DL (ref 70–110)
POTASSIUM SERPL-SCNC: 3.3 MMOL/L (ref 3.5–5.1)
PROT SERPL-MCNC: 6.4 G/DL (ref 6–8.4)
PULM VEIN S/D RATIO: 1.4
PV PEAK D VEL: 0.35 M/S
PV PEAK S VEL: 0.49 M/S
RA MAJOR: 4.69 CM
RA PRESSURE: 3 MMHG
RA WIDTH: 3.38 CM
RBC # BLD AUTO: 3.6 M/UL (ref 4–5.4)
RIGHT VENTRICULAR END-DIASTOLIC DIMENSION: 3.13 CM
SINUS: 2.93 CM
SODIUM SERPL-SCNC: 140 MMOL/L (ref 136–145)
STJ: 2.26 CM
TDI LATERAL: 0.09 M/S
TDI SEPTAL: 0.05 M/S
TDI: 0.07 M/S
TR MAX PG: 36 MMHG
TRICUSPID ANNULAR PLANE SYSTOLIC EXCURSION: 1.38 CM
TV REST PULMONARY ARTERY PRESSURE: 39 MMHG
WBC # BLD AUTO: 13.53 K/UL (ref 3.9–12.7)

## 2023-03-28 PROCEDURE — 93010 EKG 12-LEAD: ICD-10-PCS | Mod: HCNC,,, | Performed by: INTERNAL MEDICINE

## 2023-03-28 PROCEDURE — 99233 PR SUBSEQUENT HOSPITAL CARE,LEVL III: ICD-10-PCS | Mod: HCNC,,, | Performed by: HOSPITALIST

## 2023-03-28 PROCEDURE — 80053 COMPREHEN METABOLIC PANEL: CPT | Mod: HCNC | Performed by: HOSPITALIST

## 2023-03-28 PROCEDURE — 99233 SBSQ HOSP IP/OBS HIGH 50: CPT | Mod: HCNC,,, | Performed by: HOSPITALIST

## 2023-03-28 PROCEDURE — 99900035 HC TECH TIME PER 15 MIN (STAT): Mod: HCNC

## 2023-03-28 PROCEDURE — 93005 ELECTROCARDIOGRAM TRACING: CPT | Mod: HCNC

## 2023-03-28 PROCEDURE — 63600175 PHARM REV CODE 636 W HCPCS: Mod: HCNC | Performed by: HOSPITALIST

## 2023-03-28 PROCEDURE — 99497 PR ADVNCD CARE PLAN 30 MIN: ICD-10-PCS | Mod: HCNC,25,, | Performed by: CLINICAL NURSE SPECIALIST

## 2023-03-28 PROCEDURE — 99223 1ST HOSP IP/OBS HIGH 75: CPT | Mod: HCNC,,, | Performed by: CLINICAL NURSE SPECIALIST

## 2023-03-28 PROCEDURE — 99233 PR SUBSEQUENT HOSPITAL CARE,LEVL III: ICD-10-PCS | Mod: 25,HCNC,, | Performed by: INTERNAL MEDICINE

## 2023-03-28 PROCEDURE — 85025 COMPLETE CBC W/AUTO DIFF WBC: CPT | Mod: HCNC | Performed by: HOSPITALIST

## 2023-03-28 PROCEDURE — 25500020 PHARM REV CODE 255: Mod: HCNC | Performed by: HOSPITALIST

## 2023-03-28 PROCEDURE — 99497 ADVNCD CARE PLAN 30 MIN: CPT | Mod: HCNC,25,, | Performed by: CLINICAL NURSE SPECIALIST

## 2023-03-28 PROCEDURE — 27000221 HC OXYGEN, UP TO 24 HOURS: Mod: HCNC

## 2023-03-28 PROCEDURE — 25000003 PHARM REV CODE 250: Mod: HCNC | Performed by: HOSPITALIST

## 2023-03-28 PROCEDURE — 36415 COLL VENOUS BLD VENIPUNCTURE: CPT | Mod: HCNC | Performed by: HOSPITALIST

## 2023-03-28 PROCEDURE — 99233 SBSQ HOSP IP/OBS HIGH 50: CPT | Mod: 25,HCNC,, | Performed by: INTERNAL MEDICINE

## 2023-03-28 PROCEDURE — 94761 N-INVAS EAR/PLS OXIMETRY MLT: CPT | Mod: HCNC

## 2023-03-28 PROCEDURE — 99223 PR INITIAL HOSPITAL CARE,LEVL III: ICD-10-PCS | Mod: HCNC,,, | Performed by: CLINICAL NURSE SPECIALIST

## 2023-03-28 PROCEDURE — 93010 ELECTROCARDIOGRAM REPORT: CPT | Mod: HCNC,,, | Performed by: INTERNAL MEDICINE

## 2023-03-28 RX ORDER — OXYBUTYNIN CHLORIDE 5 MG/1
5 TABLET ORAL 2 TIMES DAILY
Status: DISCONTINUED | OUTPATIENT
Start: 2023-03-28 | End: 2023-03-28 | Stop reason: HOSPADM

## 2023-03-28 RX ORDER — LOPERAMIDE HYDROCHLORIDE 2 MG/1
2 CAPSULE ORAL 4 TIMES DAILY PRN
Status: DISCONTINUED | OUTPATIENT
Start: 2023-03-28 | End: 2023-03-28 | Stop reason: HOSPADM

## 2023-03-28 RX ORDER — SULFASALAZINE 500 MG/1
500 TABLET ORAL 2 TIMES DAILY
Status: DISCONTINUED | OUTPATIENT
Start: 2023-03-28 | End: 2023-03-28 | Stop reason: HOSPADM

## 2023-03-28 RX ORDER — POTASSIUM CHLORIDE 20 MEQ/1
40 TABLET, EXTENDED RELEASE ORAL ONCE
Status: COMPLETED | OUTPATIENT
Start: 2023-03-28 | End: 2023-03-28

## 2023-03-28 RX ADMIN — SOTALOL HYDROCHLORIDE 120 MG: 120 TABLET ORAL at 09:03

## 2023-03-28 RX ADMIN — METOPROLOL TARTRATE 25 MG: 25 TABLET, FILM COATED ORAL at 12:03

## 2023-03-28 RX ADMIN — METOPROLOL TARTRATE 25 MG: 25 TABLET, FILM COATED ORAL at 06:03

## 2023-03-28 RX ADMIN — POTASSIUM CHLORIDE 40 MEQ: 1500 TABLET, EXTENDED RELEASE ORAL at 09:03

## 2023-03-28 RX ADMIN — ATORVASTATIN CALCIUM 20 MG: 20 TABLET, FILM COATED ORAL at 08:03

## 2023-03-28 RX ADMIN — FOLIC ACID 1 MG: 1 TABLET ORAL at 08:03

## 2023-03-28 RX ADMIN — FERROUS GLUCONATE 324 MG: 324 TABLET ORAL at 08:03

## 2023-03-28 RX ADMIN — LEVOTHYROXINE SODIUM 175 MCG: 150 TABLET ORAL at 06:03

## 2023-03-28 RX ADMIN — HUMAN ALBUMIN MICROSPHERES AND PERFLUTREN 0.11 MG: 10; .22 INJECTION, SOLUTION INTRAVENOUS at 11:03

## 2023-03-28 RX ADMIN — SULFASALAZINE 500 MG: 500 TABLET ORAL at 09:03

## 2023-03-28 RX ADMIN — APIXABAN 5 MG: 5 TABLET, FILM COATED ORAL at 08:03

## 2023-03-28 RX ADMIN — METOPROLOL TARTRATE 25 MG: 25 TABLET, FILM COATED ORAL at 05:03

## 2023-03-28 RX ADMIN — PANTOPRAZOLE SODIUM 40 MG: 40 TABLET, DELAYED RELEASE ORAL at 08:03

## 2023-03-28 RX ADMIN — OXYBUTYNIN CHLORIDE 5 MG: 5 TABLET ORAL at 08:03

## 2023-03-28 RX ADMIN — LOPERAMIDE HYDROCHLORIDE 2 MG: 2 CAPSULE ORAL at 05:03

## 2023-03-28 RX ADMIN — AMPICILLIN AND SULBACTAM 3 G: 2; 1 INJECTION, POWDER, FOR SOLUTION INTRAMUSCULAR; INTRAVENOUS at 01:03

## 2023-03-28 NOTE — ASSESSMENT & PLAN NOTE
Patient is an 83 year old woman with Crohn's disease, recently diagnosed pancreatic adenocarcinoma who presents with SOB and chest pain found to be in A.fib with RVR. Has not established care in Oncology clinic.    Recommendations:  - No acute intervention for patient's pancreatic adenocarcinoma while inpatient.  - Patient states that she would not want treatment if it were to worsen quality of life. Discussed that there are potential options to align with patient's goals.    - For good performance status, consider FOLFIRINOX or gemcitabine/paclitaxel   - For not ideal performance status, can consider single agent gemcitabine vs capecitabine vs 5FU  - Consider Palliative Care consult to further address goals of care  - Will ensure close follow up in Oncology clinic upon discharge.

## 2023-03-28 NOTE — HPI
82-year-old past medical history of AFib on Eliquis, Crohn's disease, sick sinus syndrome status post PPM., hyperlipidemia, hypertension, pancreatic head mass with recent hospitalization for ERCP and post ER CP pancreatitis discharge 3/23/23 presenting with chest pain and palpitations.  Patient mentions starting at 5 a.m. this morning noted palpitations with chest pain on left-sided chest radiating to left arm.

## 2023-03-28 NOTE — NURSING
Pt 89-91% on room air, denies SOB/dyspnea, emre breath sounds clear but diminished throughout.   MD notified as dc orders were placed - states 6 min walk not necessary and pt is fine to dc on room air

## 2023-03-28 NOTE — CONSULTS
[facility] - Emergency Dept  Hematology/Oncology  Consult Note    Patient Name: Pauline Cronin  MRN: [id number]  Admission Date: 3/27/2023  Hospital Length of Stay: 0 days  Code Status: Full Code   Attending Provider: Mandy Goodson MD  Consulting Provider: Francine Prince MD  Primary Care Physician: Ga Waldrop MD  Principal Problem:<principal problem not specified>    Inpatient consult to Hematology/Oncology  Consult performed by: Francine Prince MD  Consult ordered by: Mandy Goodson MD        Subjective:     HPI:  Ms. Pauline Cronin is an 83 year old woman with Crohn's disease, recently diagnosed pancreatic adenocarcinoma and paroxysmal A.fib who presented with chest pain and SOB. She was found to be in A.fib with RVR. Of note she was recently hospitalized from 03/17-03/23 where she had an ERCP done and course was complicated by ERCP induced pancreatitis.     Oncologic history (per discharge summary on 03/23): The patient presented to her PCP on March 1st reporting fatigue and 10 pounds of unintentional weight loss. She was sent for a CT abdomen/pelvis on 3/1/2023 which demonstrated a pancreatic head mass. She underwent ERCP w/ biopsy of the mass on 3/14, biopsy results are pending at the time of admission. Plan for outpatient ERCP w/ stent placement was planned for 3/29. She was instructed to present to the ED due to elevated bilirubin, jaundice, and icterus noted on outpatient labs on 3/16.    Medical Oncology was consulted for pancreatic adenocarcinoma.      Oncology Treatment Plan:   [No matching plan found]    Medications:  Continuous Infusions:  Scheduled Meds:   ampicillin-sulbactim (UNASYN) IVPB  3 g Intravenous Q8H    apixaban  5 mg Oral BID    [START ON 3/28/2023] atorvastatin  20 mg Oral Daily    [START ON 3/28/2023] ferrous gluconate  324 mg Oral Daily with breakfast    [START ON 3/28/2023] folic acid  1 mg Oral Daily    [START ON 3/28/2023] levothyroxine  175 mcg Oral Before breakfast     [START ON 3/28/2023] metoprolol tartrate  25 mg Oral Q6H    [START ON 3/28/2023] oxybutynin  5 mg Oral Daily    [START ON 3/28/2023] pantoprazole  40 mg Oral Daily    potassium bicarbonate  50 mEq Oral Q4H    senna-docusate 8.6-50 mg  1 tablet Oral Q12H    sotaloL  120 mg Oral BID    sulfaSALAzine  1,000 mg Oral QID     PRN Meds:acetaminophen, acetaminophen, HYDROcodone-acetaminophen, metoprolol, ondansetron, prochlorperazine, sodium chloride 0.9%     Review of patient's allergies indicates:   Allergen Reactions    Lisinopril Other (See Comments)     cough        Past Medical History:   Diagnosis Date    Anticoagulant long-term use     Atrial fibrillation     Crohn disease diagnosed in her 20's    GERD (gastroesophageal reflux disease)     Hyperlipidemia     Hypertension     Pancreatic adenocarcinoma 3/21/2023    Thyroid disease     s/p I 131     Past Surgical History:   Procedure Laterality Date    APPENDECTOMY      CARDIAC SURGERY  2014    pacemaker    COLONOSCOPY  ~2008    normal findings per patient report    ENDOSCOPIC ULTRASOUND OF UPPER GASTROINTESTINAL TRACT N/A 3/14/2023    Procedure: ULTRASOUND, UPPER GI TRACT, ENDOSCOPIC;  Surgeon: Andrei Swartz MD;  Location: [facility];  Service: Endoscopy;  Laterality: N/A;  Approval to hold Eliquis rec'd from Dr. Barbosa (see telephone encounter 3/3/23)-DS  [employer] AICD/PPM  3/3/23-Instructions via portal-DS    ERCP N/A 3/21/2023    Procedure: ERCP (ENDOSCOPIC RETROGRADE CHOLANGIOPANCREATOGRAPHY);  Surgeon: Celina Toney MD;  Location: [facility] ENDO ([address] [place] [address]);  Service: Endoscopy;  Laterality: N/A;    ESOPHAGOGASTRODUODENOSCOPY N/A 7/17/2018    Procedure: EGD (ESOPHAGOGASTRODUODENOSCOPY);  Surgeon: Alondra Casiano MD;  Location: [facility] ENDO;  Service: Endoscopy;  Laterality: N/A;    HYSTERECTOMY      INSERTION OF IMPLANTABLE CARDIOVERTER-DEFIBRILLATOR (ICD) GENERATOR WITH TWO EXISTING LEADS Left 5/10/2021    Procedure: INSERTION, PULSE  GENERATOR WITH 2 EXISTING LEADS, ICD;  Surgeon: Bo Leone MD;  Location: [facility] CATH LAB;  Service: Cardiology;  Laterality: Left;    INSERTION OF PACEMAKER      REPLACEMENT OF PACEMAKER GENERATOR  05/10/2021    RETROGRADE PYELOGRAPHY Right 2020    Procedure: PYELOGRAM, RETROGRADE;  Surgeon: Jovan Kruse MD;  Location: [facility] OR;  Service: Urology;  Laterality: Right;    SMALL INTESTINE SURGERY  in her 20's    for crohn's    TONSILLECTOMY      UPPER GASTROINTESTINAL ENDOSCOPY  ~    normal findings per patient report     Family History       Problem Relation (Age of Onset)    Breast cancer Mother    Cancer Mother    Crohn's disease Other          Tobacco Use    Smoking status: Former     Types: Cigarettes     Quit date:      Years since quittin.2    Smokeless tobacco: Never   Substance and Sexual Activity    Alcohol use: No    Drug use: No    Sexual activity: Never       Review of Systems   Constitutional:  Negative for appetite change, chills, fatigue, fever and unexpected weight change.   HENT:  Negative for congestion and dental problem.    Eyes:  Negative for photophobia and visual disturbance.   Respiratory:  Positive for shortness of breath. Negative for cough.    Cardiovascular:  Positive for chest pain. Negative for leg swelling.   Gastrointestinal:  Negative for abdominal pain, constipation, diarrhea and nausea.   Genitourinary:  Negative for difficulty urinating and dysuria.   Musculoskeletal:  Negative for arthralgias and back pain.   Skin:  Negative for color change and rash.   Neurological:  Negative for light-headedness and headaches.   Hematological:  Negative for adenopathy. Does not bruise/bleed easily.   Psychiatric/Behavioral:  Negative for agitation and behavioral problems.    Objective:     Vital Signs (Most Recent):  Temp: 98.6 °F (37 °C) (23)  Pulse: (!) 123 (23)  Resp: 18 (23 1934)  BP: 115/69 (23)  SpO2: 96 %  (03/27/23 1645)   Vital Signs (24h Range):  Temp:  [98.6 °F (37 °C)] 98.6 °F (37 °C)  Pulse:  [114-130] 123  Resp:  [18-34] 18  SpO2:  [93 %-99 %] 96 %  BP: ()/(61-84) 115/69     Weight: 83.9 kg (185 lb)  Body mass index is 36.13 kg/m².  Body surface area is 1.88 meters squared.    No intake or output data in the 24 hours ending 03/27/23 2012    Physical Exam  Constitutional:       General: She is not in acute distress.     Appearance: She is well-developed.   HENT:      Head: Normocephalic and atraumatic.      Nose: Nose normal. No congestion.   Eyes:      General: No scleral icterus.     Extraocular Movements: Extraocular movements intact.   Cardiovascular:      Rate and Rhythm: Tachycardia present. Rhythm irregular.   Pulmonary:      Effort: Pulmonary effort is normal. No respiratory distress.   Abdominal:      General: There is no distension.      Palpations: Abdomen is soft.   Musculoskeletal:         General: Normal range of motion.      Cervical back: Neck supple. No rigidity.   Skin:     General: Skin is warm and dry.   Neurological:      Mental Status: She is alert and oriented to person, place, and time. Mental status is at baseline.   Psychiatric:         Mood and Affect: Mood normal.         Behavior: Behavior normal.       Significant Labs:   CBC:   Recent Labs   Lab 03/27/23  1150   WBC 12.74*   HGB 8.8*   HCT 29.9*       and CMP:   Recent Labs   Lab 03/27/23  1255      K 3.0*      CO2 23   GLU 95   BUN 6*   CREATININE 1.0   CALCIUM 9.0   PROT 7.1   ALBUMIN 2.3*   BILITOT 1.6*   ALKPHOS 155*   AST 31   ALT 26   ANIONGAP 13       Diagnostic Results:  I have reviewed all pertinent imaging results/findings within the past 24 hours.    Assessment/Plan:     Pancreatic adenocarcinoma  Patient is an 83 year old woman with Crohn's disease, recently diagnosed pancreatic adenocarcinoma who presents with SOB and chest pain found to be in A.fib with RVR. Has not established care in  Oncology clinic.    Recommendations:  - No acute intervention for patient's pancreatic adenocarcinoma while inpatient.  - Patient states that she would not want treatment if it were to worsen quality of life. Discussed that there are potential options to align with patient's goals.    - For good performance status, consider FOLFIRINOX or gemcitabine/paclitaxel   - For not ideal performance status, can consider single agent gemcitabine vs capecitabine vs 5FU  - Consider Palliative Care consult to further address goals of care  - Will ensure close follow up in Oncology clinic upon discharge.    Patient seen and discussed with attending, Dr. Whittaker.    Thank you for your consult. I will sign off. Please contact us if you have any additional questions.    Francine Prince MD  Hematology/Oncology  [facility] - Emergency Dept

## 2023-03-28 NOTE — ASSESSMENT & PLAN NOTE
83F w known AFib and LBBB hx presented in AFib on arrival to ED.  Converted to NSR after home meds (Coreg 6.25, Sotalol 120) given.  Now feels at baseline.     Per the most recent EKG/telemetry, pt is currently in sinus rhythm.    [id number] score 3 which would favor use of anticoagulation.  HASBLED score of 1 with a 1.02 % risk of bleeding; only for warfarin use.      RECOMMENDATIONS:   Maintain K > 4, Mag > 2 and Ca/iCal WNL to decrease arrhythmogenic potential   Rate control with Toprol- daily   Rhythm control with home Sotalol 120 BID so long as QTc < 500   Anticoagulation with Eliquis 5 mg BID   Follow up with Dr. Barbosa for medication changes based on results of today's TTE   Low-salt diet

## 2023-03-28 NOTE — CONSULTS
[facility] - Telemetry Stepdown  Cardiology  Consult Note    Patient Name: Pauline Cronin  MRN: [id number]  Admission Date: 3/27/2023  Hospital Length of Stay: 1 days  Code Status: DNR   Attending Provider: Mandy Goodson MD   Consulting Provider: Larry Saldana MD  Primary Care Physician: Ga Waldrop MD  Principal Problem:Paroxysmal atrial fibrillation    Patient information was obtained from patient, past medical records, ER records and primary team.     Subjective:     Chief Complaint:  Chest pain, Dyspnea    HPI:   Pauline Cronin is an 83 year old female with Paroxysmal atrial fibrillation, s/p dual chamber PPM (uncertain indication), HTN, HLD, LBBB, pancreatic adenocarcinoma, Crohn's disease who presented to [facility] with chest pain and dyspnea which woke her up from sleep around 3 AM on 3/27.  EKG in ED: atrial fibrillation and RVR in the 140s. Hypokalemic and hypomagnesemic on chemistry. BNP elevated at 1186. Troponin minimally elevated 0.042 - 0.043.  Cardiology was consulted for assistance with AFib, troponemia, and volume overload c/f possible acute heart failure.    On our initial assessment she reports no chest pain.  Crackles to bilateral lungs and +JVD.  We wrote a plan of care outlining our assessment and plan:   AFib:  Transition carvedilol to metoprolol tartrate 25 mg every 6 hours.  Continue home apixaban, sotalol.  Check echocardiogram.  Troponin elevation: Likely type II in the setting of RVR. Continue to trend to peak   Volume overload: Check echo as above, denies history of heart failure. Give additional lasix 40 mg IV x1.    Hospital Course:    She remained free of CP overnight.  After treatment started, HR remained 69-74, MAP 74-87.  PEX on 3/28 with improved JVD and crackles.  EKG: Atrial pacing, LBBB.  Troponin peaked at 0.043.  Echo: HFpEF, LV with concentric hypertrophy, G2LVDD, RV nl, PAP 39, CVP 3.  Met with Palliative, decided to be DNR.  Discussing least-harsh treatment options with  Oncology.      Past Medical History:   Diagnosis Date    Anticoagulant long-term use     Atrial fibrillation     Crohn disease diagnosed in her 20's    GERD (gastroesophageal reflux disease)     Hyperlipidemia     Hypertension     Pancreatic adenocarcinoma 3/21/2023    Thyroid disease     s/p I 131       Past Surgical History:   Procedure Laterality Date    APPENDECTOMY      CARDIAC SURGERY  2014    pacemaker    COLONOSCOPY  ~2008    normal findings per patient report    ENDOSCOPIC ULTRASOUND OF UPPER GASTROINTESTINAL TRACT N/A 3/14/2023    Procedure: ULTRASOUND, UPPER GI TRACT, ENDOSCOPIC;  Surgeon: Andrei Swartz MD;  Location: [facility];  Service: Endoscopy;  Laterality: N/A;  Approval to hold Eliquis rec'd from Dr. Barbosa (see telephone encounter 3/3/23)-DS  [employer] AICD/PPM  3/3/23-Instructions via portal-DS    ERCP N/A 3/21/2023    Procedure: ERCP (ENDOSCOPIC RETROGRADE CHOLANGIOPANCREATOGRAPHY);  Surgeon: Celina Toney MD;  Location: [facility] ([address] [place] [address]);  Service: Endoscopy;  Laterality: N/A;    ESOPHAGOGASTRODUODENOSCOPY N/A 7/17/2018    Procedure: EGD (ESOPHAGOGASTRODUODENOSCOPY);  Surgeon: Alondra Casiano MD;  Location: [facility] ENDO;  Service: Endoscopy;  Laterality: N/A;    HYSTERECTOMY      INSERTION OF IMPLANTABLE CARDIOVERTER-DEFIBRILLATOR (ICD) GENERATOR WITH TWO EXISTING LEADS Left 5/10/2021    Procedure: INSERTION, PULSE GENERATOR WITH 2 EXISTING LEADS, ICD;  Surgeon: Bo Leone MD;  Location: [facility] CATH LAB;  Service: Cardiology;  Laterality: Left;    INSERTION OF PACEMAKER      REPLACEMENT OF PACEMAKER GENERATOR  05/10/2021    RETROGRADE PYELOGRAPHY Right 2/18/2020    Procedure: PYELOGRAM, RETROGRADE;  Surgeon: Jovan Kruse MD;  Location: [facility] OR;  Service: Urology;  Laterality: Right;    SMALL INTESTINE SURGERY  in her 20's    for crohn's    TONSILLECTOMY      UPPER GASTROINTESTINAL ENDOSCOPY  ~2008    normal findings per patient report       Review of  patient's allergies indicates:   Allergen Reactions    Lisinopril Other (See Comments)     cough       No current facility-administered medications on file prior to encounter.     Current Outpatient Medications on File Prior to Encounter   Medication Sig    acetaminophen (TYLENOL) 325 MG tablet Take 650 mg by mouth daily as needed (Headache).    alendronate (FOSAMAX) 70 MG tablet Take 1 tablet (70 mg total) by mouth every 7 days.    amLODIPine (NORVASC) 5 MG tablet Take 1 tablet (5 mg total) by mouth once daily.    atorvastatin (LIPITOR) 20 MG tablet Take 1 tablet (20 mg total) by mouth once daily.    carvediloL (COREG) 6.25 MG tablet Take 1 tablet (6.25 mg total) by mouth 2 (two) times daily with meals.    ELIQUIS 5 mg Tab Take 1 tablet (5 mg total) by mouth 2 (two) times daily.    esomeprazole (NEXIUM) 40 MG capsule Take 1 capsule (40 mg total) by mouth once daily.    ferrous sulfate (IRON) 325 mg (65 mg iron) Tab tablet Take 1 tablet (325 mg total) by mouth daily with breakfast.    folic acid (FOLVITE) 1 MG tablet Take 1 tablet by mouth once daily    levothyroxine (SYNTHROID, LEVOTHROID) 175 MCG tablet Take 1 tablet by mouth once daily    MELATONIN ORAL Take 1 tablet by mouth nightly as needed (Insomnia).    sotaloL (BETAPACE) 120 MG Tab Take 1 tablet (120 mg total) by mouth every 12 (twelve) hours.    sulfaSALAzine (AZULFIDINE) 500 mg Tab Take 1 tablet by mouth twice daily    tolterodine (DETROL LA) 4 MG 24 hr capsule Take 1 capsule (4 mg total) by mouth once daily.     Family History       Problem Relation (Age of Onset)    Breast cancer Mother    Cancer Mother    Crohn's disease Other          Tobacco Use    Smoking status: Former     Types: Cigarettes     Quit date:      Years since quittin.2    Smokeless tobacco: Never   Substance and Sexual Activity    Alcohol use: No    Drug use: No    Sexual activity: Never     ROS as above  Objective:     Vital Signs (Most Recent):  Temp:  98.2 °F (36.8 °C) (03/28/23 0808)  Pulse: 69 (03/28/23 1238)  Resp: (!) 25 (03/28/23 1208)  BP: (!) 142/60 (03/28/23 1208)  SpO2: 99 % (03/28/23 1238)   Vital Signs (24h Range):  Temp:  [97.4 °F (36.3 °C)-98.6 °F (37 °C)] 98.2 °F (36.8 °C)  Pulse:  [] 69  Resp:  [14-30] 25  SpO2:  [96 %-100 %] 99 %  BP: (101-142)/(53-78) 142/60     Weight: 78.4 kg (172 lb 13.5 oz)  Body mass index is 33.76 kg/m².    SpO2: 99 %         Intake/Output Summary (Last 24 hours) at 3/28/2023 1352  Last data filed at 3/28/2023 1153  Gross per 24 hour   Intake --   Output 400 ml   Net -400 ml       Lines/Drains/Airways       Peripheral Intravenous Line  Duration                  Peripheral IV - Single Lumen 03/27/23 1815 20 G Left Antecubital <1 day                    Physical Exam  Constitutional:       General: She is not in acute distress.     Appearance: She is obese. She is ill-appearing.   Eyes:      Extraocular Movements: Extraocular movements intact.   Neck:      Comments: JVD improved  Cardiovascular:      Rate and Rhythm: Normal rate. Rhythm irregular.      Pulses: Normal pulses.      Heart sounds: Normal heart sounds.   Pulmonary:      Effort: Pulmonary effort is normal.      Breath sounds: Rales (improved) present.   Musculoskeletal:      Right lower leg: Edema present.      Left lower leg: Edema present.   Skin:     Capillary Refill: Capillary refill takes less than 2 seconds.   Neurological:      General: No focal deficit present.      Mental Status: She is alert.     Significant Labs: CMP   Recent Labs   Lab 03/27/23  1255 03/28/23  0240    140   K 3.0* 3.3*    100   CO2 23 26   GLU 95 96   BUN 6* 9   CREATININE 1.0 0.9   CALCIUM 9.0 8.4*   PROT 7.1 6.4   ALBUMIN 2.3* 2.0*   BILITOT 1.6* 1.3*   ALKPHOS 155* 137*   AST 31 24   ALT 26 20   ANIONGAP 13 14   , CBC   Recent Labs   Lab 03/27/23  1150 03/28/23  0240   WBC 12.74* 13.53*   HGB 8.8* 8.4*   HCT 29.9* 28.7*    298   , Lipid Panel No results for  "input(s): CHOL, HDL, LDLCALC, TRIG, CHOLHDL in the last 48 hours., Troponin   Recent Labs   Lab 03/27/23  1150 03/27/23  1509   TROPONINI 0.042* 0.043*   , and BNP as above    Significant Imaging: Echocardiogram: Transthoracic echo (TTE) complete (Cupid Only):   Results for orders placed or performed during the hospital encounter of 03/27/23   Echo   Result Value Ref Range    Ascending aorta 2.55 cm    STJ 2.26 cm    AV mean gradient 9 mmHg    Ao peak amilcar 1.95 m/s    Ao VTI 40.38 cm    IVS 1.40 (A) 0.6 - 1.1 cm    LA size 4.88 cm    Left Atrium Major Axis 6.07 cm    Left Atrium Minor Axis 6.47 cm    LVIDd 4.40 3.5 - 6.0 cm    LVIDs 2.95 2.1 - 4.0 cm    LVOT diameter 1.94 cm    LVOT peak VTI 36.23 cm    Posterior Wall 1.04 0.6 - 1.1 cm    MV Peak A Amilcar 0.93 m/s    E wave deceleration time 204.63 msec    MV Peak E Amilcar 0.89 m/s    PV Peak D Amilcar 0.35 m/s    PV Peak S Amilcar 0.49 m/s    RA Major Axis 4.69 cm    RA Width 3.38 cm    RVDD 3.13 cm    Sinus 2.93 cm    TAPSE 1.38 cm    TR Max Amilcar 2.98 m/s    TDI LATERAL 0.09 m/s    TDI SEPTAL 0.05 m/s    LA WIDTH 3.89 cm    MV stenosis pressure 1/2 time 59.34 ms    LV Diastolic Volume 69.07 mL    LV Systolic Volume 33.59 mL    LVOT peak amilcar 1.56 m/s    LA volume (mod) 71.67 cm3    MV "A" wave duration 11.80 msec    LV LATERAL E/E' RATIO 9.89 m/s    LV SEPTAL E/E' RATIO 17.80 m/s    FS 33 %    LA volume 101.07 cm3    LV mass 195.98 g    Left Ventricle Relative Wall Thickness 0.47 cm    AV valve area 2.65 cm2    AV Velocity Ratio 0.80     AV index (prosthetic) 0.90     MV valve area p 1/2 method 3.71 cm2    E/A ratio 0.96     Mean e' 0.07 m/s    Pulm vein S/D ratio 1.40     LVOT area 3.0 cm2    LVOT stroke volume 107.04 cm3    AV peak gradient 15 mmHg    E/E' ratio 12.71 m/s    LV Systolic Volume Index 19.2 mL/m2    LV Diastolic Volume Index 39.47 mL/m2    LA Volume Index 57.8 mL/m2    LV Mass Index 112 g/m2    Triscuspid Valve Regurgitation Peak Gradient 36 mmHg    LA Volume Index " (Mod) 41.0 mL/m2    BSA 1.82 m2    Right Atrial Pressure (from IVC) 3 mmHg    EF 60 %    TV rest pulmonary artery pressure 39 mmHg    Narrative    · The estimated ejection fraction is 60%.  · The left ventricle is normal in size with concentric hypertrophy and   normal systolic function. There is systolic anterior motion without LVOT   obstruction.  · There is abnormal septal wall motion.  · Grade II left ventricular diastolic dysfunction.  · Normal right ventricular size with normal right ventricular systolic   function.  · Mild left atrial enlargement.  · The estimated PA systolic pressure is 39 mmHg.  · Normal central venous pressure (3 mmHg).       and EKG: As above    Assessment and Plan:     * Paroxysmal atrial fibrillation  83F w known AFib and LBBB hx presented in AFib on arrival to ED.  Converted to NSR after home meds (Coreg 6.25, Sotalol 120) given.  Now feels at baseline.     Per the most recent EKG/telemetry, pt is currently in sinus rhythm.    [id number] score 3 which would favor use of anticoagulation.  HASBLED score of 1 with a 1.02 % risk of bleeding; only for warfarin use.      RECOMMENDATIONS:   Maintain K > 4, Mag > 2 and Ca/iCal WNL to decrease arrhythmogenic potential   Rate control with Toprol- daily   Rhythm control with home Sotalol 120 BID so long as QTc < 500   Anticoagulation with Eliquis 5 mg BID   Follow up with Dr. Barbosa for medication changes based on results of today's TTE   Low-salt diet      Acute heart failure with preserved ejection fraction (HFpEF)  Pulmonary HTN    Echo 3/28:  · The estimated ejection fraction is 60%.  · The left ventricle is normal in size with concentric hypertrophy and normal systolic function. There is systolic anterior motion without LVOT obstruction. sonographer will be sent back to verify lack of LVOT obstruction.  · There is abnormal septal wall motion.  · Grade II left ventricular diastolic dysfunction.  · Normal right ventricular size  with normal right ventricular systolic function.  · Mild left atrial enlargement.  · The estimated PA systolic pressure is 39 mmHg.  · Normal central venous pressure (3 mmHg).    Responded to diuresis overnight, now feels at baseline.    RECOMMENDATIONS:  · Weigh self daily at home   · Lasix 40 PO daily PRN if gain 3 lbs in 24h or 5 lbs in 1 wk  · Follow up with Dr. Barbosa to further discuss TTE results          VTE Risk Mitigation (From admission, onward)         Ordered     apixaban tablet 5 mg  2 times daily         03/27/23 1613     IP VTE HIGH RISK PATIENT  Once         03/27/23 1613     Place sequential compression device  Until discontinued         03/27/23 1613                Thank you for your consult. I will sign off. Please contact us if you have any additional questions.    Larry Saldana MD  Cardiology   [facility] - Telemetry Stepdown

## 2023-03-28 NOTE — PLAN OF CARE
Problem: Adult Inpatient Plan of Care  Goal: Plan of Care Review  Outcome: Ongoing, Progressing  Goal: Absence of Hospital-Acquired Illness or Injury  Outcome: Ongoing, Progressing  Goal: Optimal Comfort and Wellbeing  Outcome: Ongoing, Progressing  Goal: Readiness for Transition of Care  Outcome: Ongoing, Progressing     Problem: Coping Ineffective  Goal: Effective Coping  Outcome: Ongoing, Progressing     Problem: Fall Injury Risk  Goal: Absence of Fall and Fall-Related Injury  Outcome: Ongoing, Progressing     Problem: Skin Injury Risk Increased  Goal: Skin Health and Integrity  Outcome: Ongoing, Progressing    PT alert and oriented x 4. Able to voice all wants and needs. All needs met.  She has remained free of falls and injuries. Safety eduction and plan of care reviewed with PT and he voiced understanding. Bed is low and locked with call light with in easy reach.  Bed alarm set.

## 2023-03-28 NOTE — SUBJECTIVE & OBJECTIVE
Past Medical History:   Diagnosis Date    Anticoagulant long-term use     Atrial fibrillation     Crohn disease diagnosed in her 20's    GERD (gastroesophageal reflux disease)     Hyperlipidemia     Hypertension     Pancreatic adenocarcinoma 3/21/2023    Thyroid disease     s/p I 131       Past Surgical History:   Procedure Laterality Date    APPENDECTOMY      CARDIAC SURGERY  2014    pacemaker    COLONOSCOPY  ~2008    normal findings per patient report    ENDOSCOPIC ULTRASOUND OF UPPER GASTROINTESTINAL TRACT N/A 3/14/2023    Procedure: ULTRASOUND, UPPER GI TRACT, ENDOSCOPIC;  Surgeon: Andrei Swartz MD;  Location: [facility];  Service: Endoscopy;  Laterality: N/A;  Approval to hold Eliquis rec'd from Dr. Barbosa (see telephone encounter 3/3/23)-DS  [employer] AICD/PPM  3/3/23-Instructions via portal-DS    ERCP N/A 3/21/2023    Procedure: ERCP (ENDOSCOPIC RETROGRADE CHOLANGIOPANCREATOGRAPHY);  Surgeon: Celina Toney MD;  Location: [facility] ([address] [place] [address]);  Service: Endoscopy;  Laterality: N/A;    ESOPHAGOGASTRODUODENOSCOPY N/A 7/17/2018    Procedure: EGD (ESOPHAGOGASTRODUODENOSCOPY);  Surgeon: Alondra Casiano MD;  Location: [facility] ENDO;  Service: Endoscopy;  Laterality: N/A;    HYSTERECTOMY      INSERTION OF IMPLANTABLE CARDIOVERTER-DEFIBRILLATOR (ICD) GENERATOR WITH TWO EXISTING LEADS Left 5/10/2021    Procedure: INSERTION, PULSE GENERATOR WITH 2 EXISTING LEADS, ICD;  Surgeon: Bo Leone MD;  Location: [facility] CATH LAB;  Service: Cardiology;  Laterality: Left;    INSERTION OF PACEMAKER      REPLACEMENT OF PACEMAKER GENERATOR  05/10/2021    RETROGRADE PYELOGRAPHY Right 2/18/2020    Procedure: PYELOGRAM, RETROGRADE;  Surgeon: Jovan Kruse MD;  Location: [facility] OR;  Service: Urology;  Laterality: Right;    SMALL INTESTINE SURGERY  in her 20's    for crohn's    TONSILLECTOMY      UPPER GASTROINTESTINAL ENDOSCOPY  ~2008    normal findings per patient report       Review of patient's allergies indicates:    Allergen Reactions    Lisinopril Other (See Comments)     cough       No current facility-administered medications on file prior to encounter.     Current Outpatient Medications on File Prior to Encounter   Medication Sig    acetaminophen (TYLENOL) 325 MG tablet Take 650 mg by mouth daily as needed (Headache).    alendronate (FOSAMAX) 70 MG tablet Take 1 tablet (70 mg total) by mouth every 7 days.    amLODIPine (NORVASC) 5 MG tablet Take 1 tablet (5 mg total) by mouth once daily.    atorvastatin (LIPITOR) 20 MG tablet Take 1 tablet (20 mg total) by mouth once daily.    carvediloL (COREG) 6.25 MG tablet Take 1 tablet (6.25 mg total) by mouth 2 (two) times daily with meals.    ELIQUIS 5 mg Tab Take 1 tablet (5 mg total) by mouth 2 (two) times daily.    esomeprazole (NEXIUM) 40 MG capsule Take 1 capsule (40 mg total) by mouth once daily.    ferrous sulfate (IRON) 325 mg (65 mg iron) Tab tablet Take 1 tablet (325 mg total) by mouth daily with breakfast.    folic acid (FOLVITE) 1 MG tablet Take 1 tablet by mouth once daily    levothyroxine (SYNTHROID, LEVOTHROID) 175 MCG tablet Take 1 tablet by mouth once daily    MELATONIN ORAL Take 1 tablet by mouth nightly as needed (Insomnia).    sotaloL (BETAPACE) 120 MG Tab Take 1 tablet (120 mg total) by mouth every 12 (twelve) hours.    sulfaSALAzine (AZULFIDINE) 500 mg Tab Take 1 tablet by mouth twice daily    tolterodine (DETROL LA) 4 MG 24 hr capsule Take 1 capsule (4 mg total) by mouth once daily.     Family History       Problem Relation (Age of Onset)    Breast cancer Mother    Cancer Mother    Crohn's disease Other          Tobacco Use    Smoking status: Former     Types: Cigarettes     Quit date:      Years since quittin.2    Smokeless tobacco: Never   Substance and Sexual Activity    Alcohol use: No    Drug use: No    Sexual activity: Never     Review of Systems   Constitutional:  Negative for appetite change, chills, fatigue, fever and unexpected weight  change.   HENT:  Negative for congestion and dental problem.    Eyes:  Negative for photophobia and visual disturbance.   Respiratory:  Positive for shortness of breath. Negative for cough.    Cardiovascular:  Positive for chest pain. Negative for leg swelling.   Gastrointestinal:  Negative for abdominal pain, constipation, diarrhea and nausea.   Genitourinary:  Negative for difficulty urinating and dysuria.   Musculoskeletal:  Negative for arthralgias and back pain.   Skin:  Negative for color change and rash.   Neurological:  Negative for light-headedness and headaches.   Hematological:  Negative for adenopathy. Does not bruise/bleed easily.   Psychiatric/Behavioral:  Negative for agitation and behavioral problems.    Objective:     Vital Signs (Most Recent):  Temp: 98.2 °F (36.8 °C) (03/28/23 0808)  Pulse: 70 (03/28/23 0949)  Resp: (!) 26 (03/28/23 0809)  BP: (!) 142/60 (03/28/23 1208)  SpO2: 99 % (03/28/23 0809)   Vital Signs (24h Range):  Temp:  [97.4 °F (36.3 °C)-98.6 °F (37 °C)] 98.2 °F (36.8 °C)  Pulse:  [] 70  Resp:  [14-30] 26  SpO2:  [93 %-100 %] 99 %  BP: (101-142)/(53-78) 142/60     Weight: 78.4 kg (172 lb 13.5 oz)  Body mass index is 33.76 kg/m².    Physical Exam  Constitutional:       General: She is not in acute distress.     Appearance: She is well-developed.   HENT:      Head: Normocephalic and atraumatic.      Nose: Nose normal. No congestion.   Eyes:      General: No scleral icterus.     Extraocular Movements: Extraocular movements intact.   Cardiovascular:      Rate and Rhythm: Tachycardia present. Rhythm irregular.   Pulmonary:      Effort: Pulmonary effort is normal. No respiratory distress.   Abdominal:      General: There is no distension.      Palpations: Abdomen is soft.   Musculoskeletal:         General: Normal range of motion.      Cervical back: Neck supple. No rigidity.   Skin:     General: Skin is warm and dry.   Neurological:      Mental Status: She is alert and oriented to  person, place, and time. Mental status is at baseline.   Psychiatric:         Mood and Affect: Mood normal.         Behavior: Behavior normal.           Significant Labs: All pertinent labs within the past 24 hours have been reviewed.    Significant Imaging: I have reviewed all pertinent imaging results/findings within the past 24 hours.

## 2023-03-28 NOTE — ASSESSMENT & PLAN NOTE
"Impression: Pt is an 82 y/o female with recently dx pancreatic cancer. Pt has hx of Crohn's and a-fib. Pt is alert, oriented to person, place, time, and situation. No distress noted.     Reason for consult: ACP.     Goals of care/ACP:  Met with pt whose goal is to continue medical management at this time and f/u in Hem/onc clinic for options for treatment for her pancreatic cancer. Per pt if treatment would cause her QOL to decline, she would not want to have treatment and would focus in comfort/QOL.     Code status: Code status discussed. Pt reports she wants to be a DNR. Messaged Dr. Goodson.     MPOA: MPOA education done with pt. Per pt her NOK is her 2 sons but she wants her daughter-in-law Karen to be MPOA. Paperwork given to pt to complete. She wants to speak to Karen before filling out. Let pt know form can be witnessed in hospital if she completes or she can bring to clinic appt with Dr. Magana in April.      Pt reports she lives alone but when needed, she will move in with her son and daughter-in-law Karen. They live a block away from her. Pt also has another son and daughter who is .     Symptom management:   Pt reports unsteadiness at times, especially during night.   Pt asked about at walker.   Would have PT/OT evaluate and treat.     Pt denies pain, N/V.     Dyspnea:   Pt reports feeling of SOB when "a-fib acts up".  Pt is on O2 per NC.   No complaints at this time.   Pt sats 100%.   Pt does not use O2 at home.     Plan:   Pt wants code status to be DNR. Communicated with Dr. Goodson.  Pt to f/u in Hem/Onc clinic and Palliative care clinic.   MPOA given to pt. Pt reports he wants her daughter-in-law Karenbiju Mejia to be MPOA. She wants to discuss with Karen before filling out.     Will follow.       "

## 2023-03-28 NOTE — CONSULTS
[facility] - Telemetry Stepdown  Adult Nutrition  Consult Note    SUMMARY     Recommendations    1. Liberalize diet to Regular & add Boost Plus ONS BID.   2. RD to monitor & follow-up.    Goals: Meet % EEN, EPN by RD f/u date  Nutrition Goal Status: new  Communication of RD Recs: reviewed with RN    Assessment and Plan    No nutritional dx at this time.    Reason for Assessment    Reason For Assessment: consult  Diagnosis: other (see comments) (Pancreatic adenocarcinoma)  Relevant Medical History: Crohns, HTN  Interdisciplinary Rounds: did not attend    General Information Comments: Diet just advanced, pt awaiting breakfast during visit. Reports fair appetite PTA & UBW of 175#. Appears nourished w/ no indicators of malnutrition. Pt willing to try ONS. Noted palliative care consulted.  Nutrition Discharge Planning: Adequate PO intake    Nutrition/Diet History    Factors Affecting Nutritional Intake: other (see comments) (Diet just advanced.)    Anthropometrics    Temp: 98.2 °F (36.8 °C)  Height Method: Stated  Height: 5' (152.4 cm)  Height (inches): 60 in  Weight Method: Bed Scale  Weight: 78.4 kg (172 lb 13.5 oz)  Weight (lb): 172.84 lb  Ideal Body Weight (IBW), Female: 100 lb  % Ideal Body Weight, Female (lb): 172.84 %  BMI (Calculated): 33.8  BMI Grade: 30 - 34.9- obesity - grade I    Lab/Procedures/Meds    Pertinent Labs Reviewed: reviewed  Pertinent Medications Reviewed: reviewed  Pertinent Medications Comments: Statin    Estimated/Assessed Needs    Weight Used For Calorie Calculations: 78.4 kg (172 lb 13.5 oz)    Energy Calorie Requirements (kcal): 1400 kcal/d  Energy Need Method: [place]-St Jeor (1.2 PAL)    Protein Requirements: 86 g/d (1.1 g/kg)  Weight Used For Protein Calculations: 78.4 kg (172 lb 13.5 oz)    Estimated Fluid Requirement Method: other (see comments) (Per MD or 1 mL/kcal)  RDA Method (mL): 1400    Nutrition Prescription Ordered    Current Diet Order: Cardiac    Evaluation of Received  Nutrient/Fluid Intake    I/O: -    Comments: LBM: 3/26    Nutrition Risk    Level of Risk/Frequency of Follow-up:  (1x/week)     Monitor and Evaluation    Food and Nutrient Intake: energy intake, food and beverage intake  Food and Nutrient Adminstration: diet order  Physical Activity and Function: nutrition-related ADLs and IADLs  Anthropometric Measurements: weight, weight change  Biochemical Data, Medical Tests and Procedures: glucose/endocrine profile, lipid profile, inflammatory profile, gastrointestinal profile, electrolyte and renal panel  Nutrition-Focused Physical Findings: overall appearance     Nutrition Follow-Up    RD Follow-up?: Yes

## 2023-03-28 NOTE — SUBJECTIVE & OBJECTIVE
Past Medical History:   Diagnosis Date    Anticoagulant long-term use     Atrial fibrillation     Crohn disease diagnosed in her 20's    GERD (gastroesophageal reflux disease)     Hyperlipidemia     Hypertension     Pancreatic adenocarcinoma 3/21/2023    Thyroid disease     s/p I 131       Past Surgical History:   Procedure Laterality Date    APPENDECTOMY      CARDIAC SURGERY  2014    pacemaker    COLONOSCOPY  ~2008    normal findings per patient report    ENDOSCOPIC ULTRASOUND OF UPPER GASTROINTESTINAL TRACT N/A 3/14/2023    Procedure: ULTRASOUND, UPPER GI TRACT, ENDOSCOPIC;  Surgeon: Andrei Swartz MD;  Location: [facility];  Service: Endoscopy;  Laterality: N/A;  Approval to hold Eliquis rec'd from Dr. Barbosa (see telephone encounter 3/3/23)-DS  [employer] AICD/PPM  3/3/23-Instructions via portal-DS    ERCP N/A 3/21/2023    Procedure: ERCP (ENDOSCOPIC RETROGRADE CHOLANGIOPANCREATOGRAPHY);  Surgeon: Celina Toney MD;  Location: [facility] ([address] [place] [address]);  Service: Endoscopy;  Laterality: N/A;    ESOPHAGOGASTRODUODENOSCOPY N/A 7/17/2018    Procedure: EGD (ESOPHAGOGASTRODUODENOSCOPY);  Surgeon: Alondra Casiano MD;  Location: [facility] ENDO;  Service: Endoscopy;  Laterality: N/A;    HYSTERECTOMY      INSERTION OF IMPLANTABLE CARDIOVERTER-DEFIBRILLATOR (ICD) GENERATOR WITH TWO EXISTING LEADS Left 5/10/2021    Procedure: INSERTION, PULSE GENERATOR WITH 2 EXISTING LEADS, ICD;  Surgeon: Bo Leone MD;  Location: [facility] CATH LAB;  Service: Cardiology;  Laterality: Left;    INSERTION OF PACEMAKER      REPLACEMENT OF PACEMAKER GENERATOR  05/10/2021    RETROGRADE PYELOGRAPHY Right 2/18/2020    Procedure: PYELOGRAM, RETROGRADE;  Surgeon: Jovan Kruse MD;  Location: [facility] OR;  Service: Urology;  Laterality: Right;    SMALL INTESTINE SURGERY  in her 20's    for crohn's    TONSILLECTOMY      UPPER GASTROINTESTINAL ENDOSCOPY  ~2008    normal findings per patient report       Review of patient's allergies indicates:    Allergen Reactions    Lisinopril Other (See Comments)     cough       No current facility-administered medications on file prior to encounter.     Current Outpatient Medications on File Prior to Encounter   Medication Sig    acetaminophen (TYLENOL) 325 MG tablet Take 650 mg by mouth daily as needed (Headache).    alendronate (FOSAMAX) 70 MG tablet Take 1 tablet (70 mg total) by mouth every 7 days.    amLODIPine (NORVASC) 5 MG tablet Take 1 tablet (5 mg total) by mouth once daily.    atorvastatin (LIPITOR) 20 MG tablet Take 1 tablet (20 mg total) by mouth once daily.    carvediloL (COREG) 6.25 MG tablet Take 1 tablet (6.25 mg total) by mouth 2 (two) times daily with meals.    ELIQUIS 5 mg Tab Take 1 tablet (5 mg total) by mouth 2 (two) times daily.    esomeprazole (NEXIUM) 40 MG capsule Take 1 capsule (40 mg total) by mouth once daily.    ferrous sulfate (IRON) 325 mg (65 mg iron) Tab tablet Take 1 tablet (325 mg total) by mouth daily with breakfast.    folic acid (FOLVITE) 1 MG tablet Take 1 tablet by mouth once daily    levothyroxine (SYNTHROID, LEVOTHROID) 175 MCG tablet Take 1 tablet by mouth once daily    MELATONIN ORAL Take 1 tablet by mouth nightly as needed (Insomnia).    sotaloL (BETAPACE) 120 MG Tab Take 1 tablet (120 mg total) by mouth every 12 (twelve) hours.    sulfaSALAzine (AZULFIDINE) 500 mg Tab Take 1 tablet by mouth twice daily    tolterodine (DETROL LA) 4 MG 24 hr capsule Take 1 capsule (4 mg total) by mouth once daily.     Family History       Problem Relation (Age of Onset)    Breast cancer Mother    Cancer Mother    Crohn's disease Other          Tobacco Use    Smoking status: Former     Types: Cigarettes     Quit date:      Years since quittin.2    Smokeless tobacco: Never   Substance and Sexual Activity    Alcohol use: No    Drug use: No    Sexual activity: Never     ROS as above  Objective:     Vital Signs (Most Recent):  Temp: 98.2 °F (36.8 °C) (23 0808)  Pulse: 69 (23  1238)  Resp: (!) 25 (03/28/23 1208)  BP: (!) 142/60 (03/28/23 1208)  SpO2: 99 % (03/28/23 1238)   Vital Signs (24h Range):  Temp:  [97.4 °F (36.3 °C)-98.6 °F (37 °C)] 98.2 °F (36.8 °C)  Pulse:  [] 69  Resp:  [14-30] 25  SpO2:  [96 %-100 %] 99 %  BP: (101-142)/(53-78) 142/60     Weight: 78.4 kg (172 lb 13.5 oz)  Body mass index is 33.76 kg/m².    SpO2: 99 %         Intake/Output Summary (Last 24 hours) at 3/28/2023 1352  Last data filed at 3/28/2023 1153  Gross per 24 hour   Intake --   Output 400 ml   Net -400 ml       Lines/Drains/Airways       Peripheral Intravenous Line  Duration                  Peripheral IV - Single Lumen 03/27/23 1815 20 G Left Antecubital <1 day                    Physical Exam  Constitutional:       General: She is not in acute distress.     Appearance: She is obese. She is ill-appearing.   Eyes:      Extraocular Movements: Extraocular movements intact.   Neck:      Comments: JVD improved  Cardiovascular:      Rate and Rhythm: Normal rate. Rhythm irregular.      Pulses: Normal pulses.      Heart sounds: Normal heart sounds.   Pulmonary:      Effort: Pulmonary effort is normal.      Breath sounds: Rales (improved) present.   Musculoskeletal:      Right lower leg: Edema present.      Left lower leg: Edema present.   Skin:     Capillary Refill: Capillary refill takes less than 2 seconds.   Neurological:      General: No focal deficit present.      Mental Status: She is alert.     Significant Labs: CMP   Recent Labs   Lab 03/27/23  1255 03/28/23  0240    140   K 3.0* 3.3*    100   CO2 23 26   GLU 95 96   BUN 6* 9   CREATININE 1.0 0.9   CALCIUM 9.0 8.4*   PROT 7.1 6.4   ALBUMIN 2.3* 2.0*   BILITOT 1.6* 1.3*   ALKPHOS 155* 137*   AST 31 24   ALT 26 20   ANIONGAP 13 14   , CBC   Recent Labs   Lab 03/27/23  1150 03/28/23  0240   WBC 12.74* 13.53*   HGB 8.8* 8.4*   HCT 29.9* 28.7*    298   , Lipid Panel No results for input(s): CHOL, HDL, LDLCALC, TRIG, CHOLHDL in the last  "48 hours., Troponin   Recent Labs   Lab 03/27/23  1150 03/27/23  1509   TROPONINI 0.042* 0.043*   , and BNP as above    Significant Imaging: Echocardiogram: Transthoracic echo (TTE) complete (Cupid Only):   Results for orders placed or performed during the hospital encounter of 03/27/23   Echo   Result Value Ref Range    Ascending aorta 2.55 cm    STJ 2.26 cm    AV mean gradient 9 mmHg    Ao peak amilcar 1.95 m/s    Ao VTI 40.38 cm    IVS 1.40 (A) 0.6 - 1.1 cm    LA size 4.88 cm    Left Atrium Major Axis 6.07 cm    Left Atrium Minor Axis 6.47 cm    LVIDd 4.40 3.5 - 6.0 cm    LVIDs 2.95 2.1 - 4.0 cm    LVOT diameter 1.94 cm    LVOT peak VTI 36.23 cm    Posterior Wall 1.04 0.6 - 1.1 cm    MV Peak A Amilcar 0.93 m/s    E wave deceleration time 204.63 msec    MV Peak E Amilcar 0.89 m/s    PV Peak D Amilcar 0.35 m/s    PV Peak S Amilcar 0.49 m/s    RA Major Axis 4.69 cm    RA Width 3.38 cm    RVDD 3.13 cm    Sinus 2.93 cm    TAPSE 1.38 cm    TR Max Amilcar 2.98 m/s    TDI LATERAL 0.09 m/s    TDI SEPTAL 0.05 m/s    LA WIDTH 3.89 cm    MV stenosis pressure 1/2 time 59.34 ms    LV Diastolic Volume 69.07 mL    LV Systolic Volume 33.59 mL    LVOT peak amilcar 1.56 m/s    LA volume (mod) 71.67 cm3    MV "A" wave duration 11.80 msec    LV LATERAL E/E' RATIO 9.89 m/s    LV SEPTAL E/E' RATIO 17.80 m/s    FS 33 %    LA volume 101.07 cm3    LV mass 195.98 g    Left Ventricle Relative Wall Thickness 0.47 cm    AV valve area 2.65 cm2    AV Velocity Ratio 0.80     AV index (prosthetic) 0.90     MV valve area p 1/2 method 3.71 cm2    E/A ratio 0.96     Mean e' 0.07 m/s    Pulm vein S/D ratio 1.40     LVOT area 3.0 cm2    LVOT stroke volume 107.04 cm3    AV peak gradient 15 mmHg    E/E' ratio 12.71 m/s    LV Systolic Volume Index 19.2 mL/m2    LV Diastolic Volume Index 39.47 mL/m2    LA Volume Index 57.8 mL/m2    LV Mass Index 112 g/m2    Triscuspid Valve Regurgitation Peak Gradient 36 mmHg    LA Volume Index (Mod) 41.0 mL/m2    BSA 1.82 m2    Right Atrial " Pressure (from IVC) 3 mmHg    EF 60 %    TV rest pulmonary artery pressure 39 mmHg    Narrative    · The estimated ejection fraction is 60%.  · The left ventricle is normal in size with concentric hypertrophy and   normal systolic function. There is systolic anterior motion without LVOT   obstruction.  · There is abnormal septal wall motion.  · Grade II left ventricular diastolic dysfunction.  · Normal right ventricular size with normal right ventricular systolic   function.  · Mild left atrial enlargement.  · The estimated PA systolic pressure is 39 mmHg.  · Normal central venous pressure (3 mmHg).       and EKG: As above

## 2023-03-28 NOTE — SUBJECTIVE & OBJECTIVE
Interval History:   3/28: Subjectively feels better    Review of Systems   Constitutional:  Negative for appetite change, chills, fatigue, fever and unexpected weight change.   HENT:  Negative for congestion and dental problem.    Eyes:  Negative for photophobia and visual disturbance.   Respiratory:  Positive for shortness of breath. Negative for cough.    Cardiovascular:  Positive for chest pain. Negative for leg swelling.   Gastrointestinal:  Negative for abdominal pain, constipation, diarrhea and nausea.   Genitourinary:  Negative for difficulty urinating and dysuria.   Musculoskeletal:  Negative for arthralgias and back pain.   Skin:  Negative for color change and rash.   Neurological:  Negative for light-headedness and headaches.   Hematological:  Negative for adenopathy. Does not bruise/bleed easily.   Psychiatric/Behavioral:  Negative for agitation and behavioral problems.    Objective:     Vital Signs (Most Recent):  Temp: 98.2 °F (36.8 °C) (03/28/23 0808)  Pulse: 72 (03/28/23 1208)  Resp: (!) 25 (03/28/23 1208)  BP: (!) 142/60 (03/28/23 1208)  SpO2: 98 % (03/28/23 1208)   Vital Signs (24h Range):  Temp:  [97.4 °F (36.3 °C)-98.6 °F (37 °C)] 98.2 °F (36.8 °C)  Pulse:  [] 72  Resp:  [14-30] 25  SpO2:  [93 %-100 %] 98 %  BP: (101-142)/(53-78) 142/60     Weight: 78.4 kg (172 lb 13.5 oz)  Body mass index is 33.76 kg/m².    Intake/Output Summary (Last 24 hours) at 3/28/2023 1212  Last data filed at 3/28/2023 1153  Gross per 24 hour   Intake --   Output 400 ml   Net -400 ml      Physical Exam  Constitutional:       General: She is not in acute distress.     Appearance: She is well-developed.   HENT:      Head: Normocephalic and atraumatic.      Nose: Nose normal. No congestion.   Eyes:      General: No scleral icterus.     Extraocular Movements: Extraocular movements intact.   Cardiovascular:      Rate and Rhythm: Tachycardia present. Rhythm irregular.   Pulmonary:      Effort: Pulmonary effort is normal. No  respiratory distress.   Abdominal:      General: There is no distension.      Palpations: Abdomen is soft.   Musculoskeletal:         General: Normal range of motion.      Cervical back: Neck supple. No rigidity.   Skin:     General: Skin is warm and dry.   Neurological:      Mental Status: She is alert and oriented to person, place, and time. Mental status is at baseline.   Psychiatric:         Mood and Affect: Mood normal.         Behavior: Behavior normal.       Significant Labs: All pertinent labs within the past 24 hours have been reviewed.    Significant Imaging: I have reviewed all pertinent imaging results/findings within the past 24 hours.

## 2023-03-28 NOTE — PHARMACY MED REC
"Admission Medication History     The home medication history was taken by Khris Moon.    You may go to "Admission" then "Reconcile Home Medications" tabs to review and/or act upon these items.     The home medication list has been updated by the Pharmacy department.   Please read ALL comments highlighted in yellow.   Please address this information as you see fit.    Feel free to contact us if you have any questions or require assistance.      The medications listed below were removed from the home medication list. Please reorder if appropriate:  Patient reports no longer taking the following medication(s):  COLESTIPOL 1 GRAM TAB    Medications listed below were obtained from: Patient/Medication list  Current Outpatient Medications on File Prior to Encounter   Medication Sig    acetaminophen (TYLENOL) 325 MG tablet   Take 650 mg by mouth daily as needed (Headache).    alendronate (FOSAMAX) 70 MG tablet   Take 1 tablet (70 mg total) by mouth every 7 days.    amLODIPine (NORVASC) 5 MG tablet   Take 1 tablet (5 mg total) by mouth once daily.    atorvastatin (LIPITOR) 20 MG tablet   Take 1 tablet (20 mg total) by mouth once daily.    carvediloL (COREG) 6.25 MG tablet   Take 1 tablet (6.25 mg total) by mouth 2 (two) times daily with meals.      ELIQUIS 5 mg Tab Take 1 tablet (5 mg total) by mouth 2 (two) times daily.      esomeprazole (NEXIUM) 40 MG capsule   Take 1 capsule (40 mg total) by mouth once daily.    ferrous sulfate (IRON) 325 mg (65 mg iron) Tab tablet   Take 1 tablet (325 mg total) by mouth daily with breakfast.    folic acid (FOLVITE) 1 MG tablet   Take 1 tablet by mouth once daily    levothyroxine (SYNTHROID, LEVOTHROID) 175 MCG tablet   Take 1 tablet by mouth once daily    MELATONIN ORAL Take 1 tablet by mouth nightly as needed (Insomnia).      sotaloL (BETAPACE) 120 MG Tab   Take 1 tablet (120 mg total) by mouth every 12 (twelve) hours.    sulfaSALAzine (AZULFIDINE) 500 mg Tab   Take 1 tablet by mouth " twice daily    tolterodine (DETROL LA) 4 MG 24 hr capsule   Take 1 capsule (4 mg total) by mouth once daily.     Potential issues to be addressed PRIOR TO DISCHARGE  Patient requires education regarding drug therapies           Khris Moon  EXT [id number]                  .

## 2023-03-28 NOTE — SUBJECTIVE & OBJECTIVE
Interval History: Pt newly dx pancreatic cancer.       Past Surgical History:   Procedure Laterality Date    APPENDECTOMY      CARDIAC SURGERY  2014    pacemaker    COLONOSCOPY  ~2008    normal findings per patient report    ENDOSCOPIC ULTRASOUND OF UPPER GASTROINTESTINAL TRACT N/A 3/14/2023    Procedure: ULTRASOUND, UPPER GI TRACT, ENDOSCOPIC;  Surgeon: Andrei Swartz MD;  Location: [facility] ENDO;  Service: Endoscopy;  Laterality: N/A;  Approval to hold Eliquis rec'd from Dr. Barbosa (see telephone encounter 3/3/23)-DS  [employer] AICD/PPM  3/3/23-Instructions via portal-DS    ERCP N/A 3/21/2023    Procedure: ERCP (ENDOSCOPIC RETROGRADE CHOLANGIOPANCREATOGRAPHY);  Surgeon: Celina Toney MD;  Location: [facility] ENDO ([address] [place] [address]);  Service: Endoscopy;  Laterality: N/A;    ESOPHAGOGASTRODUODENOSCOPY N/A 7/17/2018    Procedure: EGD (ESOPHAGOGASTRODUODENOSCOPY);  Surgeon: Alondra Casiano MD;  Location: [facility] ENDO;  Service: Endoscopy;  Laterality: N/A;    HYSTERECTOMY      INSERTION OF IMPLANTABLE CARDIOVERTER-DEFIBRILLATOR (ICD) GENERATOR WITH TWO EXISTING LEADS Left 5/10/2021    Procedure: INSERTION, PULSE GENERATOR WITH 2 EXISTING LEADS, ICD;  Surgeon: Bo Leone MD;  Location: [facility] CATH LAB;  Service: Cardiology;  Laterality: Left;    INSERTION OF PACEMAKER      REPLACEMENT OF PACEMAKER GENERATOR  05/10/2021    RETROGRADE PYELOGRAPHY Right 2/18/2020    Procedure: PYELOGRAM, RETROGRADE;  Surgeon: Jovan Kruse MD;  Location: [facility];  Service: Urology;  Laterality: Right;    SMALL INTESTINE SURGERY  in her 20's    for crohn's    TONSILLECTOMY      UPPER GASTROINTESTINAL ENDOSCOPY  ~2008    normal findings per patient report       Review of patient's allergies indicates:   Allergen Reactions    Lisinopril Other (See Comments)     cough       Medications:  Continuous Infusions:  Scheduled Meds:   apixaban  5 mg Oral BID    atorvastatin  20 mg Oral Daily    ferrous gluconate  324 mg Oral Daily with breakfast     folic acid  1 mg Oral Daily    levothyroxine  175 mcg Oral Before breakfast    metoprolol tartrate  25 mg Oral Q6H    oxybutynin  5 mg Oral BID    pantoprazole  40 mg Oral Daily    senna-docusate 8.6-50 mg  1 tablet Oral Q12H    sotaloL  120 mg Oral BID    sulfaSALAzine  500 mg Oral BID     PRN Meds:acetaminophen, acetaminophen, HYDROcodone-acetaminophen, melatonin, metoprolol, ondansetron, prochlorperazine, sodium chloride 0.9%    Family History       Problem Relation (Age of Onset)    Breast cancer Mother    Cancer Mother    Crohn's disease Other          Tobacco Use    Smoking status: Former     Types: Cigarettes     Quit date:      Years since quittin.2    Smokeless tobacco: Never   Substance and Sexual Activity    Alcohol use: No    Drug use: No    Sexual activity: Never       Review of Systems   Constitutional:  Positive for activity change and fatigue.   Respiratory:  Negative for shortness of breath.    Gastrointestinal:  Negative for nausea and vomiting.   Musculoskeletal:  Positive for gait problem.   Neurological:  Positive for weakness.   Psychiatric/Behavioral: Negative.     Objective:     Vital Signs (Most Recent):  Temp: 98.2 °F (36.8 °C) (23 0808)  Pulse: 69 (23 0606)  Resp: (!) 25 (23 0808)  BP: 139/60 (23 0808)  SpO2: 100 % (23 0500)   Vital Signs (24h Range):  Temp:  [97.4 °F (36.3 °C)-98.6 °F (37 °C)] 98.2 °F (36.8 °C)  Pulse:  [] 69  Resp:  [14-34] 25  SpO2:  [93 %-100 %] 100 %  BP: ()/(53-84) 139/60     Weight: 78.4 kg (172 lb 13.5 oz)  Body mass index is 33.76 kg/m².    Physical Exam  Constitutional:       Interventions: Nasal cannula in place.   HENT:      Head: Normocephalic and atraumatic.   Eyes:      General: Lids are normal.      Conjunctiva/sclera: Conjunctivae normal.   Pulmonary:      Effort: No respiratory distress.   Musculoskeletal:      Cervical back: Normal range of motion.      Comments: ROM normal   Skin:     General: Skin is  warm and dry.   Neurological:      Mental Status: She is alert and oriented to person, place, and time.   Psychiatric:         Attention and Perception: Attention normal.         Mood and Affect: Mood normal.         Speech: Speech normal.         Behavior: Behavior is cooperative.       Review of Symptoms      Symptom Assessment (ESAS 0-10 Scale)  Pain:  0  Dyspnea:  0  Anxiety:  0  Nausea:  0  Depression:  0  Anorexia:  0  Fatigue:  0  Insomnia:  0  Restlessness:  0  Agitation:  0         Psychosocial/Cultural:   See Palliative Psychosocial Note: No  Pt lives by herself. Pt's son and daughter-in-law live a block away. Depending on what she needs she may move in with them. Karen drives a school bus and can be present for appts around her pick-up and drop off time.   **Primary  to Follow**  Palliative Care  Consult: No      Advance Care Planning   Advance Directives:   Do Not Resuscitate: Pt reports she wants to be a DNR..    Agent's Name:  Pt's adult sons are NOK.    Decision Making:  Patient answered questions  Goals of Care: The patient endorses that what is most important right now is to focus on quality of life, even if it means sacrificing a little time    Accordingly, we have decided that the best plan to meet the patient's goals includes continuing with treatment       Significant Labs: All pertinent labs within the past 24 hours have been reviewed.  CBC:   Recent Labs   Lab 03/28/23  0240   WBC 13.53*   HGB 8.4*   HCT 28.7*   MCV 80*        BMP:  Recent Labs   Lab 03/27/23  1255 03/28/23  0240   GLU 95 96    140   K 3.0* 3.3*    100   CO2 23 26   BUN 6* 9   CREATININE 1.0 0.9   CALCIUM 9.0 8.4*   MG 1.3*  --      LFT:  Lab Results   Component Value Date    AST 24 03/28/2023    ALKPHOS 137 (H) 03/28/2023    BILITOT 1.3 (H) 03/28/2023     Albumin:   Albumin   Date Value Ref Range Status   03/28/2023 2.0 (L) 3.5 - 5.2 g/dL Final     Protein:   Total Protein   Date  Value Ref Range Status   03/28/2023 6.4 6.0 - 8.4 g/dL Final     Lactic acid:   Lab Results   Component Value Date    LACTATE 1.5 03/23/2023    LACTATE 1.2 03/17/2023       Significant Imaging: I have reviewed all pertinent imaging results/findings within the past 24 hours.

## 2023-03-28 NOTE — HPI
Pauline Cronin is an 83 year old female with Paroxysmal atrial fibrillation, s/p dual chamber PPM (uncertain indication), HTN, HLD, LBBB, pancreatic adenocarcinoma, Crohn's disease who presented to [facility] with chest pain and dyspnea which woke her up from sleep around 3 AM on 3/27.  EKG in ED: atrial fibrillation and RVR in the 140s. Hypokalemic and hypomagnesemic on chemistry. BNP elevated at 1186. Troponin minimally elevated 0.042 - 0.043.  Cardiology was consulted for assistance with AFib, troponemia, and volume overload c/f possible acute heart failure.    On our initial assessment she reports no chest pain.  Crackles to bilateral lungs and +JVD.  We wrote a plan of care outlining our assessment and plan:   AFib:  Transition carvedilol to metoprolol tartrate 25 mg every 6 hours.  Continue home apixaban, sotalol.  Check echocardiogram.  Troponin elevation: Likely type II in the setting of RVR. Continue to trend to peak   Volume overload: Check echo as above, denies history of heart failure. Give additional lasix 40 mg IV x1.    Hospital Course:    She remained free of CP overnight.  After treatment started, HR remained 69-74, MAP 74-87.  PEX on 3/28 with improved JVD and crackles.  Troponin peaked at 0.043.  Echo: HFpEF, LV with concentric hypertrophy, G2LVDD, RV nl, PAP 39, CVP 3.  Met with Palliative, decided to be DNR.  Discussing least-harsh treatment options with Oncology.

## 2023-03-28 NOTE — HPI
Ms. Pauline Cronin is an 83 year old woman with Crohn's disease, recently diagnosed pancreatic adenocarcinoma and paroxysmal A.fib who presented with chest pain and SOB. She was found to be in A.fib with RVR. Of note she was recently hospitalized from 03/17-03/23 where she had an ERCP done and course was complicated by ERCP induced pancreatitis.     Oncologic history (per discharge summary on 03/23): The patient presented to her PCP on March 1st reporting fatigue and 10 pounds of unintentional weight loss. She was sent for a CT abdomen/pelvis on 3/1/2023 which demonstrated a pancreatic head mass. She underwent ERCP w/ biopsy of the mass on 3/14, biopsy results are pending at the time of admission. Plan for outpatient ERCP w/ stent placement was planned for 3/29. She was instructed to present to the ED due to elevated bilirubin, jaundice, and icterus noted on outpatient labs on 3/16.    Medical Oncology was consulted for pancreatic adenocarcinoma.

## 2023-03-28 NOTE — ASSESSMENT & PLAN NOTE
Patient with Paroxysmal (<7 days) atrial fibrillation which is uncontrolled currently with Beta Blocker. Patient is currently in atrial fibrillation.[id number] Score: 3. On eliquis    Check EKG  Trend troponin  TSH with am labs  Rate control   Eliquis  Echo  Cardiology consult

## 2023-03-28 NOTE — SUBJECTIVE & OBJECTIVE
Oncology Treatment Plan:   [No matching plan found]    Medications:  Continuous Infusions:  Scheduled Meds:   ampicillin-sulbactim (UNASYN) IVPB  3 g Intravenous Q8H    apixaban  5 mg Oral BID    [START ON 3/28/2023] atorvastatin  20 mg Oral Daily    [START ON 3/28/2023] ferrous gluconate  324 mg Oral Daily with breakfast    [START ON 3/28/2023] folic acid  1 mg Oral Daily    [START ON 3/28/2023] levothyroxine  175 mcg Oral Before breakfast    [START ON 3/28/2023] metoprolol tartrate  25 mg Oral Q6H    [START ON 3/28/2023] oxybutynin  5 mg Oral Daily    [START ON 3/28/2023] pantoprazole  40 mg Oral Daily    potassium bicarbonate  50 mEq Oral Q4H    senna-docusate 8.6-50 mg  1 tablet Oral Q12H    sotaloL  120 mg Oral BID    sulfaSALAzine  1,000 mg Oral QID     PRN Meds:acetaminophen, acetaminophen, HYDROcodone-acetaminophen, metoprolol, ondansetron, prochlorperazine, sodium chloride 0.9%     Review of patient's allergies indicates:   Allergen Reactions    Lisinopril Other (See Comments)     cough        Past Medical History:   Diagnosis Date    Anticoagulant long-term use     Atrial fibrillation     Crohn disease diagnosed in her 20's    GERD (gastroesophageal reflux disease)     Hyperlipidemia     Hypertension     Pancreatic adenocarcinoma 3/21/2023    Thyroid disease     s/p I 131     Past Surgical History:   Procedure Laterality Date    APPENDECTOMY      CARDIAC SURGERY  2014    pacemaker    COLONOSCOPY  ~2008    normal findings per patient report    ENDOSCOPIC ULTRASOUND OF UPPER GASTROINTESTINAL TRACT N/A 3/14/2023    Procedure: ULTRASOUND, UPPER GI TRACT, ENDOSCOPIC;  Surgeon: Andrei Swartz MD;  Location: [facility];  Service: Endoscopy;  Laterality: N/A;  Approval to hold Eliquis rec'd from Dr. Barbosa (see telephone encounter 3/3/23)-DS  [employer] AICD/PPM  3/3/23-Instructions via portal-DS    ERCP N/A 3/21/2023    Procedure: ERCP (ENDOSCOPIC RETROGRADE CHOLANGIOPANCREATOGRAPHY);  Surgeon: Celina Toney,  MD;  Location: [facility] ENDO (2ND FLR);  Service: Endoscopy;  Laterality: N/A;    ESOPHAGOGASTRODUODENOSCOPY N/A 2018    Procedure: EGD (ESOPHAGOGASTRODUODENOSCOPY);  Surgeon: Alondra Casiano MD;  Location: [facility] ENDO;  Service: Endoscopy;  Laterality: N/A;    HYSTERECTOMY      INSERTION OF IMPLANTABLE CARDIOVERTER-DEFIBRILLATOR (ICD) GENERATOR WITH TWO EXISTING LEADS Left 5/10/2021    Procedure: INSERTION, PULSE GENERATOR WITH 2 EXISTING LEADS, ICD;  Surgeon: Bo Leone MD;  Location: [facility] CATH LAB;  Service: Cardiology;  Laterality: Left;    INSERTION OF PACEMAKER      REPLACEMENT OF PACEMAKER GENERATOR  05/10/2021    RETROGRADE PYELOGRAPHY Right 2020    Procedure: PYELOGRAM, RETROGRADE;  Surgeon: Jovan Kruse MD;  Location: [facility];  Service: Urology;  Laterality: Right;    SMALL INTESTINE SURGERY  in her 20's    for crohn's    TONSILLECTOMY      UPPER GASTROINTESTINAL ENDOSCOPY  ~    normal findings per patient report     Family History       Problem Relation (Age of Onset)    Breast cancer Mother    Cancer Mother    Crohn's disease Other          Tobacco Use    Smoking status: Former     Types: Cigarettes     Quit date:      Years since quittin.2    Smokeless tobacco: Never   Substance and Sexual Activity    Alcohol use: No    Drug use: No    Sexual activity: Never       Review of Systems   Constitutional:  Negative for appetite change, chills, fatigue, fever and unexpected weight change.   HENT:  Negative for congestion and dental problem.    Eyes:  Negative for photophobia and visual disturbance.   Respiratory:  Positive for shortness of breath. Negative for cough.    Cardiovascular:  Positive for chest pain. Negative for leg swelling.   Gastrointestinal:  Negative for abdominal pain, constipation, diarrhea and nausea.   Genitourinary:  Negative for difficulty urinating and dysuria.   Musculoskeletal:  Negative for arthralgias and back pain.   Skin:  Negative for color change  and rash.   Neurological:  Negative for light-headedness and headaches.   Hematological:  Negative for adenopathy. Does not bruise/bleed easily.   Psychiatric/Behavioral:  Negative for agitation and behavioral problems.    Objective:     Vital Signs (Most Recent):  Temp: 98.6 °F (37 °C) (03/27/23 1645)  Pulse: (!) 123 (03/27/23 1645)  Resp: 18 (03/27/23 1934)  BP: 115/69 (03/27/23 1645)  SpO2: 96 % (03/27/23 1645)   Vital Signs (24h Range):  Temp:  [98.6 °F (37 °C)] 98.6 °F (37 °C)  Pulse:  [114-130] 123  Resp:  [18-34] 18  SpO2:  [93 %-99 %] 96 %  BP: ()/(61-84) 115/69     Weight: 83.9 kg (185 lb)  Body mass index is 36.13 kg/m².  Body surface area is 1.88 meters squared.    No intake or output data in the 24 hours ending 03/27/23 2012    Physical Exam  Constitutional:       General: She is not in acute distress.     Appearance: She is well-developed.   HENT:      Head: Normocephalic and atraumatic.      Nose: Nose normal. No congestion.   Eyes:      General: No scleral icterus.     Extraocular Movements: Extraocular movements intact.   Cardiovascular:      Rate and Rhythm: Tachycardia present. Rhythm irregular.   Pulmonary:      Effort: Pulmonary effort is normal. No respiratory distress.   Abdominal:      General: There is no distension.      Palpations: Abdomen is soft.   Musculoskeletal:         General: Normal range of motion.      Cervical back: Neck supple. No rigidity.   Skin:     General: Skin is warm and dry.   Neurological:      Mental Status: She is alert and oriented to person, place, and time. Mental status is at baseline.   Psychiatric:         Mood and Affect: Mood normal.         Behavior: Behavior normal.       Significant Labs:   CBC:   Recent Labs   Lab 03/27/23  1150   WBC 12.74*   HGB 8.8*   HCT 29.9*       and CMP:   Recent Labs   Lab 03/27/23  1255      K 3.0*      CO2 23   GLU 95   BUN 6*   CREATININE 1.0   CALCIUM 9.0   PROT 7.1   ALBUMIN 2.3*   BILITOT 1.6*    ALKPHOS 155*   AST 31   ALT 26   ANIONGAP 13       Diagnostic Results:  I have reviewed all pertinent imaging results/findings within the past 24 hours.

## 2023-03-28 NOTE — PLAN OF CARE
[facility] - Telemetry Stepdown  Initial Discharge Assessment       Primary Care Provider: Ga Waldrop MD    Admission Diagnosis: Atrial fibrillation and flutter [I48.91, I48.92]  Chest pain [R07.9]  Atrial fibrillation with RVR [I48.91]  Congestive heart failure, unspecified HF chronicity, unspecified heart failure type [I50.9]    Admission Date: 3/27/2023  Expected Discharge Date: 3/28/2023    Discharge Barriers Identified: None    Payor: [employer] MANAGED MEDICARE / Plan: [employer] MEDICARE HMO / Product Type: Capitation /     Extended Emergency Contact Information  Primary Emergency Contact: Karen Mejia  Address: [address] [address] [facility]  Home Phone: [phone]  Mobile Phone: [phone]  Relation: Relative  Secondary Emergency Contact: WIL Freeman  Home Phone: [phone]  Mobile Phone: [phone]  Relation: Grandchild  Preferred language: English   needed? No    Discharge Plan A: Home with family  Discharge Plan B: Home Health      [place] [id number]  [place] - [address]  [address] [facility]  [place] [address]  Phone: [phone] Fax: [phone]      Initial Assessment (most recent)       Adult Discharge Assessment - 03/28/23 1603          Discharge Assessment    Assessment Type Discharge Planning Assessment     Confirmed/corrected address, phone number and insurance Yes     Confirmed Demographics Correct on Facesheet     Source of Information patient     Communicated UMA with patient/caregiver Yes     Reason For Admission Afib     People in Home alone     Do you expect to return to your current living situation? Yes     Do you have help at home or someone to help you manage your care at home? Yes     Prior to hospitilization cognitive status: Alert/Oriented     Current cognitive status: Alert/Oriented     Equipment Currently Used at Home shower chair     Readmission within 30 days? Yes   Recently discharged  from [facility] on 3/23    Patient currently being followed by outpatient case management? No     Do you currently have service(s) that help you manage your care at home? No     Do you take prescription medications? Yes     Do you have prescription coverage? Yes     Do you have any problems affording any of your prescribed medications? No     Is the patient taking medications as prescribed? yes     Who is going to help you get home at discharge? Patient's family will provide transportation home.     How do you get to doctors appointments? family or friend will provide     Are you on dialysis? No     Do you take coumadin? No     Discharge Plan A Home with family     Discharge Plan B Home Health     DME Needed Upon Discharge  other (see comments)   TBD    Discharge Plan discussed with: Patient     Discharge Barriers Identified None                   Lorena Olivier RN  Ext [phone]

## 2023-03-28 NOTE — H&P
[facility] - Telemetry [facility]  History & Physical    Patient Name: Pauline Cronin  MRN: [id number]  Patient Class: IP- Inpatient  Admission Date: 3/27/2023  Attending Physician: Mandy Goodson MD   Primary Care Provider: Ga Waldrop MD         Patient information was obtained from patient, past medical records and ER records.     Subjective:     Principal Problem:Paroxysmal atrial fibrillation    Chief Complaint:   Chief Complaint   Patient presents with    Chest Pain     Pt from home c/o chest pain, SOB that started about an hour ago. HX of pacemaker and a-fib. Abnormal EKG with EMS. Given 324 mg ASA and 2 spray of nitro.         HPI: 82-year-old past medical history of AFib on Eliquis, Crohn's disease, sick sinus syndrome status post PPM., hyperlipidemia, hypertension, pancreatic head mass with recent hospitalization for ERCP and post ER CP pancreatitis discharge 3/23/23 presenting with chest pain and palpitations.  Patient mentions starting at 5 a.m. this morning noted palpitations with chest pain on left-sided chest radiating to left arm.      Past Medical History:   Diagnosis Date    Anticoagulant long-term use     Atrial fibrillation     Crohn disease diagnosed in her 20's    GERD (gastroesophageal reflux disease)     Hyperlipidemia     Hypertension     Pancreatic adenocarcinoma 3/21/2023    Thyroid disease     s/p I 131       Past Surgical History:   Procedure Laterality Date    APPENDECTOMY      CARDIAC SURGERY  2014    pacemaker    COLONOSCOPY  ~2008    normal findings per patient report    ENDOSCOPIC ULTRASOUND OF UPPER GASTROINTESTINAL TRACT N/A 3/14/2023    Procedure: ULTRASOUND, UPPER GI TRACT, ENDOSCOPIC;  Surgeon: Andrei Swartz MD;  Location: [facility];  Service: Endoscopy;  Laterality: N/A;  Approval to hold Eliquis rec'd from Dr. Barbosa (see telephone encounter 3/3/23)-DS  [employer] AICD/PPM  3/3/23-Instructions via portal-DS    ERCP N/A 3/21/2023     Procedure: ERCP (ENDOSCOPIC RETROGRADE CHOLANGIOPANCREATOGRAPHY);  Surgeon: Celina Toney MD;  [facility]  Service: Endoscopy;  Laterality: N/A;    ESOPHAGOGASTRODUODENOSCOPY N/A 7/17/2018    Procedure: EGD (ESOPHAGOGASTRODUODENOSCOPY);  Surgeon: Alondra Casiano MD;  Location: [facility] ENDO;  Service: Endoscopy;  Laterality: N/A;    HYSTERECTOMY      INSERTION OF IMPLANTABLE CARDIOVERTER-DEFIBRILLATOR (ICD) GENERATOR WITH TWO EXISTING LEADS Left 5/10/2021    Procedure: INSERTION, PULSE GENERATOR WITH 2 EXISTING LEADS, ICD;  Surgeon: Bo Leone MD;  Location: [facility] CATH LAB;  Service: Cardiology;  Laterality: Left;    INSERTION OF PACEMAKER      REPLACEMENT OF PACEMAKER GENERATOR  05/10/2021    RETROGRADE PYELOGRAPHY Right 2/18/2020    Procedure: PYELOGRAM, RETROGRADE;  Surgeon: Jovan Kruse MD;  Location: [facility] OR;  Service: Urology;  Laterality: Right;    SMALL INTESTINE SURGERY  in her 20's    for crohn's    TONSILLECTOMY      UPPER GASTROINTESTINAL ENDOSCOPY  ~2008    normal findings per patient report       Review of patient's allergies indicates:   Allergen Reactions    Lisinopril Other (See Comments)     cough       No current facility-administered medications on file prior to encounter.     Current Outpatient Medications on File Prior to Encounter   Medication Sig    acetaminophen (TYLENOL) 325 MG tablet Take 650 mg by mouth daily as needed (Headache).    alendronate (FOSAMAX) 70 MG tablet Take 1 tablet (70 mg total) by mouth every 7 days.    amLODIPine (NORVASC) 5 MG tablet Take 1 tablet (5 mg total) by mouth once daily.    atorvastatin (LIPITOR) 20 MG tablet Take 1 tablet (20 mg total) by mouth once daily.    carvediloL (COREG) 6.25 MG tablet Take 1 tablet (6.25 mg total) by mouth 2 (two) times daily with meals.    ELIQUIS 5 mg Tab Take 1 tablet (5 mg total) by mouth 2 (two) times daily.    esomeprazole (NEXIUM) 40 MG capsule Take 1 capsule (40 mg total) by  mouth once daily.    ferrous sulfate (IRON) 325 mg (65 mg iron) Tab tablet Take 1 tablet (325 mg total) by mouth daily with breakfast.    folic acid (FOLVITE) 1 MG tablet Take 1 tablet by mouth once daily    levothyroxine (SYNTHROID, LEVOTHROID) 175 MCG tablet Take 1 tablet by mouth once daily    MELATONIN ORAL Take 1 tablet by mouth nightly as needed (Insomnia).    sotaloL (BETAPACE) 120 MG Tab Take 1 tablet (120 mg total) by mouth every 12 (twelve) hours.    sulfaSALAzine (AZULFIDINE) 500 mg Tab Take 1 tablet by mouth twice daily    tolterodine (DETROL LA) 4 MG 24 hr capsule Take 1 capsule (4 mg total) by mouth once daily.     Family History       Problem Relation (Age of Onset)    Breast cancer Mother    Cancer Mother    Crohn's disease Other          Tobacco Use    Smoking status: Former     Types: Cigarettes     Quit date:      Years since quittin.2    Smokeless tobacco: Never   Substance and Sexual Activity    Alcohol use: No    Drug use: No    Sexual activity: Never     Review of Systems   Constitutional:  Negative for appetite change, chills, fatigue, fever and unexpected weight change.   HENT:  Negative for congestion and dental problem.    Eyes:  Negative for photophobia and visual disturbance.   Respiratory:  Positive for shortness of breath. Negative for cough.    Cardiovascular:  Positive for chest pain. Negative for leg swelling.   Gastrointestinal:  Negative for abdominal pain, constipation, diarrhea and nausea.   Genitourinary:  Negative for difficulty urinating and dysuria.   Musculoskeletal:  Negative for arthralgias and back pain.   Skin:  Negative for color change and rash.   Neurological:  Negative for light-headedness and headaches.   Hematological:  Negative for adenopathy. Does not bruise/bleed easily.   Psychiatric/Behavioral:  Negative for agitation and behavioral problems.    Objective:     Vital Signs (Most Recent):  Temp: 98.2 °F (36.8 °C) (23 0808)  Pulse: 70  (03/28/23 0949)  Resp: (!) 26 (03/28/23 0809)  BP: (!) 142/60 (03/28/23 1208)  SpO2: 99 % (03/28/23 0809)   Vital Signs (24h Range):  Temp:  [97.4 °F (36.3 °C)-98.6 °F (37 °C)] 98.2 °F (36.8 °C)  Pulse:  [] 70  Resp:  [14-30] 26  SpO2:  [93 %-100 %] 99 %  BP: (101-142)/(53-78) 142/60     Weight: 78.4 kg (172 lb 13.5 oz)  Body mass index is 33.76 kg/m².    Physical Exam  Constitutional:       General: She is not in acute distress.     Appearance: She is well-developed.   HENT:      Head: Normocephalic and atraumatic.      Nose: Nose normal. No congestion.   Eyes:      General: No scleral icterus.     Extraocular Movements: Extraocular movements intact.   Cardiovascular:      Rate and Rhythm: Tachycardia present. Rhythm irregular.   Pulmonary:      Effort: Pulmonary effort is normal. No respiratory distress.   Abdominal:      General: There is no distension.      Palpations: Abdomen is soft.   Musculoskeletal:         General: Normal range of motion.      Cervical back: Neck supple. No rigidity.   Skin:     General: Skin is warm and dry.   Neurological:      Mental Status: She is alert and oriented to person, place, and time. Mental status is at baseline.   Psychiatric:         Mood and Affect: Mood normal.         Behavior: Behavior normal.           Significant Labs: All pertinent labs within the past 24 hours have been reviewed.    Significant Imaging: I have reviewed all pertinent imaging results/findings within the past 24 hours.    Assessment/Plan:     * Paroxysmal atrial fibrillation  Patient with Paroxysmal (<7 days) atrial fibrillation which is uncontrolled currently with Beta Blocker. Patient is currently in atrial fibrillation.[id number] Score: 3. On eliquis    Check EKG  Trend troponin  TSH with am labs  Rate control   Eliquis  Echo  Cardiology consult      Palliative care encounter        Pancreatic adenocarcinoma    Outpatient followup  Oncology and palliative assistance appreciated    Crohn's  disease of large intestine without complication    Continue home regimen   Appears stable      VTE Risk Mitigation (From admission, onward)         Ordered     apixaban tablet 5 mg  2 times daily         03/27/23 1613     IP VTE HIGH RISK PATIENT  Once         03/27/23 1613     Place sequential compression device  Until discontinued         03/27/23 1613                           Mandy Goodson MD  Department of Hospital Medicine  [facility] - Telemetry Stepdown

## 2023-03-28 NOTE — PLAN OF CARE
Problem: Adult Inpatient Plan of Care  Goal: Plan of Care Review  Outcome: Met  Goal: Patient-Specific Goal (Individualized)  Outcome: Met  Goal: Absence of Hospital-Acquired Illness or Injury  Outcome: Met  Goal: Optimal Comfort and Wellbeing  Outcome: Met  Goal: Readiness for Transition of Care  Outcome: Met     Problem: Coping Ineffective  Goal: Effective Coping  Outcome: Met     Problem: Fall Injury Risk  Goal: Absence of Fall and Fall-Related Injury  Outcome: Met     Problem: Skin Injury Risk Increased  Goal: Skin Health and Integrity  Outcome: Met     VSS, AAOx4, day uneventful. Pt walks to restroom with standby assist. BM x1. Goals of care discussed with primary and palliative care. Echo completed at bedside. Pt happy at this time. Home to self-care

## 2023-03-28 NOTE — CONSULTS
[facility] - Telemetry Stepdown  Palliative Medicine  Consult Note    Patient Name: Pauline Cronin  MRN: [id number]  Admission Date: 3/27/2023  Hospital Length of Stay: 1 days  Code Status: Full Code   Attending Provider: Mandy Goodson MD  Consulting Provider: JUSTINA Ruelas  Primary Care Physician: Ga Waldrop MD  Principal Problem:<principal problem not specified>    Patient information was obtained from patient and primary team.      Inpatient consult to Palliative Care  Consult performed by: JUSTINA Butler  Consult ordered by: Mandy Goodson MD      Assessment/Plan:     Palliative Care  Palliative care encounter  Impression: Pt is an 82 y/o female with recently dx pancreatic cancer. Pt has hx of Crohn's and a-fib. Pt is alert, oriented to person, place, time, and situation. No distress noted.     Reason for consult: ACP.     Goals of care/ACP:  Met with pt whose goal is to continue medical management at this time and f/u in Hem/onc clinic for options for treatment for her pancreatic cancer. Per pt if treatment would cause her QOL to decline, she would not want to have treatment and would focus in comfort/QOL.     Code status: Code status discussed. Pt reports she wants to be a DNR. Messaged Dr. Goodson.     MPOA: MPOA education done with pt. Per pt her NOK is her 2 sons but she wants her daughter-in-law Karen to be MPOA. Paperwork given to pt to complete. She wants to speak to Karen before filling out. Let pt know form can be witnessed in hospital if she completes or she can bring to clinic appt with Dr. Magana in April.      Pt reports she lives alone but when needed, she will move in with her son and daughter-in-law Karen. They live a block away from her. Pt also has another son and daughter who is .     Symptom management:   Pt reports unsteadiness at times, especially during night.   Pt asked about at walker.   Would have PT/OT evaluate and treat.     Pt denies pain,  "N/V.     Dyspnea:   Pt reports feeling of SOB when "a-fib acts up".  Pt is on O2 per NC.   No complaints at this time.   Pt sats 100%.   Pt does not use O2 at home.     Plan:   Pt wants code status to be DNR. Communicated with Dr. Goodson.  Pt to f/u in Hem/Onc clinic and Palliative care clinic.   MPOA given to pt. Pt reports he wants her daughter-in-law Karen Mejia to be MPOA. She wants to discuss with Karen before filling out.     Will follow.             Thank you for your consult. I will follow-up with patient. Please contact us if you have any additional questions.    Subjective:     HPI:   Pt is an 83 year old woman with Crohn's disease, recently diagnosed pancreatic adenocarcinoma and paroxysmal A.fib who presented with chest pain and SOB. She was found to be in A.fib with RVR. Of note she was recently hospitalized from 03/17-03/23 where she had an ERCP done and course was complicated by ERCP induced pancreatitis.      Per chart review, oncologic history (per discharge summary on 03/23): The patient presented to her PCP on March 1st reporting fatigue and 10 pounds of unintentional weight loss. She was sent for a CT abdomen/pelvis on 3/1/2023 which demonstrated a pancreatic head mass. She underwent ERCP w/ biopsy of the mass on 3/14, biopsy results are pending at the time of admission. Plan for outpatient ERCP w/ stent placement was planned for 3/29. She was instructed to present to the ED due to elevated bilirubin, jaundice, and icterus noted on outpatient labs on 3/16.     Medical Oncology was consulted for pancreatic adenocarcinoma      Hospital Course:  No notes on file    Interval History: Pt newly dx pancreatic cancer.       Past Surgical History:   Procedure Laterality Date    APPENDECTOMY      CARDIAC SURGERY  2014    pacemaker    COLONOSCOPY  ~2008    normal findings per patient report    ENDOSCOPIC ULTRASOUND OF UPPER GASTROINTESTINAL TRACT N/A 3/14/2023    Procedure: ULTRASOUND, UPPER GI TRACT, " ENDOSCOPIC;  Surgeon: Andrei Swartz MD;  Location: [facility] ENDO;  Service: Endoscopy;  Laterality: N/A;  Approval to hold Eliquis rec'd from Dr. Barbosa (see telephone encounter 3/3/23)-DS  [employer] AICD/PPM  3/3/23-Instructions via portal-DS    ERCP N/A 3/21/2023    Procedure: ERCP (ENDOSCOPIC RETROGRADE CHOLANGIOPANCREATOGRAPHY);  Surgeon: Celina Toney MD;  Location: [facility] ENDO ([facility]LR);  Service: Endoscopy;  Laterality: N/A;    ESOPHAGOGASTRODUODENOSCOPY N/A 7/17/2018    Procedure: EGD (ESOPHAGOGASTRODUODENOSCOPY);  Surgeon: Alondra Casiano MD;  Location: [facility] ENDO;  Service: Endoscopy;  Laterality: N/A;    HYSTERECTOMY      INSERTION OF IMPLANTABLE CARDIOVERTER-DEFIBRILLATOR (ICD) GENERATOR WITH TWO EXISTING LEADS Left 5/10/2021    Procedure: INSERTION, PULSE GENERATOR WITH 2 EXISTING LEADS, ICD;  Surgeon: Bo Leone MD;  Location: [facility] CATH LAB;  Service: Cardiology;  Laterality: Left;    INSERTION OF PACEMAKER      REPLACEMENT OF PACEMAKER GENERATOR  05/10/2021    RETROGRADE PYELOGRAPHY Right 2/18/2020    Procedure: PYELOGRAM, RETROGRADE;  Surgeon: Jovan Kruse MD;  Location: [facility];  Service: Urology;  Laterality: Right;    SMALL INTESTINE SURGERY  in her 20's    for crohn's    TONSILLECTOMY      UPPER GASTROINTESTINAL ENDOSCOPY  ~2008    normal findings per patient report       Review of patient's allergies indicates:   Allergen Reactions    Lisinopril Other (See Comments)     cough       Medications:  Continuous Infusions:  Scheduled Meds:   apixaban  5 mg Oral BID    atorvastatin  20 mg Oral Daily    ferrous gluconate  324 mg Oral Daily with breakfast    folic acid  1 mg Oral Daily    levothyroxine  175 mcg Oral Before breakfast    metoprolol tartrate  25 mg Oral Q6H    oxybutynin  5 mg Oral BID    pantoprazole  40 mg Oral Daily    senna-docusate 8.6-50 mg  1 tablet Oral Q12H    sotaloL  120 mg Oral BID    sulfaSALAzine  500 mg Oral BID     PRN Meds:acetaminophen, acetaminophen,  HYDROcodone-acetaminophen, melatonin, metoprolol, ondansetron, prochlorperazine, sodium chloride 0.9%    Family History       Problem Relation (Age of Onset)    Breast cancer Mother    Cancer Mother    Crohn's disease Other          Tobacco Use    Smoking status: Former     Types: Cigarettes     Quit date:      Years since quittin.2    Smokeless tobacco: Never   Substance and Sexual Activity    Alcohol use: No    Drug use: No    Sexual activity: Never       Review of Systems   Constitutional:  Positive for activity change and fatigue.   Respiratory:  Negative for shortness of breath.    Gastrointestinal:  Negative for nausea and vomiting.   Musculoskeletal:  Positive for gait problem.   Neurological:  Positive for weakness.   Psychiatric/Behavioral: Negative.     Objective:     Vital Signs (Most Recent):  Temp: 98.2 °F (36.8 °C) (23 08)  Pulse: 69 (23 0606)  Resp: (!) 25 (23 08)  BP: 139/60 (23 0808)  SpO2: 100 % (23 0500)   Vital Signs (24h Range):  Temp:  [97.4 °F (36.3 °C)-98.6 °F (37 °C)] 98.2 °F (36.8 °C)  Pulse:  [] 69  Resp:  [14-34] 25  SpO2:  [93 %-100 %] 100 %  BP: ()/(53-84) 139/60     Weight: 78.4 kg (172 lb 13.5 oz)  Body mass index is 33.76 kg/m².    Physical Exam  Constitutional:       Interventions: Nasal cannula in place.   HENT:      Head: Normocephalic and atraumatic.   Eyes:      General: Lids are normal.      Conjunctiva/sclera: Conjunctivae normal.   Pulmonary:      Effort: No respiratory distress.   Musculoskeletal:      Cervical back: Normal range of motion.      Comments: ROM normal   Skin:     General: Skin is warm and dry.   Neurological:      Mental Status: She is alert and oriented to person, place, and time.   Psychiatric:         Attention and Perception: Attention normal.         Mood and Affect: Mood normal.         Speech: Speech normal.         Behavior: Behavior is cooperative.       Review of Symptoms      Symptom Assessment  (ESAS 0-10 Scale)  Pain:  0  Dyspnea:  0  Anxiety:  0  Nausea:  0  Depression:  0  Anorexia:  0  Fatigue:  0  Insomnia:  0  Restlessness:  0  Agitation:  0         Psychosocial/Cultural:   See Palliative Psychosocial Note: No  Pt lives by herself. Pt's son and daughter-in-law live a block away. Depending on what she needs she may move in with them. Karen drives a school bus and can be present for appts around her pick-up and drop off time.   **Primary  to Follow**  Palliative Care  Consult: No      Advance Care Planning   Advance Directives:   Do Not Resuscitate: Pt reports she wants to be a DNR..    Agent's Name:  Pt's adult sons are NOK.    Decision Making:  Patient answered questions  Goals of Care: The patient endorses that what is most important right now is to focus on quality of life, even if it means sacrificing a little time    Accordingly, we have decided that the best plan to meet the patient's goals includes continuing with treatment       Significant Labs: All pertinent labs within the past 24 hours have been reviewed.  CBC:   Recent Labs   Lab 03/28/23  0240   WBC 13.53*   HGB 8.4*   HCT 28.7*   MCV 80*        BMP:  Recent Labs   Lab 03/27/23  1255 03/28/23  0240   GLU 95 96    140   K 3.0* 3.3*    100   CO2 23 26   BUN 6* 9   CREATININE 1.0 0.9   CALCIUM 9.0 8.4*   MG 1.3*  --      LFT:  Lab Results   Component Value Date    AST 24 03/28/2023    ALKPHOS 137 (H) 03/28/2023    BILITOT 1.3 (H) 03/28/2023     Albumin:   Albumin   Date Value Ref Range Status   03/28/2023 2.0 (L) 3.5 - 5.2 g/dL Final     Protein:   Total Protein   Date Value Ref Range Status   03/28/2023 6.4 6.0 - 8.4 g/dL Final     Lactic acid:   Lab Results   Component Value Date    LACTATE 1.5 03/23/2023    LACTATE 1.2 03/17/2023       Significant Imaging: I have reviewed all pertinent imaging results/findings within the past 24 hours.        25 minutes of ACP completed.       Opal CHATMAN  Charmaine, CNS  Palliative Medicine  [facility] - Telemetry Stepdown

## 2023-03-28 NOTE — PROGRESS NOTES
[facility] - Telemetry [facility]  Progress Note    Patient Name: Pauline Cronin  MRN: [id number]  Patient Class: IP- Inpatient   Admission Date: 3/27/2023  Length of Stay: 1 days  Attending Physician: Mandy Goodson MD  Primary Care Provider: Ga Waldrop MD        Subjective:     Principal Problem:Paroxysmal atrial fibrillation        HPI:  82-year-old past medical history of AFib on Eliquis, Crohn's disease, sick sinus syndrome status post PPM., hyperlipidemia, hypertension, pancreatic head mass with recent hospitalization for ERCP and post ER CP pancreatitis discharge 3/23/23 presenting with chest pain and palpitations.  Patient mentions starting at 5 a.m. this morning noted palpitations with chest pain on left-sided chest radiating to left arm.      Overview/Hospital Course:  No notes on file    Interval History:   3/28: Subjectively feels better    Review of Systems   Constitutional:  Negative for appetite change, chills, fatigue, fever and unexpected weight change.   HENT:  Negative for congestion and dental problem.    Eyes:  Negative for photophobia and visual disturbance.   Respiratory:  Positive for shortness of breath. Negative for cough.    Cardiovascular:  Positive for chest pain. Negative for leg swelling.   Gastrointestinal:  Negative for abdominal pain, constipation, diarrhea and nausea.   Genitourinary:  Negative for difficulty urinating and dysuria.   Musculoskeletal:  Negative for arthralgias and back pain.   Skin:  Negative for color change and rash.   Neurological:  Negative for light-headedness and headaches.   Hematological:  Negative for adenopathy. Does not bruise/bleed easily.   Psychiatric/Behavioral:  Negative for agitation and behavioral problems.    Objective:     Vital Signs (Most Recent):  Temp: 98.2 °F (36.8 °C) (03/28/23 0808)  Pulse: 72 (03/28/23 1208)  Resp: (!) 25 (03/28/23 1208)  BP: (!) 142/60 (03/28/23 1208)  SpO2: 98 % (03/28/23 1208)   Vital Signs (24h  Range):  Temp:  [97.4 °F (36.3 °C)-98.6 °F (37 °C)] 98.2 °F (36.8 °C)  Pulse:  [] 72  Resp:  [14-30] 25  SpO2:  [93 %-100 %] 98 %  BP: (101-142)/(53-78) 142/60     Weight: 78.4 kg (172 lb 13.5 oz)  Body mass index is 33.76 kg/m².    Intake/Output Summary (Last 24 hours) at 3/28/2023 1212  Last data filed at 3/28/2023 1153  Gross per 24 hour   Intake --   Output 400 ml   Net -400 ml      Physical Exam  Constitutional:       General: She is not in acute distress.     Appearance: She is well-developed.   HENT:      Head: Normocephalic and atraumatic.      Nose: Nose normal. No congestion.   Eyes:      General: No scleral icterus.     Extraocular Movements: Extraocular movements intact.   Cardiovascular:      Rate and Rhythm: Tachycardia present. Rhythm irregular.   Pulmonary:      Effort: Pulmonary effort is normal. No respiratory distress.   Abdominal:      General: There is no distension.      Palpations: Abdomen is soft.   Musculoskeletal:         General: Normal range of motion.      Cervical back: Neck supple. No rigidity.   Skin:     General: Skin is warm and dry.   Neurological:      Mental Status: She is alert and oriented to person, place, and time. Mental status is at baseline.   Psychiatric:         Mood and Affect: Mood normal.         Behavior: Behavior normal.       Significant Labs: All pertinent labs within the past 24 hours have been reviewed.    Significant Imaging: I have reviewed all pertinent imaging results/findings within the past 24 hours.      Assessment/Plan:      * Paroxysmal atrial fibrillation  Patient with Paroxysmal (<7 days) atrial fibrillation which is uncontrolled currently with Beta Blocker. Patient is currently in atrial fibrillation.[id number] Score: 3. On eliquis    Check EKG  Trend troponin  TSH with am labs  Rate control   Eliquis  Echo  Cardiology consult      Palliative care encounter        Pancreatic adenocarcinoma    Outpatient followup  Oncology and palliative  assistance appreciated    Crohn's disease of large intestine without complication    Continue home regimen   Appears stable      VTE Risk Mitigation (From admission, onward)         Ordered     apixaban tablet 5 mg  2 times daily         03/27/23 1613     IP VTE HIGH RISK PATIENT  Once         03/27/23 1613     Place sequential compression device  Until discontinued         03/27/23 1613                Discharge Planning   UMA:      Code Status: DNR   Is the patient medically ready for discharge?:     Reason for patient still in hospital (select all that apply): Patient trending condition                     Mandy Goodson MD  Department of Hospital Medicine   [facility] - Telemetry Stepdown

## 2023-03-28 NOTE — HPI
Pt is an 83 year old woman with Crohn's disease, recently diagnosed pancreatic adenocarcinoma and paroxysmal A.fib who presented with chest pain and SOB. She was found to be in A.fib with RVR. Of note she was recently hospitalized from 03/17-03/23 where she had an ERCP done and course was complicated by ERCP induced pancreatitis.      Per chart review, oncologic history (per discharge summary on 03/23): The patient presented to her PCP on March 1st reporting fatigue and 10 pounds of unintentional weight loss. She was sent for a CT abdomen/pelvis on 3/1/2023 which demonstrated a pancreatic head mass. She underwent ERCP w/ biopsy of the mass on 3/14, biopsy results are pending at the time of admission. Plan for outpatient ERCP w/ stent placement was planned for 3/29. She was instructed to present to the ED due to elevated bilirubin, jaundice, and icterus noted on outpatient labs on 3/16.     Medical Oncology was consulted for pancreatic adenocarcinoma

## 2023-03-28 NOTE — PLAN OF CARE
Recommendations     1. Liberalize diet to Regular & add Boost Plus ONS BID.   2. RD to monitor & follow-up.     Goals: Meet % EEN, EPN by RD f/u date  Nutrition Goal Status: new  Communication of RD Recs: reviewed with RN

## 2023-03-28 NOTE — ASSESSMENT & PLAN NOTE
Pulmonary HTN    Echo 3/28:  · The estimated ejection fraction is 60%.  · The left ventricle is normal in size with concentric hypertrophy and normal systolic function. There is systolic anterior motion without LVOT obstruction. sonographer will be sent back to verify lack of LVOT obstruction.  · There is abnormal septal wall motion.  · Grade II left ventricular diastolic dysfunction.  · Normal right ventricular size with normal right ventricular systolic function.  · Mild left atrial enlargement.  · The estimated PA systolic pressure is 39 mmHg.  · Normal central venous pressure (3 mmHg).    Responded to diuresis overnight, now feels at baseline.    RECOMMENDATIONS:  · Weigh self daily at home   · Lasix 40 PO daily PRN if gain 3 lbs in 24h or 5 lbs in 1 wk  · Follow up with Dr. Barbosa to further discuss TTE results

## 2023-03-29 ENCOUNTER — TELEPHONE (OUTPATIENT)
Dept: SURGERY | Facility: CLINIC | Age: 83
End: 2023-03-29
Payer: MEDICARE

## 2023-03-29 ENCOUNTER — TELEPHONE (OUTPATIENT)
Dept: CARDIOLOGY | Facility: CLINIC | Age: 83
End: 2023-03-29
Payer: MEDICARE

## 2023-03-29 DIAGNOSIS — C25.9 PANCREATIC ADENOCARCINOMA: Primary | ICD-10-CM

## 2023-03-29 NOTE — TELEPHONE ENCOUNTER
----- Message from Greg Vergara sent at 3/29/2023 10:20 AM CDT -----  Contact: [phone]  Pt daughter calling to get a call bk please advise [phone]

## 2023-03-29 NOTE — TELEPHONE ENCOUNTER
Pt's dtr (Karen) requesting an appointment with Dr. Santamaria/NOLA Condon NP to discuss pts test results and current poc. Appointment provided for 4/3/23 @ 1486 with NOLA Condon NP. Dtr verbalized understanding.

## 2023-03-29 NOTE — PLAN OF CARE
[facility] - Telemetry Stepdown  Discharge Final Note    Primary Care Provider: Ga Waldrop MD    Expected Discharge Date: 3/28/2023      Future Appointments   Date Time Provider Department Center   4/3/2023 10:30 AM Machelleeric Arnold NP [facility]   4/5/2023  9:00 AM CV [facility] ECHO/EKG [facility]   4/20/2023  9:00 AM Jovan Barbosa MD [facility]   5/1/2023  1:00 PM Eugenio Magana MD [facility]          Final Discharge Note (most recent)       Final Note - 03/29/23 0741          Final Note    Assessment Type Final Discharge Note     Anticipated Discharge Disposition Home or Self Care     What phone number can be called within the next 1-3 days to see how you are doing after discharge? [phone]     Hospital Resources/Appts/Education Provided Appointments scheduled and added to AVS        Post-Acute Status    Post-Acute Authorization Other     Other Status No Post-Acute Service Needs                     Important Message from Medicare      Lorena Olivier RN  Ext [phone]

## 2023-03-31 ENCOUNTER — TELEPHONE (OUTPATIENT)
Dept: ADMINISTRATIVE | Facility: CLINIC | Age: 83
End: 2023-03-31
Payer: MEDICARE

## 2023-03-31 ENCOUNTER — TELEPHONE (OUTPATIENT)
Dept: SURGERY | Facility: CLINIC | Age: 83
End: 2023-03-31
Payer: MEDICARE

## 2023-03-31 DIAGNOSIS — K83.1 BILIARY OBSTRUCTION: Primary | ICD-10-CM

## 2023-03-31 DIAGNOSIS — C25.9 PANCREATIC ADENOCARCINOMA: ICD-10-CM

## 2023-03-31 RX ORDER — ONDANSETRON 4 MG/1
4 TABLET, ORALLY DISINTEGRATING ORAL EVERY 8 HOURS PRN
Qty: 60 TABLET | Refills: 0 | Status: SHIPPED | OUTPATIENT
Start: 2023-03-31 | End: 2023-04-18 | Stop reason: SDUPTHER

## 2023-03-31 NOTE — TELEPHONE ENCOUNTER
Notified pts dtr of anti emetic that was sent to pharmacy this morning. Dtr verbalized understanding.

## 2023-03-31 NOTE — PROGRESS NOTES
"Phoned patient in response to reply of "2" to post-discharge texting tracker. Left message on voicemail including OOC number.  2nd attempt-spoke to daughter-in-law Karen who states she replied to the text because patient needed medication for nausea and she wasn't sure if Ms. Cronin had been discharged with any. She has since reached out to doctor's office and verified that a prescription was sent to [facility] in [place] for ondansetron. Karen verbalized understanding.  "

## 2023-04-01 LAB
BACTERIA BLD CULT: NORMAL
BACTERIA BLD CULT: NORMAL

## 2023-04-03 ENCOUNTER — DOCUMENTATION ONLY (OUTPATIENT)
Dept: SURGERY | Facility: CLINIC | Age: 83
End: 2023-04-03
Payer: MEDICARE

## 2023-04-03 NOTE — PROGRESS NOTES
Advanced directives scanned into chart and faxed to responsible party. Fax confirmation email received.

## 2023-04-04 ENCOUNTER — PATIENT OUTREACH (OUTPATIENT)
Dept: ADMINISTRATIVE | Facility: OTHER | Age: 83
End: 2023-04-04
Payer: MEDICARE

## 2023-04-04 ENCOUNTER — TELEPHONE (OUTPATIENT)
Dept: ADMINISTRATIVE | Facility: CLINIC | Age: 83
End: 2023-04-04
Payer: MEDICARE

## 2023-04-04 ENCOUNTER — TELEPHONE (OUTPATIENT)
Dept: SURGERY | Facility: CLINIC | Age: 83
End: 2023-04-04
Payer: MEDICARE

## 2023-04-04 NOTE — TELEPHONE ENCOUNTER
Placed call to pt and spoke to pt daughter to let her know we have r/s her mom appt to 4/13/23 @ 1:30 pm with Marcella.   Pt daughter acknowledged pt appt date & time.

## 2023-04-04 NOTE — PROGRESS NOTES
CHW completed SDOH with patients daughter in law, Pauline. Pauline stated that Mrs. Cronin must have submitted the text notification. She is planning to move Ms. Cronin in her home and will be there to assist her with her care. CHW informed her of COA that if she chooses later she could apply for home aid services. CHW informed her of [employer] OTC assistance that Mrs. Cronin may qualify for. Mrs. Carpenter decline any needs or assistance at this time.  CHW informed her of her contact information for further needs.

## 2023-04-05 NOTE — DISCHARGE SUMMARY
DISCHARGE SUMMARY  Hospital Medicine    Team: [facility]    Patient Name: Pauline Cronin  YOB: 1940    Admit Date: 3/27/2023    Discharge Date: 4/5/2023    Discharge Attending Physician: Mandy Goodson     Admitting Resident    Diagnoses:  Active Hospital Problems    Diagnosis  POA    *Paroxysmal atrial fibrillation [I48.0]  Yes     Followed by Dr. Leone        Palliative care encounter [Z51.5]  Not Applicable    Debility [R53.81]  Yes    Goals of care, counseling/discussion [Z71.89]  Not Applicable    Advanced care planning/counseling discussion [Z71.89]  Not Applicable    Acute heart failure with preserved ejection fraction (HFpEF) [I50.31]  Yes    Pulmonary hypertension [I27.20]  Yes    Pancreatic adenocarcinoma [C25.9]  Yes    Crohn's disease of large intestine without complication [K50.10]  Yes      Resolved Hospital Problems   No resolved problems to display.       Discharged Condition: admit problems have stabilized     HOSPITAL COURSE:      Initial Presentation:    82-year-old past medical history of AFib on Eliquis, Crohn's disease, sick sinus syndrome status post PPM., hyperlipidemia, hypertension, pancreatic head mass with recent hospitalization for ERCP and post ER CP pancreatitis discharge 3/23/23 presenting with chest pain and palpitations.  Patient mentions starting at 5 a.m. this morning noted palpitations with chest pain on left-sided chest radiating to left arm    Course of Principle Problem for Admission:    Paroxysmal atrial fibrillation  83F w known AFib and LBBB hx presented in AFib on arrival to ED.  Converted to NSR after home meds (Coreg 6.25, Sotalol 120) given.  Now feels at baseline.      Per the most recent EKG/telemetry, pt is currently in sinus rhythm.    [id number] score 3 which would favor use of anticoagulation.  HASBLED score of 1 with a 1.02 % risk of bleeding; only for warfarin use.       RECOMMENDATIONS:  Maintain K > 4, Mag > 2 and Ca/iCal WNL to decrease  arrhythmogenic potential  Rate control with Toprol- daily  Rhythm control with home Sotalol 120 BID so long as QTc < 500  Anticoagulation with Eliquis 5 mg BID  Follow up with Dr. Barbosa for medication changes based on results of today's TTE  Low-salt diet    Other Medical Problems Addressed in the Hospital:    Acute heart failure with preserved ejection fraction (HFpEF)  Pulmonary HTN     Echo 3/28:  · The estimated ejection fraction is 60%.  · The left ventricle is normal in size with concentric hypertrophy and normal systolic function. There is systolic anterior motion without LVOT obstruction. sonographer will be sent back to verify lack of LVOT obstruction.  · There is abnormal septal wall motion.  · Grade II left ventricular diastolic dysfunction.  · Normal right ventricular size with normal right ventricular systolic function.  · Mild left atrial enlargement.  · The estimated PA systolic pressure is 39 mmHg.  · Normal central venous pressure (3 mmHg).     Responded to diuresis overnight, now feels at baseline.     RECOMMENDATIONS:  Weigh self daily at home   Lasix 40 PO daily PRN if gain 3 lbs in 24h or 5 lbs in 1 wk  Follow up with Dr. Barbosa to further discuss TTE results       CONSULTS: cardiology     Last CBC/BMP/HgbA1c (if applicable):  Recent Results (from the past 336 hour(s))   CBC Auto Differential    Collection Time: 03/28/23  2:40 AM   Result Value Ref Range    WBC 13.53 (H) 3.90 - 12.70 K/uL    Hemoglobin 8.4 (L) 12.0 - 16.0 g/dL    Hematocrit 28.7 (L) 37.0 - 48.5 %    Platelets 298 150 - 450 K/uL   CBC auto differential    Collection Time: 03/27/23 11:50 AM   Result Value Ref Range    WBC 12.74 (H) 3.90 - 12.70 K/uL    Hemoglobin 8.8 (L) 12.0 - 16.0 g/dL    Hematocrit 29.9 (L) 37.0 - 48.5 %    Platelets 411 150 - 450 K/uL   CBC with Automated Differential    Collection Time: 03/23/23  4:06 AM   Result Value Ref Range    WBC 12.75 (H) 3.90 - 12.70 K/uL    Hemoglobin 8.3 (L) 12.0 - 16.0  g/dL    Hematocrit 26.7 (L) 37.0 - 48.5 %    Platelets 221 150 - 450 K/uL     No results found for this or any previous visit (from the past 336 hour(s)).  Lab Results   Component Value Date    HGBA1C 5.6 12/16/2020       Disposition:     Home with Home Health       Future Scheduled Appointments:  Future Appointments   Date Time Provider Department Center   4/13/2023  1:00 PM [facility]   4/13/2023  1:30 PM Marcella Condon NP [facility]   4/13/2023  2:00 PM Jim Hill MD [facility] HEMONC2 [facility]   4/18/2023  9:15 AM Ga Waldrop MD [facility]   4/20/2023  9:00 AM Jovan Barbosa MD [facility]   5/1/2023  1:00 PM Eugenio Magana MD [facility]       Follow-up Plans from This Hospitalization:  cardiology    Discharge Medication List:         Medication List        CONTINUE taking these medications      acetaminophen 325 MG tablet  Commonly known as: TYLENOL     alendronate 70 MG tablet  Commonly known as: FOSAMAX  Take 1 tablet (70 mg total) by mouth every 7 days.     amLODIPine 5 MG tablet  Commonly known as: NORVASC  Take 1 tablet (5 mg total) by mouth once daily.     atorvastatin 20 MG tablet  Commonly known as: LIPITOR  Take 1 tablet (20 mg total) by mouth once daily.     carvediloL 6.25 MG tablet  Commonly known as: COREG  Take 1 tablet (6.25 mg total) by mouth 2 (two) times daily with meals.     ELIQUIS 5 mg Tab  Generic drug: apixaban  Take 1 tablet (5 mg total) by mouth 2 (two) times daily.     esomeprazole 40 MG capsule  Commonly known as: NEXIUM  Take 1 capsule (40 mg total) by mouth once daily.     FeroSuL 325 mg (65 mg iron) Tab tablet  Generic drug: ferrous sulfate  Take 1 tablet (325 mg total) by mouth daily with breakfast.     folic acid 1 MG tablet  Commonly known as: FOLVITE  Take 1 tablet by mouth once daily     levothyroxine 175 MCG tablet  Commonly known as: SYNTHROID, LEVOTHROID  Take 1  tablet by mouth once daily     MELATONIN ORAL     sotaloL 120 MG Tab  Commonly known as: BETAPACE  Take 1 tablet (120 mg total) by mouth every 12 (twelve) hours.     sulfaSALAzine 500 mg Tab  Commonly known as: AZULFIDINE  Take 1 tablet by mouth twice daily     tolterodine 4 MG 24 hr capsule  Commonly known as: DETROL LA  Take 1 capsule (4 mg total) by mouth once daily.              Patient Instructions:  No discharge procedures on file.    Signing Physician:  Mandy Goodson MD

## 2023-04-13 ENCOUNTER — LAB VISIT (OUTPATIENT)
Dept: LAB | Facility: HOSPITAL | Age: 83
End: 2023-04-13
Attending: INTERNAL MEDICINE
Payer: MEDICARE

## 2023-04-13 ENCOUNTER — OFFICE VISIT (OUTPATIENT)
Dept: SURGERY | Facility: CLINIC | Age: 83
End: 2023-04-13
Payer: MEDICARE

## 2023-04-13 ENCOUNTER — OFFICE VISIT (OUTPATIENT)
Dept: HEMATOLOGY/ONCOLOGY | Facility: CLINIC | Age: 83
End: 2023-04-13
Payer: MEDICARE

## 2023-04-13 VITALS
WEIGHT: 170.31 LBS | HEIGHT: 60 IN | BODY MASS INDEX: 33.44 KG/M2 | SYSTOLIC BLOOD PRESSURE: 125 MMHG | HEART RATE: 89 BPM | DIASTOLIC BLOOD PRESSURE: 60 MMHG | OXYGEN SATURATION: 94 %

## 2023-04-13 VITALS
HEART RATE: 89 BPM | DIASTOLIC BLOOD PRESSURE: 60 MMHG | TEMPERATURE: 98 F | OXYGEN SATURATION: 96 % | WEIGHT: 170.31 LBS | RESPIRATION RATE: 18 BRPM | SYSTOLIC BLOOD PRESSURE: 125 MMHG | BODY MASS INDEX: 33.44 KG/M2 | HEIGHT: 60 IN

## 2023-04-13 DIAGNOSIS — C25.9 PANCREATIC ADENOCARCINOMA: Primary | ICD-10-CM

## 2023-04-13 DIAGNOSIS — K86.81 EXOCRINE PANCREATIC INSUFFICIENCY: ICD-10-CM

## 2023-04-13 DIAGNOSIS — E66.01 SEVERE OBESITY (BMI 35.0-39.9) WITH COMORBIDITY: ICD-10-CM

## 2023-04-13 DIAGNOSIS — C25.9 PANCREATIC ADENOCARCINOMA: ICD-10-CM

## 2023-04-13 DIAGNOSIS — Z79.899 DRUG-INDUCED IMMUNODEFICIENCY: ICD-10-CM

## 2023-04-13 DIAGNOSIS — I70.0 AORTIC ATHEROSCLEROSIS: ICD-10-CM

## 2023-04-13 DIAGNOSIS — E78.2 MIXED HYPERLIPIDEMIA: ICD-10-CM

## 2023-04-13 DIAGNOSIS — D50.0 IRON DEFICIENCY ANEMIA DUE TO CHRONIC BLOOD LOSS: ICD-10-CM

## 2023-04-13 DIAGNOSIS — I48.0 PAROXYSMAL ATRIAL FIBRILLATION: ICD-10-CM

## 2023-04-13 DIAGNOSIS — D50.9 MICROCYTIC ANEMIA: ICD-10-CM

## 2023-04-13 DIAGNOSIS — K50.10 CROHN'S DISEASE OF LARGE INTESTINE WITHOUT COMPLICATION: ICD-10-CM

## 2023-04-13 DIAGNOSIS — I10 ESSENTIAL HYPERTENSION, BENIGN: ICD-10-CM

## 2023-04-13 DIAGNOSIS — D84.821 DRUG-INDUCED IMMUNODEFICIENCY: ICD-10-CM

## 2023-04-13 LAB
ALBUMIN SERPL BCP-MCNC: 2.1 G/DL (ref 3.5–5.2)
ALP SERPL-CCNC: 89 U/L (ref 55–135)
ALT SERPL W/O P-5'-P-CCNC: 7 U/L (ref 10–44)
ANION GAP SERPL CALC-SCNC: 10 MMOL/L (ref 8–16)
AST SERPL-CCNC: 21 U/L (ref 10–40)
BASOPHILS # BLD AUTO: 0.06 K/UL (ref 0–0.2)
BASOPHILS NFR BLD: 0.9 % (ref 0–1.9)
BILIRUB SERPL-MCNC: 0.5 MG/DL (ref 0.1–1)
BUN SERPL-MCNC: 7 MG/DL (ref 8–23)
CALCIUM SERPL-MCNC: 8.9 MG/DL (ref 8.7–10.5)
CANCER AG19-9 SERPL-ACNC: 19.7 U/ML (ref 0–40)
CHLORIDE SERPL-SCNC: 107 MMOL/L (ref 95–110)
CO2 SERPL-SCNC: 25 MMOL/L (ref 23–29)
CREAT SERPL-MCNC: 0.9 MG/DL (ref 0.5–1.4)
DIFFERENTIAL METHOD: ABNORMAL
EOSINOPHIL # BLD AUTO: 0.4 K/UL (ref 0–0.5)
EOSINOPHIL NFR BLD: 6.4 % (ref 0–8)
ERYTHROCYTE [DISTWIDTH] IN BLOOD BY AUTOMATED COUNT: 22 % (ref 11.5–14.5)
EST. GFR  (NO RACE VARIABLE): >60 ML/MIN/1.73 M^2
GLUCOSE SERPL-MCNC: 141 MG/DL (ref 70–110)
HCT VFR BLD AUTO: 29.9 % (ref 37–48.5)
HGB BLD-MCNC: 8.5 G/DL (ref 12–16)
IMM GRANULOCYTES # BLD AUTO: 0.02 K/UL (ref 0–0.04)
IMM GRANULOCYTES NFR BLD AUTO: 0.3 % (ref 0–0.5)
LYMPHOCYTES # BLD AUTO: 1.4 K/UL (ref 1–4.8)
LYMPHOCYTES NFR BLD: 20.9 % (ref 18–48)
MCH RBC QN AUTO: 23.4 PG (ref 27–31)
MCHC RBC AUTO-ENTMCNC: 28.4 G/DL (ref 32–36)
MCV RBC AUTO: 82 FL (ref 82–98)
MONOCYTES # BLD AUTO: 0.5 K/UL (ref 0.3–1)
MONOCYTES NFR BLD: 7.6 % (ref 4–15)
NEUTROPHILS # BLD AUTO: 4.2 K/UL (ref 1.8–7.7)
NEUTROPHILS NFR BLD: 63.9 % (ref 38–73)
NRBC BLD-RTO: 0 /100 WBC
PLATELET # BLD AUTO: 377 K/UL (ref 150–450)
PMV BLD AUTO: 10 FL (ref 9.2–12.9)
POTASSIUM SERPL-SCNC: 4.2 MMOL/L (ref 3.5–5.1)
PROT SERPL-MCNC: 7 G/DL (ref 6–8.4)
RBC # BLD AUTO: 3.64 M/UL (ref 4–5.4)
SODIUM SERPL-SCNC: 142 MMOL/L (ref 136–145)
WBC # BLD AUTO: 6.56 K/UL (ref 3.9–12.7)

## 2023-04-13 PROCEDURE — 1111F DSCHRG MED/CURRENT MED MERGE: CPT | Mod: HCNC,CPTII,S$GLB,

## 2023-04-13 PROCEDURE — 1157F PR ADVANCE CARE PLAN OR EQUIV PRESENT IN MEDICAL RECORD: ICD-10-PCS | Mod: HCNC,CPTII,S$GLB, | Performed by: INTERNAL MEDICINE

## 2023-04-13 PROCEDURE — 3078F DIAST BP <80 MM HG: CPT | Mod: HCNC,CPTII,S$GLB, | Performed by: INTERNAL MEDICINE

## 2023-04-13 PROCEDURE — 3074F SYST BP LT 130 MM HG: CPT | Mod: HCNC,CPTII,S$GLB,

## 2023-04-13 PROCEDURE — 3074F SYST BP LT 130 MM HG: CPT | Mod: HCNC,CPTII,S$GLB, | Performed by: INTERNAL MEDICINE

## 2023-04-13 PROCEDURE — 1111F PR DISCHARGE MEDS RECONCILED W/ CURRENT OUTPATIENT MED LIST: ICD-10-PCS | Mod: HCNC,CPTII,S$GLB, | Performed by: INTERNAL MEDICINE

## 2023-04-13 PROCEDURE — 3288F FALL RISK ASSESSMENT DOCD: CPT | Mod: HCNC,CPTII,S$GLB, | Performed by: INTERNAL MEDICINE

## 2023-04-13 PROCEDURE — 1157F ADVNC CARE PLAN IN RCRD: CPT | Mod: HCNC,CPTII,S$GLB, | Performed by: INTERNAL MEDICINE

## 2023-04-13 PROCEDURE — 99999 PR PBB SHADOW E&M-EST. PATIENT-LVL V: ICD-10-PCS | Mod: PBBFAC,HCNC,, | Performed by: INTERNAL MEDICINE

## 2023-04-13 PROCEDURE — 99999 PR PBB SHADOW E&M-EST. PATIENT-LVL III: ICD-10-PCS | Mod: PBBFAC,HCNC,,

## 2023-04-13 PROCEDURE — 85025 COMPLETE CBC W/AUTO DIFF WBC: CPT | Mod: HCNC | Performed by: INTERNAL MEDICINE

## 2023-04-13 PROCEDURE — 1101F PR PT FALLS ASSESS DOC 0-1 FALLS W/OUT INJ PAST YR: ICD-10-PCS | Mod: HCNC,CPTII,S$GLB,

## 2023-04-13 PROCEDURE — 1126F AMNT PAIN NOTED NONE PRSNT: CPT | Mod: HCNC,CPTII,S$GLB,

## 2023-04-13 PROCEDURE — 3288F PR FALLS RISK ASSESSMENT DOCUMENTED: ICD-10-PCS | Mod: HCNC,CPTII,S$GLB, | Performed by: INTERNAL MEDICINE

## 2023-04-13 PROCEDURE — 1159F MED LIST DOCD IN RCRD: CPT | Mod: HCNC,CPTII,S$GLB, | Performed by: INTERNAL MEDICINE

## 2023-04-13 PROCEDURE — 1157F ADVNC CARE PLAN IN RCRD: CPT | Mod: HCNC,CPTII,S$GLB,

## 2023-04-13 PROCEDURE — 3078F DIAST BP <80 MM HG: CPT | Mod: HCNC,CPTII,S$GLB,

## 2023-04-13 PROCEDURE — 1160F PR REVIEW ALL MEDS BY PRESCRIBER/CLIN PHARMACIST DOCUMENTED: ICD-10-PCS | Mod: HCNC,CPTII,S$GLB, | Performed by: INTERNAL MEDICINE

## 2023-04-13 PROCEDURE — 1101F PT FALLS ASSESS-DOCD LE1/YR: CPT | Mod: HCNC,CPTII,S$GLB, | Performed by: INTERNAL MEDICINE

## 2023-04-13 PROCEDURE — 99215 PR OFFICE/OUTPT VISIT, EST, LEVL V, 40-54 MIN: ICD-10-PCS | Mod: HCNC,S$GLB,,

## 2023-04-13 PROCEDURE — 99205 PR OFFICE/OUTPT VISIT, NEW, LEVL V, 60-74 MIN: ICD-10-PCS | Mod: HCNC,S$GLB,, | Performed by: INTERNAL MEDICINE

## 2023-04-13 PROCEDURE — 1111F PR DISCHARGE MEDS RECONCILED W/ CURRENT OUTPATIENT MED LIST: ICD-10-PCS | Mod: HCNC,CPTII,S$GLB,

## 2023-04-13 PROCEDURE — 1101F PR PT FALLS ASSESS DOC 0-1 FALLS W/OUT INJ PAST YR: ICD-10-PCS | Mod: HCNC,CPTII,S$GLB, | Performed by: INTERNAL MEDICINE

## 2023-04-13 PROCEDURE — 1111F DSCHRG MED/CURRENT MED MERGE: CPT | Mod: HCNC,CPTII,S$GLB, | Performed by: INTERNAL MEDICINE

## 2023-04-13 PROCEDURE — 1157F PR ADVANCE CARE PLAN OR EQUIV PRESENT IN MEDICAL RECORD: ICD-10-PCS | Mod: HCNC,CPTII,S$GLB,

## 2023-04-13 PROCEDURE — 1101F PT FALLS ASSESS-DOCD LE1/YR: CPT | Mod: HCNC,CPTII,S$GLB,

## 2023-04-13 PROCEDURE — 3288F PR FALLS RISK ASSESSMENT DOCUMENTED: ICD-10-PCS | Mod: HCNC,CPTII,S$GLB,

## 2023-04-13 PROCEDURE — 99215 OFFICE O/P EST HI 40 MIN: CPT | Mod: HCNC,S$GLB,,

## 2023-04-13 PROCEDURE — 1160F RVW MEDS BY RX/DR IN RCRD: CPT | Mod: HCNC,CPTII,S$GLB, | Performed by: INTERNAL MEDICINE

## 2023-04-13 PROCEDURE — 1126F PR PAIN SEVERITY QUANTIFIED, NO PAIN PRESENT: ICD-10-PCS | Mod: HCNC,CPTII,S$GLB,

## 2023-04-13 PROCEDURE — 3078F PR MOST RECENT DIASTOLIC BLOOD PRESSURE < 80 MM HG: ICD-10-PCS | Mod: HCNC,CPTII,S$GLB, | Performed by: INTERNAL MEDICINE

## 2023-04-13 PROCEDURE — 80053 COMPREHEN METABOLIC PANEL: CPT | Mod: HCNC | Performed by: INTERNAL MEDICINE

## 2023-04-13 PROCEDURE — 3074F PR MOST RECENT SYSTOLIC BLOOD PRESSURE < 130 MM HG: ICD-10-PCS | Mod: HCNC,CPTII,S$GLB, | Performed by: INTERNAL MEDICINE

## 2023-04-13 PROCEDURE — 99999 PR PBB SHADOW E&M-EST. PATIENT-LVL III: CPT | Mod: PBBFAC,HCNC,,

## 2023-04-13 PROCEDURE — 3078F PR MOST RECENT DIASTOLIC BLOOD PRESSURE < 80 MM HG: ICD-10-PCS | Mod: HCNC,CPTII,S$GLB,

## 2023-04-13 PROCEDURE — 3288F FALL RISK ASSESSMENT DOCD: CPT | Mod: HCNC,CPTII,S$GLB,

## 2023-04-13 PROCEDURE — 1159F PR MEDICATION LIST DOCUMENTED IN MEDICAL RECORD: ICD-10-PCS | Mod: HCNC,CPTII,S$GLB, | Performed by: INTERNAL MEDICINE

## 2023-04-13 PROCEDURE — 3074F PR MOST RECENT SYSTOLIC BLOOD PRESSURE < 130 MM HG: ICD-10-PCS | Mod: HCNC,CPTII,S$GLB,

## 2023-04-13 PROCEDURE — 86301 IMMUNOASSAY TUMOR CA 19-9: CPT | Mod: HCNC | Performed by: INTERNAL MEDICINE

## 2023-04-13 PROCEDURE — 99999 PR PBB SHADOW E&M-EST. PATIENT-LVL V: CPT | Mod: PBBFAC,HCNC,, | Performed by: INTERNAL MEDICINE

## 2023-04-13 PROCEDURE — 36415 COLL VENOUS BLD VENIPUNCTURE: CPT | Mod: HCNC | Performed by: INTERNAL MEDICINE

## 2023-04-13 PROCEDURE — 99205 OFFICE O/P NEW HI 60 MIN: CPT | Mod: HCNC,S$GLB,, | Performed by: INTERNAL MEDICINE

## 2023-04-13 RX ORDER — PANCRELIPASE 24000; 76000; 120000 [USP'U]/1; [USP'U]/1; [USP'U]/1
2 CAPSULE, DELAYED RELEASE PELLETS ORAL
Qty: 180 CAPSULE | Refills: 11 | Status: SHIPPED | OUTPATIENT
Start: 2023-04-13 | End: 2024-04-12

## 2023-04-13 RX ORDER — ONDANSETRON HYDROCHLORIDE 8 MG/1
8 TABLET, FILM COATED ORAL EVERY 8 HOURS PRN
Qty: 90 TABLET | Refills: 2 | Status: ON HOLD | OUTPATIENT
Start: 2023-04-13 | End: 2023-05-03 | Stop reason: HOSPADM

## 2023-04-13 RX ORDER — METHYLPREDNISOLONE SOD SUCC 125 MG
125 VIAL (EA) INJECTION ONCE AS NEEDED
Status: CANCELLED | OUTPATIENT
Start: 2023-04-20

## 2023-04-13 RX ORDER — SODIUM CHLORIDE 9 MG/ML
INJECTION, SOLUTION INTRAVENOUS CONTINUOUS
Status: CANCELLED | OUTPATIENT
Start: 2023-04-20

## 2023-04-13 RX ORDER — DIPHENHYDRAMINE HYDROCHLORIDE 50 MG/ML
50 INJECTION INTRAMUSCULAR; INTRAVENOUS ONCE AS NEEDED
Status: CANCELLED | OUTPATIENT
Start: 2023-04-20

## 2023-04-13 RX ORDER — EPINEPHRINE 0.3 MG/.3ML
0.3 INJECTION SUBCUTANEOUS ONCE AS NEEDED
Status: CANCELLED | OUTPATIENT
Start: 2023-04-20

## 2023-04-13 RX ORDER — SODIUM CHLORIDE 0.9 % (FLUSH) 0.9 %
10 SYRINGE (ML) INJECTION
Status: CANCELLED | OUTPATIENT
Start: 2023-04-20

## 2023-04-13 RX ORDER — HEPARIN 100 UNIT/ML
5 SYRINGE INTRAVENOUS
Status: CANCELLED | OUTPATIENT
Start: 2023-04-20

## 2023-04-13 NOTE — PROGRESS NOTES
Encounter Date:  2023    Patient ID: Pauline Cronin  Age:  83 y.o. :  1940    Chief Complaint:  followup of pancreatic mass     Interval History:  Ms. Cronin returns to clinic with a pancreatic mass and partial gastric outlet obstruction. She presented to her PCP with abdominal pain, 10 lb unintentional weight loss, and decreased energy. CT revealed a pancreatic head abutting the 2nd portion of duodenum with narrowing of the lumen. She underwent EUS on 3/14 and ERCP on 3/21/23. Pathology from EUS revealed adenocarcinoma.     She reports that she has abdominal pain as well as frequent nausea that is controlled with zofran. She is eating but in small quantity. She is maintaining her weight. Denies fever, chills, SOB, chest pain.     New Data:  Imaging:  I personally reviewed the following images:   3/1/23: CT A/P:  Impression:  1. Interval development of mass at the level of the pancreatic head that abuts or possibly arises from the 2nd portion of the duodenum.  This results in interval dilation of the pancreatic duct, common duct, as well as several intrahepatic biliary ducts.  Finding is most concerning for malignancy.  Correlation is advised.  2. Above mass narrows the lumen of the 2nd portion of the duodenum, concerning for gastric outlet obstruction.  3. Nodular contour of the hepatic parenchyma, correlation with any history of hepatic disease or cirrhosis.  4. Cholelithiasis without secondary findings to suggest acute cholecystitis.  5. Calculus possibly within the distal aspect of the right ureter versus adjacent phlebolith.  No secondary findings to suggest obstructive uropathy.  This may reflect chronic finding.  6. Please see above for several additional findings.    Labs:        Chemistry        Component Value Date/Time     2023 1306    K 4.2 2023 1306     2023 1306    CO2 25 2023 1306    BUN 7 (L) 2023 1306    CREATININE 0.9 2023 1306    GLU  141 (H) 04/13/2023 1306        Component Value Date/Time    CALCIUM 8.9 04/13/2023 1306    ALKPHOS 89 04/13/2023 1306    AST 21 04/13/2023 1306    ALT 7 (L) 04/13/2023 1306    BILITOT 0.5 04/13/2023 1306    ESTGFRAFRICA >60.0 04/27/2022 1150    EGFRNONAA >60.0 04/27/2022 1150        Lab Results   Component Value Date    WBC 6.56 04/13/2023    HGB 8.5 (L) 04/13/2023    HCT 29.9 (L) 04/13/2023    MCV 82 04/13/2023     04/13/2023     Endoscopy:    3/14/23: EUS:  Impression:      - A mass was identified in the pancreatic head.                          This was staged T2 N0 M0 by endosonographic                          criteria. The staging applies if malignancy is                          confirmed. Fine needle biopsy performed.                          - There was dilation in the common bile duct which                          measured up to 11 mm.                          - There was no evidence of significant pathology                          in the visualized portion of the liver.                          - Duodenal mass. Biopsied.     3/21/23: ERCP:  Impression:     - The major papilla was severely distorted with                          mass-like change related to known pancreas head                          malignancy (pathology results positive for                          pancreas head malignancy today).                          - The major papilla was poorly visualized due to                          mass effect/change in the second portion of                          duodenum as described above; this caused                          significant difficulty gaining wire access of the                          biled duct.                          - A single localized severe biliary stricture was                          found in the lower third of the main bile duct                          related to known pancreas head malignant tumor.                          The stricture was malignant appearing.                           - The upper third of the main bile duct and common                          hepatic duct were mildly dilated, with the                          pancreas head mass causing the obstruction.                          - A biliary sphincterotomy was performed.                          - One 10mm x 6cm uncovered metal biliary stent was                          placed into the distal common bile duct, which                          will remain permanently.                          - Indomethacin given to decrease risk of post-ERCP                          pancreatitis.     Pathology:    Final Pathologic Diagnosis Abnormal   PANCREAS, HEAD MASS, EUS-FNB:   Adenocarcinoma   Tumor tissue is limited. An attempt will be made to perform MMR-IHC and   results will be reported separately.      Past Medical History:   Diagnosis Date    Anticoagulant long-term use     Atrial fibrillation     Crohn disease diagnosed in her 20's    GERD (gastroesophageal reflux disease)     Hyperlipidemia     Hypertension     Pancreatic adenocarcinoma 3/21/2023    Thyroid disease     s/p I 131     Past Surgical History:   Procedure Laterality Date    APPENDECTOMY      CARDIAC SURGERY  2014    pacemaker    COLONOSCOPY  ~2008    normal findings per patient report    ENDOSCOPIC ULTRASOUND OF UPPER GASTROINTESTINAL TRACT N/A 3/14/2023    Procedure: ULTRASOUND, UPPER GI TRACT, ENDOSCOPIC;  Surgeon: Andrei Swartz MD;  Location: [facility];  Service: Endoscopy;  Laterality: N/A;  Approval to hold Eliquis rec'd from Dr. Barbosa (see telephone encounter 3/3/23)-DS  [employer] AICD/PPM  3/3/23-Instructions via portal-DS    ERCP N/A 3/21/2023    Procedure: ERCP (ENDOSCOPIC RETROGRADE CHOLANGIOPANCREATOGRAPHY);  Surgeon: Celina Toney MD;  Location: [facility] ([address] [place] [address]);  Service: Endoscopy;  Laterality: N/A;    ESOPHAGOGASTRODUODENOSCOPY N/A 7/17/2018    Procedure: EGD (ESOPHAGOGASTRODUODENOSCOPY);  Surgeon: Alondra Casiano MD;   Location: [facility] ENDO;  Service: Endoscopy;  Laterality: N/A;    HYSTERECTOMY      INSERTION OF IMPLANTABLE CARDIOVERTER-DEFIBRILLATOR (ICD) GENERATOR WITH TWO EXISTING LEADS Left 5/10/2021    Procedure: INSERTION, PULSE GENERATOR WITH 2 EXISTING LEADS, ICD;  Surgeon: Bo Leone MD;  Location: [facility] CATH LAB;  Service: Cardiology;  Laterality: Left;    INSERTION OF PACEMAKER      REPLACEMENT OF PACEMAKER GENERATOR  05/10/2021    RETROGRADE PYELOGRAPHY Right 2/18/2020    Procedure: PYELOGRAM, RETROGRADE;  Surgeon: Jovan Kruse MD;  Location: [facility] OR;  Service: Urology;  Laterality: Right;    SMALL INTESTINE SURGERY  in her 20's    for crohn's    TONSILLECTOMY      UPPER GASTROINTESTINAL ENDOSCOPY  ~2008    normal findings per patient report     Current Outpatient Medications on File Prior to Visit   Medication Sig Dispense Refill    acetaminophen (TYLENOL) 325 MG tablet Take 650 mg by mouth daily as needed (Headache).      alendronate (FOSAMAX) 70 MG tablet Take 1 tablet (70 mg total) by mouth every 7 days. 12 tablet 3    amLODIPine (NORVASC) 5 MG tablet Take 1 tablet (5 mg total) by mouth once daily. 30 tablet 11    atorvastatin (LIPITOR) 20 MG tablet Take 1 tablet (20 mg total) by mouth once daily. 90 tablet 3    carvediloL (COREG) 6.25 MG tablet Take 1 tablet (6.25 mg total) by mouth 2 (two) times daily with meals. 180 tablet 3    ELIQUIS 5 mg Tab Take 1 tablet (5 mg total) by mouth 2 (two) times daily. 180 tablet 0    esomeprazole (NEXIUM) 40 MG capsule Take 1 capsule (40 mg total) by mouth once daily. 90 capsule 3    folic acid (FOLVITE) 1 MG tablet Take 1 tablet by mouth once daily 90 tablet 1    levothyroxine (SYNTHROID, LEVOTHROID) 175 MCG tablet Take 1 tablet by mouth once daily 30 tablet 5    MELATONIN ORAL Take 1 tablet by mouth nightly as needed (Insomnia).      ondansetron (ZOFRAN-ODT) 4 MG TbDL Take 1 tablet (4 mg total) by mouth every 8 (eight) hours as needed (nausea). 60 tablet 0    sotaloL  (BETAPACE) 120 MG Tab Take 1 tablet (120 mg total) by mouth every 12 (twelve) hours. 180 tablet 3    sulfaSALAzine (AZULFIDINE) 500 mg Tab Take 1 tablet by mouth twice daily 180 tablet 3    tolterodine (DETROL LA) 4 MG 24 hr capsule Take 1 capsule by mouth once daily 90 capsule 3    ferrous sulfate (IRON) 325 mg (65 mg iron) Tab tablet Take 1 tablet (325 mg total) by mouth daily with breakfast. (Patient not taking: Reported on 4/3/2023) 90 tablet 3     No current facility-administered medications on file prior to visit.     Review of patient's allergies indicates:   Allergen Reactions    Lisinopril Other (See Comments)     cough     Family History:  Her family history includes Breast cancer in her mother; Cancer in her mother; Crohn's disease in an other family member.     Social History:   reports that she quit smoking about 46 years ago. Her smoking use included cigarettes. She has never used smokeless tobacco. She reports that she does not drink alcohol and does not use drugs.     ROS: Pertinent positive/negatives detailed in HPI, all other systems negative.     Physical Exam:  /60   Pulse 89   Ht 5' (1.524 m)   Wt 77.3 kg (170 lb 4.9 oz)   SpO2 (!) 94%   BMI 33.26 kg/m²     Constitutional:  Non-toxic, no acute distress.    Eyes:  Sclerae anicteric, gaze symmetrical  Resp:  Easy work of breathing\  Abd:  Soft, non-tender, no masses, no ascites  Musculoskeletal:  Ambulatory, normal gait, no muscle wasting.  Extremities are symmetrical without lymphedema.  Neuro:  No gross deficits  Psych:  Awake, alert, oriented.  Answers and asks questions appropriately      ICD-10-CM ICD-9-CM    1. Pancreatic adenocarcinoma  C25.9 157.9         Plan   Ms. Cronin returns to clinic with a pancreatic mass and partial gastric outlet obstruction. She presented to her PCP with abdominal pain, 10 lb unintentional weight loss, and decreased energy. CT revealed a pancreatic head abutting the 2nd portion of duodenum with  narrowing of the lumen. She underwent EUS on 3/14 and ERCP on 3/21/23. Pathology from EUS revealed adenocarcinoma. We discussed the treatment of pancreatic cancer for curative intent. She is quite debilitated and I discussed with her that I am concerned with how she would tolerate a major operation. She is resectable but she is quite debilitated. She is seeing Dr. Hill this afternoon for discussion of chemotherapy options. Encouraged her to reach out with questions. Will f/u for restaging. Questions were asked and answered to patient's satisfaction.  We discussed the need for continued clinical/radiographic/endoscopic follow-up.    Follow up if symptoms worsen or fail to improve, for imaging after finished chemo therapy.      Case discussed with Dr. Santamaria.         Marcella Condon NP  Upper GI / Hepatobiliary Surgical Oncology  [facility] [place], [place]  Office: [phone]  Fax: [phone]

## 2023-04-13 NOTE — PLAN OF CARE
START ON PATHWAY REGIMEN - Pancreatic Adenocarcinoma    [id number]        Nab-paclitaxel (protein bound) (Abraxane)       Gemcitabine     **Always confirm dose/schedule in your pharmacy ordering system**    Patient Characteristics:  Locally Advanced, Anatomically Unresectable, M0, First Line, PS ? 2, BRCA1/2 and   PALB2 Mutation Absent/Unknown, Chemotherapy  Therapeutic Status: Locally Advanced, Anatomically Unresectable, M0  Line of Therapy: First Line  ECOG Performance Status: 2  BRCA1/2 Mutation Status: Awaiting Test Results  PALB2 Mutation Status: Awaiting Test Results  Intent of Therapy:  Non-Curative / Palliative Intent, Discussed with Patient

## 2023-04-13 NOTE — PROGRESS NOTES
MEDICAL ONCOLOGY - NEW PATIENT VISIT    Reason for visit: Pancreatic adenocarcinoma    Best Contact Phone Number(s): [phone] (home)      Cancer/Stage/TNM:    Cancer Staging   Pancreatic adenocarcinoma  Staging form: Exocrine Pancreas, AJCC 8th Edition  - Clinical stage from 4/13/2023: Stage IB (cT2, cN0, cM0) - Unsigned       Oncology History   Pancreatic adenocarcinoma   3/21/2023 Initial Diagnosis    Pancreatic adenocarcinoma       4/27/2023 -  Chemotherapy    Treatment Summary   Plan Name: OP PANC NAB-PACLITAXEL + GEMCITABINE Q4W  Treatment Goal: Palliative  Status: Active  Start Date: 4/27/2023 (Planned)  End Date: 3/14/2024 (Planned)  Provider: Jim Hill MD  Chemotherapy: gemcitabine (GEMZAR) 1,448 mg in sodium chloride 0.9% SolP 250 mL chemo infusion, 800 mg/m2 = 1,448 mg (original dose ), Intravenous, Clinic/HOD 1 time, 0 of 12 cycles  Dose modification: 800 mg/m2 (Cycle 1, Reason: Other (see comments), Comment: poor PS)            HPI:   83 y.o. female with Crohn's disease, p.afib, SSS s/p PPM, HTN, HLD who presents for evaluation of newly diagnosed pancreatic adenocarcinoma. She presented initially to her PCP on 03/01/23 with several weeks of fatigue, nausea and several pounds of weight loss. She had a CT abdomen pelvis which showed a pancreatic head mass that abuts or invades the adjacent 2nd portion of the duodenum it was measured about 3.3 x 2.8 cm. There was some degree of GOO. The portal vein, splenic vein, SMV, celiac axis and SMA all are patent.   She underwent an ERCP w biopsy of the mass on 03/14 which was positive for adenocarcinoma, pMMR. She was admitted on 03/17 for hyperbilirubinemia. She underwent ERCP with stenting on 03/21 c/w post-ERCP pancreatitis.   She was admitted again 03/28 for afib with RVR and ADHF. She was discharged on 04/05.       Today, she presents with her daughter in law. She overall feels ok. She complained of some nausea and diarrhea. She denies abdominal  pain or back pain. She did lose some weight over the last few months.     History has been obtained by chart review and discussion with the patient.    ROS:   Review of Systems   Constitutional:  Positive for malaise/fatigue and weight loss. Negative for chills and fever.   HENT:  Negative for congestion.    Eyes:  Negative for blurred vision.   Respiratory:  Negative for shortness of breath.    Cardiovascular:  Negative for chest pain, palpitations and leg swelling.   Gastrointestinal:  Positive for diarrhea and nausea. Negative for blood in stool, constipation and vomiting.   Genitourinary:  Negative for dysuria.   Musculoskeletal:  Negative for back pain and myalgias.   Skin:  Negative for itching and rash.   Neurological:  Negative for dizziness, tingling, tremors, sensory change and focal weakness.   Endo/Heme/Allergies:  Does not bruise/bleed easily.     Past Medical History:   Past Medical History:   Diagnosis Date    Anticoagulant long-term use     Atrial fibrillation     Crohn disease diagnosed in her 20's    GERD (gastroesophageal reflux disease)     Hyperlipidemia     Hypertension     Pancreatic adenocarcinoma 3/21/2023    Thyroid disease     s/p I 131        Past Surgical History:   Past Surgical History:   Procedure Laterality Date    APPENDECTOMY      CARDIAC SURGERY  2014    pacemaker    COLONOSCOPY  ~2008    normal findings per patient report    ENDOSCOPIC ULTRASOUND OF UPPER GASTROINTESTINAL TRACT N/A 3/14/2023    Procedure: ULTRASOUND, UPPER GI TRACT, ENDOSCOPIC;  Surgeon: Andrei Swartz MD;  Location: [facility];  Service: Endoscopy;  Laterality: N/A;  Approval to hold Eliquis rec'd from Dr. Barbosa (see telephone encounter 3/3/23)-DS  [employer] AICD/PPM  3/3/23-Instructions via portal-DS    ERCP N/A 3/21/2023    Procedure: ERCP (ENDOSCOPIC RETROGRADE CHOLANGIOPANCREATOGRAPHY);  Surgeon: Celina Toney MD;  Location: [facility] (2ND FLR);  Service: Endoscopy;  Laterality: N/A;     ESOPHAGOGASTRODUODENOSCOPY N/A 2018    Procedure: EGD (ESOPHAGOGASTRODUODENOSCOPY);  Surgeon: Alondra Casiano MD;  Location: [facility];  Service: Endoscopy;  Laterality: N/A;    HYSTERECTOMY      INSERTION OF IMPLANTABLE CARDIOVERTER-DEFIBRILLATOR (ICD) GENERATOR WITH TWO EXISTING LEADS Left 5/10/2021    Procedure: INSERTION, PULSE GENERATOR WITH 2 EXISTING LEADS, ICD;  Surgeon: Bo Leone MD;  Location: [facility] CATH LAB;  Service: Cardiology;  Laterality: Left;    INSERTION OF PACEMAKER      REPLACEMENT OF PACEMAKER GENERATOR  05/10/2021    RETROGRADE PYELOGRAPHY Right 2020    Procedure: PYELOGRAM, RETROGRADE;  Surgeon: Jovan Kruse MD;  Location: [facility] OR;  Service: Urology;  Laterality: Right;    SMALL INTESTINE SURGERY  in her 20's    for crohn's    TONSILLECTOMY      UPPER GASTROINTESTINAL ENDOSCOPY  ~    normal findings per patient report        Family History:   Family History   Problem Relation Age of Onset    Cancer Mother     Breast cancer Mother     Crohn's disease Other     Colon cancer Neg Hx     Colon polyps Neg Hx     Esophageal cancer Neg Hx     Stomach cancer Neg Hx     Ulcerative colitis Neg Hx         Social History:   Social History     Tobacco Use    Smoking status: Former     Types: Cigarettes     Quit date:      Years since quittin.3    Smokeless tobacco: Never   Substance Use Topics    Alcohol use: No        I have reviewed and updated the patient's past medical, surgical, family and social histories.    Allergies:   Review of patient's allergies indicates:   Allergen Reactions    Lisinopril Other (See Comments)     cough        Medications:   Current Outpatient Medications   Medication Sig Dispense Refill    acetaminophen (TYLENOL) 325 MG tablet Take 650 mg by mouth daily as needed (Headache).      alendronate (FOSAMAX) 70 MG tablet Take 1 tablet (70 mg total) by mouth every 7 days. 12 tablet 3    amLODIPine (NORVASC) 5 MG tablet Take 1 tablet (5 mg total)  by mouth once daily. 30 tablet 11    atorvastatin (LIPITOR) 20 MG tablet Take 1 tablet (20 mg total) by mouth once daily. 90 tablet 3    carvediloL (COREG) 6.25 MG tablet Take 1 tablet (6.25 mg total) by mouth 2 (two) times daily with meals. 180 tablet 3    ELIQUIS 5 mg Tab Take 1 tablet (5 mg total) by mouth 2 (two) times daily. 180 tablet 0    esomeprazole (NEXIUM) 40 MG capsule Take 1 capsule (40 mg total) by mouth once daily. 90 capsule 3    ferrous sulfate (IRON) 325 mg (65 mg iron) Tab tablet Take 1 tablet (325 mg total) by mouth daily with breakfast. (Patient not taking: Reported on 4/3/2023) 90 tablet 3    folic acid (FOLVITE) 1 MG tablet Take 1 tablet by mouth once daily 90 tablet 1    levothyroxine (SYNTHROID, LEVOTHROID) 175 MCG tablet Take 1 tablet by mouth once daily 30 tablet 5    lipase-protease-amylase 24,000-76,000-120,000 units (CREON) 24,000-76,000 -120,000 unit capsule Take 2 capsules by mouth 3 (three) times daily with meals. 180 capsule 11    MELATONIN ORAL Take 1 tablet by mouth nightly as needed (Insomnia).      ondansetron (ZOFRAN-ODT) 4 MG TbDL Take 1 tablet (4 mg total) by mouth every 8 (eight) hours as needed (nausea). 60 tablet 0    sotaloL (BETAPACE) 120 MG Tab Take 1 tablet (120 mg total) by mouth every 12 (twelve) hours. 180 tablet 3    sulfaSALAzine (AZULFIDINE) 500 mg Tab Take 1 tablet by mouth twice daily 180 tablet 3    tolterodine (DETROL LA) 4 MG 24 hr capsule Take 1 capsule by mouth once daily 90 capsule 3     No current facility-administered medications for this visit.        Physical Exam:   /60 (BP Location: Left arm, Patient Position: Sitting, BP Method: Medium (Automatic))   Pulse 89   Temp 97.5 °F (36.4 °C) (Oral)   Resp 18   Ht 5' (1.524 m)   Wt 77.3 kg (170 lb 4.9 oz)   SpO2 96%   BMI 33.26 kg/m²      ECOG Performance status: 1            Physical Exam  Constitutional:       General: She is not in acute distress.  HENT:      Head: Normocephalic and  atraumatic.   Eyes:      Pupils: Pupils are equal, round, and reactive to light.   Cardiovascular:      Rate and Rhythm: Normal rate and regular rhythm.      Heart sounds: No murmur heard.  Pulmonary:      Effort: No respiratory distress.      Breath sounds: Normal breath sounds. No wheezing.   Abdominal:      General: Abdomen is flat. Bowel sounds are normal. There is no distension.      Palpations: Abdomen is soft. There is no mass.      Tenderness: There is no abdominal tenderness.   Musculoskeletal:         General: No swelling or deformity.   Neurological:      Mental Status: She is alert and oriented to person, place, and time. Mental status is at baseline.         Labs:   Recent Results (from the past 48 hour(s))   CBC Auto Differential    Collection Time: 04/13/23  1:06 PM   Result Value Ref Range    WBC 6.56 3.90 - 12.70 K/uL    RBC 3.64 (L) 4.00 - 5.40 M/uL    Hemoglobin 8.5 (L) 12.0 - 16.0 g/dL    Hematocrit 29.9 (L) 37.0 - 48.5 %    MCV 82 82 - 98 fL    MCH 23.4 (L) 27.0 - 31.0 pg    MCHC 28.4 (L) 32.0 - 36.0 g/dL    RDW 22.0 (H) 11.5 - 14.5 %    Platelets 377 150 - 450 K/uL    MPV 10.0 9.2 - 12.9 fL    Immature Granulocytes 0.3 0.0 - 0.5 %    Gran # (ANC) 4.2 1.8 - 7.7 K/uL    Immature Grans (Abs) 0.02 0.00 - 0.04 K/uL    Lymph # 1.4 1.0 - 4.8 K/uL    Mono # 0.5 0.3 - 1.0 K/uL    Eos # 0.4 0.0 - 0.5 K/uL    Baso # 0.06 0.00 - 0.20 K/uL    nRBC 0 0 /100 WBC    Gran % 63.9 38.0 - 73.0 %    Lymph % 20.9 18.0 - 48.0 %    Mono % 7.6 4.0 - 15.0 %    Eosinophil % 6.4 0.0 - 8.0 %    Basophil % 0.9 0.0 - 1.9 %    Differential Method Automated    CMP    Collection Time: 04/13/23  1:06 PM   Result Value Ref Range    Sodium 142 136 - 145 mmol/L    Potassium 4.2 3.5 - 5.1 mmol/L    Chloride 107 95 - 110 mmol/L    CO2 25 23 - 29 mmol/L    Glucose 141 (H) 70 - 110 mg/dL    BUN 7 (L) 8 - 23 mg/dL    Creatinine 0.9 0.5 - 1.4 mg/dL    Calcium 8.9 8.7 - 10.5 mg/dL    Total Protein 7.0 6.0 - 8.4 g/dL    Albumin 2.1 (L) 3.5  - 5.2 g/dL    Total Bilirubin 0.5 0.1 - 1.0 mg/dL    Alkaline Phosphatase 89 55 - 135 U/L    AST 21 10 - 40 U/L    ALT 7 (L) 10 - 44 U/L    Anion Gap 10 8 - 16 mmol/L    eGFR >60.0 >60 mL/min/1.73 m^2   CANCER ANTIGEN 19-9    Collection Time: 04/13/23  1:06 PM   Result Value Ref Range    CA 19-9 19.7 0.0 - 40.0 U/mL        I have reviewed the pertinent labs from 03/28 which are notable for normal liver enzymes, bilirubin 1.3, leucocytosis, microcytic anemia.     Imaging:    I have personally reviewed the imaging which is notable for a pancreatic head mass evident on CT abdomen pelvis with no evidence of metastatic disease.     Path:   PANCREAS, HEAD MASS, EUS-FNB:   Adenocarcinoma   Mismatch Repair (MMR) Proteins:    MLH1 - Intact nuclear expression    MSH2 - Intact nuclear expression    MSH6 - Intact nuclear expression    PMS2 - Intact nuclear expression    Assessment:       1. Pancreatic adenocarcinoma    2. Essential hypertension, benign    3. Mixed hyperlipidemia    4. Paroxysmal atrial fibrillation    5. Aortic atherosclerosis    6. Microcytic anemia    7. Iron deficiency anemia due to chronic blood loss    8. Exocrine pancreatic insufficiency    9. Drug-induced immunodeficiency    10. Severe obesity (BMI 35.0-39.9) with comorbidity    11. Crohn's disease of large intestine without complication          Plan:       # 1. Pancreatic adenocarcinoma.   83 y.o. F with Crohn's disease, p.afib, SSS s/p PPM, HTN, HLD, fatigue, presented with weight loss and jaundice and was found to have  pancreatic adenocarcinoma. She has early stage pancreatic cancer, and is considered resectable. However, giving her age and multiple co-morbidities, surgery might not be feasible or safe.     We have discussed with her the diagnosis, stage, prognosis and treatment options. She understands that Surgery is the only curable option and she might not be fit for it. We have discussed chemotherapy with a palliative intent to improve symptoms  and prolong life.   She expressed understanding and she values QoL but is willing to try chemotherapy.     We have discussed treatment with first line therapy Gemcitabine + Abraxane. We briefly discussed the administration and possible side effects. She agreed and wished to proceed.     Plan:   - Complete staging with CT chest wo contrast   - CA 19.9 today 19.7  - Will get [facility] blood NGS with next visit.   - Genetics referral  - Insurance authorization for chemotherapy  - Will dose reduce Gemcitabine to 800 mg/m2, and abraxana to 100 mg/m2 and check her tolerance.   - Might need port in the future.       # 2. HTN   Controlled   On amlodipine 5 mg daily, coreg 6.25 mg bid     # 3, 5 HLD, Atherosclerosis   On Lipitor 20 mg   Follows with cardiology     #4. P. Afib   Rate controlled   On Coreg 6.25 mg bid   AC with eliquis     #. 6, 7. Microcytic anemia, Iron def due to chronic blood loss  Unclear etiology but likely from chronic blood loss 2/2 diverticulosis  Iron studies consistent with iron deficiency.   Couldn't tolerate PO iron due to severe GI side effects (abdominal cramps and nausea)  Injectafer x 2 doses     #8. Pancreatic insuffiencey   Symptomatic with frequent diarrhea  Start Creon   On lomotil for diarrhea     #11. Crohn's disease.   On Sulfasalazine  Follows with GI     Follow up: 2 weeks to start treatment      The above information has been reviewed with the patient and all questions have been answered to their apparent satisfaction.  They understand that they can call the clinic with any questions.    Robbie Rivera, PGY VI  Hematology/Oncology  [facility]        Med Onc Chart Routing  Urgent    Follow up with physician . Insurance authorization for chemotherapy and Injectafer. Once authorized, see Taylor Pierre or Luma with labs to start chemotherapy. Needs an appointment with Genetics (Micheal Katz)   Follow up with DOMINGO 2 weeks.   Infusion scheduling note every 2 weeks    Injection scheduling note    Labs CBC and CMP   Scheduling: Labs same day as infusion  Preferred lab:  Lab interval: every 2 weeks  and [facility] blood with her next blood draw   Imaging CT chest abdomen pelvis   CT chest before her next appointment   Pharmacy appointment    Other referrals  Additional referrals needed

## 2023-04-15 ENCOUNTER — HOSPITAL ENCOUNTER (OUTPATIENT)
Facility: HOSPITAL | Age: 83
Discharge: HOME | End: 2023-04-16
Attending: EMERGENCY MEDICINE | Admitting: STUDENT IN AN ORGANIZED HEALTH CARE EDUCATION/TRAINING PROGRAM
Payer: MEDICARE

## 2023-04-15 DIAGNOSIS — R07.9 CHEST PAIN: ICD-10-CM

## 2023-04-15 DIAGNOSIS — I48.91 AFIB: ICD-10-CM

## 2023-04-15 PROCEDURE — 99291 PR CRITICAL CARE, E/M 30-74 MINUTES: ICD-10-PCS | Mod: HCNC,,, | Performed by: EMERGENCY MEDICINE

## 2023-04-15 PROCEDURE — 99291 CRITICAL CARE FIRST HOUR: CPT | Mod: HCNC,,, | Performed by: EMERGENCY MEDICINE

## 2023-04-15 PROCEDURE — 99285 EMERGENCY DEPT VISIT HI MDM: CPT | Mod: HCNC

## 2023-04-16 VITALS
OXYGEN SATURATION: 90 % | HEART RATE: 70 BPM | TEMPERATURE: 98 F | WEIGHT: 170.19 LBS | BODY MASS INDEX: 33.41 KG/M2 | DIASTOLIC BLOOD PRESSURE: 60 MMHG | RESPIRATION RATE: 16 BRPM | HEIGHT: 60 IN | SYSTOLIC BLOOD PRESSURE: 141 MMHG

## 2023-04-16 LAB
ALBUMIN SERPL BCP-MCNC: 2 G/DL (ref 3.5–5.2)
ALBUMIN SERPL BCP-MCNC: 2 G/DL (ref 3.5–5.2)
ALP SERPL-CCNC: 78 U/L (ref 55–135)
ALP SERPL-CCNC: 83 U/L (ref 55–135)
ALT SERPL W/O P-5'-P-CCNC: 5 U/L (ref 10–44)
ALT SERPL W/O P-5'-P-CCNC: 9 U/L (ref 10–44)
ANION GAP SERPL CALC-SCNC: 10 MMOL/L (ref 8–16)
ANION GAP SERPL CALC-SCNC: 9 MMOL/L (ref 8–16)
AST SERPL-CCNC: 20 U/L (ref 10–40)
AST SERPL-CCNC: 24 U/L (ref 10–40)
BASOPHILS # BLD AUTO: 0.04 K/UL (ref 0–0.2)
BASOPHILS # BLD AUTO: 0.06 K/UL (ref 0–0.2)
BASOPHILS NFR BLD: 0.6 % (ref 0–1.9)
BASOPHILS NFR BLD: 1.1 % (ref 0–1.9)
BILIRUB SERPL-MCNC: 0.5 MG/DL (ref 0.1–1)
BILIRUB SERPL-MCNC: 0.5 MG/DL (ref 0.1–1)
BNP SERPL-MCNC: 710 PG/ML (ref 0–99)
BUN SERPL-MCNC: 7 MG/DL (ref 8–23)
BUN SERPL-MCNC: 8 MG/DL (ref 8–23)
CALCIUM SERPL-MCNC: 8.3 MG/DL (ref 8.7–10.5)
CALCIUM SERPL-MCNC: 8.5 MG/DL (ref 8.7–10.5)
CHLORIDE SERPL-SCNC: 105 MMOL/L (ref 95–110)
CHLORIDE SERPL-SCNC: 106 MMOL/L (ref 95–110)
CO2 SERPL-SCNC: 22 MMOL/L (ref 23–29)
CO2 SERPL-SCNC: 23 MMOL/L (ref 23–29)
CREAT SERPL-MCNC: 0.8 MG/DL (ref 0.5–1.4)
CREAT SERPL-MCNC: 0.8 MG/DL (ref 0.5–1.4)
DIFFERENTIAL METHOD: ABNORMAL
DIFFERENTIAL METHOD: ABNORMAL
EOSINOPHIL # BLD AUTO: 0.4 K/UL (ref 0–0.5)
EOSINOPHIL # BLD AUTO: 0.5 K/UL (ref 0–0.5)
EOSINOPHIL NFR BLD: 6.6 % (ref 0–8)
EOSINOPHIL NFR BLD: 7.4 % (ref 0–8)
ERYTHROCYTE [DISTWIDTH] IN BLOOD BY AUTOMATED COUNT: 21.7 % (ref 11.5–14.5)
ERYTHROCYTE [DISTWIDTH] IN BLOOD BY AUTOMATED COUNT: 22.1 % (ref 11.5–14.5)
EST. GFR  (NO RACE VARIABLE): >60 ML/MIN/1.73 M^2
EST. GFR  (NO RACE VARIABLE): >60 ML/MIN/1.73 M^2
GLUCOSE SERPL-MCNC: 104 MG/DL (ref 70–110)
GLUCOSE SERPL-MCNC: 91 MG/DL (ref 70–110)
HCT VFR BLD AUTO: 29.1 % (ref 37–48.5)
HCT VFR BLD AUTO: 31.3 % (ref 37–48.5)
HGB BLD-MCNC: 8.6 G/DL (ref 12–16)
HGB BLD-MCNC: 9 G/DL (ref 12–16)
IMM GRANULOCYTES # BLD AUTO: 0.02 K/UL (ref 0–0.04)
IMM GRANULOCYTES # BLD AUTO: 0.02 K/UL (ref 0–0.04)
IMM GRANULOCYTES NFR BLD AUTO: 0.3 % (ref 0–0.5)
IMM GRANULOCYTES NFR BLD AUTO: 0.4 % (ref 0–0.5)
LIPASE SERPL-CCNC: 375 U/L (ref 4–60)
LYMPHOCYTES # BLD AUTO: 1.6 K/UL (ref 1–4.8)
LYMPHOCYTES # BLD AUTO: 1.7 K/UL (ref 1–4.8)
LYMPHOCYTES NFR BLD: 26.4 % (ref 18–48)
LYMPHOCYTES NFR BLD: 29 % (ref 18–48)
MAGNESIUM SERPL-MCNC: 1.3 MG/DL (ref 1.6–2.6)
MCH RBC QN AUTO: 23.1 PG (ref 27–31)
MCH RBC QN AUTO: 23.2 PG (ref 27–31)
MCHC RBC AUTO-ENTMCNC: 28.8 G/DL (ref 32–36)
MCHC RBC AUTO-ENTMCNC: 29.6 G/DL (ref 32–36)
MCV RBC AUTO: 79 FL (ref 82–98)
MCV RBC AUTO: 80 FL (ref 82–98)
MONOCYTES # BLD AUTO: 0.5 K/UL (ref 0.3–1)
MONOCYTES # BLD AUTO: 0.6 K/UL (ref 0.3–1)
MONOCYTES NFR BLD: 8.1 % (ref 4–15)
MONOCYTES NFR BLD: 8.6 % (ref 4–15)
NEUTROPHILS # BLD AUTO: 3.1 K/UL (ref 1.8–7.7)
NEUTROPHILS # BLD AUTO: 3.6 K/UL (ref 1.8–7.7)
NEUTROPHILS NFR BLD: 54.8 % (ref 38–73)
NEUTROPHILS NFR BLD: 56.7 % (ref 38–73)
NRBC BLD-RTO: 0 /100 WBC
NRBC BLD-RTO: 0 /100 WBC
PHOSPHATE SERPL-MCNC: 2.5 MG/DL (ref 2.7–4.5)
PLATELET # BLD AUTO: 336 K/UL (ref 150–450)
PLATELET # BLD AUTO: 384 K/UL (ref 150–450)
PMV BLD AUTO: 10.6 FL (ref 9.2–12.9)
PMV BLD AUTO: 9.9 FL (ref 9.2–12.9)
POC CARDIAC TROPONIN I: 0.03 NG/ML (ref 0–0.08)
POC CARDIAC TROPONIN I: 0.03 NG/ML (ref 0–0.08)
POTASSIUM SERPL-SCNC: 3.2 MMOL/L (ref 3.5–5.1)
POTASSIUM SERPL-SCNC: 4.1 MMOL/L (ref 3.5–5.1)
PROT SERPL-MCNC: 6.4 G/DL (ref 6–8.4)
PROT SERPL-MCNC: 6.6 G/DL (ref 6–8.4)
RBC # BLD AUTO: 3.7 M/UL (ref 4–5.4)
RBC # BLD AUTO: 3.9 M/UL (ref 4–5.4)
SAMPLE: NORMAL
SAMPLE: NORMAL
SODIUM SERPL-SCNC: 137 MMOL/L (ref 136–145)
SODIUM SERPL-SCNC: 138 MMOL/L (ref 136–145)
TROPONIN I SERPL DL<=0.01 NG/ML-MCNC: 0.04 NG/ML (ref 0–0.03)
TROPONIN I SERPL DL<=0.01 NG/ML-MCNC: 0.04 NG/ML (ref 0–0.03)
WBC # BLD AUTO: 5.59 K/UL (ref 3.9–12.7)
WBC # BLD AUTO: 6.37 K/UL (ref 3.9–12.7)

## 2023-04-16 PROCEDURE — 63600175 PHARM REV CODE 636 W HCPCS: Mod: HCNC

## 2023-04-16 PROCEDURE — 93010 ELECTROCARDIOGRAM REPORT: CPT | Mod: 76,HCNC,, | Performed by: INTERNAL MEDICINE

## 2023-04-16 PROCEDURE — 25500020 PHARM REV CODE 255: Mod: HCNC | Performed by: STUDENT IN AN ORGANIZED HEALTH CARE EDUCATION/TRAINING PROGRAM

## 2023-04-16 PROCEDURE — 25000003 PHARM REV CODE 250: Mod: HCNC | Performed by: STUDENT IN AN ORGANIZED HEALTH CARE EDUCATION/TRAINING PROGRAM

## 2023-04-16 PROCEDURE — 99223 1ST HOSP IP/OBS HIGH 75: CPT | Mod: AI,HCNC,GC, | Performed by: STUDENT IN AN ORGANIZED HEALTH CARE EDUCATION/TRAINING PROGRAM

## 2023-04-16 PROCEDURE — 84484 ASSAY OF TROPONIN QUANT: CPT | Mod: 91,HCNC | Performed by: EMERGENCY MEDICINE

## 2023-04-16 PROCEDURE — 85025 COMPLETE CBC W/AUTO DIFF WBC: CPT | Mod: HCNC | Performed by: EMERGENCY MEDICINE

## 2023-04-16 PROCEDURE — 83690 ASSAY OF LIPASE: CPT | Mod: HCNC

## 2023-04-16 PROCEDURE — 84484 ASSAY OF TROPONIN QUANT: CPT | Mod: HCNC | Performed by: EMERGENCY MEDICINE

## 2023-04-16 PROCEDURE — 36415 COLL VENOUS BLD VENIPUNCTURE: CPT | Mod: HCNC | Performed by: STUDENT IN AN ORGANIZED HEALTH CARE EDUCATION/TRAINING PROGRAM

## 2023-04-16 PROCEDURE — 93005 ELECTROCARDIOGRAM TRACING: CPT | Mod: HCNC

## 2023-04-16 PROCEDURE — 84484 ASSAY OF TROPONIN QUANT: CPT | Mod: HCNC

## 2023-04-16 PROCEDURE — 93010 EKG 12-LEAD: ICD-10-PCS | Mod: 76,HCNC,, | Performed by: INTERNAL MEDICINE

## 2023-04-16 PROCEDURE — G0378 HOSPITAL OBSERVATION PER HR: HCPCS | Mod: HCNC

## 2023-04-16 PROCEDURE — 25000003 PHARM REV CODE 250: Mod: HCNC

## 2023-04-16 PROCEDURE — 83880 ASSAY OF NATRIURETIC PEPTIDE: CPT | Mod: HCNC | Performed by: EMERGENCY MEDICINE

## 2023-04-16 PROCEDURE — 93010 ELECTROCARDIOGRAM REPORT: CPT | Mod: HCNC,,, | Performed by: INTERNAL MEDICINE

## 2023-04-16 PROCEDURE — 99223 PR INITIAL HOSPITAL CARE,LEVL III: ICD-10-PCS | Mod: AI,HCNC,GC, | Performed by: STUDENT IN AN ORGANIZED HEALTH CARE EDUCATION/TRAINING PROGRAM

## 2023-04-16 PROCEDURE — 80053 COMPREHEN METABOLIC PANEL: CPT | Mod: HCNC | Performed by: EMERGENCY MEDICINE

## 2023-04-16 PROCEDURE — 83735 ASSAY OF MAGNESIUM: CPT | Mod: HCNC | Performed by: STUDENT IN AN ORGANIZED HEALTH CARE EDUCATION/TRAINING PROGRAM

## 2023-04-16 PROCEDURE — 96374 THER/PROPH/DIAG INJ IV PUSH: CPT | Mod: HCNC

## 2023-04-16 PROCEDURE — 25000003 PHARM REV CODE 250: Mod: HCNC | Performed by: EMERGENCY MEDICINE

## 2023-04-16 PROCEDURE — 84100 ASSAY OF PHOSPHORUS: CPT | Mod: HCNC | Performed by: STUDENT IN AN ORGANIZED HEALTH CARE EDUCATION/TRAINING PROGRAM

## 2023-04-16 PROCEDURE — 99222 PR INITIAL HOSPITAL CARE,LEVL II: ICD-10-PCS | Mod: HCNC,,, | Performed by: INTERNAL MEDICINE

## 2023-04-16 PROCEDURE — 36415 COLL VENOUS BLD VENIPUNCTURE: CPT | Mod: HCNC

## 2023-04-16 PROCEDURE — 85025 COMPLETE CBC W/AUTO DIFF WBC: CPT | Mod: 91,HCNC | Performed by: STUDENT IN AN ORGANIZED HEALTH CARE EDUCATION/TRAINING PROGRAM

## 2023-04-16 PROCEDURE — 96375 TX/PRO/DX INJ NEW DRUG ADDON: CPT | Mod: 59

## 2023-04-16 PROCEDURE — 80053 COMPREHEN METABOLIC PANEL: CPT | Mod: 91,HCNC | Performed by: STUDENT IN AN ORGANIZED HEALTH CARE EDUCATION/TRAINING PROGRAM

## 2023-04-16 PROCEDURE — 99222 1ST HOSP IP/OBS MODERATE 55: CPT | Mod: HCNC,,, | Performed by: INTERNAL MEDICINE

## 2023-04-16 RX ORDER — IBUPROFEN 200 MG
24 TABLET ORAL
Status: DISCONTINUED | OUTPATIENT
Start: 2023-04-16 | End: 2023-04-16 | Stop reason: HOSPADM

## 2023-04-16 RX ORDER — METOPROLOL TARTRATE 1 MG/ML
5 INJECTION, SOLUTION INTRAVENOUS
Status: COMPLETED | OUTPATIENT
Start: 2023-04-16 | End: 2023-04-16

## 2023-04-16 RX ORDER — AMLODIPINE BESYLATE 5 MG/1
5 TABLET ORAL DAILY
Status: DISCONTINUED | OUTPATIENT
Start: 2023-04-16 | End: 2023-04-16 | Stop reason: HOSPADM

## 2023-04-16 RX ORDER — FOLIC ACID 1 MG/1
1000 TABLET ORAL DAILY
Status: DISCONTINUED | OUTPATIENT
Start: 2023-04-16 | End: 2023-04-16 | Stop reason: HOSPADM

## 2023-04-16 RX ORDER — SOTALOL HYDROCHLORIDE 120 MG/1
120 TABLET ORAL EVERY 12 HOURS
Status: DISCONTINUED | OUTPATIENT
Start: 2023-04-16 | End: 2023-04-16 | Stop reason: HOSPADM

## 2023-04-16 RX ORDER — METOPROLOL SUCCINATE 50 MG/1
50 TABLET, EXTENDED RELEASE ORAL DAILY
Qty: 30 TABLET | Refills: 1 | Status: SHIPPED | OUTPATIENT
Start: 2023-04-17 | End: 2023-05-18

## 2023-04-16 RX ORDER — CARVEDILOL 6.25 MG/1
6.25 TABLET ORAL 2 TIMES DAILY WITH MEALS
Status: COMPLETED | OUTPATIENT
Start: 2023-04-16 | End: 2023-04-16

## 2023-04-16 RX ORDER — CARVEDILOL 6.25 MG/1
6.25 TABLET ORAL 2 TIMES DAILY WITH MEALS
Status: DISCONTINUED | OUTPATIENT
Start: 2023-04-16 | End: 2023-04-16

## 2023-04-16 RX ORDER — MAGNESIUM SULFATE HEPTAHYDRATE 40 MG/ML
2 INJECTION, SOLUTION INTRAVENOUS
Status: DISCONTINUED | OUTPATIENT
Start: 2023-04-16 | End: 2023-04-16

## 2023-04-16 RX ORDER — SODIUM CHLORIDE 0.9 % (FLUSH) 0.9 %
10 SYRINGE (ML) INJECTION
Status: DISCONTINUED | OUTPATIENT
Start: 2023-04-16 | End: 2023-04-16 | Stop reason: HOSPADM

## 2023-04-16 RX ORDER — ONDANSETRON 4 MG/1
8 TABLET, FILM COATED ORAL EVERY 8 HOURS PRN
Status: DISCONTINUED | OUTPATIENT
Start: 2023-04-16 | End: 2023-04-16 | Stop reason: HOSPADM

## 2023-04-16 RX ORDER — ACETAMINOPHEN 325 MG/1
650 TABLET ORAL EVERY 6 HOURS PRN
Status: DISCONTINUED | OUTPATIENT
Start: 2023-04-16 | End: 2023-04-16 | Stop reason: HOSPADM

## 2023-04-16 RX ORDER — TALC
6 POWDER (GRAM) TOPICAL NIGHTLY PRN
Status: DISCONTINUED | OUTPATIENT
Start: 2023-04-16 | End: 2023-04-16 | Stop reason: HOSPADM

## 2023-04-16 RX ORDER — DIPHENOXYLATE HYDROCHLORIDE AND ATROPINE SULFATE 2.5; .025 MG/1; MG/1
1 TABLET ORAL 4 TIMES DAILY PRN
Status: DISCONTINUED | OUTPATIENT
Start: 2023-04-16 | End: 2023-04-16 | Stop reason: HOSPADM

## 2023-04-16 RX ORDER — PANTOPRAZOLE SODIUM 40 MG/1
40 TABLET, DELAYED RELEASE ORAL DAILY
Status: DISCONTINUED | OUTPATIENT
Start: 2023-04-16 | End: 2023-04-16 | Stop reason: HOSPADM

## 2023-04-16 RX ORDER — SULFASALAZINE 500 MG/1
500 TABLET ORAL 2 TIMES DAILY
Status: DISCONTINUED | OUTPATIENT
Start: 2023-04-16 | End: 2023-04-16 | Stop reason: HOSPADM

## 2023-04-16 RX ORDER — IBUPROFEN 200 MG
16 TABLET ORAL
Status: DISCONTINUED | OUTPATIENT
Start: 2023-04-16 | End: 2023-04-16 | Stop reason: HOSPADM

## 2023-04-16 RX ORDER — ATORVASTATIN CALCIUM 20 MG/1
20 TABLET, FILM COATED ORAL DAILY
Status: DISCONTINUED | OUTPATIENT
Start: 2023-04-16 | End: 2023-04-16 | Stop reason: HOSPADM

## 2023-04-16 RX ORDER — METOPROLOL SUCCINATE 50 MG/1
50 TABLET, EXTENDED RELEASE ORAL DAILY
Status: DISCONTINUED | OUTPATIENT
Start: 2023-04-17 | End: 2023-04-16 | Stop reason: HOSPADM

## 2023-04-16 RX ORDER — GLUCAGON 1 MG
1 KIT INJECTION
Status: DISCONTINUED | OUTPATIENT
Start: 2023-04-16 | End: 2023-04-16 | Stop reason: HOSPADM

## 2023-04-16 RX ORDER — NALOXONE HCL 0.4 MG/ML
0.02 VIAL (ML) INJECTION
Status: DISCONTINUED | OUTPATIENT
Start: 2023-04-16 | End: 2023-04-16 | Stop reason: HOSPADM

## 2023-04-16 RX ORDER — OXYBUTYNIN CHLORIDE 5 MG/1
10 TABLET, EXTENDED RELEASE ORAL DAILY
Status: DISCONTINUED | OUTPATIENT
Start: 2023-04-16 | End: 2023-04-16 | Stop reason: HOSPADM

## 2023-04-16 RX ADMIN — SOTALOL HYDROCHLORIDE 120 MG: 120 TABLET ORAL at 08:04

## 2023-04-16 RX ADMIN — CARVEDILOL 6.25 MG: 6.25 TABLET, FILM COATED ORAL at 05:04

## 2023-04-16 RX ADMIN — PANTOPRAZOLE SODIUM 40 MG: 40 TABLET, DELAYED RELEASE ORAL at 08:04

## 2023-04-16 RX ADMIN — OXYBUTYNIN CHLORIDE 10 MG: 5 TABLET, EXTENDED RELEASE ORAL at 08:04

## 2023-04-16 RX ADMIN — DIPHENOXYLATE HYDROCHLORIDE AND ATROPINE SULFATE 1 TABLET: 2.5; .025 TABLET ORAL at 02:04

## 2023-04-16 RX ADMIN — POTASSIUM BICARBONATE 50 MEQ: 978 TABLET, EFFERVESCENT ORAL at 04:04

## 2023-04-16 RX ADMIN — AMLODIPINE BESYLATE 5 MG: 5 TABLET ORAL at 08:04

## 2023-04-16 RX ADMIN — PANCRELIPASE 2 CAPSULE: 24000; 76000; 120000 CAPSULE, DELAYED RELEASE PELLETS ORAL at 09:04

## 2023-04-16 RX ADMIN — PANCRELIPASE 2 CAPSULE: 24000; 76000; 120000 CAPSULE, DELAYED RELEASE PELLETS ORAL at 11:04

## 2023-04-16 RX ADMIN — ONDANSETRON 8 MG: 4 TABLET ORAL at 05:04

## 2023-04-16 RX ADMIN — APIXABAN 5 MG: 5 TABLET, FILM COATED ORAL at 08:04

## 2023-04-16 RX ADMIN — MAGNESIUM SULFATE HEPTAHYDRATE 2 G: 40 INJECTION, SOLUTION INTRAVENOUS at 02:04

## 2023-04-16 RX ADMIN — IOHEXOL 100 ML: 350 INJECTION, SOLUTION INTRAVENOUS at 04:04

## 2023-04-16 RX ADMIN — LEVOTHYROXINE SODIUM 175 MCG: 150 TABLET ORAL at 06:04

## 2023-04-16 RX ADMIN — CARVEDILOL 6.25 MG: 6.25 TABLET, FILM COATED ORAL at 08:04

## 2023-04-16 RX ADMIN — ATORVASTATIN CALCIUM 20 MG: 20 TABLET, FILM COATED ORAL at 08:04

## 2023-04-16 RX ADMIN — DIPHENOXYLATE HYDROCHLORIDE AND ATROPINE SULFATE 1 TABLET: 2.5; .025 TABLET ORAL at 08:04

## 2023-04-16 RX ADMIN — METOROPROLOL TARTRATE 5 MG: 5 INJECTION, SOLUTION INTRAVENOUS at 03:04

## 2023-04-16 RX ADMIN — SULFASALAZINE 500 MG: 500 TABLET ORAL at 09:04

## 2023-04-16 NOTE — SUBJECTIVE & OBJECTIVE
Past Medical History:   Diagnosis Date    Anticoagulant long-term use     Atrial fibrillation     Crohn disease diagnosed in her 20's    GERD (gastroesophageal reflux disease)     Hyperlipidemia     Hypertension     Pancreatic adenocarcinoma 3/21/2023    Thyroid disease     s/p I 131       Past Surgical History:   Procedure Laterality Date    APPENDECTOMY      CARDIAC SURGERY  2014    pacemaker    COLONOSCOPY  ~2008    normal findings per patient report    ENDOSCOPIC ULTRASOUND OF UPPER GASTROINTESTINAL TRACT N/A 3/14/2023    Procedure: ULTRASOUND, UPPER GI TRACT, ENDOSCOPIC;  Surgeon: Andrei Swartz MD;  Location: [facility];  Service: Endoscopy;  Laterality: N/A;  Approval to hold Eliquis rec'd from Dr. Barbosa (see telephone encounter 3/3/23)-DS  [employer] AICD/PPM  3/3/23-Instructions via portal-DS    ERCP N/A 3/21/2023    Procedure: ERCP (ENDOSCOPIC RETROGRADE CHOLANGIOPANCREATOGRAPHY);  Surgeon: Celina Toney MD;  Location: [facility] ([address] [place] [address]);  Service: Endoscopy;  Laterality: N/A;    ESOPHAGOGASTRODUODENOSCOPY N/A 7/17/2018    Procedure: EGD (ESOPHAGOGASTRODUODENOSCOPY);  Surgeon: Alondra Casiano MD;  Location: [facility] ENDO;  Service: Endoscopy;  Laterality: N/A;    HYSTERECTOMY      INSERTION OF IMPLANTABLE CARDIOVERTER-DEFIBRILLATOR (ICD) GENERATOR WITH TWO EXISTING LEADS Left 5/10/2021    Procedure: INSERTION, PULSE GENERATOR WITH 2 EXISTING LEADS, ICD;  Surgeon: Bo Leone MD;  Location: [facility] CATH LAB;  Service: Cardiology;  Laterality: Left;    INSERTION OF PACEMAKER      REPLACEMENT OF PACEMAKER GENERATOR  05/10/2021    RETROGRADE PYELOGRAPHY Right 2/18/2020    Procedure: PYELOGRAM, RETROGRADE;  Surgeon: Jovan Kruse MD;  Location: [facility] OR;  Service: Urology;  Laterality: Right;    SMALL INTESTINE SURGERY  in her 20's    for crohn's    TONSILLECTOMY      UPPER GASTROINTESTINAL ENDOSCOPY  ~2008    normal findings per patient report       Review of patient's allergies indicates:    Allergen Reactions    Lisinopril Other (See Comments)     cough       No current facility-administered medications on file prior to encounter.     Current Outpatient Medications on File Prior to Encounter   Medication Sig    acetaminophen (TYLENOL) 325 MG tablet Take 650 mg by mouth daily as needed (Headache).    alendronate (FOSAMAX) 70 MG tablet Take 1 tablet (70 mg total) by mouth every 7 days.    amLODIPine (NORVASC) 5 MG tablet Take 1 tablet (5 mg total) by mouth once daily.    atorvastatin (LIPITOR) 20 MG tablet Take 1 tablet (20 mg total) by mouth once daily.    carvediloL (COREG) 6.25 MG tablet Take 1 tablet (6.25 mg total) by mouth 2 (two) times daily with meals.    diphenoxylate-atropine 2.5-0.025 mg (LOMOTIL) 2.5-0.025 mg per tablet TAKE 1 TABLET BY MOUTH 4 TIMES DAILY AS NEEDED FOR DIARRHEA    ELIQUIS 5 mg Tab Take 1 tablet (5 mg total) by mouth 2 (two) times daily.    esomeprazole (NEXIUM) 40 MG capsule Take 1 capsule (40 mg total) by mouth once daily.    ferrous sulfate (IRON) 325 mg (65 mg iron) Tab tablet Take 1 tablet (325 mg total) by mouth daily with breakfast. (Patient not taking: Reported on 4/3/2023)    folic acid (FOLVITE) 1 MG tablet Take 1 tablet by mouth once daily    levothyroxine (SYNTHROID, LEVOTHROID) 175 MCG tablet Take 1 tablet by mouth once daily    lipase-protease-amylase 24,000-76,000-120,000 units (CREON) 24,000-76,000 -120,000 unit capsule Take 2 capsules by mouth 3 (three) times daily with meals.    MELATONIN ORAL Take 1 tablet by mouth nightly as needed (Insomnia).    ondansetron (ZOFRAN) 8 MG tablet Take 1 tablet (8 mg total) by mouth every 8 (eight) hours as needed for Nausea.    ondansetron (ZOFRAN-ODT) 4 MG TbDL Take 1 tablet (4 mg total) by mouth every 8 (eight) hours as needed (nausea).    sotaloL (BETAPACE) 120 MG Tab Take 1 tablet (120 mg total) by mouth every 12 (twelve) hours.    sulfaSALAzine (AZULFIDINE) 500 mg Tab Take 1 tablet by mouth twice daily    tolterodine  (DETROL LA) 4 MG 24 hr capsule Take 1 capsule by mouth once daily     Family History       Problem Relation (Age of Onset)    Breast cancer Mother    Cancer Mother    Crohn's disease Other          Tobacco Use    Smoking status: Former     Types: Cigarettes     Quit date:      Years since quittin.3    Smokeless tobacco: Never   Substance and Sexual Activity    Alcohol use: No    Drug use: No    Sexual activity: Never     Review of Systems   Constitutional:  Positive for chills and diaphoresis. Negative for activity change and fever.   Respiratory:  Positive for shortness of breath. Negative for cough.    Cardiovascular:  Positive for chest pain and palpitations. Negative for leg swelling.   Gastrointestinal:  Positive for diarrhea. Negative for abdominal pain, constipation and nausea.   Genitourinary:  Negative for dysuria and flank pain.   Musculoskeletal:  Negative for back pain.   Neurological:  Positive for dizziness. Negative for tremors, syncope, weakness, light-headedness, numbness and headaches.   Objective:     Vital Signs (Most Recent):  Temp: 97.9 °F (36.6 °C) (04/15/23 2331)  Pulse: 92 (23 0402)  Resp: 17 (23 040)  BP: (!) 118/58 (23 0402)  SpO2: 97 % (23 040)   Vital Signs (24h Range):  Temp:  [97.9 °F (36.6 °C)] 97.9 °F (36.6 °C)  Pulse:  [] 92  Resp:  [17-20] 17  SpO2:  [96 %-98 %] 97 %  BP: (102-121)/(58-73) 118/58     Weight: 77.1 kg (170 lb)  Body mass index is 33.2 kg/m².    Physical Exam  Vitals and nursing note reviewed.   Constitutional:       General: She is not in acute distress.     Appearance: She is ill-appearing.   HENT:      Head: Normocephalic and atraumatic.      Mouth/Throat:      Mouth: Mucous membranes are moist.   Eyes:      General: No scleral icterus.     Extraocular Movements: Extraocular movements intact.      Pupils: Pupils are equal, round, and reactive to light.   Neck:      Comments: No jvd  Cardiovascular:      Rate and Rhythm:  Normal rate and regular rhythm.   Pulmonary:      Effort: Pulmonary effort is normal. No respiratory distress.      Breath sounds: Normal breath sounds.   Abdominal:      General: Abdomen is flat. Bowel sounds are normal. There is no distension.      Palpations: Abdomen is soft.   Musculoskeletal:      Right lower leg: No edema.      Left lower leg: No edema.   Lymphadenopathy:      Cervical: No cervical adenopathy.   Skin:     General: Skin is warm.      Capillary Refill: Capillary refill takes less than 2 seconds.   Neurological:      General: No focal deficit present.      Mental Status: She is oriented to person, place, and time.         CRANIAL NERVES     CN III, IV, VI   Pupils are equal, round, and reactive to light.     Significant Labs: All pertinent labs within the past 24 hours have been reviewed.    Significant Imaging: I have reviewed all pertinent imaging results/findings within the past 24 hours.

## 2023-04-16 NOTE — H&P
[facility] Emergency Dept  Hospital Medicine  History & Physical    Patient Name: Pauline Cronin  MRN: [id number]  Patient Class: OP- Observation  Admission Date: 4/15/2023  Attending Physician: Carrillo Schwartz MD   Primary Care Provider: Ga Waldrop MD         Patient information was obtained from patient and ER records.     Subjective:     Principal Problem:Paroxysmal atrial fibrillation    Chief Complaint:   Chief Complaint   Patient presents with    Chest Pain     Substernal, non-radiating since this afternoon. Pale per EMS. Aspirin in route        HPI: 82 yo F with PMH pAF (apixaban), pancreatic adenocarcinoma, HFpEF, SSS s/p PM, Crohns Disease, HTN, HLD, hypothyroidism presenting for acute onset dyspnea, chest pressure, and palpitations. She was at home relaxing when the sensation came on suddenly. It felt consistent with her usual episodes of atrial fibrillation. The pain was described as heavy and she had some associated left shoulder soreness. She went to lie down in her bed but the feeling persisted which is why she called EMS. She had some associated dizziness and diaphoresis. She denies fever, dysuria, orthopnea, LE swelling, cough, pleuritic chest pain, and abdominal pain. She has chronic loose stools 2/2 inflammatory bowel disease but is currently at her baseline regarding this. With EMS she was given ASA which reportedly helped her symptoms though did not relieve them fully. OF note patient was admitted to hospital medicine on 3/27 for similar presentation and found to be in RVR. She is overall concerned as to why her bouts of atrial fibrillation are occurring more frequently. At the time of my exam she was symptom free. She lives at home and is independent with ADLs though has support from her daughter in law.     In the ED - T 97.9, HR 86, /73, and placed on 2L with SpO2 98%. HgB 8.6 (baseline), K 3.2, Cr 0.8. troponin 0.043 then 0.036, . CXR unremarkable. Admitted to hospital  medicine for hypoxia and RVR.      Past Medical History:   Diagnosis Date    Anticoagulant long-term use     Atrial fibrillation     Crohn disease diagnosed in her 20's    GERD (gastroesophageal reflux disease)     Hyperlipidemia     Hypertension     Pancreatic adenocarcinoma 3/21/2023    Thyroid disease     s/p I 131       Past Surgical History:   Procedure Laterality Date    APPENDECTOMY      CARDIAC SURGERY  2014    pacemaker    COLONOSCOPY  ~2008    normal findings per patient report    ENDOSCOPIC ULTRASOUND OF UPPER GASTROINTESTINAL TRACT N/A 3/14/2023    Procedure: ULTRASOUND, UPPER GI TRACT, ENDOSCOPIC;  Surgeon: Andrei Swartz MD;  Location: [facility];  Service: Endoscopy;  Laterality: N/A;  Approval to hold Eliquis rec'd from Dr. Barbosa (see telephone encounter 3/3/23)-DS  [employer] AICD/PPM  3/3/23-Instructions via portal-DS    ERCP N/A 3/21/2023    Procedure: ERCP (ENDOSCOPIC RETROGRADE CHOLANGIOPANCREATOGRAPHY);  Surgeon: Celina Toney MD;  Location: [address]);  Service: Endoscopy;  Laterality: N/A;    ESOPHAGOGASTRODUODENOSCOPY N/A 7/17/2018    Procedure: EGD (ESOPHAGOGASTRODUODENOSCOPY);  Surgeon: Alondra Casiano MD;  Location: [facility] ENDO;  Service: Endoscopy;  Laterality: N/A;    HYSTERECTOMY      INSERTION OF IMPLANTABLE CARDIOVERTER-DEFIBRILLATOR (ICD) GENERATOR WITH TWO EXISTING LEADS Left 5/10/2021    Procedure: INSERTION, PULSE GENERATOR WITH 2 EXISTING LEADS, ICD;  Surgeon: Bo Leone MD;  Location[facility] C[facility] LAB;  Service: Cardiology;  Laterality: Left;    INSERTION OF PACEMAKER      REPLACEMENT OF PACEMAKER GENERATOR  05/10/2021    RETROGRADE PYELOGRAPHY Right 2/18/2020    Procedure: PYELOGRAM, RETROGRADE;  Surgeon: Jovan Kruse MD;  Location: [facility] OR;  Service: Urology;  Laterality: Right;    SMALL INTESTINE SURGERY  in her 20's    for crohn's    TONSILLECTOMY      UPPER GASTROINTESTINAL ENDOSCOPY  ~2008    normal findings per patient report        Review of patient's allergies indicates:   Allergen Reactions    Lisinopril Other (See Comments)     cough       No current facility-administered medications on file prior to encounter.     Current Outpatient Medications on File Prior to Encounter   Medication Sig    acetaminophen (TYLENOL) 325 MG tablet Take 650 mg by mouth daily as needed (Headache).    alendronate (FOSAMAX) 70 MG tablet Take 1 tablet (70 mg total) by mouth every 7 days.    amLODIPine (NORVASC) 5 MG tablet Take 1 tablet (5 mg total) by mouth once daily.    atorvastatin (LIPITOR) 20 MG tablet Take 1 tablet (20 mg total) by mouth once daily.    carvediloL (COREG) 6.25 MG tablet Take 1 tablet (6.25 mg total) by mouth 2 (two) times daily with meals.    diphenoxylate-atropine 2.5-0.025 mg (LOMOTIL) 2.5-0.025 mg per tablet TAKE 1 TABLET BY MOUTH 4 TIMES DAILY AS NEEDED FOR DIARRHEA    ELIQUIS 5 mg Tab Take 1 tablet (5 mg total) by mouth 2 (two) times daily.    esomeprazole (NEXIUM) 40 MG capsule Take 1 capsule (40 mg total) by mouth once daily.    ferrous sulfate (IRON) 325 mg (65 mg iron) Tab tablet Take 1 tablet (325 mg total) by mouth daily with breakfast. (Patient not taking: Reported on 4/3/2023)    folic acid (FOLVITE) 1 MG tablet Take 1 tablet by mouth once daily    levothyroxine (SYNTHROID, LEVOTHROID) 175 MCG tablet Take 1 tablet by mouth once daily    lipase-protease-amylase 24,000-76,000-120,000 units (CREON) 24,000-76,000 -120,000 unit capsule Take 2 capsules by mouth 3 (three) times daily with meals.    MELATONIN ORAL Take 1 tablet by mouth nightly as needed (Insomnia).    ondansetron (ZOFRAN) 8 MG tablet Take 1 tablet (8 mg total) by mouth every 8 (eight) hours as needed for Nausea.    ondansetron (ZOFRAN-ODT) 4 MG TbDL Take 1 tablet (4 mg total) by mouth every 8 (eight) hours as needed (nausea).    sotaloL (BETAPACE) 120 MG Tab Take 1 tablet (120 mg total) by mouth every 12 (twelve) hours.    sulfaSALAzine  (AZULFIDINE) 500 mg Tab Take 1 tablet by mouth twice daily    tolterodine (DETROL LA) 4 MG 24 hr capsule Take 1 capsule by mouth once daily     Family History       Problem Relation (Age of Onset)    Breast cancer Mother    Cancer Mother    Crohn's disease Other          Tobacco Use    Smoking status: Former     Types: Cigarettes     Quit date:      Years since quittin.3    Smokeless tobacco: Never   Substance and Sexual Activity    Alcohol use: No    Drug use: No    Sexual activity: Never     Review of Systems   Constitutional:  Positive for chills and diaphoresis. Negative for activity change and fever.   Respiratory:  Positive for shortness of breath. Negative for cough.    Cardiovascular:  Positive for chest pain and palpitations. Negative for leg swelling.   Gastrointestinal:  Positive for diarrhea. Negative for abdominal pain, constipation and nausea.   Genitourinary:  Negative for dysuria and flank pain.   Musculoskeletal:  Negative for back pain.   Neurological:  Positive for dizziness. Negative for tremors, syncope, weakness, light-headedness, numbness and headaches.   Objective:     Vital Signs (Most Recent):  Temp: 97.9 °F (36.6 °C) (04/15/23 2331)  Pulse: 92 (23 0402)  Resp: 17 (23 0402)  BP: (!) 118/58 (23 0402)  SpO2: 97 % (23 0402)   Vital Signs (24h Range):  Temp:  [97.9 °F (36.6 °C)] 97.9 °F (36.6 °C)  Pulse:  [] 92  Resp:  [17-20] 17  SpO2:  [96 %-98 %] 97 %  BP: (102-121)/(58-73) 118/58     Weight: 77.1 kg (170 lb)  Body mass index is 33.2 kg/m².    Physical Exam  Vitals and nursing note reviewed.   Constitutional:       General: She is not in acute distress.     Appearance: She is ill-appearing.   HENT:      Head: Normocephalic and atraumatic.      Mouth/Throat:      Mouth: Mucous membranes are moist.   Eyes:      General: No scleral icterus.     Extraocular Movements: Extraocular movements intact.      Pupils: Pupils are equal, round, and reactive to  light.   Neck:      Comments: No jvd  Cardiovascular:      Rate and Rhythm: Normal rate and regular rhythm.   Pulmonary:      Effort: Pulmonary effort is normal. No respiratory distress.      Breath sounds: Normal breath sounds.   Abdominal:      General: Abdomen is flat. Bowel sounds are normal. There is no distension.      Palpations: Abdomen is soft.   Musculoskeletal:      Right lower leg: No edema.      Left lower leg: No edema.   Lymphadenopathy:      Cervical: No cervical adenopathy.   Skin:     General: Skin is warm.      Capillary Refill: Capillary refill takes less than 2 seconds.   Neurological:      General: No focal deficit present.      Mental Status: She is oriented to person, place, and time.         CRANIAL NERVES     CN III, IV, VI   Pupils are equal, round, and reactive to light.     Significant Labs: All pertinent labs within the past 24 hours have been reviewed.    Significant Imaging: I have reviewed all pertinent imaging results/findings within the past 24 hours.    Assessment/Plan:     * Paroxysmal atrial fibrillation  Hx of pAF on apixaban 5 mg BID, sotalol 120 mg BID, and carvedilol 6.25 mg BID. Noted to be in RVR here; feels symptoms are consistent with bouts of afib RVR. Does not appear volume overloaded, troponin flat ruling against ACS, no signs of active infection.   Chads Vasc 5    -continue AC  -fu CT PE study ordered by ED - low suspicion as patient is on AC and was able to be taken off supplemental O2 while examining her; however she should undergo CT of lungs given recent cancer diagnosis  -continue carvedilol 6.25 mg BID + sotalol 120 mg BID  -keep K>4, Mg>2  -has outpatient appt 4/20 with cardiologist to discuss medications given recurrent episodes of RVR        Pancreatic adenocarcinoma  Recently diagnosed, follows with Dr. Hill outpatient. Not a surgical candidate but plan to start palliative chemotherapy.    -pain control PRN  -anti-emetics PRN  -code status: DNR confirmed  on admission    Sick sinus syndrome  S/p PM with LBBB      GERD (gastroesophageal reflux disease)  Takes nexium daily at home    -protonix on formulary here      OAB (overactive bladder)  Home regimen: tolterodine    -oxybutynin per formulary       Hypothyroidism  Last TSH checked 3 weeks ago wnl; unlikely to be driving recurrent afib    -continue home synthroid      Essential hypertension, benign  Home regimen: norvasc 5 mg qd, carvedilol 6.25 mg BID    -continue home meds      Hyperlipidemia  -continue atorvastatin 20 mg qd    Crohn's disease of large intestine without complication  Loose stools approximately 3x per day. On daily sulfasalazine. No evidence of acute flare.    -continue sulfasalazine  -PRN lomotil      VTE Risk Mitigation (From admission, onward)         Ordered     apixaban tablet 5 mg  2 times daily         04/16/23 0428     IP VTE HIGH RISK PATIENT  Once         04/16/23 0405     Place sequential compression device  Until discontinued         04/16/23 0405                     Ros Starkey DO  Department of Hospital Medicine  [facility] - Emergency Dept

## 2023-04-16 NOTE — DISCHARGE SUMMARY
[facility] - Cardiology [facility] Medicine  Discharge Summary      Patient Name: Pauline Cronin  MRN: [id number]  RICK: [id number]  Patient Class: OP- Observation  Admission Date: 4/15/2023  Hospital Length of Stay: 0 days  Discharge Date and Time:  04/16/2023 2:26 PM  Attending Physician: Carrillo Schwartz MD   Discharging Provider: Luther Shabazz DO  Primary Care Provider: Ga Waldrop MD  Hospital Medicine Team: [facility] 4 Luther Shabazz DO  Primary Care Team: [facility] 4    HPI:   84 yo F with PMH pAF (apixaban), pancreatic adenocarcinoma, HFpEF, SSS s/p PM, Crohns Disease, HTN, HLD, hypothyroidism presenting for acute onset dyspnea, chest pressure, and palpitations. She was at home relaxing when the sensation came on suddenly. It felt consistent with her usual episodes of atrial fibrillation. The pain was described as heavy and she had some associated left shoulder soreness. She went to lie down in her bed but the feeling persisted which is why she called EMS. She had some associated dizziness and diaphoresis. She denies fever, dysuria, orthopnea, LE swelling, cough, pleuritic chest pain, and abdominal pain. She has chronic loose stools 2/2 inflammatory bowel disease but is currently at her baseline regarding this. With EMS she was given ASA which reportedly helped her symptoms though did not relieve them fully. OF note patient was admitted to hospital medicine on 3/27 for similar presentation and found to be in RVR. She is overall concerned as to why her bouts of atrial fibrillation are occurring more frequently. At the time of my exam she was symptom free. She lives at home and is independent with ADLs though has support from her daughter in law.     In the ED - T 97.9, HR 86, /73, and placed on 2L with SpO2 98%. HgB 8.6 (baseline), K 3.2, Cr 0.8. troponin 0.043 then 0.036, . CXR unremarkable. Admitted to hospital medicine for hypoxia and RVR.      * No surgery found *       Hospital Course:   Admitted to hospital medicine with Afib RVR that resolved following 1x 5mg IVP of lopressor. Initially requiring supplemental oxygen in ED briefly. CTA showed no PE, heterogenous mass of pancreatic body with new possible direct invasion of the stomach, and mild peripancreatic inflammatory change possibly 2/2 to pancreatitis. Also noted - Cholelithiasis and pneumobilia with CBD stent in place. As well as patchy ground-glass opacities throughout the lungs. Lipase level of 375, having no abdominal pain and eager to get home. Cardiology evaluated patient, no changes in Sotolol and recommended changing Coreg to Toprol 50 mg daily. Magnesium replaced. Patient medically stable for discharge 4/16/23 with PCP follow up already scheduled 4/18 and cardiology follow up 4/20.      /61 (BP Location: Right arm, Patient Position: Lying)   Pulse 76   Temp 97.9 °F (36.6 °C) (Oral)   Resp 18   Ht 5' (1.524 m)   Wt 77.2 kg (170 lb 3.1 oz)   SpO2 98%   BMI 33.24 kg/m²        Physical Exam  Vitals and nursing note reviewed.   Constitutional:       General: She is not in acute distress.     Appearance: She is ill-appearing.   HENT:      Head: Normocephalic and atraumatic.      Mouth/Throat:      Mouth: Mucous membranes are moist.   Eyes:      General: No scleral icterus.     Extraocular Movements: Extraocular movements intact.      Pupils: Pupils are equal, round, and reactive to light.   Cardiovascular:      Rate and Rhythm: Normal rate and regular rhythm.   Pulmonary:      Effort: Pulmonary effort is normal. No respiratory distress.      Breath sounds: Normal breath sounds.   Abdominal:      General: Abdomen is flat. Bowel sounds are normal. There is no distension.      Palpations: Abdomen is soft.  Musculoskeletal:      Right lower leg: No edema.      Left lower leg: No edema.   Lymphadenopathy:      Cervical: No cervical adenopathy.   Skin:     General: Skin is warm.      Capillary Refill: Capillary  refill takes less than 2 seconds.   Neurological:      General: No focal deficit present.      Mental Status: She is oriented to person, place, and time.     Goals of Care Treatment Preferences:  Code Status: DNR    Health care agent: Pt's adult sons are NOK.  Health care agent number: No value filed.    Living Will: Yes     What is most important right now is to focus on quality of life, even if it means sacrificing a little time.  Accordingly, we have decided that the best plan to meet the patient's goals includes continuing with treatment.      Consults:   Consults (From admission, onward)        Status Ordering Provider     Inpatient consult to Cardiology  Once        Provider:  (Not yet assigned)    Completed ALLYSON WALLACE     IP consult to case management  Once        Provider:  (Not yet assigned)    Acknowledged VIANEY MEJIA          Final Active Diagnoses:    Diagnosis Date Noted POA    PRINCIPAL PROBLEM:  Paroxysmal atrial fibrillation [I48.0] 04/19/2018 Yes    Pancreatic adenocarcinoma [C25.9] 03/21/2023 Yes    Sick sinus syndrome [I49.5] 05/10/2021 Yes    Essential hypertension, benign [I10] 04/19/2018 Yes    Hyperlipidemia [E78.5] 04/19/2018 Yes    Hypothyroidism [E03.9] 04/19/2018 Yes    OAB (overactive bladder) [N32.81] 04/19/2018 Yes    Crohn's disease of large intestine without complication [K50.10] 04/19/2018 Yes    GERD (gastroesophageal reflux disease) [K21.9] 04/19/2018 Yes      Problems Resolved During this Admission:       Discharged Condition: stable    Disposition: Home or Self Care    Follow Up: Please see your scheduled follow up appointments below.    4/18/2023 9:15 AM Ga Waldrop MD [facility] - Primary Care [facility] [address]   4/20/2023 9:00 AM Jovan Barbosa MD [facility] - Cardiology [address]   5/1/2023 1:00 PM Eugenio Magana MD [facility]- Palliative Medicine [address] [place] [address]       Patient Instructions:   No discharge procedures on  file.    Significant Diagnostic Studies: Labs: All labs within the past 24 hours have been reviewed    Pending Diagnostic Studies:     None         Medications:  Reconciled Home Medications:      Medication List      START taking these medications    metoprolol succinate 50 MG 24 hr tablet  Commonly known as: TOPROL-XL  Take 1 tablet (50 mg total) by mouth once daily.  Start taking on: April 17, 2023        CONTINUE taking these medications    acetaminophen 325 MG tablet  Commonly known as: TYLENOL  Take 650 mg by mouth daily as needed (Headache).     alendronate 70 MG tablet  Commonly known as: FOSAMAX  Take 1 tablet (70 mg total) by mouth every 7 days.     amLODIPine 5 MG tablet  Commonly known as: NORVASC  Take 1 tablet (5 mg total) by mouth once daily.     atorvastatin 20 MG tablet  Commonly known as: LIPITOR  Take 1 tablet (20 mg total) by mouth once daily.     CREON 24,000-76,000 -120,000 unit capsule  Generic drug: lipase-protease-amylase 24,000-76,000-120,000 units  Take 2 capsules by mouth 3 (three) times daily with meals.     diphenoxylate-atropine 2.5-0.025 mg 2.5-0.025 mg per tablet  Commonly known as: LOMOTIL  TAKE 1 TABLET BY MOUTH 4 TIMES DAILY AS NEEDED FOR DIARRHEA     ELIQUIS 5 mg Tab  Generic drug: apixaban  Take 1 tablet (5 mg total) by mouth 2 (two) times daily.     esomeprazole 40 MG capsule  Commonly known as: NEXIUM  Take 1 capsule (40 mg total) by mouth once daily.     folic acid 1 MG tablet  Commonly known as: FOLVITE  Take 1 tablet by mouth once daily     levothyroxine 175 MCG tablet  Commonly known as: SYNTHROID, LEVOTHROID  Take 1 tablet by mouth once daily     MELATONIN ORAL  Take 1 tablet by mouth nightly as needed (Insomnia).     ondansetron 4 MG Tbdl  Commonly known as: ZOFRAN-ODT  Take 1 tablet (4 mg total) by mouth every 8 (eight) hours as needed (nausea).     ondansetron 8 MG tablet  Commonly known as: ZOFRAN  Take 1 tablet (8 mg total) by mouth every 8 (eight) hours as needed for  Nausea.     sotaloL 120 MG Tab  Commonly known as: BETAPACE  Take 1 tablet (120 mg total) by mouth every 12 (twelve) hours.     sulfaSALAzine 500 mg Tab  Commonly known as: AZULFIDINE  Take 1 tablet by mouth twice daily     tolterodine 4 MG 24 hr capsule  Commonly known as: DETROL LA  Take 1 capsule by mouth once daily        STOP taking these medications    carvediloL 6.25 MG tablet  Commonly known as: COREG        ASK your doctor about these medications    FeroSuL 325 mg (65 mg iron) Tab tablet  Generic drug: ferrous sulfate  Take 1 tablet (325 mg total) by mouth daily with breakfast.            Indwelling Lines/Drains at time of discharge:   Lines/Drains/Airways     None                 Time spent on the discharge of patient: 35 minutes         Luther Shabazz DO  Department of Hospital Medicine  [facility] - Cardiology Stepdown

## 2023-04-16 NOTE — ASSESSMENT & PLAN NOTE
Last TSH checked 3 weeks ago wnl; unlikely to be driving recurrent afib    -continue home synthroid

## 2023-04-16 NOTE — HPI
Pauline Cronin is an 83-year-old female with a history of SSS s/p medtronic dc PPM (Gen change in 2021) atrial fibrillation, on apixaban as well as sotalol 120mg BID and carvedilol 6.25mg BID, Crohn's disease, pancreatic adenocarcinoma not amenable to resection with plans to undergo palliative chemotherapy, DNR status. She follows with Dr. Barbosa in the clinic and is due to see him on 04/20. She doesn't have an EP provider and states Dr. Barbosa manages her sotalol. Ablation was discussed with Dr. Barbosa in the past but the decision was to medically treat, per patient report. She presented with dyspnea, chest pressure and palpitations found to be in AFib with RVR which improved with the administration of IV metoprolol.  CT PE did not show evidence of PE. She is now in NSR. Cardiology was consulted to determine if any anti-arrhythmic changes is to be made. She denies any orthopnea, dyspnea, chest pain, palpitations or lightheadedness. She is complaint with her eliquis 5mg BID.

## 2023-04-16 NOTE — ASSESSMENT & PLAN NOTE
Pauline Cronin is an 83-year-old female with a history of SSS s/p medtronic dc PPM (Gen change in 2021) atrial fibrillation, on apixaban as well as sotalol 120mg BID and carvedilol 6.25mg BID, Crohn's disease, pancreatic adenocarcinoma not amenable to resection with plans to undergo palliative chemotherapy, DNR status. She follows with Dr. Barbosa in the clinic and is due to see him on 04/20 who she reports is managing her sotalol. Admitted for afib, though cardioverted to NSR    Recommendations  - Continue sotalol 120mg BID   - Can switch carvedilol to metoprolol succinate 50mg qd  - Follow up with Dr. Barbosa scheduled for April 20th. It appears she declined EP referral outpatient in 2021. We discussed she will require an EP outpatient referral for further management of atrial fib and PPM, however she will like to discuss this with Dr. Barbosa during her upcoming appt.   - Okay to discharge her home from our standpoint.

## 2023-04-16 NOTE — ASSESSMENT & PLAN NOTE
Loose stools approximately 3x per day. On daily sulfasalazine. No evidence of acute flare.    -continue sulfasalazine  -PRN lomotil

## 2023-04-16 NOTE — ASSESSMENT & PLAN NOTE
Recently diagnosed, follows with Dr. Hill outpatient. Not a surgical candidate but plan to start palliative chemotherapy.    -pain control PRN  -anti-emetics PRN  -code status: DNR confirmed on admission

## 2023-04-16 NOTE — HOSPITAL COURSE
Admitted to hospital medicine with Afib RVR that resolved following 1x 5mg IVP of lopressor. Initially requiring supplemental oxygen in ED briefly. CTA showed no PE, heterogenous mass of pancreatic body with new possible direct invasion of the stomach, and mild peripancreatic inflammatory change possibly 2/2 to pancreatitis. Also noted - Cholelithiasis and pneumobilia with CBD stent in place. As well as patchy ground-glass opacities throughout the lungs. Lipase level of 375, having no abdominal pain and eager to get home. Cardiology evaluated patient, no changes in Sotolol and recommended changing Coreg to Toprol 50 mg daily. Magnesium replaced. Patient medically stable for discharge 4/16/23 with PCP follow up already scheduled 4/18 and cardiology follow up 4/20.

## 2023-04-16 NOTE — ASSESSMENT & PLAN NOTE
Hx of pAF on apixaban 5 mg BID, sotalol 120 mg BID, and carvedilol 6.25 mg BID. Noted to be in RVR here; feels symptoms are consistent with bouts of afib RVR. Does not appear volume overloaded, troponin flat ruling against ACS, no signs of active infection.   Chads Vasc 5    -continue AC  -fu CT PE study ordered by ED - low suspicion as patient is on AC and was able to be taken off supplemental O2 while examining her; however she should undergo CT of lungs given recent cancer diagnosis  -continue carvedilol 6.25 mg BID + sotalol 120 mg BID  -keep K>4, Mg>2  -has outpatient appt 4/20 with cardiologist to discuss medications given recurrent episodes of RVR

## 2023-04-16 NOTE — PLAN OF CARE
[facility] - Cardiology Stepdown  Discharge Final Note    Primary Care Provider: Ga Waldrop MD    Expected Discharge Date: 4/16/2023    Final Discharge Note (most recent)       Final Note - 04/16/23 1618          Final Note    Assessment Type Final Discharge Note     Anticipated Discharge Disposition Home or Self Care     Hospital Resources/Appts/Education Provided Provided patient/caregiver with written discharge plan information        Post-Acute Status    Discharge Delays None known at this time                     Important Message from Medicare           SW spoke with pt at bedside. Confirmed face sheet information. No HH. Has RW. Takes coumadin. No dialysis. Offered ride home. Pt reported that she has a ride from family.     Hodan Cook, MSW, LCSW  Weekend [facility]  [facility] [phone]

## 2023-04-16 NOTE — ED NOTES
Pauline Diazton, a 83 y.o. female presents to the ED via EMS w/ complaint of non-radiating substernal chest pain. Hx of Afib. Aspirin given by EMS.     Triage note:  Chief Complaint   Patient presents with    Chest Pain     Substernal, non-radiating since this afternoon. Pale per EMS. Aspirin in route     Review of patient's allergies indicates:   Allergen Reactions    Lisinopril Other (See Comments)     cough     Past Medical History:   Diagnosis Date    Anticoagulant long-term use     Atrial fibrillation     Crohn disease diagnosed in her 20's    GERD (gastroesophageal reflux disease)     Hyperlipidemia     Hypertension     Pancreatic adenocarcinoma 3/21/2023    Thyroid disease     s/p I 131

## 2023-04-16 NOTE — SUBJECTIVE & OBJECTIVE
Past Medical History:   Diagnosis Date    Anticoagulant long-term use     Atrial fibrillation     Crohn disease diagnosed in her 20's    GERD (gastroesophageal reflux disease)     Hyperlipidemia     Hypertension     Pancreatic adenocarcinoma 3/21/2023    Thyroid disease     s/p I 131       Past Surgical History:   Procedure Laterality Date    APPENDECTOMY      CARDIAC SURGERY  2014    pacemaker    COLONOSCOPY  ~2008    normal findings per patient report    ENDOSCOPIC ULTRASOUND OF UPPER GASTROINTESTINAL TRACT N/A 3/14/2023    Procedure: ULTRASOUND, UPPER GI TRACT, ENDOSCOPIC;  Surgeon: Andrei Swartz MD;  Location: [facility];  Service: Endoscopy;  Laterality: N/A;  Approval to hold Eliquis rec'd from Dr. Barbosa (see telephone encounter 3/3/23)-DS  [employer] AICD/PPM  3/3/23-Instructions via portal-DS    ERCP N/A 3/21/2023    Procedure: ERCP (ENDOSCOPIC RETROGRADE CHOLANGIOPANCREATOGRAPHY);  Surgeon: Celina Toney MD;  Location: [facility] ([address] [place] [address]);  Service: Endoscopy;  Laterality: N/A;    ESOPHAGOGASTRODUODENOSCOPY N/A 7/17/2018    Procedure: EGD (ESOPHAGOGASTRODUODENOSCOPY);  Surgeon: Alondra Casiano MD;  Location: [facility] ENDO;  Service: Endoscopy;  Laterality: N/A;    HYSTERECTOMY      INSERTION OF IMPLANTABLE CARDIOVERTER-DEFIBRILLATOR (ICD) GENERATOR WITH TWO EXISTING LEADS Left 5/10/2021    Procedure: INSERTION, PULSE GENERATOR WITH 2 EXISTING LEADS, ICD;  Surgeon: Bo Leone MD;  Location: [facility] CATH LAB;  Service: Cardiology;  Laterality: Left;    INSERTION OF PACEMAKER      REPLACEMENT OF PACEMAKER GENERATOR  05/10/2021    RETROGRADE PYELOGRAPHY Right 2/18/2020    Procedure: PYELOGRAM, RETROGRADE;  Surgeon: Jovan Kruse MD;  Location: [facility] OR;  Service: Urology;  Laterality: Right;    SMALL INTESTINE SURGERY  in her 20's    for crohn's    TONSILLECTOMY      UPPER GASTROINTESTINAL ENDOSCOPY  ~2008    normal findings per patient report       Review of patient's allergies indicates:    Allergen Reactions    Lisinopril Other (See Comments)     cough       No current facility-administered medications on file prior to encounter.     Current Outpatient Medications on File Prior to Encounter   Medication Sig    acetaminophen (TYLENOL) 325 MG tablet Take 650 mg by mouth daily as needed (Headache).    alendronate (FOSAMAX) 70 MG tablet Take 1 tablet (70 mg total) by mouth every 7 days.    amLODIPine (NORVASC) 5 MG tablet Take 1 tablet (5 mg total) by mouth once daily.    atorvastatin (LIPITOR) 20 MG tablet Take 1 tablet (20 mg total) by mouth once daily.    carvediloL (COREG) 6.25 MG tablet Take 1 tablet (6.25 mg total) by mouth 2 (two) times daily with meals.    diphenoxylate-atropine 2.5-0.025 mg (LOMOTIL) 2.5-0.025 mg per tablet TAKE 1 TABLET BY MOUTH 4 TIMES DAILY AS NEEDED FOR DIARRHEA    ELIQUIS 5 mg Tab Take 1 tablet (5 mg total) by mouth 2 (two) times daily.    esomeprazole (NEXIUM) 40 MG capsule Take 1 capsule (40 mg total) by mouth once daily.    ferrous sulfate (IRON) 325 mg (65 mg iron) Tab tablet Take 1 tablet (325 mg total) by mouth daily with breakfast. (Patient not taking: Reported on 4/3/2023)    folic acid (FOLVITE) 1 MG tablet Take 1 tablet by mouth once daily    levothyroxine (SYNTHROID, LEVOTHROID) 175 MCG tablet Take 1 tablet by mouth once daily    lipase-protease-amylase 24,000-76,000-120,000 units (CREON) 24,000-76,000 -120,000 unit capsule Take 2 capsules by mouth 3 (three) times daily with meals.    MELATONIN ORAL Take 1 tablet by mouth nightly as needed (Insomnia).    ondansetron (ZOFRAN) 8 MG tablet Take 1 tablet (8 mg total) by mouth every 8 (eight) hours as needed for Nausea.    ondansetron (ZOFRAN-ODT) 4 MG TbDL Take 1 tablet (4 mg total) by mouth every 8 (eight) hours as needed (nausea).    sotaloL (BETAPACE) 120 MG Tab Take 1 tablet (120 mg total) by mouth every 12 (twelve) hours.    sulfaSALAzine (AZULFIDINE) 500 mg Tab Take 1 tablet by mouth twice daily    tolterodine  (DETROL LA) 4 MG 24 hr capsule Take 1 capsule by mouth once daily     Family History       Problem Relation (Age of Onset)    Breast cancer Mother    Cancer Mother    Crohn's disease Other          Tobacco Use    Smoking status: Former     Types: Cigarettes     Quit date:      Years since quittin.3    Smokeless tobacco: Never   Substance and Sexual Activity    Alcohol use: No    Drug use: No    Sexual activity: Never     Review of Systems   Constitutional: Negative for chills and decreased appetite.   HENT: Negative.     Cardiovascular:  Negative for chest pain, dyspnea on exertion and palpitations.   Respiratory:  Negative for shortness of breath.    Endocrine: Negative.    Hematologic/Lymphatic: Negative.    Skin: Negative.    Musculoskeletal: Negative.    Objective:     Vital Signs (Most Recent):  Temp: 97.9 °F (36.6 °C) (23 1149)  Pulse: 76 (23 1149)  Resp: 18 (23 1149)  BP: 139/61 (23 1149)  SpO2: 98 % (23 1149) Vital Signs (24h Range):  Temp:  [97.5 °F (36.4 °C)-98.6 °F (37 °C)] 97.9 °F (36.6 °C)  Pulse:  [] 76  Resp:  [17-20] 18  SpO2:  [95 %-99 %] 98 %  BP: (102-139)/(52-73) 139/61     Weight: 77.2 kg (170 lb 3.1 oz)  Body mass index is 33.24 kg/m².    SpO2: 98 %       No intake or output data in the 24 hours ending 23 1336    Lines/Drains/Airways       Peripheral Intravenous Line  Duration                  Peripheral IV - Single Lumen 23 0014 22 G Anterior;Left Forearm <1 day                    Physical Exam  Constitutional:       General: She is not in acute distress.     Appearance: Normal appearance. She is obese. She is not ill-appearing, toxic-appearing or diaphoretic.   HENT:      Head: Normocephalic and atraumatic.      Nose: Nose normal.      Mouth/Throat:      Mouth: Mucous membranes are moist.   Eyes:      Extraocular Movements: Extraocular movements intact.   Neck:      Vascular: No JVD.   Cardiovascular:      Rate and Rhythm: Normal rate  and regular rhythm.      Pulses: Normal pulses.      Heart sounds: Normal heart sounds. No murmur heard.  Pulmonary:      Effort: Pulmonary effort is normal. No respiratory distress.      Breath sounds: Normal breath sounds. No wheezing or rales.   Abdominal:      General: Abdomen is flat. Bowel sounds are normal. There is no distension.      Tenderness: There is no abdominal tenderness.   Musculoskeletal:         General: No swelling or tenderness. Normal range of motion.      Cervical back: Normal range of motion.   Skin:     General: Skin is warm.   Neurological:      Mental Status: She is alert and oriented to person, place, and time. Mental status is at baseline.   Psychiatric:         Mood and Affect: Mood normal.         Behavior: Behavior normal.         Thought Content: Thought content normal.       Significant Labs: BMP:   Recent Labs   Lab 04/16/23  0035 04/16/23  0658    91    138   K 3.2* 4.1    106   CO2 23 22*   BUN 8 7*   CREATININE 0.8 0.8   CALCIUM 8.3* 8.5*   MG  --  1.3*   , CMP   Recent Labs   Lab 04/16/23  0035 04/16/23  0658    138   K 3.2* 4.1    106   CO2 23 22*    91   BUN 8 7*   CREATININE 0.8 0.8   CALCIUM 8.3* 8.5*   PROT 6.6 6.4   ALBUMIN 2.0* 2.0*   BILITOT 0.5 0.5   ALKPHOS 83 78   AST 24 20   ALT 9* 5*   ANIONGAP 9 10   , CBC   Recent Labs   Lab 04/16/23  0035 04/16/23  0429   WBC 6.37 5.59   HGB 8.6* 9.0*   HCT 29.1* 31.3*    384   , and INR No results for input(s): INR, PROTIME in the last 48 hours.    Significant Imaging:   Imaging Results               CTA Chest Non-Coronary (PE Studies) (Final result)  Result time 04/16/23 05:54:41      Final result by Johny Almazan MD (04/16/23 05:54:41)                   Impression:      1. No convincing evidence of pulmonary thromboembolism to the level of the proximal segmental arteries.  Evaluation more distally, particularly at the lung bases is limited by respiratory motion artifact.  2.  Heterogeneous mass within the pancreatic body with possible direct invasion of the stomach which is new compared to prior CT examination of 03/17/2023.  Appropriate subspecialty consultation/follow-up is advised.  3. Mild peripancreatic inflammatory change, possibly secondary to pancreatitis.  Correlation with lipase values advised.  4. Cholelithiasis and pneumobilia.  Common bile duct stent in place.  5. Patchy ground-glass opacities throughout the lungs which may relate to edema, infection, aspiration, or noninfectious inflammatory process.  6. Additional findings as above.  This report was flagged in Epic as abnormal.    Electronically signed by resident: Margo Choudhary  Date:    04/16/2023  Time:    05:01    Electronically signed by: Johny Almazan MD  Date:    04/16/2023  Time:    05:54               Narrative:    EXAMINATION:  CTA CHEST NON CORONARY (PE STUDIES)    CLINICAL HISTORY:  Pulmonary embolism (PE) suspected, high prob;    TECHNIQUE:  Low dose axial images, sagittal and coronal reformations were obtained from the thoracic inlet to the lung bases following the IV administration of 100 mL of Omnipaque-350.  Scan technique was optimized to evaluate the pulmonary arteries.  MIP images were performed.    COMPARISON:  Chest radiograph 04/16/2023 and CT abdomen pelvis 03/17/2023.    FINDINGS:  No pulmonary artery filling defects to the segmental level.  Evaluation more distally is significantly limited by patient respiratory motion artifact.    There is no pneumothorax.  There are patchy ground-glass opacities throughout the lungs.  Further assessment of the lung parenchyma is limited by significant respiratory motion.  No significant volume of pleural fluid present.    Heart size is normal.  No pericardial effusion.  The aorta is normal in course and caliber.  No bulky mediastinal or axillary lymph node enlargement appreciated.    Gallbladder is relatively nondistended with layering high-density material  within the lumen of the gallbladder, likely representing small gallstones or biliary sludge.  Partially visualized common bile duct stent.  Pneumobilia, likely relating to biliary stent.  There is a heterogeneous appearing mass in the body of the pancreas measuring approximately 2.5 x 3.4 cm, new in comparison to prior CT examination of 03/17/2023.  The mass abuts the lesser curvature of the stomach with possible direct invasion/involvement.  Mass also appears to abut the splenic artery.  The main pancreatic duct is dilated measuring up to 0.7 cm in the uncinate process.  There is partial visualization of the known mass of the pancreatic head.  There is mild peripancreatic inflammatory change and correlation for acute pancreatitis advised.    Degenerative changes of the osseous structures.  Mild chronic appearing superior endplate deformity of the T3 vertebral body.  Postop change of median sternotomy.  Left chest wall cardiac device.                                       X-Ray Chest PA And Lateral (Final result)  Result time 04/16/23 01:39:15      Final result by Lexis Rodriguez MD (04/16/23 01:39:15)                   Impression:      No acute intrathoracic abnormality.      Electronically signed by: Lexis Rodriguez  Date:    04/16/2023  Time:    01:39               Narrative:    EXAMINATION:  CHEST PA AND LATERAL    CLINICAL HISTORY:  Chest pain, unspecified    TECHNIQUE:  PA and lateral chest radiograph    COMPARISON:  03/27/2023    FINDINGS:  Median sternotomy changes are present.  There is a left subclavian pacer.  The cardiac silhouette is within normal limits.   There is no focal consolidation, pneumothorax, or pleural effusion.

## 2023-04-16 NOTE — CONSULTS
[facility]  Cardiology  Consult Note    Patient Name: Pauline Cronin  MRN: [id number]  Admission Date: 4/15/2023  Hospital Length of Stay: 0 days  Code Status: DNR   Attending Provider: Carrillo Schwartz MD   Consulting Provider: Luciano Wolf MD  Primary Care Physician: Ga Waldrop MD  Principal Problem:Paroxysmal atrial fibrillation    Patient information was obtained from patient, past medical records and ER records.     Inpatient consult to Cardiology  Consult performed by: Luciano Wolf MD  Consult ordered by: Luther Shabazz DO        Subjective:     Chief Complaint:  Palpitations     HPI:   Pauline Cronin is an 83-year-old female with a history of SSS s/p medtronic dc PPM (Gen change in 2021) atrial fibrillation, on apixaban as well as sotalol 120mg BID and carvedilol 6.25mg BID, Crohn's disease, pancreatic adenocarcinoma not amenable to resection with plans to undergo palliative chemotherapy, DNR status. She follows with Dr. Barbosa in the clinic and is due to see him on 04/20. She doesn't have an EP provider and states Dr. Barbosa manages her sotalol. Ablation was discussed with Dr. Barbosa in the past but the decision was to medically treat, per patient report. She presented with dyspnea, chest pressure and palpitations found to be in AFib with RVR which improved with the administration of IV metoprolol.  CT PE did not show evidence of PE. She is now in NSR. Cardiology was consulted to determine if any anti-arrhythmic changes is to be made. She denies any orthopnea, dyspnea, chest pain, palpitations or lightheadedness. She is complaint with her eliquis 5mg BID.      Past Medical History:   Diagnosis Date    Anticoagulant long-term use     Atrial fibrillation     Crohn disease diagnosed in her 20's    GERD (gastroesophageal reflux disease)     Hyperlipidemia     Hypertension     Pancreatic adenocarcinoma 3/21/2023    Thyroid disease     s/p I 131       Past Surgical History:    Procedure Laterality Date    APPENDECTOMY      CARDIAC SURGERY  2014    pacemaker    COLONOSCOPY  ~2008    normal findings per patient report    ENDOSCOPIC ULTRASOUND OF UPPER GASTROINTESTINAL TRACT N/A 3/14/2023    Procedure: ULTRASOUND, UPPER GI TRACT, ENDOSCOPIC;  Surgeon: Andrei Swartz MD;  Location: [facility] ENDO;  Service: Endoscopy;  Laterality: N/A;  Approval to hold Eliquis rec'd from Dr. Barbosa (see telephone encounter 3/3/23)-DS  Medtronic AICD/PPM  3/3/23-Instructions via portal-DS    ERCP N/A 3/21/2023    Procedure: ERCP (ENDOSCOPIC RETROGRADE CHOLANGIOPANCREATOGRAPHY);  Surgeon: Celina Toney MD;  [facility]  Service: Endoscopy;  Laterality: N/A;    ESOPHAGOGASTRODUODENOSCOPY N/A 7/17/2018    Procedure: EGD (ESOPHAGOGASTRODUODENOSCOPY);  Surgeon: Alondra Casiano MD;  Location: [facility] ENDO;  Service: Endoscopy;  Laterality: N/A;    HYSTERECTOMY      INSERTION OF IMPLANTABLE CARDIOVERTER-DEFIBRILLATOR (ICD) GENERATOR WITH TWO EXISTING LEADS Left 5/10/2021    Procedure: INSERTION, PULSE GENERATOR WITH 2 EXISTING LEADS, ICD;  Surgeon: Bo Leone MD;  Location[facility] LAB;  Service: Cardiology;  Laterality: Left;    INSERTION OF PACEMAKER      REPLACEMENT OF PACEMAKER GENERATOR  05/10/2021    RETROGRADE PYELOGRAPHY Right 2/18/2020    Procedure: PYELOGRAM, RETROGRADE;  Surgeon: Jovan Kruse MD;  Location: [facility];  Service: Urology;  Laterality: Right;    SMALL INTESTINE SURGERY  in her 20's    for crohn's    TONSILLECTOMY      UPPER GASTROINTESTINAL ENDOSCOPY  ~2008    normal findings per patient report       Review of patient's allergies indicates:   Allergen Reactions    Lisinopril Other (See Comments)     cough       No current facility-administered medications on file prior to encounter.     Current Outpatient Medications on File Prior to Encounter   Medication Sig    acetaminophen (TYLENOL) 325 MG tablet Take 650 mg by mouth daily as needed  (Headache).    alendronate (FOSAMAX) 70 MG tablet Take 1 tablet (70 mg total) by mouth every 7 days.    amLODIPine (NORVASC) 5 MG tablet Take 1 tablet (5 mg total) by mouth once daily.    atorvastatin (LIPITOR) 20 MG tablet Take 1 tablet (20 mg total) by mouth once daily.    carvediloL (COREG) 6.25 MG tablet Take 1 tablet (6.25 mg total) by mouth 2 (two) times daily with meals.    diphenoxylate-atropine 2.5-0.025 mg (LOMOTIL) 2.5-0.025 mg per tablet TAKE 1 TABLET BY MOUTH 4 TIMES DAILY AS NEEDED FOR DIARRHEA    ELIQUIS 5 mg Tab Take 1 tablet (5 mg total) by mouth 2 (two) times daily.    esomeprazole (NEXIUM) 40 MG capsule Take 1 capsule (40 mg total) by mouth once daily.    ferrous sulfate (IRON) 325 mg (65 mg iron) Tab tablet Take 1 tablet (325 mg total) by mouth daily with breakfast. (Patient not taking: Reported on 4/3/2023)    folic acid (FOLVITE) 1 MG tablet Take 1 tablet by mouth once daily    levothyroxine (SYNTHROID, LEVOTHROID) 175 MCG tablet Take 1 tablet by mouth once daily    lipase-protease-amylase 24,000-76,000-120,000 units (CREON) 24,000-76,000 -120,000 unit capsule Take 2 capsules by mouth 3 (three) times daily with meals.    MELATONIN ORAL Take 1 tablet by mouth nightly as needed (Insomnia).    ondansetron (ZOFRAN) 8 MG tablet Take 1 tablet (8 mg total) by mouth every 8 (eight) hours as needed for Nausea.    ondansetron (ZOFRAN-ODT) 4 MG TbDL Take 1 tablet (4 mg total) by mouth every 8 (eight) hours as needed (nausea).    sotaloL (BETAPACE) 120 MG Tab Take 1 tablet (120 mg total) by mouth every 12 (twelve) hours.    sulfaSALAzine (AZULFIDINE) 500 mg Tab Take 1 tablet by mouth twice daily    tolterodine (DETROL LA) 4 MG 24 hr capsule Take 1 capsule by mouth once daily     Family History       Problem Relation (Age of Onset)    Breast cancer Mother    Cancer Mother    Crohn's disease Other          Tobacco Use    Smoking status: Former     Types: Cigarettes     Quit date: 1977      Years since quittin.3    Smokeless tobacco: Never   Substance and Sexual Activity    Alcohol use: No    Drug use: No    Sexual activity: Never     Review of Systems   Constitutional: Negative for chills and decreased appetite.   HENT: Negative.     Cardiovascular:  Negative for chest pain, dyspnea on exertion and palpitations.   Respiratory:  Negative for shortness of breath.    Endocrine: Negative.    Hematologic/Lymphatic: Negative.    Skin: Negative.    Musculoskeletal: Negative.    Objective:     Vital Signs (Most Recent):  Temp: 97.9 °F (36.6 °C) (23 1149)  Pulse: 76 (23 1149)  Resp: 18 (23 1149)  BP: 139/61 (23 1149)  SpO2: 98 % (23 1149) Vital Signs (24h Range):  Temp:  [97.5 °F (36.4 °C)-98.6 °F (37 °C)] 97.9 °F (36.6 °C)  Pulse:  [] 76  Resp:  [17-20] 18  SpO2:  [95 %-99 %] 98 %  BP: (102-139)/(52-73) 139/61     Weight: 77.2 kg (170 lb 3.1 oz)  Body mass index is 33.24 kg/m².    SpO2: 98 %       No intake or output data in the 24 hours ending 23 1336    Lines/Drains/Airways       Peripheral Intravenous Line  Duration                  Peripheral IV - Single Lumen 23 0014 22 G Anterior;Left Forearm <1 day                    Physical Exam  Constitutional:       General: She is not in acute distress.     Appearance: Normal appearance. She is obese. She is not ill-appearing, toxic-appearing or diaphoretic.   HENT:      Head: Normocephalic and atraumatic.      Nose: Nose normal.      Mouth/Throat:      Mouth: Mucous membranes are moist.   Eyes:      Extraocular Movements: Extraocular movements intact.   Neck:      Vascular: No JVD.   Cardiovascular:      Rate and Rhythm: Normal rate and regular rhythm.      Pulses: Normal pulses.      Heart sounds: Normal heart sounds. No murmur heard.  Pulmonary:      Effort: Pulmonary effort is normal. No respiratory distress.      Breath sounds: Normal breath sounds. No wheezing or rales.   Abdominal:      General:  Abdomen is flat. Bowel sounds are normal. There is no distension.      Tenderness: There is no abdominal tenderness.   Musculoskeletal:         General: No swelling or tenderness. Normal range of motion.      Cervical back: Normal range of motion.   Skin:     General: Skin is warm.   Neurological:      Mental Status: She is alert and oriented to person, place, and time. Mental status is at baseline.   Psychiatric:         Mood and Affect: Mood normal.         Behavior: Behavior normal.         Thought Content: Thought content normal.       Significant Labs: BMP:   Recent Labs   Lab 04/16/23  0035 04/16/23  0658    91    138   K 3.2* 4.1    106   CO2 23 22*   BUN 8 7*   CREATININE 0.8 0.8   CALCIUM 8.3* 8.5*   MG  --  1.3*   , CMP   Recent Labs   Lab 04/16/23 0035 04/16/23  0658    138   K 3.2* 4.1    106   CO2 23 22*    91   BUN 8 7*   CREATININE 0.8 0.8   CALCIUM 8.3* 8.5*   PROT 6.6 6.4   ALBUMIN 2.0* 2.0*   BILITOT 0.5 0.5   ALKPHOS 83 78   AST 24 20   ALT 9* 5*   ANIONGAP 9 10   , CBC   Recent Labs   Lab 04/16/23 0035 04/16/23  0429   WBC 6.37 5.59   HGB 8.6* 9.0*   HCT 29.1* 31.3*    384   , and INR No results for input(s): INR, PROTIME in the last 48 hours.    Significant Imaging:   Imaging Results               CTA Chest Non-Coronary (PE Studies) (Final result)  Result time 04/16/23 05:54:41      Final result by Johny Almazan MD (04/16/23 05:54:41)                   Impression:      1. No convincing evidence of pulmonary thromboembolism to the level of the proximal segmental arteries.  Evaluation more distally, particularly at the lung bases is limited by respiratory motion artifact.  2. Heterogeneous mass within the pancreatic body with possible direct invasion of the stomach which is new compared to prior CT examination of 03/17/2023.  Appropriate subspecialty consultation/follow-up is advised.  3. Mild peripancreatic inflammatory change, possibly  secondary to pancreatitis.  Correlation with lipase values advised.  4. Cholelithiasis and pneumobilia.  Common bile duct stent in place.  5. Patchy ground-glass opacities throughout the lungs which may relate to edema, infection, aspiration, or noninfectious inflammatory process.  6. Additional findings as above.  This report was flagged in Epic as abnormal.    Electronically signed by resident: Margo Choudhary  Date:    04/16/2023  Time:    05:01    Electronically signed by: Johny Almazan MD  Date:    04/16/2023  Time:    05:54               Narrative:    EXAMINATION:  CTA CHEST NON CORONARY (PE STUDIES)    CLINICAL HISTORY:  Pulmonary embolism (PE) suspected, high prob;    TECHNIQUE:  Low dose axial images, sagittal and coronal reformations were obtained from the thoracic inlet to the lung bases following the IV administration of 100 mL of Omnipaque-350.  Scan technique was optimized to evaluate the pulmonary arteries.  MIP images were performed.    COMPARISON:  Chest radiograph 04/16/2023 and CT abdomen pelvis 03/17/2023.    FINDINGS:  No pulmonary artery filling defects to the segmental level.  Evaluation more distally is significantly limited by patient respiratory motion artifact.    There is no pneumothorax.  There are patchy ground-glass opacities throughout the lungs.  Further assessment of the lung parenchyma is limited by significant respiratory motion.  No significant volume of pleural fluid present.    Heart size is normal.  No pericardial effusion.  The aorta is normal in course and caliber.  No bulky mediastinal or axillary lymph node enlargement appreciated.    Gallbladder is relatively nondistended with layering high-density material within the lumen of the gallbladder, likely representing small gallstones or biliary sludge.  Partially visualized common bile duct stent.  Pneumobilia, likely relating to biliary stent.  There is a heterogeneous appearing mass in the body of the pancreas measuring  approximately 2.5 x 3.4 cm, new in comparison to prior CT examination of 03/17/2023.  The mass abuts the lesser curvature of the stomach with possible direct invasion/involvement.  Mass also appears to abut the splenic artery.  The main pancreatic duct is dilated measuring up to 0.7 cm in the uncinate process.  There is partial visualization of the known mass of the pancreatic head.  There is mild peripancreatic inflammatory change and correlation for acute pancreatitis advised.    Degenerative changes of the osseous structures.  Mild chronic appearing superior endplate deformity of the T3 vertebral body.  Postop change of median sternotomy.  Left chest wall cardiac device.                                       X-Ray Chest PA And Lateral (Final result)  Result time 04/16/23 01:39:15      Final result by Lexis Rodriguez MD (04/16/23 01:39:15)                   Impression:      No acute intrathoracic abnormality.      Electronically signed by: Lexis Rodriguez  Date:    04/16/2023  Time:    01:39               Narrative:    EXAMINATION:  CHEST PA AND LATERAL    CLINICAL HISTORY:  Chest pain, unspecified    TECHNIQUE:  PA and lateral chest radiograph    COMPARISON:  03/27/2023    FINDINGS:  Median sternotomy changes are present.  There is a left subclavian pacer.  The cardiac silhouette is within normal limits.   There is no focal consolidation, pneumothorax, or pleural effusion.                                        Assessment and Plan:     * Paroxysmal atrial fibrillation  Pauline Cronin is an 83-year-old female with a history of SSS s/p medtronic dc PPM (Gen change in 2021) atrial fibrillation, on apixaban as well as sotalol 120mg BID and carvedilol 6.25mg BID, Crohn's disease, pancreatic adenocarcinoma not amenable to resection with plans to undergo palliative chemotherapy, DNR status. She follows with Dr. Barbosa in the clinic and is due to see him on 04/20 who she reports is managing her sotalol. Admitted for  afib, though cardioverted to NSR    Recommendations  - Continue sotalol 120mg BID   - Can switch carvedilol to metoprolol succinate 50mg qd  - Follow up with Dr. Barbosa scheduled for April 20th. It appears she declined EP referral outpatient in 2021. We discussed she will require an EP outpatient referral for further management of atrial fib and PPM, however she will like to discuss this with Dr. Barbosa during her upcoming appt.   - Continue anticoagulation with eliquis  - Okay to discharge her home from our standpoint.         VTE Risk Mitigation (From admission, onward)         Ordered     apixaban tablet 5 mg  2 times daily         04/16/23 0428     IP VTE HIGH RISK PATIENT  Once         04/16/23 0405     Place sequential compression device  Until discontinued         04/16/23 0405                Thank you for your consult. I will sign off. Please contact us if you have any additional questions.    Luciano Wolf MD  Cardiology   [facility]

## 2023-04-16 NOTE — HPI
82 yo F with PMH pAF (apixaban), pancreatic adenocarcinoma, HFpEF, SSS s/p PM, Crohns Disease, HTN, HLD, hypothyroidism presenting for acute onset dyspnea, chest pressure, and palpitations. She was at home relaxing when the sensation came on suddenly. It felt consistent with her usual episodes of atrial fibrillation. The pain was described as heavy and she had some associated left shoulder soreness. She went to lie down in her bed but the feeling persisted which is why she called EMS. She had some associated dizziness and diaphoresis. She denies fever, dysuria, orthopnea, LE swelling, cough, pleuritic chest pain, and abdominal pain. She has chronic loose stools 2/2 inflammatory bowel disease but is currently at her baseline regarding this. With EMS she was given ASA which reportedly helped her symptoms though did not relieve them fully. OF note patient was admitted to hospital medicine on 3/27 for similar presentation and found to be in RVR. She is overall concerned as to why her bouts of atrial fibrillation are occurring more frequently. At the time of my exam she was symptom free. She lives at home and is independent with ADLs though has support from her daughter in law.     In the ED - T 97.9, HR 86, /73, and placed on 2L with SpO2 98%. HgB 8.6 (baseline), K 3.2, Cr 0.8. troponin 0.043 then 0.036, . CXR unremarkable. Admitted to hospital medicine for hypoxia and RVR.

## 2023-04-16 NOTE — ED PROVIDER NOTES
Encounter Date: 4/15/2023       History     Chief Complaint   Patient presents with    Chest Pain     Substernal, non-radiating since this afternoon. Pale per EMS. Aspirin in route     HPI patient is an 83-year-old female with a history of recently diagnosed pancreatic adenocarcinoma not currently treated with chemotherapy, atrial fibrillation, Crohn's disease, hypertension who presents emergency department with 1 day of left-sided nonradiating chest pain and tightness with associated shortness of breath, no palpitations.  Patient notes no recent fevers or chills, no change in sputum, no lower extremity edema and notes improvement in her chest pain with aspirin she received on route.  Per EMS, patient appeared pale with systolic blood pressures in the 90s on route and thus did not receive any nitroglycerin.  Review of patient's allergies indicates:   Allergen Reactions    Lisinopril Other (See Comments)     cough     Past Medical History:   Diagnosis Date    Anticoagulant long-term use     Atrial fibrillation     Crohn disease diagnosed in her 20's    GERD (gastroesophageal reflux disease)     Hyperlipidemia     Hypertension     Pancreatic adenocarcinoma 3/21/2023    Thyroid disease     s/p I 131     Past Surgical History:   Procedure Laterality Date    APPENDECTOMY      CARDIAC SURGERY  2014    pacemaker    COLONOSCOPY  ~2008    normal findings per patient report    ENDOSCOPIC ULTRASOUND OF UPPER GASTROINTESTINAL TRACT N/A 3/14/2023    Procedure: ULTRASOUND, UPPER GI TRACT, ENDOSCOPIC;  Surgeon: Andrei Swartz MD;  Location: [facility];  Service: Endoscopy;  Laterality: N/A;  Approval to hold Eliquis rec'd from Dr. Barbosa (see telephone encounter 3/3/23)-DS  [employer] AICD/PPM  3/3/23-Instructions via portal-DS    ERCP N/A 3/21/2023    Procedure: ERCP (ENDOSCOPIC RETROGRADE CHOLANGIOPANCREATOGRAPHY);  Surgeon: Celina Toney MD;  Location: [facility] ENDO ([address] [place] [address]);  Service: Endoscopy;  Laterality: N/A;     ESOPHAGOGASTRODUODENOSCOPY N/A 2018    Procedure: EGD (ESOPHAGOGASTRODUODENOSCOPY);  Surgeon: Alondra Casiano MD;  Location: [facility];  Service: Endoscopy;  Laterality: N/A;    HYSTERECTOMY      INSERTION OF IMPLANTABLE CARDIOVERTER-DEFIBRILLATOR (ICD) GENERATOR WITH TWO EXISTING LEADS Left 5/10/2021    Procedure: INSERTION, PULSE GENERATOR WITH 2 EXISTING LEADS, ICD;  Surgeon: Bo Leone MD;  Location: [facility] CATH LAB;  Service: Cardiology;  Laterality: Left;    INSERTION OF PACEMAKER      REPLACEMENT OF PACEMAKER GENERATOR  05/10/2021    RETROGRADE PYELOGRAPHY Right 2020    Procedure: PYELOGRAM, RETROGRADE;  Surgeon: Jovan Kruse MD;  Location: [facility] OR;  Service: Urology;  Laterality: Right;    SMALL INTESTINE SURGERY  in her 20's    for crohn's    TONSILLECTOMY      UPPER GASTROINTESTINAL ENDOSCOPY  ~    normal findings per patient report     Family History   Problem Relation Age of Onset    Cancer Mother     Breast cancer Mother     Crohn's disease Other     Colon cancer Neg Hx     Colon polyps Neg Hx     Esophageal cancer Neg Hx     Stomach cancer Neg Hx     Ulcerative colitis Neg Hx      Social History     Tobacco Use    Smoking status: Former     Types: Cigarettes     Quit date:      Years since quittin.3    Smokeless tobacco: Never   Substance Use Topics    Alcohol use: No    Drug use: No     Review of Systems  10 point review of systems reviewed with patient otherwise negative.     Physical Exam     Initial Vitals [04/15/23 2331]   BP Pulse Resp Temp SpO2   121/73 86 18 97.9 °F (36.6 °C) 96 %      MAP       --         Physical Exam  Nursing note and vitals reviewed.  Constitutional: Patient appears well-developed and well-nourished. No distress.   HENT:   Head: Normocephalic and atraumatic.   Eyes: Conjunctivae and EOM are normal. Pupils are equal, round, and reactive to light.   Neck: Neck supple.   Normal range of motion.  Cardiovascular: Normal rate, regular rhythm,  normal heart sounds and intact distal pulses. Healed anterior chest wall scar  Pulmonary/Chest: Breath sounds normal.   Abdominal: Abdomen is soft. Patient exhibits no distension. There is no abdominal tenderness.   Musculoskeletal:      Cervical back: Normal range of motion and neck supple.   Neurological: Patient is alert and oriented to person, place, and time. No cranial nerve deficit. Gait normal. GCS score is 15.    Skin: Skin is warm and dry.  Psych: Normal mood/affect    ED Course   Procedures  Labs Reviewed   CBC W/ AUTO DIFFERENTIAL - Abnormal; Notable for the following components:       Result Value    RBC 3.70 (*)     Hemoglobin 8.6 (*)     Hematocrit 29.1 (*)     MCV 79 (*)     MCH 23.2 (*)     MCHC 29.6 (*)     RDW 21.7 (*)     All other components within normal limits   COMPREHENSIVE METABOLIC PANEL - Abnormal; Notable for the following components:    Potassium 3.2 (*)     Calcium 8.3 (*)     Albumin 2.0 (*)     ALT 9 (*)     All other components within normal limits   TROPONIN I - Abnormal; Notable for the following components:    Troponin I 0.043 (*)     All other components within normal limits   B-TYPE NATRIURETIC PEPTIDE - Abnormal; Notable for the following components:     (*)     All other components within normal limits   TROPONIN I - Abnormal; Notable for the following components:    Troponin I 0.036 (*)     All other components within normal limits   CBC W/ AUTO DIFFERENTIAL - Abnormal; Notable for the following components:    RBC 3.90 (*)     Hemoglobin 9.0 (*)     Hematocrit 31.3 (*)     MCV 80 (*)     MCH 23.1 (*)     MCHC 28.8 (*)     RDW 22.1 (*)     All other components within normal limits   TROPONIN ISTAT   TROPONIN ISTAT     I have personally reviewed and interpreted the patient's EKG:  Paced tachycardia to 101 with left bundle-branch block similar to previous, no STEMI  I have personally reviewed and interpreted imaging studies:  Chest x-ray with visible sternotomy wires, no  pulmonary infiltrates, no pneumothorax  I have personally reviewed and interpreted the patients laboratory studies:  Remarkable for elevated troponin, elevated BNP, TSH pending,     I have personally reviewed and interpreted the patient's previous EKG: 3/28 paced left bundle-branch block pattern         ECG Results              EKG 12-lead (Final result)  Result time 04/16/23 08:37:55      Final result by Interface, Lab In [facility] (04/16/23 08:37:55)                   Narrative:    Test Reason :     Vent. Rate : 102 BPM     Atrial Rate : 076 BPM     P-R Int : 000 ms          QRS Dur : 134 ms      QT Int : 364 ms       P-R-T Axes : 000 -33 144 degrees     QTc Int : 474 ms    Atrial fibrillation with rapid ventricular response with premature  ventricular or aberrantly conducted complexes  Left axis deviation  Left bundle branch block  Abnormal ECG  When compared with ECG of 16-APR-2023 00:10,  No significant change was found  Confirmed by ROC LORD MD ([id number]) on 4/16/2023 8:37:47 AM    Referred By: [email or web address]   SELF           Confirmed By:ROC LORD MD                                     EKG 12-lead (Final result)  Result time 04/16/23 08:39:51      Final result by Interface, Lab In [facility] (04/16/23 08:39:51)                   Narrative:    Test Reason : R07.9,    Vent. Rate : 101 BPM     Atrial Rate : 101 BPM     P-R Int : 000 ms          QRS Dur : 136 ms      QT Int : 430 ms       P-R-T Axes : 000 -32 143 degrees     QTc Int : 557 ms    Atrial fibrillation with rapid ventricular response with occasional   ventricular-paced complexes  Left axis deviation  Left bundle branch block  Abnormal ECG  When compared with ECG of 28-MAR-2023 08:47,  Atrial fibrillation has replaced Atrial-paced rhythm  Confirmed by ROC LORD MD ([id number]) on 4/16/2023 8:39:41 AM    Referred By: [email or web address]   SELF           Confirmed By:ROC LORD MD                                  Imaging Results               CTA Chest  Non-Coronary (PE Studies) (Final result)  Result time 04/16/23 05:54:41      Final result by Johny Almazan MD (04/16/23 05:54:41)                   Impression:      1. No convincing evidence of pulmonary thromboembolism to the level of the proximal segmental arteries.  Evaluation more distally, particularly at the lung bases is limited by respiratory motion artifact.  2. Heterogeneous mass within the pancreatic body with possible direct invasion of the stomach which is new compared to prior CT examination of 03/17/2023.  Appropriate subspecialty consultation/follow-up is advised.  3. Mild peripancreatic inflammatory change, possibly secondary to pancreatitis.  Correlation with lipase values advised.  4. Cholelithiasis and pneumobilia.  Common bile duct stent in place.  5. Patchy ground-glass opacities throughout the lungs which may relate to edema, infection, aspiration, or noninfectious inflammatory process.  6. Additional findings as above.  This report was flagged in Epic as abnormal.    Electronically signed by resident: Margo Choudhary  Date:    04/16/2023  Time:    05:01    Electronically signed by: Johny Almazan MD  Date:    04/16/2023  Time:    05:54               Narrative:    EXAMINATION:  CTA CHEST NON CORONARY (PE STUDIES)    CLINICAL HISTORY:  Pulmonary embolism (PE) suspected, high prob;    TECHNIQUE:  Low dose axial images, sagittal and coronal reformations were obtained from the thoracic inlet to the lung bases following the IV administration of 100 mL of Omnipaque-350.  Scan technique was optimized to evaluate the pulmonary arteries.  MIP images were performed.    COMPARISON:  Chest radiograph 04/16/2023 and CT abdomen pelvis 03/17/2023.    FINDINGS:  No pulmonary artery filling defects to the segmental level.  Evaluation more distally is significantly limited by patient respiratory motion artifact.    There is no pneumothorax.  There are patchy ground-glass opacities throughout the lungs.   Further assessment of the lung parenchyma is limited by significant respiratory motion.  No significant volume of pleural fluid present.    Heart size is normal.  No pericardial effusion.  The aorta is normal in course and caliber.  No bulky mediastinal or axillary lymph node enlargement appreciated.    Gallbladder is relatively nondistended with layering high-density material within the lumen of the gallbladder, likely representing small gallstones or biliary sludge.  Partially visualized common bile duct stent.  Pneumobilia, likely relating to biliary stent.  There is a heterogeneous appearing mass in the body of the pancreas measuring approximately 2.5 x 3.4 cm, new in comparison to prior CT examination of 03/17/2023.  The mass abuts the lesser curvature of the stomach with possible direct invasion/involvement.  Mass also appears to abut the splenic artery.  The main pancreatic duct is dilated measuring up to 0.7 cm in the uncinate process.  There is partial visualization of the known mass of the pancreatic head.  There is mild peripancreatic inflammatory change and correlation for acute pancreatitis advised.    Degenerative changes of the osseous structures.  Mild chronic appearing superior endplate deformity of the T3 vertebral body.  Postop change of median sternotomy.  Left chest wall cardiac device.                                       X-Ray Chest PA And Lateral (Final result)  Result time 04/16/23 01:39:15      Final result by Lexis Rodriguez MD (04/16/23 01:39:15)                   Impression:      No acute intrathoracic abnormality.      Electronically signed by: Lexis Rodriguez  Date:    04/16/2023  Time:    01:39               Narrative:    EXAMINATION:  CHEST PA AND LATERAL    CLINICAL HISTORY:  Chest pain, unspecified    TECHNIQUE:  PA and lateral chest radiograph    COMPARISON:  03/27/2023    FINDINGS:  Median sternotomy changes are present.  There is a left subclavian pacer.  The cardiac  silhouette is within normal limits.   There is no focal consolidation, pneumothorax, or pleural effusion.                                       Medications   metoprolol injection 5 mg (5 mg Intravenous Given 4/16/23 0309)   potassium bicarbonate disintegrating tablet 50 mEq (50 mEq Oral Given 4/16/23 0420)   iohexoL (OMNIPAQUE 350) injection 100 mL (100 mLs Intravenous Given 4/16/23 0451)   carvediloL tablet 6.25 mg (6.25 mg Oral Given 4/16/23 1710)       Given the patient's underlying comorbidities, elevated troponin in the setting of chest pain, atrial fibrillation with RVR in the emergency department patient received metoprolol x1 in the ED for rate control and will plan for observation admission.  Of note, given patient's recent pancreatic cancer diagnosis and shortness of breath, hypotension CT PE was ordered and is pending at the time of admission.  [facility] to follow up on CT results                    Critical Care   Date: 04/17/2023  Performed by: Juju Santacruz MD   Authorized by: Juju Santacruz MD      Total critical care time (exclusive of procedural time) : 45 minutes, exclusive of separately billable procedures and treating other patients and teaching time.    Critical care was necessary to treat or prevent imminent or life-threatening deterioration of the following conditions:  a fib with RVR requiring IV metoprolol    Due to a high probability of clinically significant, life threatening deterioration, the patient required my highest level of preparedness to intervene emergently and I personally spent this critical care time directly and personally managing the patient. This critical care time included obtaining a history; examining the patient; pulse oximetry; ordering and review of studies; arranging urgent treatment with development of a management plan; evaluation of patient's response to treatment; frequent reassessment, documentation, and, discussions with other providers, patient and family  members.               Clinical Impression:   Final diagnoses:  [R07.9] Chest pain        ED Disposition Condition    Observation                 Juju Santacruz MD  04/17/23 0794

## 2023-04-16 NOTE — ED NOTES
Telemetry Verification   Patient placed on Telemetry Box  Verified with Egr [facility]   Box # [id number]   Rate 70   Rhythm Normal Sinus

## 2023-04-16 NOTE — PLAN OF CARE
Problem: Adult Inpatient Plan of Care  Goal: Plan of Care Review  Outcome: Met  Goal: Patient-Specific Goal (Individualized)  Outcome: Met  Goal: Absence of Hospital-Acquired Illness or Injury  Outcome: Met  Goal: Optimal Comfort and Wellbeing  Outcome: Met  Goal: Readiness for Transition of Care  Outcome: Met     Problem: Fall Injury Risk  Goal: Absence of Fall and Fall-Related Injury  Outcome: Met     Pt discharged per MD orders.  Tele discontinued and returned to station.  IV discontinued; catheter tip intact x.  Medication list and prescriptions reviewed; prescriptions sent to pt preferred pharmacy and printed prescriptions provided.  Pt verbalizes understanding of all written and verbal discharge instructions.  Pt awaiting family/escort arrival.  Will continue to monitor.

## 2023-04-18 ENCOUNTER — OFFICE VISIT (OUTPATIENT)
Dept: PRIMARY CARE CLINIC | Facility: CLINIC | Age: 83
End: 2023-04-18
Payer: MEDICARE

## 2023-04-18 VITALS
SYSTOLIC BLOOD PRESSURE: 118 MMHG | WEIGHT: 172.38 LBS | TEMPERATURE: 96 F | HEIGHT: 60 IN | RESPIRATION RATE: 18 BRPM | BODY MASS INDEX: 33.84 KG/M2 | OXYGEN SATURATION: 97 % | HEART RATE: 89 BPM | DIASTOLIC BLOOD PRESSURE: 78 MMHG

## 2023-04-18 DIAGNOSIS — I48.0 PAROXYSMAL ATRIAL FIBRILLATION: ICD-10-CM

## 2023-04-18 DIAGNOSIS — K86.89 PANCREATIC INSUFFICIENCY: ICD-10-CM

## 2023-04-18 DIAGNOSIS — C25.9 PANCREATIC ADENOCARCINOMA: Primary | ICD-10-CM

## 2023-04-18 PROBLEM — E66.9 OBESITY (BMI 30.0-34.9): Status: ACTIVE | Noted: 2019-02-14

## 2023-04-18 PROBLEM — E66.811 OBESITY (BMI 30.0-34.9): Status: ACTIVE | Noted: 2019-02-14

## 2023-04-18 PROCEDURE — 3078F DIAST BP <80 MM HG: CPT | Mod: HCNC,CPTII,S$GLB, | Performed by: FAMILY MEDICINE

## 2023-04-18 PROCEDURE — 1159F MED LIST DOCD IN RCRD: CPT | Mod: HCNC,CPTII,S$GLB, | Performed by: FAMILY MEDICINE

## 2023-04-18 PROCEDURE — 3288F PR FALLS RISK ASSESSMENT DOCUMENTED: ICD-10-PCS | Mod: HCNC,CPTII,S$GLB, | Performed by: FAMILY MEDICINE

## 2023-04-18 PROCEDURE — 1126F PR PAIN SEVERITY QUANTIFIED, NO PAIN PRESENT: ICD-10-PCS | Mod: HCNC,CPTII,S$GLB, | Performed by: FAMILY MEDICINE

## 2023-04-18 PROCEDURE — 99214 PR OFFICE/OUTPT VISIT, EST, LEVL IV, 30-39 MIN: ICD-10-PCS | Mod: HCNC,S$GLB,, | Performed by: FAMILY MEDICINE

## 2023-04-18 PROCEDURE — 1160F PR REVIEW ALL MEDS BY PRESCRIBER/CLIN PHARMACIST DOCUMENTED: ICD-10-PCS | Mod: HCNC,CPTII,S$GLB, | Performed by: FAMILY MEDICINE

## 2023-04-18 PROCEDURE — 99999 PR PBB SHADOW E&M-EST. PATIENT-LVL V: ICD-10-PCS | Mod: PBBFAC,HCNC,, | Performed by: FAMILY MEDICINE

## 2023-04-18 PROCEDURE — 3078F PR MOST RECENT DIASTOLIC BLOOD PRESSURE < 80 MM HG: ICD-10-PCS | Mod: HCNC,CPTII,S$GLB, | Performed by: FAMILY MEDICINE

## 2023-04-18 PROCEDURE — 1157F PR ADVANCE CARE PLAN OR EQUIV PRESENT IN MEDICAL RECORD: ICD-10-PCS | Mod: HCNC,CPTII,S$GLB, | Performed by: FAMILY MEDICINE

## 2023-04-18 PROCEDURE — 99214 OFFICE O/P EST MOD 30 MIN: CPT | Mod: HCNC,S$GLB,, | Performed by: FAMILY MEDICINE

## 2023-04-18 PROCEDURE — 3074F PR MOST RECENT SYSTOLIC BLOOD PRESSURE < 130 MM HG: ICD-10-PCS | Mod: HCNC,CPTII,S$GLB, | Performed by: FAMILY MEDICINE

## 2023-04-18 PROCEDURE — 1159F PR MEDICATION LIST DOCUMENTED IN MEDICAL RECORD: ICD-10-PCS | Mod: HCNC,CPTII,S$GLB, | Performed by: FAMILY MEDICINE

## 2023-04-18 PROCEDURE — 1160F RVW MEDS BY RX/DR IN RCRD: CPT | Mod: HCNC,CPTII,S$GLB, | Performed by: FAMILY MEDICINE

## 2023-04-18 PROCEDURE — 1111F DSCHRG MED/CURRENT MED MERGE: CPT | Mod: HCNC,CPTII,S$GLB, | Performed by: FAMILY MEDICINE

## 2023-04-18 PROCEDURE — 1157F ADVNC CARE PLAN IN RCRD: CPT | Mod: HCNC,CPTII,S$GLB, | Performed by: FAMILY MEDICINE

## 2023-04-18 PROCEDURE — 3288F FALL RISK ASSESSMENT DOCD: CPT | Mod: HCNC,CPTII,S$GLB, | Performed by: FAMILY MEDICINE

## 2023-04-18 PROCEDURE — 99999 PR PBB SHADOW E&M-EST. PATIENT-LVL V: CPT | Mod: PBBFAC,HCNC,, | Performed by: FAMILY MEDICINE

## 2023-04-18 PROCEDURE — 1126F AMNT PAIN NOTED NONE PRSNT: CPT | Mod: HCNC,CPTII,S$GLB, | Performed by: FAMILY MEDICINE

## 2023-04-18 PROCEDURE — 3074F SYST BP LT 130 MM HG: CPT | Mod: HCNC,CPTII,S$GLB, | Performed by: FAMILY MEDICINE

## 2023-04-18 PROCEDURE — 1101F PT FALLS ASSESS-DOCD LE1/YR: CPT | Mod: HCNC,CPTII,S$GLB, | Performed by: FAMILY MEDICINE

## 2023-04-18 PROCEDURE — 1101F PR PT FALLS ASSESS DOC 0-1 FALLS W/OUT INJ PAST YR: ICD-10-PCS | Mod: HCNC,CPTII,S$GLB, | Performed by: FAMILY MEDICINE

## 2023-04-18 PROCEDURE — 1111F PR DISCHARGE MEDS RECONCILED W/ CURRENT OUTPATIENT MED LIST: ICD-10-PCS | Mod: HCNC,CPTII,S$GLB, | Performed by: FAMILY MEDICINE

## 2023-04-18 RX ORDER — MONTELUKAST SODIUM 4 MG/1
1 TABLET, CHEWABLE ORAL 2 TIMES DAILY
COMMUNITY
End: 2023-05-22 | Stop reason: SDUPTHER

## 2023-04-18 NOTE — PROGRESS NOTES
Subjective:       Patient ID: Pauline Cronin is a 83 y.o. female.    Chief Complaint: Follow-up (Pt stated she in today for follow up)    Diarrhea has resolved since starting Creon last week.  Recently diagnosed with pancreatic adenocarcinoma.  She is suboptimal surgical candidate due to age and comorbidities, proceeding with evaluation for palliative chemotherapy, has consultation next month.  She is at peace with this diagnosis and is focused on preserving quality of life.  She has a DNR in place.  She says she is generally feeling okay.  Denies abdominal pain.  No fevers or chills.  No chest pain or shortness of breath.  No recurrent jaundice.    Review of Systems   Constitutional:  Negative for fever and unexpected weight change.   Respiratory:  Negative for shortness of breath.    Cardiovascular:  Negative for chest pain.   Gastrointestinal:  Negative for abdominal distention and abdominal pain.   Skin:  Negative for wound.   Psychiatric/Behavioral:  Negative for agitation, confusion and dysphoric mood.      Objective:      Vitals:    04/18/23 0943   BP: 118/78   BP Location: Right arm   Patient Position: Sitting   BP Method: Medium (Manual)   Pulse: 89   Resp: 18   Temp: 96 °F (35.6 °C)   TempSrc: Temporal   SpO2: 97%   Weight: 78.2 kg (172 lb 6.4 oz)   Height: 5' (1.524 m)     Physical Exam  Vitals and nursing note reviewed.   Constitutional:       General: She is not in acute distress.     Appearance: Normal appearance. She is well-developed.   HENT:      Head: Normocephalic and atraumatic.   Cardiovascular:      Rate and Rhythm: Normal rate and regular rhythm.      Heart sounds: Normal heart sounds.   Pulmonary:      Effort: Pulmonary effort is normal.      Breath sounds: Normal breath sounds.   Abdominal:      General: Bowel sounds are normal. There is no distension.      Tenderness: There is no abdominal tenderness. There is no guarding.   Musculoskeletal:      Right lower leg: No edema.      Left lower  leg: No edema.   Skin:     General: Skin is warm and dry.   Neurological:      Mental Status: She is alert and oriented to person, place, and time.   Psychiatric:         Mood and Affect: Mood normal.         Behavior: Behavior normal.       Lab Results   Component Value Date    WBC 5.59 04/16/2023    HGB 9.0 (L) 04/16/2023    HCT 31.3 (L) 04/16/2023     04/16/2023    CHOL 151 03/01/2023    TRIG 80 03/01/2023    HDL 66 03/01/2023    ALT 5 (L) 04/16/2023    AST 20 04/16/2023     04/16/2023    K 4.1 04/16/2023     04/16/2023    CREATININE 0.8 04/16/2023    BUN 7 (L) 04/16/2023    CO2 22 (L) 04/16/2023    TSH 2.019 03/27/2023    INR 1.2 03/18/2023    HGBA1C 5.6 12/16/2020      Assessment:       1. Pancreatic adenocarcinoma    2. Paroxysmal atrial fibrillation    3. Pancreatic insufficiency        Plan:       Pancreatic adenocarcinoma  Follow-up with Heme-Onc and palliative medicine as scheduled  Paroxysmal atrial fibrillation  Stable on current regimen  Pancreatic insufficiency  Continue Creon    Medication List with Changes/Refills   Current Medications    ACETAMINOPHEN (TYLENOL) 325 MG TABLET    Take 650 mg by mouth daily as needed (Headache).    ALENDRONATE (FOSAMAX) 70 MG TABLET    Take 1 tablet (70 mg total) by mouth every 7 days.    AMLODIPINE (NORVASC) 5 MG TABLET    Take 1 tablet (5 mg total) by mouth once daily.    ATORVASTATIN (LIPITOR) 20 MG TABLET    Take 1 tablet (20 mg total) by mouth once daily.    COLESTIPOL (COLESTID) 1 GRAM TAB    Take 1 g by mouth 2 (two) times daily.    DIPHENOXYLATE-ATROPINE 2.5-0.025 MG (LOMOTIL) 2.5-0.025 MG PER TABLET    TAKE 1 TABLET BY MOUTH 4 TIMES DAILY AS NEEDED FOR DIARRHEA    ELIQUIS 5 MG TAB    Take 1 tablet (5 mg total) by mouth 2 (two) times daily.    ESOMEPRAZOLE (NEXIUM) 40 MG CAPSULE    Take 1 capsule (40 mg total) by mouth once daily.    FOLIC ACID (FOLVITE) 1 MG TABLET    Take 1 tablet by mouth once daily    LEVOTHYROXINE (SYNTHROID, LEVOTHROID)  175 MCG TABLET    Take 1 tablet by mouth once daily    LIPASE-PROTEASE-AMYLASE 24,000-76,000-120,000 UNITS (CREON) 24,000-76,000 -120,000 UNIT CAPSULE    Take 2 capsules by mouth 3 (three) times daily with meals.    MELATONIN ORAL    Take 1 tablet by mouth nightly as needed (Insomnia).    METOPROLOL SUCCINATE (TOPROL-XL) 50 MG 24 HR TABLET    Take 1 tablet (50 mg total) by mouth once daily.    ONDANSETRON (ZOFRAN) 8 MG TABLET    Take 1 tablet (8 mg total) by mouth every 8 (eight) hours as needed for Nausea.    SOTALOL (BETAPACE) 120 MG TAB    Take 1 tablet (120 mg total) by mouth every 12 (twelve) hours.    SULFASALAZINE (AZULFIDINE) 500 MG TAB    Take 1 tablet by mouth twice daily    TOLTERODINE (DETROL LA) 4 MG 24 HR CAPSULE    Take 1 capsule by mouth once daily   Discontinued Medications    FERROUS SULFATE (IRON) 325 MG (65 MG IRON) TAB TABLET    Take 1 tablet (325 mg total) by mouth daily with breakfast.    ONDANSETRON (ZOFRAN-ODT) 4 MG TBDL    Take 1 tablet (4 mg total) by mouth every 8 (eight) hours as needed (nausea).

## 2023-04-20 ENCOUNTER — PATIENT MESSAGE (OUTPATIENT)
Dept: HEMATOLOGY/ONCOLOGY | Facility: CLINIC | Age: 83
End: 2023-04-20
Payer: MEDICARE

## 2023-04-20 ENCOUNTER — OFFICE VISIT (OUTPATIENT)
Dept: CARDIOLOGY | Facility: CLINIC | Age: 83
End: 2023-04-20
Payer: MEDICARE

## 2023-04-20 VITALS
HEART RATE: 77 BPM | OXYGEN SATURATION: 96 % | BODY MASS INDEX: 33.67 KG/M2 | SYSTOLIC BLOOD PRESSURE: 129 MMHG | HEIGHT: 60 IN | DIASTOLIC BLOOD PRESSURE: 58 MMHG

## 2023-04-20 DIAGNOSIS — Z95.0 PACEMAKER: ICD-10-CM

## 2023-04-20 DIAGNOSIS — Z79.01 CHRONIC ANTICOAGULATION: ICD-10-CM

## 2023-04-20 DIAGNOSIS — I44.7 LBBB (LEFT BUNDLE BRANCH BLOCK): ICD-10-CM

## 2023-04-20 DIAGNOSIS — I10 ESSENTIAL HYPERTENSION, BENIGN: ICD-10-CM

## 2023-04-20 DIAGNOSIS — I70.0 AORTIC ATHEROSCLEROSIS: ICD-10-CM

## 2023-04-20 DIAGNOSIS — I48.0 PAROXYSMAL ATRIAL FIBRILLATION: Primary | ICD-10-CM

## 2023-04-20 DIAGNOSIS — E78.5 HYPERLIPIDEMIA, UNSPECIFIED HYPERLIPIDEMIA TYPE: ICD-10-CM

## 2023-04-20 DIAGNOSIS — C25.9 PANCREATIC ADENOCARCINOMA: ICD-10-CM

## 2023-04-20 DIAGNOSIS — D50.0 IRON DEFICIENCY ANEMIA DUE TO CHRONIC BLOOD LOSS: ICD-10-CM

## 2023-04-20 PROCEDURE — 1157F ADVNC CARE PLAN IN RCRD: CPT | Mod: HCNC,CPTII,S$GLB, | Performed by: INTERNAL MEDICINE

## 2023-04-20 PROCEDURE — 99213 OFFICE O/P EST LOW 20 MIN: CPT | Mod: HCNC,S$GLB,, | Performed by: INTERNAL MEDICINE

## 2023-04-20 PROCEDURE — 1157F PR ADVANCE CARE PLAN OR EQUIV PRESENT IN MEDICAL RECORD: ICD-10-PCS | Mod: HCNC,CPTII,S$GLB, | Performed by: INTERNAL MEDICINE

## 2023-04-20 PROCEDURE — 99213 PR OFFICE/OUTPT VISIT, EST, LEVL III, 20-29 MIN: ICD-10-PCS | Mod: HCNC,S$GLB,, | Performed by: INTERNAL MEDICINE

## 2023-04-20 PROCEDURE — 1159F PR MEDICATION LIST DOCUMENTED IN MEDICAL RECORD: ICD-10-PCS | Mod: HCNC,CPTII,S$GLB, | Performed by: INTERNAL MEDICINE

## 2023-04-20 PROCEDURE — 99999 PR PBB SHADOW E&M-EST. PATIENT-LVL III: CPT | Mod: PBBFAC,HCNC,, | Performed by: INTERNAL MEDICINE

## 2023-04-20 PROCEDURE — 3078F DIAST BP <80 MM HG: CPT | Mod: HCNC,CPTII,S$GLB, | Performed by: INTERNAL MEDICINE

## 2023-04-20 PROCEDURE — 3074F SYST BP LT 130 MM HG: CPT | Mod: HCNC,CPTII,S$GLB, | Performed by: INTERNAL MEDICINE

## 2023-04-20 PROCEDURE — 1126F PR PAIN SEVERITY QUANTIFIED, NO PAIN PRESENT: ICD-10-PCS | Mod: HCNC,CPTII,S$GLB, | Performed by: INTERNAL MEDICINE

## 2023-04-20 PROCEDURE — 99999 PR PBB SHADOW E&M-EST. PATIENT-LVL III: ICD-10-PCS | Mod: PBBFAC,HCNC,, | Performed by: INTERNAL MEDICINE

## 2023-04-20 PROCEDURE — 3074F PR MOST RECENT SYSTOLIC BLOOD PRESSURE < 130 MM HG: ICD-10-PCS | Mod: HCNC,CPTII,S$GLB, | Performed by: INTERNAL MEDICINE

## 2023-04-20 PROCEDURE — 3288F PR FALLS RISK ASSESSMENT DOCUMENTED: ICD-10-PCS | Mod: HCNC,CPTII,S$GLB, | Performed by: INTERNAL MEDICINE

## 2023-04-20 PROCEDURE — 3078F PR MOST RECENT DIASTOLIC BLOOD PRESSURE < 80 MM HG: ICD-10-PCS | Mod: HCNC,CPTII,S$GLB, | Performed by: INTERNAL MEDICINE

## 2023-04-20 PROCEDURE — 1111F DSCHRG MED/CURRENT MED MERGE: CPT | Mod: HCNC,CPTII,S$GLB, | Performed by: INTERNAL MEDICINE

## 2023-04-20 PROCEDURE — 1160F PR REVIEW ALL MEDS BY PRESCRIBER/CLIN PHARMACIST DOCUMENTED: ICD-10-PCS | Mod: HCNC,CPTII,S$GLB, | Performed by: INTERNAL MEDICINE

## 2023-04-20 PROCEDURE — 1160F RVW MEDS BY RX/DR IN RCRD: CPT | Mod: HCNC,CPTII,S$GLB, | Performed by: INTERNAL MEDICINE

## 2023-04-20 PROCEDURE — 1101F PT FALLS ASSESS-DOCD LE1/YR: CPT | Mod: HCNC,CPTII,S$GLB, | Performed by: INTERNAL MEDICINE

## 2023-04-20 PROCEDURE — 1126F AMNT PAIN NOTED NONE PRSNT: CPT | Mod: HCNC,CPTII,S$GLB, | Performed by: INTERNAL MEDICINE

## 2023-04-20 PROCEDURE — 1159F MED LIST DOCD IN RCRD: CPT | Mod: HCNC,CPTII,S$GLB, | Performed by: INTERNAL MEDICINE

## 2023-04-20 PROCEDURE — 3288F FALL RISK ASSESSMENT DOCD: CPT | Mod: HCNC,CPTII,S$GLB, | Performed by: INTERNAL MEDICINE

## 2023-04-20 PROCEDURE — 1111F PR DISCHARGE MEDS RECONCILED W/ CURRENT OUTPATIENT MED LIST: ICD-10-PCS | Mod: HCNC,CPTII,S$GLB, | Performed by: INTERNAL MEDICINE

## 2023-04-20 PROCEDURE — 1101F PR PT FALLS ASSESS DOC 0-1 FALLS W/OUT INJ PAST YR: ICD-10-PCS | Mod: HCNC,CPTII,S$GLB, | Performed by: INTERNAL MEDICINE

## 2023-04-20 NOTE — PROGRESS NOTES
Subjective:      Patient ID: Pauline Cronin is a 83 y.o. female.    Chief Complaint: Follow-up and Medication Dose Change    HPI:  Pt dx with pancreatic cancer and is scheduled for chemotherapy.      Pt had a biliary stent    Pt had 2 episodes of atrial fibrillation with rapid ventricular response.  After second trip to the ER the carvedilol 6.25 mg bid was switched to metoprolol succinate 50 mg daily    Still short of breath when walking.    No bleeding on Eliquis.\    Review of Systems   Cardiovascular:  Positive for chest pain (Pt had left shoulder pain during episode of atrial fibrillation), dyspnea on exertion and palpitations. Negative for claudication, irregular heartbeat, leg swelling, near-syncope, orthopnea and syncope.      Past Medical History:   Diagnosis Date    Anticoagulant long-term use     Atrial fibrillation     Crohn disease diagnosed in her 20's    GERD (gastroesophageal reflux disease)     Hyperlipidemia     Hypertension     Pancreatic adenocarcinoma 3/21/2023    Thyroid disease     s/p I 131        Past Surgical History:   Procedure Laterality Date    APPENDECTOMY      CARDIAC SURGERY  2014    pacemaker    COLONOSCOPY  ~2008    normal findings per patient report    ENDOSCOPIC ULTRASOUND OF UPPER GASTROINTESTINAL TRACT N/A 3/14/2023    Procedure: ULTRASOUND, UPPER GI TRACT, ENDOSCOPIC;  Surgeon: Andrei Swartz MD;  Location: [facility];  Service: Endoscopy;  Laterality: N/A;  Approval to hold Eliquis rec'd from Dr. Barbosa (see telephone encounter 3/3/23)-DS  [employer] AICD/PPM  3/3/23-Instructions via portal-DS    ERCP N/A 3/21/2023    Procedure: ERCP (ENDOSCOPIC RETROGRADE CHOLANGIOPANCREATOGRAPHY);  Surgeon: Celina Toney MD;  Location: [facility] ([address] [place] [address]);  Service: Endoscopy;  Laterality: N/A;    ESOPHAGOGASTRODUODENOSCOPY N/A 7/17/2018    Procedure: EGD (ESOPHAGOGASTRODUODENOSCOPY);  Surgeon: Alondra Casiano MD;  Location: [facility];  Service: Endoscopy;  Laterality: N/A;     HYSTERECTOMY      INSERTION OF IMPLANTABLE CARDIOVERTER-DEFIBRILLATOR (ICD) GENERATOR WITH TWO EXISTING LEADS Left 5/10/2021    Procedure: INSERTION, PULSE GENERATOR WITH 2 EXISTING LEADS, ICD;  Surgeon: Bo Leone MD;  Location: [facility] CATH LAB;  Service: Cardiology;  Laterality: Left;    INSERTION OF PACEMAKER      REPLACEMENT OF PACEMAKER GENERATOR  05/10/2021    RETROGRADE PYELOGRAPHY Right 2020    Procedure: PYELOGRAM, RETROGRADE;  Surgeon: Jovan Kruse MD;  Location: [facility] OR;  Service: Urology;  Laterality: Right;    SMALL INTESTINE SURGERY  in her 20's    for crohn's    TONSILLECTOMY      UPPER GASTROINTESTINAL ENDOSCOPY  ~    normal findings per patient report       Family History   Problem Relation Age of Onset    Cancer Mother     Breast cancer Mother     Crohn's disease Other     Colon cancer Neg Hx     Colon polyps Neg Hx     Esophageal cancer Neg Hx     Stomach cancer Neg Hx     Ulcerative colitis Neg Hx        Social History     Socioeconomic History    Marital status:    Tobacco Use    Smoking status: Former     Types: Cigarettes     Quit date:      Years since quittin.3    Smokeless tobacco: Never   Substance and Sexual Activity    Alcohol use: No    Drug use: No    Sexual activity: Never     Social Determinants of Health     Financial Resource Strain: Unknown    Difficulty of Paying Living Expenses: Patient refused   Food Insecurity: Unknown    Worried About Running Out of Food in the Last Year: Patient refused    Ran Out of Food in the Last Year: Patient refused   Transportation Needs: Unknown    Lack of Transportation (Medical): Patient refused    Lack of Transportation (Non-Medical): Patient refused   Physical Activity: Unknown    Days of Exercise per Week: Patient refused    Minutes of Exercise per Session: Patient refused   Stress: Unknown    Feeling of Stress : Patient refused   Social Connections: Unknown    Frequency of Communication with Friends and  Family: Patient refused    Frequency of Social Gatherings with Friends and Family: Patient refused    Attends Caodaism Services: Patient refused    Active Member of Clubs or Organizations: Patient refused    Attends Club or Organization Meetings: Patient refused    Marital Status: Patient refused   Housing Stability: Unknown    Unable to Pay for Housing in the Last Year: Patient refused    Number of Places Lived in the Last Year: 1    Unstable Housing in the Last Year: Patient refused       Current Outpatient Medications on File Prior to Visit   Medication Sig Dispense Refill    acetaminophen (TYLENOL) 325 MG tablet Take 650 mg by mouth daily as needed (Headache).      alendronate (FOSAMAX) 70 MG tablet Take 1 tablet (70 mg total) by mouth every 7 days. 12 tablet 3    amLODIPine (NORVASC) 5 MG tablet Take 1 tablet (5 mg total) by mouth once daily. 30 tablet 11    atorvastatin (LIPITOR) 20 MG tablet Take 1 tablet (20 mg total) by mouth once daily. 90 tablet 3    colestipoL (COLESTID) 1 gram Tab Take 1 g by mouth 2 (two) times daily.      diphenoxylate-atropine 2.5-0.025 mg (LOMOTIL) 2.5-0.025 mg per tablet TAKE 1 TABLET BY MOUTH 4 TIMES DAILY AS NEEDED FOR DIARRHEA 60 tablet 0    ELIQUIS 5 mg Tab Take 1 tablet (5 mg total) by mouth 2 (two) times daily. 180 tablet 0    esomeprazole (NEXIUM) 40 MG capsule Take 1 capsule (40 mg total) by mouth once daily. 90 capsule 3    folic acid (FOLVITE) 1 MG tablet Take 1 tablet by mouth once daily 90 tablet 1    levothyroxine (SYNTHROID, LEVOTHROID) 175 MCG tablet Take 1 tablet by mouth once daily 30 tablet 5    lipase-protease-amylase 24,000-76,000-120,000 units (CREON) 24,000-76,000 -120,000 unit capsule Take 2 capsules by mouth 3 (three) times daily with meals. 180 capsule 11    MELATONIN ORAL Take 1 tablet by mouth nightly as needed (Insomnia).      metoprolol succinate (TOPROL-XL) 50 MG 24 hr tablet Take 1 tablet (50 mg total) by mouth once daily. 30 tablet 1    ondansetron  (ZOFRAN) 8 MG tablet Take 1 tablet (8 mg total) by mouth every 8 (eight) hours as needed for Nausea. 90 tablet 2    sotaloL (BETAPACE) 120 MG Tab Take 1 tablet (120 mg total) by mouth every 12 (twelve) hours. 180 tablet 3    sulfaSALAzine (AZULFIDINE) 500 mg Tab Take 1 tablet by mouth twice daily 180 tablet 3    tolterodine (DETROL LA) 4 MG 24 hr capsule Take 1 capsule by mouth once daily 90 capsule 3     No current facility-administered medications on file prior to visit.       Review of patient's allergies indicates:   Allergen Reactions    Lisinopril Other (See Comments)     cough     Objective:     Vitals:    04/20/23 0919   BP: (!) 129/58   BP Location: Left arm   Patient Position: Sitting   BP Method: Large (Automatic)   Pulse: 77   SpO2: 96%   Weight: Comment: Wheelchair   Height: 5' (1.524 m)        Physical Exam  Vitals reviewed.   Constitutional:       Appearance: She is well-developed.   Eyes:      General: No scleral icterus.  Neck:      Vascular: No carotid bruit or JVD.   Cardiovascular:      Rate and Rhythm: Normal rate and regular rhythm.      Heart sounds: No murmur heard.    No gallop.   Pulmonary:      Breath sounds: Normal breath sounds.   Abdominal:      Palpations: Abdomen is soft.      Tenderness: There is no abdominal tenderness.   Musculoskeletal:      Right lower leg: No edema.      Left lower leg: No edema.   Skin:     General: Skin is warm and dry.   Neurological:      Mental Status: She is alert and oriented to person, place, and time.   Psychiatric:         Behavior: Behavior normal.         Thought Content: Thought content normal.         Judgment: Judgment normal.             Discussed results of abnormal CT scan with patient personally including concerning pancreatic mass found at the head of the pancreas.  Urgent referral placed to eulalia ferreira with surgical oncology and hematology oncology. Phone numbers given to patient for reference. Questions addressed             PACS Images  for [facility]er     Show images for CT Abdomen Pelvis With Contrast      CT Abdomen Pelvis With Contrast (Order [id number]) - Reflex for Order [id number]      Urinalysis, Reflex to Urine Culture Urine, Clean Catch (Order [id number])  All Reviewers List    Machelle Arnold NP on 3/2/2023 09:25      Contains abnormal data CT Abdomen Pelvis With Contrast  Order: [id number] - Reflex for Order [id number]  Status: Final result      Visible to patient: Yes (seen)       Next appt: Today at 01:00 PM in Cardiology (CARDIOLOGY [place])       Dx: Transaminitis; Elevated alkaline phos...       1 Result Note      Details    Reading Physician Reading Date Result Priority   Waylon Reid MD  [phone] 3/1/2023 STAT     Narrative & Impression  EXAMINATION:  CT ABDOMEN PELVIS WITH CONTRAST     CLINICAL HISTORY:  Abdominal abscess/infection suspected; Elevation of levels of liver transaminase levels     TECHNIQUE:  Low dose axial images, sagittal and coronal reformations were obtained from the lung bases to the pubic symphysis following the IV administration of 75 mL of Omnipaque 350 and the oral administration of 450 mL of Readi-Cat.     COMPARISON:  02/22/2020     FINDINGS:  Images of the lower thorax are remarkable for a few 2-3 mm pulmonary nodules within the left lower lobe, stable.  Pacer wires noted.     The liver is hypoattenuating suggesting steatosis, correlation with LFTs recommended.  The liver has a nodular contour.  There is intrahepatic biliary dilation.  The gallbladder is distended noting the common duct is dilated measuring up to 1.1 cm.  There is cholelithiasis.  The spleen and adrenal glands are unremarkable.  There is atrophic change of the pancreas noting pancreatic ductal dilation, increased since the previous examination.  There is a mass at the level of the pancreatic head, that abuts or invades the adjacent 2nd portion of the duodenum.  Measurement is  difficult given relative amorphous appearance however the region in question measures approximately 3.3 x 2.8 cm.  The stomach is distended with ingested contrast and food products.  There is some degree of narrowing of the 2nd portion of the duodenum near the mass, suggesting some degree of gastric outlet obstruction.  The portal vein, splenic vein, SMV, celiac axis and SMA all are patent.  There are several scattered nonenlarged to upper limit of normal in caliber abdominal lymph nodes.     The kidneys enhance symmetrically without hydronephrosis or nephrolithiasis.  There are a few scattered subcentimeter low attenuating lesions within the kidneys bilaterally, all of which too small for characterization.  There is a possible calculus within the distal aspect of the right ureter versus adjacent phlebolith measuring 4 mm.  The left ureter is unremarkable.  The urinary bladder is unremarkable.  The uterus is absent the adnexa is unremarkable.     There are a few scattered colonic diverticula without inflammation.  Oral contrast reaches the terminal ileum.  The appendix is not identified.  The small bowel other than what was described earlier, is grossly unremarkable.  There is atherosclerotic calcification of the aorta and its branches.  Several scattered shotty periaortic and paracaval lymph nodes noted.  No focal organized pelvic fluid collection.     There is osteopenia.  There are degenerative changes of the bilateral sacroiliac joints and spine.  No significant inguinal lymphadenopathy.  There are surgical changes of the anterior abdominal wall.     Impression:     This report was flagged in Epic as abnormal.     1. Interval development of mass at the level of the pancreatic head that abuts or possibly arises from the 2nd portion of the duodenum.  This results in interval dilation of the pancreatic duct, common duct, as well as several intrahepatic biliary ducts.  Finding is most concerning for malignancy.   Correlation is advised.  2. Above mass narrows the lumen of the 2nd portion of the duodenum, concerning for gastric outlet obstruction.  3. Nodular contour of the hepatic parenchyma, correlation with any history of hepatic disease or cirrhosis.  4. Cholelithiasis without secondary findings to suggest acute cholecystitis.  5. Calculus possibly within the distal aspect of the right ureter versus adjacent phlebolith.  No secondary findings to suggest obstructive uropathy.  This may reflect chronic finding.  6. Please see above for several additional findings.        Electronically signed by: Waylon Reid MD  Date:                                            03/01/2023  Time:                                           17:50  Transthoracic echo (TTE) complete with contrast  Order# [id number]  Reading physician: Chen Rodriguez MD Ordering physician: Mandy Goodson MD Study date: 3/28/23     Reason for Exam  Priority: Routine  Dx: Atrial fibrillation and flutter [I48.91, I48.92 (ICD-10-CM)]     View Images Vital Vitrea     Show images for Echo  Summary    The estimated ejection fraction is 60%.  The left ventricle is normal in size with concentric hypertrophy and normal systolic function. There is systolic anterior motion without LVOT obstruction.  There is abnormal septal wall motion.  Grade II left ventricular diastolic dysfunction.  Normal right ventricular size with normal right ventricular systolic function.  Mild left atrial enlargement.  The estimated PA systolic pressure is 39 mmHg.  Normal central venous pressure (3 mmHg).        Assessment:     1. Paroxysmal atrial fibrillation    2. LBBB (left bundle branch block)    3. Hyperlipidemia, unspecified hyperlipidemia type    4. Aortic atherosclerosis    5. Essential hypertension, benign    6. Pacemaker    7. Iron deficiency anemia due to chronic blood loss    8. Pancreatic adenocarcinoma    9. Chronic anticoagulation      Plan:   Pauline was seen today for  follow-up and medication dose change.    Diagnoses and all orders for this visit:    Paroxysmal atrial fibrillation    LBBB (left bundle branch block)    Hyperlipidemia, unspecified hyperlipidemia type    Aortic atherosclerosis    Essential hypertension, benign    Pacemaker    Iron deficiency anemia due to chronic blood loss    Pancreatic adenocarcinoma    Chronic anticoagulation     Same meds for now.    IF a fib recurs will double the metoprolol to bid    If BP goes below 110 will discontinue the amlodipine.    No follow-ups on file.

## 2023-04-21 LAB
ONEOME COMMENT: NORMAL
ONEOME METHOD: NORMAL

## 2023-04-22 ENCOUNTER — HOSPITAL ENCOUNTER (INPATIENT)
Facility: HOSPITAL | Age: 83
LOS: 2 days | Discharge: HOME | DRG: 948 | End: 2023-04-24
Attending: EMERGENCY MEDICINE | Admitting: INTERNAL MEDICINE
Payer: MEDICARE

## 2023-04-22 DIAGNOSIS — R07.9 CHEST PAIN: ICD-10-CM

## 2023-04-22 DIAGNOSIS — C25.9 MALIGNANT NEOPLASM OF PANCREAS, UNSPECIFIED LOCATION OF MALIGNANCY: Primary | ICD-10-CM

## 2023-04-22 DIAGNOSIS — R10.13 EPIGASTRIC ABDOMINAL PAIN: ICD-10-CM

## 2023-04-22 DIAGNOSIS — R10.9 ABDOMINAL PAIN, UNSPECIFIED ABDOMINAL LOCATION: ICD-10-CM

## 2023-04-22 DIAGNOSIS — K80.20 CALCULUS OF GALLBLADDER WITHOUT CHOLECYSTITIS WITHOUT OBSTRUCTION: ICD-10-CM

## 2023-04-22 PROBLEM — J96.01 ACUTE HYPOXEMIC RESPIRATORY FAILURE: Status: ACTIVE | Noted: 2023-04-22

## 2023-04-22 LAB
ALBUMIN SERPL BCP-MCNC: 2.5 G/DL (ref 3.5–5.2)
ALP SERPL-CCNC: 75 U/L (ref 55–135)
ALT SERPL W/O P-5'-P-CCNC: 9 U/L (ref 10–44)
ANION GAP SERPL CALC-SCNC: 9 MMOL/L (ref 8–16)
AST SERPL-CCNC: 27 U/L (ref 10–40)
BASOPHILS # BLD AUTO: 0.06 K/UL (ref 0–0.2)
BASOPHILS NFR BLD: 0.8 % (ref 0–1.9)
BILIRUB SERPL-MCNC: 0.7 MG/DL (ref 0.1–1)
BILIRUB UR QL STRIP: NEGATIVE
BUN SERPL-MCNC: 7 MG/DL (ref 6–30)
BUN SERPL-MCNC: 8 MG/DL (ref 8–23)
CALCIUM SERPL-MCNC: 8.9 MG/DL (ref 8.7–10.5)
CHLORIDE SERPL-SCNC: 103 MMOL/L (ref 95–110)
CHLORIDE SERPL-SCNC: 107 MMOL/L (ref 95–110)
CLARITY UR REFRACT.AUTO: ABNORMAL
CO2 SERPL-SCNC: 22 MMOL/L (ref 23–29)
COLOR UR AUTO: YELLOW
CREAT SERPL-MCNC: 0.8 MG/DL (ref 0.5–1.4)
CREAT SERPL-MCNC: 0.8 MG/DL (ref 0.5–1.4)
DIFFERENTIAL METHOD: ABNORMAL
EOSINOPHIL # BLD AUTO: 0.1 K/UL (ref 0–0.5)
EOSINOPHIL NFR BLD: 1.8 % (ref 0–8)
ERYTHROCYTE [DISTWIDTH] IN BLOOD BY AUTOMATED COUNT: 21.2 % (ref 11.5–14.5)
EST. GFR  (NO RACE VARIABLE): >60 ML/MIN/1.73 M^2
GLUCOSE SERPL-MCNC: 96 MG/DL (ref 70–110)
GLUCOSE SERPL-MCNC: 98 MG/DL (ref 70–110)
GLUCOSE UR QL STRIP: NEGATIVE
HCT VFR BLD AUTO: 33.7 % (ref 37–48.5)
HCT VFR BLD CALC: 33 %PCV (ref 36–54)
HGB BLD-MCNC: 9.5 G/DL (ref 12–16)
HGB UR QL STRIP: NEGATIVE
IMM GRANULOCYTES # BLD AUTO: 0.03 K/UL (ref 0–0.04)
IMM GRANULOCYTES NFR BLD AUTO: 0.4 % (ref 0–0.5)
KETONES UR QL STRIP: NEGATIVE
LACTATE SERPL-SCNC: 1 MMOL/L (ref 0.5–2.2)
LEUKOCYTE ESTERASE UR QL STRIP: NEGATIVE
LIPASE SERPL-CCNC: 179 U/L (ref 4–60)
LYMPHOCYTES # BLD AUTO: 1.5 K/UL (ref 1–4.8)
LYMPHOCYTES NFR BLD: 19.3 % (ref 18–48)
MCH RBC QN AUTO: 22.6 PG (ref 27–31)
MCHC RBC AUTO-ENTMCNC: 28.2 G/DL (ref 32–36)
MCV RBC AUTO: 80 FL (ref 82–98)
MONOCYTES # BLD AUTO: 0.6 K/UL (ref 0.3–1)
MONOCYTES NFR BLD: 8.1 % (ref 4–15)
NEUTROPHILS # BLD AUTO: 5.3 K/UL (ref 1.8–7.7)
NEUTROPHILS NFR BLD: 69.6 % (ref 38–73)
NITRITE UR QL STRIP: NEGATIVE
NRBC BLD-RTO: 0 /100 WBC
PH UR STRIP: 8 [PH] (ref 5–8)
PLATELET # BLD AUTO: 327 K/UL (ref 150–450)
PMV BLD AUTO: 10.6 FL (ref 9.2–12.9)
POC IONIZED CALCIUM: 1.11 MMOL/L (ref 1.06–1.42)
POC TCO2 (MEASURED): 26 MMOL/L (ref 23–29)
POTASSIUM BLD-SCNC: 3.5 MMOL/L (ref 3.5–5.1)
POTASSIUM SERPL-SCNC: 3.6 MMOL/L (ref 3.5–5.1)
PROT SERPL-MCNC: 7.2 G/DL (ref 6–8.4)
PROT UR QL STRIP: NEGATIVE
RBC # BLD AUTO: 4.2 M/UL (ref 4–5.4)
SAMPLE: ABNORMAL
SODIUM BLD-SCNC: 139 MMOL/L (ref 136–145)
SODIUM SERPL-SCNC: 138 MMOL/L (ref 136–145)
SP GR UR STRIP: 1.01 (ref 1–1.03)
URN SPEC COLLECT METH UR: ABNORMAL
WBC # BLD AUTO: 7.65 K/UL (ref 3.9–12.7)

## 2023-04-22 PROCEDURE — 83605 ASSAY OF LACTIC ACID: CPT | Mod: HCNC | Performed by: PHYSICIAN ASSISTANT

## 2023-04-22 PROCEDURE — 83690 ASSAY OF LIPASE: CPT | Mod: HCNC | Performed by: PHYSICIAN ASSISTANT

## 2023-04-22 PROCEDURE — 96376 TX/PRO/DX INJ SAME DRUG ADON: CPT | Mod: HCNC

## 2023-04-22 PROCEDURE — 25500020 PHARM REV CODE 255: Mod: HCNC | Performed by: EMERGENCY MEDICINE

## 2023-04-22 PROCEDURE — 63600175 PHARM REV CODE 636 W HCPCS: Mod: HCNC

## 2023-04-22 PROCEDURE — 83880 ASSAY OF NATRIURETIC PEPTIDE: CPT | Mod: HCNC

## 2023-04-22 PROCEDURE — 12000002 HC ACUTE/MED SURGE SEMI-PRIVATE ROOM: Mod: HCNC

## 2023-04-22 PROCEDURE — 93010 EKG 12-LEAD: ICD-10-PCS | Mod: HCNC,,, | Performed by: INTERNAL MEDICINE

## 2023-04-22 PROCEDURE — 99285 PR EMERGENCY DEPT VISIT,LEVEL V: ICD-10-PCS | Mod: HCNC,,, | Performed by: PHYSICIAN ASSISTANT

## 2023-04-22 PROCEDURE — 36415 COLL VENOUS BLD VENIPUNCTURE: CPT | Mod: HCNC

## 2023-04-22 PROCEDURE — 25000003 PHARM REV CODE 250: Mod: HCNC | Performed by: PHYSICIAN ASSISTANT

## 2023-04-22 PROCEDURE — 25000003 PHARM REV CODE 250: Mod: HCNC

## 2023-04-22 PROCEDURE — 99285 EMERGENCY DEPT VISIT HI MDM: CPT | Mod: HCNC,,, | Performed by: PHYSICIAN ASSISTANT

## 2023-04-22 PROCEDURE — 96375 TX/PRO/DX INJ NEW DRUG ADDON: CPT | Mod: HCNC

## 2023-04-22 PROCEDURE — 96374 THER/PROPH/DIAG INJ IV PUSH: CPT | Mod: 59,HCNC

## 2023-04-22 PROCEDURE — 81003 URINALYSIS AUTO W/O SCOPE: CPT | Mod: HCNC | Performed by: PHYSICIAN ASSISTANT

## 2023-04-22 PROCEDURE — 96365 THER/PROPH/DIAG IV INF INIT: CPT | Mod: HCNC

## 2023-04-22 PROCEDURE — 93005 ELECTROCARDIOGRAM TRACING: CPT | Mod: HCNC

## 2023-04-22 PROCEDURE — 93010 ELECTROCARDIOGRAM REPORT: CPT | Mod: HCNC,,, | Performed by: INTERNAL MEDICINE

## 2023-04-22 PROCEDURE — 99285 EMERGENCY DEPT VISIT HI MDM: CPT | Mod: 25,HCNC

## 2023-04-22 PROCEDURE — 63600175 PHARM REV CODE 636 W HCPCS: Mod: HCNC | Performed by: PHYSICIAN ASSISTANT

## 2023-04-22 PROCEDURE — 80053 COMPREHEN METABOLIC PANEL: CPT | Mod: HCNC | Performed by: PHYSICIAN ASSISTANT

## 2023-04-22 PROCEDURE — 96361 HYDRATE IV INFUSION ADD-ON: CPT | Mod: HCNC

## 2023-04-22 PROCEDURE — 85025 COMPLETE CBC W/AUTO DIFF WBC: CPT | Mod: HCNC | Performed by: PHYSICIAN ASSISTANT

## 2023-04-22 RX ORDER — SODIUM CHLORIDE 0.9 % (FLUSH) 0.9 %
10 SYRINGE (ML) INJECTION EVERY 12 HOURS PRN
Status: DISCONTINUED | OUTPATIENT
Start: 2023-04-22 | End: 2023-04-24 | Stop reason: HOSPADM

## 2023-04-22 RX ORDER — PROCHLORPERAZINE EDISYLATE 5 MG/ML
10 INJECTION INTRAMUSCULAR; INTRAVENOUS
Status: COMPLETED | OUTPATIENT
Start: 2023-04-22 | End: 2023-04-22

## 2023-04-22 RX ORDER — POLYETHYLENE GLYCOL 3350 17 G/17G
17 POWDER, FOR SOLUTION ORAL DAILY PRN
Status: DISCONTINUED | OUTPATIENT
Start: 2023-04-22 | End: 2023-04-24 | Stop reason: HOSPADM

## 2023-04-22 RX ORDER — PROMETHAZINE HYDROCHLORIDE 12.5 MG/1
25 TABLET ORAL EVERY 6 HOURS PRN
Status: DISCONTINUED | OUTPATIENT
Start: 2023-04-22 | End: 2023-04-24 | Stop reason: HOSPADM

## 2023-04-22 RX ORDER — AMLODIPINE BESYLATE 5 MG/1
5 TABLET ORAL DAILY
Status: DISCONTINUED | OUTPATIENT
Start: 2023-04-23 | End: 2023-04-24 | Stop reason: HOSPADM

## 2023-04-22 RX ORDER — ACETAMINOPHEN 325 MG/1
650 TABLET ORAL EVERY 4 HOURS PRN
Status: DISCONTINUED | OUTPATIENT
Start: 2023-04-22 | End: 2023-04-24 | Stop reason: HOSPADM

## 2023-04-22 RX ORDER — OXYBUTYNIN CHLORIDE 10 MG/1
10 TABLET, EXTENDED RELEASE ORAL DAILY
Status: DISCONTINUED | OUTPATIENT
Start: 2023-04-23 | End: 2023-04-24 | Stop reason: HOSPADM

## 2023-04-22 RX ORDER — GLUCAGON 1 MG
1 KIT INJECTION
Status: DISCONTINUED | OUTPATIENT
Start: 2023-04-22 | End: 2023-04-24 | Stop reason: HOSPADM

## 2023-04-22 RX ORDER — ONDANSETRON 2 MG/ML
4 INJECTION INTRAMUSCULAR; INTRAVENOUS
Status: COMPLETED | OUTPATIENT
Start: 2023-04-22 | End: 2023-04-22

## 2023-04-22 RX ORDER — NALOXONE HCL 0.4 MG/ML
0.02 VIAL (ML) INJECTION
Status: DISCONTINUED | OUTPATIENT
Start: 2023-04-22 | End: 2023-04-24 | Stop reason: HOSPADM

## 2023-04-22 RX ORDER — SULFASALAZINE 500 MG/1
500 TABLET ORAL 2 TIMES DAILY
Status: DISCONTINUED | OUTPATIENT
Start: 2023-04-22 | End: 2023-04-24 | Stop reason: HOSPADM

## 2023-04-22 RX ORDER — ATORVASTATIN CALCIUM 20 MG/1
20 TABLET, FILM COATED ORAL DAILY
Status: DISCONTINUED | OUTPATIENT
Start: 2023-04-23 | End: 2023-04-24 | Stop reason: HOSPADM

## 2023-04-22 RX ORDER — SODIUM CHLORIDE, SODIUM LACTATE, POTASSIUM CHLORIDE, CALCIUM CHLORIDE 600; 310; 30; 20 MG/100ML; MG/100ML; MG/100ML; MG/100ML
INJECTION, SOLUTION INTRAVENOUS CONTINUOUS
Status: DISCONTINUED | OUTPATIENT
Start: 2023-04-22 | End: 2023-04-24

## 2023-04-22 RX ORDER — MORPHINE SULFATE 2 MG/ML
2 INJECTION, SOLUTION INTRAMUSCULAR; INTRAVENOUS EVERY 4 HOURS PRN
Status: DISCONTINUED | OUTPATIENT
Start: 2023-04-22 | End: 2023-04-23

## 2023-04-22 RX ORDER — METOPROLOL SUCCINATE 50 MG/1
50 TABLET, EXTENDED RELEASE ORAL DAILY
Status: DISCONTINUED | OUTPATIENT
Start: 2023-04-23 | End: 2023-04-24 | Stop reason: HOSPADM

## 2023-04-22 RX ORDER — PANTOPRAZOLE SODIUM 40 MG/1
40 TABLET, DELAYED RELEASE ORAL DAILY
Status: DISCONTINUED | OUTPATIENT
Start: 2023-04-23 | End: 2023-04-24 | Stop reason: HOSPADM

## 2023-04-22 RX ORDER — DEXTROSE 40 %
15 GEL (GRAM) ORAL
Status: DISCONTINUED | OUTPATIENT
Start: 2023-04-22 | End: 2023-04-24 | Stop reason: HOSPADM

## 2023-04-22 RX ORDER — SOTALOL HYDROCHLORIDE 120 MG/1
120 TABLET ORAL EVERY 12 HOURS
Status: DISCONTINUED | OUTPATIENT
Start: 2023-04-22 | End: 2023-04-24 | Stop reason: HOSPADM

## 2023-04-22 RX ORDER — ONDANSETRON 4 MG/1
8 TABLET, FILM COATED ORAL EVERY 8 HOURS PRN
Status: DISCONTINUED | OUTPATIENT
Start: 2023-04-22 | End: 2023-04-24 | Stop reason: HOSPADM

## 2023-04-22 RX ORDER — MORPHINE SULFATE 4 MG/ML
4 INJECTION, SOLUTION INTRAMUSCULAR; INTRAVENOUS
Status: COMPLETED | OUTPATIENT
Start: 2023-04-22 | End: 2023-04-22

## 2023-04-22 RX ORDER — DEXTROSE 40 %
30 GEL (GRAM) ORAL
Status: DISCONTINUED | OUTPATIENT
Start: 2023-04-22 | End: 2023-04-24 | Stop reason: HOSPADM

## 2023-04-22 RX ORDER — FOLIC ACID 1 MG/1
1000 TABLET ORAL DAILY
Status: DISCONTINUED | OUTPATIENT
Start: 2023-04-23 | End: 2023-04-24 | Stop reason: HOSPADM

## 2023-04-22 RX ADMIN — IOHEXOL 75 ML: 350 INJECTION, SOLUTION INTRAVENOUS at 01:04

## 2023-04-22 RX ADMIN — MORPHINE SULFATE 4 MG: 4 INJECTION INTRAVENOUS at 12:04

## 2023-04-22 RX ADMIN — APIXABAN 5 MG: 5 TABLET, FILM COATED ORAL at 10:04

## 2023-04-22 RX ADMIN — SODIUM CHLORIDE, POTASSIUM CHLORIDE, SODIUM LACTATE AND CALCIUM CHLORIDE: 600; 310; 30; 20 INJECTION, SOLUTION INTRAVENOUS at 11:04

## 2023-04-22 RX ADMIN — SODIUM CHLORIDE 1000 ML: 9 INJECTION, SOLUTION INTRAVENOUS at 12:04

## 2023-04-22 RX ADMIN — PROCHLORPERAZINE EDISYLATE 10 MG: 5 INJECTION INTRAMUSCULAR; INTRAVENOUS at 08:04

## 2023-04-22 RX ADMIN — MORPHINE SULFATE 4 MG: 4 INJECTION INTRAVENOUS at 06:04

## 2023-04-22 RX ADMIN — PIPERACILLIN SODIUM AND TAZOBACTAM SODIUM 4.5 G: 4; .5 INJECTION, POWDER, FOR SOLUTION INTRAVENOUS at 05:04

## 2023-04-22 RX ADMIN — SOTALOL HYDROCHLORIDE 120 MG: 120 TABLET ORAL at 10:04

## 2023-04-22 RX ADMIN — ONDANSETRON 4 MG: 2 INJECTION INTRAMUSCULAR; INTRAVENOUS at 12:04

## 2023-04-22 RX ADMIN — SULFASALAZINE 500 MG: 500 TABLET ORAL at 10:04

## 2023-04-22 NOTE — ED PROVIDER NOTES
Encounter Date: 4/22/2023       History     Chief Complaint   Patient presents with    Abdominal Pain     Active pancreatic cancer not on chemo yet, +Nausea     83-year-old female with past medical history of atrial fibrillation, Crohn's disease, GERD, hypertension, hyperlipidemia, pancreatic adenocarcinoma who presents to the emergency department with chief complaint of abdominal pain.  She began with epigastric abdominal pain that radiates to the back that began this morning at about 1:00 a.m..  The pain kept her up all night and she can not sleep.  She tried Tylenol at home without significant improvement.  She endorses nausea.  She denies chest pain, shortness of breath, vomiting, diarrhea, dysuria.  She does admit to similar pain in the past associated with acute pancreatitis.  She denies other worsening or alleviating factors.    Review of patient's allergies indicates:   Allergen Reactions    Lisinopril Other (See Comments)     cough     Past Medical History:   Diagnosis Date    Anticoagulant long-term use     Atrial fibrillation     Crohn disease diagnosed in her 20's    GERD (gastroesophageal reflux disease)     Hyperlipidemia     Hypertension     Pancreatic adenocarcinoma 3/21/2023    Thyroid disease     s/p I 131     Past Surgical History:   Procedure Laterality Date    APPENDECTOMY      CARDIAC SURGERY  2014    pacemaker    COLONOSCOPY  ~2008    normal findings per patient report    ENDOSCOPIC ULTRASOUND OF UPPER GASTROINTESTINAL TRACT N/A 3/14/2023    Procedure: ULTRASOUND, UPPER GI TRACT, ENDOSCOPIC;  Surgeon: Andrei Swartz MD;  Location: [facility];  Service: Endoscopy;  Laterality: N/A;  Approval to hold Eliquis rec'd from Dr. Barbosa (see telephone encounter 3/3/23)-DS  [employer] AICD/PPM  3/3/23-Instructions via portal-DS    ERCP N/A 3/21/2023    Procedure: ERCP (ENDOSCOPIC RETROGRADE CHOLANGIOPANCREATOGRAPHY);  Surgeon: Celina Toney MD;  Location: [facility] (2ND FLR);  Service: Endoscopy;   Laterality: N/A;    ESOPHAGOGASTRODUODENOSCOPY N/A 2018    Procedure: EGD (ESOPHAGOGASTRODUODENOSCOPY);  Surgeon: Alondra Casiano MD;  Location: [facility];  Service: Endoscopy;  Laterality: N/A;    HYSTERECTOMY      INSERTION OF IMPLANTABLE CARDIOVERTER-DEFIBRILLATOR (ICD) GENERATOR WITH TWO EXISTING LEADS Left 5/10/2021    Procedure: INSERTION, PULSE GENERATOR WITH 2 EXISTING LEADS, ICD;  Surgeon: Bo Leone MD;  Location: [facility] CATH LAB;  Service: Cardiology;  Laterality: Left;    INSERTION OF PACEMAKER      REPLACEMENT OF PACEMAKER GENERATOR  05/10/2021    RETROGRADE PYELOGRAPHY Right 2020    Procedure: PYELOGRAM, RETROGRADE;  Surgeon: Jovan Krues MD;  Location: [facility];  Service: Urology;  Laterality: Right;    SMALL INTESTINE SURGERY  in her 20's    for crohn's    TONSILLECTOMY      UPPER GASTROINTESTINAL ENDOSCOPY  ~    normal findings per patient report     Family History   Problem Relation Age of Onset    Cancer Mother     Breast cancer Mother     Crohn's disease Other     Colon cancer Neg Hx     Colon polyps Neg Hx     Esophageal cancer Neg Hx     Stomach cancer Neg Hx     Ulcerative colitis Neg Hx      Social History     Tobacco Use    Smoking status: Former     Types: Cigarettes     Quit date:      Years since quittin.3    Smokeless tobacco: Never   Substance Use Topics    Alcohol use: No    Drug use: No     Review of Systems   Gastrointestinal:  Positive for abdominal pain and nausea.     Physical Exam     Initial Vitals [23 1213]   BP Pulse Resp Temp SpO2   (!) 125/58 87 18 97.5 °F (36.4 °C) 97 %      MAP       --         Physical Exam    Nursing note and vitals reviewed.  Constitutional: She appears well-developed and well-nourished. She is not diaphoretic. No distress.   HENT:   Head: Normocephalic and atraumatic.   Mouth/Throat: Oropharynx is clear and moist.   Eyes: Conjunctivae and EOM are normal. Pupils are equal, round, and reactive to light.   Neck:  Neck supple.   Normal range of motion.  Cardiovascular:  Normal rate, regular rhythm, normal heart sounds and intact distal pulses.     Exam reveals no gallop and no friction rub.       No murmur heard.  Pulmonary/Chest: Breath sounds normal. She has no wheezes. She has no rhonchi. She has no rales.   Abdominal: Abdomen is soft. Bowel sounds are normal. There is abdominal tenderness in the right upper quadrant and epigastric area.   Musculoskeletal:         General: Normal range of motion.      Cervical back: Normal range of motion and neck supple.     Neurological: She is alert and oriented to person, place, and time. She has normal strength. No cranial nerve deficit or sensory deficit. GCS score is 15. GCS eye subscore is 4. GCS verbal subscore is 5. GCS motor subscore is 6.   Skin: Skin is warm and dry. Capillary refill takes less than 2 seconds.   Psychiatric: She has a normal mood and affect. Her behavior is normal. Judgment and thought content normal.       ED Course   Procedures  Labs Reviewed   CBC W/ AUTO DIFFERENTIAL - Abnormal; Notable for the following components:       Result Value    Hemoglobin 9.5 (*)     Hematocrit 33.7 (*)     MCV 80 (*)     MCH 22.6 (*)     MCHC 28.2 (*)     RDW 21.2 (*)     All other components within normal limits   COMPREHENSIVE METABOLIC PANEL - Abnormal; Notable for the following components:    CO2 22 (*)     Albumin 2.5 (*)     ALT 9 (*)     All other components within normal limits   LIPASE - Abnormal; Notable for the following components:    Lipase 179 (*)     All other components within normal limits   URINALYSIS, REFLEX TO URINE CULTURE - Abnormal; Notable for the following components:    Appearance, UA Hazy (*)     All other components within normal limits    Narrative:     Specimen Source->Urine   ISTAT PROCEDURE - Abnormal; Notable for the following components:    POC Hematocrit 33 (*)     All other components within normal limits   LACTIC ACID, PLASMA   ISTAT CHEM8      EKG Readings: (Independently Interpreted)   Initial Reading: No STEMI. Rhythm: Normal Sinus Rhythm. Heart Rate: 73.     Imaging Results               US Abdomen Limited (Final result)  Result time 04/22/23 19:34:09      Final result by Danyel Ann MD (04/22/23 19:34:09)                   Impression:      Within the gallbladder, there is layering gallbladder sludge and scattered intraluminal air.  Scattered gallstones as well.  There are no secondary signs of acute cholecystitis.  Air is suspected to have originated from the biliary stent.  If clinical suspicion for infectious/inflammatory etiology such as acute cholecystitis remains high, consider dedicated HIDA scan.    Pancreatic head masses in this patient with known malignancy, better characterized on recent CT.    This report was flagged in Epic as abnormal.    Electronically signed by resident: Reuben Mendoza  Date:    04/22/2023  Time:    19:02    Electronically signed by: Danyel Ann MD  Date:    04/22/2023  Time:    19:34               Narrative:    EXAMINATION:  US ABDOMEN LIMITED    CLINICAL HISTORY:  cholelithiasis, possible gas in gallbladder    TECHNIQUE:  Limited ultrasound of the right upper quadrant of the abdomen (including pancreas, liver, gallbladder, common bile duct, spleen and IVC) was performed.    COMPARISON:  CT abdomen pelvis 04/22/2023, 03/01/2023    FINDINGS:  Liver: Normal in size, measuring 13.3 cm. Homogeneous echotexture. No focal hepatic lesions.    Gallbladder: There is layering gallbladder sludge and air in the gallbladder.  Multiple hyperechogenic foci, several which demonstrate posterior acoustic shadowing suggesting stones.  No wall thickening, wall hyperemia or pericholecystic fluid.    Biliary system: The common duct is not dilated, measuring 3 mm.  No intrahepatic ductal dilatation.  There is a biliary stent in place.    Pancreas: Hypoechoic pancreatic head masses measuring 2.6 x 3.5 x 1.9 cm and 2.7 x 3.1 x 2.4 cm.   Pancreatic duct is dilated up to 4 mm at the visualized neck region.    Spleen: Normal in size and echotexture, measuring 8.9 x 4.1 cm.    Miscellaneous: No upper abdominal ascites seen.  No hydronephrosis at either kidney.                                       CT Abdomen Pelvis With Contrast (Edited Result - FINAL)  Result time 04/22/23 17:47:47      Addendum (preliminary) 1 of 1 by Danyel Ann MD (04/22/23 17:47:47)      Correction to the 3rd sentence for impression 5: There is slight increased volume of gas within the GALLBLADDER, presumably related to the bile duct stent, although sequela of superimposed gas-forming infection not excluded on the basis of this examination.      Electronically signed by: Danyel Ann MD  Date:    04/22/2023  Time:    17:47                   Final result by Danyel Ann MD (04/22/23 15:04:11)                   Impression:      1. Fat stranding about the lesser curvature of the stomach with new circumferential wall thickening of the distal gastric body and antrum and fat stranding about the pancreatic body and proximal tail, not significantly changed from 04/16/2023 CT thorax study but new from 03/17/2023 CT abdomen and pelvis study.  Differential considerations can include sequela of nonspecific gastritis and/or pancreatitis, noting lymphangitic type carcinomatosis could present similarly.  2. Stable to slightly larger known pancreatic head mass when compared to 03/17/2023 study, with continued mass effect upon the adjacent duodenum and associated dilatation of the pancreatic duct and biliary ducts, noting common bile duct stent in place.  No evidence of gastric outlet obstruction.  3. There is an additional heterogeneous mass within the pancreatic body with findings concerning for direct invasion of the stomach which is new from CT examination of 03/17/2023 but similar to CT study of 04/16/2023, allowing for differences in timing of contrast bolus and image acquisition.   This mass results in associated dilatation of the distal pancreatic duct and is highly concerning for malignancy.  Appropriate subspecialty consultation/follow-up is advised.  4. New mild right-sided hydroureteronephrosis which could be related to a calculus within the distal right ureter versus an adjacent phlebolith.  5. Cholelithiasis.  Continued pneumobilia likely related to common bile duct stent.  There is slight increased volume gas within the urinary bladder presumably related to bile duct stent, although sequela of superimposed gas-forming infection not excluded on the basis of this examination.  Suspected nonspecific pericholecystic fluid.  Clinical correlation advised.  Further evaluation with right upper quadrant ultrasound can be obtained as warranted.  6. New trace volume perihepatic ascites, likely reactive.  7. Nodular hepatic contour suggesting underlying cirrhosis.  No discrete hepatic mass seen.  8. Continued patchy ground-glass opacities at the lung bases which may relate to edema, infection, aspiration or noninfectious inflammatory process.  No consolidation.  9. Grossly stable additional findings as above.  This report was flagged in Epic as abnormal.      Electronically signed by: Danyel Ann MD  Date:    04/22/2023  Time:    15:04               Narrative:    EXAMINATION:  CT ABDOMEN PELVIS WITH CONTRAST    CLINICAL HISTORY:  Epigastric pain;    TECHNIQUE:  Low dose axial images, sagittal and coronal reformations were obtained from the lung bases to the pubic symphysis following the IV administration of 75 mL of Omnipaque 350 .  Oral contrast was not given.    COMPARISON:  CT abdomen and pelvis 03/17/2023 and renal ultrasound 02/19/2019, ERCP 03/21/2023, CT thorax 04/16/2023, CT urogram 02/10/2020    FINDINGS:  Imaged lung bases show mild dependent atelectasis, scattered areas of platelike scarring versus atelectasis and mild patchy nonspecific pulmonary mosaic attenuation with peribronchial  thickening, grossly similar to chest CT 04/16/2023.  No consolidative process or pleural effusion.    Postoperative changes of prior sternotomy with transvenous leads in the right atrium and right ventricle again noted.  Heart is mildly enlarged without significant pericardial fluid.  Multi-vessel coronary arterial, aortic annular and aortic valvular calcifications noted.    Common bile duct stent again noted.  There is persistent pneumobilia likely related to the stent and not significantly changed from most recent priors.  No significant intrahepatic biliary ductal dilatation.    Redemonstrated heterogeneous appearing mass in the body of the pancreas measuring approximately 3.1 x 4.6 cm, previously measured as 2.5 x 3.4 cm on CT thorax of 04/16/2023 but likely stable to only slightly larger allowing for this is in timing of contrast bolus and image acquisition further delineating this mass when compared to previous study.  The central component of more hypoattenuation remains and measures up to 2.6 cm and appears more conspicuous on current study likely owing to timing of contrast bolus and image acquisition tailored for soft tissue structures rather than the pulmonary arterial system as on prior.  This is new from 03/17/2023 CT abdomen and pelvis.  This mass is inseparable from the medial and posterior aspect of the gastric body/lesser curvature but similar to recent CT thorax study.  There is circumferential wall thickening of the distal gastric body and antrum with mild stranding about the posterior aspect.  There is upstream dilatation of the distal pancreatic body and also pancreatic tail measuring up to 5-6 mm new from 03/17/2023 study.    Previous known heterogeneous mass at the pancreatic head measures 3.3 x 4 cm, previously 3.5 x 3.5 cm on 03/17/2023 study.  There is upstream dilatation of the pancreatic duct measuring up to 6-7 mm similar to CT thorax 04/16/2023 and slightly more so when compared to  03/17/2023 study.  There is mild fat stranding about the pancreas particularly about the body and tail and along the anterior aspect new from 03/17/2023 study but grossly similar from 04/16/2023 study.  There is continued mass effect upon the medial aspect of the duodenal C-loop which is inseparable from this mass, but no significant adjacent inflammatory change.  There is continued mild mass effect upon the adjacent duodenum, but no evidence of gastric outlet obstruction.  The lateral margin of the duodenum is within normal limits.    Liver is normal in size and attenuation with subtle nodular contour similar to prior.  Subcentimeter calcified granuloma of the hepatic dome unchanged.  No new focal hepatic abnormality.  Main portal vasculature is patent.    Gallbladder is moderately distended containing dependent stones.  Interval increased volume of non dependent gas within the gallbladder fundus when compared to 04/16/2023 CT chest.  No significant gallbladder wall thickening although there is suspected trace nonspecific pericholecystic fluid which appears increased from priors.    Spleen and bilateral adrenal glands are within normal limits.    Bilateral kidneys are stable in overall size, shape and location with mild cortical thinning and few scattered areas of cortical scarring with otherwise relatively symmetric normal enhancement.  Interval mild right-sided hydronephrosis as well as increased prominence of the right ureter to the level of the pelvis where there is a grossly stable 5 mm calcification.  This could be related to a phleboliths versus calculus within the distal right ureter.  Additional scattered subcentimeter pelvic phleboliths noted.  No left hydronephrosis.  Left ureter is normal in course and caliber.  No significant perinephric stranding on either side.    Urinary bladder is well distended without wall thickening.  Uterus is surgically absent.  No adnexal mass.  No significant free fluid in  the pelvis.    Appendix is not identified; however, no pericecal inflammatory change.  Mild amount scattered fecal material throughout the colon.  Few scattered colonic diverticula without diverticulitis.  No evidence of bowel obstruction.  The colon and remaining small bowel loops are within normal limits.  No pneumatosis or portal venous gas.    Trace volume perihepatic ascites new from 04/16/2023 CT study.  No free air.  Grossly stable subcentimeter tran hepatis and gastrohepatic lymph nodes.  No lymphadenopathy by CT criteria definitively seen within the abdomen, pelvis or inguinal regions.    Mild scattered calcific atherosclerosis of the abdominal aorta extending into its iliac branches.  No aortic aneurysm or dissection.    Extraperitoneal soft tissues are unchanged.  Osseous structures appear stable without acute process seen.                                       Medications   sodium chloride 0.9% bolus 1,000 mL 1,000 mL (0 mLs Intravenous Stopped 4/22/23 1420)   morphine injection 4 mg (4 mg Intravenous Given 4/22/23 1255)   ondansetron injection 4 mg (4 mg Intravenous Given 4/22/23 1255)   iohexoL (OMNIPAQUE 350) injection 75 mL (75 mLs Intravenous Given 4/22/23 1359)   piperacillin-tazobactam (ZOSYN) 4.5 g in dextrose 5 % in water (D5W) 5 % 100 mL IVPB (MB+) (0 g Intravenous Stopped 4/22/23 1824)   morphine injection 4 mg (4 mg Intravenous Given 4/22/23 1816)     Medical Decision Making:   History:   Old Medical Records: I decided to obtain old medical records.  Initial Assessment:   Emergent evaluation of a 83 y.o. female presenting to the emergency department complaining of abdominal pain that radiates to the back with associated nausea that began this morning at 1AM. Patient is afebrile, hemodynamically stable, and non toxic appearing.   Will order labs, imaging, EKG, analgesia, fluids, continue to monitor.  Differential Diagnosis:   Differential diagnosis includes but isn't limited to pancreatitis,  malignancy, bowel obstruction, gallbladder disease.  Independently Interpreted Test(s):   I have ordered and independently interpreted EKG Reading(s) - see prior notes  Clinical Tests:   Lab Tests: Ordered and Reviewed  Radiological Study: Ordered and Reviewed  Medical Tests: Ordered and Reviewed  ED Management:  No leukocytosis.  H/H stable.  No severe metabolic derangements.  Lipase 179.  Lactate 1.0.  CT abdomen pelvis with:  Fat stranding of the lesser curvature of the stomach with new circumferential wall thickening of the distal gastric body.  Stable to slightly larger known pancreatic head mass.  Additional heterogeneous mass within the pancreatic body with findings concerning for direct invasion of the stomach.  New mild right-sided hydroureteronephrosis which could be related to a calculus within the distal right ureter versus an adjacent phlebolith.  The patient does report history of ureterolithiasis.  She states that this pain feels different.  She has no flank pain or urinary symptoms.  Cholelithiasis with increased volume gas within the gallbladder.  Suspected nonspecific pericholecystic fluid.  She does have tenderness palpation in the epigastrium and right upper quadrant.  With concern for infection, will treat with Zosyn and obtain right upper quadrant ultrasound.  New trace volume perihepatic ascites.  Nodular hepatic contour suggesting underlying cirrhosis.  Patchy ground-glass opacities at the lung bases.    The patient reports that morphine helped with her pain, although it is returning.  Will give additional dose of morphine while awaiting ultrasound results.    Ultrasound with layering gallbladder sludge in scattered intraluminal air.  No secondary signs of acute cholecystitis.  Air suspected to originate from the biliary stent.  Consider HIDA if acute cholecystitis is of concern.    Will admit to Hospital Medicine for further workup and treatment.  I have discussed this plan with the patient  who is agreeable.  All questions answered.  The patient's history, physical exam, and plan of care was discussed with and agreed upon with my supervising physician.            ED Course as of 04/22/23 2022   Sat Apr 22, 2023   1351 WBC: 7.65 [JM]   1351 Hemoglobin(!): 9.5 [JM]   1351 Hematocrit(!): 33.7 [JM]   1351 Platelets: 327 [JM]   1351 BUN: 8 [JM]   1351 Creatinine: 0.8 [JM]   1351 Lipase(!): 179 [JM]   1351 Lactate, Piyush: 1.0 [JM]      ED Course User Index  [JM] Berta Canales PA-C                 Clinical Impression:   Final diagnoses:  [R10.13] Epigastric abdominal pain  [C25.9] Malignant neoplasm of pancreas, unspecified location of malignancy (Primary)  [K80.20] Calculus of gallbladder without cholecystitis without obstruction        ED Disposition Condition    Admit Stable                Berta Canales PA-C  04/22/23 2022

## 2023-04-23 LAB
ALBUMIN SERPL BCP-MCNC: 2.2 G/DL (ref 3.5–5.2)
ALP SERPL-CCNC: 70 U/L (ref 55–135)
ALT SERPL W/O P-5'-P-CCNC: 9 U/L (ref 10–44)
ANION GAP SERPL CALC-SCNC: 6 MMOL/L (ref 8–16)
AST SERPL-CCNC: 20 U/L (ref 10–40)
BASOPHILS # BLD AUTO: 0.05 K/UL (ref 0–0.2)
BASOPHILS NFR BLD: 0.9 % (ref 0–1.9)
BILIRUB SERPL-MCNC: 0.6 MG/DL (ref 0.1–1)
BNP SERPL-MCNC: 482 PG/ML (ref 0–99)
BUN SERPL-MCNC: 7 MG/DL (ref 8–23)
CALCIUM SERPL-MCNC: 8.5 MG/DL (ref 8.7–10.5)
CHLORIDE SERPL-SCNC: 105 MMOL/L (ref 95–110)
CO2 SERPL-SCNC: 27 MMOL/L (ref 23–29)
CREAT SERPL-MCNC: 0.8 MG/DL (ref 0.5–1.4)
DIFFERENTIAL METHOD: ABNORMAL
EOSINOPHIL # BLD AUTO: 0.1 K/UL (ref 0–0.5)
EOSINOPHIL NFR BLD: 1.2 % (ref 0–8)
ERYTHROCYTE [DISTWIDTH] IN BLOOD BY AUTOMATED COUNT: 20.9 % (ref 11.5–14.5)
EST. GFR  (NO RACE VARIABLE): >60 ML/MIN/1.73 M^2
GLUCOSE SERPL-MCNC: 80 MG/DL (ref 70–110)
HCT VFR BLD AUTO: 28.2 % (ref 37–48.5)
HGB BLD-MCNC: 8 G/DL (ref 12–16)
IMM GRANULOCYTES # BLD AUTO: 0.02 K/UL (ref 0–0.04)
IMM GRANULOCYTES NFR BLD AUTO: 0.3 % (ref 0–0.5)
LYMPHOCYTES # BLD AUTO: 1.6 K/UL (ref 1–4.8)
LYMPHOCYTES NFR BLD: 26.7 % (ref 18–48)
MAGNESIUM SERPL-MCNC: 1.3 MG/DL (ref 1.6–2.6)
MCH RBC QN AUTO: 22.7 PG (ref 27–31)
MCHC RBC AUTO-ENTMCNC: 28.4 G/DL (ref 32–36)
MCV RBC AUTO: 80 FL (ref 82–98)
MONOCYTES # BLD AUTO: 0.6 K/UL (ref 0.3–1)
MONOCYTES NFR BLD: 10.4 % (ref 4–15)
NEUTROPHILS # BLD AUTO: 3.5 K/UL (ref 1.8–7.7)
NEUTROPHILS NFR BLD: 60.5 % (ref 38–73)
NRBC BLD-RTO: 0 /100 WBC
PHOSPHATE SERPL-MCNC: 3.8 MG/DL (ref 2.7–4.5)
PLATELET # BLD AUTO: 278 K/UL (ref 150–450)
PMV BLD AUTO: 11 FL (ref 9.2–12.9)
POTASSIUM SERPL-SCNC: 4.1 MMOL/L (ref 3.5–5.1)
PROT SERPL-MCNC: 6.4 G/DL (ref 6–8.4)
RBC # BLD AUTO: 3.53 M/UL (ref 4–5.4)
SODIUM SERPL-SCNC: 138 MMOL/L (ref 136–145)
WBC # BLD AUTO: 5.85 K/UL (ref 3.9–12.7)

## 2023-04-23 PROCEDURE — 25000003 PHARM REV CODE 250: Mod: HCNC

## 2023-04-23 PROCEDURE — 97116 GAIT TRAINING THERAPY: CPT | Mod: HCNC

## 2023-04-23 PROCEDURE — 99223 PR INITIAL HOSPITAL CARE,LEVL III: ICD-10-PCS | Mod: AI,HCNC,, | Performed by: INTERNAL MEDICINE

## 2023-04-23 PROCEDURE — 97530 THERAPEUTIC ACTIVITIES: CPT | Mod: HCNC

## 2023-04-23 PROCEDURE — 83735 ASSAY OF MAGNESIUM: CPT | Mod: HCNC

## 2023-04-23 PROCEDURE — 36415 COLL VENOUS BLD VENIPUNCTURE: CPT | Mod: HCNC

## 2023-04-23 PROCEDURE — 80053 COMPREHEN METABOLIC PANEL: CPT | Mod: HCNC

## 2023-04-23 PROCEDURE — 85025 COMPLETE CBC W/AUTO DIFF WBC: CPT | Mod: HCNC

## 2023-04-23 PROCEDURE — 97165 OT EVAL LOW COMPLEX 30 MIN: CPT | Mod: HCNC

## 2023-04-23 PROCEDURE — 99223 1ST HOSP IP/OBS HIGH 75: CPT | Mod: AI,HCNC,, | Performed by: INTERNAL MEDICINE

## 2023-04-23 PROCEDURE — 25000003 PHARM REV CODE 250: Mod: HCNC | Performed by: STUDENT IN AN ORGANIZED HEALTH CARE EDUCATION/TRAINING PROGRAM

## 2023-04-23 PROCEDURE — 63600175 PHARM REV CODE 636 W HCPCS: Mod: HCNC | Performed by: STUDENT IN AN ORGANIZED HEALTH CARE EDUCATION/TRAINING PROGRAM

## 2023-04-23 PROCEDURE — 84100 ASSAY OF PHOSPHORUS: CPT | Mod: HCNC

## 2023-04-23 PROCEDURE — 63600175 PHARM REV CODE 636 W HCPCS: Mod: HCNC

## 2023-04-23 PROCEDURE — 97161 PT EVAL LOW COMPLEX 20 MIN: CPT | Mod: HCNC

## 2023-04-23 PROCEDURE — 12000002 HC ACUTE/MED SURGE SEMI-PRIVATE ROOM: Mod: HCNC

## 2023-04-23 PROCEDURE — 97535 SELF CARE MNGMENT TRAINING: CPT | Mod: HCNC

## 2023-04-23 RX ORDER — MAGNESIUM SULFATE HEPTAHYDRATE 40 MG/ML
2 INJECTION, SOLUTION INTRAVENOUS ONCE
Status: COMPLETED | OUTPATIENT
Start: 2023-04-23 | End: 2023-04-23

## 2023-04-23 RX ORDER — OXYCODONE HYDROCHLORIDE 10 MG/1
10 TABLET ORAL EVERY 6 HOURS PRN
Status: DISCONTINUED | OUTPATIENT
Start: 2023-04-23 | End: 2023-04-24 | Stop reason: HOSPADM

## 2023-04-23 RX ORDER — OXYCODONE HYDROCHLORIDE 5 MG/1
5 TABLET ORAL EVERY 6 HOURS PRN
Status: DISCONTINUED | OUTPATIENT
Start: 2023-04-23 | End: 2023-04-24 | Stop reason: HOSPADM

## 2023-04-23 RX ORDER — MORPHINE SULFATE 4 MG/ML
4 INJECTION, SOLUTION INTRAMUSCULAR; INTRAVENOUS EVERY 4 HOURS PRN
Status: DISCONTINUED | OUTPATIENT
Start: 2023-04-23 | End: 2023-04-24 | Stop reason: HOSPADM

## 2023-04-23 RX ADMIN — OXYBUTYNIN CHLORIDE 10 MG: 10 TABLET, EXTENDED RELEASE ORAL at 10:04

## 2023-04-23 RX ADMIN — APIXABAN 5 MG: 5 TABLET, FILM COATED ORAL at 10:04

## 2023-04-23 RX ADMIN — SOTALOL HYDROCHLORIDE 120 MG: 120 TABLET ORAL at 10:04

## 2023-04-23 RX ADMIN — METOPROLOL SUCCINATE 50 MG: 50 TABLET, EXTENDED RELEASE ORAL at 09:04

## 2023-04-23 RX ADMIN — FOLIC ACID 1000 MCG: 1 TABLET ORAL at 09:04

## 2023-04-23 RX ADMIN — PANCRELIPASE 2 CAPSULE: 24000; 76000; 120000 CAPSULE, DELAYED RELEASE PELLETS ORAL at 10:04

## 2023-04-23 RX ADMIN — PANCRELIPASE 2 CAPSULE: 24000; 76000; 120000 CAPSULE, DELAYED RELEASE PELLETS ORAL at 05:04

## 2023-04-23 RX ADMIN — SULFASALAZINE 500 MG: 500 TABLET ORAL at 09:04

## 2023-04-23 RX ADMIN — ATORVASTATIN CALCIUM 20 MG: 20 TABLET, FILM COATED ORAL at 09:04

## 2023-04-23 RX ADMIN — SULFASALAZINE 500 MG: 500 TABLET ORAL at 08:04

## 2023-04-23 RX ADMIN — PANTOPRAZOLE SODIUM 40 MG: 40 TABLET, DELAYED RELEASE ORAL at 09:04

## 2023-04-23 RX ADMIN — SODIUM CHLORIDE, POTASSIUM CHLORIDE, SODIUM LACTATE AND CALCIUM CHLORIDE: 600; 310; 30; 20 INJECTION, SOLUTION INTRAVENOUS at 07:04

## 2023-04-23 RX ADMIN — APIXABAN 5 MG: 5 TABLET, FILM COATED ORAL at 08:04

## 2023-04-23 RX ADMIN — FERUMOXYTOL 510 MG: 510 INJECTION INTRAVENOUS at 06:04

## 2023-04-23 RX ADMIN — LEVOTHYROXINE SODIUM 175 MCG: 150 TABLET ORAL at 05:04

## 2023-04-23 RX ADMIN — SOTALOL HYDROCHLORIDE 120 MG: 120 TABLET ORAL at 08:04

## 2023-04-23 RX ADMIN — AMLODIPINE BESYLATE 5 MG: 5 TABLET ORAL at 09:04

## 2023-04-23 RX ADMIN — MAGNESIUM SULFATE 2 G: 2 INJECTION INTRAVENOUS at 10:04

## 2023-04-23 NOTE — SUBJECTIVE & OBJECTIVE
Past Medical History:   Diagnosis Date    Anticoagulant long-term use     Atrial fibrillation     Crohn disease diagnosed in her 20's    GERD (gastroesophageal reflux disease)     Hyperlipidemia     Hypertension     Pancreatic adenocarcinoma 3/21/2023    Thyroid disease     s/p I 131       Past Surgical History:   Procedure Laterality Date    APPENDECTOMY      CARDIAC SURGERY  2014    pacemaker    COLONOSCOPY  ~2008    normal findings per patient report    ENDOSCOPIC ULTRASOUND OF UPPER GASTROINTESTINAL TRACT N/A 3/14/2023    Procedure: ULTRASOUND, UPPER GI TRACT, ENDOSCOPIC;  Surgeon: Andrei Swartz MD;  Location: [facility];  Service: Endoscopy;  Laterality: N/A;  Approval to hold Eliquis rec'd from Dr. Barbosa (see telephone encounter 3/3/23)-DS  [employer] AICD/PPM  3/3/23-Instructions via portal-DS    ERCP N/A 3/21/2023    Procedure: ERCP (ENDOSCOPIC RETROGRADE CHOLANGIOPANCREATOGRAPHY);  Surgeon: Celina Toney MD;  Location: [facility] ([address] [place] [address]);  Service: Endoscopy;  Laterality: N/A;    ESOPHAGOGASTRODUODENOSCOPY N/A 7/17/2018    Procedure: EGD (ESOPHAGOGASTRODUODENOSCOPY);  Surgeon: Alondra Casiano MD;  Location: [facility] ENDO;  Service: Endoscopy;  Laterality: N/A;    HYSTERECTOMY      INSERTION OF IMPLANTABLE CARDIOVERTER-DEFIBRILLATOR (ICD) GENERATOR WITH TWO EXISTING LEADS Left 5/10/2021    Procedure: INSERTION, PULSE GENERATOR WITH 2 EXISTING LEADS, ICD;  Surgeon: Bo Leone MD;  Location: [facility] CATH LAB;  Service: Cardiology;  Laterality: Left;    INSERTION OF PACEMAKER      REPLACEMENT OF PACEMAKER GENERATOR  05/10/2021    RETROGRADE PYELOGRAPHY Right 2/18/2020    Procedure: PYELOGRAM, RETROGRADE;  Surgeon: Jovan Kruse MD;  Location: [facility] OR;  Service: Urology;  Laterality: Right;    SMALL INTESTINE SURGERY  in her 20's    for crohn's    TONSILLECTOMY      UPPER GASTROINTESTINAL ENDOSCOPY  ~2008    normal findings per patient report       Review of patient's allergies indicates:    Allergen Reactions    Lisinopril Other (See Comments)     cough       No current facility-administered medications on file prior to encounter.     Current Outpatient Medications on File Prior to Encounter   Medication Sig    acetaminophen (TYLENOL) 325 MG tablet Take 650 mg by mouth daily as needed (Headache).    alendronate (FOSAMAX) 70 MG tablet Take 1 tablet (70 mg total) by mouth every 7 days.    amLODIPine (NORVASC) 5 MG tablet Take 1 tablet (5 mg total) by mouth once daily.    atorvastatin (LIPITOR) 20 MG tablet Take 1 tablet (20 mg total) by mouth once daily.    colestipoL (COLESTID) 1 gram Tab Take 1 g by mouth 2 (two) times daily.    diphenoxylate-atropine 2.5-0.025 mg (LOMOTIL) 2.5-0.025 mg per tablet TAKE 1 TABLET BY MOUTH 4 TIMES DAILY AS NEEDED FOR DIARRHEA    ELIQUIS 5 mg Tab Take 1 tablet (5 mg total) by mouth 2 (two) times daily.    esomeprazole (NEXIUM) 40 MG capsule Take 1 capsule (40 mg total) by mouth once daily.    folic acid (FOLVITE) 1 MG tablet Take 1 tablet by mouth once daily    levothyroxine (SYNTHROID, LEVOTHROID) 175 MCG tablet Take 1 tablet by mouth once daily    lipase-protease-amylase 24,000-76,000-120,000 units (CREON) 24,000-76,000 -120,000 unit capsule Take 2 capsules by mouth 3 (three) times daily with meals.    MELATONIN ORAL Take 1 tablet by mouth nightly as needed (Insomnia).    metoprolol succinate (TOPROL-XL) 50 MG 24 hr tablet Take 1 tablet (50 mg total) by mouth once daily.    ondansetron (ZOFRAN) 8 MG tablet Take 1 tablet (8 mg total) by mouth every 8 (eight) hours as needed for Nausea.    sotaloL (BETAPACE) 120 MG Tab Take 1 tablet (120 mg total) by mouth every 12 (twelve) hours.    sulfaSALAzine (AZULFIDINE) 500 mg Tab Take 1 tablet by mouth twice daily    tolterodine (DETROL LA) 4 MG 24 hr capsule Take 1 capsule by mouth once daily     Family History       Problem Relation (Age of Onset)    Breast cancer Mother    Cancer Mother    Crohn's disease Other           Tobacco Use    Smoking status: Former     Types: Cigarettes     Quit date:      Years since quittin.3    Smokeless tobacco: Never   Substance and Sexual Activity    Alcohol use: No    Drug use: No    Sexual activity: Never     Review of Systems   Constitutional:  Negative for appetite change, chills, fatigue, fever and unexpected weight change.   HENT:  Negative for congestion and dental problem.    Eyes:  Negative for photophobia and visual disturbance.   Respiratory:  Negative for cough and shortness of breath.    Cardiovascular:  Negative for chest pain and leg swelling.   Gastrointestinal:  Positive for abdominal pain, diarrhea (chronic), nausea and vomiting. Negative for abdominal distention and constipation.   Genitourinary:  Negative for difficulty urinating and dysuria.   Musculoskeletal:  Negative for arthralgias and back pain.   Skin:  Negative for color change and rash.   Neurological:  Negative for light-headedness and headaches.   Hematological:  Negative for adenopathy. Does not bruise/bleed easily.   Psychiatric/Behavioral:  Negative for agitation and behavioral problems.    Objective:     Vital Signs (Most Recent):  Temp: 97.5 °F (36.4 °C) (23)  Pulse: 73 (23)  Resp: 16 (23)  BP: (!) 145/67 (23)  SpO2: 96 % (23)   Vital Signs (24h Range):  Temp:  [97.2 °F (36.2 °C)-97.5 °F (36.4 °C)] 97.5 °F (36.4 °C)  Pulse:  [69-87] 73  Resp:  [16-20] 16  SpO2:  [94 %-97 %] 96 %  BP: (112-145)/(56-67) 145/67     Weight: 79.4 kg (175 lb)  Body mass index is 34.18 kg/m².    Physical Exam  Constitutional:       General: She is not in acute distress.     Appearance: She is well-developed.   HENT:      Head: Normocephalic and atraumatic.      Nose: No congestion or rhinorrhea.      Mouth/Throat:      Mouth: Mucous membranes are moist.      Pharynx: Oropharynx is clear.   Eyes:      General: No scleral icterus.     Extraocular Movements: Extraocular  movements intact.   Cardiovascular:      Rate and Rhythm: Normal rate. Rhythm irregular.      Heart sounds: Murmur (systolic) heard.   Pulmonary:      Effort: Pulmonary effort is normal. No respiratory distress.      Breath sounds: No wheezing or rales.   Abdominal:      General: Abdomen is flat. There is no distension.      Palpations: Abdomen is soft.      Tenderness: There is abdominal tenderness (Epigastric tenderness. Negative olsen's sign). There is no guarding.   Musculoskeletal:         General: Normal range of motion.      Cervical back: Neck supple. No rigidity.      Right lower leg: No edema.      Left lower leg: No edema.   Skin:     General: Skin is warm and dry.      Findings: No rash.   Neurological:      Mental Status: She is alert and oriented to person, place, and time. Mental status is at baseline.   Psychiatric:         Mood and Affect: Mood normal.         Behavior: Behavior normal.        Significant Labs: All pertinent labs within the past 24 hours have been reviewed.  CBC:   Recent Labs   Lab 04/22/23  1307 04/22/23  1313   WBC 7.65  --    HGB 9.5*  --    HCT 33.7* 33*     --      CMP:   Recent Labs   Lab 04/22/23  1307      K 3.6      CO2 22*   GLU 96   BUN 8   CREATININE 0.8   CALCIUM 8.9   PROT 7.2   ALBUMIN 2.5*   BILITOT 0.7   ALKPHOS 75   AST 27   ALT 9*   ANIONGAP 9     Lactic Acid:   Recent Labs   Lab 04/22/23  1307   LACTATE 1.0     Lipase:   Recent Labs   Lab 04/22/23  1307   LIPASE 179*       Significant Imaging: I have reviewed all pertinent imaging results/findings within the past 24 hours.

## 2023-04-23 NOTE — ASSESSMENT & PLAN NOTE
Hgb 9.5 on admission. No evidence of bleeding on exam. B-12 and folate within normal limits. Iron deficient on previous admission.    -- Trend CBC  -- Consider iron supplementation on discharge

## 2023-04-23 NOTE — ASSESSMENT & PLAN NOTE
83 yoF w/ pancreatic adenocarcinoma, crohn's, recent post-ERCP pancreatitis presenting with acute onset epigastric abdominal pain radiating to the back. CT scan overall similar to CT scan 4/15 with concern for possible pancreatitis vs gastric involvement of malignancy vs gastritis. Intraluminal air in gallbladder likely secondary to biliary stent although cholecystitis possible. Less likely cholecystitis given negative olsen sign and no evidence of systemic infection (leukocytues normal, afebrile). Pancreatitis possibly causing abdominal pain given character, location, elevated lipase, and imaging concerning for pancreatic tail inflammation. Challenging to interpret in the setting of recent post-ERCP pancreatitis. Lipase decreased from prior. Alternative etiology of abdominal pain possibly due to spread of malignancy although acute onset would be atypical. Will treat as pancreatitis on admission w/ plans for GI and heme-onc consult in AM.    -- NPO except for ice chips and sips/meds  -- IV LR 100cc/hr  -- IV morphine 2 mg q6h PRN; titrate as indicated  -- Consider HIDA scan if elevated concern for cholecystitis  -- GI and heme/onc consult in AM  -- If increased/continued abdominal pain consider repeat lactate given concern for possible mesenteric ischemia 2/2 mass effect

## 2023-04-23 NOTE — ASSESSMENT & PLAN NOTE
Recently diagnosed, follows with Dr. Hill outpatient. Not a surgical candidate but plan to start palliative chemotherapy. Concern for possible gastric extension.    -- Oncology consult in AM  -- pain control as above  -- anti-emetics PRN  -- code status: DNR confirmed on admission

## 2023-04-23 NOTE — HPI
The patient is an 83 year old female with pancreatic adenocarcinoma (plans for palliative chemotherapy, not yet initiated), pAF (on Apixaban), HFpEF, SSS s/p PM, Crohns Disease, HTN, HLD, hypothyroidism presenting for acute onset abdominal pain. The patient states the pain awoke her from sleep and began acutely at 1 AM on 4/22. The pain is located in the epigastrium and radiates towards her back. Of note the patient was hospitalized 3/17 to 3/23 for elevated bilirubin in the setting of a pancreatic head mass at which time she underwent ERCP w/ pancreatic biopsy which confirmed the diagnosis of pancreatic adenocarcinoma. That hospitalization was complicated by post-ERCP pancreatitis. She was subsequently seen outpatient w/ oncology (Dr. Hill) with plans for palliative chemotherapy in the setting of inoperable (due to medical co-morbidities) pancreatic cancer. She notes that her present abdominal pain feels similar to her prior episode of pancreatitis. She reports associated nausea with one episode of vomiting. She reports chronic diarrhea that is unchanged from prior. Denies fever, chills, headache, chest pain, palpitations, or dyspnea.    Of note the patient was recently briefly hospitalized on 4/15-4/16 for A-fib with RVR which responded to metoprolol. At that time abdominal imaging was concerning for possible extension of the pancreatic mass to the stomach. She was discharged with plans for outpatient palliative chemotherapy.    In the emergency department the patient is hemodynamically stable, afebrile. Labs significant for WBC 7.65, Hgb 9.5, Plts 327, Cr 0.8, bili 0.7, alk phos 75, AST 27, ALT 9, Lipase 179 (decreased from 375 on 4/16 and 1095 on 3/22), lactate 1.0. CT abdomen/pelvis concerning for circumferential wall thickening of distal gastric body (not significantly changed from 4/16), grossly stable pancreatic head and body masses, associated dilatation of distal pancreatic duct, cholelithiasis. RUQ  ultrasound with scattered gallstones, scattered intraluminal air, biliary stent.

## 2023-04-23 NOTE — ASSESSMENT & PLAN NOTE
Patient takes tolterodine at home (not on formulary)     -- Oxybutynin 10 mg daily while admitted

## 2023-04-23 NOTE — ASSESSMENT & PLAN NOTE
Loose stools approximately 3x per day at baseline. On daily sulfasalazine. No evidence of acute flare. Patient states abdominal pain feels more similar to previous pancreatitis than prior Crohn's flares.     -continue sulfasalazine

## 2023-04-23 NOTE — ASSESSMENT & PLAN NOTE
H/o A-fib. Anticoagulated w/ apixaban (last dose 3/16). Rate controlled w/ metoprolol succinate 25 mg daily and sotalol 120 mg BID.     -- Continue Apixaban 5 mg BID  -- Continue sotalol and metoprolol

## 2023-04-23 NOTE — ASSESSMENT & PLAN NOTE
Patient takes amlodipine 5 mg daily and metoprolol succinate 50 mg daily at home.     -- Continue home antihypertensives

## 2023-04-23 NOTE — HPI
Patient is an 82 y/o female with newly dx pancreatic adenoca to start palliative gemcitabine + nab-paclitaxel.     She has two admissions since her inital outpatient apt on 4/13/23. SHe reports epigastric pain that limits her appetite, which is now improved. She denies fevers, chills, n/v.     VSS. Labs with normocytic anemia. Iron panel with MAT. Hypomg. Normal LFTs and bili.

## 2023-04-23 NOTE — PLAN OF CARE
Problem: Physical Therapy  Goal: Physical Therapy Goal  Description: Goals to be met by: 2023     Patient will increase functional independence with mobility by performin. Sit to stand transfer with [place].  2. Gait  x 100 feet with Modified [place] using Rolling Walker.   3. Lower extremity exercise program x20 reps per handout, with supervision.    Outcome: Ongoing, Progressing

## 2023-04-23 NOTE — ED TRIAGE NOTES
Pauline Cronin, a 83 y.o. female presents to the ED w/ complaint of abdominal pain. Active pancreatic cancer, not on chemo currently. +nausea and weakness.    Adult Physical Assessment  LOC: Pauline Cronin, 83 y.o. female verified via two identifiers.  The patient is awake, alert, oriented and speaking appropriately at this time.  APPEARANCE: Patient resting comfortably and appears to be in no acute distress at this time. Patient is clean and well groomed, patient's clothing is properly fastened.  SKIN:The skin is warm and dry, color consistent with ethnicity, patient has normal skin turgor and moist mucus membranes, skin intact, no breakdown or brusing noted.  MUSCULOSKELETAL: Patient moving all extremities well, no obvious swelling or deformities noted.  RESPIRATORY: Airway is open and patent, respirations are spontaneous, patient has a normal effort and rate, no accessory muscle use noted.  CARDIAC: Patient has a normal rate and rhythm, no periphreal edema noted in any extremity, capillary refill < 3 seconds in all extremities  ABDOMEN: Soft and non tender to palpation, no abdominal distention noted. Bowel sounds present in all four quadrants. Patient reports abdominal pain. Active pancreatic cancer, not on chemo currently. +nausea and weakness.    NEUROLOGIC: Eyes open spontaneously, behavior appropriate to situation, follows commands, facial expression symmetrical, bilateral hand grasp equal and even, purposeful motor response noted, normal sensation in all extremities when touched with a finger.      Triage note:  Chief Complaint   Patient presents with    Abdominal Pain     Active pancreatic cancer not on chemo yet, +Nausea     Review of patient's allergies indicates:   Allergen Reactions    Lisinopril Other (See Comments)     cough     Past Medical History:   Diagnosis Date    Anticoagulant long-term use     Atrial fibrillation     Crohn disease diagnosed in her 20's    GERD (gastroesophageal reflux  disease)     Hyperlipidemia     Hypertension     Pancreatic adenocarcinoma 3/21/2023    Thyroid disease     s/p I 131

## 2023-04-23 NOTE — H&P
[facility] - [facility] ([address] Medicine  History & Physical    Patient Name: Pauline Cronin  MRN: [id number]  Patient Class: IP- Inpatient  Admission Date: 4/22/2023  Attending Physician: Yuridia Mann MD   Primary Care Provider: Ga Waldrop MD    Patient information was obtained from patient, past medical records and ER records.     Subjective:     Principal Problem:Abdominal pain    Chief Complaint:   Chief Complaint   Patient presents with    Abdominal Pain     Active pancreatic cancer not on chemo yet, +Nausea        HPI: The patient is an 83 year old female with pancreatic adenocarcinoma (plans for palliative chemotherapy, not yet initiated), pAF (on Apixaban), HFpEF, SSS s/p PM, Crohns Disease, HTN, HLD, hypothyroidism presenting for acute onset abdominal pain. The patient states the pain awoke her from sleep and began acutely at 1 AM on 4/22. The pain is located in the epigastrium and radiates towards her back. Of note the patient was hospitalized 3/17 to 3/23 for elevated bilirubin in the setting of a pancreatic head mass at which time she underwent ERCP w/ pancreatic biopsy which confirmed the diagnosis of pancreatic adenocarcinoma. That hospitalization was complicated by post-ERCP pancreatitis. She was subsequently seen outpatient w/ oncology (Dr. Hill) with plans for palliative chemotherapy in the setting of inoperable (due to medical co-morbidities) pancreatic cancer. She notes that her present abdominal pain feels similar to her prior episode of pancreatitis. She reports associated nausea with one episode of vomiting. She reports chronic diarrhea that is unchanged from prior. Denies fever, chills, headache, chest pain, palpitations, or dyspnea.    Of note the patient was recently briefly hospitalized on 4/15-4/16 for A-fib with RVR which responded to metoprolol. At that time abdominal imaging was concerning for possible extension of the pancreatic mass to the stomach.  She was discharged with plans for outpatient palliative chemotherapy.    In the emergency department the patient is hemodynamically stable, afebrile. Labs significant for WBC 7.65, Hgb 9.5, Plts 327, Cr 0.8, bili 0.7, alk phos 75, AST 27, ALT 9, Lipase 179 (decreased from 375 on 4/16 and 1095 on 3/22), lactate 1.0. CT abdomen/pelvis concerning for circumferential wall thickening of distal gastric body (not significantly changed from 4/16), grossly stable pancreatic head and body masses, associated dilatation of distal pancreatic duct, cholelithiasis. RUQ ultrasound with scattered gallstones, scattered intraluminal air, biliary stent.      Past Medical History:   Diagnosis Date    Anticoagulant long-term use     Atrial fibrillation     Crohn disease diagnosed in her 20's    GERD (gastroesophageal reflux disease)     Hyperlipidemia     Hypertension     Pancreatic adenocarcinoma 3/21/2023    Thyroid disease     s/p I 131       Past Surgical History:   Procedure Laterality Date    APPENDECTOMY      CARDIAC SURGERY  2014    pacemaker    COLONOSCOPY  ~2008    normal findings per patient report    ENDOSCOPIC ULTRASOUND OF UPPER GASTROINTESTINAL TRACT N/A 3/14/2023    Procedure: ULTRASOUND, UPPER GI TRACT, ENDOSCOPIC;  Surgeon: Andrei Swartz MD;  Location: [facility];  Service: Endoscopy;  Laterality: N/A;  Approval to hold Eliquis rec'd from Dr. Barbosa (see telephone encounter 3/3/23)-DS  [employer] AICD/PPM  3/3/23-Instructions via portal-DS    ERCP N/A 3/21/2023    Procedure: ERCP (ENDOSCOPIC RETROGRADE CHOLANGIOPANCREATOGRAPHY);  Surgeon: Celina Toney MD;  Location: [facility] ENDO ([address] [place] [address]);  Service: Endoscopy;  Laterality: N/A;    ESOPHAGOGASTRODUODENOSCOPY N/A 7/17/2018    Procedure: EGD (ESOPHAGOGASTRODUODENOSCOPY);  Surgeon: Alondra Casiano MD;  Location: [facility] ENDO;  Service: Endoscopy;  Laterality: N/A;    HYSTERECTOMY      INSERTION OF IMPLANTABLE CARDIOVERTER-DEFIBRILLATOR (ICD) GENERATOR  WITH TWO EXISTING LEADS Left 5/10/2021    Procedure: INSERTION, PULSE GENERATOR WITH 2 EXISTING LEADS, ICD;  Surgeon: Bo Leone MD;  Location: [facility] CATH LAB;  Service: Cardiology;  Laterality: Left;    INSERTION OF PACEMAKER      REPLACEMENT OF PACEMAKER GENERATOR  05/10/2021    RETROGRADE PYELOGRAPHY Right 2/18/2020    Procedure: PYELOGRAM, RETROGRADE;  Surgeon: Jovan Kruse MD;  Location[facility] OR;  Service: Urology;  Laterality: Right;    SMALL INTESTINE SURGERY  in her 20's    for crohn's    TONSILLECTOMY      UPPER GASTROINTESTINAL ENDOSCOPY  ~2008    normal findings per patient report       Review of patient's allergies indicates:   Allergen Reactions    Lisinopril Other (See Comments)     cough       No current facility-administered medications on file prior to encounter.     Current Outpatient Medications on File Prior to Encounter   Medication Sig    acetaminophen (TYLENOL) 325 MG tablet Take 650 mg by mouth daily as needed (Headache).    alendronate (FOSAMAX) 70 MG tablet Take 1 tablet (70 mg total) by mouth every 7 days.    amLODIPine (NORVASC) 5 MG tablet Take 1 tablet (5 mg total) by mouth once daily.    atorvastatin (LIPITOR) 20 MG tablet Take 1 tablet (20 mg total) by mouth once daily.    colestipoL (COLESTID) 1 gram Tab Take 1 g by mouth 2 (two) times daily.    diphenoxylate-atropine 2.5-0.025 mg (LOMOTIL) 2.5-0.025 mg per tablet TAKE 1 TABLET BY MOUTH 4 TIMES DAILY AS NEEDED FOR DIARRHEA    ELIQUIS 5 mg Tab Take 1 tablet (5 mg total) by mouth 2 (two) times daily.    esomeprazole (NEXIUM) 40 MG capsule Take 1 capsule (40 mg total) by mouth once daily.    folic acid (FOLVITE) 1 MG tablet Take 1 tablet by mouth once daily    levothyroxine (SYNTHROID, LEVOTHROID) 175 MCG tablet Take 1 tablet by mouth once daily    lipase-protease-amylase 24,000-76,000-120,000 units (CREON) 24,000-76,000 -120,000 unit capsule Take 2 capsules by mouth 3 (three) times daily with meals.     MELATONIN ORAL Take 1 tablet by mouth nightly as needed (Insomnia).    metoprolol succinate (TOPROL-XL) 50 MG 24 hr tablet Take 1 tablet (50 mg total) by mouth once daily.    ondansetron (ZOFRAN) 8 MG tablet Take 1 tablet (8 mg total) by mouth every 8 (eight) hours as needed for Nausea.    sotaloL (BETAPACE) 120 MG Tab Take 1 tablet (120 mg total) by mouth every 12 (twelve) hours.    sulfaSALAzine (AZULFIDINE) 500 mg Tab Take 1 tablet by mouth twice daily    tolterodine (DETROL LA) 4 MG 24 hr capsule Take 1 capsule by mouth once daily     Family History       Problem Relation (Age of Onset)    Breast cancer Mother    Cancer Mother    Crohn's disease Other          Tobacco Use    Smoking status: Former     Types: Cigarettes     Quit date:      Years since quittin.3    Smokeless tobacco: Never   Substance and Sexual Activity    Alcohol use: No    Drug use: No    Sexual activity: Never     Review of Systems   Constitutional:  Negative for appetite change, chills, fatigue, fever and unexpected weight change.   HENT:  Negative for congestion and dental problem.    Eyes:  Negative for photophobia and visual disturbance.   Respiratory:  Negative for cough and shortness of breath.    Cardiovascular:  Negative for chest pain and leg swelling.   Gastrointestinal:  Positive for abdominal pain, diarrhea (chronic), nausea and vomiting. Negative for abdominal distention and constipation.   Genitourinary:  Negative for difficulty urinating and dysuria.   Musculoskeletal:  Negative for arthralgias and back pain.   Skin:  Negative for color change and rash.   Neurological:  Negative for light-headedness and headaches.   Hematological:  Negative for adenopathy. Does not bruise/bleed easily.   Psychiatric/Behavioral:  Negative for agitation and behavioral problems.    Objective:     Vital Signs (Most Recent):  Temp: 97.5 °F (36.4 °C) (23)  Pulse: 73 (23)  Resp: 16 (23)  BP: (!)  145/67 (04/22/23 2232)  SpO2: 96 % (04/22/23 2232)   Vital Signs (24h Range):  Temp:  [97.2 °F (36.2 °C)-97.5 °F (36.4 °C)] 97.5 °F (36.4 °C)  Pulse:  [69-87] 73  Resp:  [16-20] 16  SpO2:  [94 %-97 %] 96 %  BP: (112-145)/(56-67) 145/67     Weight: 79.4 kg (175 lb)  Body mass index is 34.18 kg/m².    Physical Exam  Constitutional:       General: She is not in acute distress.     Appearance: She is well-developed.   HENT:      Head: Normocephalic and atraumatic.      Nose: No congestion or rhinorrhea.      Mouth/Throat:      Mouth: Mucous membranes are moist.      Pharynx: Oropharynx is clear.   Eyes:      General: No scleral icterus.     Extraocular Movements: Extraocular movements intact.   Cardiovascular:      Rate and Rhythm: Normal rate. Rhythm irregular.      Heart sounds: Murmur (systolic) heard.   Pulmonary:      Effort: Pulmonary effort is normal. No respiratory distress.      Breath sounds: No wheezing or rales.   Abdominal:      General: Abdomen is flat. There is no distension.      Palpations: Abdomen is soft.      Tenderness: There is abdominal tenderness (Epigastric tenderness. Negative olsen's sign). There is no guarding.   Musculoskeletal:         General: Normal range of motion.      Cervical back: Neck supple. No rigidity.      Right lower leg: No edema.      Left lower leg: No edema.   Skin:     General: Skin is warm and dry.      Findings: No rash.   Neurological:      Mental Status: She is alert and oriented to person, place, and time. Mental status is at baseline.   Psychiatric:         Mood and Affect: Mood normal.         Behavior: Behavior normal.        Significant Labs: All pertinent labs within the past 24 hours have been reviewed.  CBC:   Recent Labs   Lab 04/22/23  1307 04/22/23  1313   WBC 7.65  --    HGB 9.5*  --    HCT 33.7* 33*     --      CMP:   Recent Labs   Lab 04/22/23  1307      K 3.6      CO2 22*   GLU 96   BUN 8   CREATININE 0.8   CALCIUM 8.9   PROT 7.2    ALBUMIN 2.5*   BILITOT 0.7   ALKPHOS 75   AST 27   ALT 9*   ANIONGAP 9     Lactic Acid:   Recent Labs   Lab 04/22/23  1307   LACTATE 1.0     Lipase:   Recent Labs   Lab 04/22/23  1307   LIPASE 179*       Significant Imaging: I have reviewed all pertinent imaging results/findings within the past 24 hours.    Assessment/Plan:     * Abdominal pain  83 yoF w/ pancreatic adenocarcinoma, crohn's, recent post-ERCP pancreatitis presenting with acute onset epigastric abdominal pain radiating to the back. CT scan overall similar to CT scan 4/15 with concern for possible pancreatitis vs gastric involvement of malignancy vs gastritis. Intraluminal air in gallbladder likely secondary to biliary stent although cholecystitis possible. Less likely cholecystitis given negative olsen sign and no evidence of systemic infection (leukocytues normal, afebrile). Pancreatitis possibly causing abdominal pain given character, location, elevated lipase, and imaging concerning for pancreatic tail inflammation. Challenging to interpret in the setting of recent post-ERCP pancreatitis. Lipase decreased from prior. Alternative etiology of abdominal pain possibly due to spread of malignancy although acute onset would be atypical. Will treat as pancreatitis on admission w/ plans for GI and heme-onc consult in AM.    -- NPO except for ice chips and sips/meds  -- IV LR 100cc/hr  -- IV morphine 2 mg q6h PRN; titrate as indicated  -- Consider HIDA scan if elevated concern for cholecystitis  -- GI and heme/onc consult in AM  -- If increased/continued abdominal pain consider repeat lactate given concern for possible mesenteric ischemia 2/2 mass effect      Acute hypoxemic respiratory failure  Patient with Hypoxic Respiratory failure which is Acute.  she is not on home oxygen. Supplemental oxygen was provided and noted-      .   Signs/symptoms of respiratory failure include- hypoxia. Contributing diagnoses includes - Aspiration and CHF Labs and images  were reviewed. Patient Has not had a recent ABG. Will treat underlying causes and adjust management of respiratory failure as follows.    Unclear etiology for hypoxia on admission. History of HFpEF however does not appear to have acute exacerbation on admission. No JVD or lower extremity edema. Will check CXR and BNP. Unlikely pneumonia given lack of fever or leukocytosis. CT scan with patchy ground-glass opacities in lung bases unchanged from prior admission, no consolidations.    -- Supplemental O2; wean as tolerated  -- Follow-up CXR and BNP  -- Gentle fluids as above for pancreatitis given history of HFpEF    Paroxysmal atrial fibrillation  H/o A-fib. Anticoagulated w/ apixaban (last dose 3/16). Rate controlled w/ metoprolol succinate 25 mg daily and sotalol 120 mg BID.     -- Continue Apixaban 5 mg BID  -- Continue sotalol and metoprolol      Anemia  Hgb 9.5 on admission. No evidence of bleeding on exam. B-12 and folate within normal limits. Iron deficient on previous admission.    -- Trend CBC  -- Consider iron supplementation on discharge    Pancreatic insufficiency  -- Continue home Creon    Malignant neoplasm of pancreas  Recently diagnosed, follows with Dr. Hill outpatient. Not a surgical candidate but plan to start palliative chemotherapy. Concern for possible gastric extension.    -- Oncology consult in AM  -- pain control as above  -- anti-emetics PRN  -- code status: DNR confirmed on admission    Sick sinus syndrome  S/p PPM. Stable.    GERD (gastroesophageal reflux disease)  Takes nexium daily at home; not on formulary.    -- Continue Protonix while inpatient    OAB (overactive bladder)  Patient takes tolterodine at home (not on formulary)     -- Oxybutynin 10 mg daily while admitted       Pacemaker  H/o PPM secondary to sick sinus syndrome and complete heart block. No acute concerns.    Hypothyroidism  Lab Results   Component Value Date    TSH 2.019 03/27/2023     -- Continue home levothyroxine  175 mcg daily    Essential hypertension, benign  Patient takes amlodipine 5 mg daily and metoprolol succinate 50 mg daily at home.     -- Continue home antihypertensives    Hyperlipidemia  Last LDL 69 in March 2023.    -- Continue home atorvastatin 20 mg daily    Crohn's disease of large intestine without complication  Loose stools approximately 3x per day at baseline. On daily sulfasalazine. No evidence of acute flare. Patient states abdominal pain feels more similar to previous pancreatitis than prior Crohn's flares.     -continue sulfasalazine      VTE Risk Mitigation (From admission, onward)         Ordered     apixaban tablet 5 mg  2 times daily         04/22/23 2205     Reason for No Pharmacological VTE Prophylaxis  Once        Question:  Reasons:  Answer:  Already adequately anticoagulated on oral Anticoagulants    04/22/23 2205     IP VTE HIGH RISK PATIENT  Once         04/22/23 2205     Place sequential compression device  Until discontinued         04/22/23 2205                  Dave Ortega MD  Department of Hospital Medicine  [facility] - Intensive Care ([place]-[facility])

## 2023-04-23 NOTE — PLAN OF CARE
04/23/23 1557   Discharge Assessment   Assessment Type Discharge Planning Assessment   Confirmed/corrected address, phone number and insurance Yes   Confirmed Demographics Correct on Facesheet   Source of Information patient   When was your last doctors appointment? 04/18/23   Communicated UMA with patient/caregiver Date not available/Unable to determine   Reason For Admission Epigastric Abdominal Pain   People in Home alone   Do you expect to return to your current living situation? Yes   Do you have help at home or someone to help you manage your care at home? Yes   Who are your caregiver(s) and their phone number(s)? Daughter-In-Law/POA-Karen Roberto ([phone]   Prior to hospitilization cognitive status: Alert/Oriented   Current cognitive status: Alert/Oriented   Home Layout Able to live on 1st floor   Equipment Currently Used at Home walker, rolling   Readmission within 30 days? Yes   Patient currently being followed by outpatient case management? No   Do you currently have service(s) that help you manage your care at home? No   Do you take prescription medications? Yes   Do you have prescription coverage? Yes   Coverage [employer] Prescription Drug Coverage   Do you have any problems affording any of your prescribed medications? No   Is the patient taking medications as prescribed? yes   Who is going to help you get home at discharge? Daughter-in-Law, Karen   How do you get to doctors appointments? car, drives self   Are you on dialysis? No   Do you take coumadin? No   Discharge Plan A Home with family   Discharge Plan B Home   DME Needed Upon Discharge  none   Discharge Plan discussed with: Patient   Discharge Barriers Identified None   Physical Activity   On average, how many days per week do you engage in moderate to strenuous exercise (like a brisk walk)? 0 days   On average, how many minutes do you engage in exercise at this level? 0 min   Financial Resource Strain   How hard is it for you to  pay for the very basics like food, housing, medical care, and heating? Not hard   Housing Stability   In the last 12 months, was there a time when you were not able to pay the mortgage or rent on time? N   In the last 12 months, how many places have you lived? 1   In the last 12 months, was there a time when you did not have a steady place to sleep or slept in a shelter (including now)? N   Transportation Needs   In the past 12 months, has lack of transportation kept you from medical appointments or from getting medications? no   In the past 12 months, has lack of transportation kept you from meetings, work, or from getting things needed for daily living? No   Food Insecurity   Within the past 12 months, you worried that your food would run out before you got the money to buy more. Never true   Within the past 12 months, the food you bought just didn't last and you didn't have money to get more. Never true   Stress   Do you feel stress - tense, restless, nervous, or anxious, or unable to sleep at night because your mind is troubled all the time - these days? Not at all   Social Connections   In a typical week, how many times do you talk on the phone with family, friends, or neighbors? More than 3   How often do you get together with friends or relatives? More than 3   How often do you attend Temple or Yazidism services? Never   Do you belong to any clubs or organizations such as Temple groups, unions, fraternal or athletic groups, or school groups? Yes   How often do you attend meetings of the clubs or organizations you belong to? More than 4   Are you , , , , never , or living with a partner?    Alcohol Use   Q1: How often do you have a drink containing alcohol? Never   Q2: How many drinks containing alcohol do you have on a typical day when you are drinking? None   Q3: How often do you have six or more drinks on one occasion? Never     SW met with pt. at bedside to  complete Initial Discharge Planning Assessment. Pt. lives alone in her home in [place], [place]. The home is a 1-story home with 1 steps to enter. Pt. is . States her daughter-in-law, Karen Mejia is her caregiver & POA. DME includes a rolling walker. No [facility], Dialysis or Coumadin. [facility] in [place], [place]. Pt. drives, but Karen will provide transportation home upon discharge. No psychosocial needs identified at this time.     Barrett Nguyen LMSW

## 2023-04-23 NOTE — PT/OT/SLP EVAL
Physical Therapy Evaluation and Treatment    Patient Name: Pauline Cronin   MRN: [id number]  Recent Surgery: * No surgery found *      Co-evaluation/treatment with LEANNE Ballard performed due to patient's multiple deficits requiring two skilled therapists to appropriately and safely assess patient's strength and endurance while facilitating functional tasks in addition to accommodating for patient's activity tolerance.      Recommendations:     Discharge Recommendations: home health PT   Discharge Equipment Recommendations: walker, rolling   Barriers to discharge: Increased level of assist at this time    Assessment:     Pauline Cronin is a 83 y.o. female admitted with a medical diagnosis of Abdominal pain. She presents with the following impairments/functional limitations: weakness, impaired endurance, impaired self care skills, impaired functional mobility, gait instability, impaired balance, decreased lower extremity function, decreased ROM, impaired skin, impaired cardiopulmonary response to activity. Patient requires supervision with bed mobility, SBA with STS transfers, SBA with gait, and SBA with toilet transfers. Patient required acute physical therapy services to address deficits and return patient to [facility]. Patient would benefit from home health physical therapy services when discharged.     Rehab Prognosis: Good; patient would benefit from acute PT services to address these deficits and reach maximum level of function.    Plan:     During this hospitalization, patient to be seen 3 x/week to address the above listed problems via gait training, therapeutic activities, therapeutic exercises, neuromuscular re-education    Plan of Care Expires: 05/23/23    Subjective     Chief Complaint: None noted  Patient Comments/Goals: Are to gain strength  Pain/Comfort:  Pain Rating 1: 0/10  Pain Rating Post-Intervention 1: 0/10    Social History:  Living Environment: Patient lives alone in a first floor apartment with  tub-shower combo and shower chair  Prior Level of Function: Prior to admission, patient requires assistance with ADLs including hair washing and bathing 2-3 times per week. Daughter in law also provides care . Patient has not been driving for the past 2 months.   Equipment Used at Home: shower chair  DME owned (not currently used): none  Assistance Upon Discharge: family    Objective:     Communicated with NOBLE Lira prior to session. Patient found HOB elevated with peripheral IV, telemetry, PureWick upon PT entry to room.    General Precautions: Standard, fall   Orthopedic Precautions: N/A   Braces: N/A    Respiratory Status: Nasal cannula, flow 2 L/min, though patient was not wearing NC and claims she didn't need it. Patient did not become short of breath while ambulating in room.    Exams:  Cognition: Patient is oriented to Person, Place, Time, Situation  RLE ROM: WFL  RLE Strength:  Grossly 4/5  LLE ROM: WFL  LLE Strength: Grossly 4/5  Gross Motor Coordination: WFL  Postural Exam: Patient presented with the following abnormalities:    -       Rounded shoulders  -       Forward head  Sensation:    -       Intact  Skin Integrity/Edema:     -       Skin integrity: Visible skin intact, Thin, and Dry    Functional Mobility:  Gait belt applied - Yes  Bed Mobility  Rolling Left: supervision  Rolling Right: supervision  Scooting: supervision  Supine to Sit: supervision  Sit to Supine: supervision  Transfers  Sit to Stand: stand by assistance with rolling walker  Bed to Chair: stand by assistance with rolling walker using Stand Pivot  Toilet Transfer: stand by assistance with rolling walker using Stand Pivot  Gait  Patient ambulated 50 feet in room with rolling walker and CGA/SBA. Patient demonstrates steady gait, decreased step length, decreased weight shift, decreased foot clearance, flexed posture, decreased betty, and reciprocal gait pattern.  Balance  Sitting: stand by assistance  Standing: stand by  assistance    Therapeutic Activities and Exercises:   Patient educated on role of acute care PT and PT POC and safety while in hospital including calling nurse for mobility  Patient performed 10 repetitions of the following standing exercises: marches for bilateral LE. Patient required skilled PT for instruction of exercises and appropriate cues to perform exercises safely and appropriately.  Patient educated about importance of OOB mobility and remaining up in chair most of the day.  Doffed and donned new brief and [place] with OT. See OT note for assistance.     AM-PAC 6 CLICK MOBILITY  Total Score:17    Patient left HOB elevated with all lines intact and call button in reach.    GOALS:   Multidisciplinary Problems       Physical Therapy Goals          Problem: Physical Therapy    Goal Priority Disciplines Outcome Goal Variances Interventions   Physical Therapy Goal     PT, PT/OT Ongoing, Progressing     Description: Goals to be met by: 2023     Patient will increase functional independence with mobility by performin. Sit to stand transfer with [place].  2. Gait  x 100 feet with Modified [place] using Rolling Walker.   3. Lower extremity exercise program x20 reps per handout, with supervision.                         History:     Past Medical History:   Diagnosis Date    Anticoagulant long-term use     Atrial fibrillation     Crohn disease diagnosed in her 20's    GERD (gastroesophageal reflux disease)     Hyperlipidemia     Hypertension     Pancreatic adenocarcinoma 3/21/2023    Thyroid disease     s/p I 131       Past Surgical History:   Procedure Laterality Date    APPENDECTOMY      CARDIAC SURGERY  2014    pacemaker    COLONOSCOPY  ~    normal findings per patient report    ENDOSCOPIC ULTRASOUND OF UPPER GASTROINTESTINAL TRACT N/A 3/14/2023    Procedure: ULTRASOUND, UPPER GI TRACT, ENDOSCOPIC;  Surgeon: Andrei Swartz MD;  Location: [facility];  Service: Endoscopy;  Laterality:  N/A;  Approval to hold Eliquis rec'd from Dr. Barbosa (see telephone encounter 3/3/23)-DS  [employer] AICD/PPM  3/3/23-Instructions via portal-DS    ERCP N/A 3/21/2023    Procedure: ERCP (ENDOSCOPIC RETROGRADE CHOLANGIOPANCREATOGRAPHY);  Surgeon: Celina Toney MD;  Location: [address]);  Service: Endoscopy;  Laterality: N/A;    ESOPHAGOGASTRODUODENOSCOPY N/A 7/17/2018    Procedure: EGD (ESOPHAGOGASTRODUODENOSCOPY);  Surgeon: Alondra Casiano MD;  Location: [facility] ENDO;  Service: Endoscopy;  Laterality: N/A;    HYSTERECTOMY      INSERTION OF IMPLANTABLE CARDIOVERTER-DEFIBRILLATOR (ICD) GENERATOR WITH TWO EXISTING LEADS Left 5/10/2021    Procedure: INSERTION, PULSE GENERATOR WITH 2 EXISTING LEADS, ICD;  Surgeon: Bo Leone MD;  Location: [facility] CATH LAB;  Service: Cardiology;  Laterality: Left;    INSERTION OF PACEMAKER      REPLACEMENT OF PACEMAKER GENERATOR  05/10/2021    RETROGRADE PYELOGRAPHY Right 2/18/2020    Procedure: PYELOGRAM, RETROGRADE;  Surgeon: Jovan Kruse MD;  Location: [facility];  Service: Urology;  Laterality: Right;    SMALL INTESTINE SURGERY  in her 20's    for crohn's    TONSILLECTOMY      UPPER GASTROINTESTINAL ENDOSCOPY  ~2008    normal findings per patient report       Time Tracking:     PT Received On: 04/23/23  PT Start Time: 0909  PT Stop Time: 0938  PT Total Time (min): 29 min     Billable Minutes: Evaluation 19 and Gait Training 10    4/23/2023

## 2023-04-23 NOTE — CONSULTS
[facility] - [facility] ([place] [id number])  Hematology/Oncology  Consult Note    Patient Name: Pauline Cronin  MRN: [id number]  Admission Date: 4/22/2023  Hospital Length of Stay: 1 days  Code Status: DNR   Attending Provider: Yuridia Mann MD  Consulting Provider: Marilu Burroughs MD  Primary Care Physician: Ga Waldrop MD  Principal Problem:Abdominal pain    Inpatient consult to Hematology/Oncology  Consult performed by: Marilu Burroughs MD  Consult ordered by: Dave Ortega MD        Subjective:     HPI:  Patient is an 84 y/o female with newly dx pancreatic adenoca to start palliative gemcitabine + nab-paclitaxel.     She has two admissions since her inital outpatient apt on 4/13/23. SHe reports epigastric pain that limits her appetite, which is now improved. She denies fevers, chills, n/v.     VSS. Labs with normocytic anemia. Iron panel with MAT. Hypomg. Normal LFTs and bili.       Oncology Treatment Plan:   OP PANC NAB-PACLITAXEL + GEMCITABINE Q4W    Medications:  Continuous Infusions:   lactated ringers 100 mL/hr at 04/22/23 2303     Scheduled Meds:   amLODIPine  5 mg Oral Daily    apixaban  5 mg Oral BID    atorvastatin  20 mg Oral Daily    folic acid  1,000 mcg Oral Daily    levothyroxine  175 mcg Oral Before breakfast    lipase-protease-amylase 24,000-76,000-120,000 units  2 capsule Oral TID WM    metoprolol succinate  50 mg Oral Daily    oxybutynin  10 mg Oral Daily    pantoprazole  40 mg Oral Daily    sotaloL  120 mg Oral Q12H    sulfaSALAzine  500 mg Oral BID     PRN Meds:acetaminophen, dextrose 10%, dextrose 10%, dextrose, dextrose, glucagon (human recombinant), morphine, naloxone, ondansetron, oxyCODONE, oxyCODONE, polyethylene glycol, promethazine, sodium chloride 0.9%     Review of patient's allergies indicates:   Allergen Reactions    Lisinopril Other (See Comments)     cough        Past Medical History:   Diagnosis Date    Anticoagulant long-term use     Atrial fibrillation     Crohn disease  diagnosed in her 20's    GERD (gastroesophageal reflux disease)     Hyperlipidemia     Hypertension     Pancreatic adenocarcinoma 3/21/2023    Thyroid disease     s/p I 131     Past Surgical History:   Procedure Laterality Date    APPENDECTOMY      CARDIAC SURGERY  2014    pacemaker    COLONOSCOPY  ~2008    normal findings per patient report    ENDOSCOPIC ULTRASOUND OF UPPER GASTROINTESTINAL TRACT N/A 3/14/2023    Procedure: ULTRASOUND, UPPER GI TRACT, ENDOSCOPIC;  Surgeon: Andrei Swartz MD;  Location: [facility] ENDO;  Service: Endoscopy;  Laterality: N/A;  Approval to hold Eliquis rec'd from Dr. Barbosa (see telephone encounter 3/3/23)-DS  [employer] AICD/PPM  3/3/23-Instructions via portal-DS    ERCP N/A 3/21/2023    Procedure: ERCP (ENDOSCOPIC RETROGRADE CHOLANGIOPANCREATOGRAPHY);  Surgeon: Celina Toney MD;  Location: [facility] ([address] [place] [address]);  Service: Endoscopy;  Laterality: N/A;    ESOPHAGOGASTRODUODENOSCOPY N/A 7/17/2018    Procedure: EGD (ESOPHAGOGASTRODUODENOSCOPY);  Surgeon: Alondra Casiano MD;  Location: [facility];  Service: Endoscopy;  Laterality: N/A;    HYSTERECTOMY      INSERTION OF IMPLANTABLE CARDIOVERTER-DEFIBRILLATOR (ICD) GENERATOR WITH TWO EXISTING LEADS Left 5/10/2021    Procedure: INSERTION, PULSE GENERATOR WITH 2 EXISTING LEADS, ICD;  Surgeon: Bo Leone MD;  Location: [facility] CATH LAB;  Service: Cardiology;  Laterality: Left;    INSERTION OF PACEMAKER      REPLACEMENT OF PACEMAKER GENERATOR  05/10/2021    RETROGRADE PYELOGRAPHY Right 2/18/2020    Procedure: PYELOGRAM, RETROGRADE;  Surgeon: Jovan Kruse MD;  Location: [facility] OR;  Service: Urology;  Laterality: Right;    SMALL INTESTINE SURGERY  in her 20's    for crohn's    TONSILLECTOMY      UPPER GASTROINTESTINAL ENDOSCOPY  ~2008    normal findings per patient report     Family History       Problem Relation (Age of Onset)    Breast cancer Mother    Cancer Mother    Crohn's disease Other          Tobacco Use    Smoking status: Former      Types: Cigarettes     Quit date:      Years since quittin.3    Smokeless tobacco: Never   Substance and Sexual Activity    Alcohol use: No    Drug use: No    Sexual activity: Never       Review of Systems  Reports epigastric pain that limits her appetite, which is now improved. She denies fevers, chills, n/v.     Objective:     Vital Signs (Most Recent):  Temp: 98 °F (36.7 °C) (23 1214)  Pulse: 70 (23 1214)  Resp: 18 (23 1214)  BP: (!) 140/64 (23 1214)  SpO2: (!) 93 % (23 1214)   Vital Signs (24h Range):  Temp:  [97.2 °F (36.2 °C)-98 °F (36.7 °C)] 98 °F (36.7 °C)  Pulse:  [69-78] 70  Resp:  [16-20] 18  SpO2:  [93 %-98 %] 93 %  BP: (134-155)/(61-70) 140/64     Weight: 79.4 kg (175 lb)  Body mass index is 34.18 kg/m².  Body surface area is 1.83 meters squared.      Intake/Output Summary (Last 24 hours) at 2023 1700  Last data filed at 2023 0650  Gross per 24 hour   Intake --   Output 100 ml   Net -100 ml       Physical Exam  Alert awake oriented x3  Regular rate and rhythm  Lungs clear to auscultation bilaterally  Abdomen soft nontender  No lower extremity edema    Significant Labs:   All pertinent labs from the last 24 hours have been reviewed.    Diagnostic Results:  I have reviewed all pertinent imaging results/findings within the past 24 hours.    Assessment/Plan:     Newly diagnosed pancreatic ca, not yet treated    Patient is an 84 y/o female with newly dx pancreatic adenoca to start palliative gemcitabine + nab-paclitaxel who presents to [facility] on 23 for epigastric pain.     She has two admissions since her inital outpatient apt on 23. She reports epigastric pain that limits her appetite, which is now improved.    She would benefit from oxycodone PRN outpatient. Messaged schedulers for outpatient follow up. No indication for inpatient chemotherapy. Chemo auth pending. Recommend outpatient IV iron for MAT      I will sign off. Please contact us if you  have any additional questions.    Marilu Burroughs MD  Hematology/Oncology Fellow PGY V  [facility]        I have reviewed the notes, assessments, and/or procedures performed by the housestaff, as above.  I have personally interviewed and examined the patient at the beside, and rounded with the housestaff. I concur with her/his assessment and plan and the documentation of Pauline Cronin.  More than 70 mins total time were spent during this encounter.      Yobany Cardona M.D., M.S., F.A.C.P.  Hematology/Oncology Attending  [facility]

## 2023-04-23 NOTE — PT/OT/SLP EVAL
"Occupational Therapy   Co - Evaluation and Co- Treatment with Discharge Note    Co-evaluation/treatment performed due to patient's multiple deficits requiring two skilled therapists to appropriately and safely assess patient's strength and endurance while facilitating functional tasks in addition to accommodating for patient's activity tolerance.       Name: Pauline Cronin  MRN: [id number]  Admitting Diagnosis: Abdominal pain  Recent Surgery: * No surgery found *      Recommendations:     Discharge Recommendations: home  Discharge Equipment Recommendations: walker, rolling  Barriers to discharge:  None    Assessment:     Pauline Cronin is a 83 y.o. female with a medical diagnosis of Abdominal pain. At this time, patient is functioning at their prior level of function and does not require further acute OT services. PT will follow to maximize functional ambulation and BLE strengthening.    Plan:     During this hospitalization, patient does not require further acute OT services.  Please re-consult if situation changes.    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  She has a tub/shower combo with shower chair.   Previous level of function: A caregiver assists with hair washing and bathing 2-3x a week.  Pt stopped driving 2 months ago. DIL also provides good care, according to pt  Roles and Routines: mother/MIL  Equipment Used at home: shower chair  Assistance upon Discharge: family     Pain/Comfort:  Pain Rating 1: 0/10  Pain Rating Post-Intervention 1: 0/10    Patients cultural, spiritual, Pentec[employer]alism conflicts given the current situation: no    Objective:     Communicated with: NOBLE Lira prior to session.  Patient found HOB elevated with peripheral IV, telemetry, PureWick upon OT " entry to room.    General Precautions: Standard, fall  Orthopedic Precautions: N/A  Braces: N/A  Respiratory Status: Nasal cannula, flow 2 L/min though pt not wearing NC and claims she didn't need it. Pt did not become short of breath while ambulating in room    Occupational Performance:    Bed Mobility:    Patient completed Rolling/Turning to Right with supervision  Patient completed Scooting/Bridging to EOB in sitting and HOB in supine with supervision  Patient completed Supine to Sit with supervision  Patient completed Sit to Supine with supervision    Functional Mobility/Transfers:  Patient completed Sit <> Stand Transfer with stand by assistance  with  rolling walker   Patient completed Bed <> Chair Transfer using Step Transfer technique with stand by assistance with rolling walker  Patient completed Toilet Transfer Step Transfer technique with stand by assistance with  rolling walker  Functional Mobility: Pt engaging in functional mobility to simulate household/community distances with SBA and utilizing RW in order to maximize functional activity tolerance and standing balance required for engagement in occupations of choice.   Pt transferred to upright chair with SBA/RW  Pt then called OT/PT back into room and transferred from upright chair back to bed with step transfer/RW and SBA    Activities of Daily Living:  Grooming: supervision washing face while standing at sink, wash underarms and apply deodorant while standing at sink   Upper Body Dressing: modified independence unzipping/zipping house dress  Lower Body Dressing: supervision doffing soiled briefs and Pure wick, and modA  donning fresh briefs and Pure wick  Toileting: supervision toileting and completing perineal care    Cognitive/Visual Perceptual:  Cognitive/Psychosocial Skills:     -       Oriented to: AOx4   -       Follows Commands/attention:Follows multistep  commands  -       Communication: clear/fluent  -       Memory: No Deficits noted  -        Safety awareness/insight to disability: impaired   -       Mood/Affect/Coping skills/emotional control: Appropriate to situation  Visual/Perceptual:      -Intact      Physical Exam:  Balance:    -       impaired  Postural examination/scapula alignment:    -       Rounded shoulders  Skin integrity: Visible skin intact  Edema:  None noted  Sensation:    -       Intact  Upper Extremity Range of Motion:     -       Right Upper Extremity: WFL  -       Left Upper Extremity: WFL  Upper Extremity Strength:    -       Right Upper Extremity: WFL  -       Left Upper Extremity: WFL   Strength:    -       Right Upper Extremity: WFL  -       Left Upper Extremity: WFL  Fine Motor Coordination:    -       Intact  Neurological:    -       intact    AMPAC 6 Click ADL:  AMPAC Total Score: 24    Treatment & Education:  Pt educated on role of OT, POC, and goals for therapy.    POC was dicussed with patient/caregiver, who was included in its development and is in agreement with the identified goals and treatment plan.   Time provided for therapeutic counseling and discussion of health disposition.   Educated on importance of EOB/OOB mobility, maintaining routine, sitting up in chair, and maximizing independence with ADLs during admission   Pt completed ADLs and functional mobility for treatment session as noted above   Pt/caregiver verbalized understanding and expressed no further concerns/questions.  Updated communication board      Patient left HOB elevated with all lines intact, call button in reach, and RN notified    GOALS:   Multidisciplinary Problems       Occupational Therapy Goals       Not on file                    History:     Past Medical History:   Diagnosis Date    Anticoagulant long-term use     Atrial fibrillation     Crohn disease diagnosed in her 20's    GERD (gastroesophageal reflux disease)     Hyperlipidemia     Hypertension     Pancreatic adenocarcinoma 3/21/2023    Thyroid disease     s/p I 131         Past  Surgical History:   Procedure Laterality Date    APPENDECTOMY      CARDIAC SURGERY  2014    pacemaker    COLONOSCOPY  ~2008    normal findings per patient report    ENDOSCOPIC ULTRASOUND OF UPPER GASTROINTESTINAL TRACT N/A 3/14/2023    Procedure: ULTRASOUND, UPPER GI TRACT, ENDOSCOPIC;  Surgeon: Andrei Swartz MD;  Location: [facility] ENDO;  Service: Endoscopy;  Laterality: N/A;  Approval to hold Eliquis rec'd from Dr. Barbosa (see telephone encounter 3/3/23)-DS  [employer] AICD/PPM  3/3/23-Instructions via portal-DS    ERCP N/A 3/21/2023    Procedure: ERCP (ENDOSCOPIC RETROGRADE CHOLANGIOPANCREATOGRAPHY);  Surgeon: Cleina Toney MD;  Location: [facility] ENDO ([facility]);  Service: Endoscopy;  Laterality: N/A;    ESOPHAGOGASTRODUODENOSCOPY N/A 7/17/2018    Procedure: EGD (ESOPHAGOGASTRODUODENOSCOPY);  Surgeon: Alondra Casiano MD;  Location: [facility] ENDO;  Service: Endoscopy;  Laterality: N/A;    HYSTERECTOMY      INSERTION OF IMPLANTABLE CARDIOVERTER-DEFIBRILLATOR (ICD) GENERATOR WITH TWO EXISTING LEADS Left 5/10/2021    Procedure: INSERTION, PULSE GENERATOR WITH 2 EXISTING LEADS, ICD;  Surgeon: Bo Leone MD;  Location: [facility] CATH LAB;  Service: Cardiology;  Laterality: Left;    INSERTION OF PACEMAKER      REPLACEMENT OF PACEMAKER GENERATOR  05/10/2021    RETROGRADE PYELOGRAPHY Right 2/18/2020    Procedure: PYELOGRAM, RETROGRADE;  Surgeon: Jovan Kruse MD;  Location: [facility];  Service: Urology;  Laterality: Right;    SMALL INTESTINE SURGERY  in her 20's    for crohn's    TONSILLECTOMY      UPPER GASTROINTESTINAL ENDOSCOPY  ~2008    normal findings per patient report       Time Tracking:     OT Date of Treatment: 04/23/23  OT Start Time: 0909  OT Stop Time: 0938  OT Total Time (min): 29 min    Billable Minutes:Evaluation 6  Self Care/Home Management 13  Therapeutic Activity 10    4/23/2023

## 2023-04-23 NOTE — ASSESSMENT & PLAN NOTE
Patient with Hypoxic Respiratory failure which is Acute.  she is not on home oxygen. Supplemental oxygen was provided and noted-      .   Signs/symptoms of respiratory failure include- hypoxia. Contributing diagnoses includes - Aspiration and CHF Labs and images were reviewed. Patient Has not had a recent ABG. Will treat underlying causes and adjust management of respiratory failure as follows.    Unclear etiology for hypoxia on admission. History of HFpEF however does not appear to have acute exacerbation on admission. No JVD or lower extremity edema. Will check CXR and BNP. Unlikely pneumonia given lack of fever or leukocytosis. CT scan with patchy ground-glass opacities in lung bases unchanged from prior admission, no consolidations.    -- Supplemental O2; wean as tolerated  -- Follow-up CXR and BNP  -- Gentle fluids as above for pancreatitis given history of HFpEF

## 2023-04-23 NOTE — ASSESSMENT & PLAN NOTE
Lab Results   Component Value Date    TSH 2.019 03/27/2023     -- Continue home levothyroxine 175 mcg daily

## 2023-04-23 NOTE — PLAN OF CARE
Problem: Adult Inpatient Plan of Care  Goal: Plan of Care Review  Outcome: Ongoing, Progressing  Goal: Patient-Specific Goal (Individualized)  Outcome: Ongoing, Progressing  Goal: Absence of Hospital-Acquired Illness or Injury  Outcome: Ongoing, Progressing  Goal: Optimal Comfort and Wellbeing  Outcome: Ongoing, Progressing  Goal: Readiness for Transition of Care  Outcome: Ongoing, Progressing    Pt npo at this time except for meds. Pt tolerated meds well overnight. Iv fluids infusing.

## 2023-04-23 NOTE — SUBJECTIVE & OBJECTIVE
Oncology Treatment Plan:   OP PANC NAB-PACLITAXEL + GEMCITABINE Q4W    Medications:  Continuous Infusions:   lactated ringers 100 mL/hr at 04/22/23 2303     Scheduled Meds:   amLODIPine  5 mg Oral Daily    apixaban  5 mg Oral BID    atorvastatin  20 mg Oral Daily    folic acid  1,000 mcg Oral Daily    levothyroxine  175 mcg Oral Before breakfast    lipase-protease-amylase 24,000-76,000-120,000 units  2 capsule Oral TID WM    metoprolol succinate  50 mg Oral Daily    oxybutynin  10 mg Oral Daily    pantoprazole  40 mg Oral Daily    sotaloL  120 mg Oral Q12H    sulfaSALAzine  500 mg Oral BID     PRN Meds:acetaminophen, dextrose 10%, dextrose 10%, dextrose, dextrose, glucagon (human recombinant), morphine, naloxone, ondansetron, oxyCODONE, oxyCODONE, polyethylene glycol, promethazine, sodium chloride 0.9%     Review of patient's allergies indicates:   Allergen Reactions    Lisinopril Other (See Comments)     cough        Past Medical History:   Diagnosis Date    Anticoagulant long-term use     Atrial fibrillation     Crohn disease diagnosed in her 20's    GERD (gastroesophageal reflux disease)     Hyperlipidemia     Hypertension     Pancreatic adenocarcinoma 3/21/2023    Thyroid disease     s/p I 131     Past Surgical History:   Procedure Laterality Date    APPENDECTOMY      CARDIAC SURGERY  2014    pacemaker    COLONOSCOPY  ~2008    normal findings per patient report    ENDOSCOPIC ULTRASOUND OF UPPER GASTROINTESTINAL TRACT N/A 3/14/2023    Procedure: ULTRASOUND, UPPER GI TRACT, ENDOSCOPIC;  Surgeon: Andrei Swartz MD;  Location: [facility];  Service: Endoscopy;  Laterality: N/A;  Approval to hold Eliquis rec'd from Dr. Barbosa (see telephone encounter 3/3/23)-DS  [employer] AICD/PPM  3/3/23-Instructions via portal-DS    ERCP N/A 3/21/2023    Procedure: ERCP (ENDOSCOPIC RETROGRADE CHOLANGIOPANCREATOGRAPHY);  Surgeon: Celina Toney MD;  Location: [facility] ([address] [place] [address]);  Service: Endoscopy;  Laterality: N/A;     ESOPHAGOGASTRODUODENOSCOPY N/A 2018    Procedure: EGD (ESOPHAGOGASTRODUODENOSCOPY);  Surgeon: Alondra Casiano MD;  Location: [facility] ENDO;  Service: Endoscopy;  Laterality: N/A;    HYSTERECTOMY      INSERTION OF IMPLANTABLE CARDIOVERTER-DEFIBRILLATOR (ICD) GENERATOR WITH TWO EXISTING LEADS Left 5/10/2021    Procedure: INSERTION, PULSE GENERATOR WITH 2 EXISTING LEADS, ICD;  Surgeon: Bo Leone MD;  Location: [facility] CATH LAB;  Service: Cardiology;  Laterality: Left;    INSERTION OF PACEMAKER      REPLACEMENT OF PACEMAKER GENERATOR  05/10/2021    RETROGRADE PYELOGRAPHY Right 2020    Procedure: PYELOGRAM, RETROGRADE;  Surgeon: Jovan Kruse MD;  Location: [facility] OR;  Service: Urology;  Laterality: Right;    SMALL INTESTINE SURGERY  in her 20's    for crohn's    TONSILLECTOMY      UPPER GASTROINTESTINAL ENDOSCOPY  ~    normal findings per patient report     Family History       Problem Relation (Age of Onset)    Breast cancer Mother    Cancer Mother    Crohn's disease Other          Tobacco Use    Smoking status: Former     Types: Cigarettes     Quit date:      Years since quittin.3    Smokeless tobacco: Never   Substance and Sexual Activity    Alcohol use: No    Drug use: No    Sexual activity: Never       Review of Systems  Reports epigastric pain that limits her appetite, which is now improved. She denies fevers, chills, n/v.     Objective:     Vital Signs (Most Recent):  Temp: 98 °F (36.7 °C) (23 1214)  Pulse: 70 (23 1214)  Resp: 18 (23 1214)  BP: (!) 140/64 (23 1214)  SpO2: (!) 93 % (23 1214)   Vital Signs (24h Range):  Temp:  [97.2 °F (36.2 °C)-98 °F (36.7 °C)] 98 °F (36.7 °C)  Pulse:  [69-78] 70  Resp:  [16-20] 18  SpO2:  [93 %-98 %] 93 %  BP: (134-155)/(61-70) 140/64     Weight: 79.4 kg (175 lb)  Body mass index is 34.18 kg/m².  Body surface area is 1.83 meters squared.      Intake/Output Summary (Last 24 hours) at 2023 1700  Last data filed at  4/23/2023 0650  Gross per 24 hour   Intake --   Output 100 ml   Net -100 ml       Physical Exam  Alert awake oriented x3  Regular rate and rhythm  Lungs clear to auscultation bilaterally  Abdomen soft nontender  No lower extremity edema    Significant Labs:   All pertinent labs from the last 24 hours have been reviewed.    Diagnostic Results:  I have reviewed all pertinent imaging results/findings within the past 24 hours.

## 2023-04-24 VITALS
DIASTOLIC BLOOD PRESSURE: 67 MMHG | OXYGEN SATURATION: 93 % | HEART RATE: 71 BPM | TEMPERATURE: 98 F | WEIGHT: 176.63 LBS | HEIGHT: 60 IN | BODY MASS INDEX: 34.68 KG/M2 | SYSTOLIC BLOOD PRESSURE: 145 MMHG | RESPIRATION RATE: 18 BRPM

## 2023-04-24 LAB
ALBUMIN SERPL BCP-MCNC: 2.2 G/DL (ref 3.5–5.2)
ALP SERPL-CCNC: 65 U/L (ref 55–135)
ALT SERPL W/O P-5'-P-CCNC: 9 U/L (ref 10–44)
ANION GAP SERPL CALC-SCNC: 9 MMOL/L (ref 8–16)
AST SERPL-CCNC: 17 U/L (ref 10–40)
BASOPHILS # BLD AUTO: 0.04 K/UL (ref 0–0.2)
BASOPHILS NFR BLD: 0.8 % (ref 0–1.9)
BILIRUB SERPL-MCNC: 0.4 MG/DL (ref 0.1–1)
BUN SERPL-MCNC: 7 MG/DL (ref 8–23)
CALCIUM SERPL-MCNC: 8.8 MG/DL (ref 8.7–10.5)
CHLORIDE SERPL-SCNC: 105 MMOL/L (ref 95–110)
CO2 SERPL-SCNC: 26 MMOL/L (ref 23–29)
CREAT SERPL-MCNC: 0.8 MG/DL (ref 0.5–1.4)
DIFFERENTIAL METHOD: ABNORMAL
EOSINOPHIL # BLD AUTO: 0.3 K/UL (ref 0–0.5)
EOSINOPHIL NFR BLD: 5.1 % (ref 0–8)
ERYTHROCYTE [DISTWIDTH] IN BLOOD BY AUTOMATED COUNT: 21.3 % (ref 11.5–14.5)
EST. GFR  (NO RACE VARIABLE): >60 ML/MIN/1.73 M^2
GLUCOSE SERPL-MCNC: 88 MG/DL (ref 70–110)
HCT VFR BLD AUTO: 30.8 % (ref 37–48.5)
HGB BLD-MCNC: 8.5 G/DL (ref 12–16)
IMM GRANULOCYTES # BLD AUTO: 0.03 K/UL (ref 0–0.04)
IMM GRANULOCYTES NFR BLD AUTO: 0.6 % (ref 0–0.5)
LYMPHOCYTES # BLD AUTO: 1.6 K/UL (ref 1–4.8)
LYMPHOCYTES NFR BLD: 30.5 % (ref 18–48)
MAGNESIUM SERPL-MCNC: 1.9 MG/DL (ref 1.6–2.6)
MCH RBC QN AUTO: 22.7 PG (ref 27–31)
MCHC RBC AUTO-ENTMCNC: 27.6 G/DL (ref 32–36)
MCV RBC AUTO: 82 FL (ref 82–98)
MONOCYTES # BLD AUTO: 0.6 K/UL (ref 0.3–1)
MONOCYTES NFR BLD: 11.4 % (ref 4–15)
NEUTROPHILS # BLD AUTO: 2.7 K/UL (ref 1.8–7.7)
NEUTROPHILS NFR BLD: 51.6 % (ref 38–73)
NRBC BLD-RTO: 1 /100 WBC
PHOSPHATE SERPL-MCNC: 2.6 MG/DL (ref 2.7–4.5)
PLATELET # BLD AUTO: 294 K/UL (ref 150–450)
PMV BLD AUTO: 10.7 FL (ref 9.2–12.9)
POTASSIUM SERPL-SCNC: 3 MMOL/L (ref 3.5–5.1)
PROT SERPL-MCNC: 6.5 G/DL (ref 6–8.4)
RBC # BLD AUTO: 3.75 M/UL (ref 4–5.4)
SODIUM SERPL-SCNC: 140 MMOL/L (ref 136–145)
WBC # BLD AUTO: 5.25 K/UL (ref 3.9–12.7)

## 2023-04-24 PROCEDURE — 83735 ASSAY OF MAGNESIUM: CPT | Mod: HCNC

## 2023-04-24 PROCEDURE — 99223 1ST HOSP IP/OBS HIGH 75: CPT | Mod: HCNC,,, | Performed by: INTERNAL MEDICINE

## 2023-04-24 PROCEDURE — 1111F DSCHRG MED/CURRENT MED MERGE: CPT | Mod: HCNC,CPTII,, | Performed by: INTERNAL MEDICINE

## 2023-04-24 PROCEDURE — 80053 COMPREHEN METABOLIC PANEL: CPT | Mod: HCNC

## 2023-04-24 PROCEDURE — 36415 COLL VENOUS BLD VENIPUNCTURE: CPT | Mod: HCNC

## 2023-04-24 PROCEDURE — 25000003 PHARM REV CODE 250: Mod: HCNC

## 2023-04-24 PROCEDURE — 63600175 PHARM REV CODE 636 W HCPCS: Mod: HCNC

## 2023-04-24 PROCEDURE — 99239 HOSP IP/OBS DSCHRG MGMT >30: CPT | Mod: HCNC,,, | Performed by: INTERNAL MEDICINE

## 2023-04-24 PROCEDURE — 99239 PR HOSPITAL DISCHARGE DAY,>30 MIN: ICD-10-PCS | Mod: HCNC,,, | Performed by: INTERNAL MEDICINE

## 2023-04-24 PROCEDURE — 84100 ASSAY OF PHOSPHORUS: CPT | Mod: HCNC

## 2023-04-24 PROCEDURE — 85025 COMPLETE CBC W/AUTO DIFF WBC: CPT | Mod: HCNC

## 2023-04-24 PROCEDURE — 97116 GAIT TRAINING THERAPY: CPT | Mod: HCNC,CQ

## 2023-04-24 PROCEDURE — 25000003 PHARM REV CODE 250: Mod: HCNC | Performed by: STUDENT IN AN ORGANIZED HEALTH CARE EDUCATION/TRAINING PROGRAM

## 2023-04-24 PROCEDURE — 1111F PR DISCHARGE MEDS RECONCILED W/ CURRENT OUTPATIENT MED LIST: ICD-10-PCS | Mod: HCNC,CPTII,, | Performed by: INTERNAL MEDICINE

## 2023-04-24 PROCEDURE — 99223 PR INITIAL HOSPITAL CARE,LEVL III: ICD-10-PCS | Mod: HCNC,,, | Performed by: INTERNAL MEDICINE

## 2023-04-24 RX ORDER — OXYCODONE AND ACETAMINOPHEN 5; 325 MG/1; MG/1
1 TABLET ORAL EVERY 4 HOURS PRN
Qty: 60 TABLET | Refills: 0 | Status: ON HOLD | OUTPATIENT
Start: 2023-04-24 | End: 2023-05-03 | Stop reason: HOSPADM

## 2023-04-24 RX ORDER — POTASSIUM CHLORIDE 20 MEQ/1
40 TABLET, EXTENDED RELEASE ORAL
Status: COMPLETED | OUTPATIENT
Start: 2023-04-24 | End: 2023-04-24

## 2023-04-24 RX ADMIN — SULFASALAZINE 500 MG: 500 TABLET ORAL at 08:04

## 2023-04-24 RX ADMIN — SODIUM CHLORIDE, POTASSIUM CHLORIDE, SODIUM LACTATE AND CALCIUM CHLORIDE: 600; 310; 30; 20 INJECTION, SOLUTION INTRAVENOUS at 05:04

## 2023-04-24 RX ADMIN — OXYBUTYNIN CHLORIDE 10 MG: 10 TABLET, EXTENDED RELEASE ORAL at 08:04

## 2023-04-24 RX ADMIN — METOPROLOL SUCCINATE 50 MG: 50 TABLET, EXTENDED RELEASE ORAL at 08:04

## 2023-04-24 RX ADMIN — POTASSIUM CHLORIDE 40 MEQ: 1500 TABLET, EXTENDED RELEASE ORAL at 01:04

## 2023-04-24 RX ADMIN — PANCRELIPASE 2 CAPSULE: 24000; 76000; 120000 CAPSULE, DELAYED RELEASE PELLETS ORAL at 08:04

## 2023-04-24 RX ADMIN — AMLODIPINE BESYLATE 5 MG: 5 TABLET ORAL at 08:04

## 2023-04-24 RX ADMIN — OXYCODONE HYDROCHLORIDE 5 MG: 5 TABLET ORAL at 05:04

## 2023-04-24 RX ADMIN — ATORVASTATIN CALCIUM 20 MG: 20 TABLET, FILM COATED ORAL at 08:04

## 2023-04-24 RX ADMIN — PANTOPRAZOLE SODIUM 40 MG: 40 TABLET, DELAYED RELEASE ORAL at 08:04

## 2023-04-24 RX ADMIN — FOLIC ACID 1000 MCG: 1 TABLET ORAL at 08:04

## 2023-04-24 RX ADMIN — LEVOTHYROXINE SODIUM 175 MCG: 150 TABLET ORAL at 05:04

## 2023-04-24 RX ADMIN — POTASSIUM CHLORIDE 40 MEQ: 1500 TABLET, EXTENDED RELEASE ORAL at 03:04

## 2023-04-24 RX ADMIN — SOTALOL HYDROCHLORIDE 120 MG: 120 TABLET ORAL at 08:04

## 2023-04-24 RX ADMIN — APIXABAN 5 MG: 5 TABLET, FILM COATED ORAL at 08:04

## 2023-04-24 NOTE — NURSING
Patient discharged today. Awake, alert, and oriented with no acute distress noted. Reviewed discharge orders including: medicine orders, prescriptions, follow-up appts, and patient education materials for diet and diagnosis. Belongings packed for transport to home. Waiting for family member to pick her up. Will be ambulating out per wheelchair at time of discharge.

## 2023-04-24 NOTE — NURSING
Patient given d/c instructions; Rx delivered to  bedside; IV removed with catheter intact; patient transported via wheelchair to private vehicle.

## 2023-04-24 NOTE — PLAN OF CARE
[facility] ([facility])  Discharge Reassessment    Primary Care Provider: Ga Waldrop MD    Expected Discharge Date: 4/24/2023    Reassessment (most recent)       Discharge Reassessment - 04/24/23 1416          Discharge Reassessment    Assessment Type Discharge Planning Reassessment (P)      Did the patient's condition or plan change since previous assessment? No (P)      Discharge Plan discussed with: Patient (P)      Communicated UMA with patient/caregiver No (P)      Discharge Plan A Home (P)      Discharge Plan B Home with family (P)      DME Needed Upon Discharge  none (P)      Discharge Barriers Identified None (P)      Why the patient remains in the hospital Requires continued medical care (P)         Post-Acute Status    Discharge Delays None known at this time (P)                  Spoke with pt in room.  She confirms her plan is to go home upon d/c.  Care at home, Ready responders, OPCM ordered by CM earlier today.    TRACEY RodriguezN, BS, RN, CCM

## 2023-04-24 NOTE — DISCHARGE SUMMARY
[facility] ([place] [address])  [facility] Medicine  Discharge Summary      Patient Name: Pauline Cronin  MRN: [id number]  RICK: [id number]  Patient Class: IP- Inpatient  Admission Date: 4/22/2023  Hospital Length of Stay: 2 days  Discharge Date and Time:  04/24/2023 11:59 AM  Attending Physician: Yuridia Mann MD   Discharging Provider: Massimo Morales MD  Primary Care Provider: Ga Waldrop MD  [facility] Medicine Team: [facility] HOSP [facility] 4 Massimo Morales MD  Primary Care Team: [facility] 4    HPI:   The patient is an 83 year old female with pancreatic adenocarcinoma (plans for palliative chemotherapy, not yet initiated), pAF (on Apixaban), HFpEF, SSS s/p PM, Crohns Disease, HTN, HLD, hypothyroidism presenting for acute onset abdominal pain. The patient states the pain awoke her from sleep and began acutely at 1 AM on 4/22. The pain is located in the epigastrium and radiates towards her back. Of note the patient was hospitalized 3/17 to 3/23 for elevated bilirubin in the setting of a pancreatic head mass at which time she underwent ERCP w/ pancreatic biopsy which confirmed the diagnosis of pancreatic adenocarcinoma. That hospitalization was complicated by post-ERCP pancreatitis. She was subsequently seen outpatient w/ oncology (Dr. Hill) with plans for palliative chemotherapy in the setting of inoperable (due to medical co-morbidities) pancreatic cancer. She notes that her present abdominal pain feels similar to her prior episode of pancreatitis. She reports associated nausea with one episode of vomiting. She reports chronic diarrhea that is unchanged from prior. Denies fever, chills, headache, chest pain, palpitations, or dyspnea.    Of note the patient was recently briefly hospitalized on 4/15-4/16 for A-fib with RVR which responded to metoprolol. At that time abdominal imaging was concerning for possible extension of the pancreatic mass to the stomach. She was discharged with plans for  outpatient palliative chemotherapy.    In the emergency department the patient is hemodynamically stable, afebrile. Labs significant for WBC 7.65, Hgb 9.5, Plts 327, Cr 0.8, bili 0.7, alk phos 75, AST 27, ALT 9, Lipase 179 (decreased from 375 on 4/16 and 1095 on 3/22), lactate 1.0. CT abdomen/pelvis concerning for circumferential wall thickening of distal gastric body (not significantly changed from 4/16), grossly stable pancreatic head and body masses, associated dilatation of distal pancreatic duct, cholelithiasis. RUQ ultrasound with scattered gallstones, scattered intraluminal air, biliary stent.      * No surgery found *      Hospital Course:   [facility] [place] [facility] [place] [facility]. Abdominal pain attributed to underlying malignancy rather than pancreatitis/cholecystitis. US abdomen done on 4/22 with gallstones. Oxycodone 5mg provided improvement in pain. Plans for palliative chemotherapy as outpatient. Discharged home w/ percocet for pain control.       Goals of Care Treatment Preferences:  Code Status: DNR    Health care agent: Pt's adult sons are NOK.  Health care agent number: No value filed.    Living Will: Yes     What is most important right now is to focus on quality of life, even if it means sacrificing a little time.  Accordingly, we have decided that the best plan to meet the patient's goals includes continuing with treatment.      Consults:   Consults (From admission, onward)          Status Ordering Provider     Inpatient consult to Hematology/Oncology  Once        Provider:  (Not yet assigned)    Completed ANNIKA KAUR            Vitals:    04/24/23 0545 04/24/23 0552 04/24/23 0841 04/24/23 1055   BP:   (!) 158/70    BP Location:   Left arm    Patient Position:   Lying    Pulse:   72    Resp:   19    Temp:   97.8 °F (36.6 °C)    TempSrc:       SpO2: 98%  95% (!) 94%   Weight:  80.1 kg (176 lb 9.6 oz)     Height:         Physical Exam  Constitutional:       General: She is not in acute distress.      Appearance: She is well-developed.   HENT:      Head: Normocephalic and atraumatic.      Nose: No congestion or rhinorrhea.      Mouth/Throat:      Mouth: Mucous membranes are moist.      Pharynx: Oropharynx is clear.   Eyes:      General: No scleral icterus.     Extraocular Movements: Extraocular movements intact.   Cardiovascular:      Rate and Rhythm: Normal rate.  Pulmonary:      Effort: Pulmonary effort is normal. No respiratory distress.      Breath sounds: No wheezing or rales.   Abdominal:      General: Abdomen is flat. There is no distension.      Palpations: Abdomen is soft.      Tenderness: Non-tender abdomen  Musculoskeletal:         General: Normal range of motion.      Cervical back: Neck supple. No rigidity.      Right lower leg: No edema.      Left lower leg: No edema.   Skin:     General: Skin is warm and dry.      Findings: No rash.   Neurological:      Mental Status: She is alert and oriented to person, place, and time. Mental status is at baseline.   Psychiatric:         Mood and Affect: Mood normal.         Behavior: Behavior normal.     No new Assessment & Plan notes have been filed under this hospital service since the last note was generated.  Service: Hospital Medicine    Final Active Diagnoses:    Diagnosis Date Noted POA    PRINCIPAL PROBLEM:  Abdominal pain [R10.9] 04/22/2023 Yes    Acute hypoxemic respiratory failure [J96.01] 04/22/2023 Yes    Pancreatic insufficiency [K86.89] 04/18/2023 Yes    Malignant neoplasm of pancreas [C25.9] 03/21/2023 Yes    Anemia [D64.9] 03/17/2023 Yes    Sick sinus syndrome [I49.5] 05/10/2021 Yes    Paroxysmal atrial fibrillation [I48.0] 04/19/2018 Yes    Crohn's disease of large intestine without complication [K50.10] 04/19/2018 Yes    Hyperlipidemia [E78.5] 04/19/2018 Yes    Essential hypertension, benign [I10] 04/19/2018 Yes    Hypothyroidism [E03.9] 04/19/2018 Yes    Pacemaker [Z95.0] 04/19/2018 Yes    OAB (overactive bladder) [N32.81] 04/19/2018 Yes     GERD (gastroesophageal reflux disease) [K21.9] 04/19/2018 Yes      Problems Resolved During this Admission:       Discharged Condition: stable    Disposition: Home or Self Care    Follow Up:   Follow-up Information       [facility] HEMATOLOGY/ONCOLOGY. Schedule an appointment as soon as possible for a visit in 1 week(s).    Specialty: Hematology and Oncology  Contact information:  [address]  [facility] [address]  [phone]             Ga Waldrop MD Follow up.    Specialty: Family Medicine  Why: As needed  Contact information:  [address]  [address]  [facility] [address]  [phone]                           Patient Instructions:      Ambulatory referral/consult to [facility]   Standing Status: Future   Referral Priority: Routine Referral Type: Consultation   Referral Reason: Specialty Services Required   Number of Visits Requested: 1     Ambulatory referral/consult to Outpatient Case Management   Referral Priority: Routine Referral Type: Consultation   Referral Reason: Specialty Services Required   Number of Visits Requested: 1     Ambulatory Request for [facility]   Standing Status: Future Standing Exp. Date: 04/24/24     Order Specific Question Answer Comments   Program referred to: COVID-19 Vaccine        Significant Diagnostic Studies: Labs:   BMP:   Recent Labs   Lab 04/22/23  1307 04/23/23  0410 04/24/23  0414   GLU 96 80 88    138 140   K 3.6 4.1 3.0*    105 105   CO2 22* 27 26   BUN 8 7* 7*   CREATININE 0.8 0.8 0.8   CALCIUM 8.9 8.5* 8.8   MG  --  1.3* 1.9    and CBC   Recent Labs   Lab 04/22/23  1307 04/22/23  1313 04/23/23  0410 04/24/23  0414   WBC 7.65  --  5.85 5.25   HGB 9.5*  --  8.0* 8.5*   HCT 33.7*   < > 28.2* 30.8*     --  278 294    < > = values in this interval not displayed.       Pending Diagnostic Studies:       None           Medications:  Reconciled Home Medications:      Medication List        START taking  these medications      oxyCODONE-acetaminophen 5-325 mg per tablet  Commonly known as: PERCOCET  Take 1 tablet by mouth every 4 (four) hours as needed for Pain.            CONTINUE taking these medications      acetaminophen 325 MG tablet  Commonly known as: TYLENOL  Take 650 mg by mouth daily as needed (Headache).     alendronate 70 MG tablet  Commonly known as: FOSAMAX  Take 1 tablet (70 mg total) by mouth every 7 days.     amLODIPine 5 MG tablet  Commonly known as: NORVASC  Take 1 tablet (5 mg total) by mouth once daily.     atorvastatin 20 MG tablet  Commonly known as: LIPITOR  Take 1 tablet (20 mg total) by mouth once daily.     colestipoL 1 gram Tab  Commonly known as: COLESTID  Take 1 g by mouth 2 (two) times daily.     CREON 24,000-76,000 -120,000 unit capsule  Generic drug: lipase-protease-amylase 24,000-76,000-120,000 units  Take 2 capsules by mouth 3 (three) times daily with meals.     diphenoxylate-atropine 2.5-0.025 mg 2.5-0.025 mg per tablet  Commonly known as: LOMOTIL  TAKE 1 TABLET BY MOUTH 4 TIMES DAILY AS NEEDED FOR DIARRHEA     ELIQUIS 5 mg Tab  Generic drug: apixaban  Take 1 tablet (5 mg total) by mouth 2 (two) times daily.     esomeprazole 40 MG capsule  Commonly known as: NEXIUM  Take 1 capsule (40 mg total) by mouth once daily.     folic acid 1 MG tablet  Commonly known as: FOLVITE  Take 1 tablet by mouth once daily     levothyroxine 175 MCG tablet  Commonly known as: SYNTHROID, LEVOTHROID  Take 1 tablet by mouth once daily     MELATONIN ORAL  Take 1 tablet by mouth nightly as needed (Insomnia).     metoprolol succinate 50 MG 24 hr tablet  Commonly known as: TOPROL-XL  Take 1 tablet (50 mg total) by mouth once daily.     ondansetron 8 MG tablet  Commonly known as: ZOFRAN  Take 1 tablet (8 mg total) by mouth every 8 (eight) hours as needed for Nausea.     sotaloL 120 MG Tab  Commonly known as: BETAPACE  Take 1 tablet (120 mg total) by mouth every 12 (twelve) hours.     sulfaSALAzine 500 mg  Tab  Commonly known as: AZULFIDINE  Take 1 tablet by mouth twice daily     tolterodine 4 MG 24 hr capsule  Commonly known as: DETROL LA  Take 1 capsule by mouth once daily              Indwelling Lines/Drains at time of discharge:   Lines/Drains/Airways       None                   Time spent on the discharge of patient: 35 minutes         Massimo Morales MD  Department of Hospital Medicine  [facility] - Intensive Care ([place]-16)

## 2023-04-24 NOTE — PT/OT/SLP PROGRESS
Physical Therapy Treatment    Patient Name:  Pauline Cronin   MRN:  [id number]    Recommendations:     Discharge Recommendations: home health PT  Discharge Equipment Recommendations: walker, rolling  Barriers to discharge: None    Assessment:     Pauline Cronin is a 83 y.o. female admitted with a medical diagnosis of Abdominal pain.  She presents with the following impairments/functional limitations: weakness, impaired endurance, impaired functional mobility, gait instability, impaired balance ;pt with good mobility today, NO LOB or SOB noted w/ amb. In hallway.    Rehab Prognosis: Good; patient would benefit from acute skilled PT services to address these deficits and reach maximum level of function.    Recent Surgery: * No surgery found *      Plan:     During this hospitalization, patient to be seen 3 x/week to address the identified rehab impairments via gait training, therapeutic activities, therapeutic exercises, neuromuscular re-education and progress toward the following goals:    Plan of Care Expires:  05/23/23    Subjective     Chief Complaint: none stated  Patient/Family Comments/goals: pt agreeable to session, reports she is going home today. Declined sitting up in chair, reports sitting up for 2-3 hours earlier in day.  Pain/Comfort:  Pain Rating 1: 0/10  Pain Rating Post-Intervention 1: 0/10      Objective:     Communicated with nurse prior to session.  Patient found HOB elevated with peripheral IV upon PT entry to room.     General Precautions: Standard, fall  Orthopedic Precautions: N/A  Braces: N/A  Respiratory Status: Room air     Functional Mobility:  Bed Mobility:     Supine to Sit: supervision, stand by assistance, and with HOB elevated  Sit to Supine: supervision, stand by assistance, and with HOB elevated   Transfers:     Sit to Stand:  supervision and stand by assistance with rolling walker  Gait: amb'd 150' w/ RW and SBA      AM-PAC 6 CLICK MOBILITY  Turning over in bed (including adjusting  bedclothes, sheets and blankets)?: 4  Sitting down on and standing up from a chair with arms (e.g., wheelchair, bedside commode, etc.): 3  Moving from lying on back to sitting on the side of the bed?: 4  Moving to and from a bed to a chair (including a wheelchair)?: 3  Need to walk in hospital room?: 3  Climbing 3-5 steps with a railing?: 2  Basic Mobility Total Score: 19       Treatment & Education:  Pt educated on safety at home    Patient left HOB elevated with all lines intact, call button in reach, and nurse notified..    GOALS:   Multidisciplinary Problems       Physical Therapy Goals          Problem: Physical Therapy    Goal Priority Disciplines Outcome Goal Variances Interventions   Physical Therapy Goal     PT, PT/OT Ongoing, Progressing     Description: Goals to be met by: 2023     Patient will increase functional independence with mobility by performin. Sit to stand transfer with Clare.  2. Gait  x 100 feet with Modified Clare using Rolling Walker.   3. Lower extremity exercise program x20 reps per handout, with supervision.                         Time Tracking:     PT Received On: 23  PT Start Time: 1513     PT Stop Time: 1523  PT Total Time (min): 10 min     Billable Minutes: Gait Training 10    Treatment Type: Treatment  PT/PTA: PTA     Number of PTA visits since last PT visit: 2023

## 2023-04-24 NOTE — HOSPITAL COURSE
Patient transferred to [facility] for AES. Abdominal pain attributed to underlying malignancy rather than pancreatitis/cholecystitis. US abdomen done on 4/22 with gallstones. Oxycodone 5mg provided improvement in pain. Plans for palliative chemotherapy as outpatient. Discharged home w/ percocet for pain control.

## 2023-04-24 NOTE — PHYSICIAN QUERY
PT Name: Pauline Cronin  MR #: [id number]     DOCUMENTATION CLARIFICATION     CDS/: Mimi Riley               Contact information: [email or web address]  This form is a permanent document in the medical record.     Query Date: April 24, 2023    By submitting this query, we are merely seeking further clarification of documentation.  Please utilize your independent clinical judgment when addressing the question(s) below.  The Medical Record contains the following   Indicators   Supporting Clinical Findings Location in Medical Record   x Documentation of Respiratory Failure, ARDS Acute hypoxemic respiratory failure  Hypoxic Respiratory failure which is Acute.  she is not on home oxygen.   Signs/symptoms of respiratory failure include- hypoxia. Contributing diagnoses includes- Aspiration and CHF     Final Active Diagnoses: Acute hypoxemic respiratory failure     Hospital Medicine H&P 04/23          [facility] Medicine Discharge Summary 04/24   x SOB, HARDING, Wheezing, Productive Cough, Use of Accessory Muscles, etc. Pulmonary/Chest: Breath sounds normal. She has no wheezes. She has no rhonchi. She has no rales.       Denies dyspnea  Respiratory:  Negative for cough and shortness of breath  Pulmonary:   Effort: Pulmonary effort is normal. No respiratory distress.   Breath sounds: No wheezing or rales.      ED Provider Note 04/22        [facility] Medicine H&P 04/23     x RR     ABGs     O2 sat Resp:  [16-20]    SpO2:  [94 %-97 %]     Initial VS 04/22 1213:              RR= 18        Sat= 97% on Room Air  VS 04/22 1255 - 1935:            RR= 17        Sat= 94%-95%-96%  VS 04/22 2133:                       RR= 20        Sat= 96% on 2L Nasal Cannula  VS 04/22 2232 & 04/23 0438: RR= 16        Sat= 96 & 97%  VS 04/23 0814 - 04/24 0521:  RR= 16-18   Sat= 98%-93%-87%-92%-89% on Room Air  VS 04/24 0545:                                            Sat= 98% on 2 L Nasal Cannula  VS 04/24 0841 - 1232:            RR= 19-18    Sat= 95%-94%-92% on Room Air   [facility] Medicine H&P 04/23      VS Flowsheet 04/22 - 04/24   x Hypoxia/Hypercapnia Unclear etiology for hypoxia on admission. History of HFpEF however does not appear to have acute exacerbation on admission. No JVD or lower extremity edema.    [facility] Medicine H&P 04/23      BiPAP/Intubation/Mechanical Ventilation     x Supplemental O2 Supplemental O2; wean as tolerated    2 L nasal cannula   [facility] H&P 04/23    VS flowsheet    Home O2, Oxygen Dependence      Respiratory distress      x Radiology findings Will check CXR and BNP. Unlikely pneumonia given lack of fever or leukocytosis. CT scan with patchy ground-glass opacities in lung bases unchanged from prior admission, no consolidations.      Pacer leads and sternotomy wires, similar to prior.  Metal zipper on the patient's clothing also overlies the heart.  Partially visualized wall stent in the right upper quadrant.  There is no consolidation, large effusion, or pneumothorax.  Slight blunting left CP angle could represent trace effusion.  Cardiomediastinal silhouette is similar to prior.  Mild perihilar edema.  Regional osseous structures are similar to prior.   [facility] Medicine H&P 04/23          CXR 04/23 0014   x Acute/Chronic Illness Presents to the emergency department with chief complaint of abdominal pain.   Final diagnoses:  Epigastric abdominal pain  Malignant neoplasm of pancreas, unspecified location of malignancy (Primary)  Calculus of gallbladder without cholecystitis without obstruction      83 year old female with pancreatic adenocarcinoma (plans for palliative chemotherapy, not yet initiated), pAF (on Apixaban), HFpEF, SSS s/p PM, Crohns Disease, HTN, HLD, hypothyroidism presenting for acute onset abdominal pain.  Smoking status: Former    Quit date: 1977   ED Provider Note 04/22              [facility] Medicine H&P 04/23      Treatment      Other         The noted clinical guidelines following a  diagnosis are only system guidelines and do not replace the providers clinical judgment.      Due to the conflicting clinical picture, please clinically validate the diagnosis of __Acute hypoxemic respiratory failure___.    If validated, please provide additional clinical support for the diagnosis.     [  x  ] Above stated diagnosis is not confirmed and/or it has been ruled out   [    ] Above stated diagnosis is not confirmed and/or it has been ruled out, other diagnosis ruled in (please specify):_______________   [    ] Acute Respiratory Failure with Hypoxia (ABG pO2 < 60 mmHg or O2 sat of <91% on room air and respiratory symptoms documented) diagnosis is confirmed and additional clinical support/decision-making indicators for the diagnosis include (please specify): _______________________________________________   [    ] Other clarification (please specify): ___________________     Present on admission (POA) status:  [  ] Yes (Y)   [  x] No (N)   [  ] Documentation insufficient to determine if condition is POA (U)   [  ] Clinically Undetermined (W)       Please document in your progress notes daily for the duration of treatment until resolved and include in your discharge summary.       Form No. [id number]

## 2023-04-25 NOTE — PLAN OF CARE
[facility] - [facility] ([facility])  Discharge Final Note    Primary Care Provider: Ga Waldrop MD    Expected Discharge Date: 4/24/2023    Final Discharge Note (most recent)       Final Note - 04/25/23 1527          Final Note    Assessment Type Final Discharge Note (P)      Anticipated Discharge Disposition Home or Self Care (P)         Post-Acute Status    Discharge Delays None known at this time (P)                      Important Message from Medicare  Important Message from Medicare regarding Discharge Appeal Rights: Given to patient/caregiver, Explained to patient/caregiver, Signed/date by patient/caregiver     Date IMM was signed: 04/24/23  Time IMM was signed: 1410    Contact Info       [facility] HEMATOLOGY/ONCOLOGY   Specialty: Hematology and Oncology    [address]  [facility] [address]   Phone: [phone]       Next Steps: Schedule an appointment as soon as possible for a visit in 1 week(s)    Ga Waldrop MD   Specialty: Family Medicine   Relationship: PCP - General    [address]  [address]  [facility] [address]   Phone: [phone]       Next Steps: Follow up    Instructions: As needed        Pt d/c'd to home.    TRACEY RodriguezN, BS, RN, CCM

## 2023-04-27 RX ORDER — AMLODIPINE BESYLATE 10 MG/1
TABLET ORAL
Qty: 90 TABLET | Refills: 0 | OUTPATIENT
Start: 2023-04-27

## 2023-04-27 NOTE — TELEPHONE ENCOUNTER
Refill Decision Note   Pauline Cronin  is requesting a refill authorization.  Brief Assessment and Rationale for Refill:  Quick Discontinue     Medication Therapy Plan:  Discontinued on 3/16/2023 by Jovan Barbosa MD reason: Dose adjustment      Comments:     Note composed:4:50 PM 04/27/2023             Appointments     Last Visit   4/18/2023 Ga Waldrop MD   Next Visit   10/18/2023 Ga Waldrop MD

## 2023-04-27 NOTE — TELEPHONE ENCOUNTER
No care due was identified.  [facility] Embedded Care Due Messages. Reference number: [id number].   4/27/2023 2:17:27 PM CDT

## 2023-04-28 ENCOUNTER — PES CALL (OUTPATIENT)
Dept: ADMINISTRATIVE | Facility: CLINIC | Age: 83
End: 2023-04-28
Payer: MEDICARE

## 2023-05-01 ENCOUNTER — PATIENT MESSAGE (OUTPATIENT)
Dept: HEMATOLOGY/ONCOLOGY | Facility: CLINIC | Age: 83
End: 2023-05-01

## 2023-05-01 ENCOUNTER — TELEPHONE (OUTPATIENT)
Dept: HEMATOLOGY/ONCOLOGY | Facility: CLINIC | Age: 83
End: 2023-05-01
Payer: MEDICARE

## 2023-05-01 ENCOUNTER — OFFICE VISIT (OUTPATIENT)
Dept: HEMATOLOGY/ONCOLOGY | Facility: CLINIC | Age: 83
End: 2023-05-01
Payer: MEDICARE

## 2023-05-01 ENCOUNTER — INFUSION (OUTPATIENT)
Dept: INFUSION THERAPY | Facility: HOSPITAL | Age: 83
End: 2023-05-01
Payer: MEDICARE

## 2023-05-01 ENCOUNTER — NURSE TRIAGE (OUTPATIENT)
Dept: ADMINISTRATIVE | Facility: CLINIC | Age: 83
End: 2023-05-01
Payer: MEDICARE

## 2023-05-01 VITALS
RESPIRATION RATE: 18 BRPM | WEIGHT: 167.13 LBS | HEIGHT: 60 IN | SYSTOLIC BLOOD PRESSURE: 136 MMHG | DIASTOLIC BLOOD PRESSURE: 63 MMHG | TEMPERATURE: 98 F | HEART RATE: 72 BPM | BODY MASS INDEX: 32.81 KG/M2

## 2023-05-01 VITALS
RESPIRATION RATE: 18 BRPM | WEIGHT: 167.13 LBS | OXYGEN SATURATION: 100 % | TEMPERATURE: 98 F | BODY MASS INDEX: 32.81 KG/M2 | SYSTOLIC BLOOD PRESSURE: 109 MMHG | HEIGHT: 60 IN | HEART RATE: 71 BPM | DIASTOLIC BLOOD PRESSURE: 54 MMHG

## 2023-05-01 DIAGNOSIS — D84.821 DRUG-INDUCED IMMUNODEFICIENCY: ICD-10-CM

## 2023-05-01 DIAGNOSIS — E78.2 MIXED HYPERLIPIDEMIA: ICD-10-CM

## 2023-05-01 DIAGNOSIS — I70.0 AORTIC ATHEROSCLEROSIS: ICD-10-CM

## 2023-05-01 DIAGNOSIS — Z79.899 DRUG-INDUCED IMMUNODEFICIENCY: ICD-10-CM

## 2023-05-01 DIAGNOSIS — D50.0 IRON DEFICIENCY ANEMIA DUE TO CHRONIC BLOOD LOSS: ICD-10-CM

## 2023-05-01 DIAGNOSIS — C25.9 PANCREATIC ADENOCARCINOMA: Primary | ICD-10-CM

## 2023-05-01 DIAGNOSIS — D50.9 MICROCYTIC ANEMIA: ICD-10-CM

## 2023-05-01 DIAGNOSIS — I10 ESSENTIAL HYPERTENSION, BENIGN: ICD-10-CM

## 2023-05-01 DIAGNOSIS — C25.9 MALIGNANT NEOPLASM OF PANCREAS, UNSPECIFIED LOCATION OF MALIGNANCY: Primary | ICD-10-CM

## 2023-05-01 DIAGNOSIS — E66.01 SEVERE OBESITY (BMI 35.0-39.9) WITH COMORBIDITY: ICD-10-CM

## 2023-05-01 DIAGNOSIS — K50.10 CROHN'S DISEASE OF LARGE INTESTINE WITHOUT COMPLICATION: ICD-10-CM

## 2023-05-01 DIAGNOSIS — K86.81 EXOCRINE PANCREATIC INSUFFICIENCY: ICD-10-CM

## 2023-05-01 DIAGNOSIS — I48.0 PAROXYSMAL ATRIAL FIBRILLATION: ICD-10-CM

## 2023-05-01 PROCEDURE — 1126F AMNT PAIN NOTED NONE PRSNT: CPT | Mod: CPTII,S$GLB,, | Performed by: INTERNAL MEDICINE

## 2023-05-01 PROCEDURE — 1111F DSCHRG MED/CURRENT MED MERGE: CPT | Mod: CPTII,S$GLB,, | Performed by: INTERNAL MEDICINE

## 2023-05-01 PROCEDURE — 3074F PR MOST RECENT SYSTOLIC BLOOD PRESSURE < 130 MM HG: ICD-10-PCS | Mod: CPTII,S$GLB,, | Performed by: INTERNAL MEDICINE

## 2023-05-01 PROCEDURE — 1157F ADVNC CARE PLAN IN RCRD: CPT | Mod: CPTII,S$GLB,, | Performed by: INTERNAL MEDICINE

## 2023-05-01 PROCEDURE — 99999 PR PBB SHADOW E&M-EST. PATIENT-LVL IV: ICD-10-PCS | Mod: PBBFAC,,, | Performed by: INTERNAL MEDICINE

## 2023-05-01 PROCEDURE — 1157F PR ADVANCE CARE PLAN OR EQUIV PRESENT IN MEDICAL RECORD: ICD-10-PCS | Mod: CPTII,S$GLB,, | Performed by: INTERNAL MEDICINE

## 2023-05-01 PROCEDURE — 1111F PR DISCHARGE MEDS RECONCILED W/ CURRENT OUTPATIENT MED LIST: ICD-10-PCS | Mod: CPTII,S$GLB,, | Performed by: INTERNAL MEDICINE

## 2023-05-01 PROCEDURE — 3074F SYST BP LT 130 MM HG: CPT | Mod: CPTII,S$GLB,, | Performed by: INTERNAL MEDICINE

## 2023-05-01 PROCEDURE — 99215 PR OFFICE/OUTPT VISIT, EST, LEVL V, 40-54 MIN: ICD-10-PCS | Mod: S$GLB,,, | Performed by: INTERNAL MEDICINE

## 2023-05-01 PROCEDURE — 96375 TX/PRO/DX INJ NEW DRUG ADDON: CPT | Mod: HCNC

## 2023-05-01 PROCEDURE — 1101F PR PT FALLS ASSESS DOC 0-1 FALLS W/OUT INJ PAST YR: ICD-10-PCS | Mod: CPTII,S$GLB,, | Performed by: INTERNAL MEDICINE

## 2023-05-01 PROCEDURE — 99999 PR PBB SHADOW E&M-EST. PATIENT-LVL IV: CPT | Mod: PBBFAC,,, | Performed by: INTERNAL MEDICINE

## 2023-05-01 PROCEDURE — 3288F FALL RISK ASSESSMENT DOCD: CPT | Mod: CPTII,S$GLB,, | Performed by: INTERNAL MEDICINE

## 2023-05-01 PROCEDURE — 96413 CHEMO IV INFUSION 1 HR: CPT | Mod: HCNC

## 2023-05-01 PROCEDURE — 25000003 PHARM REV CODE 250: Mod: HCNC | Performed by: INTERNAL MEDICINE

## 2023-05-01 PROCEDURE — 99215 OFFICE O/P EST HI 40 MIN: CPT | Mod: S$GLB,,, | Performed by: INTERNAL MEDICINE

## 2023-05-01 PROCEDURE — 3288F PR FALLS RISK ASSESSMENT DOCUMENTED: ICD-10-PCS | Mod: CPTII,S$GLB,, | Performed by: INTERNAL MEDICINE

## 2023-05-01 PROCEDURE — 1101F PT FALLS ASSESS-DOCD LE1/YR: CPT | Mod: CPTII,S$GLB,, | Performed by: INTERNAL MEDICINE

## 2023-05-01 PROCEDURE — 3078F DIAST BP <80 MM HG: CPT | Mod: CPTII,S$GLB,, | Performed by: INTERNAL MEDICINE

## 2023-05-01 PROCEDURE — 1126F PR PAIN SEVERITY QUANTIFIED, NO PAIN PRESENT: ICD-10-PCS | Mod: CPTII,S$GLB,, | Performed by: INTERNAL MEDICINE

## 2023-05-01 PROCEDURE — 3078F PR MOST RECENT DIASTOLIC BLOOD PRESSURE < 80 MM HG: ICD-10-PCS | Mod: CPTII,S$GLB,, | Performed by: INTERNAL MEDICINE

## 2023-05-01 PROCEDURE — 96417 CHEMO IV INFUS EACH ADDL SEQ: CPT | Mod: HCNC

## 2023-05-01 PROCEDURE — 63600175 PHARM REV CODE 636 W HCPCS: Mod: HCNC | Performed by: INTERNAL MEDICINE

## 2023-05-01 RX ORDER — HEPARIN 100 UNIT/ML
500 SYRINGE INTRAVENOUS
Status: CANCELLED | OUTPATIENT
Start: 2023-05-10

## 2023-05-01 RX ORDER — DEXAMETHASONE SODIUM PHOSPHATE 4 MG/ML
4 INJECTION, SOLUTION INTRA-ARTICULAR; INTRALESIONAL; INTRAMUSCULAR; INTRAVENOUS; SOFT TISSUE
Status: CANCELLED
Start: 2023-05-01

## 2023-05-01 RX ORDER — METHYLPREDNISOLONE SOD SUCC 125 MG
125 VIAL (EA) INJECTION ONCE AS NEEDED
Status: CANCELLED | OUTPATIENT
Start: 2023-05-10

## 2023-05-01 RX ORDER — DEXAMETHASONE SODIUM PHOSPHATE 4 MG/ML
4 INJECTION, SOLUTION INTRA-ARTICULAR; INTRALESIONAL; INTRAMUSCULAR; INTRAVENOUS; SOFT TISSUE
Status: COMPLETED | OUTPATIENT
Start: 2023-05-01 | End: 2023-05-01

## 2023-05-01 RX ORDER — SODIUM CHLORIDE 0.9 % (FLUSH) 0.9 %
10 SYRINGE (ML) INJECTION
Status: CANCELLED | OUTPATIENT
Start: 2023-05-01

## 2023-05-01 RX ORDER — EPINEPHRINE 0.3 MG/.3ML
0.3 INJECTION SUBCUTANEOUS ONCE AS NEEDED
Status: CANCELLED | OUTPATIENT
Start: 2023-05-10

## 2023-05-01 RX ORDER — HEPARIN 100 UNIT/ML
500 SYRINGE INTRAVENOUS
Status: DISCONTINUED | OUTPATIENT
Start: 2023-05-01 | End: 2023-05-01 | Stop reason: HOSPADM

## 2023-05-01 RX ORDER — SODIUM CHLORIDE 0.9 % (FLUSH) 0.9 %
10 SYRINGE (ML) INJECTION
Status: CANCELLED | OUTPATIENT
Start: 2023-05-10

## 2023-05-01 RX ORDER — AMLODIPINE BESYLATE 10 MG/1
TABLET ORAL
Qty: 90 TABLET | Refills: 0 | OUTPATIENT
Start: 2023-05-01

## 2023-05-01 RX ORDER — DIPHENHYDRAMINE HYDROCHLORIDE 50 MG/ML
50 INJECTION INTRAMUSCULAR; INTRAVENOUS ONCE AS NEEDED
Status: CANCELLED | OUTPATIENT
Start: 2023-05-10

## 2023-05-01 RX ORDER — FOLIC ACID 1 MG/1
TABLET ORAL
Qty: 90 TABLET | Refills: 0 | Status: SHIPPED | OUTPATIENT
Start: 2023-05-01 | End: 2023-08-14

## 2023-05-01 RX ORDER — HEPARIN 100 UNIT/ML
500 SYRINGE INTRAVENOUS
Status: CANCELLED | OUTPATIENT
Start: 2023-05-01

## 2023-05-01 RX ORDER — SODIUM CHLORIDE 0.9 % (FLUSH) 0.9 %
10 SYRINGE (ML) INJECTION
Status: DISCONTINUED | OUTPATIENT
Start: 2023-05-01 | End: 2023-05-01 | Stop reason: HOSPADM

## 2023-05-01 RX ADMIN — SODIUM CHLORIDE: 9 INJECTION, SOLUTION INTRAVENOUS at 04:05

## 2023-05-01 RX ADMIN — PACLITAXEL 180 MG: 100 INJECTION, POWDER, LYOPHILIZED, FOR SUSPENSION INTRAVENOUS at 04:05

## 2023-05-01 RX ADMIN — GEMCITABINE HYDROCHLORIDE 1400 MG: 1 INJECTION, SOLUTION INTRAVENOUS at 04:05

## 2023-05-01 RX ADMIN — DEXAMETHASONE SODIUM PHOSPHATE 4 MG: 4 INJECTION INTRA-ARTICULAR; INTRALESIONAL; INTRAMUSCULAR; INTRAVENOUS; SOFT TISSUE at 03:05

## 2023-05-01 NOTE — TELEPHONE ENCOUNTER
Refill Routing Note   Medication(s) are not appropriate for processing by [facility] for the following reason(s):      Medication outside of protocol    [facility] action(s):  Quick Discontinue  Route     Medication Therapy Plan: Discontinued by: Jovan Barbosa MD on 3/16/2023; Reason: Dose adjustment. Patient now taking Amlodipine 5 mg daily      Appointments  past 12m or future 3m with PCP    Date Provider   Last Visit   4/18/2023 Ga Waldrop MD   Next Visit   10/18/2023 Ga Waldrop MD   ED visits in past 90 days: 0        Note composed:3:26 PM 05/01/2023

## 2023-05-01 NOTE — TELEPHONE ENCOUNTER
Pharmacy Call Documentation    Pharmacy Called:  [facility] Call Time: 3:22   Spoke with: Lidia 05/01/2023      Called to verify that the script was discontinued:  received SEVERAL refill requests from pharmacy in regards to Amlodipine 10 mg from same [facility] pharmacy.  Each had been denied with note stating there had been a dosage change.  Spoke with Lidia at pharmacy to ensure that the system was updated with current information regarding medication so we do not continue to receive requests.       Current Medication Requested: (refill encounter)   Requested Prescriptions     Pending Prescriptions Disp Refills    amLODIPine (NORVASC) 10 MG tablet [Pharmacy Med Name: amLODIPine Besylate 10 MG Oral Tablet] 90 tablet 0     Sig: Take 1 tablet by mouth once daily    folic acid (FOLVITE) 1 MG tablet [Pharmacy Med Name: Folic Acid 1 MG Oral Tablet] 90 tablet 0     Sig: Take 1 tablet by mouth once daily      [facility]     Note composed: 05/01/2023 3:27 PM

## 2023-05-01 NOTE — PROGRESS NOTES
MEDICAL ONCOLOGY - ESTABLISHED PATIENT VISIT    Reason for visit: Pancreatic adenocarcinoma    Best Contact Phone Number(s): [phone] (home)      Cancer/Stage/TNM:    Cancer Staging   Malignant neoplasm of pancreas  Staging form: Exocrine Pancreas, AJCC 8th Edition  - Clinical stage from 4/13/2023: Stage IB (cT2, cN0, cM0) - Unsigned       Oncology History   Malignant neoplasm of pancreas   3/21/2023 Initial Diagnosis    Pancreatic adenocarcinoma       5/1/2023 -  Chemotherapy    Treatment Summary   Plan Name: OP PANC NAB-PACLITAXEL + GEMCITABINE Q4W  Treatment Goal: Palliative  Status: Active  Start Date: 5/1/2023  End Date: 3/14/2024 (Planned)  Provider: Jim Hill MD  Chemotherapy: gemcitabine (GEMZAR) 1,400 mg in sodium chloride 0.9% SolP 321.82 mL chemo infusion, 1,448 mg (100 % of original dose 800 mg/m2), Intravenous, Clinic/HOD 1 time, 1 of 12 cycles  Dose modification: 800 mg/m2 (original dose 800 mg/m2, Cycle 1, Reason: Other (see comments), Comment: poor PS)            Interim History:   83 y.o. female with Crohn's disease, p.afib, SSS s/p PPM, HTN, HLD who presents prior to cycle 1 of gemcitabine + nab-paclitaxel for her locally advanced pancreatic cancer.  She has continued mild epigastric discomfort.  She denies nausea or vomiting and reports good appetite.  She has no jaundice or scleral icterus. Since her last visit with me she was in the hospital for epigastric pain that improved with pain medication without evidence of new acute pancreatitis or other acute problems.      ECOG PS 2    ROS:   Review of Systems   Constitutional:  Positive for malaise/fatigue and weight loss. Negative for chills and fever.   HENT:  Negative for congestion.    Eyes:  Negative for blurred vision.   Respiratory:  Negative for shortness of breath.    Cardiovascular:  Negative for chest pain, palpitations and leg swelling.   Gastrointestinal:  Positive for abdominal pain. Negative for blood in stool,  constipation, diarrhea, nausea and vomiting.   Genitourinary:  Negative for dysuria.   Musculoskeletal:  Negative for back pain and myalgias.   Skin:  Negative for itching and rash.   Neurological:  Negative for dizziness, tingling, tremors, sensory change and focal weakness.   Endo/Heme/Allergies:  Does not bruise/bleed easily.     Past Medical History:   Past Medical History:   Diagnosis Date    Anticoagulant long-term use     Atrial fibrillation     Crohn disease diagnosed in her 20's    GERD (gastroesophageal reflux disease)     Hyperlipidemia     Hypertension     Pancreatic adenocarcinoma 3/21/2023    Thyroid disease     s/p I 131        Past Surgical History:   Past Surgical History:   Procedure Laterality Date    APPENDECTOMY      CARDIAC SURGERY  2014    pacemaker    COLONOSCOPY  ~2008    normal findings per patient report    ENDOSCOPIC ULTRASOUND OF UPPER GASTROINTESTINAL TRACT N/A 3/14/2023    Procedure: ULTRASOUND, UPPER GI TRACT, ENDOSCOPIC;  Surgeon: Andrei Swartz MD;  Location: [facility];  Service: Endoscopy;  Laterality: N/A;  Approval to hold Eliquis rec'd from Dr. Barbosa (see telephone encounter 3/3/23)-DS  [employer] AICD/PPM  3/3/23-Instructions via portal-DS    ERCP N/A 3/21/2023    Procedure: ERCP (ENDOSCOPIC RETROGRADE CHOLANGIOPANCREATOGRAPHY);  Surgeon: Celina Toney MD;  Location: [facility] ([address] [place] [address]);  Service: Endoscopy;  Laterality: N/A;    ESOPHAGOGASTRODUODENOSCOPY N/A 7/17/2018    Procedure: EGD (ESOPHAGOGASTRODUODENOSCOPY);  Surgeon: Alondra Casiano MD;  Location: [facility] ENDO;  Service: Endoscopy;  Laterality: N/A;    HYSTERECTOMY      INSERTION OF IMPLANTABLE CARDIOVERTER-DEFIBRILLATOR (ICD) GENERATOR WITH TWO EXISTING LEADS Left 5/10/2021    Procedure: INSERTION, PULSE GENERATOR WITH 2 EXISTING LEADS, ICD;  Surgeon: oB Leone MD;  Location: [facility] CATH LAB;  Service: Cardiology;  Laterality: Left;    INSERTION OF PACEMAKER      REPLACEMENT OF PACEMAKER GENERATOR   05/10/2021    RETROGRADE PYELOGRAPHY Right 2020    Procedure: PYELOGRAM, RETROGRADE;  Surgeon: Jovan Kruse MD;  Location: [facility];  Service: Urology;  Laterality: Right;    SMALL INTESTINE SURGERY  in her 20's    for crohn's    TONSILLECTOMY      UPPER GASTROINTESTINAL ENDOSCOPY  ~    normal findings per patient report        Family History:   Family History   Problem Relation Age of Onset    Cancer Mother     Breast cancer Mother     Crohn's disease Other     Colon cancer Neg Hx     Colon polyps Neg Hx     Esophageal cancer Neg Hx     Stomach cancer Neg Hx     Ulcerative colitis Neg Hx         Social History:   Social History     Tobacco Use    Smoking status: Former     Types: Cigarettes     Quit date:      Years since quittin.3    Smokeless tobacco: Never   Substance Use Topics    Alcohol use: No        I have reviewed and updated the patient's past medical, surgical, family and social histories.    Allergies:   Review of patient's allergies indicates:   Allergen Reactions    Lisinopril Other (See Comments)     cough        Medications:   Current Outpatient Medications   Medication Sig Dispense Refill    acetaminophen (TYLENOL) 325 MG tablet Take 650 mg by mouth daily as needed (Headache).      alendronate (FOSAMAX) 70 MG tablet Take 1 tablet (70 mg total) by mouth every 7 days. 12 tablet 3    amLODIPine (NORVASC) 5 MG tablet Take 1 tablet (5 mg total) by mouth once daily. 30 tablet 11    atorvastatin (LIPITOR) 20 MG tablet Take 1 tablet (20 mg total) by mouth once daily. 90 tablet 3    colestipoL (COLESTID) 1 gram Tab Take 1 g by mouth 2 (two) times daily.      diphenoxylate-atropine 2.5-0.025 mg (LOMOTIL) 2.5-0.025 mg per tablet TAKE 1 TABLET BY MOUTH 4 TIMES DAILY AS NEEDED FOR DIARRHEA 60 tablet 0    ELIQUIS 5 mg Tab Take 1 tablet (5 mg total) by mouth 2 (two) times daily. 180 tablet 0    esomeprazole (NEXIUM) 40 MG capsule Take 1 capsule (40 mg total) by mouth once daily. 90 capsule  3    folic acid (FOLVITE) 1 MG tablet Take 1 tablet by mouth once daily 90 tablet 0    levothyroxine (SYNTHROID, LEVOTHROID) 175 MCG tablet Take 1 tablet by mouth once daily 30 tablet 5    lipase-protease-amylase 24,000-76,000-120,000 units (CREON) 24,000-76,000 -120,000 unit capsule Take 2 capsules by mouth 3 (three) times daily with meals. 180 capsule 11    MELATONIN ORAL Take 1 tablet by mouth nightly as needed (Insomnia).      metoprolol succinate (TOPROL-XL) 50 MG 24 hr tablet Take 1 tablet (50 mg total) by mouth once daily. 30 tablet 1    oxyCODONE (ROXICODONE) 5 MG immediate release tablet Take 1 tablet (5 mg total) by mouth every 4 (four) hours as needed for Pain. 42 tablet 0    polyethylene glycol (GLYCOLAX) 17 gram/dose powder Use cap to measure 17 grams, mix in liquid and take by mouth once daily. 510 g 0    prochlorperazine (COMPAZINE) 5 MG tablet Take 1 tablet (5 mg total) by mouth 4 (four) times daily as needed for Nausea (Take up to 4 times daily 30 minutes prior to meals). 60 tablet 0    senna-docusate 8.6-50 mg (PERICOLACE) 8.6-50 mg per tablet Take 1 tablet by mouth 2 (two) times daily. 60 tablet 0    sotaloL (BETAPACE) 120 MG Tab Take 1 tablet (120 mg total) by mouth every 12 (twelve) hours. 180 tablet 3    sulfaSALAzine (AZULFIDINE) 500 mg Tab Take 1 tablet by mouth twice daily 180 tablet 3    tolterodine (DETROL LA) 4 MG 24 hr capsule Take 1 capsule by mouth once daily 90 capsule 3     No current facility-administered medications for this visit.     Facility-Administered Medications Ordered in Other Visits   Medication Dose Route Frequency Provider Last Rate Last Admin    acetaminophen tablet 650 mg  650 mg Oral Q6H PRN Negrito Leon MD        amLODIPine tablet 5 mg  5 mg Oral Daily Negrito Leon MD   5 mg at 05/03/23 0908    apixaban tablet 5 mg  5 mg Oral BID Negrito Leon MD   5 mg at 05/03/23 0906    atorvastatin tablet 20 mg  20 mg Oral Daily Negrito Leon MD   20 mg at 05/03/23  0906    dextrose 10% bolus 125 mL 125 mL  12.5 g Intravenous PRN Negrito Leon MD        dextrose 10% bolus 250 mL 250 mL  25 g Intravenous PRN Negrito Leon MD        dextrose 40 % gel 15,000 mg  15 g Oral PRN Negrito Leon MD        dextrose 40 % gel 30,000 mg  30 g Oral PRN Negrito Leon MD        folic acid tablet 1,000 mcg  1,000 mcg Oral Daily Negrito Leon MD   1,000 mcg at 05/03/23 0906    glucagon (human recombinant) injection 1 mg  1 mg Intramuscular PRN Negrito Leon MD        levothyroxine tablet 175 mcg  175 mcg Oral Before breakfast Letty HQuinn Stiles MD   175 mcg at 05/03/23 0528    lipase-protease-amylase 24,000-76,000-120,000 units capsule 2 capsule  2 capsule Oral TID WM Negrito Leon MD        melatonin tablet 6 mg  6 mg Oral Nightly PRN Negrito Leon MD   6 mg at 05/02/23 2033    metoprolol succinate (TOPROL-XL) 24 hr tablet 50 mg  50 mg Oral Daily Negrito Leon MD   50 mg at 05/03/23 0906    naloxone 0.4 mg/mL injection 0.02 mg  0.02 mg Intravenous PRN Negrito Leon MD        oxyCODONE 12 hr tablet 10 mg  10 mg Oral Q12H Negrito Leon MD        oxyCODONE immediate release tablet 5 mg  5 mg Oral Q4H PRN Negrito Leon MD        oxyCODONE immediate release tablet Tab 10 mg  10 mg Oral Q4H PRN Negrito Leon MD   10 mg at 05/02/23 0930    pantoprazole EC tablet 40 mg  40 mg Oral Daily Negrito Leon MD   40 mg at 05/03/23 0906    polyethylene glycol packet 17 g  17 g Oral Daily Negrito Leon MD        prochlorperazine tablet 5 mg  5 mg Oral QID Negrito Leon MD   5 mg at 05/03/23 1430    promethazine tablet 12.5 mg  12.5 mg Oral Q6H PRN Negrito Leon MD   12.5 mg at 05/02/23 2033    senna-docusate 8.6-50 mg per tablet 1 tablet  1 tablet Oral BID Negrito Leon MD        sodium chloride 0.9% flush 10 mL  10 mL Intravenous Q12H PRN Negrito Leon MD        sotaloL tablet 120 mg  120 mg Oral Q12H Negrito Leon MD   120 mg at 05/03/23 0906     sulfaSALAzine tablet 500 mg  500 mg Oral BID Negrito Leon MD   500 mg at 05/03/23 0905        Physical Exam:   BP (!) 109/54 (BP Location: Right arm, Patient Position: Sitting, BP Method: Medium (Automatic))   Pulse 71   Temp 97.8 °F (36.6 °C) (Oral)   Resp 18   Ht 5' (1.524 m)   Wt 75.8 kg (167 lb 1.7 oz)   SpO2 100%   BMI 32.64 kg/m²      ECOG Performance status: 2            Physical Exam  Constitutional:       General: She is not in acute distress.  HENT:      Head: Normocephalic and atraumatic.   Eyes:      Pupils: Pupils are equal, round, and reactive to light.   Cardiovascular:      Rate and Rhythm: Normal rate and regular rhythm.      Heart sounds: No murmur heard.  Pulmonary:      Effort: No respiratory distress.      Breath sounds: Normal breath sounds. No wheezing.   Abdominal:      General: Abdomen is flat. Bowel sounds are normal. There is no distension.      Palpations: Abdomen is soft. There is no mass.      Tenderness: There is no abdominal tenderness.   Musculoskeletal:         General: No swelling or deformity.   Neurological:      Mental Status: She is alert and oriented to person, place, and time. Mental status is at baseline.         Labs:   Recent Results (from the past 48 hour(s))   CBC auto differential    Collection Time: 05/02/23  1:49 AM   Result Value Ref Range    WBC 6.80 3.90 - 12.70 K/uL    RBC 4.15 4.00 - 5.40 M/uL    Hemoglobin 9.9 (L) 12.0 - 16.0 g/dL    Hematocrit 35.2 (L) 37.0 - 48.5 %    MCV 85 82 - 98 fL    MCH 23.9 (L) 27.0 - 31.0 pg    MCHC 28.1 (L) 32.0 - 36.0 g/dL    RDW 25.5 (H) 11.5 - 14.5 %    Platelets 312 150 - 450 K/uL    MPV 10.4 9.2 - 12.9 fL    Immature Granulocytes 0.4 0.0 - 0.5 %    Gran # (ANC) 5.7 1.8 - 7.7 K/uL    Immature Grans (Abs) 0.03 0.00 - 0.04 K/uL    Lymph # 0.8 (L) 1.0 - 4.8 K/uL    Mono # 0.2 (L) 0.3 - 1.0 K/uL    Eos # 0.0 0.0 - 0.5 K/uL    Baso # 0.02 0.00 - 0.20 K/uL    nRBC 0 0 /100 WBC    Gran % 84.5 (H) 38.0 - 73.0 %    Lymph % 11.3  (L) 18.0 - 48.0 %    Mono % 3.5 (L) 4.0 - 15.0 %    Eosinophil % 0.0 0.0 - 8.0 %    Basophil % 0.3 0.0 - 1.9 %    Differential Method Automated    Comprehensive metabolic panel    Collection Time: 05/02/23  1:49 AM   Result Value Ref Range    Sodium 141 136 - 145 mmol/L    Potassium 3.6 3.5 - 5.1 mmol/L    Chloride 107 95 - 110 mmol/L    CO2 24 23 - 29 mmol/L    Glucose 201 (H) 70 - 110 mg/dL    BUN 13 8 - 23 mg/dL    Creatinine 1.1 0.5 - 1.4 mg/dL    Calcium 8.6 (L) 8.7 - 10.5 mg/dL    Total Protein 7.1 6.0 - 8.4 g/dL    Albumin 3.0 (L) 3.5 - 5.2 g/dL    Total Bilirubin 0.5 0.1 - 1.0 mg/dL    Alkaline Phosphatase 71 55 - 135 U/L    AST 27 10 - 40 U/L    ALT 13 10 - 44 U/L    Anion Gap 10 8 - 16 mmol/L    eGFR 50.2 (A) >60 mL/min/1.73 m^2   Lipase    Collection Time: 05/02/23  1:49 AM   Result Value Ref Range    Lipase 370 (H) 4 - 60 U/L   Lactic acid, plasma    Collection Time: 05/02/23  1:49 AM   Result Value Ref Range    Lactate (Lactic Acid) 1.5 0.5 - 2.2 mmol/L   Troponin I    Collection Time: 05/02/23  4:36 AM   Result Value Ref Range    Troponin I 0.022 0.000 - 0.026 ng/mL   Brain natriuretic peptide    Collection Time: 05/02/23  4:36 AM   Result Value Ref Range     (H) 0 - 99 pg/mL   ISTAT PROCEDURE    Collection Time: 05/02/23  4:41 AM   Result Value Ref Range    POC PH 7.410 7.35 - 7.45    POC PCO2 42.4 35 - 45 mmHg    POC PO2 43 40 - 60 mmHg    POC HCO3 26.9 24 - 28 mmol/L    POC BE 2 -2 to 2 mmol/L    POC SATURATED O2 78 (L) 95 - 100 %    POC TCO2 28 24 - 29 mmol/L    Sample VENOUS     Site Other     Allens Test N/A    COVID-19 Rapid Screening    Collection Time: 05/02/23  9:42 AM   Result Value Ref Range    SARS-CoV-2 RNA, Amplification, Qual Negative Negative   Urinalysis, Reflex to Urine Culture Urine, Clean Catch    Collection Time: 05/02/23  9:17 PM    Specimen: Urine   Result Value Ref Range    Specimen UA Urine, Clean Catch     Color, UA Yellow Yellow, Straw, Marilyn    Appearance, UA Clear  Clear    pH, UA 6.0 5.0 - 8.0    Specific Gravity, UA 1.020 1.005 - 1.030    Protein, UA Trace (A) Negative    Glucose, UA Negative Negative    Ketones, UA Negative Negative    Bilirubin (UA) Negative Negative    Occult Blood UA Negative Negative    Nitrite, UA Negative Negative    Leukocytes, UA 1+ (A) Negative   Urinalysis Microscopic    Collection Time: 05/02/23  9:17 PM   Result Value Ref Range    WBC, UA 13 (H) 0 - 5 /hpf    Bacteria Rare None-Occ /hpf    Squam Epithel, UA 4 /hpf    Microscopic Comment SEE COMMENT    CBC auto differential    Collection Time: 05/03/23  8:16 AM   Result Value Ref Range    WBC 7.28 3.90 - 12.70 K/uL    RBC 3.81 (L) 4.00 - 5.40 M/uL    Hemoglobin 9.1 (L) 12.0 - 16.0 g/dL    Hematocrit 32.8 (L) 37.0 - 48.5 %    MCV 86 82 - 98 fL    MCH 23.9 (L) 27.0 - 31.0 pg    MCHC 27.7 (L) 32.0 - 36.0 g/dL    RDW 25.5 (H) 11.5 - 14.5 %    Platelets 200 150 - 450 K/uL    MPV 10.8 9.2 - 12.9 fL    Immature Granulocytes 0.3 0.0 - 0.5 %    Gran # (ANC) 6.0 1.8 - 7.7 K/uL    Immature Grans (Abs) 0.02 0.00 - 0.04 K/uL    Lymph # 1.1 1.0 - 4.8 K/uL    Mono # 0.2 (L) 0.3 - 1.0 K/uL    Eos # 0.1 0.0 - 0.5 K/uL    Baso # 0.02 0.00 - 0.20 K/uL    nRBC 0 0 /100 WBC    Gran % 81.7 (H) 38.0 - 73.0 %    Lymph % 14.4 (L) 18.0 - 48.0 %    Mono % 2.1 (L) 4.0 - 15.0 %    Eosinophil % 1.2 0.0 - 8.0 %    Basophil % 0.3 0.0 - 1.9 %    Differential Method Automated    Comprehensive metabolic panel    Collection Time: 05/03/23  8:16 AM   Result Value Ref Range    Sodium 141 136 - 145 mmol/L    Potassium 3.6 3.5 - 5.1 mmol/L    Chloride 105 95 - 110 mmol/L    CO2 29 23 - 29 mmol/L    Glucose 103 70 - 110 mg/dL    BUN 10 8 - 23 mg/dL    Creatinine 0.8 0.5 - 1.4 mg/dL    Calcium 8.5 (L) 8.7 - 10.5 mg/dL    Total Protein 6.3 6.0 - 8.4 g/dL    Albumin 2.6 (L) 3.5 - 5.2 g/dL    Total Bilirubin 1.4 (H) 0.1 - 1.0 mg/dL    Alkaline Phosphatase 61 55 - 135 U/L    AST 23 10 - 40 U/L    ALT 11 10 - 44 U/L    Anion Gap 7 (L) 8 - 16  mmol/L    eGFR >60.0 >60 mL/min/1.73 m^2        I have reviewed the pertinent labs from 5/1 which are notable for microcytic anemia, normal liver enzymes, total bili 0.3.    Imaging:    I have personally reviewed the imaging which is notable for a pancreatic head mass evident on CT abdomen pelvis with no evidence of metastatic disease.     Path:   PANCREAS, HEAD MASS, EUS-FNB:   Adenocarcinoma   Mismatch Repair (MMR) Proteins:    MLH1 - Intact nuclear expression    MSH2 - Intact nuclear expression    MSH6 - Intact nuclear expression    PMS2 - Intact nuclear expression    Assessment:       1. Pancreatic adenocarcinoma    2. Essential hypertension, benign    3. Mixed hyperlipidemia    4. Aortic atherosclerosis    5. Paroxysmal atrial fibrillation    6. Microcytic anemia    7. Iron deficiency anemia due to chronic blood loss    8. Exocrine pancreatic insufficiency    9. Drug-induced immunodeficiency    10. Severe obesity (BMI 35.0-39.9) with comorbidity    11. Crohn's disease of large intestine without complication            Plan:       #  Pancreatic adenocarcinoma.   83 y.o. F with Crohn's disease, p.afib, SSS s/p PPM, HTN, HLD, fatigue, presented with weight loss and jaundice and was found to have  pancreatic adenocarcinoma. She has early stage pancreatic cancer, and is considered surgically resectable. However, giving her age and multiple co-morbidities, surgery might not be feasible or safe.     We have discussed with her the diagnosis, stage, prognosis and treatment options. She understands that Surgery is the only curable option and she is unlikely to be sufficiently medically fit for it. We have discussed chemotherapy with a palliative intent to improve symptoms and prolong life.   She expressed understanding and she values QoL but is willing to try chemotherapy.     We have discussed treatment with first line therapy Gemcitabine + Abraxane.  She agreed and wished to proceed.     Patient was consented for  chemotherapy today 5/3/2023 .   An extensive discussion was had which included a thorough discussion of the risk and benefits of treatment and alternatives.  Risks, including but not limited to, possible hair loss, bone marrow damage (anemia, thrombocytopenia, immune suppression, neutropenia), damage to body organs (brain, heart, liver, kidney, lungs, nervous system, skin, and others), allergic reactions, sterility, nausea/vomiting, constipation/diarrhea, sores in the mouth, secondary cancers, local damage at possible injection sites, and rarely death were all discussed.  The patient agrees with the plan, and all questions have been answered to their satisfaction.  Consent was signed the patient, provider, and a third party witness.     Proceed today with cycle 1 of biweekly gem/abraxane with dose reductions of 800 mg/m2 and 100 mg/m2 respectively.    Will place order for port placement prior to cycle 2.    Palliative appt scheduled on 5/12.    # HTN   BP low normal today.  On amlodipine 5 mg daily, coreg 6.25 mg bid     # HLD, Atherosclerosis   On Lipitor 20 mg   Follows with cardiology     # P. Afib   Rate controlled   On Coreg 6.25 mg bid   AC with eliquis     #. Microcytic anemia, Iron def due to chronic blood loss  Unclear etiology but likely from chronic blood loss 2/2 diverticulosis or tumor invasion of small bowel.  Iron studies consistent with iron deficiency.   Couldn't tolerate PO iron due to severe GI side effects (abdominal cramps and nausea)  Injectafer denied by insurance so will administer Venofer x 4-5 doses.    #Pancreatic insufficiency   Improved with Creon   On lomotil for diarrhea     # Crohn's disease.   On Sulfasalazine  Follows with GI     Follow up: 2 weeks for cycle 2.    The above information has been reviewed with the patient and all questions have been answered to their apparent satisfaction.  They understand that they can call the clinic with any questions.    Jim Hill,  MD  Hematology/Oncology  [facility]          Med Onc Chart Routing      Follow up with physician 4 weeks. for gem/abraxane   Follow up with DOMINGO 2 weeks. for gem/abraxane   Infusion scheduling note    Injection scheduling note    Labs CBC, CMP and CA 19-9   Scheduling:  Preferred lab:  Lab interval: every 2 weeks     Imaging    Pharmacy appointment    Other referrals

## 2023-05-01 NOTE — PLAN OF CARE
1730 Abraxane + Gemzar administered with no signs of infusion reaction. Pt aware to call provider with any questions or concerns and is aware of upcoming appts. Wheelchair assistance provided at discharge, pt accompanied by family, no distress noted, VSS.

## 2023-05-01 NOTE — TELEPHONE ENCOUNTER
No care due was identified.  [facility] Embedded Care Due Messages. Reference number: [id number].   5/01/2023 9:15:56 AM CDT

## 2023-05-01 NOTE — TELEPHONE ENCOUNTER
----- Message from Remy Avalos sent at 5/1/2023  3:12 PM CDT -----  Regarding: Change Appt Time  Patient is requesting a callback regarding changing her appointment time. Pt stated her appointments has to be after 10am . Please give the patient a callback at [phone]

## 2023-05-02 DIAGNOSIS — C25.9 PANCREATIC ADENOCARCINOMA: Primary | ICD-10-CM

## 2023-05-02 PROBLEM — I10 ESSENTIAL HYPERTENSION, BENIGN: Chronic | Status: ACTIVE | Noted: 2018-04-19

## 2023-05-02 PROBLEM — Z95.0 PACEMAKER: Chronic | Status: ACTIVE | Noted: 2018-04-19

## 2023-05-02 PROBLEM — K21.9 GERD (GASTROESOPHAGEAL REFLUX DISEASE): Chronic | Status: ACTIVE | Noted: 2018-04-19

## 2023-05-02 PROBLEM — I50.31 ACUTE HEART FAILURE WITH PRESERVED EJECTION FRACTION (HFPEF): Chronic | Status: ACTIVE | Noted: 2023-03-28

## 2023-05-02 PROBLEM — E03.9 HYPOTHYROIDISM: Chronic | Status: ACTIVE | Noted: 2018-04-19

## 2023-05-02 PROBLEM — R11.0 NAUSEA: Status: ACTIVE | Noted: 2023-05-02

## 2023-05-02 PROBLEM — K50.10 CROHN'S DISEASE OF LARGE INTESTINE WITHOUT COMPLICATION: Chronic | Status: ACTIVE | Noted: 2018-04-19

## 2023-05-02 PROBLEM — E78.5 HYPERLIPIDEMIA: Chronic | Status: ACTIVE | Noted: 2018-04-19

## 2023-05-02 PROBLEM — R52 PAIN: Status: ACTIVE | Noted: 2023-05-02

## 2023-05-02 PROBLEM — I48.0 PAROXYSMAL ATRIAL FIBRILLATION: Chronic | Status: ACTIVE | Noted: 2018-04-19

## 2023-05-02 PROBLEM — Z71.89 ADVANCE CARE PLANNING: Status: ACTIVE | Noted: 2023-05-02

## 2023-05-02 NOTE — TELEPHONE ENCOUNTER
Pt's D-I-L, Karen, reports pt is having severe abdominal pain and nauseated, Pt advised to be seen in the ED now per protocol, D-I-L /Pt encouraged to call back with any worsening symptoms or questions and verbalized understanding.    Reason for Disposition   [1] SEVERE pain (e.g., excruciating) AND [2] present > 1 hour    Additional Information   Negative: Shock suspected (e.g., cold/pale/clammy skin, too weak to stand, low BP, rapid pulse)   Negative: Difficult to awaken or acting confused (e.g., disoriented, slurred speech)   Negative: Passed out (i.e., lost consciousness, collapsed and was not responding)   Negative: Sounds like a life-threatening emergency to the triager    Protocols used: Abdominal Pain - Female-A-AH

## 2023-05-03 ENCOUNTER — HOSPITAL ENCOUNTER (INPATIENT)
Facility: HOSPITAL | Age: 83
LOS: 3 days | Discharge: HOME HEALTH | DRG: 280 | End: 2023-05-07
Attending: EMERGENCY MEDICINE | Admitting: HOSPITALIST
Payer: MEDICARE

## 2023-05-03 DIAGNOSIS — I48.91 CONTROL OF ATRIAL FIBRILLATION WITH PACEMAKER: ICD-10-CM

## 2023-05-03 DIAGNOSIS — E83.42 HYPOMAGNESEMIA: ICD-10-CM

## 2023-05-03 DIAGNOSIS — I50.31 ACUTE HEART FAILURE WITH PRESERVED EJECTION FRACTION (HFPEF): Chronic | ICD-10-CM

## 2023-05-03 DIAGNOSIS — R07.9 CHEST PAIN: ICD-10-CM

## 2023-05-03 DIAGNOSIS — E87.6 HYPOKALEMIA: ICD-10-CM

## 2023-05-03 DIAGNOSIS — R53.1 WEAKNESS: ICD-10-CM

## 2023-05-03 DIAGNOSIS — I48.91 ATRIAL FIBRILLATION WITH RVR: ICD-10-CM

## 2023-05-03 DIAGNOSIS — Z95.0 CONTROL OF ATRIAL FIBRILLATION WITH PACEMAKER: ICD-10-CM

## 2023-05-03 DIAGNOSIS — J18.9 PNEUMONIA OF RIGHT LOWER LOBE DUE TO INFECTIOUS ORGANISM: Primary | ICD-10-CM

## 2023-05-03 DIAGNOSIS — I95.9 HYPOTENSION, UNSPECIFIED HYPOTENSION TYPE: ICD-10-CM

## 2023-05-03 LAB
ALLENS TEST: NORMAL
BUN SERPL-MCNC: 20 MG/DL (ref 6–30)
CHLORIDE SERPL-SCNC: 102 MMOL/L (ref 95–110)
CREAT SERPL-MCNC: 0.9 MG/DL (ref 0.5–1.4)
GLUCOSE SERPL-MCNC: 174 MG/DL (ref 70–110)
HCT VFR BLD CALC: 30 %PCV (ref 36–54)
LDH SERPL L TO P-CCNC: 1.83 MMOL/L (ref 0.5–2.2)
POC IONIZED CALCIUM: 0.85 MMOL/L (ref 1.06–1.42)
POC TCO2 (MEASURED): 25 MMOL/L (ref 23–29)
POTASSIUM BLD-SCNC: 6.7 MMOL/L (ref 3.5–5.1)
SAMPLE: ABNORMAL
SAMPLE: NORMAL
SITE: NORMAL
SODIUM BLD-SCNC: 132 MMOL/L (ref 136–145)

## 2023-05-03 PROCEDURE — 85610 PROTHROMBIN TIME: CPT | Performed by: EMERGENCY MEDICINE

## 2023-05-03 PROCEDURE — 83880 ASSAY OF NATRIURETIC PEPTIDE: CPT | Performed by: EMERGENCY MEDICINE

## 2023-05-03 PROCEDURE — 93010 ELECTROCARDIOGRAM REPORT: CPT | Mod: ,,, | Performed by: INTERNAL MEDICINE

## 2023-05-03 PROCEDURE — 99900035 HC TECH TIME PER 15 MIN (STAT)

## 2023-05-03 PROCEDURE — 99285 EMERGENCY DEPT VISIT HI MDM: CPT | Mod: GC,,, | Performed by: EMERGENCY MEDICINE

## 2023-05-03 PROCEDURE — 86920 COMPATIBILITY TEST SPIN: CPT

## 2023-05-03 PROCEDURE — 84443 ASSAY THYROID STIM HORMONE: CPT | Performed by: EMERGENCY MEDICINE

## 2023-05-03 PROCEDURE — 25000003 PHARM REV CODE 250: Performed by: STUDENT IN AN ORGANIZED HEALTH CARE EDUCATION/TRAINING PROGRAM

## 2023-05-03 PROCEDURE — 84145 PROCALCITONIN (PCT): CPT | Performed by: EMERGENCY MEDICINE

## 2023-05-03 PROCEDURE — 63600175 PHARM REV CODE 636 W HCPCS: Performed by: STUDENT IN AN ORGANIZED HEALTH CARE EDUCATION/TRAINING PROGRAM

## 2023-05-03 PROCEDURE — 84484 ASSAY OF TROPONIN QUANT: CPT | Performed by: EMERGENCY MEDICINE

## 2023-05-03 PROCEDURE — 93010 EKG 12-LEAD: ICD-10-PCS | Mod: ,,, | Performed by: INTERNAL MEDICINE

## 2023-05-03 PROCEDURE — 99285 PR EMERGENCY DEPT VISIT,LEVEL V: ICD-10-PCS | Mod: GC,,, | Performed by: EMERGENCY MEDICINE

## 2023-05-03 PROCEDURE — 93005 ELECTROCARDIOGRAM TRACING: CPT

## 2023-05-03 PROCEDURE — 87040 BLOOD CULTURE FOR BACTERIA: CPT | Performed by: EMERGENCY MEDICINE

## 2023-05-03 PROCEDURE — 83605 ASSAY OF LACTIC ACID: CPT

## 2023-05-03 PROCEDURE — 86900 BLOOD TYPING SEROLOGIC ABO: CPT | Performed by: EMERGENCY MEDICINE

## 2023-05-03 PROCEDURE — 63600175 PHARM REV CODE 636 W HCPCS: Performed by: EMERGENCY MEDICINE

## 2023-05-03 PROCEDURE — 99285 EMERGENCY DEPT VISIT HI MDM: CPT | Mod: 25

## 2023-05-03 PROCEDURE — 85025 COMPLETE CBC W/AUTO DIFF WBC: CPT | Mod: 91 | Performed by: EMERGENCY MEDICINE

## 2023-05-03 RX ORDER — FAMOTIDINE 10 MG/ML
20 INJECTION INTRAVENOUS
Status: COMPLETED | OUTPATIENT
Start: 2023-05-03 | End: 2023-05-03

## 2023-05-03 RX ORDER — ONDANSETRON HYDROCHLORIDE 4 MG/5ML
4 SOLUTION ORAL ONCE
Status: DISCONTINUED | OUTPATIENT
Start: 2023-05-04 | End: 2023-05-04

## 2023-05-03 RX ADMIN — FAMOTIDINE 20 MG: 10 INJECTION, SOLUTION INTRAVENOUS at 11:05

## 2023-05-03 RX ADMIN — CEFEPIME 2 G: 2 INJECTION, POWDER, FOR SOLUTION INTRAVENOUS at 11:05

## 2023-05-03 RX ADMIN — SODIUM CHLORIDE, POTASSIUM CHLORIDE, SODIUM LACTATE AND CALCIUM CHLORIDE 2382 ML: 600; 310; 30; 20 INJECTION, SOLUTION INTRAVENOUS at 11:05

## 2023-05-04 ENCOUNTER — TELEPHONE (OUTPATIENT)
Dept: HEMATOLOGY/ONCOLOGY | Facility: CLINIC | Age: 83
End: 2023-05-04
Payer: MEDICARE

## 2023-05-04 PROBLEM — E87.8 ELECTROLYTE ABNORMALITY: Status: ACTIVE | Noted: 2023-05-04

## 2023-05-04 PROBLEM — D64.9 ANEMIA: Status: RESOLVED | Noted: 2023-03-17 | Resolved: 2023-05-04

## 2023-05-04 PROBLEM — M85.80 OSTEOPENIA: Status: ACTIVE | Noted: 2018-06-04

## 2023-05-04 PROBLEM — I21.4 NSTEMI (NON-ST ELEVATED MYOCARDIAL INFARCTION): Status: ACTIVE | Noted: 2023-05-04

## 2023-05-04 PROBLEM — R07.9 CHEST PAIN: Status: ACTIVE | Noted: 2023-05-04

## 2023-05-04 PROBLEM — A41.9 SEPSIS: Status: ACTIVE | Noted: 2023-05-04

## 2023-05-04 LAB
ABO + RH BLD: NORMAL
ALBUMIN SERPL BCP-MCNC: 2.3 G/DL (ref 3.5–5.2)
ALP SERPL-CCNC: 55 U/L (ref 55–135)
ALT SERPL W/O P-5'-P-CCNC: 12 U/L (ref 10–44)
ANION GAP SERPL CALC-SCNC: 8 MMOL/L (ref 8–16)
AST SERPL-CCNC: 23 U/L (ref 10–40)
BACTERIA #/AREA URNS AUTO: ABNORMAL /HPF
BACTERIA #/AREA URNS AUTO: ABNORMAL /HPF
BASOPHILS # BLD AUTO: 0.03 K/UL (ref 0–0.2)
BASOPHILS NFR BLD: 0.3 % (ref 0–1.9)
BILIRUB SERPL-MCNC: 1.2 MG/DL (ref 0.1–1)
BILIRUB UR QL STRIP: NEGATIVE
BILIRUB UR QL STRIP: NEGATIVE
BLD GP AB SCN CELLS X3 SERPL QL: NORMAL
BNP SERPL-MCNC: 654 PG/ML (ref 0–99)
BUN SERPL-MCNC: 14 MG/DL (ref 8–23)
CALCIUM SERPL-MCNC: 7.9 MG/DL (ref 8.7–10.5)
CHLORIDE SERPL-SCNC: 105 MMOL/L (ref 95–110)
CLARITY UR REFRACT.AUTO: CLEAR
CLARITY UR REFRACT.AUTO: CLEAR
CO2 SERPL-SCNC: 21 MMOL/L (ref 23–29)
COLOR UR AUTO: YELLOW
COLOR UR AUTO: YELLOW
CREAT SERPL-MCNC: 0.9 MG/DL (ref 0.5–1.4)
DIFFERENTIAL METHOD: ABNORMAL
EOSINOPHIL # BLD AUTO: 0.1 K/UL (ref 0–0.5)
EOSINOPHIL NFR BLD: 1.2 % (ref 0–8)
ERYTHROCYTE [DISTWIDTH] IN BLOOD BY AUTOMATED COUNT: 25.9 % (ref 11.5–14.5)
EST. GFR  (NO RACE VARIABLE): >60 ML/MIN/1.73 M^2
GLUCOSE SERPL-MCNC: 193 MG/DL (ref 70–110)
GLUCOSE UR QL STRIP: NEGATIVE
GLUCOSE UR QL STRIP: NEGATIVE
HCT VFR BLD AUTO: 31 % (ref 37–48.5)
HGB BLD-MCNC: 8.8 G/DL (ref 12–16)
HGB UR QL STRIP: NEGATIVE
HGB UR QL STRIP: NEGATIVE
HYALINE CASTS UR QL AUTO: 3 /LPF
IMM GRANULOCYTES # BLD AUTO: 0.06 K/UL (ref 0–0.04)
IMM GRANULOCYTES NFR BLD AUTO: 0.6 % (ref 0–0.5)
INR PPP: 1.2 (ref 0.8–1.2)
KETONES UR QL STRIP: NEGATIVE
KETONES UR QL STRIP: NEGATIVE
LEUKOCYTE ESTERASE UR QL STRIP: ABNORMAL
LEUKOCYTE ESTERASE UR QL STRIP: ABNORMAL
LIPASE SERPL-CCNC: 193 U/L (ref 4–60)
LYMPHOCYTES # BLD AUTO: 0.9 K/UL (ref 1–4.8)
LYMPHOCYTES NFR BLD: 9 % (ref 18–48)
MAGNESIUM SERPL-MCNC: 1.1 MG/DL (ref 1.6–2.6)
MCH RBC QN AUTO: 24.4 PG (ref 27–31)
MCHC RBC AUTO-ENTMCNC: 28.4 G/DL (ref 32–36)
MCV RBC AUTO: 86 FL (ref 82–98)
MICROSCOPIC COMMENT: ABNORMAL
MICROSCOPIC COMMENT: ABNORMAL
MONOCYTES # BLD AUTO: 0.1 K/UL (ref 0.3–1)
MONOCYTES NFR BLD: 0.8 % (ref 4–15)
NEUTROPHILS # BLD AUTO: 8.4 K/UL (ref 1.8–7.7)
NEUTROPHILS NFR BLD: 88.1 % (ref 38–73)
NITRITE UR QL STRIP: NEGATIVE
NITRITE UR QL STRIP: NEGATIVE
NON-SQ EPI CELLS #/AREA URNS AUTO: 1 /HPF
NRBC BLD-RTO: 0 /100 WBC
PH UR STRIP: 5 [PH] (ref 5–8)
PH UR STRIP: 6 [PH] (ref 5–8)
PLATELET # BLD AUTO: 196 K/UL (ref 150–450)
PMV BLD AUTO: 11.5 FL (ref 9.2–12.9)
POTASSIUM SERPL-SCNC: 3.3 MMOL/L (ref 3.5–5.1)
PROCALCITONIN SERPL IA-MCNC: 0.13 NG/ML
PROT SERPL-MCNC: 5.8 G/DL (ref 6–8.4)
PROT UR QL STRIP: NEGATIVE
PROT UR QL STRIP: NEGATIVE
PROTHROMBIN TIME: 12.9 SEC (ref 9–12.5)
RBC # BLD AUTO: 3.6 M/UL (ref 4–5.4)
RBC #/AREA URNS AUTO: 1 /HPF (ref 0–4)
RBC #/AREA URNS AUTO: 12 /HPF (ref 0–4)
SODIUM SERPL-SCNC: 134 MMOL/L (ref 136–145)
SP GR UR STRIP: 1.01 (ref 1–1.03)
SP GR UR STRIP: >1.03 (ref 1–1.03)
SPECIMEN OUTDATE: NORMAL
SQUAMOUS #/AREA URNS AUTO: 2 /HPF
SQUAMOUS #/AREA URNS AUTO: 2 /HPF
TROPONIN I SERPL DL<=0.01 NG/ML-MCNC: 0.04 NG/ML (ref 0–0.03)
TROPONIN I SERPL DL<=0.01 NG/ML-MCNC: 0.13 NG/ML (ref 0–0.03)
TROPONIN I SERPL DL<=0.01 NG/ML-MCNC: 0.19 NG/ML (ref 0–0.03)
TROPONIN I SERPL DL<=0.01 NG/ML-MCNC: 0.2 NG/ML (ref 0–0.03)
TSH SERPL DL<=0.005 MIU/L-ACNC: 1.38 UIU/ML (ref 0.4–4)
UNSPECIFIED CRY UR QL COMP ASSIST: <1
URN SPEC COLLECT METH UR: ABNORMAL
URN SPEC COLLECT METH UR: ABNORMAL
WBC # BLD AUTO: 9.54 K/UL (ref 3.9–12.7)
WBC #/AREA URNS AUTO: 48 /HPF (ref 0–5)
WBC #/AREA URNS AUTO: 6 /HPF (ref 0–5)

## 2023-05-04 PROCEDURE — 93010 ELECTROCARDIOGRAM REPORT: CPT | Mod: ,,, | Performed by: INTERNAL MEDICINE

## 2023-05-04 PROCEDURE — 83690 ASSAY OF LIPASE: CPT | Performed by: EMERGENCY MEDICINE

## 2023-05-04 PROCEDURE — 99900035 HC TECH TIME PER 15 MIN (STAT)

## 2023-05-04 PROCEDURE — 93010 EKG 12-LEAD: ICD-10-PCS | Mod: ,,, | Performed by: INTERNAL MEDICINE

## 2023-05-04 PROCEDURE — 84484 ASSAY OF TROPONIN QUANT: CPT | Performed by: STUDENT IN AN ORGANIZED HEALTH CARE EDUCATION/TRAINING PROGRAM

## 2023-05-04 PROCEDURE — 25500020 PHARM REV CODE 255: Performed by: EMERGENCY MEDICINE

## 2023-05-04 PROCEDURE — 99232 SBSQ HOSP IP/OBS MODERATE 35: CPT | Mod: 25,,, | Performed by: INTERNAL MEDICINE

## 2023-05-04 PROCEDURE — 87086 URINE CULTURE/COLONY COUNT: CPT

## 2023-05-04 PROCEDURE — 51798 US URINE CAPACITY MEASURE: CPT

## 2023-05-04 PROCEDURE — 83735 ASSAY OF MAGNESIUM: CPT | Performed by: EMERGENCY MEDICINE

## 2023-05-04 PROCEDURE — 63600175 PHARM REV CODE 636 W HCPCS: Performed by: STUDENT IN AN ORGANIZED HEALTH CARE EDUCATION/TRAINING PROGRAM

## 2023-05-04 PROCEDURE — 25000242 PHARM REV CODE 250 ALT 637 W/ HCPCS

## 2023-05-04 PROCEDURE — 99232 PR SUBSEQUENT HOSPITAL CARE,LEVL II: ICD-10-PCS | Mod: 25,,, | Performed by: INTERNAL MEDICINE

## 2023-05-04 PROCEDURE — 99223 PR INITIAL HOSPITAL CARE,LEVL III: ICD-10-PCS | Mod: GC,,, | Performed by: HOSPITALIST

## 2023-05-04 PROCEDURE — 25000003 PHARM REV CODE 250: Performed by: STUDENT IN AN ORGANIZED HEALTH CARE EDUCATION/TRAINING PROGRAM

## 2023-05-04 PROCEDURE — 94761 N-INVAS EAR/PLS OXIMETRY MLT: CPT

## 2023-05-04 PROCEDURE — 81001 URINALYSIS AUTO W/SCOPE: CPT | Mod: 91

## 2023-05-04 PROCEDURE — 84484 ASSAY OF TROPONIN QUANT: CPT | Mod: 91

## 2023-05-04 PROCEDURE — 80053 COMPREHEN METABOLIC PANEL: CPT | Performed by: EMERGENCY MEDICINE

## 2023-05-04 PROCEDURE — 27000221 HC OXYGEN, UP TO 24 HOURS

## 2023-05-04 PROCEDURE — 93005 ELECTROCARDIOGRAM TRACING: CPT

## 2023-05-04 PROCEDURE — 94640 AIRWAY INHALATION TREATMENT: CPT

## 2023-05-04 PROCEDURE — 81001 URINALYSIS AUTO W/SCOPE: CPT | Performed by: EMERGENCY MEDICINE

## 2023-05-04 PROCEDURE — 99223 1ST HOSP IP/OBS HIGH 75: CPT | Mod: GC,,, | Performed by: HOSPITALIST

## 2023-05-04 PROCEDURE — 63600175 PHARM REV CODE 636 W HCPCS: Performed by: HOSPITALIST

## 2023-05-04 PROCEDURE — 25000242 PHARM REV CODE 250 ALT 637 W/ HCPCS: Performed by: HOSPITALIST

## 2023-05-04 PROCEDURE — 20600001 HC STEP DOWN PRIVATE ROOM

## 2023-05-04 PROCEDURE — 36415 COLL VENOUS BLD VENIPUNCTURE: CPT | Performed by: STUDENT IN AN ORGANIZED HEALTH CARE EDUCATION/TRAINING PROGRAM

## 2023-05-04 PROCEDURE — 36415 COLL VENOUS BLD VENIPUNCTURE: CPT

## 2023-05-04 PROCEDURE — 25000003 PHARM REV CODE 250

## 2023-05-04 RX ORDER — LEVALBUTEROL INHALATION SOLUTION 0.63 MG/3ML
0.63 SOLUTION RESPIRATORY (INHALATION) EVERY 8 HOURS
Status: DISCONTINUED | OUTPATIENT
Start: 2023-05-04 | End: 2023-05-07 | Stop reason: HOSPADM

## 2023-05-04 RX ORDER — SOTALOL HYDROCHLORIDE 120 MG/1
120 TABLET ORAL EVERY 12 HOURS
Status: DISCONTINUED | OUTPATIENT
Start: 2023-05-04 | End: 2023-05-04

## 2023-05-04 RX ORDER — LEVALBUTEROL INHALATION SOLUTION 0.63 MG/3ML
SOLUTION RESPIRATORY (INHALATION)
Status: COMPLETED
Start: 2023-05-04 | End: 2023-05-04

## 2023-05-04 RX ORDER — ACETAMINOPHEN 500 MG
1000 TABLET ORAL
Status: DISCONTINUED | OUTPATIENT
Start: 2023-05-04 | End: 2023-05-04

## 2023-05-04 RX ORDER — CALCIUM GLUCONATE 20 MG/ML
1 INJECTION, SOLUTION INTRAVENOUS ONCE
Status: COMPLETED | OUTPATIENT
Start: 2023-05-04 | End: 2023-05-04

## 2023-05-04 RX ORDER — GLUCAGON 1 MG
1 KIT INJECTION
Status: DISCONTINUED | OUTPATIENT
Start: 2023-05-04 | End: 2023-05-07 | Stop reason: HOSPADM

## 2023-05-04 RX ORDER — OXYCODONE HYDROCHLORIDE 5 MG/1
5 TABLET ORAL EVERY 4 HOURS PRN
Status: DISCONTINUED | OUTPATIENT
Start: 2023-05-04 | End: 2023-05-07 | Stop reason: HOSPADM

## 2023-05-04 RX ORDER — FOLIC ACID 1 MG/1
1000 TABLET ORAL DAILY
Status: DISCONTINUED | OUTPATIENT
Start: 2023-05-04 | End: 2023-05-04

## 2023-05-04 RX ORDER — PROCHLORPERAZINE MALEATE 5 MG
5 TABLET ORAL 4 TIMES DAILY PRN
Status: DISCONTINUED | OUTPATIENT
Start: 2023-05-04 | End: 2023-05-07 | Stop reason: HOSPADM

## 2023-05-04 RX ORDER — IPRATROPIUM BROMIDE AND ALBUTEROL SULFATE 2.5; .5 MG/3ML; MG/3ML
3 SOLUTION RESPIRATORY (INHALATION)
Status: DISCONTINUED | OUTPATIENT
Start: 2023-05-04 | End: 2023-05-04

## 2023-05-04 RX ORDER — SULFASALAZINE 500 MG/1
500 TABLET ORAL 2 TIMES DAILY
Status: DISCONTINUED | OUTPATIENT
Start: 2023-05-04 | End: 2023-05-07 | Stop reason: HOSPADM

## 2023-05-04 RX ORDER — MAGNESIUM SULFATE HEPTAHYDRATE 40 MG/ML
2 INJECTION, SOLUTION INTRAVENOUS
Status: COMPLETED | OUTPATIENT
Start: 2023-05-04 | End: 2023-05-04

## 2023-05-04 RX ORDER — OXYBUTYNIN CHLORIDE 5 MG/1
10 TABLET, EXTENDED RELEASE ORAL DAILY
Status: DISCONTINUED | OUTPATIENT
Start: 2023-05-04 | End: 2023-05-07 | Stop reason: HOSPADM

## 2023-05-04 RX ORDER — ACETAMINOPHEN 325 MG/1
650 TABLET ORAL EVERY 8 HOURS PRN
Status: DISCONTINUED | OUTPATIENT
Start: 2023-05-04 | End: 2023-05-06

## 2023-05-04 RX ORDER — TALC
6 POWDER (GRAM) TOPICAL NIGHTLY PRN
Status: DISCONTINUED | OUTPATIENT
Start: 2023-05-04 | End: 2023-05-04

## 2023-05-04 RX ORDER — MONTELUKAST SODIUM 4 MG/1
1 TABLET, CHEWABLE ORAL 2 TIMES DAILY
Status: DISCONTINUED | OUTPATIENT
Start: 2023-05-04 | End: 2023-05-04

## 2023-05-04 RX ORDER — POLYETHYLENE GLYCOL 3350 17 G/17G
17 POWDER, FOR SOLUTION ORAL DAILY
Status: DISCONTINUED | OUTPATIENT
Start: 2023-05-04 | End: 2023-05-07 | Stop reason: HOSPADM

## 2023-05-04 RX ORDER — NALOXONE HCL 0.4 MG/ML
0.02 VIAL (ML) INJECTION
Status: DISCONTINUED | OUTPATIENT
Start: 2023-05-04 | End: 2023-05-07 | Stop reason: HOSPADM

## 2023-05-04 RX ORDER — ATORVASTATIN CALCIUM 20 MG/1
20 TABLET, FILM COATED ORAL DAILY
Status: DISCONTINUED | OUTPATIENT
Start: 2023-05-04 | End: 2023-05-07 | Stop reason: HOSPADM

## 2023-05-04 RX ORDER — SOTALOL HYDROCHLORIDE 120 MG/1
120 TABLET ORAL DAILY
Status: DISCONTINUED | OUTPATIENT
Start: 2023-05-05 | End: 2023-05-07 | Stop reason: HOSPADM

## 2023-05-04 RX ORDER — METOPROLOL SUCCINATE 50 MG/1
50 TABLET, EXTENDED RELEASE ORAL DAILY
Status: DISCONTINUED | OUTPATIENT
Start: 2023-05-04 | End: 2023-05-07 | Stop reason: HOSPADM

## 2023-05-04 RX ORDER — LIDOCAINE 50 MG/G
1 PATCH TOPICAL
Status: DISCONTINUED | OUTPATIENT
Start: 2023-05-04 | End: 2023-05-07 | Stop reason: HOSPADM

## 2023-05-04 RX ORDER — FUROSEMIDE 10 MG/ML
20 INJECTION INTRAMUSCULAR; INTRAVENOUS ONCE
Status: COMPLETED | OUTPATIENT
Start: 2023-05-04 | End: 2023-05-04

## 2023-05-04 RX ORDER — MORPHINE SULFATE 4 MG/ML
4 INJECTION, SOLUTION INTRAMUSCULAR; INTRAVENOUS
Status: DISCONTINUED | OUTPATIENT
Start: 2023-05-04 | End: 2023-05-04

## 2023-05-04 RX ORDER — SODIUM CHLORIDE 0.9 % (FLUSH) 0.9 %
10 SYRINGE (ML) INJECTION EVERY 12 HOURS PRN
Status: DISCONTINUED | OUTPATIENT
Start: 2023-05-04 | End: 2023-05-07 | Stop reason: HOSPADM

## 2023-05-04 RX ORDER — MAGNESIUM SULFATE HEPTAHYDRATE 40 MG/ML
2 INJECTION, SOLUTION INTRAVENOUS ONCE
Status: DISCONTINUED | OUTPATIENT
Start: 2023-05-04 | End: 2023-05-04

## 2023-05-04 RX ORDER — ONDANSETRON 2 MG/ML
4 INJECTION INTRAMUSCULAR; INTRAVENOUS
Status: COMPLETED | OUTPATIENT
Start: 2023-05-04 | End: 2023-05-04

## 2023-05-04 RX ORDER — PANTOPRAZOLE SODIUM 40 MG/1
40 TABLET, DELAYED RELEASE ORAL DAILY
Status: DISCONTINUED | OUTPATIENT
Start: 2023-05-04 | End: 2023-05-07 | Stop reason: HOSPADM

## 2023-05-04 RX ORDER — TALC
3 POWDER (GRAM) TOPICAL NIGHTLY PRN
Status: DISCONTINUED | OUTPATIENT
Start: 2023-05-04 | End: 2023-05-07 | Stop reason: HOSPADM

## 2023-05-04 RX ORDER — MICONAZOLE NITRATE 2 %
POWDER (GRAM) TOPICAL 2 TIMES DAILY
Status: DISCONTINUED | OUTPATIENT
Start: 2023-05-04 | End: 2023-05-07 | Stop reason: HOSPADM

## 2023-05-04 RX ORDER — IBUPROFEN 200 MG
24 TABLET ORAL
Status: DISCONTINUED | OUTPATIENT
Start: 2023-05-04 | End: 2023-05-07 | Stop reason: HOSPADM

## 2023-05-04 RX ORDER — IBUPROFEN 200 MG
16 TABLET ORAL
Status: DISCONTINUED | OUTPATIENT
Start: 2023-05-04 | End: 2023-05-07 | Stop reason: HOSPADM

## 2023-05-04 RX ORDER — FUROSEMIDE 10 MG/ML
20 INJECTION INTRAMUSCULAR; INTRAVENOUS ONCE
Status: DISCONTINUED | OUTPATIENT
Start: 2023-05-04 | End: 2023-05-04

## 2023-05-04 RX ORDER — FOLIC ACID 1 MG/1
1 TABLET ORAL DAILY
Status: DISCONTINUED | OUTPATIENT
Start: 2023-05-04 | End: 2023-05-07 | Stop reason: HOSPADM

## 2023-05-04 RX ADMIN — CALCIUM GLUCONATE 1 G: 20 INJECTION, SOLUTION INTRAVENOUS at 01:05

## 2023-05-04 RX ADMIN — LEVOTHYROXINE SODIUM 175 MCG: 125 TABLET ORAL at 05:05

## 2023-05-04 RX ADMIN — APIXABAN 5 MG: 5 TABLET, FILM COATED ORAL at 08:05

## 2023-05-04 RX ADMIN — OXYCODONE HYDROCHLORIDE 5 MG: 5 TABLET ORAL at 05:05

## 2023-05-04 RX ADMIN — ONDANSETRON 4 MG: 2 INJECTION INTRAMUSCULAR; INTRAVENOUS at 12:05

## 2023-05-04 RX ADMIN — PANCRELIPASE 2 CAPSULE: 24000; 76000; 120000 CAPSULE, DELAYED RELEASE PELLETS ORAL at 06:05

## 2023-05-04 RX ADMIN — MAGNESIUM SULFATE 2 G: 2 INJECTION INTRAVENOUS at 03:05

## 2023-05-04 RX ADMIN — METOPROLOL SUCCINATE 50 MG: 50 TABLET, EXTENDED RELEASE ORAL at 09:05

## 2023-05-04 RX ADMIN — SULFASALAZINE 500 MG: 500 TABLET ORAL at 09:05

## 2023-05-04 RX ADMIN — PANTOPRAZOLE SODIUM 40 MG: 40 TABLET, DELAYED RELEASE ORAL at 09:05

## 2023-05-04 RX ADMIN — SOTALOL HYDROCHLORIDE 120 MG: 120 TABLET ORAL at 09:05

## 2023-05-04 RX ADMIN — ATORVASTATIN CALCIUM 20 MG: 20 TABLET, FILM COATED ORAL at 09:05

## 2023-05-04 RX ADMIN — IOHEXOL 75 ML: 350 INJECTION, SOLUTION INTRAVENOUS at 01:05

## 2023-05-04 RX ADMIN — PANCRELIPASE 2 CAPSULE: 24000; 76000; 120000 CAPSULE, DELAYED RELEASE PELLETS ORAL at 03:05

## 2023-05-04 RX ADMIN — FUROSEMIDE 20 MG: 10 INJECTION, SOLUTION INTRAMUSCULAR; INTRAVENOUS at 10:05

## 2023-05-04 RX ADMIN — FOLIC ACID 1 MG: 1 TABLET ORAL at 09:05

## 2023-05-04 RX ADMIN — MICONAZOLE NITRATE 2 % TOPICAL POWDER: at 08:05

## 2023-05-04 RX ADMIN — APIXABAN 5 MG: 5 TABLET, FILM COATED ORAL at 09:05

## 2023-05-04 RX ADMIN — LEVALBUTEROL HYDROCHLORIDE 0.63 MG: 0.63 SOLUTION RESPIRATORY (INHALATION) at 11:05

## 2023-05-04 RX ADMIN — MAGNESIUM SULFATE 2 G: 2 INJECTION INTRAVENOUS at 04:05

## 2023-05-04 RX ADMIN — POTASSIUM BICARBONATE 40 MEQ: 391 TABLET, EFFERVESCENT ORAL at 03:05

## 2023-05-04 RX ADMIN — LEVALBUTEROL HYDROCHLORIDE 0.63 MG: 0.63 SOLUTION RESPIRATORY (INHALATION) at 04:05

## 2023-05-04 RX ADMIN — PANCRELIPASE 2 CAPSULE: 24000; 76000; 120000 CAPSULE, DELAYED RELEASE PELLETS ORAL at 09:05

## 2023-05-04 RX ADMIN — SULFASALAZINE 500 MG: 500 TABLET ORAL at 08:05

## 2023-05-04 RX ADMIN — VANCOMYCIN HYDROCHLORIDE 2000 MG: 500 INJECTION, POWDER, LYOPHILIZED, FOR SOLUTION INTRAVENOUS at 12:05

## 2023-05-04 RX ADMIN — OXYBUTYNIN CHLORIDE 10 MG: 5 TABLET, EXTENDED RELEASE ORAL at 09:05

## 2023-05-04 NOTE — RESPIRATORY THERAPY
RAPID RESPONSE RESPIRATORY THERAPY FOLLOW-UP NOTE       Followed up with patient for proactive rounding.   Please call Rapid Response RT, Jeremiah Velázquez [place] at [phone] with any questions or concerns.

## 2023-05-04 NOTE — SUBJECTIVE & OBJECTIVE
Past Medical History:   Diagnosis Date    Anticoagulant long-term use     Atrial fibrillation     Crohn disease diagnosed in her 20's    GERD (gastroesophageal reflux disease)     Hyperlipidemia     Hypertension     Pancreatic adenocarcinoma 3/21/2023    Thyroid disease     s/p I 131       Past Surgical History:   Procedure Laterality Date    APPENDECTOMY      CARDIAC SURGERY  2014    pacemaker    COLONOSCOPY  ~2008    normal findings per patient report    ENDOSCOPIC ULTRASOUND OF UPPER GASTROINTESTINAL TRACT N/A 3/14/2023    Procedure: ULTRASOUND, UPPER GI TRACT, ENDOSCOPIC;  Surgeon: Andrei Swartz MD;  Location: [facility];  Service: Endoscopy;  Laterality: N/A;  Approval to hold Eliquis rec'd from Dr. Barbosa (see telephone encounter 3/3/23)-DS  [employer] AICD/PPM  3/3/23-Instructions via portal-DS    ERCP N/A 3/21/2023    Procedure: ERCP (ENDOSCOPIC RETROGRADE CHOLANGIOPANCREATOGRAPHY);  Surgeon: Celina Toney MD;  Location: [facility] ([address] [place] [address]);  Service: Endoscopy;  Laterality: N/A;    ESOPHAGOGASTRODUODENOSCOPY N/A 7/17/2018    Procedure: EGD (ESOPHAGOGASTRODUODENOSCOPY);  Surgeon: Alondra Casiano MD;  Location: [facility] ENDO;  Service: Endoscopy;  Laterality: N/A;    HYSTERECTOMY      INSERTION OF IMPLANTABLE CARDIOVERTER-DEFIBRILLATOR (ICD) GENERATOR WITH TWO EXISTING LEADS Left 5/10/2021    Procedure: INSERTION, PULSE GENERATOR WITH 2 EXISTING LEADS, ICD;  Surgeon: Bo Leone MD;  Location: [facility] CATH LAB;  Service: Cardiology;  Laterality: Left;    INSERTION OF PACEMAKER      REPLACEMENT OF PACEMAKER GENERATOR  05/10/2021    RETROGRADE PYELOGRAPHY Right 2/18/2020    Procedure: PYELOGRAM, RETROGRADE;  Surgeon: Jovan Kruse MD;  Location: [facility] OR;  Service: Urology;  Laterality: Right;    SMALL INTESTINE SURGERY  in her 20's    for crohn's    TONSILLECTOMY      UPPER GASTROINTESTINAL ENDOSCOPY  ~2008    normal findings per patient report       Review of patient's allergies indicates:    Allergen Reactions    Lisinopril Other (See Comments)     cough       Current Facility-Administered Medications on File Prior to Encounter   Medication    [DISCONTINUED] acetaminophen tablet 650 mg    [DISCONTINUED] amLODIPine tablet 5 mg    [DISCONTINUED] apixaban tablet 5 mg    [DISCONTINUED] atorvastatin tablet 20 mg    [DISCONTINUED] dextrose 10% bolus 125 mL 125 mL    [DISCONTINUED] dextrose 10% bolus 250 mL 250 mL    [DISCONTINUED] dextrose 40 % gel 15,000 mg    [DISCONTINUED] dextrose 40 % gel 30,000 mg    [DISCONTINUED] folic acid tablet 1,000 mcg    [DISCONTINUED] glucagon (human recombinant) injection 1 mg    [DISCONTINUED] levothyroxine tablet 175 mcg    [DISCONTINUED] lipase-protease-amylase 24,000-76,000-120,000 units capsule 2 capsule    [DISCONTINUED] melatonin tablet 6 mg    [DISCONTINUED] metoprolol succinate (TOPROL-XL) 24 hr tablet 50 mg    [DISCONTINUED] naloxone 0.4 mg/mL injection 0.02 mg    [DISCONTINUED] oxyCODONE 12 hr tablet 10 mg    [DISCONTINUED] oxyCODONE immediate release tablet 5 mg    [DISCONTINUED] oxyCODONE immediate release tablet Tab 10 mg    [DISCONTINUED] pantoprazole EC tablet 40 mg    [DISCONTINUED] polyethylene glycol packet 17 g    [DISCONTINUED] prochlorperazine tablet 5 mg    [DISCONTINUED] promethazine tablet 12.5 mg    [DISCONTINUED] senna-docusate 8.6-50 mg per tablet 1 tablet    [DISCONTINUED] sodium chloride 0.9% flush 10 mL    [DISCONTINUED] sotaloL tablet 120 mg    [DISCONTINUED] sulfaSALAzine tablet 500 mg     Current Outpatient Medications on File Prior to Encounter   Medication Sig    acetaminophen (TYLENOL) 325 MG tablet Take 650 mg by mouth daily as needed (Headache).    alendronate (FOSAMAX) 70 MG tablet Take 1 tablet (70 mg total) by mouth every 7 days.    amLODIPine (NORVASC) 5 MG tablet Take 1 tablet (5 mg total) by mouth once daily.    atorvastatin (LIPITOR) 20 MG tablet Take 1 tablet (20 mg total) by mouth once daily.    colestipoL (COLESTID) 1 gram Tab  Take 1 g by mouth 2 (two) times daily.    diphenoxylate-atropine 2.5-0.025 mg (LOMOTIL) 2.5-0.025 mg per tablet TAKE 1 TABLET BY MOUTH 4 TIMES DAILY AS NEEDED FOR DIARRHEA    ELIQUIS 5 mg Tab Take 1 tablet (5 mg total) by mouth 2 (two) times daily.    esomeprazole (NEXIUM) 40 MG capsule Take 1 capsule (40 mg total) by mouth once daily.    folic acid (FOLVITE) 1 MG tablet Take 1 tablet by mouth once daily    levothyroxine (SYNTHROID, LEVOTHROID) 175 MCG tablet Take 1 tablet by mouth once daily    lipase-protease-amylase 24,000-76,000-120,000 units (CREON) 24,000-76,000 -120,000 unit capsule Take 2 capsules by mouth 3 (three) times daily with meals.    MELATONIN ORAL Take 1 tablet by mouth nightly as needed (Insomnia).    metoprolol succinate (TOPROL-XL) 50 MG 24 hr tablet Take 1 tablet (50 mg total) by mouth once daily.    oxyCODONE (ROXICODONE) 5 MG immediate release tablet Take 1 tablet (5 mg total) by mouth every 4 (four) hours as needed for Pain.    polyethylene glycol (GLYCOLAX) 17 gram/dose powder Use cap to measure 17 grams, mix in liquid and take by mouth once daily.    prochlorperazine (COMPAZINE) 5 MG tablet Take 1 tablet (5 mg total) by mouth 4 (four) times daily as needed for Nausea (Take up to 4 times daily 30 minutes prior to meals).    senna-docusate 8.6-50 mg (PERICOLACE) 8.6-50 mg per tablet Take 1 tablet by mouth 2 (two) times daily.    sotaloL (BETAPACE) 120 MG Tab Take 1 tablet (120 mg total) by mouth every 12 (twelve) hours.    sulfaSALAzine (AZULFIDINE) 500 mg Tab Take 1 tablet by mouth twice daily    tolterodine (DETROL LA) 4 MG 24 hr capsule Take 1 capsule by mouth once daily     Family History       Problem Relation (Age of Onset)    Breast cancer Mother    Cancer Mother    Crohn's disease Other          Tobacco Use    Smoking status: Former     Types: Cigarettes     Quit date:      Years since quittin.3    Smokeless tobacco: Never   Substance and Sexual Activity    Alcohol use: No     Drug use: No    Sexual activity: Never     Review of Systems   Cardiovascular:  Negative for chest pain, dyspnea on exertion, irregular heartbeat, leg swelling, orthopnea, palpitations and syncope.   Respiratory:  Positive for shortness of breath. Negative for cough, hemoptysis, sputum production and wheezing.    All other systems reviewed and are negative.  Objective:     Vital Signs (Most Recent):  Temp: 98.2 °F (36.8 °C) (05/04/23 0737)  Pulse: 69 (05/04/23 0730)  Resp: 20 (05/04/23 0501)  BP: 134/61 (05/04/23 0737)  SpO2: (!) 93 % (05/04/23 0730)   Vital Signs (24h Range):  Temp:  [97.7 °F (36.5 °C)-99.2 °F (37.3 °C)] 98.2 °F (36.8 °C)  Pulse:  [] 69  Resp:  [16-35] 20  SpO2:  [86 %-100 %] 93 %  BP: ()/(47-71) 134/61     Weight: 80 kg (176 lb 5.9 oz)  Body mass index is 34.44 kg/m².    SpO2: (!) 93 %         Intake/Output Summary (Last 24 hours) at 5/4/2023 0926  Last data filed at 5/4/2023 0700  Gross per 24 hour   Intake 20 ml   Output --   Net 20 ml       Lines/Drains/Airways       Drain  Duration             Female External Urinary Catheter 05/04/23 0255 <1 day              Peripheral Intravenous Line  Duration                  Peripheral IV - Single Lumen 05/04/23 0006 20 G Anterior;Distal;Left Upper Arm <1 day         Peripheral IV - Single Lumen 05/04/23 0007 22 G Anterior;Right Hand <1 day                     Physical Exam  Vitals reviewed.   Constitutional:       General: She is not in acute distress.     Appearance: She is obese. She is not ill-appearing.   Eyes:      General: No scleral icterus.     Pupils: Pupils are equal, round, and reactive to light.   Cardiovascular:      Rate and Rhythm: Normal rate and regular rhythm.      Pulses: Normal pulses.   Pulmonary:      Effort: Respiratory distress present.      Breath sounds: No stridor.      Comments: Tachypnea  Chest:      Chest wall: No tenderness.   Abdominal:      General: Bowel sounds are normal. There is no distension.       "Palpations: Abdomen is soft.      Tenderness: There is no abdominal tenderness.   Musculoskeletal:         General: No swelling. Normal range of motion.      Cervical back: Normal range of motion. No rigidity.      Right lower leg: No edema.      Left lower leg: No edema.   Skin:     General: Skin is warm.      Capillary Refill: Capillary refill takes less than 2 seconds.      Coloration: Skin is not jaundiced.      Findings: No erythema.   Neurological:      General: No focal deficit present.      Mental Status: She is alert and oriented to person, place, and time. Mental status is at baseline.   Psychiatric:         Mood and Affect: Mood normal.        Significant Labs: All pertinent lab results from the last 24 hours have been reviewed.    Significant Imaging: Echocardiogram: Transthoracic echo (TTE) complete (Cupid Only):   Results for orders placed or performed during the hospital encounter of 03/27/23   Echo   Result Value Ref Range    Ascending aorta 2.55 cm    STJ 2.26 cm    AV mean gradient 11 mmHg    Ao peak amilcar 2.19 m/s    Ao VTI 47.31 cm    IVS 1.11 (A) 0.6 - 1.1 cm    LA size 4.88 cm    Left Atrium Major Axis 6.07 cm    Left Atrium Minor Axis 6.47 cm    LVIDd 3.98 3.5 - 6.0 cm    LVIDs 2.95 2.1 - 4.0 cm    LVOT diameter 1.94 cm    LVOT peak VTI 36.23 cm    Posterior Wall 1.04 0.6 - 1.1 cm    MV Peak A Amilcar 0.93 m/s    E wave deceleration time 204.63 msec    MV Peak E Amilcar 0.89 m/s    PV Peak D Amilcar 0.35 m/s    PV Peak S Amilcar 0.49 m/s    RA Major Axis 4.69 cm    RA Width 3.38 cm    RVDD 3.13 cm    Sinus 2.93 cm    TAPSE 1.38 cm    TR Max Amilcar 2.98 m/s    TDI LATERAL 0.09 m/s    TDI SEPTAL 0.05 m/s    LA WIDTH 3.89 cm    MV stenosis pressure 1/2 time 59.34 ms    LV Diastolic Volume 69.07 mL    LV Systolic Volume 33.59 mL    LVOT peak amilcar 1.56 m/s    LA volume (mod) 71.67 cm3    MV "A" wave duration 11.80 msec    LV LATERAL E/E' RATIO 9.89 m/s    LV SEPTAL E/E' RATIO 17.80 m/s    FS 26 %    LA volume 101.07 cm3 "    LV mass 139.79 g    Left Ventricle Relative Wall Thickness 0.52 cm    AV valve area 2.26 cm2    AV Velocity Ratio 0.71     AV index (prosthetic) 0.77     MV valve area p 1/2 method 3.71 cm2    E/A ratio 0.96     Mean e' 0.07 m/s    Pulm vein S/D ratio 1.40     LVOT area 3.0 cm2    LVOT stroke volume 107.04 cm3    AV peak gradient 19 mmHg    E/E' ratio 12.71 m/s    LV Systolic Volume Index 19.2 mL/m2    LV Diastolic Volume Index 39.47 mL/m2    LA Volume Index 57.8 mL/m2    LV Mass Index 80 g/m2    Triscuspid Valve Regurgitation Peak Gradient 36 mmHg    LA Volume Index (Mod) 41.0 mL/m2    BSA 1.82 m2    Right Atrial Pressure (from IVC) 3 mmHg    EF 60 %    TV rest pulmonary artery pressure 39 mmHg    Narrative    · The estimated ejection fraction is 60%.  · The left ventricle is normal in size with concentric hypertrophy and   normal systolic function. There is systolic anterior motion without LVOT   obstruction.  · There is abnormal septal wall motion.  · Grade II left ventricular diastolic dysfunction.  · Normal right ventricular size with normal right ventricular systolic   function.  · Mild left atrial enlargement.  · The estimated PA systolic pressure is 39 mmHg.  · Normal central venous pressure (3 mmHg).

## 2023-05-04 NOTE — NURSING
Arrived to room via stretcher. AAO x's 4. Verbal, obeys command. Respirations even, unlabored. Skin warm and dry. Denies nausea. Oxycodone 5 mg PO given for pain. Oriented to room, call light, and bathroom. Bed in lowest position, wheels locked, side rails up x's 2, call light in reach. Instructed to call for assistance as needed.

## 2023-05-04 NOTE — SUBJECTIVE & OBJECTIVE
Oncology Treatment Plan:   OP PANC NAB-PACLITAXEL + GEMCITABINE Q4W    Medications:  Continuous Infusions:  Scheduled Meds:   apixaban  5 mg Oral BID    atorvastatin  20 mg Oral Daily    ceFEPime (MAXIPIME) IVPB  2 g Intravenous Q12H    colestipoL  1 g Oral BID    folic acid  1 mg Oral Daily    levothyroxine  175 mcg Oral Daily    lipase-protease-amylase 24,000-76,000-120,000 units  2 capsule Oral TID WM    magnesium sulfate IVPB  2 g Intravenous Q2H    metoprolol succinate  50 mg Oral Daily    oxybutynin  10 mg Oral Daily    pantoprazole  40 mg Oral Daily    polyethylene glycol  17 g Oral Daily    sotaloL  120 mg Oral Q12H    sulfaSALAzine  500 mg Oral BID     PRN Meds:acetaminophen, dextrose 10%, dextrose 10%, glucagon (human recombinant), glucose, glucose, melatonin, naloxone, oxyCODONE, prochlorperazine, sodium chloride 0.9%, Pharmacy to dose Vancomycin consult **AND** vancomycin - pharmacy to dose     Review of patient's allergies indicates:   Allergen Reactions    Lisinopril Other (See Comments)     cough        Past Medical History:   Diagnosis Date    Anticoagulant long-term use     Atrial fibrillation     Crohn disease diagnosed in her 20's    GERD (gastroesophageal reflux disease)     Hyperlipidemia     Hypertension     Pancreatic adenocarcinoma 3/21/2023    Thyroid disease     s/p I 131     Past Surgical History:   Procedure Laterality Date    APPENDECTOMY      CARDIAC SURGERY  2014    pacemaker    COLONOSCOPY  ~2008    normal findings per patient report    ENDOSCOPIC ULTRASOUND OF UPPER GASTROINTESTINAL TRACT N/A 3/14/2023    Procedure: ULTRASOUND, UPPER GI TRACT, ENDOSCOPIC;  Surgeon: Andrei Swartz MD;  Location: [facility];  Service: Endoscopy;  Laterality: N/A;  Approval to hold Eliquis rec'd from Dr. Barbosa (see telephone encounter 3/3/23)-DS  [employer] AICD/PPM  3/3/23-Instructions via portal-DS    ERCP N/A 3/21/2023    Procedure: ERCP (ENDOSCOPIC RETROGRADE CHOLANGIOPANCREATOGRAPHY);  Surgeon:  Celina Toney MD;  Location: [facility] ENDO (2ND FLR);  Service: Endoscopy;  Laterality: N/A;    ESOPHAGOGASTRODUODENOSCOPY N/A 2018    Procedure: EGD (ESOPHAGOGASTRODUODENOSCOPY);  Surgeon: Alondra Casiano MD;  Location: [facility] ENDO;  Service: Endoscopy;  Laterality: N/A;    HYSTERECTOMY      INSERTION OF IMPLANTABLE CARDIOVERTER-DEFIBRILLATOR (ICD) GENERATOR WITH TWO EXISTING LEADS Left 5/10/2021    Procedure: INSERTION, PULSE GENERATOR WITH 2 EXISTING LEADS, ICD;  Surgeon: Bo Leone MD;  Location: [facility] CATH LAB;  Service: Cardiology;  Laterality: Left;    INSERTION OF PACEMAKER      REPLACEMENT OF PACEMAKER GENERATOR  05/10/2021    RETROGRADE PYELOGRAPHY Right 2020    Procedure: PYELOGRAM, RETROGRADE;  Surgeon: Jovan Kruse MD;  Location: [facility] OR;  Service: Urology;  Laterality: Right;    SMALL INTESTINE SURGERY  in her 20's    for crohn's    TONSILLECTOMY      UPPER GASTROINTESTINAL ENDOSCOPY  ~    normal findings per patient report     Family History       Problem Relation (Age of Onset)    Breast cancer Mother    Cancer Mother    Crohn's disease Other          Tobacco Use    Smoking status: Former     Types: Cigarettes     Quit date:      Years since quittin.3    Smokeless tobacco: Never   Substance and Sexual Activity    Alcohol use: No    Drug use: No    Sexual activity: Never       Review of Systems   Constitutional:  Positive for activity change and fatigue. Negative for chills, diaphoresis and fever.   HENT:  Negative for congestion.    Respiratory:  Negative for cough and shortness of breath.    Cardiovascular:  Positive for chest pain and palpitations.   Gastrointestinal:  Negative for abdominal pain, diarrhea, nausea and vomiting.   Genitourinary:  Negative for difficulty urinating, dysuria and frequency.   Musculoskeletal:  Positive for gait problem.   Skin:  Negative for rash.   Allergic/Immunologic: Positive for immunocompromised state.   Neurological:  Negative for  light-headedness and headaches.   Hematological:  Bruises/bleeds easily.   Objective:     Vital Signs (Most Recent):  Temp: 97.7 °F (36.5 °C) (05/03/23 2242)  Pulse: 70 (05/04/23 0233)  Resp: 20 (05/04/23 0048)  BP: (!) 152/64 (05/04/23 0233)  SpO2: 100 % (05/04/23 0233)   Vital Signs (24h Range):  Temp:  [97.7 °F (36.5 °C)-99.2 °F (37.3 °C)] 97.7 °F (36.5 °C)  Pulse:  [] 70  Resp:  [16-35] 20  SpO2:  [86 %-100 %] 100 %  BP: ()/(47-65) 152/64     Weight: 79.4 kg (175 lb)  Body mass index is 34.18 kg/m².  Body surface area is 1.83 meters squared.      Intake/Output Summary (Last 24 hours) at 5/4/2023 0310  Last data filed at 5/4/2023 0207  Gross per 24 hour   Intake 20 ml   Output --   Net 20 ml       Physical Exam  Vitals reviewed.   Constitutional:       General: She is not in acute distress.     Appearance: Normal appearance. She is ill-appearing. She is not toxic-appearing.   HENT:      Head: Normocephalic and atraumatic.      Right Ear: External ear normal.      Left Ear: External ear normal.      Nose: Nose normal. No congestion.      Mouth/Throat:      Mouth: Mucous membranes are moist.      Pharynx: No oropharyngeal exudate.   Eyes:      General: No scleral icterus.     Conjunctiva/sclera: Conjunctivae normal.   Cardiovascular:      Rate and Rhythm: Normal rate and regular rhythm.      Pulses: Normal pulses.   Pulmonary:      Effort: Pulmonary effort is normal. No respiratory distress.      Breath sounds: Normal breath sounds. No wheezing or rales.      Comments: Reproducible anterior chest wall tenderness.   Chest:      Chest wall: Tenderness present.   Abdominal:      General: There is no distension.      Palpations: Abdomen is soft.      Tenderness: There is no abdominal tenderness. There is no guarding.   Musculoskeletal:         General: Normal range of motion.      Right lower leg: No edema.      Left lower leg: No edema.   Skin:     General: Skin is warm and dry.      Findings: No rash.    Neurological:      Mental Status: She is alert and oriented to person, place, and time. Mental status is at baseline.   Psychiatric:         Mood and Affect: Mood normal.         Behavior: Behavior normal.       Significant Labs:   All pertinent labs from the last 24 hours have been reviewed.    Diagnostic Results:  Imaging Results               CTA Chest Non-Coronary (PE Studies) (Final result)  Result time 05/04/23 02:33:36      Final result by Benigno Torrez MD (05/04/23 02:33:36)                   Impression:      1. Suboptimal contrast bolus timing.  No pulmonary embolism to the proximal segmental level.  There is a hypodensity in the left upper lobe segmental branch which is favored to represent streak artifact, however, small pulmonary embolus cannot be completely excluded.  2. Similar appearance of ground-glass attenuation throughout the bilateral lung fields, nonspecific and may represent edema, infection, aspiration, or inflammation.  3. Trace right pleural effusion, new from prior.  4. Stable heterogeneous mass in the body of the pancreas.  5. Similar appearance of mild peripancreatic inflammatory changes, suggestive of possible pancreatitis.  6. Biliary sludge, similar to prior.  7. Common bile duct stent with pneumobilia.  This report was flagged in Epic as abnormal.    Electronically signed by resident: Margo Choudhary  Date:    05/04/2023  Time:    01:42    Electronically signed by: Benigno Torrez MD  Date:    05/04/2023  Time:    02:33               Narrative:    EXAMINATION:  CTA CHEST NON CORONARY (PE STUDIES)    CLINICAL HISTORY:  Pulmonary embolism (PE) suspected, high prob;    TECHNIQUE:  Low dose axial images, sagittal and coronal reformations were obtained from the thoracic inlet to the lung bases following the IV administration of 75 mL of Omnipaque-350.  Scan technique was optimized to evaluate the pulmonary arteries.  MIP images were performed.    COMPARISON:  CTA chest  04/16/2023    FINDINGS:  Suboptimal contrast bolus timing.  No pulmonary artery filling defects to the proximal segmental level.  There is a questionable hypodensity in left upper lobe segmental branch (axial series 2, image 170), favored to represent streak artifact, however, small pulmonary embolus cannot be completely excluded.  No suspicious pulmonary nodules ground-glass attenuation throughout the bilateral lung fields, similar to prior CTA from 04/16/2023.  Bilateral bandlike opacifications in the lung bases favored to represent scarring or subsegmental atelectasis.   No pneumothorax.  Trace right pleural effusion, new from prior.    Heart size is normal.  No pericardial effusion.  The aorta is normal in course and caliber.  The main pulmonary artery is normal in diameter.  No mediastinal, hilar or axillary lymphadenopathy.  The visualized thyroid gland is normal.    Layering high-density material within the lumen of the gallbladder, likely representing biliary sludge.  There is a common bile duct stent in place.  Punctate focus of pneumobilia, decreased compared to prior and likely related to common bile duct stent.  Similar appearance of heterogeneous appearing mass in the body of the pancreas measuring approximately 3.4 x 2.6 cm (axial series 2, image 408), previously 3.4 x 2.5 cm.  This mass abuts the lesser curvature of the stomach no intervening fat plane suggesting possible direct invasion/involvement.  Mild peripancreatic fat stranding, similar to prior.    Degenerative changes of the osseous structures.  Stable mild superior endplate deformity of the T3 vertebral body.  Postop change of median sternotomy.  Left chest wall cardiac device.                                       CT Head Without Contrast (Final result)  Result time 05/04/23 01:45:20      Final result by Sofi Almazan MD (05/04/23 01:45:20)                   Impression:      1. No CT evidence of acute intracranial abnormality. Clinical  correlation and further evaluation as warranted.  2. Generalized cerebral volume loss and findings suggestive of chronic microvascular ischemic change.      Electronically signed by: Sofi Almazan MD  Date:    05/04/2023  Time:    01:45               Narrative:    EXAMINATION:  CT HEAD WITHOUT CONTRAST    CLINICAL HISTORY:  Headache, new or worsening (Age >= 50y);    TECHNIQUE:  Low dose axial images were obtained through the head.  Coronal and sagittal reformations were also performed. Contrast was not administered.    COMPARISON:  None.    FINDINGS:  There is no acute intracranial hemorrhage, hydrocephalus, midline shift or mass effect. There is mild generalized cerebral volume loss compensatory prominence of the ventricular system.  There is hypoattenuation within the periventricular white matter, nonspecific but likely reflecting sequela of chronic microvascular ischemic change.  Gray-white matter differentiation is otherwise maintained.  The basal cisterns are patent. Visualized paranasal sinuses and mastoid air cells are clear of acute process.  The visualized bones of the calvarium demonstrate no acute osseous abnormality.                                       X-Ray Chest AP Portable (Final result)  Result time 05/03/23 23:22:34      Final result by Benigno Torrez MD (05/03/23 23:22:34)                   Impression:      As above.      Electronically signed by: Benigno Torrez MD  Date:    05/03/2023  Time:    23:22               Narrative:    EXAMINATION:  XR CHEST AP PORTABLE    CLINICAL HISTORY:  Sepsis;    TECHNIQUE:  Single frontal view of the chest was performed.    COMPARISON:  05/02/2023.    FINDINGS:  Pacer leads and sternotomy wires appears similar to prior.    Subsegmental atelectatic changes at the lung bases.    Heart and lungs otherwise appear unchanged when allowing for differences in technique and positioning.

## 2023-05-04 NOTE — TELEPHONE ENCOUNTER
----- Message from Peggy Mejia RN sent at 5/4/2023  8:20 AM CDT -----  Regarding: FW: Peer to Peer  NING,  See below for peer to peer for Sergio's ptQuinn Dawkins  ----- Message -----  From: Stephanie Cortes  Sent: 5/4/2023   8:12 AM CDT  To: [facility]  Subject: Peer to Peer                                     Name of Caller: Genia       Contact Preference: [phone]       Treating Physician: Jim Hill MD      Nature of Call: [facility] called to offer optional Peer to Peer on patient behalf for genetic testing need response by noon to keep request in compliance

## 2023-05-04 NOTE — ASSESSMENT & PLAN NOTE
83F with extensive comorbidites recently admitted with intractable nausea, vomiting following chemo, treated for pancreatitis and discharged on 5/03 now representing with a one day history of palpitations and nonradiating pressure like anterior chest pain. Found to be in A-fib RVR and hypotensive on admission, which resolved with IVFs. Elevated troponin to 0.041 without EKG changes. Reproducible chest wall tenderness on exam. CXR without focal consolidation. CTA negative for PE to the proximal segmental level but bolus timing was suboptimal, along with bilateral GGO.      - Continue broad spectrum abx for now, but suspicion for infection is relatively low.   - Follow infectious workup.   - Trend troponin to peak.   - Cardiac monitoring and EKG prn for recurrent chest pain.

## 2023-05-04 NOTE — CONSULTS
[facility] ([facility])  Cardiology  Consult Note    Patient Name: Pauline Cronin  MRN: [id number]  Admission Date: 5/3/2023  Hospital Length of Stay: 0 days  Code Status: DNR   Attending Provider: Letty Stiles MD   Consulting Provider: Beena Fernandez MD  Primary Care Physician: Primary Doctor No  Principal Problem:Chest pain    Patient information was obtained from patient and ER records.     Inpatient consult to Cardiology  Consult performed by: Beena Fernandez MD  Consult ordered by: Negrito Leon MD        Subjective:     Chief Complaint:  Palpitations/chest pain     HPI:   Ms. Pauline Cronin is a 83 year old female with paroxysmal Afib, HFpEF, SSS s/p pacemaker 2021, hypothyroidism, Crohn disease, HLD, and recently diagnosed unresectable pancreatic cancer  (3/2023) s/p cycle 1 chemo completed on 5/1. Patient presents to [facility] ED for complaints of palpitations with subsequent chest pain. Chest pain desribed as left chest pressure with radiation to left arm. She has had similar episodes in the past, all associated with onset of atrial fibrillation. Denies orthopnea, lower extremity swelling, recent N/V, diarrhea overnight. Of note, patient recently discharged on 5/3 for treatment of pancreatitis and resuscitated with IVF at that time.     On arrival, patient AF,  with EKG showing Atrial fibrillation and known LBBB, BP 77/47. Labs notable for K 3.3, Mg 1.1, down trending lipase, elevated BNP (baseline ~382), TSH WNL. Troponin 0.041>>0.203. Afib resolved s/p 2.3L, currently in NSR with a paced rhythm. CTA without PE, new trace R. Pleural effusion, and unchanged GGO. Cardiology consulted for up trending troponin.       Past Medical History:   Diagnosis Date    Anticoagulant long-term use     Atrial fibrillation     Crohn disease diagnosed in her 20's    GERD (gastroesophageal reflux disease)     Hyperlipidemia     Hypertension     Pancreatic adenocarcinoma 3/21/2023    Thyroid disease     s/p I 131        Past Surgical History:   Procedure Laterality Date    APPENDECTOMY      CARDIAC SURGERY  2014    pacemaker    COLONOSCOPY  ~2008    normal findings per patient report    ENDOSCOPIC ULTRASOUND OF UPPER GASTROINTESTINAL TRACT N/A 3/14/2023    Procedure: ULTRASOUND, UPPER GI TRACT, ENDOSCOPIC;  Surgeon: Andrei Swartz MD;  Location: [facility] ENDO;  Service: Endoscopy;  Laterality: N/A;  Approval to hold Eliquis rec'd from Dr. Barbosa (see telephone encounter 3/3/23)-DS  [employer] AICD/PPM  3/3/23-Instructions via portal-DS    ERCP N/A 3/21/2023    Procedure: ERCP (ENDOSCOPIC RETROGRADE CHOLANGIOPANCREATOGRAPHY);  Surgeon: Celina Toney MD;  Location: [facility] ENDO ([facility] FLR);  Service: Endoscopy;  Laterality: N/A;    ESOPHAGOGASTRODUODENOSCOPY N/A 7/17/2018    Procedure: EGD (ESOPHAGOGASTRODUODENOSCOPY);  Surgeon: Alondra Casiano MD;  Location: [facility] ENDO;  Service: Endoscopy;  Laterality: N/A;    HYSTERECTOMY      INSERTION OF IMPLANTABLE CARDIOVERTER-DEFIBRILLATOR (ICD) GENERATOR WITH TWO EXISTING LEADS Left 5/10/2021    Procedure: INSERTION, PULSE GENERATOR WITH 2 EXISTING LEADS, ICD;  Surgeon: Bo Leone MD;  Location: [facility] CATH LAB;  Service: Cardiology;  Laterality: Left;    INSERTION OF PACEMAKER      REPLACEMENT OF PACEMAKER GENERATOR  05/10/2021    RETROGRADE PYELOGRAPHY Right 2/18/2020    Procedure: PYELOGRAM, RETROGRADE;  Surgeon: Jovan Kruse MD;  Location: [facility] OR;  Service: Urology;  Laterality: Right;    SMALL INTESTINE SURGERY  in her 20's    for crohn's    TONSILLECTOMY      UPPER GASTROINTESTINAL ENDOSCOPY  ~2008    normal findings per patient report       Review of patient's allergies indicates:   Allergen Reactions    Lisinopril Other (See Comments)     cough       Current Facility-Administered Medications on File Prior to Encounter   Medication    [DISCONTINUED] acetaminophen tablet 650 mg    [DISCONTINUED] amLODIPine tablet 5 mg    [DISCONTINUED] apixaban tablet 5 mg     [DISCONTINUED] atorvastatin tablet 20 mg    [DISCONTINUED] dextrose 10% bolus 125 mL 125 mL    [DISCONTINUED] dextrose 10% bolus 250 mL 250 mL    [DISCONTINUED] dextrose 40 % gel 15,000 mg    [DISCONTINUED] dextrose 40 % gel 30,000 mg    [DISCONTINUED] folic acid tablet 1,000 mcg    [DISCONTINUED] glucagon (human recombinant) injection 1 mg    [DISCONTINUED] levothyroxine tablet 175 mcg    [DISCONTINUED] lipase-protease-amylase 24,000-76,000-120,000 units capsule 2 capsule    [DISCONTINUED] melatonin tablet 6 mg    [DISCONTINUED] metoprolol succinate (TOPROL-XL) 24 hr tablet 50 mg    [DISCONTINUED] naloxone 0.4 mg/mL injection 0.02 mg    [DISCONTINUED] oxyCODONE 12 hr tablet 10 mg    [DISCONTINUED] oxyCODONE immediate release tablet 5 mg    [DISCONTINUED] oxyCODONE immediate release tablet Tab 10 mg    [DISCONTINUED] pantoprazole EC tablet 40 mg    [DISCONTINUED] polyethylene glycol packet 17 g    [DISCONTINUED] prochlorperazine tablet 5 mg    [DISCONTINUED] promethazine tablet 12.5 mg    [DISCONTINUED] senna-docusate 8.6-50 mg per tablet 1 tablet    [DISCONTINUED] sodium chloride 0.9% flush 10 mL    [DISCONTINUED] sotaloL tablet 120 mg    [DISCONTINUED] sulfaSALAzine tablet 500 mg     Current Outpatient Medications on File Prior to Encounter   Medication Sig    acetaminophen (TYLENOL) 325 MG tablet Take 650 mg by mouth daily as needed (Headache).    alendronate (FOSAMAX) 70 MG tablet Take 1 tablet (70 mg total) by mouth every 7 days.    amLODIPine (NORVASC) 5 MG tablet Take 1 tablet (5 mg total) by mouth once daily.    atorvastatin (LIPITOR) 20 MG tablet Take 1 tablet (20 mg total) by mouth once daily.    colestipoL (COLESTID) 1 gram Tab Take 1 g by mouth 2 (two) times daily.    diphenoxylate-atropine 2.5-0.025 mg (LOMOTIL) 2.5-0.025 mg per tablet TAKE 1 TABLET BY MOUTH 4 TIMES DAILY AS NEEDED FOR DIARRHEA    ELIQUIS 5 mg Tab Take 1 tablet (5 mg total) by mouth 2 (two) times daily.    esomeprazole (NEXIUM) 40 MG  capsule Take 1 capsule (40 mg total) by mouth once daily.    folic acid (FOLVITE) 1 MG tablet Take 1 tablet by mouth once daily    levothyroxine (SYNTHROID, LEVOTHROID) 175 MCG tablet Take 1 tablet by mouth once daily    lipase-protease-amylase 24,000-76,000-120,000 units (CREON) 24,000-76,000 -120,000 unit capsule Take 2 capsules by mouth 3 (three) times daily with meals.    MELATONIN ORAL Take 1 tablet by mouth nightly as needed (Insomnia).    metoprolol succinate (TOPROL-XL) 50 MG 24 hr tablet Take 1 tablet (50 mg total) by mouth once daily.    oxyCODONE (ROXICODONE) 5 MG immediate release tablet Take 1 tablet (5 mg total) by mouth every 4 (four) hours as needed for Pain.    polyethylene glycol (GLYCOLAX) 17 gram/dose powder Use cap to measure 17 grams, mix in liquid and take by mouth once daily.    prochlorperazine (COMPAZINE) 5 MG tablet Take 1 tablet (5 mg total) by mouth 4 (four) times daily as needed for Nausea (Take up to 4 times daily 30 minutes prior to meals).    senna-docusate 8.6-50 mg (PERICOLACE) 8.6-50 mg per tablet Take 1 tablet by mouth 2 (two) times daily.    sotaloL (BETAPACE) 120 MG Tab Take 1 tablet (120 mg total) by mouth every 12 (twelve) hours.    sulfaSALAzine (AZULFIDINE) 500 mg Tab Take 1 tablet by mouth twice daily    tolterodine (DETROL LA) 4 MG 24 hr capsule Take 1 capsule by mouth once daily     Family History       Problem Relation (Age of Onset)    Breast cancer Mother    Cancer Mother    Crohn's disease Other          Tobacco Use    Smoking status: Former     Types: Cigarettes     Quit date:      Years since quittin.3    Smokeless tobacco: Never   Substance and Sexual Activity    Alcohol use: No    Drug use: No    Sexual activity: Never     Review of Systems   Cardiovascular:  Negative for chest pain, dyspnea on exertion, irregular heartbeat, leg swelling, orthopnea, palpitations and syncope.   Respiratory:  Positive for shortness of breath. Negative for cough,  hemoptysis, sputum production and wheezing.    All other systems reviewed and are negative.  Objective:     Vital Signs (Most Recent):  Temp: 98.2 °F (36.8 °C) (05/04/23 0737)  Pulse: 69 (05/04/23 0730)  Resp: 20 (05/04/23 0501)  BP: 134/61 (05/04/23 0737)  SpO2: (!) 93 % (05/04/23 0730)   Vital Signs (24h Range):  Temp:  [97.7 °F (36.5 °C)-99.2 °F (37.3 °C)] 98.2 °F (36.8 °C)  Pulse:  [] 69  Resp:  [16-35] 20  SpO2:  [86 %-100 %] 93 %  BP: ()/(47-71) 134/61     Weight: 80 kg (176 lb 5.9 oz)  Body mass index is 34.44 kg/m².    SpO2: (!) 93 %         Intake/Output Summary (Last 24 hours) at 5/4/2023 0926  Last data filed at 5/4/2023 0700  Gross per 24 hour   Intake 20 ml   Output --   Net 20 ml       Lines/Drains/Airways       Drain  Duration             Female External Urinary Catheter 05/04/23 0255 <1 day              Peripheral Intravenous Line  Duration                  Peripheral IV - Single Lumen 05/04/23 0006 20 G Anterior;Distal;Left Upper Arm <1 day         Peripheral IV - Single Lumen 05/04/23 0007 22 G Anterior;Right Hand <1 day                     Physical Exam  Vitals reviewed.   Constitutional:       General: She is not in acute distress.     Appearance: She is obese. She is not ill-appearing.   Eyes:      General: No scleral icterus.     Pupils: Pupils are equal, round, and reactive to light.   Cardiovascular:      Rate and Rhythm: Normal rate and regular rhythm.      Pulses: Normal pulses.   Pulmonary:      Effort: Respiratory distress present.      Breath sounds: No stridor.      Comments: Tachypnea  Chest:      Chest wall: No tenderness.   Abdominal:      General: Bowel sounds are normal. There is no distension.      Palpations: Abdomen is soft.      Tenderness: There is no abdominal tenderness.   Musculoskeletal:         General: No swelling. Normal range of motion.      Cervical back: Normal range of motion. No rigidity.      Right lower leg: No edema.      Left lower leg: No edema.  "  Skin:     General: Skin is warm.      Capillary Refill: Capillary refill takes less than 2 seconds.      Coloration: Skin is not jaundiced.      Findings: No erythema.   Neurological:      General: No focal deficit present.      Mental Status: She is alert and oriented to person, place, and time. Mental status is at baseline.   Psychiatric:         Mood and Affect: Mood normal.        Significant Labs: All pertinent lab results from the last 24 hours have been reviewed.    Significant Imaging: Echocardiogram: Transthoracic echo (TTE) complete (Cupid Only):   Results for orders placed or performed during the hospital encounter of 03/27/23   Echo   Result Value Ref Range    Ascending aorta 2.55 cm    STJ 2.26 cm    AV mean gradient 11 mmHg    Ao peak amilcar 2.19 m/s    Ao VTI 47.31 cm    IVS 1.11 (A) 0.6 - 1.1 cm    LA size 4.88 cm    Left Atrium Major Axis 6.07 cm    Left Atrium Minor Axis 6.47 cm    LVIDd 3.98 3.5 - 6.0 cm    LVIDs 2.95 2.1 - 4.0 cm    LVOT diameter 1.94 cm    LVOT peak VTI 36.23 cm    Posterior Wall 1.04 0.6 - 1.1 cm    MV Peak A Amilcar 0.93 m/s    E wave deceleration time 204.63 msec    MV Peak E Amilcar 0.89 m/s    PV Peak D Amilcar 0.35 m/s    PV Peak S Amilcar 0.49 m/s    RA Major Axis 4.69 cm    RA Width 3.38 cm    RVDD 3.13 cm    Sinus 2.93 cm    TAPSE 1.38 cm    TR Max Amilcar 2.98 m/s    TDI LATERAL 0.09 m/s    TDI SEPTAL 0.05 m/s    LA WIDTH 3.89 cm    MV stenosis pressure 1/2 time 59.34 ms    LV Diastolic Volume 69.07 mL    LV Systolic Volume 33.59 mL    LVOT peak amilcar 1.56 m/s    LA volume (mod) 71.67 cm3    MV "A" wave duration 11.80 msec    LV LATERAL E/E' RATIO 9.89 m/s    LV SEPTAL E/E' RATIO 17.80 m/s    FS 26 %    LA volume 101.07 cm3    LV mass 139.79 g    Left Ventricle Relative Wall Thickness 0.52 cm    AV valve area 2.26 cm2    AV Velocity Ratio 0.71     AV index (prosthetic) 0.77     MV valve area p 1/2 method 3.71 cm2    E/A ratio 0.96     Mean e' 0.07 m/s    Pulm vein S/D ratio 1.40     LVOT " area 3.0 cm2    LVOT stroke volume 107.04 cm3    AV peak gradient 19 mmHg    E/E' ratio 12.71 m/s    LV Systolic Volume Index 19.2 mL/m2    LV Diastolic Volume Index 39.47 mL/m2    LA Volume Index 57.8 mL/m2    LV Mass Index 80 g/m2    Triscuspid Valve Regurgitation Peak Gradient 36 mmHg    LA Volume Index (Mod) 41.0 mL/m2    BSA 1.82 m2    Right Atrial Pressure (from IVC) 3 mmHg    EF 60 %    TV rest pulmonary artery pressure 39 mmHg    Narrative    · The estimated ejection fraction is 60%.  · The left ventricle is normal in size with concentric hypertrophy and   normal systolic function. There is systolic anterior motion without LVOT   obstruction.  · There is abnormal septal wall motion.  · Grade II left ventricular diastolic dysfunction.  · Normal right ventricular size with normal right ventricular systolic   function.  · Mild left atrial enlargement.  · The estimated PA systolic pressure is 39 mmHg.  · Normal central venous pressure (3 mmHg).        Assessment and Plan:     NSTEMI (non-ST elevated myocardial infarction)  Likely NSTEMI type 2 in the setting of afib and hypotension  Trops 0.041>>0.203    Patient in NSR, chest pain free  TTE with EF 60%    Recommendations:  - Would not start ACS protocol  - Please obtain STAT EKG if chest pain reoccurs    Paroxysmal atrial fibrillation  Likely in the setting of electrolyte abnormality and hypotension  Resolved after receiving 2.3L IVF resuscitation   Now NSR with A paced rhythm and known LBBB    Recommendations:  - Sotalol should be renally dosed for CrCl <60, please reduce to 120mg once daily  - Continue metoprolol 50mg daily, can up titrate as tolerated  - Continue AC with Eliquis 5mg BID  - Optimize fluid status   - Closely monitor electrolytes and aggressively replete for goal of Mg>2, K>4      VTE Risk Mitigation (From admission, onward)           Ordered     apixaban tablet 5 mg  2 times daily         05/04/23 0306     IP VTE HIGH RISK PATIENT  Once          05/04/23 0240     Place sequential compression device  Until discontinued         05/04/23 0240                    Thank you for your consult. I will sign off. Please contact us if you have any additional questions.    Beena Fernandez MD  Cardiology   [facility] ([facility])

## 2023-05-04 NOTE — ASSESSMENT & PLAN NOTE
Home medications: Amlodipine 5 mg daily    - Hold for now given hypotension on admission and abundance of caution as re-starting home sotalol, metoprolol.   - If blood pressure trending up throughout the day, can consider restarting.

## 2023-05-04 NOTE — ASSESSMENT & PLAN NOTE
Likely NSTEMI type 2 in the setting of afib and hypotension  Trops 0.041>>0.203    Patient in NSR, chest pain free  TTE with EF 60%    Recommendations:  - Would not start ACS protocol  - Please obtain STAT EKG if chest pain

## 2023-05-04 NOTE — TELEPHONE ENCOUNTER
"----- Message from José Miguel Reinoso sent at 5/4/2023 12:50 PM CDT -----  Consult/Advisory:          Name Of Caller: Genia ([employer])      Contact Preference?: [phone]       Case # [id number]      Provider Name: Sergio      Does patient feel the need to be seen today? No      What is the nature of the call?: Calling to notify office of denial for genetics testing auth. Stating detailed letter will be mailed to the office          Additional Notes:  "Thank you for all that you do for our patients"         "

## 2023-05-04 NOTE — TELEPHONE ENCOUNTER
----- Message from Tremontana Chevalier sent at 5/4/2023  9:54 AM CDT -----  Regarding: pt advice  Consult/Advisory    Name Of Caller:  Karen - pt's daughter in law      Contact Preference:  Karen @ [phone]    Nature of call: Caller says she is advising Dr. Hill's staff that pt is currently IP @ [facility] since 05/03 for infection in her lungs. Caller says pt is also very weak. Caller says pt has upcoming infusion, pls call to advise.

## 2023-05-04 NOTE — CODE/ RAPID DOCUMENTATION
RAPID RESPONSE NURSE NOTE        Admit Date: 5/3/2023  LOS: 0  Code Status: DNR   Date of Consult: 2023  : 1940  Age: 83 y.o.  Weight:   Wt Readings from Last 1 Encounters:   23 80 kg (176 lb 5.9 oz)     Sex: female  Race: White   Bed: [address] [place] [address] A:   MRN: [id number]  Time Rapid Response Team page Received: 10:23  Time Rapid Response Team at Bedside: 10:25  Time Rapid Response Team left Bedside: 11:00  Was the patient discharged from an ICU this admission? No   Was the patient discharged from a PACU within last 24 hours? No   Did the patient receive conscious sedation/general anesthesia in last 24 hours? No  Was the patient in the ED within the past 24 hours? Yes  Was the patient on NIPPV within the past 24 hours? No   Did this progress into an ARC or CPA: No  Attending Physician: Letty Stiles MD  Primary Service: [facility]       SITUATION    Notified by pager.  Reason for alert: Acute Shortness of Breath  Called to evaluate the patient for Respiratory    BACKGROUND     Why is the patient in the hospital?: Chest pain    Patient has a past medical history of Anticoagulant long-term use, Atrial fibrillation, Crohn disease, GERD (gastroesophageal reflux disease), Hyperlipidemia, Hypertension, Pancreatic adenocarcinoma, and Thyroid disease.    Last Vitals:  Temp: 98.2 °F (36.8 °C) ( 0737)  Pulse: 69 ( 1147)  Resp: 20 ( 1108)  BP: 148/63 ( 1029)  SpO2: 100 % ( 1108)    24 Hours Vitals Range:  Temp:  [97.7 °F (36.5 °C)-99.2 °F (37.3 °C)]   Pulse:  []   Resp:  [16-35]   BP: ()/(47-71)   SpO2:  [83 %-100 %]     Labs:  Recent Labs     23  0149 23  0816 23  2328 23  2337   WBC 6.80 7.28 9.54  --    HGB 9.9* 9.1* 8.8*  --    HCT 35.2* 32.8* 31.0* 30*    200 196  --        Recent Labs     23  0149 23  0816 23  0034    141 134*   K 3.6 3.6 3.3*    105 105   CO2 24 29 21*   CREATININE 1.1 0.8 0.9   GLU  201* 103 193*   MG  --   --  1.1*        Recent Labs     05/02/23  0441   PH 7.410   PCO2 42.4   PO2 43   HCO3 26.9   POCSATURATED 78*   BE 2        ASSESSMENT    Physical Exam  Eyes:      Pupils: Pupils are equal, round, and reactive to light.   Cardiovascular:      Rate and Rhythm: Rhythm irregular.   Pulmonary:      Effort: Respiratory distress present.      Breath sounds: Wheezing present.      Comments: crackles audible upon auscultation   Neurological:      Mental Status: She is alert and oriented to person, place, and time. Mental status is at baseline.       INTERVENTIONS    The Rapid Response Team was called to this bedside to assist with patient care as she was experiencing acute respiratory distress. Dr. Stiles (primary) and her team arrived to the bedside at the same time and was able to provide a patient history and establish an updated plan of care. Reportedly, the patient became short of breath with oxygen requirements escalating from 2L NC to NRB and significant tachypnea. The patient's BP was stable, HR showing afib  bpm. She was sitting upright in bed, fully alert and oriented. A full patient assessment revealed bilateral crackles upon auscultation. The patient was deemed to be volume overloaded as she received 2.5 L IVF in the ED overnight. The team decided on the following interventions: Chest X-Ray, EKG 12-lead, scheduled DUO-NEB treatments, and Lasix 20 mg.     RECOMMENDATIONS    We recommend: Continuous pulse ox and telemetry. Monitor respiratory status and intervention efficacy. Wean O2 as tolerated     PROVIDER ESCALATION    Orders received and case discussed with Dr. Stiles .    Primary team arrival time: 10:25 (w/rapid team)    Disposition: Remain in room 847.    FOLLOW UP    Bedside Genia DICKEY  updated on plan of care. Instructed to call the Rapid Response Nurse, Marleny Ruiz RN at [phone] for additional questions or concerns.

## 2023-05-04 NOTE — PROGRESS NOTES
Pharmacokinetic Initial Assessment: IV Vancomycin    Assessment/Plan:    Continue intravenous vancomycin after loading dose of 2000 mg given once before consultwith subsequent doses when random concentrations are less than 20 mcg/mL  Desired empiric serum trough concentration is 15 to 20 mcg/mL  Draw vancomycin random level on 5/04 at 1130.  Pharmacy will continue to follow and monitor vancomycin.      Please contact pharmacy at extension [phone] with any questions regarding this assessment.     Thank you for the consult,   Farhan Oropeza       Patient brief summary:  Pauline Cronin is a 83 y.o. female initiated on antimicrobial therapy with IV Vancomycin for treatment of suspected  pneumonia    Drug Allergies:   Review of patient's allergies indicates:   Allergen Reactions    Lisinopril Other (See Comments)     cough       Actual Body Weight:   79.4kg    Renal Function:   Estimated Creatinine Clearance: 45 mL/min (based on SCr of 0.9 mg/dL).,     Dialysis Method (if applicable):  N/A    CBC (last 72 hours):  Recent Labs   Lab Result Units 05/01/23  1224 05/02/23  0149 05/03/23  0816 05/03/23  2328   WBC K/uL 5.29 6.80 7.28 9.54   Hemoglobin g/dL 9.7* 9.9* 9.1* 8.8*   Hematocrit % 33.6* 35.2* 32.8* 31.0*   Platelets K/uL 306 312 200 196   Gran % %  --  84.5* 81.7* 88.1*   Lymph % %  --  11.3* 14.4* 9.0*   Mono % %  --  3.5* 2.1* 0.8*   Eosinophil % %  --  0.0 1.2 1.2   Basophil % %  --  0.3 0.3 0.3   Differential Method   --  Automated Automated Automated       Metabolic Panel (last 72 hours):  Recent Labs   Lab Result Units 05/01/23  1224 05/02/23  0149 05/02/23  2117 05/03/23  0816 05/04/23  0034   Sodium mmol/L 141 141  --  141 134*   Potassium mmol/L 4.4 3.6  --  3.6 3.3*   Chloride mmol/L 107 107  --  105 105   CO2 mmol/L 25 24  --  29 21*   Glucose mg/dL 132* 201*  --  103 193*   Glucose, UA   --   --  Negative  --   --    BUN mg/dL 12 13  --  10 14   Creatinine mg/dL 1.1 1.1  --  0.8 0.9   Albumin g/dL 2.7*  3.0*  --  2.6* 2.3*   Total Bilirubin mg/dL 0.3 0.5  --  1.4* 1.2*   Alkaline Phosphatase U/L 61 71  --  61 55   AST U/L 38 27  --  23 23   ALT U/L 11 13  --  11 12   Magnesium mg/dL  --   --   --   --  1.1*       Drug levels (last 3 results):  No results for input(s): VANCOMYCINRA, VANCORANDOM, VANCOMYCINPE, VANCOPEAK, VANCOMYCINTR, VANCOTROUGH in the last 72 hours.    Microbiologic Results:  Microbiology Results (last 7 days)       Procedure Component Value Units Date/Time    Blood culture x two cultures. Draw prior to antibiotics. [[id number]] Collected: 05/04/23 0034    Order Status: Sent Specimen: Blood from Peripheral, Antecubital, Left Updated: 05/04/23 0034    Blood culture x two cultures. Draw prior to antibiotics. [[id number]] Collected: 05/03/23 5861    Order Status: Sent Specimen: Blood from Peripheral, Hand, Right Updated: 05/04/23 0005

## 2023-05-04 NOTE — ASSESSMENT & PLAN NOTE
Reproducible anterior chest wall pain that she describes as pressure like. Troponin 0.041 without EKG changes. Low suspicion for NSTEMI Type 1 given clinical history and presentation, most likely NSTEMI Type II from demand ischemia as she was hypotensive and in A-fib on admission.    - Trend troponin to peak.

## 2023-05-04 NOTE — ASSESSMENT & PLAN NOTE
Loose stools approximately 3x per day at baseline. On daily sulfasalazine. No evidence of acute flare.    - continue sulfasalazine

## 2023-05-04 NOTE — PT/OT/SLP PROGRESS
Occupational Therapy      Patient Name:  Pauline Cronin   MRN:  [id number]    Patient not seen today secondary to Other (Comment) (Pt refused 2/2 pt stating recovering from chest pain, politely refusing despite max education.). Will follow-up for OT eval.    5/4/2023

## 2023-05-04 NOTE — ED PROVIDER NOTES
Encounter Date: 5/3/2023       History     Chief Complaint   Patient presents with    Chest Pain     Hx of pancreatic cancer, recent hospital admission, cp started this evening, pt is pale and weak     83 y.o. female with GERD, HLD, HTN, pancreatic adenocarcinoma, thyroid disease, AFib on Eliquis, sinus syndrome status post ICD/pacemaker presents for chest pain and weakness.  Patient reports chest pain started earlier today and is in the center of the chest.  The pain does not radiate.  Patient reports associated generalized weakness.  Patient also reports a headache that is gradually came on throughout the day.  Per family, patient is more weak than usual.  Patient was recently discharged from the hospital after admission for AFib with RVR.  Patient has not had any unilateral weakness, leg swelling/pain, abdominal pain, cough, shortness of breath    The history is provided by the patient, medical records and a relative.   Review of patient's allergies indicates:   Allergen Reactions    Lisinopril Other (See Comments)     cough     Past Medical History:   Diagnosis Date    Anticoagulant long-term use     Atrial fibrillation     Crohn disease diagnosed in her 20's    GERD (gastroesophageal reflux disease)     Hyperlipidemia     Hypertension     Pancreatic adenocarcinoma 3/21/2023    Thyroid disease     s/p I 131     Past Surgical History:   Procedure Laterality Date    APPENDECTOMY      CARDIAC SURGERY  2014    pacemaker    COLONOSCOPY  ~2008    normal findings per patient report    ENDOSCOPIC ULTRASOUND OF UPPER GASTROINTESTINAL TRACT N/A 3/14/2023    Procedure: ULTRASOUND, UPPER GI TRACT, ENDOSCOPIC;  Surgeon: Andrei Swartz MD;  Location: [facility];  Service: Endoscopy;  Laterality: N/A;  Approval to hold Eliquis rec'd from Dr. Barbosa (see telephone encounter 3/3/23)-DS  [employer] AICD/PPM  3/3/23-Instructions via portal-DS    ERCP N/A 3/21/2023    Procedure: ERCP (ENDOSCOPIC RETROGRADE  CHOLANGIOPANCREATOGRAPHY);  Surgeon: Celina Toney MD;  Location: [facility] ENDO ([facility] FLR);  Service: Endoscopy;  Laterality: N/A;    ESOPHAGOGASTRODUODENOSCOPY N/A 2018    Procedure: EGD (ESOPHAGOGASTRODUODENOSCOPY);  Surgeon: Alondra Casiano MD;  Location: [facility] ENDO;  Service: Endoscopy;  Laterality: N/A;    HYSTERECTOMY      INSERTION OF IMPLANTABLE CARDIOVERTER-DEFIBRILLATOR (ICD) GENERATOR WITH TWO EXISTING LEADS Left 5/10/2021    Procedure: INSERTION, PULSE GENERATOR WITH 2 EXISTING LEADS, ICD;  Surgeon: Bo Leone MD;  Location: [facility] CATH LAB;  Service: Cardiology;  Laterality: Left;    INSERTION OF PACEMAKER      REPLACEMENT OF PACEMAKER GENERATOR  05/10/2021    RETROGRADE PYELOGRAPHY Right 2020    Procedure: PYELOGRAM, RETROGRADE;  Surgeon: Jovan Kruse MD;  Location: [facility];  Service: Urology;  Laterality: Right;    SMALL INTESTINE SURGERY  in her 20's    for crohn's    TONSILLECTOMY      UPPER GASTROINTESTINAL ENDOSCOPY  ~    normal findings per patient report     Family History   Problem Relation Age of Onset    Cancer Mother     Breast cancer Mother     Crohn's disease Other     Colon cancer Neg Hx     Colon polyps Neg Hx     Esophageal cancer Neg Hx     Stomach cancer Neg Hx     Ulcerative colitis Neg Hx      Social History     Tobacco Use    Smoking status: Former     Types: Cigarettes     Quit date:      Years since quittin.3    Smokeless tobacco: Never   Substance Use Topics    Alcohol use: No    Drug use: No     Review of Systems   Reason unable to perform ROS: See HPI for relevant ROS.     Physical Exam     Initial Vitals [23 2242]   BP Pulse Resp Temp SpO2   (!) 77/47 (!) 134 16 97.7 °F (36.5 °C) 95 %      MAP       --         Physical Exam    Nursing note and vitals reviewed.  Constitutional:   Alert, ill-appearing, pale   HENT:   Oropharynx dry   Eyes: Conjunctivae and EOM are normal. Pupils are equal, round, and reactive to light. No scleral icterus.    Cardiovascular:            Tachycardic, irregular rhythm   Pulmonary/Chest: Breath sounds normal. No stridor. No respiratory distress.   Abdominal:   Abdomen soft and nondistended, moderate epigastric tenderness   Musculoskeletal:         General: No tenderness or edema.     Neurological: She is alert and oriented to person, place, and time.   Skin: Skin is warm and dry. No rash noted. There is pallor.       ED Course   Procedures  Labs Reviewed   CBC W/ AUTO DIFFERENTIAL - Abnormal; Notable for the following components:       Result Value    RBC 3.60 (*)     Hemoglobin 8.8 (*)     Hematocrit 31.0 (*)     MCH 24.4 (*)     MCHC 28.4 (*)     RDW 25.9 (*)     Immature Granulocytes 0.6 (*)     Gran # (ANC) 8.4 (*)     Immature Grans (Abs) 0.06 (*)     Lymph # 0.9 (*)     Mono # 0.1 (*)     Gran % 88.1 (*)     Lymph % 9.0 (*)     Mono % 0.8 (*)     All other components within normal limits   TROPONIN I - Abnormal; Notable for the following components:    Troponin I 0.041 (*)     All other components within normal limits   B-TYPE NATRIURETIC PEPTIDE - Abnormal; Notable for the following components:     (*)     All other components within normal limits   PROTIME-INR - Abnormal; Notable for the following components:    Prothrombin Time 12.9 (*)     All other components within normal limits   COMPREHENSIVE METABOLIC PANEL - Abnormal; Notable for the following components:    Sodium 134 (*)     Potassium 3.3 (*)     CO2 21 (*)     Glucose 193 (*)     Calcium 7.9 (*)     Total Protein 5.8 (*)     Albumin 2.3 (*)     Total Bilirubin 1.2 (*)     All other components within normal limits   MAGNESIUM - Abnormal; Notable for the following components:    Magnesium 1.1 (*)     All other components within normal limits   LIPASE - Abnormal; Notable for the following components:    Lipase 193 (*)     All other components within normal limits   ISTAT PROCEDURE - Abnormal; Notable for the following components:    POC Glucose 174  (*)     POC Sodium 132 (*)     POC Potassium 6.7 (*)     POC Ionized Calcium 0.85 (*)     POC Hematocrit 30 (*)     All other components within normal limits   CULTURE, BLOOD   CULTURE, BLOOD   PROCALCITONIN   TSH   URINALYSIS, REFLEX TO URINE CULTURE   TYPE & SCREEN   ISTAT LACTATE   ISTAT CHEM8     EKG Readings: (Independently Interpreted)   Atrial fibrillation with left bundle-branch block, irregular, wide complex, rate of 128, negative Sgarbossa, ST depressions more prominent in lead II and rate has increased compared to previous     Imaging Results              CTA Chest Non-Coronary (PE Studies) (In process)  Result time 05/04/23 02:27:33                     CT Head Without Contrast (Final result)  Result time 05/04/23 01:45:20      Final result by Sofi Almazan MD (05/04/23 01:45:20)                   Impression:      1. No CT evidence of acute intracranial abnormality. Clinical correlation and further evaluation as warranted.  2. Generalized cerebral volume loss and findings suggestive of chronic microvascular ischemic change.      Electronically signed by: Sofi Almazan MD  Date:    05/04/2023  Time:    01:45               Narrative:    EXAMINATION:  CT HEAD WITHOUT CONTRAST    CLINICAL HISTORY:  Headache, new or worsening (Age >= 50y);    TECHNIQUE:  Low dose axial images were obtained through the head.  Coronal and sagittal reformations were also performed. Contrast was not administered.    COMPARISON:  None.    FINDINGS:  There is no acute intracranial hemorrhage, hydrocephalus, midline shift or mass effect. There is mild generalized cerebral volume loss compensatory prominence of the ventricular system.  There is hypoattenuation within the periventricular white matter, nonspecific but likely reflecting sequela of chronic microvascular ischemic change.  Gray-white matter differentiation is otherwise maintained.  The basal cisterns are patent. Visualized paranasal sinuses and mastoid air cells are clear  of acute process.  The visualized bones of the calvarium demonstrate no acute osseous abnormality.                                       X-Ray Chest AP Portable (Final result)  Result time 05/03/23 23:22:34      Final result by Benigno Torrez MD (05/03/23 23:22:34)                   Impression:      As above.      Electronically signed by: Benigno Torrez MD  Date:    05/03/2023  Time:    23:22               Narrative:    EXAMINATION:  XR CHEST AP PORTABLE    CLINICAL HISTORY:  Sepsis;    TECHNIQUE:  Single frontal view of the chest was performed.    COMPARISON:  05/02/2023.    FINDINGS:  Pacer leads and sternotomy wires appears similar to prior.    Subsegmental atelectatic changes at the lung bases.    Heart and lungs otherwise appear unchanged when allowing for differences in technique and positioning.                                       Medications   vancomycin 2 g in dextrose 5 % 500 mL IVPB (2,000 mg Intravenous New Bag 5/4/23 0033)   magnesium sulfate 2g in water 50mL IVPB (premix) (has no administration in time range)   potassium bicarbonate disintegrating tablet 40 mEq (has no administration in time range)   morphine injection 4 mg (has no administration in time range)   acetaminophen tablet 1,000 mg (has no administration in time range)   lactated ringers bolus 2,382 mL (2,382 mLs Intravenous New Bag 5/3/23 2328)   famotidine (PF) injection 20 mg (20 mg Intravenous Given 5/3/23 2353)   ceFEPIme (MAXIPIME) 2 g in dextrose 5 % in water (D5W) 5 % 50 mL IVPB (MB+) (0 g Intravenous Stopped 5/4/23 0023)   ondansetron injection 4 mg (4 mg Intravenous Given 5/4/23 0041)   calcium gluconate 1 g in NS IVPB (premixed) (0 g Intravenous Stopped 5/4/23 0207)   iohexoL (OMNIPAQUE 350) injection 75 mL (75 mLs Intravenous Given 5/4/23 0117)     Medical Decision Making:   Initial Assessment:   83 y.o. female with GERD, HLD, HTN, pancreatic adenocarcinoma, thyroid disease, AFib on Eliquis, sinus syndrome status post  ICD/pacemaker presents for chest pain and weakness  Patient with symptoms concerning for sepsis  Sepsis protocols initiated, patient started on IV fluids, IV antibiotics, broad workup including blood cultures ordered, will evaluate for possible source.    Differentials include pneumonia, UTI, PE, viral illness, dehydration, metabolic derangement, arrhythmia  IV antibiotics were given empirically  Full 30 cc/kg bolus was ordered as patient does not have contraindications to aggressive IV fluid resuscitation          Attending Attestation:   Physician Attestation Statement for Resident:  As the supervising MD   Physician Attestation Statement: I have personally seen and examined this patient.   I agree with the above history.  -:   As the supervising MD I agree with the above PE.     As the supervising MD I agree with the above treatment, course, plan, and disposition.    I have reviewed and agree with the residents interpretation of the following: lab data, x-rays, EKG and CT scans.  I have reviewed the following: old records at this facility.              ED Course as of 05/04/23 0228   u May 04, 2023   0030 Pulse(!): 119 [OK]   0137 X-Ray Chest AP Portable  Findings, per independent interpretation:  Worsening infiltrate in the right lower lobe concerning for pneumonia [OK]   0226 Patient hypokalemic, hypocalcemic, and hypomagnesemic, all of which were replenished Discussed with heme Onc who will admit patient [OK]      ED Course User Index  [OK] Ming Salinas MD                 Clinical Impression:   Final diagnoses:  [R07.9] Chest pain  [I95.9] Hypotension, unspecified hypotension type  [R53.1] Weakness  [I48.91] Atrial fibrillation with RVR  [J18.9] Pneumonia of right lower lobe due to infectious organism (Primary)  [E87.6] Hypokalemia  [E83.42] Hypomagnesemia        ED Disposition Condition    Admit Stable                Ming Salinas MD  Resident  05/04/23 0228       Hodan Greene MD  05/04/23  0350

## 2023-05-04 NOTE — H&P
[facility] Dept  Hematology/Oncology  H&P    Patient Name: Pauline Cronin  MRN: [id number]  Admission Date: 5/3/2023  Code Status: DNR   Attending Provider: Letty Stiles MD  Primary Care Physician: Ga Waldrop MD  Principal Problem:Chest pain    Subjective:     HPI: Pauline Cronin is an 83F with atrial fibrillation on apixaban and sotalol/metoprolol, hypothyroidism, osteopenia, HLD, obesity, Crohn's disease, pancreatic adenocarcinoma diagnosed by ERCP biopsy/stent placement 3/2023 complicated by post ERCP pancreatitis, pancreatic adenocarcinoma not amenable to resection presenting with a one day history of palpitations and anterior pressure like nonradiating chest pain. Of note, she was recently admitted to the Medical Oncology service at [facility] for intractable nausea and vomiting. She was treated with IVFs and anti-emetics for suspected pancreatitis and discharged home with home health on 5/03. Throughout the night, she developed palpitations along with chest pain, prompting ED arrival. She denies any fevers, chills, SOB, leg swelling, PND, orthopnea, n/v/d, abdominal pain or urinary changes.     On arrival, she was tachycardic to 134 BPM, hypotensive (77/47), otherwise afebrile and satting 95% on room air. Laboratory workup significant for hypokalemia (3.3), hypomagnesemia (1.1), lipase 193 (downtrending from 370). CXR without focal consolidation. CTA negative for PE to the proximal segmental level but bolus timing was suboptimal, along with bilateral GGO. Initial EKG showing A-fib RVR. She was given 2.3 L of IVFs with improvement in her blood pressure and resolution of A-fib. Additionally treated with potassium, Mg, zofran, morphine, 1 g calcium, along with Vanc and Cefepime then admitted to Medical Oncology service for further workup.     Oncologic history, per Dr. Hill clinic note 5/1/23  She presented initially to her PCP on 03/01/23 with several weeks of fatigue, nausea and several pounds of  weight loss. She had a CT abdomen pelvis which showed a pancreatic head mass that abuts or invades the adjacent 2nd portion of the duodenum it was measured about 3.3 x 2.8 cm. There was some degree of GOO. The portal vein, splenic vein, SMV, celiac axis and SMA all are patent.   She underwent an ERCP w biopsy of the mass on 03/14 which was positive for adenocarcinoma, pMMR. She was admitted on 03/17 for hyperbilirubinemia. She underwent ERCP with stenting on 03/21 c/w post-ERCP pancreatitis.   She was admitted again 03/28 for afib with RVR and ADHF. She was discharged on 04/05.   Admitted 4/22-4/24 for N/V and abdominal pain that resolved with conservative management.   Admitted 5/02 to 5/03 for intractable to nausea and vomiting, treated for pancreatitis.    Oncology Treatment Plan:   OP PANC NAB-PACLITAXEL + GEMCITABINE Q4W    Medications:  Continuous Infusions:  Scheduled Meds:   apixaban  5 mg Oral BID    atorvastatin  20 mg Oral Daily    ceFEPime (MAXIPIME) IVPB  2 g Intravenous Q12H    colestipoL  1 g Oral BID    folic acid  1 mg Oral Daily    levothyroxine  175 mcg Oral Daily    lipase-protease-amylase 24,000-76,000-120,000 units  2 capsule Oral TID WM    magnesium sulfate IVPB  2 g Intravenous Q2H    metoprolol succinate  50 mg Oral Daily    oxybutynin  10 mg Oral Daily    pantoprazole  40 mg Oral Daily    polyethylene glycol  17 g Oral Daily    sotaloL  120 mg Oral Q12H    sulfaSALAzine  500 mg Oral BID     PRN Meds:acetaminophen, dextrose 10%, dextrose 10%, glucagon (human recombinant), glucose, glucose, melatonin, naloxone, oxyCODONE, prochlorperazine, sodium chloride 0.9%, Pharmacy to dose Vancomycin consult **AND** vancomycin - pharmacy to dose     Review of patient's allergies indicates:   Allergen Reactions    Lisinopril Other (See Comments)     cough        Past Medical History:   Diagnosis Date    Anticoagulant long-term use     Atrial fibrillation     Crohn disease diagnosed in her 20's    GERD  (gastroesophageal reflux disease)     Hyperlipidemia     Hypertension     Pancreatic adenocarcinoma 3/21/2023    Thyroid disease     s/p I 131     Past Surgical History:   Procedure Laterality Date    APPENDECTOMY      CARDIAC SURGERY  2014    pacemaker    COLONOSCOPY  ~2008    normal findings per patient report    ENDOSCOPIC ULTRASOUND OF UPPER GASTROINTESTINAL TRACT N/A 3/14/2023    Procedure: ULTRASOUND, UPPER GI TRACT, ENDOSCOPIC;  Surgeon: Andrei Swartz MD;  Location: [facility] ENDO;  Service: Endoscopy;  Laterality: N/A;  Approval to hold Eliquis rec'd from Dr. Barbosa (see telephone encounter 3/3/23)-DS  [employer] AICD/PPM  3/3/23-Instructions via portal-DS    ERCP N/A 3/21/2023    Procedure: ERCP (ENDOSCOPIC RETROGRADE CHOLANGIOPANCREATOGRAPHY);  Surgeon: Celina Toney MD;  Location: [facility] ([facility] FLR);  Service: Endoscopy;  Laterality: N/A;    ESOPHAGOGASTRODUODENOSCOPY N/A 7/17/2018    Procedure: EGD (ESOPHAGOGASTRODUODENOSCOPY);  Surgeon: Alondra Casiano MD;  Location: [facility] ENDO;  Service: Endoscopy;  Laterality: N/A;    HYSTERECTOMY      INSERTION OF IMPLANTABLE CARDIOVERTER-DEFIBRILLATOR (ICD) GENERATOR WITH TWO EXISTING LEADS Left 5/10/2021    Procedure: INSERTION, PULSE GENERATOR WITH 2 EXISTING LEADS, ICD;  Surgeon: Bo Leone MD;  Location: [facility] CATH LAB;  Service: Cardiology;  Laterality: Left;    INSERTION OF PACEMAKER      REPLACEMENT OF PACEMAKER GENERATOR  05/10/2021    RETROGRADE PYELOGRAPHY Right 2/18/2020    Procedure: PYELOGRAM, RETROGRADE;  Surgeon: Jovan Kruse MD;  Location: [facility] OR;  Service: Urology;  Laterality: Right;    SMALL INTESTINE SURGERY  in her 20's    for crohn's    TONSILLECTOMY      UPPER GASTROINTESTINAL ENDOSCOPY  ~2008    normal findings per patient report     Family History       Problem Relation (Age of Onset)    Breast cancer Mother    Cancer Mother    Crohn's disease Other          Tobacco Use    Smoking status: Former     Types: Cigarettes     Quit  date:      Years since quittin.3    Smokeless tobacco: Never   Substance and Sexual Activity    Alcohol use: No    Drug use: No    Sexual activity: Never       Review of Systems   Constitutional:  Positive for activity change and fatigue. Negative for chills, diaphoresis and fever.   HENT:  Negative for congestion.    Respiratory:  Negative for cough and shortness of breath.    Cardiovascular:  Positive for chest pain and palpitations.   Gastrointestinal:  Negative for abdominal pain, diarrhea, nausea and vomiting.   Genitourinary:  Negative for difficulty urinating, dysuria and frequency.   Musculoskeletal:  Positive for gait problem.   Skin:  Negative for rash.   Allergic/Immunologic: Positive for immunocompromised state.   Neurological:  Negative for light-headedness and headaches.   Hematological:  Bruises/bleeds easily.   Objective:     Vital Signs (Most Recent):  Temp: 97.7 °F (36.5 °C) (23 2242)  Pulse: 70 (23 0233)  Resp: 20 (23 0048)  BP: (!) 152/64 (23 0233)  SpO2: 100 % (23)   Vital Signs (24h Range):  Temp:  [97.7 °F (36.5 °C)-99.2 °F (37.3 °C)] 97.7 °F (36.5 °C)  Pulse:  [] 70  Resp:  [16-35] 20  SpO2:  [86 %-100 %] 100 %  BP: ()/(47-65) 152/64     Weight: 79.4 kg (175 lb)  Body mass index is 34.18 kg/m².  Body surface area is 1.83 meters squared.      Intake/Output Summary (Last 24 hours) at 2023 0310  Last data filed at 2023 0207  Gross per 24 hour   Intake 20 ml   Output --   Net 20 ml       Physical Exam  Vitals reviewed.   Constitutional:       General: She is not in acute distress.     Appearance: Normal appearance. She is ill-appearing. She is not toxic-appearing.   HENT:      Head: Normocephalic and atraumatic.      Right Ear: External ear normal.      Left Ear: External ear normal.      Nose: Nose normal. No congestion.      Mouth/Throat:      Mouth: Mucous membranes are moist.      Pharynx: No oropharyngeal exudate.   Eyes:       General: No scleral icterus.     Conjunctiva/sclera: Conjunctivae normal.   Cardiovascular:      Rate and Rhythm: Normal rate and regular rhythm.      Pulses: Normal pulses.   Pulmonary:      Effort: Pulmonary effort is normal. No respiratory distress.      Breath sounds: Normal breath sounds. No wheezing or rales.      Comments: Reproducible anterior chest wall tenderness.   Chest:      Chest wall: Tenderness present.   Abdominal:      General: There is no distension.      Palpations: Abdomen is soft.      Tenderness: There is no abdominal tenderness. There is no guarding.   Musculoskeletal:         General: Normal range of motion.      Right lower leg: No edema.      Left lower leg: No edema.   Skin:     General: Skin is warm and dry.      Findings: No rash.   Neurological:      Mental Status: She is alert and oriented to person, place, and time. Mental status is at baseline.   Psychiatric:         Mood and Affect: Mood normal.         Behavior: Behavior normal.       Significant Labs:   All pertinent labs from the last 24 hours have been reviewed.    Diagnostic Results:  Imaging Results               CTA Chest Non-Coronary (PE Studies) (Final result)  Result time 05/04/23 02:33:36      Final result by Benigno Torrez MD (05/04/23 02:33:36)                   Impression:      1. Suboptimal contrast bolus timing.  No pulmonary embolism to the proximal segmental level.  There is a hypodensity in the left upper lobe segmental branch which is favored to represent streak artifact, however, small pulmonary embolus cannot be completely excluded.  2. Similar appearance of ground-glass attenuation throughout the bilateral lung fields, nonspecific and may represent edema, infection, aspiration, or inflammation.  3. Trace right pleural effusion, new from prior.  4. Stable heterogeneous mass in the body of the pancreas.  5. Similar appearance of mild peripancreatic inflammatory changes, suggestive of possible  pancreatitis.  6. Biliary sludge, similar to prior.  7. Common bile duct stent with pneumobilia.  This report was flagged in Epic as abnormal.    Electronically signed by resident: Margo Choudhary  Date:    05/04/2023  Time:    01:42    Electronically signed by: Benigno Torrez MD  Date:    05/04/2023  Time:    02:33               Narrative:    EXAMINATION:  CTA CHEST NON CORONARY (PE STUDIES)    CLINICAL HISTORY:  Pulmonary embolism (PE) suspected, high prob;    TECHNIQUE:  Low dose axial images, sagittal and coronal reformations were obtained from the thoracic inlet to the lung bases following the IV administration of 75 mL of Omnipaque-350.  Scan technique was optimized to evaluate the pulmonary arteries.  MIP images were performed.    COMPARISON:  CTA chest 04/16/2023    FINDINGS:  Suboptimal contrast bolus timing.  No pulmonary artery filling defects to the proximal segmental level.  There is a questionable hypodensity in left upper lobe segmental branch (axial series 2, image 170), favored to represent streak artifact, however, small pulmonary embolus cannot be completely excluded.  No suspicious pulmonary nodules ground-glass attenuation throughout the bilateral lung fields, similar to prior CTA from 04/16/2023.  Bilateral bandlike opacifications in the lung bases favored to represent scarring or subsegmental atelectasis.   No pneumothorax.  Trace right pleural effusion, new from prior.    Heart size is normal.  No pericardial effusion.  The aorta is normal in course and caliber.  The main pulmonary artery is normal in diameter.  No mediastinal, hilar or axillary lymphadenopathy.  The visualized thyroid gland is normal.    Layering high-density material within the lumen of the gallbladder, likely representing biliary sludge.  There is a common bile duct stent in place.  Punctate focus of pneumobilia, decreased compared to prior and likely related to common bile duct stent.  Similar appearance of heterogeneous  appearing mass in the body of the pancreas measuring approximately 3.4 x 2.6 cm (axial series 2, image 408), previously 3.4 x 2.5 cm.  This mass abuts the lesser curvature of the stomach no intervening fat plane suggesting possible direct invasion/involvement.  Mild peripancreatic fat stranding, similar to prior.    Degenerative changes of the osseous structures.  Stable mild superior endplate deformity of the T3 vertebral body.  Postop change of median sternotomy.  Left chest wall cardiac device.                                       CT Head Without Contrast (Final result)  Result time 05/04/23 01:45:20      Final result by Sofi Almazan MD (05/04/23 01:45:20)                   Impression:      1. No CT evidence of acute intracranial abnormality. Clinical correlation and further evaluation as warranted.  2. Generalized cerebral volume loss and findings suggestive of chronic microvascular ischemic change.      Electronically signed by: Sofi Almazan MD  Date:    05/04/2023  Time:    01:45               Narrative:    EXAMINATION:  CT HEAD WITHOUT CONTRAST    CLINICAL HISTORY:  Headache, new or worsening (Age >= 50y);    TECHNIQUE:  Low dose axial images were obtained through the head.  Coronal and sagittal reformations were also performed. Contrast was not administered.    COMPARISON:  None.    FINDINGS:  There is no acute intracranial hemorrhage, hydrocephalus, midline shift or mass effect. There is mild generalized cerebral volume loss compensatory prominence of the ventricular system.  There is hypoattenuation within the periventricular white matter, nonspecific but likely reflecting sequela of chronic microvascular ischemic change.  Gray-white matter differentiation is otherwise maintained.  The basal cisterns are patent. Visualized paranasal sinuses and mastoid air cells are clear of acute process.  The visualized bones of the calvarium demonstrate no acute osseous abnormality.                                        X-Ray Chest AP Portable (Final result)  Result time 05/03/23 23:22:34      Final result by Benigno Torrez MD (05/03/23 23:22:34)                   Impression:      As above.      Electronically signed by: Benigno Torrez MD  Date:    05/03/2023  Time:    23:22               Narrative:    EXAMINATION:  XR CHEST AP PORTABLE    CLINICAL HISTORY:  Sepsis;    TECHNIQUE:  Single frontal view of the chest was performed.    COMPARISON:  05/02/2023.    FINDINGS:  Pacer leads and sternotomy wires appears similar to prior.    Subsegmental atelectatic changes at the lung bases.    Heart and lungs otherwise appear unchanged when allowing for differences in technique and positioning.                                        Assessment/Plan:     * Chest pain  83F with extensive comorbidites recently admitted with intractable nausea, vomiting following chemo, treated for pancreatitis and discharged on 5/03 now representing with a one day history of palpitations and nonradiating pressure like anterior chest pain. Found to be in A-fib RVR and hypotensive on admission, which resolved with IVFs. Elevated troponin to 0.041 without EKG changes. Reproducible chest wall tenderness on exam. CXR without focal consolidation. CTA negative for PE to the proximal segmental level but bolus timing was suboptimal, along with bilateral GGO.      - Continue broad spectrum abx for now, but suspicion for infection is relatively low.   - Follow infectious workup.   - Trend troponin to peak.   - Cardiac monitoring and EKG prn for recurrent chest pain.     Suspected sepsis  Recently admitted for intractable nausea and vomiting s/p chemo, treated for pancreatitis and discharged 5/3.   Represented with palpitations, CP and hypotension on admission.   No leukocytosis, fevers, or focal consolidation noted on CXR/CTA. New small R sided pleural effusion. Procal neg.  Treated with Vanc and Cefepime in the ED.     - Continue broad spectrum abx for now but  overall suspicion for infectious etiology is low.   - Follow blood cx.   - If unrevealing infectious workup and stable hemodynamics, would discontinue abx.    Paroxysmal atrial fibrillation  Acute onset of palpitations and chest pain x1 day. Found to be in A-fib RVR and hypotensive on admission. Hemodynamics normalized after IVFS. Suspect acute episode triggered by electrolyte deficiencies.     - Continue metoprolol, sotalol, eliquis    Electrolyte abnormality  K 3.3, Mg 1.1    - Electrolytes replaced.   - Repeat BMP and Mg this afternoon is ongoing episodes of arrhythmias.     NSTEMI (non-ST elevated myocardial infarction)  Reproducible anterior chest wall pain that she describes as pressure like. Troponin 0.041 without EKG changes. Low suspicion for NSTEMI Type 1 given clinical history and presentation, most likely NSTEMI Type II from demand ischemia as she was hypotensive and in A-fib on admission.    - Trend troponin to peak.    Iron deficiency anemia due to chronic blood loss  Hgb 8.8, baseline.    - CBC daily  - Transfuse for hgb <7    Acute heart failure with preserved ejection fraction (HFpEF)  Echo 3/28:  · The estimated ejection fraction is 60%.  · The left ventricle is normal in size with concentric hypertrophy and normal systolic function. There is systolic anterior motion without LVOT obstruction. sonographer will be sent back to verify lack of LVOT obstruction.  · There is abnormal septal wall motion.  · Grade II left ventricular diastolic dysfunction.  · Normal right ventricular size with normal right ventricular systolic function.  · Mild left atrial enlargement.  · The estimated PA systolic pressure is 39 mmHg.  · Normal central venous pressure (3 mmHg).     BNP elevated but similar to prior.   No current evidence of ADHF given no LE edema, orthopnea, PND or dyspnea.  Consider starting lasix if she develops symptoms.      Malignant neoplasm of pancreas  Follows with Dr. Hill and s/p cycle 1 of  palliative paclitaxel + gemcitabine on 5/1. Recently admitted for nausea and vomiting, treated with IVFs and anti-emetics for pancreatitis.     Sick sinus syndrome  H/o PPM secondary to sick sinus syndrome and complete heart block. No acute concerns.    Osteopenia  - Continue fosamax.    GERD (gastroesophageal reflux disease)  Takes nexium daily at home; not on formulary.     - Continue Protonix while inpatient      OAB (overactive bladder)  - Continue oxybutynin.     Hypothyroidism  TSH wnl.     - Continue levothyroxine.    Essential hypertension, benign  Home medications: Amlodipine 5 mg daily    - Hold for now given hypotension on admission and abundance of caution as re-starting home sotalol, metoprolol.   - If blood pressure trending up throughout the day, can consider restarting.    Hyperlipidemia  - Continue statin    Crohn's disease of large intestine without complication  Loose stools approximately 3x per day at baseline. On daily sulfasalazine. No evidence of acute flare.    - continue sulfasalazine        Isabela Pedro MD  Hematology/Oncology  [facility] Emergency Dept

## 2023-05-04 NOTE — PROGRESS NOTES
Pharmacist Renal Dose Adjustment Note    Pauline Cronin is a 83 y.o. female being treated with the medication cefepime    Patient Data:    Vital Signs (Most Recent):  Temp: 97.7 °F (36.5 °C) (05/03/23 2242)  Pulse: 81 (05/04/23 0104)  Resp: 20 (05/04/23 0048)  BP: (!) 129/57 (05/04/23 0102)  SpO2: 97 % (05/04/23 0104)   Vital Signs (72h Range):  Temp:  [97.6 °F (36.4 °C)-99.2 °F (37.3 °C)]   Pulse:  []   Resp:  [15-35]   BP: ()/(47-97)   SpO2:  [85 %-99 %]      Recent Labs   Lab 05/02/23  0149 05/03/23  0816 05/04/23  0034   CREATININE 1.1 0.8 0.9     Serum creatinine: 0.9 mg/dL 05/04/23 0034  Estimated creatinine clearance: 45 mL/min    Medication:cefepime dose: 2g frequency every 8 hours will be changed to medication:cefepime dose:2g frequency:every 12 hours    Pharmacist's Name: Farhan Oropeza  Pharmacist's Extension: [phone]

## 2023-05-04 NOTE — ASSESSMENT & PLAN NOTE
Likely in the setting of electrolyte abnormality and hypotension  Resolved after receiving 2.3L IVF resuscitation   Now NSR with A paced rhythm and known LBBB    Recommendations:  - Sotalol should be renally dosed for CrCl <60, please reduce to 120mg once daily  - Continue metoprolol 50mg daily  - Continue AC with Eliquis 5mg BID  - Optimize fluid status   - Closely monitor electrolytes and aggressively replete for goal of Mg>2, K>4

## 2023-05-04 NOTE — TELEPHONE ENCOUNTER
Spoke with patient dil. She is calling to state patient is admitted again--as she was DC'd yesterday--and she had to bring her back to ED last night. Patient is admitted with pneumonia.       Message routed to dr brian

## 2023-05-04 NOTE — HPI
Ms. Pauline Cronin is a 83 year old female with paroxysmal Afib, HFpEF, SSS s/p pacemaker 2021, hypothyroidism, Crohn disease, HLD, and recently diagnosed unresectable pancreatic cancer  (3/2023) s/p cycle 1 chemo completed on 5/1. Patient presents to [facility] ED for complaints of palpitations with subsequent chest pain. Chest pain desribed as left chest pressure with radiation to left arm. She has had similar episodes in the past, all associated with onset of atrial fibrillation. Denies orthopnea, lower extremity swelling, recent N/V, diarrhea overnight. Of note, patient recently discharged on 5/3 for treatment of pancreatitis and resuscitated with IVF at that time.     On arrival, patient AF,  with EKG showing Atrial fibrillation and known LBBB, BP 77/47. Labs notable for K 3.3, Mg 1.1, down trending lipase, elevated BNP (baseline ~382), TSH WNL. Troponin 0.041>>0.203. Afib resolved s/p 2.3L, currently in NSR with a paced rhythm. CTA without PE, new trace R. Pleural effusion, and unchanged GGO. Cardiology consulted for up trending troponin.

## 2023-05-04 NOTE — ASSESSMENT & PLAN NOTE
Acute onset of palpitations and chest pain x1 day. Found to be in A-fib RVR and hypotensive on admission. Hemodynamics normalized after IVFS. Suspect acute episode triggered by electrolyte deficiencies.     - Continue metoprolol, sotalol, eliquis

## 2023-05-04 NOTE — ASSESSMENT & PLAN NOTE
Echo 3/28:  · The estimated ejection fraction is 60%.  · The left ventricle is normal in size with concentric hypertrophy and normal systolic function. There is systolic anterior motion without LVOT obstruction. sonographer will be sent back to verify lack of LVOT obstruction.  · There is abnormal septal wall motion.  · Grade II left ventricular diastolic dysfunction.  · Normal right ventricular size with normal right ventricular systolic function.  · Mild left atrial enlargement.  · The estimated PA systolic pressure is 39 mmHg.  · Normal central venous pressure (3 mmHg).     BNP elevated but similar to prior.   No current evidence of ADHF given no LE edema, orthopnea, PND or dyspnea.  Consider starting lasix if she develops symptoms.

## 2023-05-04 NOTE — ASSESSMENT & PLAN NOTE
Recently admitted for intractable nausea and vomiting s/p chemo, treated for pancreatitis and discharged 5/3.   Represented with palpitations, CP and hypotension on admission.   No leukocytosis, fevers, or focal consolidation noted on CXR/CTA. New small R sided pleural effusion. Procal neg.  Treated with Vanc and Cefepime in the ED.     - Continue broad spectrum abx for now but overall suspicion for infectious etiology is low.   - Follow blood cx.   - If unrevealing infectious workup and stable hemodynamics, would discontinue abx.

## 2023-05-04 NOTE — ED NOTES
Pt arrives to Ed with c/o chest pain, pt reports h/o afib. Pt placed on BP cycling, pulse ox, tele. Pt appears to be uncomfortable. Pt lying in bed, respirations even, unlabored, NAD noted, answering questions appropriately

## 2023-05-04 NOTE — HPI
Pauline Cronin is an 83F with atrial fibrillation on apixaban and sotalol/metoprolol, hypothyroidism, osteopenia, HLD, obesity, Crohn's disease, pancreatic adenocarcinoma diagnosed by ERCP biopsy/stent placement 3/2023 complicated by post ERCP pancreatitis, pancreatic adenocarcinoma not amenable to resection presenting with a one day history of palpitations and anterior pressure like nonradiating chest pain. Of note, she [place] [facility] [place]. She was treated with IVFs and anti-emetics for suspected pancreatitis and discharged home with home health on 5/03. Throughout the night, she developed palpitations along with chest pain, prompting ED arrival. She denies any fevers, chills, SOB, leg swelling, PND, orthopnea, n/v/d, abdominal pain or urinary changes.     On arrival, she was tachycardic to 134 BPM, hypotensive (77/47), otherwise afebrile and satting 95% on room air. Laboratory workup significant for hypokalemia (3.3), hypomagnesemia (1.1), lipase 193 (downtrending from 370). CXR without focal consolidation. CTA negative for PE to the proximal segmental level but bolus timing was suboptimal, along with bilateral GGO. Initial EKG showing A-fib RVR. She was given 2.3 L of IVFs with improvement in her blood pressure and resolution of A-fib. She was also given potassium, mg, zofran, morphine, tylenol, along with Vanc and Cefepime then admitted to Medical Oncology service for further workup.     Oncologic history, per Dr. Hill clinic note 5/1/23  She presented initially to her PCP on 03/01/23 with several weeks of fatigue, nausea and several pounds of weight loss. She had a CT abdomen pelvis which showed a pancreatic head mass that abuts or invades the adjacent 2nd portion of the duodenum it was measured about 3.3 x 2.8 cm. There was some degree of GOO. The portal vein, splenic vein, SMV, celiac axis and SMA all are patent.   She underwent an  ERCP w biopsy of the mass on 03/14 which was positive for adenocarcinoma, pMMR. She was admitted on 03/17 for hyperbilirubinemia. She underwent ERCP with stenting on 03/21 c/w post-ERCP pancreatitis.   She was admitted again 03/28 for afib with RVR and ADHF. She was discharged on 04/05.   Admitted 4/22-4/24 for N/V and abdominal pain that resolved with conservative management.   Admitted 5/02 to 5/03 for intractable to nausea and vomiting, treated for pancreatitis.

## 2023-05-04 NOTE — ASSESSMENT & PLAN NOTE
Follows with Dr. Hill and s/p cycle 1 of palliative paclitaxel + gemcitabine on 5/1. Recently admitted for nausea and vomiting, treated with IVFs and anti-emetics for pancreatitis.

## 2023-05-04 NOTE — CONSULTS
Consult Note:    Please see Resident H&P for additional details.      Massimo Amaral D.O.  Hematology/Oncology Fellow, PGY-IV

## 2023-05-04 NOTE — CODE/ RAPID DOCUMENTATION
Rapid Response Nurse Follow-up Note     Followed up with patient for a rapid response. The patient's work of breathing has returned to her baseline. She is no longer tachypneic and her oxygen requirements have been weaned to 3 L NC. Per the charge RN, the patient experienced significant improvement with the effect of lasix. All vital signs are stable at this time. No further acute issues or additional concerns at this time. Reviewed plan of care with charge RNJim .   Please call Rapid Response RN, Marleny Ruiz RN if any additional monitoring required or questions or concerns arise at [phone].

## 2023-05-04 NOTE — ASSESSMENT & PLAN NOTE
K 3.3, Mg 1.1    - Electrolytes replaced.   - Repeat BMP and Mg this afternoon is ongoing episodes of arrhythmias.

## 2023-05-05 LAB
ALBUMIN SERPL BCP-MCNC: 2.1 G/DL (ref 3.5–5.2)
ALP SERPL-CCNC: 61 U/L (ref 55–135)
ALT SERPL W/O P-5'-P-CCNC: 11 U/L (ref 10–44)
ANION GAP SERPL CALC-SCNC: 8 MMOL/L (ref 8–16)
ANISOCYTOSIS BLD QL SMEAR: SLIGHT
AST SERPL-CCNC: 24 U/L (ref 10–40)
BASOPHILS # BLD AUTO: 0.02 K/UL (ref 0–0.2)
BASOPHILS NFR BLD: 0.3 % (ref 0–1.9)
BILIRUB SERPL-MCNC: 1.1 MG/DL (ref 0.1–1)
BUN SERPL-MCNC: 11 MG/DL (ref 8–23)
CALCIUM SERPL-MCNC: 8 MG/DL (ref 8.7–10.5)
CHLORIDE SERPL-SCNC: 102 MMOL/L (ref 95–110)
CO2 SERPL-SCNC: 27 MMOL/L (ref 23–29)
CREAT SERPL-MCNC: 0.7 MG/DL (ref 0.5–1.4)
DIFFERENTIAL METHOD: ABNORMAL
EOSINOPHIL # BLD AUTO: 0.2 K/UL (ref 0–0.5)
EOSINOPHIL NFR BLD: 2.8 % (ref 0–8)
ERYTHROCYTE [DISTWIDTH] IN BLOOD BY AUTOMATED COUNT: 25.2 % (ref 11.5–14.5)
EST. GFR  (NO RACE VARIABLE): >60 ML/MIN/1.73 M^2
GLUCOSE SERPL-MCNC: 91 MG/DL (ref 70–110)
HCT VFR BLD AUTO: 24.5 % (ref 37–48.5)
HGB BLD-MCNC: 7.3 G/DL (ref 12–16)
HYPOCHROMIA BLD QL SMEAR: ABNORMAL
IMM GRANULOCYTES # BLD AUTO: 0.08 K/UL (ref 0–0.04)
IMM GRANULOCYTES NFR BLD AUTO: 1.1 % (ref 0–0.5)
LYMPHOCYTES # BLD AUTO: 0.7 K/UL (ref 1–4.8)
LYMPHOCYTES NFR BLD: 8.6 % (ref 18–48)
MAGNESIUM SERPL-MCNC: 1.9 MG/DL (ref 1.6–2.6)
MCH RBC QN AUTO: 25.2 PG (ref 27–31)
MCHC RBC AUTO-ENTMCNC: 29.8 G/DL (ref 32–36)
MCV RBC AUTO: 85 FL (ref 82–98)
MONOCYTES # BLD AUTO: 0.1 K/UL (ref 0.3–1)
MONOCYTES NFR BLD: 1.6 % (ref 4–15)
NEUTROPHILS # BLD AUTO: 6.5 K/UL (ref 1.8–7.7)
NEUTROPHILS NFR BLD: 85.6 % (ref 38–73)
NRBC BLD-RTO: 0 /100 WBC
OVALOCYTES BLD QL SMEAR: ABNORMAL
PHOSPHATE SERPL-MCNC: 2.4 MG/DL (ref 2.7–4.5)
PLATELET # BLD AUTO: 139 K/UL (ref 150–450)
PMV BLD AUTO: 12 FL (ref 9.2–12.9)
POIKILOCYTOSIS BLD QL SMEAR: SLIGHT
POLYCHROMASIA BLD QL SMEAR: ABNORMAL
POTASSIUM SERPL-SCNC: 3.4 MMOL/L (ref 3.5–5.1)
PROT SERPL-MCNC: 5.8 G/DL (ref 6–8.4)
RBC # BLD AUTO: 2.9 M/UL (ref 4–5.4)
SODIUM SERPL-SCNC: 137 MMOL/L (ref 136–145)
SPHEROCYTES BLD QL SMEAR: ABNORMAL
TROPONIN I SERPL DL<=0.01 NG/ML-MCNC: 0.07 NG/ML (ref 0–0.03)
WBC # BLD AUTO: 7.05 K/UL (ref 3.9–12.7)

## 2023-05-05 PROCEDURE — 97165 OT EVAL LOW COMPLEX 30 MIN: CPT

## 2023-05-05 PROCEDURE — 94640 AIRWAY INHALATION TREATMENT: CPT

## 2023-05-05 PROCEDURE — 99900035 HC TECH TIME PER 15 MIN (STAT)

## 2023-05-05 PROCEDURE — 36415 COLL VENOUS BLD VENIPUNCTURE: CPT

## 2023-05-05 PROCEDURE — 25000003 PHARM REV CODE 250: Performed by: STUDENT IN AN ORGANIZED HEALTH CARE EDUCATION/TRAINING PROGRAM

## 2023-05-05 PROCEDURE — 63600175 PHARM REV CODE 636 W HCPCS: Performed by: STUDENT IN AN ORGANIZED HEALTH CARE EDUCATION/TRAINING PROGRAM

## 2023-05-05 PROCEDURE — 93010 EKG 12-LEAD: ICD-10-PCS | Mod: ,,, | Performed by: INTERNAL MEDICINE

## 2023-05-05 PROCEDURE — 80053 COMPREHEN METABOLIC PANEL: CPT | Performed by: STUDENT IN AN ORGANIZED HEALTH CARE EDUCATION/TRAINING PROGRAM

## 2023-05-05 PROCEDURE — 93010 ELECTROCARDIOGRAM REPORT: CPT | Mod: ,,, | Performed by: INTERNAL MEDICINE

## 2023-05-05 PROCEDURE — 93005 ELECTROCARDIOGRAM TRACING: CPT

## 2023-05-05 PROCEDURE — 25000242 PHARM REV CODE 250 ALT 637 W/ HCPCS

## 2023-05-05 PROCEDURE — 97535 SELF CARE MNGMENT TRAINING: CPT

## 2023-05-05 PROCEDURE — 99232 SBSQ HOSP IP/OBS MODERATE 35: CPT | Mod: GC,,, | Performed by: INTERNAL MEDICINE

## 2023-05-05 PROCEDURE — 36415 COLL VENOUS BLD VENIPUNCTURE: CPT | Performed by: STUDENT IN AN ORGANIZED HEALTH CARE EDUCATION/TRAINING PROGRAM

## 2023-05-05 PROCEDURE — 84100 ASSAY OF PHOSPHORUS: CPT | Performed by: STUDENT IN AN ORGANIZED HEALTH CARE EDUCATION/TRAINING PROGRAM

## 2023-05-05 PROCEDURE — 94761 N-INVAS EAR/PLS OXIMETRY MLT: CPT

## 2023-05-05 PROCEDURE — 97116 GAIT TRAINING THERAPY: CPT

## 2023-05-05 PROCEDURE — 97161 PT EVAL LOW COMPLEX 20 MIN: CPT

## 2023-05-05 PROCEDURE — 27000221 HC OXYGEN, UP TO 24 HOURS

## 2023-05-05 PROCEDURE — 25000003 PHARM REV CODE 250

## 2023-05-05 PROCEDURE — 83735 ASSAY OF MAGNESIUM: CPT | Performed by: STUDENT IN AN ORGANIZED HEALTH CARE EDUCATION/TRAINING PROGRAM

## 2023-05-05 PROCEDURE — 85025 COMPLETE CBC W/AUTO DIFF WBC: CPT | Performed by: STUDENT IN AN ORGANIZED HEALTH CARE EDUCATION/TRAINING PROGRAM

## 2023-05-05 PROCEDURE — 20600001 HC STEP DOWN PRIVATE ROOM

## 2023-05-05 PROCEDURE — 25000242 PHARM REV CODE 250 ALT 637 W/ HCPCS: Performed by: STUDENT IN AN ORGANIZED HEALTH CARE EDUCATION/TRAINING PROGRAM

## 2023-05-05 PROCEDURE — 99232 PR SUBSEQUENT HOSPITAL CARE,LEVL II: ICD-10-PCS | Mod: GC,,, | Performed by: INTERNAL MEDICINE

## 2023-05-05 PROCEDURE — 84484 ASSAY OF TROPONIN QUANT: CPT

## 2023-05-05 RX ORDER — SODIUM,POTASSIUM PHOSPHATES 280-250MG
2 POWDER IN PACKET (EA) ORAL ONCE
Status: DISCONTINUED | OUTPATIENT
Start: 2023-05-05 | End: 2023-05-05

## 2023-05-05 RX ORDER — FUROSEMIDE 40 MG/1
80 TABLET ORAL ONCE
Status: COMPLETED | OUTPATIENT
Start: 2023-05-05 | End: 2023-05-05

## 2023-05-05 RX ORDER — FUROSEMIDE 10 MG/ML
40 INJECTION INTRAMUSCULAR; INTRAVENOUS ONCE
Status: DISCONTINUED | OUTPATIENT
Start: 2023-05-05 | End: 2023-05-05

## 2023-05-05 RX ORDER — ACETAMINOPHEN 325 MG/1
650 TABLET ORAL ONCE
Status: COMPLETED | OUTPATIENT
Start: 2023-05-05 | End: 2023-05-05

## 2023-05-05 RX ORDER — POTASSIUM CHLORIDE 20 MEQ/1
40 TABLET, EXTENDED RELEASE ORAL ONCE
Status: COMPLETED | OUTPATIENT
Start: 2023-05-05 | End: 2023-05-05

## 2023-05-05 RX ORDER — LEVALBUTEROL INHALATION SOLUTION 0.63 MG/3ML
0.63 SOLUTION RESPIRATORY (INHALATION) ONCE
Status: COMPLETED | OUTPATIENT
Start: 2023-05-05 | End: 2023-05-05

## 2023-05-05 RX ADMIN — SOTALOL HYDROCHLORIDE 120 MG: 120 TABLET ORAL at 10:05

## 2023-05-05 RX ADMIN — SULFASALAZINE 500 MG: 500 TABLET ORAL at 10:05

## 2023-05-05 RX ADMIN — METOPROLOL SUCCINATE 50 MG: 50 TABLET, EXTENDED RELEASE ORAL at 10:05

## 2023-05-05 RX ADMIN — APIXABAN 5 MG: 5 TABLET, FILM COATED ORAL at 10:05

## 2023-05-05 RX ADMIN — MICONAZOLE NITRATE 2 % TOPICAL POWDER: at 10:05

## 2023-05-05 RX ADMIN — PANCRELIPASE 2 CAPSULE: 24000; 76000; 120000 CAPSULE, DELAYED RELEASE PELLETS ORAL at 08:05

## 2023-05-05 RX ADMIN — FUROSEMIDE 80 MG: 40 TABLET ORAL at 01:05

## 2023-05-05 RX ADMIN — POTASSIUM CHLORIDE 40 MEQ: 1500 TABLET, EXTENDED RELEASE ORAL at 10:05

## 2023-05-05 RX ADMIN — LEVALBUTEROL HYDROCHLORIDE 0.63 MG: 0.63 SOLUTION RESPIRATORY (INHALATION) at 04:05

## 2023-05-05 RX ADMIN — OXYBUTYNIN CHLORIDE 10 MG: 5 TABLET, EXTENDED RELEASE ORAL at 10:05

## 2023-05-05 RX ADMIN — LEVALBUTEROL HYDROCHLORIDE 0.63 MG: 0.63 SOLUTION RESPIRATORY (INHALATION) at 12:05

## 2023-05-05 RX ADMIN — PANTOPRAZOLE SODIUM 40 MG: 40 TABLET, DELAYED RELEASE ORAL at 10:05

## 2023-05-05 RX ADMIN — APIXABAN 5 MG: 5 TABLET, FILM COATED ORAL at 09:05

## 2023-05-05 RX ADMIN — ACETAMINOPHEN 650 MG: 325 TABLET ORAL at 04:05

## 2023-05-05 RX ADMIN — LEVOTHYROXINE SODIUM 175 MCG: 125 TABLET ORAL at 05:05

## 2023-05-05 RX ADMIN — FOLIC ACID 1 MG: 1 TABLET ORAL at 10:05

## 2023-05-05 RX ADMIN — Medication 3 MG: at 11:05

## 2023-05-05 RX ADMIN — SULFASALAZINE 500 MG: 500 TABLET ORAL at 09:05

## 2023-05-05 RX ADMIN — Medication 2 TABLET: at 10:05

## 2023-05-05 RX ADMIN — LEVALBUTEROL HYDROCHLORIDE 0.63 MG: 0.63 SOLUTION RESPIRATORY (INHALATION) at 08:05

## 2023-05-05 RX ADMIN — ATORVASTATIN CALCIUM 20 MG: 20 TABLET, FILM COATED ORAL at 10:05

## 2023-05-05 NOTE — ASSESSMENT & PLAN NOTE
Echo 3/28:  · The estimated ejection fraction is 60%.  · The left ventricle is normal in size with concentric hypertrophy and normal systolic function. There is systolic anterior motion without LVOT obstruction. sonographer will be sent back to verify lack of LVOT obstruction.  · There is abnormal septal wall motion.  · Grade II left ventricular diastolic dysfunction.  · Normal right ventricular size with normal right ventricular systolic function.  · Mild left atrial enlargement.  · The estimated PA systolic pressure is 39 mmHg.  · Normal central venous pressure (3 mmHg).     BNP elevated but similar to prior.   Patient with ongoing crackles, will continue lasix either 40 mg IV or 80 mg PO  Wean oxygen as tolerated

## 2023-05-05 NOTE — PROGRESS NOTES
05/04/23 1020   Vital Signs   Resp (!) 30   SpO2 (!) 89 %   Flow (L/min) 7   Device (Oxygen Therapy) nasal cannula with humidification     Pt found to be 83% SpO2 on 3L. 89% when increased to 7L. 100% on non-re breather. Pt is tachypneic with expiratory wheezing and SOB. Denies chest pain. BP stable. Dr. Leon called to bedside. Rapid called to bedside. EKG and chest X-ray ordered. 20mg lasix given. Breathing treatments started. Troponin trending Q4.

## 2023-05-05 NOTE — PLAN OF CARE
Problem: Occupational Therapy  Goal: Occupational Therapy Goal  Description: Goals to be met by: 5/19     Patient will increase functional independence with ADLs by performing:    UE Dressing with [place].  LE Dressing with Modified [place].  Grooming while standing at sink with [place].  Toileting from toilet with [place] for hygiene and clothing management.   Toilet transfer to toilet with [place].    Outcome: Ongoing, Progressing

## 2023-05-05 NOTE — ASSESSMENT & PLAN NOTE
Reproducible anterior chest wall pain that she describes as pressure like. Troponin 0.041 without EKG changes. Low suspicion for NSTEMI Type 1 given clinical history and presentation, most likely NSTEMI Type II from demand ischemia as she was hypotensive and in A-fib on admission.    - Stopped trending troponin as this peaked

## 2023-05-05 NOTE — PT/OT/SLP EVAL
Occupational Therapy   CoEvaluation w PT  CoEval performed to optimize pt participation and assessment of full functional capacity.      Name: Pauline Cronin  MRN: [id number]  Admitting Diagnosis: Chest pain  Recent Surgery: * No surgery found *      Recommendations:     Discharge Recommendations: home health OT  Discharge Equipment Recommendations:  to be determined by next level of care  Barriers to discharge:  None    Assessment:     Pauline Cronin is a 83 y.o. female with a medical diagnosis of Chest pain.  She presents with good tolerance to session on this date completing func amb to restroom for standing ADLs. Performance deficits affecting function: weakness, impaired endurance, impaired self care skills, impaired functional mobility, gait instability, impaired balance, decreased safety awareness.    Pt motivated to return home however not at baseline for ADL and functional mobility performance in addition to concerns of fall risk and would benefit from continued OT services at this time.    Rehab Prognosis: Good; patient would benefit from acute skilled OT services to address these deficits and reach maximum level of function.       Plan:     Patient to be seen 3 x/week to address the above listed problems via self-care/home management, therapeutic activities, therapeutic exercises, neuromuscular re-education  Plan of Care Expires: 06/05/23  Plan of Care Reviewed with: patient    Subjective     Chief Complaint: none  Patient/Family Comments/goals: agreeable to OT    Occupational Profile:  Living Environment: lives w son in [facility],  combo   Previous level of function: A w bathing   Equipment Used at Home: walker, rolling  Assistance upon Discharge: family assist     Pain/Comfort:  Pain Rating 1: 0/10    Patients cultural, spiritual, Jainism conflicts given the current situation: no    Objective:     Communicated with: RN prior to session.  Patient found supine with oxygen, telemetry, PureWick upon OT entry  to room.    General Precautions: Standard, fall  Orthopedic Precautions: N/A  Braces: N/A  Respiratory Status: Nasal cannula, flow 2 L/min    Occupational Performance:    Bed Mobility:    Patient completed Supine to Sit with minimum assistance    Functional Mobility/Transfers:  Patient completed Sit <> Stand Transfer with contact guard assistance  with  rolling walker   Functional Mobility: pt completed functional ambulation to restroom to simulate household mobility requiring CGA and RW    Activities of Daily Living:  Grooming: stand by assistance completed oral hygiene and face wash while standing at sink w RW    Cognitive/Visual Perceptual:  Cognitive/Psychosocial Skills:     -       Oriented to: Person, Place, Time, and Situation   -       Follows Commands/attention:Follows multistep  commands  -       Communication: clear/fluent  -       Mood/Affect/Coping skills/emotional control: Cooperative and Pleasant    Physical Exam:  Upper Extremity Range of Motion:     -       Right Upper Extremity: WFL  -       Left Upper Extremity: WFL  Upper Extremity Strength:    -       Right Upper Extremity: WFL  -       Left Upper Extremity: WFL   Strength:    -       Right Upper Extremity: WFL  -       Left Upper Extremity: WFL    AMPAC 6 Click ADL:  AMPAC Total Score: 22    Treatment & Education:  Pt educated on scope of practice and importance of daily functional mobility.   Pt educated on safety precautions during transfers  Pt updated on POC   Patient left up in chair with all lines intact, call button in reach, and RN notified    GOALS:   Multidisciplinary Problems       Occupational Therapy Goals          Problem: Occupational Therapy    Goal Priority Disciplines Outcome Interventions   Occupational Therapy Goal     OT, PT/OT Ongoing, Progressing    Description: Goals to be met by: 5/19     Patient will increase functional independence with ADLs by performing:    UE Dressing with [place].  LE Dressing with  Modified [place].  Grooming while standing at sink with [place].  Toileting from toilet with [place] for hygiene and clothing management.   Toilet transfer to toilet with [place].                         History:     Past Medical History:   Diagnosis Date    Anticoagulant long-term use     Atrial fibrillation     Crohn disease diagnosed in her 20's    GERD (gastroesophageal reflux disease)     Hyperlipidemia     Hypertension     Pancreatic adenocarcinoma 3/21/2023    Thyroid disease     s/p I 131         Past Surgical History:   Procedure Laterality Date    APPENDECTOMY      CARDIAC SURGERY  2014    pacemaker    COLONOSCOPY  ~2008    normal findings per patient report    ENDOSCOPIC ULTRASOUND OF UPPER GASTROINTESTINAL TRACT N/A 3/14/2023    Procedure: ULTRASOUND, UPPER GI TRACT, ENDOSCOPIC;  Surgeon: Andrei Swartz MD;  Location: [facility] ENDO;  Service: Endoscopy;  Laterality: N/A;  Approval to hold Eliquis rec'd from Dr. Barbosa (see telephone encounter 3/3/23)-DS  [employer] AICD/PPM  3/3/23-Instructions via portal-DS    ERCP N/A 3/21/2023    Procedure: ERCP (ENDOSCOPIC RETROGRADE CHOLANGIOPANCREATOGRAPHY);  Surgeon: Celina Toney MD;  Location: [facility] ([facility] F[facility]);  Service: Endoscopy;  Laterality: N/A;    ESOPHAGOGASTRODUODENOSCOPY N/A 7/17/2018    Procedure: EGD (ESOPHAGOGASTRODUODENOSCOPY);  Surgeon: Alondra Casiano MD;  Location: [facility];  Service: Endoscopy;  Laterality: N/A;    HYSTERECTOMY      INSERTION OF IMPLANTABLE CARDIOVERTER-DEFIBRILLATOR (ICD) GENERATOR WITH TWO EXISTING LEADS Left 5/10/2021    Procedure: INSERTION, PULSE GENERATOR WITH 2 EXISTING LEADS, ICD;  Surgeon: Bo Leone MD;  [place] [facility] [place] [facility];  Service: Cardiology;  Laterality: Left;    INSERTION OF PACEMAKER      REPLACEMENT OF PACEMAKER GENERATOR  05/10/2021    RETROGRADE PYELOGRAPHY Right 2/18/2020    Procedure: PYELOGRAM, RETROGRADE;  Surgeon: Jovan Kruse MD;  Location: [facility] OR;  Service:  Urology;  Laterality: Right;    SMALL INTESTINE SURGERY  in her 20's    for crohn's    TONSILLECTOMY      UPPER GASTROINTESTINAL ENDOSCOPY  ~2008    normal findings per patient report       Time Tracking:     OT Date of Treatment: 05/05/23  OT Start Time: 0930  OT Stop Time: 0951  OT Total Time (min): 21 min    Billable Minutes:Evaluation 10  Self Care/Home Management 11    5/5/2023

## 2023-05-05 NOTE — PROGRESS NOTES
Home Oxygen Evaluation     Date Performed:      1)         Patient's Home O2 Sat on room air, while at rest: 88%                               If O2 sats on room air at rest are 88% or below, patient qualifies. No additional testing needed. Document N/A in steps 2 and 3. If 89% or above, complete steps 2.        2)         Patient's O2 Sat on room air while exercising: N/A                               If O2 sats on room air while exercising remain 89% or above patient does not qualify, no further testing needed Document N/A in step 3. If O2 sats on room air while exercising are 88% or below, continue to step 3.        3)         Patient's O2 Sat while exercising on O2:  at  LPM: N/A

## 2023-05-05 NOTE — PT/OT/SLP EVAL
Physical Therapy Co-Evaluation and treatment with OT    Patient Name:  Pauline Cronin   MRN:  [id number]    Recommendations:     Discharge Recommendations: other (see comments)   Discharge Equipment Recommendations:  (will determine DME needs closer to discharge)   Barriers to discharge: None    Assessment:     Pauline Cronin is a 83 y.o. female admitted with a medical diagnosis of Chest pain.  She presents with the following impairments/functional limitations: impaired endurance, impaired functional mobility, gait instability, impaired balance, decreased safety awareness pt tolerated treatment well and will benefit from skilled PT 3x/wk to progress physically. Pt will be able to discharge home with further PT needs when medically stable. Pt came to ED with chest pain.     Rehab Prognosis: Good; patient would benefit from acute skilled PT services to address these deficits and reach maximum level of function.    Recent Surgery: * No surgery found *      Plan:     During this hospitalization, patient to be seen 3 x/week to address the identified rehab impairments via gait training, therapeutic activities and progress toward the following goals:    Plan of Care Expires:  06/03/23    Subjective     Chief Complaint: pt had no complaints during treatment.   Patient/Family Comments/goals:  to get better and go home.   Pain/Comfort:  Pain Rating 1: 0/10  Pain Rating Post-Intervention 1: 0/10    Patients cultural, spiritual, Mandaen conflicts given the current situation: no    Living Environment:  Pt lives with her son and daughter in law in 1 story [facility].   Prior to admission, patients level of function was modified Independent with RW.  Equipment used at home: walker, rolling.  DME owned (not currently used): none.  Upon discharge, patient will have assistance from family and pt has a friend to assists 2x/wk with bathing. .    Objective:     Communicated with nurse  prior to session.  Patient found supine with oxygen,  telemetry, PureWick (hep lock IV)  upon PT entry to room.    General Precautions: Standard, fall  Orthopedic Precautions:    Braces:    Respiratory Status: Nasal cannula, flow 2 L/min    Exams:  Cognitive Exam:  Patient is oriented to Person, Place, Time, and Situation  RLE ROM: WFL  RLE Strength: WFL  LLE ROM: WFL  LLE Strength: WFL    Functional Mobility:  Bed Mobility:  pt needed verbal cues for hand placement and sequencing for functional mobility.    Rolling Left:  minimum assistance  Supine to Sit: minimum assistance    Transfers:     Sit to Stand:  contact guard assistance with rolling walker    Gait: pt received gait training ~ 55 ft with O2 intact, RW and SBA. Pt received gait training in room.     Balance: pt stood at sink with SBA and performed ADLS with OT.       PT concentrated on BLE MMT, ROM, transfers and gait training with evaluation and treatment.   Pt white board updated with current therapists name and level of mobility assistance needed.         AM-PAC 6 CLICK MOBILITY  Total Score:17       Treatment & Education:  Pt received verbal instructions in role of PT and POC. Pt verbally expressed understanding of such.     Patient left up in chair with all lines intact and call button in reach.    GOALS:   Multidisciplinary Problems       Physical Therapy Goals          Problem: Physical Therapy    Goal Priority Disciplines Outcome Goal Variances Interventions   Physical Therapy Goal     PT, PT/OT Ongoing, Progressing     Description: Goals to be met by: 23     Patient will increase functional independence with mobility by performin. Supine to sit with Stand-by Assistance  2. Sit to stand transfer with Modified [place] with RW  3. Gait  x 150 feet with Stand-by Assistance using Rolling Walker.                          History:     Past Medical History:   Diagnosis Date    Anticoagulant long-term use     Atrial fibrillation     Crohn disease diagnosed in her 20's    GERD  (gastroesophageal reflux disease)     Hyperlipidemia     Hypertension     Pancreatic adenocarcinoma 3/21/2023    Thyroid disease     s/p I 131       Past Surgical History:   Procedure Laterality Date    APPENDECTOMY      CARDIAC SURGERY  2014    pacemaker    COLONOSCOPY  ~2008    normal findings per patient report    ENDOSCOPIC ULTRASOUND OF UPPER GASTROINTESTINAL TRACT N/A 3/14/2023    Procedure: ULTRASOUND, UPPER GI TRACT, ENDOSCOPIC;  Surgeon: Andrei Swartz MD;  Location: [facility] ENDO;  Service: Endoscopy;  Laterality: N/A;  Approval to hold Eliquis rec'd from Dr. Barbosa (see telephone encounter 3/3/23)-DS  [employer] AICD/PPM  3/3/23-Instructions via portal-DS    ERCP N/A 3/21/2023    Procedure: ERCP (ENDOSCOPIC RETROGRADE CHOLANGIOPANCREATOGRAPHY);  Surgeon: Celina Toney MD;  Location: [facility] ([facility] FLR);  Service: Endoscopy;  Laterality: N/A;    ESOPHAGOGASTRODUODENOSCOPY N/A 7/17/2018    Procedure: EGD (ESOPHAGOGASTRODUODENOSCOPY);  Surgeon: Alondra Casiano MD;  Location: [facility] ENDO;  Service: Endoscopy;  Laterality: N/A;    HYSTERECTOMY      INSERTION OF IMPLANTABLE CARDIOVERTER-DEFIBRILLATOR (ICD) GENERATOR WITH TWO EXISTING LEADS Left 5/10/2021    Procedure: INSERTION, PULSE GENERATOR WITH 2 EXISTING LEADS, ICD;  Surgeon: Bo Leone MD;  Location: [facility] CATH LAB;  Service: Cardiology;  Laterality: Left;    INSERTION OF PACEMAKER      REPLACEMENT OF PACEMAKER GENERATOR  05/10/2021    RETROGRADE PYELOGRAPHY Right 2/18/2020    Procedure: PYELOGRAM, RETROGRADE;  Surgeon: Jovan Kruse MD;  Location: [facility] OR;  Service: Urology;  Laterality: Right;    SMALL INTESTINE SURGERY  in her 20's    for crohn's    TONSILLECTOMY      UPPER GASTROINTESTINAL ENDOSCOPY  ~2008    normal findings per patient report       Time Tracking:     PT Received On: 05/05/23  PT Start Time: 0930     PT Stop Time: 0950  PT Total Time (min): 20 min     Billable Minutes: Evaluation 8 min  and Gait Training 12 min        05/05/2023

## 2023-05-05 NOTE — PROGRESS NOTES
Plan of care reviewed with patient and family this shift.  VSS stable on 2L NC; afebrile. 20mg lasix given with ~700ml UO. Telemetry and continuous SpO2 monitoring continued. Urinalysis sent.  All questions and concerns addressed. Will continue to monitor.

## 2023-05-05 NOTE — PLAN OF CARE
Plan of care discussed with patient at start of shift. Bed alarm in use. Free from falls and injuries. Resting quietly with eyes closed. Respirations even, unlabored with oxygen in use at 2 liters via nasal cannula. Skin warm and dry. Telemetry in use with NSR noted. Denies pain. Denies nausea. Pure wick in use. Frequent checks for pain and safety maintained. Bed in lowest position, wheels locked, side rails up x's 2, call light in reach. Instructed to call for assistance as needed, verbalizes understanding.

## 2023-05-05 NOTE — ASSESSMENT & PLAN NOTE
Recently admitted for intractable nausea and vomiting s/p chemo, treated for pancreatitis and discharged 5/3.   Represented with palpitations, CP and hypotension on admission.   No leukocytosis, fevers, or focal consolidation noted on CXR/CTA. New small R sided pleural effusion. Procal neg.  Treated with Vanc and Cefepime in the ED.     - Continue broad spectrum abx for now but overall suspicion for infectious etiology is low.   - Follow blood cx.   - If unrevealing infectious workup and stable hemodynamics, discontinue antibiotics

## 2023-05-05 NOTE — PROGRESS NOTES
[facility] - Oncology ([facility])  Hematology/Oncology  Progress Note    Patient Name: Pauline Cronin  Admission Date: 5/3/2023  Hospital Length of Stay: 1 days  Code Status: DNR     Subjective:     HPI:  Pauline Cronin is an 83F with atrial fibrillation on apixaban and sotalol/metoprolol, hypothyroidism, osteopenia, HLD, obesity, Crohn's disease, pancreatic adenocarcinoma diagnosed by ERCP biopsy/stent placement 3/2023 complicated by post ERCP pancreatitis, pancreatic adenocarcinoma not amenable to resection presenting with a one day history of palpitations and anterior pressure like nonradiating chest pain. Of note, she was recently admitted to the Medical [place] [facility] [place] and vomiting. She was treated with IVFs and anti-emetics for suspected pancreatitis and discharged home with home health on 5/03. Throughout the night, she developed palpitations along with chest pain, prompting ED arrival. She denies any fevers, chills, SOB, leg swelling, PND, orthopnea, n/v/d, abdominal pain or urinary changes.     On arrival, she was tachycardic to 134 BPM, hypotensive (77/47), otherwise afebrile and satting 95% on room air. Laboratory workup significant for hypokalemia (3.3), hypomagnesemia (1.1), lipase 193 (downtrending from 370). CXR without focal consolidation. CTA negative for PE to the proximal segmental level but bolus timing was suboptimal, along with bilateral GGO. Initial EKG showing A-fib RVR. She was given 2.3 L of IVFs with improvement in her blood pressure and resolution of A-fib. She was also given potassium, mg, zofran, morphine, tylenol, along with Vanc and Cefepime then admitted to Medical Oncology service for further workup.     Oncologic history, per Dr. Hill clinic note 5/1/23  She presented initially to her PCP on 03/01/23 with several weeks of fatigue, nausea and several pounds of weight loss. She had a CT abdomen pelvis which showed a pancreatic head mass that abuts or  invades the adjacent 2nd portion of the duodenum it was measured about 3.3 x 2.8 cm. There was some degree of GOO. The portal vein, splenic vein, SMV, celiac axis and SMA all are patent.   She underwent an ERCP w biopsy of the mass on 03/14 which was positive for adenocarcinoma, pMMR. She was admitted on 03/17 for hyperbilirubinemia. She underwent ERCP with stenting on 03/21 c/w post-ERCP pancreatitis.   She was admitted again 03/28 for afib with RVR and ADHF. She was discharged on 04/05.   Admitted 4/22-4/24 for N/V and abdominal pain that resolved with conservative management.   Admitted 5/02 to 5/03 for intractable to nausea and vomiting, treated for pancreatitis.            Interval History: No acute events overnight, vitals stable. Patient remains on 3 L NC, will wean further. Respiratory exam with crackles in both lung fields. Unable to give IV lasix due to poor IV access, will give oral lasix 80 mg. Continue to wean oxygen as tolerated    Oncology Treatment Plan:   OP PANC NAB-PACLITAXEL + GEMCITABINE Q4W    Medications:  Continuous Infusions:  Scheduled Meds:   apixaban  5 mg Oral BID    atorvastatin  20 mg Oral Daily    folic acid  1 mg Oral Daily    levalbuterol  0.63 mg Nebulization Q8H    levothyroxine  175 mcg Oral Before breakfast    LIDOcaine  1 patch Transdermal Q24H    lipase-protease-amylase 24,000-76,000-120,000 units  2 capsule Oral TID WM    metoprolol succinate  50 mg Oral Daily    miconazole NITRATE 2 %   Topical (Top) BID    oxybutynin  10 mg Oral Daily    pantoprazole  40 mg Oral Daily    polyethylene glycol  17 g Oral Daily    potassium bicarbonate  50 mEq Oral Once    potassium, sodium phosphates  2 packet Oral Once    sotaloL  120 mg Oral Daily    sulfaSALAzine  500 mg Oral BID     PRN Meds:acetaminophen, dextrose 10%, dextrose 10%, glucagon (human recombinant), glucose, glucose, melatonin, naloxone, oxyCODONE, prochlorperazine, sodium chloride 0.9%       Objective:      Vital Signs (Most Recent):  Temp: 98.5 °F (36.9 °C) (05/05/23 0420)  Pulse: 73 (05/05/23 0420)  Resp: 20 (05/05/23 0420)  BP: (!) 127/59 (05/05/23 0420)  SpO2: (!) 94 % (05/05/23 0420) Vital Signs (24h Range):  Temp:  [97.9 °F (36.6 °C)-98.5 °F (36.9 °C)] 98.5 °F (36.9 °C)  Pulse:  [] 73  Resp:  [16-30] 20  SpO2:  [83 %-100 %] 94 %  BP: ()/(52-63) 127/59     Weight: 80 kg (176 lb 5.9 oz)  Body mass index is 34.44 kg/m².  Body surface area is 1.84 meters squared.      Intake/Output Summary (Last 24 hours) at 5/5/2023 0703  Last data filed at 5/5/2023 0327  Gross per 24 hour   Intake 240 ml   Output 950 ml   Net -710 ml        Physical Exam  Vitals reviewed.   Constitutional:       General: She is not in acute distress.     Appearance: Normal appearance. She is ill-appearing. She is not toxic-appearing.   HENT:      Head: Normocephalic and atraumatic.      Right Ear: External ear normal.      Left Ear: External ear normal.      Nose: Nose normal. No congestion.      Mouth/Throat:      Mouth: Mucous membranes are moist.      Pharynx: No oropharyngeal exudate.   Eyes:      General: No scleral icterus.     Conjunctiva/sclera: Conjunctivae normal.   Cardiovascular:      Rate and Rhythm: Normal rate and regular rhythm.      Pulses: Normal pulses.   Pulmonary:      Effort: Pulmonary effort is normal. No respiratory distress.      Breath sounds: Normal breath sounds. No wheezing or rales.   Chest:      Chest wall: No tenderness.   Abdominal:      General: There is no distension.      Palpations: Abdomen is soft.      Tenderness: There is no abdominal tenderness. There is no guarding.   Musculoskeletal:         General: Normal range of motion.      Right lower leg: No edema.      Left lower leg: No edema.   Skin:     General: Skin is warm and dry.      Findings: No rash.   Neurological:      Mental Status: She is alert and oriented to person, place, and time. Mental status is at baseline.   Psychiatric:          Mood and Affect: Mood normal.         Behavior: Behavior normal.        Significant Labs:   All pertinent labs from the last 24 hours have been reviewed.    Diagnostic Results:  I have reviewed all pertinent imaging results/findings within the past 24 hours.    Assessment/Plan:     * Chest pain  83F with extensive comorbidites recently admitted with intractable nausea, vomiting following chemo, treated for pancreatitis and discharged on 5/03 now representing with a one day history of palpitations and nonradiating pressure like anterior chest pain. Found to be in A-fib RVR and hypotensive on admission, which resolved with IVFs. Elevated troponin to 0.041 without EKG changes. Reproducible chest wall tenderness on exam. CXR without focal consolidation. CTA negative for PE to the proximal segmental level but bolus timing was suboptimal, along with bilateral GGO.      - Follow infectious workup.   - troponin peaked, stop trending   - Cardiac monitoring and EKG prn for recurrent chest pain.     Suspected sepsis  Recently admitted for intractable nausea and vomiting s/p chemo, treated for pancreatitis and discharged 5/3.   Represented with palpitations, CP and hypotension on admission.   No leukocytosis, fevers, or focal consolidation noted on CXR/CTA. New small R sided pleural effusion. Procal neg.  Treated with Vanc and Cefepime in the ED.     - Continue broad spectrum abx for now but overall suspicion for infectious etiology is low.   - Follow blood cx.   - If unrevealing infectious workup and stable hemodynamics, discontinue antibiotics    Electrolyte abnormality  - Daily cmp  - replace for K<4, mag<2    NSTEMI (non-ST elevated myocardial infarction)  Reproducible anterior chest wall pain that she describes as pressure like. Troponin 0.041 without EKG changes. Low suspicion for NSTEMI Type 1 given clinical history and presentation, most likely NSTEMI Type II from demand ischemia as she was hypotensive and in A-fib on  admission.    - Stopped trending troponin as this peaked    Iron deficiency anemia due to chronic blood loss  Hgb 8.8, baseline.    - CBC daily  - Transfuse for hgb <7    Acute heart failure with preserved ejection fraction (HFpEF)  Echo 3/28:  · The estimated ejection fraction is 60%.  · The left ventricle is normal in size with concentric hypertrophy and normal systolic function. There is systolic anterior motion without LVOT obstruction. sonographer will be sent back to verify lack of LVOT obstruction.  · There is abnormal septal wall motion.  · Grade II left ventricular diastolic dysfunction.  · Normal right ventricular size with normal right ventricular systolic function.  · Mild left atrial enlargement.  · The estimated PA systolic pressure is 39 mmHg.  · Normal central venous pressure (3 mmHg).     BNP elevated but similar to prior.   Patient with ongoing crackles, will continue lasix either 40 mg IV or 80 mg PO  Wean oxygen as tolerated      Malignant neoplasm of pancreas  Follows with Dr. Hill and s/p cycle 1 of palliative paclitaxel + gemcitabine on 5/1. Recently admitted for nausea and vomiting, treated with IVFs and anti-emetics for pancreatitis.     Sick sinus syndrome  H/o PPM secondary to sick sinus syndrome and complete heart block. No acute concerns.    Osteopenia  - Continue fosamax.    GERD (gastroesophageal reflux disease)  Takes nexium daily at home; not on formulary.     - Continue Protonix while inpatient      OAB (overactive bladder)  - Continue oxybutynin.     Hypothyroidism  TSH wnl.     - Continue levothyroxine.    Essential hypertension, benign  Home medications: Amlodipine 5 mg daily    - Hold for now given hypotension on admission and abundance of caution as re-starting home sotalol, metoprolol.   - If blood pressure trending up throughout the day, can consider restarting.    Hyperlipidemia  - Continue statin    Crohn's disease of large intestine without complication  Loose stools  approximately 3x per day at baseline. On daily sulfasalazine. No evidence of acute flare.    - continue sulfasalazine    Paroxysmal atrial fibrillation  Acute onset of palpitations and chest pain x1 day. Found to be in A-fib RVR and hypotensive on admission. Hemodynamics normalized after IVFS. Suspect acute episode triggered by electrolyte deficiencies.     - Continue metoprolol, sotalol, eliquis             Sang Morgan MD  Hematology/Oncology  [facility] - Oncology ([facility])

## 2023-05-05 NOTE — PROGRESS NOTES
No acute events this shift. VSS on 3L; afebrile.  C/o mild headache relieved by tylenol.  Phos and K replaced.  Telemetry and continuous pulse ox monitoring continued.  All questions and concerns addressed.

## 2023-05-05 NOTE — PROGRESS NOTES
05/05/23 1319   Vital Signs   Pulse 104   Resp (!) 32   SpO2 (!) 88 %   Flow (L/min) 3   Device (Oxygen Therapy) nasal cannula   BP (!) 106/51     Pt found to be 88% SpO2 on 3L NC. Pt is SOB, tachypneic, and audibly wheezing. 80mg PO lasix given due to inability to obtain IV access. Midline team called to place PIV. EKG showing A. Fib w/ RVR. Breathing treatment ordered. Troponin ordered. Dr. Morgan has been notified. BP remains stable.

## 2023-05-05 NOTE — ASSESSMENT & PLAN NOTE
83F with extensive comorbidites recently admitted with intractable nausea, vomiting following chemo, treated for pancreatitis and discharged on 5/03 now representing with a one day history of palpitations and nonradiating pressure like anterior chest pain. Found to be in A-fib RVR and hypotensive on admission, which resolved with IVFs. Elevated troponin to 0.041 without EKG changes. Reproducible chest wall tenderness on exam. CXR without focal consolidation. CTA negative for PE to the proximal segmental level but bolus timing was suboptimal, along with bilateral GGO.      - Follow infectious workup.   - troponin peaked, stop trending   - Cardiac monitoring and EKG prn for recurrent chest pain.

## 2023-05-05 NOTE — SUBJECTIVE & OBJECTIVE
Interval History: No acute events overnight, vitals stable. Patient remains on 3 L NC, will wean further. Respiratory exam with crackles in both lung fields. Unable to give IV lasix due to poor IV access, will give oral lasix 80 mg. Continue to wean oxygen as tolerated    Oncology Treatment Plan:   OP PANC NAB-PACLITAXEL + GEMCITABINE Q4W    Medications:  Continuous Infusions:  Scheduled Meds:   apixaban  5 mg Oral BID    atorvastatin  20 mg Oral Daily    folic acid  1 mg Oral Daily    levalbuterol  0.63 mg Nebulization Q8H    levothyroxine  175 mcg Oral Before breakfast    LIDOcaine  1 patch Transdermal Q24H    lipase-protease-amylase 24,000-76,000-120,000 units  2 capsule Oral TID WM    metoprolol succinate  50 mg Oral Daily    miconazole NITRATE 2 %   Topical (Top) BID    oxybutynin  10 mg Oral Daily    pantoprazole  40 mg Oral Daily    polyethylene glycol  17 g Oral Daily    potassium bicarbonate  50 mEq Oral Once    potassium, sodium phosphates  2 packet Oral Once    sotaloL  120 mg Oral Daily    sulfaSALAzine  500 mg Oral BID     PRN Meds:acetaminophen, dextrose 10%, dextrose 10%, glucagon (human recombinant), glucose, glucose, melatonin, naloxone, oxyCODONE, prochlorperazine, sodium chloride 0.9%       Objective:     Vital Signs (Most Recent):  Temp: 98.5 °F (36.9 °C) (05/05/23 0420)  Pulse: 73 (05/05/23 0420)  Resp: 20 (05/05/23 0420)  BP: (!) 127/59 (05/05/23 0420)  SpO2: (!) 94 % (05/05/23 0420) Vital Signs (24h Range):  Temp:  [97.9 °F (36.6 °C)-98.5 °F (36.9 °C)] 98.5 °F (36.9 °C)  Pulse:  [] 73  Resp:  [16-30] 20  SpO2:  [83 %-100 %] 94 %  BP: ()/(52-63) 127/59     Weight: 80 kg (176 lb 5.9 oz)  Body mass index is 34.44 kg/m².  Body surface area is 1.84 meters squared.      Intake/Output Summary (Last 24 hours) at 5/5/2023 2374  Last data filed at 5/5/2023 0327  Gross per 24 hour   Intake 240 ml   Output 950 ml   Net -710 ml        Physical Exam  Vitals reviewed.   Constitutional:        General: She is not in acute distress.     Appearance: Normal appearance. She is ill-appearing. She is not toxic-appearing.   HENT:      Head: Normocephalic and atraumatic.      Right Ear: External ear normal.      Left Ear: External ear normal.      Nose: Nose normal. No congestion.      Mouth/Throat:      Mouth: Mucous membranes are moist.      Pharynx: No oropharyngeal exudate.   Eyes:      General: No scleral icterus.     Conjunctiva/sclera: Conjunctivae normal.   Cardiovascular:      Rate and Rhythm: Normal rate and regular rhythm.      Pulses: Normal pulses.   Pulmonary:      Effort: Pulmonary effort is normal. No respiratory distress.      Breath sounds: Normal breath sounds. No wheezing or rales.   Chest:      Chest wall: No tenderness.   Abdominal:      General: There is no distension.      Palpations: Abdomen is soft.      Tenderness: There is no abdominal tenderness. There is no guarding.   Musculoskeletal:         General: Normal range of motion.      Right lower leg: No edema.      Left lower leg: No edema.   Skin:     General: Skin is warm and dry.      Findings: No rash.   Neurological:      Mental Status: She is alert and oriented to person, place, and time. Mental status is at baseline.   Psychiatric:         Mood and Affect: Mood normal.         Behavior: Behavior normal.        Significant Labs:   All pertinent labs from the last 24 hours have been reviewed.    Diagnostic Results:  I have reviewed all pertinent imaging results/findings within the past 24 hours.

## 2023-05-05 NOTE — PLAN OF CARE
Problem: Physical Therapy  Goal: Physical Therapy Goal  Description: Goals to be met by: 23     Patient will increase functional independence with mobility by performin. Supine to sit with Stand-by Assistance  2. Sit to stand transfer with Modified Tripp with RW  3. Gait  x 150 feet with Stand-by Assistance using Rolling Walker.     Outcome: Ongoing, Progressing   Evaluation completed and goals appropriate. 2023

## 2023-05-06 PROBLEM — I49.5 SICK SINUS SYNDROME: Chronic | Status: ACTIVE | Noted: 2021-05-10

## 2023-05-06 PROBLEM — M85.80 OSTEOPENIA: Chronic | Status: ACTIVE | Noted: 2018-06-04

## 2023-05-06 PROBLEM — N32.81 OAB (OVERACTIVE BLADDER): Chronic | Status: ACTIVE | Noted: 2018-04-19

## 2023-05-06 LAB
ALBUMIN SERPL BCP-MCNC: 1.9 G/DL (ref 3.5–5.2)
ALP SERPL-CCNC: 54 U/L (ref 55–135)
ALT SERPL W/O P-5'-P-CCNC: 7 U/L (ref 10–44)
ANION GAP SERPL CALC-SCNC: 9 MMOL/L (ref 8–16)
ANISOCYTOSIS BLD QL SMEAR: SLIGHT
AST SERPL-CCNC: 16 U/L (ref 10–40)
BACTERIA UR CULT: NORMAL
BASOPHILS # BLD AUTO: 0 K/UL (ref 0–0.2)
BASOPHILS NFR BLD: 0 % (ref 0–1.9)
BILIRUB SERPL-MCNC: 0.7 MG/DL (ref 0.1–1)
BLD PROD TYP BPU: NORMAL
BLD PROD TYP BPU: NORMAL
BLOOD UNIT EXPIRATION DATE: NORMAL
BLOOD UNIT EXPIRATION DATE: NORMAL
BLOOD UNIT TYPE CODE: 6200
BLOOD UNIT TYPE CODE: 6200
BLOOD UNIT TYPE: NORMAL
BLOOD UNIT TYPE: NORMAL
BUN SERPL-MCNC: 13 MG/DL (ref 8–23)
CALCIUM SERPL-MCNC: 7.8 MG/DL (ref 8.7–10.5)
CHLORIDE SERPL-SCNC: 102 MMOL/L (ref 95–110)
CO2 SERPL-SCNC: 27 MMOL/L (ref 23–29)
CODING SYSTEM: NORMAL
CODING SYSTEM: NORMAL
CREAT SERPL-MCNC: 0.9 MG/DL (ref 0.5–1.4)
CROSSMATCH INTERPRETATION: NORMAL
CROSSMATCH INTERPRETATION: NORMAL
DIFFERENTIAL METHOD: ABNORMAL
DISPENSE STATUS: NORMAL
DISPENSE STATUS: NORMAL
EOSINOPHIL # BLD AUTO: 0.2 K/UL (ref 0–0.5)
EOSINOPHIL NFR BLD: 4.7 % (ref 0–8)
ERYTHROCYTE [DISTWIDTH] IN BLOOD BY AUTOMATED COUNT: 25.4 % (ref 11.5–14.5)
EST. GFR  (NO RACE VARIABLE): >60 ML/MIN/1.73 M^2
GIANT PLATELETS BLD QL SMEAR: PRESENT
GLUCOSE SERPL-MCNC: 86 MG/DL (ref 70–110)
HCT VFR BLD AUTO: 24.3 % (ref 37–48.5)
HGB BLD-MCNC: 6.8 G/DL (ref 12–16)
HYPOCHROMIA BLD QL SMEAR: ABNORMAL
IMM GRANULOCYTES # BLD AUTO: 0.02 K/UL (ref 0–0.04)
IMM GRANULOCYTES NFR BLD AUTO: 0.6 % (ref 0–0.5)
LYMPHOCYTES # BLD AUTO: 0.5 K/UL (ref 1–4.8)
LYMPHOCYTES NFR BLD: 14.2 % (ref 18–48)
MAGNESIUM SERPL-MCNC: 1.6 MG/DL (ref 1.6–2.6)
MCH RBC QN AUTO: 24.2 PG (ref 27–31)
MCHC RBC AUTO-ENTMCNC: 28 G/DL (ref 32–36)
MCV RBC AUTO: 87 FL (ref 82–98)
MONOCYTES # BLD AUTO: 0.1 K/UL (ref 0.3–1)
MONOCYTES NFR BLD: 1.7 % (ref 4–15)
NEUTROPHILS # BLD AUTO: 2.7 K/UL (ref 1.8–7.7)
NEUTROPHILS NFR BLD: 78.8 % (ref 38–73)
NRBC BLD-RTO: 0 /100 WBC
NUM UNITS TRANS PACKED RBC: NORMAL
NUM UNITS TRANS PACKED RBC: NORMAL
OVALOCYTES BLD QL SMEAR: ABNORMAL
PHOSPHATE SERPL-MCNC: 3.1 MG/DL (ref 2.7–4.5)
PLATELET # BLD AUTO: 128 K/UL (ref 150–450)
PLATELET BLD QL SMEAR: ABNORMAL
PMV BLD AUTO: 11.8 FL (ref 9.2–12.9)
POIKILOCYTOSIS BLD QL SMEAR: SLIGHT
POTASSIUM SERPL-SCNC: 3.4 MMOL/L (ref 3.5–5.1)
PROT SERPL-MCNC: 5.5 G/DL (ref 6–8.4)
RBC # BLD AUTO: 2.81 M/UL (ref 4–5.4)
SCHISTOCYTES BLD QL SMEAR: ABNORMAL
SCHISTOCYTES BLD QL SMEAR: PRESENT
SODIUM SERPL-SCNC: 138 MMOL/L (ref 136–145)
WBC # BLD AUTO: 3.44 K/UL (ref 3.9–12.7)

## 2023-05-06 PROCEDURE — 25000003 PHARM REV CODE 250: Performed by: STUDENT IN AN ORGANIZED HEALTH CARE EDUCATION/TRAINING PROGRAM

## 2023-05-06 PROCEDURE — 36430 TRANSFUSION BLD/BLD COMPNT: CPT

## 2023-05-06 PROCEDURE — 83735 ASSAY OF MAGNESIUM: CPT | Performed by: STUDENT IN AN ORGANIZED HEALTH CARE EDUCATION/TRAINING PROGRAM

## 2023-05-06 PROCEDURE — P9016 RBC LEUKOCYTES REDUCED: HCPCS

## 2023-05-06 PROCEDURE — 27000221 HC OXYGEN, UP TO 24 HOURS

## 2023-05-06 PROCEDURE — 99233 PR SUBSEQUENT HOSPITAL CARE,LEVL III: ICD-10-PCS | Mod: GC,,, | Performed by: INTERNAL MEDICINE

## 2023-05-06 PROCEDURE — 25000003 PHARM REV CODE 250

## 2023-05-06 PROCEDURE — 80053 COMPREHEN METABOLIC PANEL: CPT | Performed by: STUDENT IN AN ORGANIZED HEALTH CARE EDUCATION/TRAINING PROGRAM

## 2023-05-06 PROCEDURE — 94761 N-INVAS EAR/PLS OXIMETRY MLT: CPT

## 2023-05-06 PROCEDURE — 84100 ASSAY OF PHOSPHORUS: CPT | Performed by: STUDENT IN AN ORGANIZED HEALTH CARE EDUCATION/TRAINING PROGRAM

## 2023-05-06 PROCEDURE — 20600001 HC STEP DOWN PRIVATE ROOM

## 2023-05-06 PROCEDURE — 99233 SBSQ HOSP IP/OBS HIGH 50: CPT | Mod: GC,,, | Performed by: INTERNAL MEDICINE

## 2023-05-06 PROCEDURE — 99900035 HC TECH TIME PER 15 MIN (STAT)

## 2023-05-06 PROCEDURE — 25000242 PHARM REV CODE 250 ALT 637 W/ HCPCS

## 2023-05-06 PROCEDURE — 85025 COMPLETE CBC W/AUTO DIFF WBC: CPT | Performed by: STUDENT IN AN ORGANIZED HEALTH CARE EDUCATION/TRAINING PROGRAM

## 2023-05-06 PROCEDURE — 36415 COLL VENOUS BLD VENIPUNCTURE: CPT | Performed by: STUDENT IN AN ORGANIZED HEALTH CARE EDUCATION/TRAINING PROGRAM

## 2023-05-06 PROCEDURE — 94640 AIRWAY INHALATION TREATMENT: CPT

## 2023-05-06 RX ORDER — LANOLIN ALCOHOL/MO/W.PET/CERES
400 CREAM (GRAM) TOPICAL ONCE
Status: COMPLETED | OUTPATIENT
Start: 2023-05-06 | End: 2023-05-06

## 2023-05-06 RX ORDER — ACETAMINOPHEN 325 MG/1
650 TABLET ORAL ONCE
Status: COMPLETED | OUTPATIENT
Start: 2023-05-06 | End: 2023-05-06

## 2023-05-06 RX ORDER — HYDROCODONE BITARTRATE AND ACETAMINOPHEN 500; 5 MG/1; MG/1
TABLET ORAL
Status: DISCONTINUED | OUTPATIENT
Start: 2023-05-06 | End: 2023-05-07 | Stop reason: HOSPADM

## 2023-05-06 RX ORDER — SODIUM,POTASSIUM PHOSPHATES 280-250MG
2 POWDER IN PACKET (EA) ORAL EVERY 4 HOURS
Status: COMPLETED | OUTPATIENT
Start: 2023-05-06 | End: 2023-05-06

## 2023-05-06 RX ORDER — POTASSIUM CHLORIDE 20 MEQ/1
40 TABLET, EXTENDED RELEASE ORAL ONCE
Status: COMPLETED | OUTPATIENT
Start: 2023-05-06 | End: 2023-05-06

## 2023-05-06 RX ORDER — FUROSEMIDE 40 MG/1
40 TABLET ORAL ONCE
Status: COMPLETED | OUTPATIENT
Start: 2023-05-06 | End: 2023-05-06

## 2023-05-06 RX ORDER — ACETAMINOPHEN 325 MG/1
650 TABLET ORAL EVERY 6 HOURS PRN
Status: DISCONTINUED | OUTPATIENT
Start: 2023-05-06 | End: 2023-05-07 | Stop reason: HOSPADM

## 2023-05-06 RX ADMIN — PANTOPRAZOLE SODIUM 40 MG: 40 TABLET, DELAYED RELEASE ORAL at 08:05

## 2023-05-06 RX ADMIN — POTASSIUM & SODIUM PHOSPHATES POWDER PACK 280-160-250 MG 2 PACKET: 280-160-250 PACK at 09:05

## 2023-05-06 RX ADMIN — LEVALBUTEROL HYDROCHLORIDE 0.63 MG: 0.63 SOLUTION RESPIRATORY (INHALATION) at 04:05

## 2023-05-06 RX ADMIN — LEVALBUTEROL HYDROCHLORIDE 0.63 MG: 0.63 SOLUTION RESPIRATORY (INHALATION) at 07:05

## 2023-05-06 RX ADMIN — LEVOTHYROXINE SODIUM 175 MCG: 125 TABLET ORAL at 05:05

## 2023-05-06 RX ADMIN — Medication 400 MG: at 09:05

## 2023-05-06 RX ADMIN — MICONAZOLE NITRATE 2 % TOPICAL POWDER: at 08:05

## 2023-05-06 RX ADMIN — FUROSEMIDE 40 MG: 40 TABLET ORAL at 09:05

## 2023-05-06 RX ADMIN — ACETAMINOPHEN 650 MG: 325 TABLET ORAL at 08:05

## 2023-05-06 RX ADMIN — APIXABAN 5 MG: 5 TABLET, FILM COATED ORAL at 08:05

## 2023-05-06 RX ADMIN — POTASSIUM & SODIUM PHOSPHATES POWDER PACK 280-160-250 MG 2 PACKET: 280-160-250 PACK at 02:05

## 2023-05-06 RX ADMIN — ATORVASTATIN CALCIUM 20 MG: 20 TABLET, FILM COATED ORAL at 08:05

## 2023-05-06 RX ADMIN — POTASSIUM CHLORIDE 40 MEQ: 1500 TABLET, EXTENDED RELEASE ORAL at 09:05

## 2023-05-06 RX ADMIN — METOPROLOL SUCCINATE 50 MG: 50 TABLET, EXTENDED RELEASE ORAL at 08:05

## 2023-05-06 RX ADMIN — ACETAMINOPHEN 650 MG: 325 TABLET ORAL at 02:05

## 2023-05-06 RX ADMIN — OXYBUTYNIN CHLORIDE 10 MG: 5 TABLET, EXTENDED RELEASE ORAL at 08:05

## 2023-05-06 RX ADMIN — FOLIC ACID 1 MG: 1 TABLET ORAL at 08:05

## 2023-05-06 RX ADMIN — SULFASALAZINE 500 MG: 500 TABLET ORAL at 08:05

## 2023-05-06 RX ADMIN — Medication 3 MG: at 08:05

## 2023-05-06 RX ADMIN — ACETAMINOPHEN 650 MG: 325 TABLET ORAL at 05:05

## 2023-05-06 RX ADMIN — SOTALOL HYDROCHLORIDE 120 MG: 120 TABLET ORAL at 08:05

## 2023-05-06 NOTE — ASSESSMENT & PLAN NOTE
Follows with Dr. Hill and s/p cycle 1 of palliative paclitaxel + gemcitabine on 5/1. Recently admitted for nausea and vomiting, treated with IVFs and anti-emetics for pancreatitis. Has symptomatically improved from this standpoint and is now tolerating PO diet well.

## 2023-05-06 NOTE — ASSESSMENT & PLAN NOTE
H/o PPM secondary to sick sinus syndrome and complete heart block. No acute concerns and tolerating BB therapy.

## 2023-05-06 NOTE — PLAN OF CARE
2 units PRBC administered, pt tolerated well. No complaints this shift. Lasix administered, pt voiding clear yellow urine. BM x 1. POC reviewed with patient; understanding verbalized. Pt. with nonskid footwear on, bed in lowest position, and locked with bed rails up x2.  Pt. instructed to call prior to getting OOB.  Pt. has call light and personal items within reach. Patient ambulates in room with assist. VSS and afebrile this shift. All questions and concerns addressed at this time. Will continue to monitor.

## 2023-05-06 NOTE — ASSESSMENT & PLAN NOTE
Recently admitted for intractable nausea and vomiting s/p chemo, treated for pancreatitis and discharged 5/3.   Represented with palpitations, CP and hypotension on admission.   No leukocytosis, fevers, or focal consolidation noted on CXR/CTA. New small R sided pleural effusion. Procal neg.  Treated with Vanc and Cefepime in the ED.     - Abx discontinued given no localizing source, no fever, no leukocytosis - hypotension likely cardiac  - Follow blood cx.

## 2023-05-06 NOTE — PLAN OF CARE
Side rails up x2; call bell in place; bed in lowest, locked position; skid proof socks on; no evidence of skin breakdown; care plan explained to patient; pt remains free of injury.  Pt tolerated po in small amount, voids per purwick, assisted pt in turning and repositioning. Pt with HA tylenol given. Pt with soft BP asymptomatic, pt instructed to notify staff with any c/o  discomfort, MD aware. Frequent rounding in progress.

## 2023-05-06 NOTE — PLAN OF CARE
[facility])      HOME HEALTH ORDERS  FACE TO FACE ENCOUNTER    Patient Name: Pauline Cronin  YOB: 1940    PCP: Primary Doctor No   PCP Address: None  PCP Phone Number: None  PCP Fax: None    Encounter Date: 5/3/23    Admit to Home Health    Diagnoses:  Active Hospital Problems    Diagnosis  POA    *Acute heart failure with preserved ejection fraction (HFpEF) [I50.31]  Yes     Chronic    NSTEMI (non-ST elevated myocardial infarction) [I21.4]  Yes    Electrolyte abnormality [E87.8]  Yes    Suspected sepsis [A41.9]  Yes    Chest pain [R07.9]  Yes    Iron deficiency anemia due to chronic blood loss [D50.0]  Yes    Malignant neoplasm of pancreas [C25.9]  Yes    Sick sinus syndrome [I49.5]  Yes     Chronic    Osteopenia [M85.80]  Yes     Chronic    Paroxysmal atrial fibrillation [I48.0]  Yes     Chronic     Followed by Dr. Leone        OAB (overactive bladder) [N32.81]  Yes     Chronic    Hyperlipidemia [E78.5]  Yes     Chronic    GERD (gastroesophageal reflux disease) [K21.9]  Yes     Chronic    Hypothyroidism [E03.9]  Yes     Chronic    Essential hypertension, benign [I10]  Yes     Chronic    Crohn's disease of large intestine without complication [K50.10]  Yes     Chronic      Resolved Hospital Problems    Diagnosis Date Resolved POA    Anemia [D64.9] 05/04/2023 Yes       Follow Up Appointments:  Future Appointments   Date Time Provider Department Center   5/12/2023 11:00 AM Christine Rodriguez DNP [facility]   5/13/2023 11:00 AM [facility]URSE 11, [facility]   5/20/2023 11:00 AM [facility] 11, [facility]   5/27/2023 11:00 AM [facility]URSE [facility]   6/3/2023 11:00 AM [facility]URSE 11, [facility]   7/12/2023  9:30 AM Jovan Barbosa MD \[facility] [place] [facility]\""[facility]   10/18/2023  9:30 AM Ga Waldrop MD [facility]       Allergies:  Review of patient's allergies indicates:   Allergen  Reactions    Lisinopril Other (See Comments)     cough       Medications: Review discharge medications with patient and family and provide education.    Current Facility-Administered Medications   Medication Dose Route Frequency Provider Last Rate Last Admin    0.9%  NaCl infusion (for blood administration)   Intravenous Q24H PRN Negrito Leon MD        acetaminophen tablet 650 mg  650 mg Oral Q6H PRN Isabela Pedro MD   650 mg at 05/06/23 0506    apixaban tablet 5 mg  5 mg Oral BID Isabela Pedro MD   5 mg at 05/06/23 0843    atorvastatin tablet 20 mg  20 mg Oral Daily Isabela Pedro MD   20 mg at 05/06/23 0843    dextrose 10% bolus 125 mL 125 mL  12.5 g Intravenous PRN Isabela Pedro MD        dextrose 10% bolus 250 mL 250 mL  25 g Intravenous PRN Isabela Pedro MD        folic acid tablet 1 mg  1 mg Oral Daily Isabela Pedro MD   1 mg at 05/06/23 0843    glucagon (human recombinant) injection 1 mg  1 mg Intramuscular PRN Isabela Pedro MD        glucose chewable tablet 16 g  16 g Oral PRN Isabela Pedro MD        glucose chewable tablet 24 g  24 g Oral PRN Isabela Pedro MD        levalbuterol nebulizer solution 0.63 mg  0.63 mg Nebulization Q8H Negrito Leon MD   0.63 mg at 05/06/23 0739    levothyroxine tablet 175 mcg  175 mcg Oral Before breakfast Isabela Pedro MD   175 mcg at 05/06/23 0507    LIDOcaine 5 % patch 1 patch  1 patch Transdermal Q24H Isabela Pedro MD        lipase-protease-amylase 24,000-76,000-120,000 units capsule 2 capsule  2 capsule Oral TID WM Isabela Pedro MD   2 capsule at 05/05/23 0801    melatonin tablet 3 mg  3 mg Oral Nightly PRN Isabela Pedro MD   3 mg at 05/05/23 2347    metoprolol succinate (TOPROL-XL) 24 hr tablet 50 mg  50 mg Oral Daily Isabela Pedro MD   50 mg at 05/06/23 0843    miconazole NITRATE 2 % top powder   Topical (Top) BID Negrito Leon MD   Given at 05/05/23 1005    naloxone 0.4 mg/mL injection 0.02 mg  0.02 mg  Intravenous PRN Isabela Pedro MD        oxybutynin 24 hr tablet 10 mg  10 mg Oral Daily Isabela Pedro MD   10 mg at 05/06/23 0843    oxyCODONE immediate release tablet 5 mg  5 mg Oral Q4H PRN Isabela Pedro MD   5 mg at 05/04/23 0501    pantoprazole EC tablet 40 mg  40 mg Oral Daily Isabela Pedro MD   40 mg at 05/06/23 0842    polyethylene glycol packet 17 g  17 g Oral Daily Isabela Pedro MD        potassium, sodium phosphates 280-160-250 mg packet 2 packet  2 packet Oral Q4H Negrito Leon MD   2 packet at 05/06/23 0956    prochlorperazine tablet 5 mg  5 mg Oral QID PRN Isabela Pedro MD        sodium chloride 0.9% flush 10 mL  10 mL Intravenous Q12H PRN Isabela Pedro MD        sotaloL tablet 120 mg  120 mg Oral Daily Negrito Leon MD   120 mg at 05/06/23 0843    sulfaSALAzine tablet 500 mg  500 mg Oral BID Isabela Pedro MD   500 mg at 05/06/23 0843     Current Discharge Medication List        START taking these medications    Details   furosemide (LASIX) 40 MG tablet Take 1 tablet (40 mg total) by mouth daily as needed (For weight gain > 3 lb in one day, increasing leg swelling, or increasing SOB). Take one daily as needed for weight gain > 3 lb in one day, increasing leg swelling, or increasing SOB  Qty: 30 tablet, Refills: 11           CONTINUE these medications which have CHANGED    Details   sotaloL (BETAPACE) 120 MG Tab Take 1 tablet (120 mg total) by mouth once daily.  Qty: 180 tablet, Refills: 3           CONTINUE these medications which have NOT CHANGED    Details   acetaminophen (TYLENOL) 325 MG tablet Take 650 mg by mouth daily as needed (Headache).      alendronate (FOSAMAX) 70 MG tablet Take 1 tablet (70 mg total) by mouth every 7 days.  Qty: 12 tablet, Refills: 3    Associated Diagnoses: Osteoporosis, unspecified osteoporosis type, unspecified pathological fracture presence      amLODIPine (NORVASC) 5 MG tablet Take 1 tablet (5 mg total) by mouth once daily.  Qty: 30  tablet, Refills: 11      atorvastatin (LIPITOR) 20 MG tablet Take 1 tablet (20 mg total) by mouth once daily.  Qty: 90 tablet, Refills: 3      colestipoL (COLESTID) 1 gram Tab Take 1 g by mouth 2 (two) times daily.      diphenoxylate-atropine 2.5-0.025 mg (LOMOTIL) 2.5-0.025 mg per tablet TAKE 1 TABLET BY MOUTH 4 TIMES DAILY AS NEEDED FOR DIARRHEA  Qty: 60 tablet, Refills: 0    Associated Diagnoses: Chronic diarrhea      ELIQUIS 5 mg Tab Take 1 tablet (5 mg total) by mouth 2 (two) times daily.  Qty: 180 tablet, Refills: 0    Comments: PATIENT NEEDS APPOINTMENT FOR FURTHER REFILLS.  Associated Diagnoses: Paroxysmal atrial fibrillation      esomeprazole (NEXIUM) 40 MG capsule Take 1 capsule (40 mg total) by mouth once daily.  Qty: 90 capsule, Refills: 3      folic acid (FOLVITE) 1 MG tablet Take 1 tablet by mouth once daily  Qty: 90 tablet, Refills: 0      levothyroxine (SYNTHROID, LEVOTHROID) 175 MCG tablet Take 1 tablet by mouth once daily  Qty: 30 tablet, Refills: 5    Associated Diagnoses: Hypothyroidism, unspecified type      lipase-protease-amylase 24,000-76,000-120,000 units (CREON) 24,000-76,000 -120,000 unit capsule Take 2 capsules by mouth 3 (three) times daily with meals.  Qty: 180 capsule, Refills: 11    Associated Diagnoses: Exocrine pancreatic insufficiency      MELATONIN ORAL Take 1 tablet by mouth nightly as needed (Insomnia).      metoprolol succinate (TOPROL-XL) 50 MG 24 hr tablet Take 1 tablet (50 mg total) by mouth once daily.  Qty: 30 tablet, Refills: 1    Comments: .      oxyCODONE (ROXICODONE) 5 MG immediate release tablet Take 1 tablet (5 mg total) by mouth every 4 (four) hours as needed for Pain.  Qty: 42 tablet, Refills: 0    Comments: Quantity prescribed more than 7 day supply? No      polyethylene glycol (GLYCOLAX) 17 gram/dose powder Use cap to measure 17 grams, mix in liquid and take by mouth once daily.  Qty: 510 g, Refills: 0      prochlorperazine (COMPAZINE) 5 MG tablet Take 1 tablet  (5 mg total) by mouth 4 (four) times daily as needed for Nausea (Take up to 4 times daily 30 minutes prior to meals).  Qty: 60 tablet, Refills: 0      senna-docusate 8.6-50 mg (PERICOLACE) 8.6-50 mg per tablet Take 1 tablet by mouth 2 (two) times daily.  Qty: 60 tablet, Refills: 0      sulfaSALAzine (AZULFIDINE) 500 mg Tab Take 1 tablet by mouth twice daily  Qty: 180 tablet, Refills: 3      tolterodine (DETROL LA) 4 MG 24 hr capsule Take 1 capsule by mouth once daily  Qty: 90 capsule, Refills: 3               I have seen and examined this patient within the last 30 days. My clinical findings that support the need for the home health skilled services and home bound status are the following:no   Weakness/numbness causing balance and gait disturbance due to Heart Failure, Weakness/Debility, and Malignancy/Cancer making it taxing to leave home.     Diet:   2 gram sodium diet    Labs:  Report Lab results to PCP.    Referrals/ Consults  Physical Therapy to evaluate and treat. Evaluate for home safety and equipment needs; Establish/upgrade home exercise program. Perform / instruct on therapeutic exercises, gait training, transfer training, and Range of Motion.  Occupational Therapy to evaluate and treat. Evaluate home environment for safety and equipment needs. Perform/Instruct on transfers, ADL training, ROM, and therapeutic exercises.  Aide to provide assistance with personal care, ADLs, and vital signs.    Activities:   activity as tolerated    Nursing:   Agency to admit patient within 24 hours of hospital discharge unless specified on physician order or at patient request    SN to complete comprehensive assessment including routine vital signs. Instruct on disease process and s/s of complications to report to MD. Review/verify medication list sent home with the patient at time of discharge  and instruct patient/caregiver as needed. Frequency may be adjusted depending on start of care date.     Skilled nurse to perform  up to 3 visits PRN for symptoms related to diagnosis    Notify MD if SBP > 160 or < 90; DBP > 90 or < 50; HR > 120 or < 50; Temp > 101; O2 < 88%; Other:       Ok to schedule additional visits based on staff availability and patient request on consecutive days within the home health episode.    When multiple disciplines ordered:    Start of Care occurs on Sunday - Wednesday schedule remaining discipline evaluations as ordered on separate consecutive days following the start of care.    Thursday SOC -schedule subsequent evaluations Friday and Monday the following week.     Friday - Saturday SOC - schedule subsequent discipline evaluations on consecutive days starting Monday of the following week.    For all post-discharge communication and subsequent orders please contact patient's primary care physician. If unable to reach primary care physician or do not receive response within 30 minutes, please contact Oncology clinic for clinical staff order clarification    Miscellaneous   Home Oxygen:  Assess oxygen saturation via pulse oximeter as needed for increase in SOB.    Home Health Aide:  Nursing Three times weekly, Physical Therapy Three times weekly, and Occupational Therapy Three times weekly    Wound Care Orders  no    I certify that this patient is confined to her home and needs intermittent skilled nursing care, physical therapy, and occupational therapy.

## 2023-05-06 NOTE — NURSING
Pt's BP 87/52 Map 66, pt is asymptomatic. Dr Pedro notified and assess pt. Stated to d/c the pulse ox and to notify if the pt becomes symptomatic. Pt also instructed to call nurse with any c/o lightheaded, dizziness, or any other discomfort.

## 2023-05-06 NOTE — ASSESSMENT & PLAN NOTE
Echo 3/28:  · The estimated ejection fraction is 60%.  · The left ventricle is normal in size with concentric hypertrophy and normal systolic function. There is systolic anterior motion without LVOT obstruction. sonographer will be sent back to verify lack of LVOT obstruction.  · There is abnormal septal wall motion.  · Grade II left ventricular diastolic dysfunction.  · Normal right ventricular size with normal right ventricular systolic function.  · Mild left atrial enlargement.  · The estimated PA systolic pressure is 39 mmHg.  · Normal central venous pressure (3 mmHg).     BNP elevated, slightly higher than prior  Patient with ongoing crackles, will continue lasix either 40 mg IV or 80 mg PO  Wean oxygen as tolerated

## 2023-05-06 NOTE — ASSESSMENT & PLAN NOTE
Loose stools approximately 3x per day at baseline. On daily sulfasalazine. No evidence of acute flare.    - continue sulfasalazine   Please use the albuterol as needed for cough/shortness of breath       PATIENT INFORMATION   CT Instructions:    IF YOUR INSURANCE REQUIRES PRE AUTHORIZATION, PLEASE WAIT 48-72 HOURS TO CALL TO SCHEDULE YOUR CT.   Please call [phone] to schedule your CT.  CT Schedulers are available Monday through Friday, 8:00 a.m. to 5:00 p.m.    Please proceed to CT/MRI department in the lower level ten minutes prior to to your appointment. This will allow sufficient time for us to prepare for your exam and to begin your exam as close to the appointment time as possible.  If you need to change your appointment or have questions, please call us at [phone].                What is CT Scanning?  CT scanning, also called computerized tomography or “CAT” scan, is an x-ray test used for diagnosis.  Scans are taken from a series of different angles and arranged by a computer to show a cross-sectional view of organs in the body.    When is it used?  CT scans are used when your health care provider needs more detailed information than regular x-rays provide.       How do I prepare for a CT scan?  No preparation is necessary unless your health care provider gives you special instructions.  For example, if you are having a CT scan of your abdomen or pelvis, you should not eat solid food for five hours before the scan and will probably need to drink oral contrast.  Before the test, you may be asked to remove dental wear, piercings, hair clips, or jewelry you are wearing. If you have a mediport, you will be asked to have the port accessed prior to your CT appointment.     What happens during the procedure?  During your scan, you will lie down on a moving table, which will slide you into the scanner.  The scanner can move around you to change the angles of the x-rays.  Inside the scanning machine, multiple x-ray beams are passed very quickly through your body at different angles. The images are projected onto a TV screen and prepared  for your health care provider to examine.     A solution of dye (also called contrast) may be injected into a vein, or you may be asked to drink a different type of dye. This allows the scanner to show areas as the dye passes through your body.     Your CT scan will last approximately 15 to 30 minutes.  The scanning procedure is painless, but you will be required to remain in one position for a period of time. You can talk to the technologist at any time during the procedure.    What are the risks associated with this procedure?  In this procedure, your body is exposed to a very small amount of radiation.  If you are pregnant, you should not have a CT scan without first discussing the possible risks with your health care provider.     During this procedure, the dye may cause warm feelings, a flushed face, headache, or a salty taste in the mouth. Rarely, it can cause nausea and vomiting, or more severe reactions.     Be sure to tell your health care provider if you are allergic to any medicines or chemicals such as iodine.     Follow-Up  -- Make an appointment with Marilee Andrew, DO     Additional Educational Resources:  For additional resources regarding your symptoms, diagnosis, or further health information, please visit the Health Resources section on [email or web address] or the Online Health Resources section in [employer].

## 2023-05-06 NOTE — ASSESSMENT & PLAN NOTE
Hgb 8.8, baseline. Hb has been trending down since admission, was at 6.8 on 5/6. No evidence of active bleeding. T bili was briefly elevated several days prior but has since trended down, which would argue against hemolysis. Given timing of recent chemotherapy highly suspect this is the cause. Will defer hemolysis workup given normal T bilirubin.     - CBC daily  - Transfuse for hgb <7  - Given 1 unit pRBC on 5/6

## 2023-05-06 NOTE — SUBJECTIVE & OBJECTIVE
Interval History: Had a brief episode of tachycardia overnight that resolved without intervention. Remains stable on 3L NC. Symptomatically has improved and feels overall better. Hb of 6.8 this morning - has been trending down since chemo on 5/1. Consent for blood obtained. Will give 1 unit pRBCs with lasix.     Oncology Treatment Plan:   OP PANC NAB-PACLITAXEL + GEMCITABINE Q4W    Medications:  Continuous Infusions:  Scheduled Meds:   apixaban  5 mg Oral BID    atorvastatin  20 mg Oral Daily    folic acid  1 mg Oral Daily    furosemide  40 mg Oral Once    levalbuterol  0.63 mg Nebulization Q8H    levothyroxine  175 mcg Oral Before breakfast    LIDOcaine  1 patch Transdermal Q24H    lipase-protease-amylase 24,000-76,000-120,000 units  2 capsule Oral TID WM    metoprolol succinate  50 mg Oral Daily    miconazole NITRATE 2 %   Topical (Top) BID    oxybutynin  10 mg Oral Daily    pantoprazole  40 mg Oral Daily    polyethylene glycol  17 g Oral Daily    sotaloL  120 mg Oral Daily    sulfaSALAzine  500 mg Oral BID     PRN Meds:sodium chloride, acetaminophen, dextrose 10%, dextrose 10%, glucagon (human recombinant), glucose, glucose, melatonin, naloxone, oxyCODONE, prochlorperazine, sodium chloride 0.9%       Objective:     Vital Signs (Most Recent):  Temp: 98.6 °F (37 °C) (05/06/23 0740)  Pulse: 72 (05/06/23 0740)  Resp: 18 (05/06/23 0740)  BP: (!) 108/51 (05/06/23 0740)  SpO2: 98 % (05/06/23 0740) Vital Signs (24h Range):  Temp:  [97.9 °F (36.6 °C)-98.6 °F (37 °C)] 98.6 °F (37 °C)  Pulse:  [] 72  Resp:  [16-32] 18  SpO2:  [79 %-100 %] 98 %  BP: ()/(50-77) 108/51     Weight: 80 kg (176 lb 5.9 oz)  Body mass index is 34.44 kg/m².  Body surface area is 1.84 meters squared.      Intake/Output Summary (Last 24 hours) at 5/6/2023 0966  Last data filed at 5/6/2023 0748  Gross per 24 hour   Intake 830 ml   Output 1250 ml   Net -420 ml          Physical Exam  Vitals reviewed.   Constitutional:       General: She is  not in acute distress.     Appearance: Normal appearance. She is ill-appearing. She is not toxic-appearing.   HENT:      Head: Normocephalic and atraumatic.      Right Ear: External ear normal.      Left Ear: External ear normal.      Nose: Nose normal. No congestion.      Mouth/Throat:      Mouth: Mucous membranes are moist.      Pharynx: No oropharyngeal exudate.   Eyes:      General: No scleral icterus.     Conjunctiva/sclera: Conjunctivae normal.   Cardiovascular:      Rate and Rhythm: Normal rate and regular rhythm.      Pulses: Normal pulses.   Pulmonary:      Effort: Pulmonary effort is normal. No respiratory distress.      Breath sounds: Normal breath sounds. No wheezing or rales.   Chest:      Chest wall: No tenderness.   Abdominal:      General: There is no distension.      Palpations: Abdomen is soft.      Tenderness: There is no abdominal tenderness. There is no guarding.   Musculoskeletal:         General: Normal range of motion.      Right lower leg: No edema.      Left lower leg: No edema.   Skin:     General: Skin is warm and dry.      Findings: No rash.   Neurological:      Mental Status: She is alert and oriented to person, place, and time. Mental status is at baseline.   Psychiatric:         Mood and Affect: Mood normal.         Behavior: Behavior normal.        Significant Labs:   All pertinent labs from the last 24 hours have been reviewed.    Diagnostic Results:  I have reviewed all pertinent imaging results/findings within the past 24 hours.  Review of Systems   Constitutional:  Negative for chills and fever.   HENT:  Negative for sore throat and trouble swallowing.    Eyes:  Negative for pain and visual disturbance.   Respiratory:  Positive for shortness of breath. Negative for cough and choking.    Cardiovascular:  Positive for palpitations. Negative for chest pain and leg swelling.   Gastrointestinal:  Negative for abdominal pain, constipation and diarrhea.   Genitourinary:  Negative for  dysuria and flank pain.   Musculoskeletal:  Negative for arthralgias and back pain.   Skin:  Negative for color change and pallor.   Neurological:  Negative for dizziness and headaches.   Psychiatric/Behavioral:  Negative for agitation and confusion.

## 2023-05-06 NOTE — PROGRESS NOTES
[facility] - Oncology ([facility])  Hematology/Oncology  Progress Note    Patient Name: Pauline Cronin  Admission Date: 5/3/2023  Hospital Length of Stay: 2 days  Code Status: DNR     Subjective:     HPI:  Pauline Cronin is an 83F with atrial fibrillation on apixaban and sotalol/metoprolol, hypothyroidism, osteopenia, HLD, obesity, Crohn's disease, pancreatic adenocarcinoma diagnosed by ERCP biopsy/stent placement 3/2023 complicated by post ERCP pancreatitis, pancreatic adenocarcinoma not amenable to resection presenting with a one day history of palpitations and anterior pressure like nonradiating chest pain. Of note, she was recently admitted to the Medical Oncology service at [facility] for intractable nausea and vomiting. She was treated with IVFs and anti-emetics for suspected pancreatitis and discharged home with home health on 5/03. Throughout the night, she developed palpitations along with chest pain, prompting ED arrival. She denies any fevers, chills, SOB, leg swelling, PND, orthopnea, n/v/d, abdominal pain or urinary changes.     On arrival, she was tachycardic to 134 BPM, hypotensive (77/47), otherwise afebrile and satting 95% on room air. Laboratory workup significant for hypokalemia (3.3), hypomagnesemia (1.1), lipase 193 (downtrending from 370). CXR without focal consolidation. CTA negative for PE to the proximal segmental level but bolus timing was suboptimal, along with bilateral GGO. Initial EKG showing A-fib RVR. She was given 2.3 L of IVFs with improvement in her blood pressure and resolution of A-fib. She was also given potassium, mg, zofran, morphine, tylenol, along with Vanc and Cefepime then admitted to Medical Oncology service for further workup.     Oncologic history, per Dr. Hill clinic note 5/1/23  She presented initially to her PCP on 03/01/23 with several weeks of fatigue, nausea and several pounds of weight loss. She had a CT abdomen pelvis which showed a pancreatic head mass that abuts or  invades the adjacent 2nd portion of the duodenum it was measured about 3.3 x 2.8 cm. There was some degree of GOO. The portal vein, splenic vein, SMV, celiac axis and SMA all are patent.   She underwent an ERCP w biopsy of the mass on 03/14 which was positive for adenocarcinoma, pMMR. She was admitted on 03/17 for hyperbilirubinemia. She underwent ERCP with stenting on 03/21 c/w post-ERCP pancreatitis.   She was admitted again 03/28 for afib with RVR and ADHF. She was discharged on 04/05.   Admitted 4/22-4/24 for N/V and abdominal pain that resolved with conservative management.   Admitted 5/02 to 5/03 for intractable to nausea and vomiting, treated for pancreatitis.            Interval History: Had a brief episode of tachycardia overnight that resolved without intervention. Remains stable on 3L NC. Symptomatically has improved and feels overall better. Hb of 6.8 this morning - has been trending down since chemo on 5/1. Consent for blood obtained. Will give 1 unit pRBCs with lasix.     Oncology Treatment Plan:   OP PANC NAB-PACLITAXEL + GEMCITABINE Q4W    Medications:  Continuous Infusions:  Scheduled Meds:   apixaban  5 mg Oral BID    atorvastatin  20 mg Oral Daily    folic acid  1 mg Oral Daily    furosemide  40 mg Oral Once    levalbuterol  0.63 mg Nebulization Q8H    levothyroxine  175 mcg Oral Before breakfast    LIDOcaine  1 patch Transdermal Q24H    lipase-protease-amylase 24,000-76,000-120,000 units  2 capsule Oral TID WM    metoprolol succinate  50 mg Oral Daily    miconazole NITRATE 2 %   Topical (Top) BID    oxybutynin  10 mg Oral Daily    pantoprazole  40 mg Oral Daily    polyethylene glycol  17 g Oral Daily    sotaloL  120 mg Oral Daily    sulfaSALAzine  500 mg Oral BID     PRN Meds:sodium chloride, acetaminophen, dextrose 10%, dextrose 10%, glucagon (human recombinant), glucose, glucose, melatonin, naloxone, oxyCODONE, prochlorperazine, sodium chloride 0.9%       Objective:     Vital  Signs (Most Recent):  Temp: 98.6 °F (37 °C) (05/06/23 0740)  Pulse: 72 (05/06/23 0740)  Resp: 18 (05/06/23 0740)  BP: (!) 108/51 (05/06/23 0740)  SpO2: 98 % (05/06/23 0740) Vital Signs (24h Range):  Temp:  [97.9 °F (36.6 °C)-98.6 °F (37 °C)] 98.6 °F (37 °C)  Pulse:  [] 72  Resp:  [16-32] 18  SpO2:  [79 %-100 %] 98 %  BP: ()/(50-77) 108/51     Weight: 80 kg (176 lb 5.9 oz)  Body mass index is 34.44 kg/m².  Body surface area is 1.84 meters squared.      Intake/Output Summary (Last 24 hours) at 5/6/2023 0924  Last data filed at 5/6/2023 0748  Gross per 24 hour   Intake 830 ml   Output 1250 ml   Net -420 ml          Physical Exam  Vitals reviewed.   Constitutional:       General: She is not in acute distress.     Appearance: Normal appearance. She is ill-appearing. She is not toxic-appearing.   HENT:      Head: Normocephalic and atraumatic.      Right Ear: External ear normal.      Left Ear: External ear normal.      Nose: Nose normal. No congestion.      Mouth/Throat:      Mouth: Mucous membranes are moist.      Pharynx: No oropharyngeal exudate.   Eyes:      General: No scleral icterus.     Conjunctiva/sclera: Conjunctivae normal.   Cardiovascular:      Rate and Rhythm: Normal rate and regular rhythm.      Pulses: Normal pulses.   Pulmonary:      Effort: Pulmonary effort is normal. No respiratory distress.      Breath sounds: Normal breath sounds. No wheezing or rales.   Chest:      Chest wall: No tenderness.   Abdominal:      General: There is no distension.      Palpations: Abdomen is soft.      Tenderness: There is no abdominal tenderness. There is no guarding.   Musculoskeletal:         General: Normal range of motion.      Right lower leg: No edema.      Left lower leg: No edema.   Skin:     General: Skin is warm and dry.      Findings: No rash.   Neurological:      Mental Status: She is alert and oriented to person, place, and time. Mental status is at baseline.   Psychiatric:         Mood and  Affect: Mood normal.         Behavior: Behavior normal.        Significant Labs:   All pertinent labs from the last 24 hours have been reviewed.    Diagnostic Results:  I have reviewed all pertinent imaging results/findings within the past 24 hours.  Review of Systems   Constitutional:  Negative for chills and fever.   HENT:  Negative for sore throat and trouble swallowing.    Eyes:  Negative for pain and visual disturbance.   Respiratory:  Positive for shortness of breath. Negative for cough and choking.    Cardiovascular:  Positive for palpitations. Negative for chest pain and leg swelling.   Gastrointestinal:  Negative for abdominal pain, constipation and diarrhea.   Genitourinary:  Negative for dysuria and flank pain.   Musculoskeletal:  Negative for arthralgias and back pain.   Skin:  Negative for color change and pallor.   Neurological:  Negative for dizziness and headaches.   Psychiatric/Behavioral:  Negative for agitation and confusion.      Assessment/Plan:     * Acute heart failure with preserved ejection fraction (HFpEF)  Echo 3/28:  · The estimated ejection fraction is 60%.  · The left ventricle is normal in size with concentric hypertrophy and normal systolic function. There is systolic anterior motion without LVOT obstruction. sonographer will be sent back to verify lack of LVOT obstruction.  · There is abnormal septal wall motion.  · Grade II left ventricular diastolic dysfunction.  · Normal right ventricular size with normal right ventricular systolic function.  · Mild left atrial enlargement.  · The estimated PA systolic pressure is 39 mmHg.  · Normal central venous pressure (3 mmHg).     BNP elevated, slightly higher than prior  Patient with ongoing crackles, will continue lasix either 40 mg IV or 80 mg PO  Wean oxygen as tolerated      Chest pain  See heart failure    Suspected sepsis  Recently admitted for intractable nausea and vomiting s/p chemo, treated for pancreatitis and discharged 5/3.    Represented with palpitations, CP and hypotension on admission.   No leukocytosis, fevers, or focal consolidation noted on CXR/CTA. New small R sided pleural effusion. Procal neg.  Treated with Vanc and Cefepime in the ED.     - Abx discontinued given no localizing source, no fever, no leukocytosis - hypotension likely cardiac  - Follow blood cx.       Electrolyte abnormality  - Daily cmp  - replace for K<4, mag<2    NSTEMI (non-ST elevated myocardial infarction)  Reproducible anterior chest wall pain that she describes as pressure like. Troponin 0.041 without EKG changes. Low suspicion for NSTEMI Type 1 given clinical history and presentation, most likely NSTEMI Type II from demand ischemia as she was hypotensive and in A-fib on admission.    - Stopped trending troponin as this peaked    Iron deficiency anemia due to chronic blood loss  Hgb 8.8, baseline. Hb has been trending down since admission, was at 6.8 on 5/6. No evidence of active bleeding. T bili was briefly elevated several days prior but has since trended down, which would argue against hemolysis. Given timing of recent chemotherapy highly suspect this is the cause. Will defer hemolysis workup given normal T bilirubin.     - CBC daily  - Transfuse for hgb <7  - Given 1 unit pRBC on 5/6    Malignant neoplasm of pancreas  Follows with Dr. Hill and s/p cycle 1 of palliative paclitaxel + gemcitabine on 5/1. Recently admitted for nausea and vomiting, treated with IVFs and anti-emetics for pancreatitis. Has symptomatically improved from this standpoint and is now tolerating PO diet well.     Sick sinus syndrome  H/o PPM secondary to sick sinus syndrome and complete heart block. No acute concerns and tolerating BB therapy.    Osteopenia  - Continue fosamax if due while inpatient    GERD (gastroesophageal reflux disease)  Takes nexium daily at home; not on formulary.     - Continue Protonix while inpatient      OAB (overactive bladder)  - Continue  oxybutynin.     Hypothyroidism  TSH wnl.     - Continue levothyroxine.    Essential hypertension, benign  Home medications: Amlodipine 5 mg daily    - Hold for now given hypotension on admission and abundance of caution as re-starting home sotalol, metoprolol.   - If blood pressure trending up throughout the day, can consider restarting.    Hyperlipidemia  - Continue statin    Crohn's disease of large intestine without complication  Loose stools approximately 3x per day at baseline. On daily sulfasalazine. No evidence of acute flare.    - continue sulfasalazine    Paroxysmal atrial fibrillation  Acute onset of palpitations and chest pain x1 day. Found to be in A-fib RVR and hypotensive on admission. Hemodynamics normalized after IVFS. Suspect acute episode triggered by electrolyte deficiencies.     - Continue metoprolol, sotalol, eliquis             Negrito Leon MD  Hematology/Oncology  [facility] - Oncology ([facility])

## 2023-05-07 VITALS
HEIGHT: 60 IN | OXYGEN SATURATION: 97 % | WEIGHT: 176.38 LBS | TEMPERATURE: 98 F | HEART RATE: 96 BPM | BODY MASS INDEX: 34.63 KG/M2 | DIASTOLIC BLOOD PRESSURE: 62 MMHG | RESPIRATION RATE: 17 BRPM | SYSTOLIC BLOOD PRESSURE: 108 MMHG

## 2023-05-07 LAB
ALBUMIN SERPL BCP-MCNC: 2.1 G/DL (ref 3.5–5.2)
ALP SERPL-CCNC: 58 U/L (ref 55–135)
ALT SERPL W/O P-5'-P-CCNC: 8 U/L (ref 10–44)
ANION GAP SERPL CALC-SCNC: 9 MMOL/L (ref 8–16)
AST SERPL-CCNC: 16 U/L (ref 10–40)
BASOPHILS # BLD AUTO: 0.01 K/UL (ref 0–0.2)
BASOPHILS NFR BLD: 0.3 % (ref 0–1.9)
BILIRUB SERPL-MCNC: 1.1 MG/DL (ref 0.1–1)
BUN SERPL-MCNC: 13 MG/DL (ref 8–23)
CALCIUM SERPL-MCNC: 7.9 MG/DL (ref 8.7–10.5)
CHLORIDE SERPL-SCNC: 103 MMOL/L (ref 95–110)
CO2 SERPL-SCNC: 27 MMOL/L (ref 23–29)
CREAT SERPL-MCNC: 0.8 MG/DL (ref 0.5–1.4)
DIFFERENTIAL METHOD: ABNORMAL
EOSINOPHIL # BLD AUTO: 0.1 K/UL (ref 0–0.5)
EOSINOPHIL NFR BLD: 3.6 % (ref 0–8)
ERYTHROCYTE [DISTWIDTH] IN BLOOD BY AUTOMATED COUNT: 21.8 % (ref 11.5–14.5)
EST. GFR  (NO RACE VARIABLE): >60 ML/MIN/1.73 M^2
GLUCOSE SERPL-MCNC: 89 MG/DL (ref 70–110)
HCT VFR BLD AUTO: 33.5 % (ref 37–48.5)
HGB BLD-MCNC: 10.8 G/DL (ref 12–16)
IMM GRANULOCYTES # BLD AUTO: 0.02 K/UL (ref 0–0.04)
IMM GRANULOCYTES NFR BLD AUTO: 0.6 % (ref 0–0.5)
LYMPHOCYTES # BLD AUTO: 0.6 K/UL (ref 1–4.8)
LYMPHOCYTES NFR BLD: 17.8 % (ref 18–48)
MAGNESIUM SERPL-MCNC: 1.4 MG/DL (ref 1.6–2.6)
MCH RBC QN AUTO: 26.3 PG (ref 27–31)
MCHC RBC AUTO-ENTMCNC: 32.2 G/DL (ref 32–36)
MCV RBC AUTO: 82 FL (ref 82–98)
MONOCYTES # BLD AUTO: 0.1 K/UL (ref 0.3–1)
MONOCYTES NFR BLD: 3.9 % (ref 4–15)
NEUTROPHILS # BLD AUTO: 2.7 K/UL (ref 1.8–7.7)
NEUTROPHILS NFR BLD: 73.8 % (ref 38–73)
NRBC BLD-RTO: 1 /100 WBC
PHOSPHATE SERPL-MCNC: 2.5 MG/DL (ref 2.7–4.5)
PLATELET # BLD AUTO: 120 K/UL (ref 150–450)
PMV BLD AUTO: 11.9 FL (ref 9.2–12.9)
POTASSIUM SERPL-SCNC: 3.6 MMOL/L (ref 3.5–5.1)
PROT SERPL-MCNC: 5.9 G/DL (ref 6–8.4)
RBC # BLD AUTO: 4.11 M/UL (ref 4–5.4)
SODIUM SERPL-SCNC: 139 MMOL/L (ref 136–145)
WBC # BLD AUTO: 3.59 K/UL (ref 3.9–12.7)

## 2023-05-07 PROCEDURE — 93005 ELECTROCARDIOGRAM TRACING: CPT

## 2023-05-07 PROCEDURE — 25000242 PHARM REV CODE 250 ALT 637 W/ HCPCS

## 2023-05-07 PROCEDURE — 27000221 HC OXYGEN, UP TO 24 HOURS

## 2023-05-07 PROCEDURE — 83735 ASSAY OF MAGNESIUM: CPT | Performed by: STUDENT IN AN ORGANIZED HEALTH CARE EDUCATION/TRAINING PROGRAM

## 2023-05-07 PROCEDURE — 94761 N-INVAS EAR/PLS OXIMETRY MLT: CPT

## 2023-05-07 PROCEDURE — 93010 EKG 12-LEAD: ICD-10-PCS | Mod: ,,, | Performed by: INTERNAL MEDICINE

## 2023-05-07 PROCEDURE — 36415 COLL VENOUS BLD VENIPUNCTURE: CPT | Performed by: STUDENT IN AN ORGANIZED HEALTH CARE EDUCATION/TRAINING PROGRAM

## 2023-05-07 PROCEDURE — 94640 AIRWAY INHALATION TREATMENT: CPT

## 2023-05-07 PROCEDURE — 80053 COMPREHEN METABOLIC PANEL: CPT | Performed by: STUDENT IN AN ORGANIZED HEALTH CARE EDUCATION/TRAINING PROGRAM

## 2023-05-07 PROCEDURE — 25000003 PHARM REV CODE 250

## 2023-05-07 PROCEDURE — 93010 ELECTROCARDIOGRAM REPORT: CPT | Mod: ,,, | Performed by: INTERNAL MEDICINE

## 2023-05-07 PROCEDURE — 85025 COMPLETE CBC W/AUTO DIFF WBC: CPT | Performed by: STUDENT IN AN ORGANIZED HEALTH CARE EDUCATION/TRAINING PROGRAM

## 2023-05-07 PROCEDURE — 84100 ASSAY OF PHOSPHORUS: CPT | Performed by: STUDENT IN AN ORGANIZED HEALTH CARE EDUCATION/TRAINING PROGRAM

## 2023-05-07 PROCEDURE — 99900035 HC TECH TIME PER 15 MIN (STAT)

## 2023-05-07 PROCEDURE — 25000003 PHARM REV CODE 250: Performed by: STUDENT IN AN ORGANIZED HEALTH CARE EDUCATION/TRAINING PROGRAM

## 2023-05-07 RX ORDER — SODIUM,POTASSIUM PHOSPHATES 280-250MG
2 POWDER IN PACKET (EA) ORAL ONCE
Status: COMPLETED | OUTPATIENT
Start: 2023-05-07 | End: 2023-05-07

## 2023-05-07 RX ORDER — LANOLIN ALCOHOL/MO/W.PET/CERES
400 CREAM (GRAM) TOPICAL ONCE
Status: COMPLETED | OUTPATIENT
Start: 2023-05-07 | End: 2023-05-07

## 2023-05-07 RX ORDER — LOPERAMIDE HYDROCHLORIDE 2 MG/1
2 CAPSULE ORAL ONCE
Status: COMPLETED | OUTPATIENT
Start: 2023-05-07 | End: 2023-05-07

## 2023-05-07 RX ORDER — SOTALOL HYDROCHLORIDE 120 MG/1
120 TABLET ORAL DAILY
Qty: 180 TABLET | Refills: 3 | Status: SHIPPED | OUTPATIENT
Start: 2023-05-07 | End: 2023-05-18 | Stop reason: SDUPTHER

## 2023-05-07 RX ORDER — FUROSEMIDE 40 MG/1
40 TABLET ORAL DAILY PRN
Qty: 30 TABLET | Refills: 11 | Status: SHIPPED | OUTPATIENT
Start: 2023-05-07 | End: 2023-10-18

## 2023-05-07 RX ADMIN — FOLIC ACID 1 MG: 1 TABLET ORAL at 08:05

## 2023-05-07 RX ADMIN — ACETAMINOPHEN 650 MG: 325 TABLET ORAL at 01:05

## 2023-05-07 RX ADMIN — LOPERAMIDE HYDROCHLORIDE 2 MG: 2 CAPSULE ORAL at 01:05

## 2023-05-07 RX ADMIN — METOPROLOL SUCCINATE 50 MG: 50 TABLET, EXTENDED RELEASE ORAL at 08:05

## 2023-05-07 RX ADMIN — OXYBUTYNIN CHLORIDE 10 MG: 5 TABLET, EXTENDED RELEASE ORAL at 08:05

## 2023-05-07 RX ADMIN — POTASSIUM & SODIUM PHOSPHATES POWDER PACK 280-160-250 MG 2 PACKET: 280-160-250 PACK at 08:05

## 2023-05-07 RX ADMIN — ATORVASTATIN CALCIUM 20 MG: 20 TABLET, FILM COATED ORAL at 08:05

## 2023-05-07 RX ADMIN — PANTOPRAZOLE SODIUM 40 MG: 40 TABLET, DELAYED RELEASE ORAL at 08:05

## 2023-05-07 RX ADMIN — LEVOTHYROXINE SODIUM 175 MCG: 125 TABLET ORAL at 05:05

## 2023-05-07 RX ADMIN — SULFASALAZINE 500 MG: 500 TABLET ORAL at 08:05

## 2023-05-07 RX ADMIN — LEVALBUTEROL HYDROCHLORIDE 0.63 MG: 0.63 SOLUTION RESPIRATORY (INHALATION) at 08:05

## 2023-05-07 RX ADMIN — MAGNESIUM OXIDE TAB 400 MG (241.3 MG ELEMENTAL MG) 400 MG: 400 (241.3 MG) TAB at 08:05

## 2023-05-07 RX ADMIN — APIXABAN 5 MG: 5 TABLET, FILM COATED ORAL at 08:05

## 2023-05-07 RX ADMIN — SOTALOL HYDROCHLORIDE 120 MG: 120 TABLET ORAL at 08:05

## 2023-05-07 NOTE — PLAN OF CARE
Side rails up x2; call bell in place; bed in lowest, locked position; skid proof socks on; no evidence of skin breakdown; care plan explained to patient; pt remains free of injury, bed alarm on.  Pt tolerated po in small amount, voids per purwick, assisted pt in turning and repositioning. Pt with frequent PVCs and changes in rhythm. Other than HA pt denies any other discomfort.  Frequent rounding in progress, pt encouraged to call as needed. VSS and afebrile.

## 2023-05-07 NOTE — PROGRESS NOTES
This Oncology Social Worker is on call and was contacted by Dr. Negrito Leon, Resident with Medical Oncology Service, regarding patient's discharge home today and requesting confirmation that home health has been set up. Patient is also going home on oxygen.    Communicated with patient via phone to her room, phone call with her daughter-in-law Karen Mejia ([phone] cell.), and Secure Chat with patient's nurse Zabrina and the charge nurse Fabby.    Patient stated that she will be going to her son Theodore and daughter-in-law Karen Mejia's home address - [address] [place], [place], [address].     Explained the home health services ordered and the home oxygen, and answered patient's and daughter-in-law's questions. The portable oxygen tank is in patient's hospital room. Daughter-in-law has the number that [facility]'s technician gave her when he called on Friday evening and told her to call when patient was discharged. Advised her to call him now, and explained that I will call [facility]'s staff as well.    Daughter-in-law expressing caregiver concerns. She is the only caregiver on the weeks when her /patient's son is working offshore. She works as a  and has to get up at 5 AM weekdays. Patient will often call her during the night for assistance with putting the light out. She has hired a caregiver to come in to give patient a bath but patient will often refuse the bath after the caregiver arrives. She has tried telling patient the importance of having a bath/being clean as part of her health care, but patient doesn't comply. She asked if the home health will include an aide and I explained that aide visits are included. Advised her to discuss this with the home health nurse and ask the nurse to explain to patient and reinforce the importance of bathing, and maybe patient will be more accepting coming from medical staff.   She said that patient says things to her, taking out on her. Explained to  her that patient may be displacing her own feelings, misdirecting to the one closest to her, and to try and not take it personally. Provided support and suggestions. Informed daughter-in-law about the availability of Oncology Psychology services for her whether or not patient is receiving this service  (daughter-in-law reported that patient denied the need to talk with someone when Dr. Hill brought it up in clinic). Spoke with daughter-in-law about the importance of taking care of herself too and preventing caregiver burnout.     Called [facility]'s on call staff, Jazzy Gomes ([phone] cell.); and [facility]'s on call staff,  [facility] On-call : Jai Masters ([phone] cell.) and [facility] On-call: Terry Bustillos ([phone] cell.). Informed them that patient is discharged today. The home health nurse will admit patient either tomorrow or Tuesday and staff will contact patient/family directly. The oxygen concentrator, additional portable tanks, and supplies will be delivered to the home address today and the technician will set up and instruct on use.     Patient and daughter-in-law are in agreement with the discharge plans and arrangements.     No other discharge needs are indicated at this time.

## 2023-05-07 NOTE — DISCHARGE SUMMARY
[facility] - Oncology ([facility])  Hematology/Oncology  Discharge Summary      Patient Name: Pauline Cronin  MRN: [id number]  Admission Date: 5/3/2023  Hospital Length of Stay: 3 days  Discharge Date and Time:  05/07/2023 8:27 AM  Attending Physician: Bobbi Staton MD   Discharging Provider: Negrito Leon MD  Primary Care Provider: Primary Doctor No    HPI: Pauline Cronin is an 83F with atrial fibrillation on apixaban and sotalol/metoprolol, hypothyroidism, osteopenia, HLD, obesity, Crohn's disease, pancreatic adenocarcinoma diagnosed by ERCP biopsy/stent placement 3/2023 complicated by post ERCP pancreatitis, pancreatic adenocarcinoma not amenable to resection presenting with a one day history of palpitations and anterior pressure like nonradiating chest pain. Of note, she was recently admitted to the Medical Oncology service at [facility] for intractable nausea and vomiting. She was treated with IVFs and anti-emetics for suspected pancreatitis and discharged home with home health on 5/03. Throughout the night, she developed palpitations along with chest pain, prompting ED arrival. She denies any fevers, chills, SOB, leg swelling, PND, orthopnea, n/v/d, abdominal pain or urinary changes.     On arrival, she was tachycardic to 134 BPM, hypotensive (77/47), otherwise afebrile and satting 95% on room air. Laboratory workup significant for hypokalemia (3.3), hypomagnesemia (1.1), lipase 193 (downtrending from 370). CXR without focal consolidation. CTA negative for PE to the proximal segmental level but bolus timing was suboptimal, along with bilateral GGO. Initial EKG showing A-fib RVR. She was given 2.3 L of IVFs with improvement in her blood pressure and resolution of A-fib. She was also given potassium, mg, zofran, morphine, tylenol, along with Vanc and Cefepime then admitted to Medical Oncology service for further workup.     Oncologic history, per Dr. Hill clinic note 5/1/23  She presented initially to her PCP on  03/01/23 with several weeks of fatigue, nausea and several pounds of weight loss. She had a CT abdomen pelvis which showed a pancreatic head mass that abuts or invades the adjacent 2nd portion of the duodenum it was measured about 3.3 x 2.8 cm. There was some degree of GOO. The portal vein, splenic vein, SMV, celiac axis and SMA all are patent.   She underwent an ERCP w biopsy of the mass on 03/14 which was positive for adenocarcinoma, pMMR. She was admitted on 03/17 for hyperbilirubinemia. She underwent ERCP with stenting on 03/21 c/w post-ERCP pancreatitis.   She was admitted again 03/28 for afib with RVR and ADHF. She was discharged on 04/05.   Admitted 4/22-4/24 for N/V and abdominal pain that resolved with conservative management.   Admitted 5/02 to 5/03 for intractable to nausea and vomiting, treated for pancreatitis.            * No surgery found *     Hospital Course: Patient admitted for Afib with RVR and hypotension. Received 2.3 L in the ED with initial improvement in her BP and HR. She had an acute respiratory decompensation the following day, with CXR and exam consistent with volume overload - treated with diuresis and breathing treatments with rapid improvement in oxygen requirements. Cardiology evaluated due to elevated troponin and concerning history - felt Afib paroxysm was 2/2 recent chemotherapy administration and recommended continued medical management and increasing metoprolol if needed for better rate control. Her blood counts declined in setting of recent chemotherapy and required a transfusion on 5/6. Counts stable on 5/7. Patient to be discharged with home oxygen and home health PT/OT. Given instructions on PRN lasix for home use. Has close follow up with palliative care and oncology.      Goals of Care Treatment Preferences:  Code Status: DNR    Health care agent: Karen Mejia  Health care agent number: No value filed.    Living Will: Yes     What is most important right now is to focus  on remaining as independent as possible, symptom/pain control, quality of life, even if it means sacrificing a little time.  Accordingly, we have decided that the best plan to meet the patient's goals includes continuing with treatment.    Physical Exam  Vitals reviewed.   Constitutional:       General: She is not in acute distress.     Appearance: Normal appearance. She is not ill-appearing.   HENT:      Head: Normocephalic and atraumatic.      Right Ear: External ear normal.      Left Ear: External ear normal.      Nose: Nose normal.      Comments: NC in place     Mouth/Throat:      Pharynx: Oropharynx is clear. No oropharyngeal exudate.   Eyes:      General: No scleral icterus.     Conjunctiva/sclera: Conjunctivae normal.   Cardiovascular:      Rate and Rhythm: Normal rate and regular rhythm.      Pulses: Normal pulses.      Heart sounds: Normal heart sounds.   Pulmonary:      Effort: Pulmonary effort is normal. No respiratory distress.      Breath sounds: Normal breath sounds.      Comments: Mild basilar crackles b/l  Abdominal:      General: There is no distension.      Palpations: Abdomen is soft.      Tenderness: There is no abdominal tenderness.   Musculoskeletal:         General: Normal range of motion.      Cervical back: Normal range of motion.      Right lower leg: No edema.      Left lower leg: No edema.   Skin:     General: Skin is warm and dry.   Neurological:      General: No focal deficit present.      Mental Status: She is alert and oriented to person, place, and time.   Psychiatric:         Mood and Affect: Mood normal.         Behavior: Behavior normal.       Consults:   Consults (From admission, onward)        Status Ordering Provider     Inpatient consult to Midline team  Once        Provider:  (Not yet assigned)    Completed OMA COURTNEY     Inpatient consult to Cardiology  Once        Provider:  (Not yet assigned)    Completed CRISELDA COLEY     Inpatient consult to Hematology/Oncology   Once        Provider:  (Not yet assigned)    VADIM Arias          Significant Diagnostic Studies: Labs:   CBC   Recent Labs   Lab 05/06/23  0532 05/07/23  0535   WBC 3.44* 3.59*   HGB 6.8* 10.8*   HCT 24.3* 33.5*   * 120*       Pending Diagnostic Studies:     None        Final Active Diagnoses:    Diagnosis Date Noted POA    PRINCIPAL PROBLEM:  Acute heart failure with preserved ejection fraction (HFpEF) [I50.31] 03/28/2023 Yes     Chronic    NSTEMI (non-ST elevated myocardial infarction) [I21.4] 05/04/2023 Yes    Electrolyte abnormality [E87.8] 05/04/2023 Yes    Suspected sepsis [A41.9] 05/04/2023 Yes    Chest pain [R07.9] 05/04/2023 Yes    Iron deficiency anemia due to chronic blood loss [D50.0] 04/13/2023 Yes    Malignant neoplasm of pancreas [C25.9] 03/21/2023 Yes    Sick sinus syndrome [I49.5] 05/10/2021 Yes     Chronic    Osteopenia [M85.80] 06/04/2018 Yes     Chronic    Paroxysmal atrial fibrillation [I48.0] 04/19/2018 Yes     Chronic    OAB (overactive bladder) [N32.81] 04/19/2018 Yes     Chronic    Hyperlipidemia [E78.5] 04/19/2018 Yes     Chronic    GERD (gastroesophageal reflux disease) [K21.9] 04/19/2018 Yes     Chronic    Hypothyroidism [E03.9] 04/19/2018 Yes     Chronic    Essential hypertension, benign [I10] 04/19/2018 Yes     Chronic    Crohn's disease of large intestine without complication [K50.10] 04/19/2018 Yes     Chronic      Problems Resolved During this Admission:    Diagnosis Date Noted Date Resolved POA    Anemia [D64.9] 03/17/2023 05/04/2023 Yes      Discharged Condition: stable    Disposition: Home-Health Care c    Follow Up:    Patient Instructions:      OXYGEN FOR HOME USE     Order Specific Question Answer Comments   Liter Flow 2    Duration Continuous    Qualifying Test Performed at: Rest    Oxygen saturation: 88    Portable mode: pulse dose acceptable    Mode: Portable concentrator    Route nasal cannula    Device: home concentrator with  portable concentrator    Length of need (in months): 99 mos    Patient condition with qualifying saturation CHF    Height: 5' (1.524 m)    Weight: 80 kg (176 lb 5.9 oz)    Alternative treatment measures have been tried or considered and deemed clinically ineffective. Yes      Medications:  Reconciled Home Medications:      Medication List      START taking these medications    furosemide 40 MG tablet  Commonly known as: LASIX  Take 1 tablet (40 mg total) by mouth daily as needed (For weight gain > 3 lb in one day, increasing leg swelling, or increasing SOB). Take one daily as needed for weight gain > 3 lb in one day, increasing leg swelling, or increasing SOB        CHANGE how you take these medications    sotaloL 120 MG Tab  Commonly known as: BETAPACE  Take 1 tablet (120 mg total) by mouth once daily.  What changed: when to take this        CONTINUE taking these medications    acetaminophen 325 MG tablet  Commonly known as: TYLENOL  Take 650 mg by mouth daily as needed (Headache).     alendronate 70 MG tablet  Commonly known as: FOSAMAX  Take 1 tablet (70 mg total) by mouth every 7 days.     amLODIPine 5 MG tablet  Commonly known as: NORVASC  Take 1 tablet (5 mg total) by mouth once daily.     atorvastatin 20 MG tablet  Commonly known as: LIPITOR  Take 1 tablet (20 mg total) by mouth once daily.     colestipoL 1 gram Tab  Commonly known as: COLESTID  Take 1 g by mouth 2 (two) times daily.     CREON 24,000-76,000 -120,000 unit capsule  Generic drug: lipase-protease-amylase 24,000-76,000-120,000 units  Take 2 capsules by mouth 3 (three) times daily with meals.     diphenoxylate-atropine 2.5-0.025 mg 2.5-0.025 mg per tablet  Commonly known as: LOMOTIL  TAKE 1 TABLET BY MOUTH 4 TIMES DAILY AS NEEDED FOR DIARRHEA     ELIQUIS 5 mg Tab  Generic drug: apixaban  Take 1 tablet (5 mg total) by mouth 2 (two) times daily.     esomeprazole 40 MG capsule  Commonly known as: NEXIUM  Take 1 capsule (40 mg total) by mouth once  daily.     folic acid 1 MG tablet  Commonly known as: FOLVITE  Take 1 tablet by mouth once daily     GAVILAX 17 gram/dose powder  Generic drug: polyethylene glycol  Use cap to measure 17 grams, mix in liquid and take by mouth once daily.     levothyroxine 175 MCG tablet  Commonly known as: SYNTHROID, LEVOTHROID  Take 1 tablet by mouth once daily     MELATONIN ORAL  Take 1 tablet by mouth nightly as needed (Insomnia).     metoprolol succinate 50 MG 24 hr tablet  Commonly known as: TOPROL-XL  Take 1 tablet (50 mg total) by mouth once daily.     oxyCODONE 5 MG immediate release tablet  Commonly known as: ROXICODONE  Take 1 tablet (5 mg total) by mouth every 4 (four) hours as needed for Pain.     prochlorperazine 5 MG tablet  Commonly known as: COMPAZINE  Take 1 tablet (5 mg total) by mouth 4 (four) times daily as needed for Nausea (Take up to 4 times daily 30 minutes prior to meals).     senna-docusate 8.6-50 mg 8.6-50 mg per tablet  Commonly known as: PERICOLACE  Take 1 tablet by mouth 2 (two) times daily.     sulfaSALAzine 500 mg Tab  Commonly known as: AZULFIDINE  Take 1 tablet by mouth twice daily     tolterodine 4 MG 24 hr capsule  Commonly known as: DETROL LA  Take 1 capsule by mouth once daily            Negrito Leon MD  Hematology/Oncology  [facility] - Oncology ([facility])

## 2023-05-07 NOTE — NURSING
Discharge instructions and prescriptions given and explained to pt.  Pt. verbalized understanding with no further questions.  Peripheral IV D/C'd with catheter tip intact.  VS WDL.  Patient is awaiting ride home by daughter in law.      ROAD TEST  O=SpO2 96% on 3L  A=Ambulating around room and hallway with assist  D=IV D/C'd  T=Tolerating regular diet  E=Voids  S=Performs self care independently  T=Teaching on wounds care complete, NA

## 2023-05-08 ENCOUNTER — TELEPHONE (OUTPATIENT)
Dept: HEMATOLOGY/ONCOLOGY | Facility: CLINIC | Age: 83
End: 2023-05-08
Payer: MEDICARE

## 2023-05-08 NOTE — TELEPHONE ENCOUNTER
----- Message from Tremontana Chevalier sent at 5/8/2023  2:06 PM CDT -----  Regarding: pt advice  Consult/Advisory:        Name Of Caller: Karen Mejia (Relative)         Contact Preference?: [phone] (Mobile)        Provider Name: Sergio        Does patient feel the need to be seen today? No        What is the nature of the call?: Following up w/ office about status of chemo appt for 5/15. Also Inquiring about status of port placement

## 2023-05-08 NOTE — TELEPHONE ENCOUNTER
"----- Message from José Miguel Reinoso sent at 5/8/2023 10:20 AM CDT -----  Consult/Advisory:          Name Of Caller: Karen Mejia (Relative)       Contact Preference?: [phone] (Mobile)      Provider Name: Sergio      Does patient feel the need to be seen today? No      What is the nature of the call?: Following up w/ office about status of chemo appt for 5/15. Also Inquiring about status of port placement           Additional Notes:  "Thank you for all that you do for our patients"      "

## 2023-05-09 ENCOUNTER — PATIENT MESSAGE (OUTPATIENT)
Dept: HEMATOLOGY/ONCOLOGY | Facility: CLINIC | Age: 83
End: 2023-05-09
Payer: MEDICARE

## 2023-05-09 DIAGNOSIS — C25.9 PANCREATIC ADENOCARCINOMA: Primary | ICD-10-CM

## 2023-05-09 LAB — BACTERIA BLD CULT: NORMAL

## 2023-05-09 PROCEDURE — G0180 MD CERTIFICATION HHA PATIENT: HCPCS | Mod: ,,, | Performed by: HOSPITALIST

## 2023-05-09 PROCEDURE — G0180 PR HOME HEALTH MD CERTIFICATION: ICD-10-PCS | Mod: ,,, | Performed by: HOSPITALIST

## 2023-05-10 ENCOUNTER — TELEPHONE (OUTPATIENT)
Dept: INTERVENTIONAL RADIOLOGY/VASCULAR | Facility: CLINIC | Age: 83
End: 2023-05-10
Payer: MEDICARE

## 2023-05-10 NOTE — TELEPHONE ENCOUNTER
Left message for pt to return my call. Need to schedule IR procedure.  Please forward call to [phone]. Thanks

## 2023-05-10 NOTE — PHYSICIAN QUERY
PT Name: Pauline Cronin  MR #: [id number]     Documentation Clarification      CDS/: Tammy Colin  RN CCDS             Contact information:[email or web address]    This form is a permanent document in the medical record.     Query Date: May 10, 2023    By submitting this query, we are merely seeking further clarification of documentation. Please utilize your independent clinical judgment when addressing the question(s) below.    The Medical Record reflects the following:    Supporting Clinical Findings Location in Medical Record   Chest pain  83F with extensive comorbidites recently admitted with intractable nausea, vomiting following chemo, treated for pancreatitis and discharged on 5/03 now representing with a one day history  of palpitations and nonradiating pressure like anterior chest pain.     Found to be in A-fib RVR and hypotensive on admission, which resolved with IVFs. Elevated troponin to 0.041 without EKG changes.     Reproducible chest wall tenderness on exam. CXR without  focal consolidation. CTA negative for PE to the proximal segmental level but bolus timing was suboptimal, along with bilateral GGO.    H&P   Atrial fibrillation with rapid ventricular response  LVH with QRS widening  Inferior infarct ,age undetermined vs due to LBBB-like IVCD  Possible Anterolateral infarct ,age undetermined vs due to LBBB-like IVCD  Abnormal ECG     Latest Reference Range & Units 05/03/23 23:28 05/04/23 05:12 05/04/23 08:06 05/04/23 12:46 05/05/23 19:46   Troponin I 0.000 - 0.026 ng/mL 0.041 (H) 0.127 (H) 0.203 (H) 0.193 (H) 0.073 (H)       NSTEMI (non-ST elevated myocardial infarction)  Reproducible anterior chest wall pain that she describes as pressure like. Troponin 0.041 without EKG changes.    Low suspicion for NSTEMI Type 1 given clinical history and presentation, most  likely NSTEMI Type II from demand ischemia as she was hypotensive and in A-fib on admission.    NSTEMI   EKG  5/3              Troponin 5/3-5/5                      Hospital medicine PN 5/6                Discharge Summary                                                                            Due to conflicting documentation, please further clarify the type of NSTEMI.  Thanks!    [   ] NSTEMI Type II     [   ] NSTEMI Type I     [   ] Other (please specify): ____________     [ x ] Clinically undetermined

## 2023-05-10 NOTE — PHYSICIAN QUERY
PT Name: Pauline Cronin  MR #: [id number]     DOCUMENTATION CLARIFICATION     CDS/: Tammy Colin  RN CCDS           Contact information:[email or web address]  This form is a permanent document in the medical record.     Query Date: May 10, 2023    By submitting this query, we are merely seeking further clarification of documentation.  Please utilize your independent clinical judgment when addressing the question(s) below.  The Medical Record contains the following   Indicators   Supporting Clinical Findings Location in Medical Record   x SOB, HARDING, Wheezing, Productive Cough, Use of Accessory Muscles, etc.  Pt found to be 83% SpO2 on 3L. 89% when increased to 7L. 100% on non-re breather. Pt is tachypneic with expiratory wheezing and SOB. Denies chest pain. BP stable. Dr. Leon called to bedside. Rapid called to bedside. EKG and chest X-ray ordered.  20mg lasix given. Breathing treatments started. Troponin trending Q4.    Pt found to be 88% SpO2 on 3L NC. Pt is SOB, tachypneic, and audibly wheezing. 80mg PO lasix given due to inability to obtain IV access. Rapid Response nursing note 5/4              Nursing PN 5/5   x RR         ABGs         O2 sat RR=35  O2 sat=92% on 2 L/NC    Pulse:  []  Resp:  [15-35]  BP: ()/(47-97)  SpO2:  [85 %-99 %] ER triage record      VS range on 5/4 per 5/4 Progress note    Hypoxia/Hypercapnia      BiPAP/Intubation/Mechanical Ventilation     x Supplemental O2 Pt on 7 LPM-3LPM during majority of admission   Nursing Flow Sheets   x Home O2, Oxygen Dependence Patient to be discharged with home oxygen and home health PT/OT.   Discharge Summary   x Respiratory distress or failure She had an acute respiratory decompensation the following day, with CXR and exam consistent with volume overload - treated with diuresis and breathing treatments with rapid improvement in oxygen requirements.   Discharge Summary   x Radiology findings 1.Suboptimal contrast bolus timing.No  pulmonary embolism to the proximal segmental level.There is a hypodensity in the left upper lobe segmental branch which is favored to represent streak artifact, however, small pulmonary embolus cannot be  completely excluded.  2.Similar appearance of ground-glass attenuation throughout the bilateral lung fields, nonspecific and may represent edema, infection, aspiration, or inflammation.  3.Trace right pleural effusion, new from prior.   CT Chest 5/4    Acute/Chronic Illness Acute CHF, Pancreatic Cancer, Afib, Crohn's Disease, Hypertension   H&P   x Treatment  Patient remains on 3 L NC, will wean further. Respiratory exam with crackles in both lung fields. Unable to  give IV lasix due to poor IV access, will give oral lasix 80 mg. Continue to wean oxygen as tolerated   Hospital medicine PN 5/5    Other         The noted clinical guidelines are only system guidelines and do not replace the providers clinical judgment.    Provider, please specify the diagnosis or diagnoses associated with above clinical findings.   [    ] Acute Respiratory Failure with Hypoxia - ABG pO2 < 60 mmHg or O2 sat of <91% on room air and respiratory symptoms documented     [  X  ] Hypoxia Only     [    ] Other Respiratory Diagnosis (please specify): _________________     [   ] Clinically Undetermined     Present on admission (POA) status:  Y  ] Yes (Y)   [  ] No (N)   [  ] Documentation insufficient to determine if condition is POA (U)   [  ] Clinically Undetermined (W)     Please document in your progress notes daily for the duration of treatment until resolved and include in your discharge summary.     Reference:    SPENCER Hampton MD. (2020, March 13). Acute respiratory distress syndrome: Clinical features, diagnosis, and complications in adults ([id number] DAX Hart MD & [id number] DARIN Bustamante MD, Eds.). Retrieved November 13, 2020, from  [email or web address]-respiratory-distress-syndrome-clinical-features-diagnosis-and-complications-in-adults?search=ards&source=search_result&selectedTitle=1~150&usage_type=default&display_rank=1  Form No. [id number]

## 2023-05-11 ENCOUNTER — TELEPHONE (OUTPATIENT)
Dept: PALLIATIVE MEDICINE | Facility: CLINIC | Age: 83
End: 2023-05-11
Payer: MEDICARE

## 2023-05-12 ENCOUNTER — OFFICE VISIT (OUTPATIENT)
Dept: PALLIATIVE MEDICINE | Facility: CLINIC | Age: 83
End: 2023-05-12
Payer: MEDICARE

## 2023-05-12 DIAGNOSIS — C25.9 MALIGNANT NEOPLASM OF PANCREAS, UNSPECIFIED LOCATION OF MALIGNANCY: ICD-10-CM

## 2023-05-12 DIAGNOSIS — Z71.89 ACP (ADVANCE CARE PLANNING): ICD-10-CM

## 2023-05-12 DIAGNOSIS — R06.00 DYSPNEA, UNSPECIFIED TYPE: ICD-10-CM

## 2023-05-12 DIAGNOSIS — G89.3 CANCER RELATED PAIN: ICD-10-CM

## 2023-05-12 DIAGNOSIS — R53.83 FATIGUE, UNSPECIFIED TYPE: ICD-10-CM

## 2023-05-12 DIAGNOSIS — R19.7 DIARRHEA, UNSPECIFIED TYPE: ICD-10-CM

## 2023-05-12 DIAGNOSIS — F43.20 ADJUSTMENT DISORDER, UNSPECIFIED TYPE: ICD-10-CM

## 2023-05-12 DIAGNOSIS — Z51.5 ENCOUNTER FOR PALLIATIVE CARE: Primary | ICD-10-CM

## 2023-05-12 PROCEDURE — 1123F ACP DISCUSS/DSCN MKR DOCD: CPT | Mod: CPTII,95,, | Performed by: NURSE PRACTITIONER

## 2023-05-12 PROCEDURE — 99417 PROLNG OP E/M EACH 15 MIN: CPT | Mod: 95,,, | Performed by: NURSE PRACTITIONER

## 2023-05-12 PROCEDURE — 1111F DSCHRG MED/CURRENT MED MERGE: CPT | Mod: CPTII,95,, | Performed by: NURSE PRACTITIONER

## 2023-05-12 PROCEDURE — 99497 ADVNCD CARE PLAN 30 MIN: CPT | Mod: 95,,, | Performed by: NURSE PRACTITIONER

## 2023-05-12 PROCEDURE — 99497 PR ADVNCD CARE PLAN 30 MIN: ICD-10-PCS | Mod: 95,,, | Performed by: NURSE PRACTITIONER

## 2023-05-12 PROCEDURE — 99417 PR PROLONGED SVC, OUTPT, W/WO DIRECT PT CONTACT,  EA ADDTL 15 MIN: ICD-10-PCS | Mod: 95,,, | Performed by: NURSE PRACTITIONER

## 2023-05-12 PROCEDURE — 1111F PR DISCHARGE MEDS RECONCILED W/ CURRENT OUTPATIENT MED LIST: ICD-10-PCS | Mod: CPTII,95,, | Performed by: NURSE PRACTITIONER

## 2023-05-12 PROCEDURE — 99215 OFFICE O/P EST HI 40 MIN: CPT | Mod: 95,,, | Performed by: NURSE PRACTITIONER

## 2023-05-12 PROCEDURE — 1123F PR ADV CARE PLAN DISCUSSED, PLAN OR SURROGATE DOCUMENTED: ICD-10-PCS | Mod: CPTII,95,, | Performed by: NURSE PRACTITIONER

## 2023-05-12 PROCEDURE — 99215 PR OFFICE/OUTPT VISIT, EST, LEVL V, 40-54 MIN: ICD-10-PCS | Mod: 95,,, | Performed by: NURSE PRACTITIONER

## 2023-05-12 NOTE — PROGRESS NOTES
Consult Note  Palliative Care      Consult Requested By: Other  Reason for Consult: symptom mgmt/ACP      ASSESSMENT/PLAN:     Plan/Recommendations:    05/12/2023:  - initial visit   - no changes made today     Pancreatic adenocarcinoma  - ID 03/21/2023  - following w Dr. Hill   - currently chemotherapy, s/p first infusion     Encounter for palliative care  - Patient is decisional  - Patient accompanied today by daughter in law: Karen   - ACP documents are uploaded into EMR   - DNR   - Philosophy of Palliative Medicine reviewed with patient and family at first visit  - New patient folder given to and reviewed with patient and family at first visit  - Goals of care: Live as long as possible without pain. Additionally, she would like to retain as much independence as possible. She has her own apartment and was living alone prior to diagnosis. She is now staying with her son and his wife, but she hopes she can return to living independently. She is ECOG 2, she has a HH aid who comes to help her bathe, she is able to cook one simple meal and and, she ambulates with a walker intermittently. She says she has accepted her diagnosis, that she is 83 and has had a good life.      Cancer-related pain  - has had few brief episodes of pain in middle of stomach  - she was recently hospitalized with severe pain but has only recurred once after chemo cycle, attribute this to infection   - was prescribed oxycodone 5 mg PRN at hospital discharge but is not using   - will continue to monitor     Fatigue  - reports moderate fatigue  - ECOG 2, able to make breakfast daily, spend time with her family, attend some in person MD appts   - home PT, HH for assistance with bathing and dressing  - no intervention needed at this time  - will continue to monitor     Diarrhea  - reports this is normal, secondary to Chron's disease   - uses imodium PRN with good relief   - good hydration status though requires balance as does require lasix PRN  "in conjunction with BID weights w hx of HFpEF  - will continue to monitor     Adjustment disorder  - reports that she has come to terms with her diagnosis, that she feels she has lived a good life  - has robust support system, currently staying with son and DIL, DIL provides most of the care she needs. Additionally has another son but he is less available due to work schedule, has multiple grandchildren and sees great grandchildren multiple times per week.   - the loss of independence has been difficult for her, she is hoping to return to living alone in her department  - her DIL reports that she is worried Ms. Carpenter does not always "do they things she needs to do", one example is she does not feel that she is observing strict hygiene in keeping with her reduced immune status, she feels she needs to remind patient about frequent handwashing and bacterial transfer from couch, dog, etc; patient responds by saying she does feel a little controlled by her DIL but understands it comes from a place of concern, she expresses gratitude for her DIL's care-taking   - patient reports she is [employer], she does not attend Anabaptism regularly  - she declines contact with PC  at this time  - will continue to monitor     Understanding of illness/Prognosis:  excellent     Goals of care: life-prolonging     Follow up: 4 weeks    Patient's encounter and above plan of care discussed with patient's oncology team.     SUBJECTIVE:     History of Present Illness:  Patient is a 83 y.o. year old female presenting with pancreatic cancer. Please see oncology notes for full oncologic history and treatment course.        05/12/2023:  ANTONIO RENTERIA reviewed and summarized: No surprises    Patient presents to virtual visit alone with her DIL interjecting from off camera. She has a low symptom-burden, is s/p her first chemo infusion which she tolerated well. She has comes to terms with her diagnosis and understands that surgery is not an option and " that chemo is palliative in nature. QOL is her priority, for her this means no/limited pain and being as independent as possible. She does ask about hospice, when it is appropriate and what the service entails, questions answered in detail. Will f/u in about 4 weeks to see how subsequent infusions are tolerated.     Past Medical History:   Diagnosis Date    Anticoagulant long-term use     Atrial fibrillation     Crohn disease diagnosed in her 20's    GERD (gastroesophageal reflux disease)     Hyperlipidemia     Hypertension     Pancreatic adenocarcinoma 3/21/2023    Thyroid disease     s/p I 131     Past Surgical History:   Procedure Laterality Date    APPENDECTOMY      CARDIAC SURGERY  2014    pacemaker    COLONOSCOPY  ~2008    normal findings per patient report    ENDOSCOPIC ULTRASOUND OF UPPER GASTROINTESTINAL TRACT N/A 3/14/2023    Procedure: ULTRASOUND, UPPER GI TRACT, ENDOSCOPIC;  Surgeon: Andrei Swartz MD;  [facility];  Service: Endoscopy;  Laterality: N/A;  Approval to hold Eliquis rec'd from Dr. Barbosa (see telephone encounter 3/3/23)-DS  [employer] AICD/PPM  3/3/23-Instructions via portal-DS    ERCP N/A 3/21/2023    Procedure: ERCP (ENDOSCOPIC RETROGRADE CHOLANGIOPANCREATOGRAPHY);  Surgeon: Celina Toney MD;  [facility] ([address] [place] [address]);  Service: Endoscopy;  Laterality: N/A;    ESOPHAGOGASTRODUODENOSCOPY N/A 7/17/2018    Procedure: EGD (ESOPHAGOGASTRODUODENOSCOPY);  Surgeon: Alondra Casiano MD;  Location: [facility] ENDO;  Service: Endoscopy;  Laterality: N/A;    HYSTERECTOMY      INSERTION OF IMPLANTABLE CARDIOVERTER-DEFIBRILLATOR (ICD) GENERATOR WITH TWO EXISTING LEADS Left 5/10/2021    Procedure: INSERTION, PULSE GENERATOR WITH 2 EXISTING LEADS, ICD;  Surgeon: Bo Leone MD;  [place] [facility] [place] [facility];  Service: Cardiology;  Laterality: Left;    INSERTION OF PACEMAKER      REPLACEMENT OF PACEMAKER GENERATOR  05/10/2021    RETROGRADE PYELOGRAPHY Right 2/18/2020    Procedure: PYELOGRAM,  RETROGRADE;  Surgeon: Jovan Kruse MD;  Location: [facility];  Service: Urology;  Laterality: Right;    SMALL INTESTINE SURGERY  in her 20's    for crohn's    TONSILLECTOMY      UPPER GASTROINTESTINAL ENDOSCOPY  ~2008    normal findings per patient report     Family History   Problem Relation Age of Onset    Cancer Mother     Breast cancer Mother     Crohn's disease Other     Colon cancer Neg Hx     Colon polyps Neg Hx     Esophageal cancer Neg Hx     Stomach cancer Neg Hx     Ulcerative colitis Neg Hx      Review of patient's allergies indicates:   Allergen Reactions    Lisinopril Other (See Comments)     cough       Medications:    Current Outpatient Medications:     acetaminophen (TYLENOL) 325 MG tablet, Take 650 mg by mouth daily as needed (Headache)., Disp: , Rfl:     alendronate (FOSAMAX) 70 MG tablet, Take 1 tablet (70 mg total) by mouth every 7 days., Disp: 12 tablet, Rfl: 3    amLODIPine (NORVASC) 5 MG tablet, Take 1 tablet (5 mg total) by mouth once daily., Disp: 30 tablet, Rfl: 11    atorvastatin (LIPITOR) 20 MG tablet, Take 1 tablet (20 mg total) by mouth once daily., Disp: 90 tablet, Rfl: 3    colestipoL (COLESTID) 1 gram Tab, Take 1 g by mouth 2 (two) times daily., Disp: , Rfl:     diphenoxylate-atropine 2.5-0.025 mg (LOMOTIL) 2.5-0.025 mg per tablet, TAKE 1 TABLET BY MOUTH 4 TIMES DAILY AS NEEDED FOR DIARRHEA, Disp: 60 tablet, Rfl: 0    ELIQUIS 5 mg Tab, Take 1 tablet (5 mg total) by mouth 2 (two) times daily., Disp: 180 tablet, Rfl: 0    esomeprazole (NEXIUM) 40 MG capsule, Take 1 capsule (40 mg total) by mouth once daily., Disp: 90 capsule, Rfl: 3    folic acid (FOLVITE) 1 MG tablet, Take 1 tablet by mouth once daily, Disp: 90 tablet, Rfl: 0    furosemide (LASIX) 40 MG tablet, Take 1 tablet (40 mg total) by mouth daily as needed (For weight gain > 3 lb in one day, increasing leg swelling, or increasing SOB)., Disp: 30 tablet, Rfl: 11    levothyroxine (SYNTHROID, LEVOTHROID) 175 MCG tablet, Take 1  tablet by mouth once daily, Disp: 30 tablet, Rfl: 5    lipase-protease-amylase 24,000-76,000-120,000 units (CREON) 24,000-76,000 -120,000 unit capsule, Take 2 capsules by mouth 3 (three) times daily with meals., Disp: 180 capsule, Rfl: 11    MELATONIN ORAL, Take 1 tablet by mouth nightly as needed (Insomnia)., Disp: , Rfl:     metoprolol succinate (TOPROL-XL) 50 MG 24 hr tablet, Take 1 tablet (50 mg total) by mouth once daily., Disp: 30 tablet, Rfl: 1    polyethylene glycol (GLYCOLAX) 17 gram/dose powder, Use cap to measure 17 grams, mix in liquid and take by mouth once daily., Disp: 510 g, Rfl: 0    prochlorperazine (COMPAZINE) 5 MG tablet, Take 1 tablet (5 mg total) by mouth 4 (four) times daily as needed for Nausea (Take up to 4 times daily 30 minutes prior to meals)., Disp: 60 tablet, Rfl: 0    senna-docusate 8.6-50 mg (PERICOLACE) 8.6-50 mg per tablet, Take 1 tablet by mouth 2 (two) times daily., Disp: 60 tablet, Rfl: 0    sotaloL (BETAPACE) 120 MG Tab, Take 1 tablet (120 mg total) by mouth once daily., Disp: 180 tablet, Rfl: 3    sulfaSALAzine (AZULFIDINE) 500 mg Tab, Take 1 tablet by mouth twice daily, Disp: 180 tablet, Rfl: 3    tolterodine (DETROL LA) 4 MG 24 hr capsule, Take 1 capsule by mouth once daily, Disp: 90 capsule, Rfl: 3    OBJECTIVE:       ROS:  Review of Systems   Constitutional:  Positive for activity change and fatigue. Negative for appetite change and unexpected weight change.   HENT: Negative.  Negative for congestion, dental problem and drooling.    Eyes: Negative.  Negative for pain, discharge and itching.   Respiratory:  Positive for shortness of breath (3L NC). Negative for apnea and chest tightness.    Cardiovascular:  Positive for leg swelling (lasix prn). Negative for chest pain.   Gastrointestinal:  Positive for abdominal pain and diarrhea. Negative for constipation and nausea.   Genitourinary: Negative.  Negative for difficulty urinating, dyspareunia and dysuria.   Musculoskeletal:  Negative.  Negative for arthralgias, back pain and gait problem.   Skin: Negative.  Negative for color change, pallor and rash.   Neurological:  Positive for weakness. Negative for dizziness, light-headedness and numbness.   Psychiatric/Behavioral:  Negative for dysphoric mood and sleep disturbance. The patient is not nervous/anxious.      Review of Symptoms      Symptom Assessment (ESAS 0-10 Scale)  Pain:  0  Dyspnea:  2  Anxiety:  0  Nausea:  0  Depression:  0  Anorexia:  0  Fatigue:  5  Insomnia:  0  Restlessness:  0  Agitation:  0     CAM / Delirium:  Negative  Constipation:  Negative  Diarrhea:  Positive    Anxiety:  Is not nervous/anxious  Constipation:  No constipation    Bowel Management Plan (BMP):  Yes      Modified Rafael Scale:  0.5    ECOG Performance Status stGstrstastdstest:st st1st Living Arrangements:  Lives with family    Psychosocial/Cultural:   See Palliative Psychosocial Note: No  **Primary  to Follow**  Palliative Care  Consult: No    Spiritual:  F - Pham and Belief:  Mormon    Advance Care Planning   Advance Directives:   Living Will: Yes        Copy on chart: Yes    Medical Power of : Yes      Decision Making:  Patient answered questions  Goals of Care: What is most important right now is to focus on remaining as independent as possible, symptom/pain control, quality of life, even if it means sacrificing a little time. Accordingly, we have decided that the best plan to meet the patient's goals includes continuing with treatment.        Physical Exam:  Vitals:  None taken, virtual visit   Physical Exam  Constitutional:       General: She is not in acute distress.     Appearance: Normal appearance. She is not ill-appearing, toxic-appearing or diaphoretic.   HENT:      Head: Normocephalic and atraumatic.      Right Ear: External ear normal.      Left Ear: External ear normal.      Nose: Nose normal.   Eyes:      Extraocular Movements: Extraocular movements intact.       "Conjunctiva/sclera: Conjunctivae normal.   Pulmonary:      Effort: No respiratory distress.      Breath sounds: No stridor.   Musculoskeletal:      Cervical back: Normal range of motion.      Comments: Seated upright throughout visit      Skin:     Coloration: Skin is not jaundiced.      Findings: No bruising.   Neurological:      Mental Status: She is alert and oriented to person, place, and time. Mental status is at baseline.   Psychiatric:         Mood and Affect: Mood normal.         Thought Content: Thought content normal.         Judgment: Judgment normal.             Labs:  CBC:   WBC   Date Value Ref Range Status   05/07/2023 3.59 (L) 3.90 - 12.70 K/uL Final     Hemoglobin   Date Value Ref Range Status   05/07/2023 10.8 (L) 12.0 - 16.0 g/dL Final     POC Hematocrit   Date Value Ref Range Status   05/03/2023 30 (L) 36 - 54 %PCV Final     Hematocrit   Date Value Ref Range Status   05/07/2023 33.5 (L) 37.0 - 48.5 % Final     MCV   Date Value Ref Range Status   05/07/2023 82 82 - 98 fL Final     Platelets   Date Value Ref Range Status   05/07/2023 120 (L) 150 - 450 K/uL Final       LFT:   Lab Results   Component Value Date    AST 16 05/07/2023    ALKPHOS 58 05/07/2023    BILITOT 1.1 (H) 05/07/2023       Albumin:   Albumin   Date Value Ref Range Status   05/07/2023 2.1 (L) 3.5 - 5.2 g/dL Final     Protein:   Total Protein   Date Value Ref Range Status   05/07/2023 5.9 (L) 6.0 - 8.4 g/dL Final       Radiology:I have reviewed all pertinent imaging results/findings within the past 24 hours.    05/02/2023 CT CAP: "Impression:     1. No organized intra-abdominal fluid collection.  2. Similar appearance of fat stranding along the lesser curvature of the stomach and adjacent to the pancreas, most likely related to pancreatitis and/or gastritis.  3. Stable pancreatic head mass with mass effect on the duodenum and upstream dilation of the main pancreatic duct.  4. Stable pancreatic body mass with findings concerning for " "direct invasion of the adjacent stomach and upstream dilation of the main pancreatic duct.  5. Interval resolution of right hydroureteronephrosis.  6. Cholelithiasis without gallbladder wall thickening.  Trace pericholecystic fluid, likely related to small volume of perihepatic free fluid.  7. Cirrhotic liver morphology.  8. Mosaic attenuation within the bilateral lung bases, suggestive of possible edema, infection, aspiration or inflammatory process.  Similar to prior."    I spent a total of 82 minutes on the day of the visit.This includes face to face time in discussion of goals of care, symptom assessment, coordination of care and emotional support.  This also includes non-face to face time preparing to see the patient (eg, review of tests/imaging), obtaining and/or reviewing separately obtained history, documenting clinical information in the electronic or other health record, independently interpreting results and communicating results to the patient/family/caregiver, or care coordinator.     A total of 18 min was spent on advance care planning, goals of care discussion, emotional support, formulating and communicating prognosis and goals of care, exploring burden/benefit of various approaches of treatment. This discussion occurred on a fully voluntary basis with the verbal consent of the patient and/or family    Signature: Christine Rodriguez DNP    "

## 2023-05-13 ENCOUNTER — INFUSION (OUTPATIENT)
Dept: INFUSION THERAPY | Facility: HOSPITAL | Age: 83
End: 2023-05-13
Payer: MEDICARE

## 2023-05-13 VITALS
DIASTOLIC BLOOD PRESSURE: 54 MMHG | TEMPERATURE: 98 F | RESPIRATION RATE: 18 BRPM | HEART RATE: 96 BPM | OXYGEN SATURATION: 96 % | SYSTOLIC BLOOD PRESSURE: 108 MMHG

## 2023-05-13 DIAGNOSIS — D50.0 IRON DEFICIENCY ANEMIA DUE TO CHRONIC BLOOD LOSS: Primary | ICD-10-CM

## 2023-05-13 PROCEDURE — 25000003 PHARM REV CODE 250: Performed by: INTERNAL MEDICINE

## 2023-05-13 PROCEDURE — 63600175 PHARM REV CODE 636 W HCPCS: Performed by: INTERNAL MEDICINE

## 2023-05-13 PROCEDURE — 96365 THER/PROPH/DIAG IV INF INIT: CPT

## 2023-05-13 RX ORDER — SODIUM CHLORIDE 0.9 % (FLUSH) 0.9 %
10 SYRINGE (ML) INJECTION
Status: CANCELLED | OUTPATIENT
Start: 2023-05-20

## 2023-05-13 RX ORDER — EPINEPHRINE 0.3 MG/.3ML
0.3 INJECTION SUBCUTANEOUS ONCE AS NEEDED
Status: CANCELLED | OUTPATIENT
Start: 2023-05-20

## 2023-05-13 RX ORDER — HEPARIN 100 UNIT/ML
500 SYRINGE INTRAVENOUS
Status: DISCONTINUED | OUTPATIENT
Start: 2023-05-13 | End: 2023-05-13 | Stop reason: HOSPADM

## 2023-05-13 RX ORDER — METHYLPREDNISOLONE SOD SUCC 125 MG
125 VIAL (EA) INJECTION ONCE AS NEEDED
Status: CANCELLED | OUTPATIENT
Start: 2023-05-20

## 2023-05-13 RX ORDER — HEPARIN 100 UNIT/ML
500 SYRINGE INTRAVENOUS
Status: CANCELLED | OUTPATIENT
Start: 2023-05-20

## 2023-05-13 RX ORDER — SODIUM CHLORIDE 0.9 % (FLUSH) 0.9 %
10 SYRINGE (ML) INJECTION
Status: DISCONTINUED | OUTPATIENT
Start: 2023-05-13 | End: 2023-05-13 | Stop reason: HOSPADM

## 2023-05-13 RX ORDER — DIPHENHYDRAMINE HYDROCHLORIDE 50 MG/ML
50 INJECTION INTRAMUSCULAR; INTRAVENOUS ONCE AS NEEDED
Status: CANCELLED | OUTPATIENT
Start: 2023-05-20

## 2023-05-13 RX ADMIN — SODIUM CHLORIDE: 9 INJECTION, SOLUTION INTRAVENOUS at 11:05

## 2023-05-13 RX ADMIN — IRON SUCROSE 200 MG: 20 INJECTION, SOLUTION INTRAVENOUS at 11:05

## 2023-05-13 NOTE — PLAN OF CARE
Patient was brought into clinic per wheelchair for Venofer infusion. PIV started without difficulty. Patient tolerated treatment well. Observed 30 minutes post treatment. PIV removed with catheter intact. Patient left from clinic in a wheelchair with daughter-in-law. NAD noted.

## 2023-05-15 ENCOUNTER — OFFICE VISIT (OUTPATIENT)
Dept: HEMATOLOGY/ONCOLOGY | Facility: CLINIC | Age: 83
End: 2023-05-15
Payer: MEDICARE

## 2023-05-15 ENCOUNTER — PATIENT MESSAGE (OUTPATIENT)
Dept: HEMATOLOGY/ONCOLOGY | Facility: CLINIC | Age: 83
End: 2023-05-15

## 2023-05-15 ENCOUNTER — LAB VISIT (OUTPATIENT)
Dept: LAB | Facility: HOSPITAL | Age: 83
End: 2023-05-15
Attending: INTERNAL MEDICINE
Payer: MEDICARE

## 2023-05-15 ENCOUNTER — TELEPHONE (OUTPATIENT)
Dept: INTERVENTIONAL RADIOLOGY/VASCULAR | Facility: CLINIC | Age: 83
End: 2023-05-15
Payer: MEDICARE

## 2023-05-15 VITALS
HEIGHT: 60 IN | HEART RATE: 94 BPM | SYSTOLIC BLOOD PRESSURE: 121 MMHG | DIASTOLIC BLOOD PRESSURE: 57 MMHG | RESPIRATION RATE: 18 BRPM | TEMPERATURE: 98 F | BODY MASS INDEX: 33.11 KG/M2 | WEIGHT: 168.63 LBS | OXYGEN SATURATION: 96 %

## 2023-05-15 DIAGNOSIS — D70.1 CHEMOTHERAPY INDUCED NEUTROPENIA: ICD-10-CM

## 2023-05-15 DIAGNOSIS — C25.9 PANCREATIC ADENOCARCINOMA: ICD-10-CM

## 2023-05-15 DIAGNOSIS — C25.9 PANCREATIC ADENOCARCINOMA: Primary | ICD-10-CM

## 2023-05-15 DIAGNOSIS — D84.821 DRUG-INDUCED IMMUNODEFICIENCY: ICD-10-CM

## 2023-05-15 DIAGNOSIS — K50.10 CROHN'S DISEASE OF LARGE INTESTINE WITHOUT COMPLICATION: ICD-10-CM

## 2023-05-15 DIAGNOSIS — I48.0 PAROXYSMAL ATRIAL FIBRILLATION: ICD-10-CM

## 2023-05-15 DIAGNOSIS — D84.81 IMMUNODEFICIENCY SECONDARY TO NEOPLASM: ICD-10-CM

## 2023-05-15 DIAGNOSIS — E78.2 MIXED HYPERLIPIDEMIA: ICD-10-CM

## 2023-05-15 DIAGNOSIS — Z79.899 DRUG-INDUCED IMMUNODEFICIENCY: ICD-10-CM

## 2023-05-15 DIAGNOSIS — K86.81 EXOCRINE PANCREATIC INSUFFICIENCY: ICD-10-CM

## 2023-05-15 DIAGNOSIS — I10 ESSENTIAL HYPERTENSION, BENIGN: ICD-10-CM

## 2023-05-15 DIAGNOSIS — E66.01 SEVERE OBESITY (BMI 35.0-39.9) WITH COMORBIDITY: ICD-10-CM

## 2023-05-15 DIAGNOSIS — D84.821 IMMUNODEFICIENCY SECONDARY TO CHEMOTHERAPY: ICD-10-CM

## 2023-05-15 DIAGNOSIS — T45.1X5A CHEMOTHERAPY INDUCED NEUTROPENIA: ICD-10-CM

## 2023-05-15 DIAGNOSIS — Z79.899 IMMUNODEFICIENCY SECONDARY TO CHEMOTHERAPY: ICD-10-CM

## 2023-05-15 DIAGNOSIS — D50.9 MICROCYTIC ANEMIA: ICD-10-CM

## 2023-05-15 DIAGNOSIS — D50.0 IRON DEFICIENCY ANEMIA DUE TO CHRONIC BLOOD LOSS: ICD-10-CM

## 2023-05-15 DIAGNOSIS — D49.9 IMMUNODEFICIENCY SECONDARY TO NEOPLASM: ICD-10-CM

## 2023-05-15 DIAGNOSIS — T45.1X5A IMMUNODEFICIENCY SECONDARY TO CHEMOTHERAPY: ICD-10-CM

## 2023-05-15 DIAGNOSIS — I70.0 AORTIC ATHEROSCLEROSIS: ICD-10-CM

## 2023-05-15 LAB
ALBUMIN SERPL BCP-MCNC: 2.6 G/DL (ref 3.5–5.2)
ALP SERPL-CCNC: 87 U/L (ref 55–135)
ALT SERPL W/O P-5'-P-CCNC: 24 U/L (ref 10–44)
ANION GAP SERPL CALC-SCNC: 10 MMOL/L (ref 8–16)
AST SERPL-CCNC: 51 U/L (ref 10–40)
BILIRUB SERPL-MCNC: 0.6 MG/DL (ref 0.1–1)
BUN SERPL-MCNC: 9 MG/DL (ref 8–23)
CALCIUM SERPL-MCNC: 8.8 MG/DL (ref 8.7–10.5)
CHLORIDE SERPL-SCNC: 106 MMOL/L (ref 95–110)
CO2 SERPL-SCNC: 28 MMOL/L (ref 23–29)
CREAT SERPL-MCNC: 0.8 MG/DL (ref 0.5–1.4)
ERYTHROCYTE [DISTWIDTH] IN BLOOD BY AUTOMATED COUNT: 24.3 % (ref 11.5–14.5)
EST. GFR  (NO RACE VARIABLE): >60 ML/MIN/1.73 M^2
GLUCOSE SERPL-MCNC: 122 MG/DL (ref 70–110)
HCT VFR BLD AUTO: 36.9 % (ref 37–48.5)
HGB BLD-MCNC: 10.8 G/DL (ref 12–16)
IMM GRANULOCYTES # BLD AUTO: 0.04 K/UL (ref 0–0.04)
MCH RBC QN AUTO: 25.8 PG (ref 27–31)
MCHC RBC AUTO-ENTMCNC: 29.3 G/DL (ref 32–36)
MCV RBC AUTO: 88 FL (ref 82–98)
NEUTROPHILS # BLD AUTO: 1 K/UL (ref 1.8–7.7)
PLATELET # BLD AUTO: 339 K/UL (ref 150–450)
PMV BLD AUTO: 10.2 FL (ref 9.2–12.9)
POTASSIUM SERPL-SCNC: 3.1 MMOL/L (ref 3.5–5.1)
PROT SERPL-MCNC: 6.2 G/DL (ref 6–8.4)
RBC # BLD AUTO: 4.19 M/UL (ref 4–5.4)
SODIUM SERPL-SCNC: 144 MMOL/L (ref 136–145)
WBC # BLD AUTO: 3.01 K/UL (ref 3.9–12.7)

## 2023-05-15 PROCEDURE — 3288F FALL RISK ASSESSMENT DOCD: CPT | Mod: CPTII,S$GLB,, | Performed by: REGISTERED NURSE

## 2023-05-15 PROCEDURE — 85027 COMPLETE CBC AUTOMATED: CPT | Performed by: INTERNAL MEDICINE

## 2023-05-15 PROCEDURE — 99999 PR PBB SHADOW E&M-EST. PATIENT-LVL IV: CPT | Mod: PBBFAC,,, | Performed by: REGISTERED NURSE

## 2023-05-15 PROCEDURE — 1160F RVW MEDS BY RX/DR IN RCRD: CPT | Mod: CPTII,S$GLB,, | Performed by: REGISTERED NURSE

## 2023-05-15 PROCEDURE — 3078F DIAST BP <80 MM HG: CPT | Mod: CPTII,S$GLB,, | Performed by: REGISTERED NURSE

## 2023-05-15 PROCEDURE — 1126F AMNT PAIN NOTED NONE PRSNT: CPT | Mod: CPTII,S$GLB,, | Performed by: REGISTERED NURSE

## 2023-05-15 PROCEDURE — 80053 COMPREHEN METABOLIC PANEL: CPT | Performed by: INTERNAL MEDICINE

## 2023-05-15 PROCEDURE — 99215 OFFICE O/P EST HI 40 MIN: CPT | Mod: S$GLB,,, | Performed by: REGISTERED NURSE

## 2023-05-15 PROCEDURE — 99215 PR OFFICE/OUTPT VISIT, EST, LEVL V, 40-54 MIN: ICD-10-PCS | Mod: S$GLB,,, | Performed by: REGISTERED NURSE

## 2023-05-15 PROCEDURE — 1159F PR MEDICATION LIST DOCUMENTED IN MEDICAL RECORD: ICD-10-PCS | Mod: CPTII,S$GLB,, | Performed by: REGISTERED NURSE

## 2023-05-15 PROCEDURE — 99999 PR PBB SHADOW E&M-EST. PATIENT-LVL IV: ICD-10-PCS | Mod: PBBFAC,,, | Performed by: REGISTERED NURSE

## 2023-05-15 PROCEDURE — 1101F PR PT FALLS ASSESS DOC 0-1 FALLS W/OUT INJ PAST YR: ICD-10-PCS | Mod: CPTII,S$GLB,, | Performed by: REGISTERED NURSE

## 2023-05-15 PROCEDURE — 1160F PR REVIEW ALL MEDS BY PRESCRIBER/CLIN PHARMACIST DOCUMENTED: ICD-10-PCS | Mod: CPTII,S$GLB,, | Performed by: REGISTERED NURSE

## 2023-05-15 PROCEDURE — 1157F PR ADVANCE CARE PLAN OR EQUIV PRESENT IN MEDICAL RECORD: ICD-10-PCS | Mod: CPTII,S$GLB,, | Performed by: REGISTERED NURSE

## 2023-05-15 PROCEDURE — 1111F DSCHRG MED/CURRENT MED MERGE: CPT | Mod: CPTII,S$GLB,, | Performed by: REGISTERED NURSE

## 2023-05-15 PROCEDURE — 1101F PT FALLS ASSESS-DOCD LE1/YR: CPT | Mod: CPTII,S$GLB,, | Performed by: REGISTERED NURSE

## 2023-05-15 PROCEDURE — 3288F PR FALLS RISK ASSESSMENT DOCUMENTED: ICD-10-PCS | Mod: CPTII,S$GLB,, | Performed by: REGISTERED NURSE

## 2023-05-15 PROCEDURE — 1126F PR PAIN SEVERITY QUANTIFIED, NO PAIN PRESENT: ICD-10-PCS | Mod: CPTII,S$GLB,, | Performed by: REGISTERED NURSE

## 2023-05-15 PROCEDURE — 1157F ADVNC CARE PLAN IN RCRD: CPT | Mod: CPTII,S$GLB,, | Performed by: REGISTERED NURSE

## 2023-05-15 PROCEDURE — 1111F PR DISCHARGE MEDS RECONCILED W/ CURRENT OUTPATIENT MED LIST: ICD-10-PCS | Mod: CPTII,S$GLB,, | Performed by: REGISTERED NURSE

## 2023-05-15 PROCEDURE — 1159F MED LIST DOCD IN RCRD: CPT | Mod: CPTII,S$GLB,, | Performed by: REGISTERED NURSE

## 2023-05-15 PROCEDURE — 3074F SYST BP LT 130 MM HG: CPT | Mod: CPTII,S$GLB,, | Performed by: REGISTERED NURSE

## 2023-05-15 PROCEDURE — 3078F PR MOST RECENT DIASTOLIC BLOOD PRESSURE < 80 MM HG: ICD-10-PCS | Mod: CPTII,S$GLB,, | Performed by: REGISTERED NURSE

## 2023-05-15 PROCEDURE — 36415 COLL VENOUS BLD VENIPUNCTURE: CPT | Performed by: INTERNAL MEDICINE

## 2023-05-15 PROCEDURE — 3074F PR MOST RECENT SYSTOLIC BLOOD PRESSURE < 130 MM HG: ICD-10-PCS | Mod: CPTII,S$GLB,, | Performed by: REGISTERED NURSE

## 2023-05-15 RX ORDER — LIDOCAINE AND PRILOCAINE 25; 25 MG/G; MG/G
CREAM TOPICAL
Qty: 30 G | Refills: 6 | Status: SHIPPED | OUTPATIENT
Start: 2023-05-15

## 2023-05-15 NOTE — TELEPHONE ENCOUNTER
(2nd attempt) Left message for pt to return my call. Need to schedule IR procedure.  Please forward call to [phone]. Thanks

## 2023-05-15 NOTE — PROGRESS NOTES
MEDICAL ONCOLOGY - ESTABLISHED PATIENT VISIT    Reason for visit: Pancreatic adenocarcinoma    Best Contact Phone Number(s): [phone] (home)      Cancer/Stage/TNM:    Cancer Staging   Malignant neoplasm of pancreas  Staging form: Exocrine Pancreas, AJCC 8th Edition  - Clinical stage from 4/13/2023: Stage IB (cT2, cN0, cM0) - Unsigned       Oncology History   Malignant neoplasm of pancreas   3/21/2023 Initial Diagnosis    Pancreatic adenocarcinoma     5/1/2023 -  Chemotherapy    Treatment Summary   Plan Name: OP PANC NAB-PACLITAXEL + GEMCITABINE Q4W  Treatment Goal: Palliative  Status: Active  Start Date: 5/1/2023  End Date: 3/14/2024 (Planned)  Provider: Jim Hill MD  Chemotherapy: gemcitabine (GEMZAR) 1,400 mg in sodium chloride 0.9% SolP 321.82 mL chemo infusion, 1,448 mg (100 % of original dose 800 mg/m2), Intravenous, Clinic/HOD 1 time, 1 of 12 cycles  Dose modification: 800 mg/m2 (original dose 800 mg/m2, Cycle 1, Reason: Other (see comments), Comment: poor PS)          Interim History:   83 y.o. female with Crohn's disease, p.afib, SSS s/p PPM, HTN, HLD who presents for hospital follow up. She received cycle 1 on 5/1/23. She was admitted 5/2-5/3 for abdominal pain and n/v after starting treatment. She was given IVF, IV pain medications and IV antiemetics for suspected pancreatitis. She was discharged home with . She was then admitted 5/3-5/7 for palpitations and anterior pressure like non-radiating chest pain. She was noted to be in a fib and was given IVF again. She then had an acute respiratory decompensation the next day with imaging/labs consistent with fluid overload. She was given diuretics and breathing treatments with improvement in symptoms.     Feeling well overall today. She has been using home O2, though not currently using O2 in the clinic and typically does not use O2 at night. No trouble breathing currently. Has home health with PT/OT 3x per week. Has diarrhea at baseline d/t  Chron's disease, but lately has had more bulk in bowels and occasionally uses stool softener. Notes bowels are regular at this time. Weight is relatively stable, monitoring closely at home. Taking lasix as needed based on daily weights, only needed once. Swelling has mostly resolved to lower extremities. Denies fever/chills, SOB, CP, N/V, pain, blood in urine/stool.     ECOG PS 2    ROS:   Review of Systems   Constitutional:  Positive for malaise/fatigue. Negative for chills, fever and weight loss.   HENT:  Negative for congestion and sore throat.    Eyes:  Negative for blurred vision.   Respiratory:  Negative for shortness of breath.    Cardiovascular:  Positive for leg swelling (mild, improved from hospitalization). Negative for chest pain and palpitations.   Gastrointestinal:  Negative for abdominal pain, blood in stool, constipation, diarrhea, nausea and vomiting.   Genitourinary:  Negative for dysuria.   Musculoskeletal:  Negative for back pain, falls and myalgias.   Skin:  Negative for itching and rash.   Neurological:  Positive for weakness (generalized). Negative for dizziness, tingling, tremors, sensory change and focal weakness.   Endo/Heme/Allergies:  Does not bruise/bleed easily.   Psychiatric/Behavioral:  Negative for depression, substance abuse and suicidal ideas. The patient is not nervous/anxious.      Past Medical History:   Past Medical History:   Diagnosis Date    Anticoagulant long-term use     Atrial fibrillation     Crohn disease diagnosed in her 20's    GERD (gastroesophageal reflux disease)     Hyperlipidemia     Hypertension     Pancreatic adenocarcinoma 3/21/2023    Thyroid disease     s/p I 131        Past Surgical History:   Past Surgical History:   Procedure Laterality Date    APPENDECTOMY      CARDIAC SURGERY  2014    pacemaker    COLONOSCOPY  ~2008    normal findings per patient report    ENDOSCOPIC ULTRASOUND OF UPPER GASTROINTESTINAL TRACT N/A 3/14/2023    Procedure: ULTRASOUND,  UPPER GI TRACT, ENDOSCOPIC;  Surgeon: Andrei Swartz MD;  Location: [facility] ENDO;  Service: Endoscopy;  Laterality: N/A;  Approval to hold Eliquis rec'd from Dr. Barbosa (see telephone encounter 3/3/23)-DS  [employer] AICD/PPM  3/3/23-Instructions via portal-DS    ERCP N/A 3/21/2023    Procedure: ERCP (ENDOSCOPIC RETROGRADE CHOLANGIOPANCREATOGRAPHY);  Surgeon: Celina Toney MD;  Location: [facility] ENDO (2ND FLR);  Service: Endoscopy;  Laterality: N/A;    ESOPHAGOGASTRODUODENOSCOPY N/A 2018    Procedure: EGD (ESOPHAGOGASTRODUODENOSCOPY);  Surgeon: Alondra Casiano MD;  Location: [facility] ENDO;  Service: Endoscopy;  Laterality: N/A;    HYSTERECTOMY      INSERTION OF IMPLANTABLE CARDIOVERTER-DEFIBRILLATOR (ICD) GENERATOR WITH TWO EXISTING LEADS Left 5/10/2021    Procedure: INSERTION, PULSE GENERATOR WITH 2 EXISTING LEADS, ICD;  Surgeon: Bo Leone MD;  Location: [facility] CATH LAB;  Service: Cardiology;  Laterality: Left;    INSERTION OF PACEMAKER      REPLACEMENT OF PACEMAKER GENERATOR  05/10/2021    RETROGRADE PYELOGRAPHY Right 2020    Procedure: PYELOGRAM, RETROGRADE;  Surgeon: Jovan Kruse MD;  Location: [facility];  Service: Urology;  Laterality: Right;    SMALL INTESTINE SURGERY  in her 20's    for crohn's    TONSILLECTOMY      UPPER GASTROINTESTINAL ENDOSCOPY  ~    normal findings per patient report        Family History:   Family History   Problem Relation Age of Onset    Cancer Mother     Breast cancer Mother     Crohn's disease Other     Colon cancer Neg Hx     Colon polyps Neg Hx     Esophageal cancer Neg Hx     Stomach cancer Neg Hx     Ulcerative colitis Neg Hx         Social History:   Social History     Tobacco Use    Smoking status: Former     Types: Cigarettes     Quit date:      Years since quittin.3    Smokeless tobacco: Never   Substance Use Topics    Alcohol use: No        I have reviewed and updated the patient's past medical, surgical, family and social  histories.    Allergies:   Review of patient's allergies indicates:   Allergen Reactions    Lisinopril Other (See Comments)     cough        Medications:   Current Outpatient Medications   Medication Sig Dispense Refill    acetaminophen (TYLENOL) 325 MG tablet Take 650 mg by mouth daily as needed (Headache).      alendronate (FOSAMAX) 70 MG tablet Take 1 tablet (70 mg total) by mouth every 7 days. 12 tablet 3    amLODIPine (NORVASC) 5 MG tablet Take 1 tablet (5 mg total) by mouth once daily. 30 tablet 11    atorvastatin (LIPITOR) 20 MG tablet Take 1 tablet (20 mg total) by mouth once daily. 90 tablet 3    colestipoL (COLESTID) 1 gram Tab Take 1 g by mouth 2 (two) times daily.      diphenoxylate-atropine 2.5-0.025 mg (LOMOTIL) 2.5-0.025 mg per tablet TAKE 1 TABLET BY MOUTH 4 TIMES DAILY AS NEEDED FOR DIARRHEA 60 tablet 0    ELIQUIS 5 mg Tab Take 1 tablet (5 mg total) by mouth 2 (two) times daily. 180 tablet 0    esomeprazole (NEXIUM) 40 MG capsule Take 1 capsule (40 mg total) by mouth once daily. 90 capsule 3    folic acid (FOLVITE) 1 MG tablet Take 1 tablet by mouth once daily 90 tablet 0    furosemide (LASIX) 40 MG tablet Take 1 tablet (40 mg total) by mouth daily as needed (For weight gain > 3 lb in one day, increasing leg swelling, or increasing SOB). 30 tablet 11    levothyroxine (SYNTHROID, LEVOTHROID) 175 MCG tablet Take 1 tablet by mouth once daily 30 tablet 5    lipase-protease-amylase 24,000-76,000-120,000 units (CREON) 24,000-76,000 -120,000 unit capsule Take 2 capsules by mouth 3 (three) times daily with meals. 180 capsule 11    MELATONIN ORAL Take 1 tablet by mouth nightly as needed (Insomnia).      metoprolol succinate (TOPROL-XL) 50 MG 24 hr tablet Take 1 tablet (50 mg total) by mouth once daily. 30 tablet 1    polyethylene glycol (GLYCOLAX) 17 gram/dose powder Use cap to measure 17 grams, mix in liquid and take by mouth once daily. 510 g 0    prochlorperazine (COMPAZINE) 5 MG tablet Take 1 tablet (5  mg total) by mouth 4 (four) times daily as needed for Nausea (Take up to 4 times daily 30 minutes prior to meals). 60 tablet 0    senna-docusate 8.6-50 mg (PERICOLACE) 8.6-50 mg per tablet Take 1 tablet by mouth 2 (two) times daily. 60 tablet 0    sotaloL (BETAPACE) 120 MG Tab Take 1 tablet (120 mg total) by mouth once daily. 180 tablet 3    sulfaSALAzine (AZULFIDINE) 500 mg Tab Take 1 tablet by mouth twice daily 180 tablet 3    tolterodine (DETROL LA) 4 MG 24 hr capsule Take 1 capsule by mouth once daily 90 capsule 3     No current facility-administered medications for this visit.      Physical Exam:   BP (!) 121/57 (BP Location: Left arm, Patient Position: Sitting, BP Method: Large (Automatic))   Pulse 94   Temp 98 °F (36.7 °C) (Oral)   Resp 18   Ht 5' (1.524 m)   Wt 76.5 kg (168 lb 10.4 oz)   SpO2 96%   BMI 32.94 kg/m²      ECOG Performance status: 2       Physical Exam  Vitals reviewed.   Constitutional:       General: She is not in acute distress.     Appearance: She is not ill-appearing, toxic-appearing or diaphoretic.   HENT:      Head: Normocephalic and atraumatic.      Right Ear: External ear normal.      Left Ear: External ear normal.      Nose: Nose normal. No congestion.      Mouth/Throat:      Pharynx: Oropharynx is clear. No oropharyngeal exudate.   Eyes:      General: No scleral icterus.     Conjunctiva/sclera: Conjunctivae normal.      Pupils: Pupils are equal, round, and reactive to light.   Cardiovascular:      Rate and Rhythm: Normal rate and regular rhythm.      Heart sounds: No murmur heard.  Pulmonary:      Effort: Pulmonary effort is normal. No respiratory distress.      Breath sounds: Normal breath sounds. No wheezing.   Abdominal:      General: Abdomen is flat. Bowel sounds are normal. There is no distension.      Palpations: Abdomen is soft. There is no mass.      Tenderness: There is no abdominal tenderness.   Musculoskeletal:         General: Swelling (trace to BLE) present. No  deformity.   Skin:     General: Skin is warm and dry.      Coloration: Skin is not jaundiced or pale.      Findings: Bruising (multiple IV sites) present. No erythema or rash.   Neurological:      Mental Status: She is alert and oriented to person, place, and time. Mental status is at baseline.      Gait: Gait abnormal (in wheelchair).       Labs:   Recent Results (from the past 48 hour(s))   CBC Oncology    Collection Time: 05/15/23  9:35 AM   Result Value Ref Range    WBC 3.01 (L) 3.90 - 12.70 K/uL    RBC 4.19 4.00 - 5.40 M/uL    Hemoglobin 10.8 (L) 12.0 - 16.0 g/dL    Hematocrit 36.9 (L) 37.0 - 48.5 %    MCV 88 82 - 98 fL    MCH 25.8 (L) 27.0 - 31.0 pg    MCHC 29.3 (L) 32.0 - 36.0 g/dL    RDW 24.3 (H) 11.5 - 14.5 %    Platelets 339 150 - 450 K/uL    MPV 10.2 9.2 - 12.9 fL    Gran # (ANC) 1.0 (L) 1.8 - 7.7 K/uL    Immature Grans (Abs) 0.04 0.00 - 0.04 K/uL      I have reviewed the pertinent labs from 5/15 which are notable for stable anemia, total bili 0.6. K+ 3.1.     Imaging:    I have personally reviewed the imaging which is notable for a pancreatic head mass evident on CT abdomen pelvis with no evidence of metastatic disease.     Path:   PANCREAS, HEAD MASS, EUS-FNB:   Adenocarcinoma   Mismatch Repair (MMR) Proteins:    MLH1 - Intact nuclear expression    MSH2 - Intact nuclear expression    MSH6 - Intact nuclear expression    PMS2 - Intact nuclear expression    Assessment:       1. 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 Immunodeficiency secondary to neoplasm    4. Essential hypertension, benign    5. Mixed hyperlipidemia    6. Aortic atherosclerosis    7. Paroxysmal atrial fibrillation    8. Microcytic anemia    9. Iron deficiency anemia due to chronic blood loss    10. Exocrine pancreatic insufficiency    11. Drug-induced immunodeficiency    12. Severe obesity (BMI 35.0-39.9) with comorbidity    13. Crohn's disease of large intestine without complication       Plan:      #  Pancreatic adenocarcinoma.   83 y.o. F with Crohn's disease, p.afib, SSS s/p PPM, HTN, HLD, fatigue, presented with weight loss and jaundice and was found to have  pancreatic adenocarcinoma. She has early stage pancreatic cancer, and is considered surgically resectable. However, giving her age and multiple co-morbidities, surgery might not be feasible or safe.     We have discussed with her the diagnosis, stage, prognosis and treatment options. She understands that Surgery is the only curable option and she is unlikely to be sufficiently medically fit for it. We have discussed chemotherapy with a palliative intent to improve symptoms and prolong life.   She expressed understanding and she values QoL but is willing to try chemotherapy.     We have discussed treatment with first line therapy Gemcitabine + Abraxane.  She agreed and wished to proceed.     Orders previously placed for port placement prior to cycle 2. Reached out to IR for scheduling. Sent emla cream to pharmacy as well.     Received cycle 1 of biweekly gem/abraxane with dose reductions of 800 mg/m2 and 100 mg/m2 respectively on 5/1/23. Subsequently hospitalized x 2 (ED visit same day as first discharge) but now feeling much better. Ready to continue chemotherapy when able. ANC 1.0 today.     Will have her RTC in 1 week with updated labs to see Dr. Hill and resume chemotherapy. She is in agreement.     Saw palliative care on 5/12. Continue to f/u as directed.     # HTN   BP normal today.  On amlodipine 5 mg daily, coreg 6.25 mg bid     # HLD, Atherosclerosis   On Lipitor 20 mg   Follows with cardiology     # P. Afib   Rate controlled   Notes feeling better since med adjustments during admission.   Continue to monitor.     #. Microcytic anemia, Iron def due to chronic blood loss  Unclear etiology but likely from chronic blood loss 2/2 diverticulosis or tumor invasion of small bowel.  Iron studies consistent with iron deficiency.   Couldn't  tolerate PO iron due to severe GI side effects (abdominal cramps and nausea)  Injectafer denied by insurance so will administer Venofer x 4-5 doses.  S/p 2 doses, scheduled for #3 on Saturday.     #Pancreatic insufficiency   Improved with Creon   On lomotil for diarrhea   Continue to monitor.     # Crohn's disease.   On Sulfasalazine  Follows with GI     Follow up: 1 week for cycle 2.    Patient is in agreement with the proposed treatment plan. All questions were answered to the patient's satisfaction. Pt knows to call clinic if anything is needed before the next clinic visit.    Patient discussed with collaborating physician, Dr. Hill.    At least 40 minutes were spent today on this encounter including face to face time with the patient, data gathering/interpretation and documentation.       Luma Tran, MSN, APRN, [facility]  Hematology and Medical Oncology  Clinical Nurse Specialist to Dr. Hill, Dr. Ceja & Dr. Pandey Onc Chart Routing  Urgent    Follow up with physician 1 week. RTC in 1 week with labs (CBC,CMP,Ca19-9) to see Dr. Hill for chemo.   Follow up with [facility] 3 weeks. RTC in 3 weeks with labs (CBC,CMP,Ca19-9) to see [facility]O for chemo.   Infusion scheduling note treatment given every 2 weeks.   Injection scheduling note    Labs CBC, CMP and CA 19-9   Scheduling:  Preferred lab:  Lab interval: every 2 weeks     Imaging    Pharmacy appointment    Other referrals

## 2023-05-15 NOTE — Clinical Note
Hey guys, can we please get her scheduled next week with updated labs, to see Dr. Hill and chemo cycle 2?

## 2023-05-16 ENCOUNTER — TELEPHONE (OUTPATIENT)
Dept: HEMATOLOGY/ONCOLOGY | Facility: CLINIC | Age: 83
End: 2023-05-16
Payer: MEDICARE

## 2023-05-16 NOTE — TELEPHONE ENCOUNTER
Called pt to review [facility] lab. Spoke to Karen. [facility] financial assistance completed - pt approved for full coverage.   Lab appt made for tomorrow at [facility].

## 2023-05-17 ENCOUNTER — LAB VISIT (OUTPATIENT)
Dept: LAB | Facility: HOSPITAL | Age: 83
End: 2023-05-17
Payer: MEDICARE

## 2023-05-17 DIAGNOSIS — C25.9 PANCREATIC ADENOCARCINOMA: ICD-10-CM

## 2023-05-17 PROCEDURE — 36415 COLL VENOUS BLD VENIPUNCTURE: CPT | Performed by: STUDENT IN AN ORGANIZED HEALTH CARE EDUCATION/TRAINING PROGRAM

## 2023-05-17 NOTE — TELEPHONE ENCOUNTER
----- Message from Rosa Sapp sent at 5/17/2023 12:46 PM CDT -----  Contact: Pt  .Type:  Needs Medical Advice    Who Called: Pt  Would the patient rather a call back or a response via [facility]? call  Best Call Back Number: [phone]    Additional Information:    Pt stated [employer] is not going to cover both of the medications (sotaloL and metoprolol succinate).  Pt wanted to know which medication should she take.

## 2023-05-18 RX ORDER — SOTALOL HYDROCHLORIDE 120 MG/1
120 TABLET ORAL 2 TIMES DAILY
Qty: 180 TABLET | Refills: 3 | Status: ON HOLD | OUTPATIENT
Start: 2023-05-18 | End: 2023-08-29 | Stop reason: HOSPADM

## 2023-05-18 NOTE — TELEPHONE ENCOUNTER
[employer] will not cover duplicate medication.  Per Dr Barbosa's instructions, patient is to stop Metoprolol and continue Sotalol twice daily.  Spoke with patient's caregiver.  She verbalized understanding.

## 2023-05-20 ENCOUNTER — INFUSION (OUTPATIENT)
Dept: INFUSION THERAPY | Facility: HOSPITAL | Age: 83
End: 2023-05-20
Payer: MEDICARE

## 2023-05-20 VITALS
HEART RATE: 99 BPM | DIASTOLIC BLOOD PRESSURE: 66 MMHG | OXYGEN SATURATION: 96 % | RESPIRATION RATE: 18 BRPM | SYSTOLIC BLOOD PRESSURE: 131 MMHG | TEMPERATURE: 98 F

## 2023-05-20 DIAGNOSIS — D50.0 IRON DEFICIENCY ANEMIA DUE TO CHRONIC BLOOD LOSS: Primary | ICD-10-CM

## 2023-05-20 PROCEDURE — 25000003 PHARM REV CODE 250: Performed by: INTERNAL MEDICINE

## 2023-05-20 PROCEDURE — 96365 THER/PROPH/DIAG IV INF INIT: CPT

## 2023-05-20 PROCEDURE — 63600175 PHARM REV CODE 636 W HCPCS: Performed by: INTERNAL MEDICINE

## 2023-05-20 RX ORDER — HEPARIN 100 UNIT/ML
500 SYRINGE INTRAVENOUS
Status: DISCONTINUED | OUTPATIENT
Start: 2023-05-20 | End: 2023-05-20 | Stop reason: HOSPADM

## 2023-05-20 RX ORDER — DIPHENHYDRAMINE HYDROCHLORIDE 50 MG/ML
50 INJECTION INTRAMUSCULAR; INTRAVENOUS ONCE AS NEEDED
Status: CANCELLED | OUTPATIENT
Start: 2023-05-27

## 2023-05-20 RX ORDER — SODIUM CHLORIDE 0.9 % (FLUSH) 0.9 %
10 SYRINGE (ML) INJECTION
Status: DISCONTINUED | OUTPATIENT
Start: 2023-05-20 | End: 2023-05-20 | Stop reason: HOSPADM

## 2023-05-20 RX ORDER — EPINEPHRINE 0.3 MG/.3ML
0.3 INJECTION SUBCUTANEOUS ONCE AS NEEDED
Status: CANCELLED | OUTPATIENT
Start: 2023-05-27

## 2023-05-20 RX ORDER — METHYLPREDNISOLONE SOD SUCC 125 MG
125 VIAL (EA) INJECTION ONCE AS NEEDED
Status: CANCELLED | OUTPATIENT
Start: 2023-05-27

## 2023-05-20 RX ORDER — SODIUM CHLORIDE 0.9 % (FLUSH) 0.9 %
10 SYRINGE (ML) INJECTION
Status: CANCELLED | OUTPATIENT
Start: 2023-05-27

## 2023-05-20 RX ORDER — HEPARIN 100 UNIT/ML
500 SYRINGE INTRAVENOUS
Status: CANCELLED | OUTPATIENT
Start: 2023-05-27

## 2023-05-20 RX ADMIN — IRON SUCROSE 200 MG: 20 INJECTION, SOLUTION INTRAVENOUS at 11:05

## 2023-05-20 NOTE — PLAN OF CARE
1115: Pt arrived Venofer. Pt A&Ox4. VSS. Labs reviewed. PIV inserted. Positive blood return noted prior to infusion. All medications review and verbal understanding noted. Will continue to monitor.

## 2023-05-20 NOTE — PLAN OF CARE
1240: Pt tolerated treatment well. PIV discontinued. Pt reeducated on sign and symptoms of reaction and instructed to seek medical care if reaction noted. Pt denied AVS, will use MyChart. Pt used wheelchair out of clinic.

## 2023-05-21 ENCOUNTER — PATIENT MESSAGE (OUTPATIENT)
Dept: PRIMARY CARE CLINIC | Facility: CLINIC | Age: 83
End: 2023-05-21
Payer: MEDICARE

## 2023-05-22 RX ORDER — MONTELUKAST SODIUM 4 MG/1
1 TABLET, CHEWABLE ORAL 2 TIMES DAILY
Qty: 60 TABLET | Refills: 5 | Status: SHIPPED | OUTPATIENT
Start: 2023-05-22

## 2023-05-22 NOTE — TELEPHONE ENCOUNTER
Refill Routing Note   Medication(s) are not appropriate for processing by [facility] for the following reason(s):      Patient not seen by PCP within 15 months  Patient seen in ED/Hospital since LOV with PCP    [facility] action(s):  Defer None identified          Appointments  past 12m or future 3m with PCP    Date Provider   Last Visit   4/18/2023 Ga Waldrop MD   Next Visit   10/18/2023 Ga Waldrop MD   ED visits in past 90 days: 0        Note composed:11:27 AM 05/22/2023

## 2023-05-25 LAB
DNA RANGE(S) EXAMINED NAR: NORMAL
GENE DIS ANL INTERP-IMP: POSITIVE
GENE DIS ASSESSED: NORMAL
MSI CA SPEC-IMP: NOT DETECTED
REASON FOR STUDY: NORMAL
TEMPUS LCA: NORMAL
TEMPUS PORTAL: NORMAL

## 2023-05-26 ENCOUNTER — OUTPATIENT CASE MANAGEMENT (OUTPATIENT)
Dept: ADMINISTRATIVE | Facility: OTHER | Age: 83
End: 2023-05-26
Payer: MEDICARE

## 2023-05-26 ENCOUNTER — PATIENT MESSAGE (OUTPATIENT)
Dept: ADMINISTRATIVE | Facility: OTHER | Age: 83
End: 2023-05-26
Payer: MEDICARE

## 2023-05-26 NOTE — PROGRESS NOTES
Outpatient Care Management  Initial Patient Assessment    Patient: Pauline Crnoin  MRN: [id number]  Date of Service: 05/26/2023  Completed by: Neeta Horne RN  Referral Date: 04/24/2023  Program: High Risk  Status: Ongoing  Effective Dates: 5/26/2023 - present  Responsible Staff: Neeta Horne RN        Reason for Visit   Patient presents with    Initial Assessment    OPCM Enrollment Call       Brief Summary:  Pauline Cronin was referred to OPCM 4/24/2023 at time of hosp 4/22-4/24/2023 for acute onset abd pain. Dx with inoperable Pancreatic Ca (d/t co-morbidities). She agree to try Palliative Chemo and pain management. Chronic N/V/D- has underlying Crohns' Disease.   Prior to 4/22/2023 admission, Pauline was hosp 3/17-3/23/2023 for increased bilirubin, ERCP w/BX/Stenting- confirmed Pancreatic Ca dx. 4/15-4/16/2023 for Afib w/RVR, c/o abd pain d/t malignancy, concerns for mets to stomach. Since 4/22/2023 admission, Pauline was seen in hos 5/2-5/3/2023 for severe epigastric pain following first treatment of chem day before. 5/3-5/7/2023- she returned with c/o CP, weakness, H/A- treated for sepsis, D/cd home with HH and O2.  Pauline has been staying at her son and dgt-in-law's home in [place], [place] and plans to continue there for the next 2 mos. Dgt-in-law, Karen Mejia is her caregiver while son works offshore. Patient qualifies for program based on 66.7%.   Active problem list, medical, surgical and social history reviewed. Active comorbidities include Pancreatic Ca, Crohns' Disease, Afib, HTN, NSTEMI, SSS w/pacemaker, GERD, Iron Def Anemia. Areas of need identified by patient include very little needs. This RN CM checked for [employer] OTC Benefits to add options to bathroom safety DME & pulse oximeter. It is learned that pt's [employer] plan does not have the OTC Benefit. Pauline has questions about the status of an application to Financial Assistance through [facility]. Informed Pauline that per the FA office she needs to  go to the Patient Portal Letter Section and follow the application directions. To provide education on conserving energy, maintaining nutrition/hydration,O2 safety, fall prevention mail Ensure Coupons.  Next steps: F/u in 2 wks to provide education on maintaining her strength and safety while undergoing chemo treatment.    Disability Status  Is the patient alert and oriented (person, place, time, and situation)?: Alert and oriented x 4  Hearing Difficulty or Deaf: yes  Hearing Management: -- (Wears 2 hearing aides. One has a piece broke off that holds unto to ear. [employer] Can't see her until Wed 5/31.The right ear has worse hearing & that is the hearing aide that broke.)  Visual Difficulty or Blind: no  Difficulty Concentrating, Remembering or Making Decisions: no  Communication Difficulty: no (Reading glasses)  Eating/Swallowing Difficulty: no (Has lost a lot of weight. Denies any pain current. H/o Crohn's Disease since in her 20s.)  Walking or Climbing Stairs Difficulty: yes  Walking or Climbing Stairs: ambulation difficulty, requires equipment; stair climbing difficulty, dependent (Able to walk around house without AD,  made her breakfast and lunch. Dgt-in-law fixes supper. Son works offshore. Uses RW for long distances. Independent amb in home.)  Mobility Management: -- (Manages with RW away from home.)  Dressing/Bathing Difficulty: yes  Dressing/Bathing: bathing difficulty, assistance 1 person (Has hired help 4 x wk to assist her with bathing)  Difficulty Managing Errands Independently: yes  Errands Management: Dgt-in-law Karen Mejia assists  Equipment Currently Used at Home: oxygen; walker, rolling  Change in Functional Status Since Onset of Current Illness/Injury: no        Spiritual Beliefs  Spiritual, Cultural Beliefs, Restoration Practices, Values that Affect Care: no      Social History     Socioeconomic History    Marital status:    Tobacco Use    Smoking status: Former     Types: Cigarettes      Quit date:      Years since quittin.4    Smokeless tobacco: Never   Substance and Sexual Activity    Alcohol use: No    Drug use: No    Sexual activity: Never     Social Determinants of Health     Financial Resource Strain: Low Risk     Difficulty of Paying Living Expenses: Not hard at all   Food Insecurity: No Food Insecurity    Worried About Running Out of Food in the Last Year: Never true    Ran Out of Food in the Last Year: Never true   Transportation Needs: No Transportation Needs    Lack of Transportation (Medical): No    Lack of Transportation (Non-Medical): No   Physical Activity: Inactive    Days of Exercise per Week: 0 days    Minutes of Exercise per Session: 0 min   Stress: No Stress Concern Present    Feeling of Stress : Not at all   Social Connections: Moderately Isolated    Frequency of Communication with Friends and Family: More than three times a week    Frequency of Social Gatherings with Friends and Family: More than three times a week    Attends Anglican Services: Never    Active Member of Clubs or Organizations: Yes    Attends Club or Organization Meetings: More than 4 times per year    Marital Status:    Housing Stability: Low Risk     Unable to Pay for Housing in the Last Year: No    Number of Places Lived in the Last Year: 1    Unstable Housing in the Last Year: No       Roles and Relationships  Primary Source of Support/Comfort: child(alexandr); extended family (daughter-in-law & son)  Name of Support/Comfort Primary Source: Karen Mejia  Primary Roles/Responsibilities: wage earner, part-time (Dgt-in-law is a  and school ended yest--- is off for 2 mos)      Advance Directives (For Healthcare)  Advance Directive  (If Adv Dir status is received, view document under Adv Dir in header or Chart Review Media tab): Advance Directive currently in Epic.        Patient Reported Insurance  Verified current insurance plan:: [employer] Medicare Advantage  [employer] benefits  discussed:: OTC Prescription Discounts        Depression Patient Health Questionnaire 5/26/2023 4/3/2023 3/6/2023 3/1/2023 3/16/2022 6/3/2021   Over the last two weeks how often have you been bothered by little interest or pleasure in doing things Not at all Not at all Several days Not at all Not at all Not at all   Over the last two weeks how often have you been bothered by feeling down, depressed or hopeless Not at all Not at all Several days Not at all Not at all Not at all   PHQ-2 Total Score 0 0 2 0 0 0       Learning Assessment       05/26/2023 1309 [facility] (5/26/2023 - Present)   Created by Neeta Horne RN -  (Nurse) Status: Complete                 PRIMARY LEARNER     Primary Learner Name:  Pauline Cronin  - 05/26/2023 1309    Comment: Pauline Mehrdad     Relationship:  Patient MM - 05/26/2023 1309    Does the primary learner have any barriers to learning?:  No Barriers  - 05/26/2023 1309    What is the preferred language of the primary learner?:  English MM - 05/26/2023 1309    Is an  required?:  No MM - 05/26/2023 1309    How does the primary learner prefer to learn new concepts?:  Listening, Reading MM - 05/26/2023 1309    How often do you need to have someone help you read instructions, pamphlets, or written material from your doctor or pharmacy?:  Never MM - 05/26/2023 1309        CO-LEARNER #1     No question answered        CO-LEARNER #2     No question answered        SPECIAL TOPICS     No question answered        ANSWERED BY:     No question answered        Edit History       Neeta Horne RN -  (Nurse)   05/26/2023 1307

## 2023-05-26 NOTE — LETTER
"                [place]:  Neeta Horne RN, [facility] Outpatient Care Management Nurse   Tel:[phone] Mon-Fri, 8 am- 4:30 pm    Lori Loera Community Health Worker -[facility] Outpatient Care Management   TEL: [phone], Mon-Fri, 8 am- 4:30 pm    [facility] On Call, 24/7 Nurse Help Line (non emergent)- TEL:  [phone]    [facility] Technical Issue Support Team--TEL:  [phone], Mon-Fri 9 am- 5 pm.    [email or web address] online patient portal    [employer] Financial Assistance TEL:  [phone]    Digital Medicine Program- TEL:  [phone]        [employer] Managed Care:    [employer]ana Marlys Life Line Benefit- TEL:  [phone]    [employer] First 24 Hour Nurse Line--TEL:  [phone]    [employer] Managed Care,  Over-the-Counter Benefit-- TEL:  [phone]  On line:  [employer] select "Over-the-Counter (OTC) items from the "Shop OTC & Supplies drop down at the top of the page    Hum[employer] Managed Care, [employer] Exercise- [facility]  On line Silver [employer] Exercise and Health Education Resources        [email or web address]    Humana Managed Care, "Mom's Meal", TEL: [phone]    [employer] Managed Care - Transportation Reservation-- TEL:  [phone]  Mon-Fri, 8 am-5 pm, 3 business days notice required  "
Pauline Cronin  [address]  [place] [address]    Dear Pauline Cronin,     Welcome to [facility]s Outpatient Care Management Program. We are here to assist patients with multiple long-term (chronic) conditions who often need more personalized healthcare.    It was a pleasure talking with you today. My name is Neeta Horne RN. I look forward to working with you as your Care Manager. I will be contacting you by telephone routinely to help coordinate care and resolve issues.    My goal is to help you function at the healthiest and highest level possible. You can contact me directly at [phone].    As an [facility] patient with [employer] Insurance, some of the services we provide, at no cost to you, include:      Development of an individualized care plan with a Registered Nurse    Connection with a    Assistance from a Community Health Worker   Connection with available resources and services     Coordinate communication among your care team members    Provide coaching and education    Help you understand your doctors treatment plan   Help you obtain information about your insurance coverage.     All services provided by [facility]s Outpatient Care Managers and other care team members are coordinated with and communicated to your primary care team.      As part of your enrollment, you will be receiving education materials and more information about these services in your [facility] account, by phone, or through the mail. If you do not wish to participate or receive information, you can Opt Out by contacting our office at [phone].      Sincerely,        Neeta Horne RN  [facility]   Outpatient Care Management                      
No risk alerts present

## 2023-05-27 ENCOUNTER — INFUSION (OUTPATIENT)
Dept: INFUSION THERAPY | Facility: HOSPITAL | Age: 83
End: 2023-05-27
Payer: MEDICARE

## 2023-05-27 VITALS
TEMPERATURE: 98 F | OXYGEN SATURATION: 97 % | DIASTOLIC BLOOD PRESSURE: 70 MMHG | RESPIRATION RATE: 18 BRPM | HEART RATE: 80 BPM | SYSTOLIC BLOOD PRESSURE: 144 MMHG

## 2023-05-27 DIAGNOSIS — D50.0 IRON DEFICIENCY ANEMIA DUE TO CHRONIC BLOOD LOSS: Primary | ICD-10-CM

## 2023-05-27 PROCEDURE — 25000003 PHARM REV CODE 250: Performed by: INTERNAL MEDICINE

## 2023-05-27 PROCEDURE — 96365 THER/PROPH/DIAG IV INF INIT: CPT

## 2023-05-27 PROCEDURE — 63600175 PHARM REV CODE 636 W HCPCS: Performed by: INTERNAL MEDICINE

## 2023-05-27 RX ORDER — HEPARIN 100 UNIT/ML
500 SYRINGE INTRAVENOUS
Status: CANCELLED | OUTPATIENT
Start: 2023-06-03

## 2023-05-27 RX ORDER — EPINEPHRINE 0.3 MG/.3ML
0.3 INJECTION SUBCUTANEOUS ONCE AS NEEDED
Status: CANCELLED | OUTPATIENT
Start: 2023-06-03

## 2023-05-27 RX ORDER — HEPARIN 100 UNIT/ML
500 SYRINGE INTRAVENOUS
Status: DISCONTINUED | OUTPATIENT
Start: 2023-05-27 | End: 2023-05-27 | Stop reason: HOSPADM

## 2023-05-27 RX ORDER — SODIUM CHLORIDE 0.9 % (FLUSH) 0.9 %
10 SYRINGE (ML) INJECTION
Status: CANCELLED | OUTPATIENT
Start: 2023-06-03

## 2023-05-27 RX ORDER — METHYLPREDNISOLONE SOD SUCC 125 MG
125 VIAL (EA) INJECTION ONCE AS NEEDED
Status: CANCELLED | OUTPATIENT
Start: 2023-06-03

## 2023-05-27 RX ORDER — DIPHENHYDRAMINE HYDROCHLORIDE 50 MG/ML
50 INJECTION INTRAMUSCULAR; INTRAVENOUS ONCE AS NEEDED
Status: CANCELLED | OUTPATIENT
Start: 2023-06-03

## 2023-05-27 RX ORDER — SODIUM CHLORIDE 0.9 % (FLUSH) 0.9 %
10 SYRINGE (ML) INJECTION
Status: DISCONTINUED | OUTPATIENT
Start: 2023-05-27 | End: 2023-05-27 | Stop reason: HOSPADM

## 2023-05-27 RX ADMIN — IRON SUCROSE 200 MG: 20 INJECTION, SOLUTION INTRAVENOUS at 11:05

## 2023-05-27 RX ADMIN — SODIUM CHLORIDE: 9 INJECTION, SOLUTION INTRAVENOUS at 11:05

## 2023-05-27 NOTE — PLAN OF CARE
1230-Pt tolerated Venofer infusion well, no complications or side effects, pt refused 30 min post OBS, POC and meds discussed with pt, pt aware of upcoming appts, pt knows to call MD with any questions or concerns. Pt off unit via w/c, no distress noted.

## 2023-05-28 DIAGNOSIS — I48.0 PAROXYSMAL ATRIAL FIBRILLATION: ICD-10-CM

## 2023-05-29 ENCOUNTER — OFFICE VISIT (OUTPATIENT)
Dept: HEMATOLOGY/ONCOLOGY | Facility: CLINIC | Age: 83
End: 2023-05-29
Payer: MEDICARE

## 2023-05-29 ENCOUNTER — LAB VISIT (OUTPATIENT)
Dept: LAB | Facility: HOSPITAL | Age: 83
End: 2023-05-29
Payer: MEDICARE

## 2023-05-29 ENCOUNTER — PATIENT OUTREACH (OUTPATIENT)
Dept: ADMINISTRATIVE | Facility: OTHER | Age: 83
End: 2023-05-29
Payer: MEDICARE

## 2023-05-29 VITALS
WEIGHT: 166.56 LBS | TEMPERATURE: 98 F | BODY MASS INDEX: 32.7 KG/M2 | SYSTOLIC BLOOD PRESSURE: 145 MMHG | HEART RATE: 97 BPM | OXYGEN SATURATION: 96 % | RESPIRATION RATE: 18 BRPM | DIASTOLIC BLOOD PRESSURE: 67 MMHG | HEIGHT: 60 IN

## 2023-05-29 DIAGNOSIS — C25.9 PANCREATIC ADENOCARCINOMA: ICD-10-CM

## 2023-05-29 DIAGNOSIS — I48.0 PAROXYSMAL ATRIAL FIBRILLATION: ICD-10-CM

## 2023-05-29 DIAGNOSIS — D84.821 IMMUNODEFICIENCY SECONDARY TO CHEMOTHERAPY: ICD-10-CM

## 2023-05-29 DIAGNOSIS — Z79.899 IMMUNODEFICIENCY SECONDARY TO CHEMOTHERAPY: ICD-10-CM

## 2023-05-29 DIAGNOSIS — D84.81 IMMUNODEFICIENCY SECONDARY TO NEOPLASM: ICD-10-CM

## 2023-05-29 DIAGNOSIS — K86.81 EXOCRINE PANCREATIC INSUFFICIENCY: ICD-10-CM

## 2023-05-29 DIAGNOSIS — D50.9 MICROCYTIC ANEMIA: ICD-10-CM

## 2023-05-29 DIAGNOSIS — E78.2 MIXED HYPERLIPIDEMIA: ICD-10-CM

## 2023-05-29 DIAGNOSIS — C25.9 PANCREATIC ADENOCARCINOMA: Primary | ICD-10-CM

## 2023-05-29 DIAGNOSIS — K50.10 CROHN'S DISEASE OF LARGE INTESTINE WITHOUT COMPLICATION: ICD-10-CM

## 2023-05-29 DIAGNOSIS — D50.0 IRON DEFICIENCY ANEMIA DUE TO CHRONIC BLOOD LOSS: ICD-10-CM

## 2023-05-29 DIAGNOSIS — I10 ESSENTIAL HYPERTENSION, BENIGN: ICD-10-CM

## 2023-05-29 DIAGNOSIS — D49.9 IMMUNODEFICIENCY SECONDARY TO NEOPLASM: ICD-10-CM

## 2023-05-29 DIAGNOSIS — I70.0 AORTIC ATHEROSCLEROSIS: ICD-10-CM

## 2023-05-29 DIAGNOSIS — T45.1X5A IMMUNODEFICIENCY SECONDARY TO CHEMOTHERAPY: ICD-10-CM

## 2023-05-29 LAB
ALBUMIN SERPL BCP-MCNC: 3 G/DL (ref 3.5–5.2)
ALP SERPL-CCNC: 96 U/L (ref 55–135)
ALT SERPL W/O P-5'-P-CCNC: 15 U/L (ref 10–44)
ANION GAP SERPL CALC-SCNC: 12 MMOL/L (ref 8–16)
AST SERPL-CCNC: 27 U/L (ref 10–40)
BILIRUB SERPL-MCNC: 0.4 MG/DL (ref 0.1–1)
BUN SERPL-MCNC: 9 MG/DL (ref 8–23)
CALCIUM SERPL-MCNC: 9.2 MG/DL (ref 8.7–10.5)
CANCER AG19-9 SERPL-ACNC: 11.9 U/ML (ref 0–40)
CHLORIDE SERPL-SCNC: 107 MMOL/L (ref 95–110)
CO2 SERPL-SCNC: 21 MMOL/L (ref 23–29)
CREAT SERPL-MCNC: 0.8 MG/DL (ref 0.5–1.4)
ERYTHROCYTE [DISTWIDTH] IN BLOOD BY AUTOMATED COUNT: 25.2 % (ref 11.5–14.5)
EST. GFR  (NO RACE VARIABLE): >60 ML/MIN/1.73 M^2
GLUCOSE SERPL-MCNC: 145 MG/DL (ref 70–110)
HCT VFR BLD AUTO: 38.5 % (ref 37–48.5)
HGB BLD-MCNC: 12 G/DL (ref 12–16)
IMM GRANULOCYTES # BLD AUTO: 0.02 K/UL (ref 0–0.04)
MCH RBC QN AUTO: 28.1 PG (ref 27–31)
MCHC RBC AUTO-ENTMCNC: 31.2 G/DL (ref 32–36)
MCV RBC AUTO: 90 FL (ref 82–98)
NEUTROPHILS # BLD AUTO: 2.8 K/UL (ref 1.8–7.7)
PLATELET # BLD AUTO: 182 K/UL (ref 150–450)
PMV BLD AUTO: 11 FL (ref 9.2–12.9)
POTASSIUM SERPL-SCNC: 3.2 MMOL/L (ref 3.5–5.1)
PROT SERPL-MCNC: 6.8 G/DL (ref 6–8.4)
RBC # BLD AUTO: 4.27 M/UL (ref 4–5.4)
SODIUM SERPL-SCNC: 140 MMOL/L (ref 136–145)
WBC # BLD AUTO: 5.15 K/UL (ref 3.9–12.7)

## 2023-05-29 PROCEDURE — 80053 COMPREHEN METABOLIC PANEL: CPT | Performed by: INTERNAL MEDICINE

## 2023-05-29 PROCEDURE — 3078F DIAST BP <80 MM HG: CPT | Mod: CPTII,S$GLB,, | Performed by: INTERNAL MEDICINE

## 2023-05-29 PROCEDURE — 1159F MED LIST DOCD IN RCRD: CPT | Mod: CPTII,S$GLB,, | Performed by: INTERNAL MEDICINE

## 2023-05-29 PROCEDURE — 1111F DSCHRG MED/CURRENT MED MERGE: CPT | Mod: CPTII,S$GLB,, | Performed by: INTERNAL MEDICINE

## 2023-05-29 PROCEDURE — 1101F PT FALLS ASSESS-DOCD LE1/YR: CPT | Mod: CPTII,S$GLB,, | Performed by: INTERNAL MEDICINE

## 2023-05-29 PROCEDURE — 86301 IMMUNOASSAY TUMOR CA 19-9: CPT | Performed by: INTERNAL MEDICINE

## 2023-05-29 PROCEDURE — 3078F PR MOST RECENT DIASTOLIC BLOOD PRESSURE < 80 MM HG: ICD-10-PCS | Mod: CPTII,S$GLB,, | Performed by: INTERNAL MEDICINE

## 2023-05-29 PROCEDURE — 3288F PR FALLS RISK ASSESSMENT DOCUMENTED: ICD-10-PCS | Mod: CPTII,S$GLB,, | Performed by: INTERNAL MEDICINE

## 2023-05-29 PROCEDURE — 1157F ADVNC CARE PLAN IN RCRD: CPT | Mod: CPTII,S$GLB,, | Performed by: INTERNAL MEDICINE

## 2023-05-29 PROCEDURE — 99999 PR PBB SHADOW E&M-EST. PATIENT-LVL IV: CPT | Mod: PBBFAC,,, | Performed by: INTERNAL MEDICINE

## 2023-05-29 PROCEDURE — 99999 PR PBB SHADOW E&M-EST. PATIENT-LVL IV: ICD-10-PCS | Mod: PBBFAC,,, | Performed by: INTERNAL MEDICINE

## 2023-05-29 PROCEDURE — 99214 OFFICE O/P EST MOD 30 MIN: CPT | Mod: S$GLB,,, | Performed by: INTERNAL MEDICINE

## 2023-05-29 PROCEDURE — 1111F PR DISCHARGE MEDS RECONCILED W/ CURRENT OUTPATIENT MED LIST: ICD-10-PCS | Mod: CPTII,S$GLB,, | Performed by: INTERNAL MEDICINE

## 2023-05-29 PROCEDURE — 1126F PR PAIN SEVERITY QUANTIFIED, NO PAIN PRESENT: ICD-10-PCS | Mod: CPTII,S$GLB,, | Performed by: INTERNAL MEDICINE

## 2023-05-29 PROCEDURE — 3077F PR MOST RECENT SYSTOLIC BLOOD PRESSURE >= 140 MM HG: ICD-10-PCS | Mod: CPTII,S$GLB,, | Performed by: INTERNAL MEDICINE

## 2023-05-29 PROCEDURE — 1101F PR PT FALLS ASSESS DOC 0-1 FALLS W/OUT INJ PAST YR: ICD-10-PCS | Mod: CPTII,S$GLB,, | Performed by: INTERNAL MEDICINE

## 2023-05-29 PROCEDURE — 1159F PR MEDICATION LIST DOCUMENTED IN MEDICAL RECORD: ICD-10-PCS | Mod: CPTII,S$GLB,, | Performed by: INTERNAL MEDICINE

## 2023-05-29 PROCEDURE — 36415 COLL VENOUS BLD VENIPUNCTURE: CPT | Performed by: INTERNAL MEDICINE

## 2023-05-29 PROCEDURE — 3288F FALL RISK ASSESSMENT DOCD: CPT | Mod: CPTII,S$GLB,, | Performed by: INTERNAL MEDICINE

## 2023-05-29 PROCEDURE — 1126F AMNT PAIN NOTED NONE PRSNT: CPT | Mod: CPTII,S$GLB,, | Performed by: INTERNAL MEDICINE

## 2023-05-29 PROCEDURE — 3077F SYST BP >= 140 MM HG: CPT | Mod: CPTII,S$GLB,, | Performed by: INTERNAL MEDICINE

## 2023-05-29 PROCEDURE — 85027 COMPLETE CBC AUTOMATED: CPT | Performed by: INTERNAL MEDICINE

## 2023-05-29 PROCEDURE — 1157F PR ADVANCE CARE PLAN OR EQUIV PRESENT IN MEDICAL RECORD: ICD-10-PCS | Mod: CPTII,S$GLB,, | Performed by: INTERNAL MEDICINE

## 2023-05-29 PROCEDURE — 99214 PR OFFICE/OUTPT VISIT, EST, LEVL IV, 30-39 MIN: ICD-10-PCS | Mod: S$GLB,,, | Performed by: INTERNAL MEDICINE

## 2023-05-29 RX ORDER — APIXABAN 5 MG/1
5 TABLET, FILM COATED ORAL 2 TIMES DAILY
Qty: 180 TABLET | Refills: 3 | Status: SHIPPED | OUTPATIENT
Start: 2023-05-29

## 2023-05-29 NOTE — Clinical Note
Jay Stahl. I know you had tried to call about scheduling a port for this patient a couple weeks ago.  I spoke with them today and they would like you to reach out again if you don't mind, as they want to see if it can be done in the next couple weeks.  Thanks so much! Raul

## 2023-05-29 NOTE — Clinical Note
Hey.  Can we please cancel her scheduled chemo for 6/1?  Still should get iron on 6/3 though as scheduled.  She will come back in two weeks for chemo. Thanks. Raul

## 2023-05-29 NOTE — PROGRESS NOTES
This Community Health Worker (CHW) competed Social Determinant of Health (SDOH)  Questionnaire with patient/caregiver via telephone today.  Notified OPCM VANESA EVANGELISTA RN, of completion.      Patient denied any SDOH needs at this time.

## 2023-05-30 ENCOUNTER — OUTPATIENT CASE MANAGEMENT (OUTPATIENT)
Dept: ADMINISTRATIVE | Facility: OTHER | Age: 83
End: 2023-05-30
Payer: MEDICARE

## 2023-05-30 ENCOUNTER — TELEPHONE (OUTPATIENT)
Dept: HEMATOLOGY/ONCOLOGY | Facility: CLINIC | Age: 83
End: 2023-05-30
Payer: MEDICARE

## 2023-05-30 NOTE — TELEPHONE ENCOUNTER
Spoke with yesenia.  Patient has runny nose and is feeling cold.  She is not sure if her mom has fever.  No other concerning symptoms.  She will test her mom for covid, if her symptoms worsen or she has fever once she checks her.   Advised her zyrtec is OK for her mom to take.

## 2023-05-30 NOTE — TELEPHONE ENCOUNTER
----- Message from Stephnaie Cortes sent at 5/30/2023  3:18 PM CDT -----  Regarding: Paient Advice              Name of Caller: Karen         Contact Preference: [phone]        Nature of Call: Patient daughter in law would like advice on which over the counter medication pt can take for running nose

## 2023-05-31 ENCOUNTER — EXTERNAL HOME HEALTH (OUTPATIENT)
Dept: HOME HEALTH SERVICES | Facility: HOSPITAL | Age: 83
End: 2023-05-31
Payer: MEDICARE

## 2023-05-31 ENCOUNTER — TELEPHONE (OUTPATIENT)
Dept: INTERVENTIONAL RADIOLOGY/VASCULAR | Facility: CLINIC | Age: 83
End: 2023-05-31
Payer: MEDICARE

## 2023-05-31 NOTE — TELEPHONE ENCOUNTER
Spoke to pts relative Karen on phone, Pt is scheduled on 6/8/2023 for IR procedure at [facility] location.  Preop instructions given (NPO after midnight, MUST have a ride home, Nurse will call 1-2  days before to go over instructions and medications), instructed pt to stay off Eliquis  for 48 hrs, pt verbally understood. Pt aware and confirmed, Thanks

## 2023-06-03 ENCOUNTER — INFUSION (OUTPATIENT)
Dept: INFUSION THERAPY | Facility: HOSPITAL | Age: 83
End: 2023-06-03
Payer: MEDICARE

## 2023-06-03 VITALS
HEART RATE: 88 BPM | OXYGEN SATURATION: 99 % | SYSTOLIC BLOOD PRESSURE: 149 MMHG | RESPIRATION RATE: 18 BRPM | TEMPERATURE: 98 F | DIASTOLIC BLOOD PRESSURE: 70 MMHG

## 2023-06-03 DIAGNOSIS — D50.0 IRON DEFICIENCY ANEMIA DUE TO CHRONIC BLOOD LOSS: Primary | ICD-10-CM

## 2023-06-03 PROCEDURE — 63600175 PHARM REV CODE 636 W HCPCS: Performed by: INTERNAL MEDICINE

## 2023-06-03 PROCEDURE — 25000003 PHARM REV CODE 250: Performed by: INTERNAL MEDICINE

## 2023-06-03 PROCEDURE — 96365 THER/PROPH/DIAG IV INF INIT: CPT

## 2023-06-03 RX ORDER — EPINEPHRINE 0.3 MG/.3ML
0.3 INJECTION SUBCUTANEOUS ONCE AS NEEDED
OUTPATIENT
Start: 2023-06-10

## 2023-06-03 RX ORDER — SODIUM CHLORIDE 0.9 % (FLUSH) 0.9 %
10 SYRINGE (ML) INJECTION
Status: DISCONTINUED | OUTPATIENT
Start: 2023-06-03 | End: 2023-06-03 | Stop reason: HOSPADM

## 2023-06-03 RX ORDER — SODIUM CHLORIDE 0.9 % (FLUSH) 0.9 %
10 SYRINGE (ML) INJECTION
OUTPATIENT
Start: 2023-06-10

## 2023-06-03 RX ORDER — DIPHENHYDRAMINE HYDROCHLORIDE 50 MG/ML
50 INJECTION INTRAMUSCULAR; INTRAVENOUS ONCE AS NEEDED
OUTPATIENT
Start: 2023-06-10

## 2023-06-03 RX ORDER — HEPARIN 100 UNIT/ML
500 SYRINGE INTRAVENOUS
Status: DISCONTINUED | OUTPATIENT
Start: 2023-06-03 | End: 2023-06-03 | Stop reason: HOSPADM

## 2023-06-03 RX ORDER — HEPARIN 100 UNIT/ML
500 SYRINGE INTRAVENOUS
OUTPATIENT
Start: 2023-06-10

## 2023-06-03 RX ORDER — METHYLPREDNISOLONE SOD SUCC 125 MG
125 VIAL (EA) INJECTION ONCE AS NEEDED
OUTPATIENT
Start: 2023-06-10

## 2023-06-03 RX ADMIN — SODIUM CHLORIDE: 9 INJECTION, SOLUTION INTRAVENOUS at 11:06

## 2023-06-03 RX ADMIN — IRON SUCROSE 200 MG: 20 INJECTION, SOLUTION INTRAVENOUS at 11:06

## 2023-06-03 NOTE — PLAN OF CARE
1230: Pt tolerated treatment well. PIV discontinued. Pt reeducated on sign and symptoms of reaction and instructed to seek medical care if reaction noted. Pt denied AVS, will use MyChart. Pt used wheelchair out of clinic.

## 2023-06-03 NOTE — PLAN OF CARE
1100: Pt arrived for Venofer. Pt A&Ox4. VSS. Labs reviewed.  All medications review and verbal understanding noted. Will continue to monitor.

## 2023-06-07 ENCOUNTER — TELEPHONE (OUTPATIENT)
Dept: PRIMARY CARE CLINIC | Facility: CLINIC | Age: 83
End: 2023-06-07
Payer: MEDICARE

## 2023-06-07 DIAGNOSIS — I27.20 PULMONARY HYPERTENSION: Primary | ICD-10-CM

## 2023-06-07 RX ORDER — ALBUTEROL SULFATE 0.83 MG/ML
2.5 SOLUTION RESPIRATORY (INHALATION) EVERY 6 HOURS PRN
Qty: 150 ML | Refills: 11 | Status: SHIPPED | OUTPATIENT
Start: 2023-06-07 | End: 2023-10-18

## 2023-06-07 NOTE — TELEPHONE ENCOUNTER
----- Message from Bethany Scott sent at 6/7/2023  9:28 AM CDT -----  Contact: [facility] phone [phone]  The patient's left lung sounds are great and the right there is some wheezing. Requesting an order for a nebulizer. Please advise. Thanks.

## 2023-06-07 NOTE — TELEPHONE ENCOUNTER
Called Helga regarding message. At pt's home visit today Helga reported wheezing in pt's right lung. Left lung is clear. Helga requested nebulizer order.

## 2023-06-08 ENCOUNTER — TELEPHONE (OUTPATIENT)
Dept: PALLIATIVE MEDICINE | Facility: CLINIC | Age: 83
End: 2023-06-08
Payer: MEDICARE

## 2023-06-08 ENCOUNTER — HOSPITAL ENCOUNTER (INPATIENT)
Facility: HOSPITAL | Age: 83
LOS: 3 days | Discharge: HOME | DRG: 439 | End: 2023-06-11
Attending: EMERGENCY MEDICINE | Admitting: EMERGENCY MEDICINE
Payer: MEDICARE

## 2023-06-08 DIAGNOSIS — R74.8 ELEVATED LIPASE: ICD-10-CM

## 2023-06-08 DIAGNOSIS — R00.0 TACHYARRHYTHMIA: ICD-10-CM

## 2023-06-08 DIAGNOSIS — R10.9 ABDOMINAL PAIN: ICD-10-CM

## 2023-06-08 DIAGNOSIS — C25.9 MALIGNANT NEOPLASM OF PANCREAS, UNSPECIFIED LOCATION OF MALIGNANCY: Primary | ICD-10-CM

## 2023-06-08 DIAGNOSIS — R11.10 VOMITING, UNSPECIFIED VOMITING TYPE, UNSPECIFIED WHETHER NAUSEA PRESENT: ICD-10-CM

## 2023-06-08 DIAGNOSIS — R07.9 CHEST PAIN: ICD-10-CM

## 2023-06-08 PROBLEM — K86.3 PANCREATIC PSEUDOCYST: Status: ACTIVE | Noted: 2023-06-08

## 2023-06-08 PROBLEM — K85.90 ACUTE PANCREATITIS: Status: ACTIVE | Noted: 2023-06-08

## 2023-06-08 LAB
ALBUMIN SERPL BCP-MCNC: 3.1 G/DL (ref 3.5–5.2)
ALP SERPL-CCNC: 88 U/L (ref 55–135)
ALT SERPL W/O P-5'-P-CCNC: 15 U/L (ref 10–44)
ANION GAP SERPL CALC-SCNC: 8 MMOL/L (ref 8–16)
APTT PPP: 27.5 SEC (ref 21–32)
AST SERPL-CCNC: 29 U/L (ref 10–40)
BASOPHILS # BLD AUTO: 0.03 K/UL (ref 0–0.2)
BASOPHILS NFR BLD: 0.3 % (ref 0–1.9)
BILIRUB SERPL-MCNC: 0.5 MG/DL (ref 0.1–1)
BILIRUB UR QL STRIP: NEGATIVE
BUN SERPL-MCNC: 9 MG/DL (ref 8–23)
CALCIUM SERPL-MCNC: 9.7 MG/DL (ref 8.7–10.5)
CHLORIDE SERPL-SCNC: 105 MMOL/L (ref 95–110)
CLARITY UR REFRACT.AUTO: ABNORMAL
CO2 SERPL-SCNC: 24 MMOL/L (ref 23–29)
COLOR UR AUTO: YELLOW
CREAT SERPL-MCNC: 0.8 MG/DL (ref 0.5–1.4)
DIFFERENTIAL METHOD: ABNORMAL
EOSINOPHIL # BLD AUTO: 0.2 K/UL (ref 0–0.5)
EOSINOPHIL NFR BLD: 2.3 % (ref 0–8)
ERYTHROCYTE [DISTWIDTH] IN BLOOD BY AUTOMATED COUNT: 23.5 % (ref 11.5–14.5)
EST. GFR  (NO RACE VARIABLE): >60 ML/MIN/1.73 M^2
GLUCOSE SERPL-MCNC: 132 MG/DL (ref 70–110)
GLUCOSE UR QL STRIP: NEGATIVE
HCT VFR BLD AUTO: 42.3 % (ref 37–48.5)
HGB BLD-MCNC: 13.2 G/DL (ref 12–16)
HGB UR QL STRIP: NEGATIVE
IMM GRANULOCYTES # BLD AUTO: 0.02 K/UL (ref 0–0.04)
IMM GRANULOCYTES NFR BLD AUTO: 0.2 % (ref 0–0.5)
INR PPP: 1.1 (ref 0.8–1.2)
KETONES UR QL STRIP: NEGATIVE
LACTATE SERPL-SCNC: 1.1 MMOL/L (ref 0.5–2.2)
LEUKOCYTE ESTERASE UR QL STRIP: ABNORMAL
LIPASE SERPL-CCNC: 185 U/L (ref 4–60)
LYMPHOCYTES # BLD AUTO: 1.1 K/UL (ref 1–4.8)
LYMPHOCYTES NFR BLD: 12.7 % (ref 18–48)
MCH RBC QN AUTO: 29.1 PG (ref 27–31)
MCHC RBC AUTO-ENTMCNC: 31.2 G/DL (ref 32–36)
MCV RBC AUTO: 93 FL (ref 82–98)
MICROSCOPIC COMMENT: ABNORMAL
MONOCYTES # BLD AUTO: 0.5 K/UL (ref 0.3–1)
MONOCYTES NFR BLD: 5.1 % (ref 4–15)
NEUTROPHILS # BLD AUTO: 6.9 K/UL (ref 1.8–7.7)
NEUTROPHILS NFR BLD: 79.4 % (ref 38–73)
NITRITE UR QL STRIP: NEGATIVE
NON-SQ EPI CELLS #/AREA URNS AUTO: 4 /HPF
NRBC BLD-RTO: 0 /100 WBC
PH UR STRIP: 7 [PH] (ref 5–8)
PLATELET # BLD AUTO: 172 K/UL (ref 150–450)
PMV BLD AUTO: 10.1 FL (ref 9.2–12.9)
POTASSIUM SERPL-SCNC: 3.4 MMOL/L (ref 3.5–5.1)
PROT SERPL-MCNC: 6.8 G/DL (ref 6–8.4)
PROT UR QL STRIP: NEGATIVE
PROTHROMBIN TIME: 11.6 SEC (ref 9–12.5)
RBC # BLD AUTO: 4.54 M/UL (ref 4–5.4)
RBC #/AREA URNS AUTO: 8 /HPF (ref 0–4)
SARS-COV-2 RDRP RESP QL NAA+PROBE: NEGATIVE
SODIUM SERPL-SCNC: 137 MMOL/L (ref 136–145)
SP GR UR STRIP: 1.03 (ref 1–1.03)
SQUAMOUS #/AREA URNS AUTO: 4 /HPF
URN SPEC COLLECT METH UR: ABNORMAL
WBC # BLD AUTO: 8.74 K/UL (ref 3.9–12.7)
WBC #/AREA URNS AUTO: 22 /HPF (ref 0–5)

## 2023-06-08 PROCEDURE — 63600175 PHARM REV CODE 636 W HCPCS

## 2023-06-08 PROCEDURE — 99285 EMERGENCY DEPT VISIT HI MDM: CPT | Mod: 25

## 2023-06-08 PROCEDURE — 99223 1ST HOSP IP/OBS HIGH 75: CPT | Mod: GC,,, | Performed by: INTERNAL MEDICINE

## 2023-06-08 PROCEDURE — 63600175 PHARM REV CODE 636 W HCPCS: Performed by: NEUROLOGICAL SURGERY

## 2023-06-08 PROCEDURE — 20600001 HC STEP DOWN PRIVATE ROOM

## 2023-06-08 PROCEDURE — 93010 EKG 12-LEAD: ICD-10-PCS | Mod: ,,, | Performed by: INTERNAL MEDICINE

## 2023-06-08 PROCEDURE — 85025 COMPLETE CBC W/AUTO DIFF WBC: CPT

## 2023-06-08 PROCEDURE — 63600175 PHARM REV CODE 636 W HCPCS: Performed by: INTERNAL MEDICINE

## 2023-06-08 PROCEDURE — 99223 PR INITIAL HOSPITAL CARE,LEVL III: ICD-10-PCS | Mod: GC,,, | Performed by: INTERNAL MEDICINE

## 2023-06-08 PROCEDURE — 99285 PR EMERGENCY DEPT VISIT,LEVEL V: ICD-10-PCS | Mod: ,,, | Performed by: EMERGENCY MEDICINE

## 2023-06-08 PROCEDURE — 96375 TX/PRO/DX INJ NEW DRUG ADDON: CPT

## 2023-06-08 PROCEDURE — 85610 PROTHROMBIN TIME: CPT | Performed by: NEUROLOGICAL SURGERY

## 2023-06-08 PROCEDURE — 94761 N-INVAS EAR/PLS OXIMETRY MLT: CPT

## 2023-06-08 PROCEDURE — 87086 URINE CULTURE/COLONY COUNT: CPT

## 2023-06-08 PROCEDURE — 63600175 PHARM REV CODE 636 W HCPCS: Performed by: STUDENT IN AN ORGANIZED HEALTH CARE EDUCATION/TRAINING PROGRAM

## 2023-06-08 PROCEDURE — 25000003 PHARM REV CODE 250: Performed by: NEUROLOGICAL SURGERY

## 2023-06-08 PROCEDURE — 85730 THROMBOPLASTIN TIME PARTIAL: CPT | Performed by: NEUROLOGICAL SURGERY

## 2023-06-08 PROCEDURE — 81001 URINALYSIS AUTO W/SCOPE: CPT

## 2023-06-08 PROCEDURE — 63600175 PHARM REV CODE 636 W HCPCS: Performed by: EMERGENCY MEDICINE

## 2023-06-08 PROCEDURE — 99285 EMERGENCY DEPT VISIT HI MDM: CPT | Mod: ,,, | Performed by: EMERGENCY MEDICINE

## 2023-06-08 PROCEDURE — 96361 HYDRATE IV INFUSION ADD-ON: CPT

## 2023-06-08 PROCEDURE — 96374 THER/PROPH/DIAG INJ IV PUSH: CPT

## 2023-06-08 PROCEDURE — 25000003 PHARM REV CODE 250

## 2023-06-08 PROCEDURE — 93010 ELECTROCARDIOGRAM REPORT: CPT | Mod: ,,, | Performed by: INTERNAL MEDICINE

## 2023-06-08 PROCEDURE — 25000003 PHARM REV CODE 250: Performed by: INTERNAL MEDICINE

## 2023-06-08 PROCEDURE — 83690 ASSAY OF LIPASE: CPT

## 2023-06-08 PROCEDURE — 25500020 PHARM REV CODE 255: Performed by: EMERGENCY MEDICINE

## 2023-06-08 PROCEDURE — 83605 ASSAY OF LACTIC ACID: CPT

## 2023-06-08 PROCEDURE — 27000221 HC OXYGEN, UP TO 24 HOURS

## 2023-06-08 PROCEDURE — 93005 ELECTROCARDIOGRAM TRACING: CPT

## 2023-06-08 PROCEDURE — U0002 COVID-19 LAB TEST NON-CDC: HCPCS

## 2023-06-08 PROCEDURE — 80053 COMPREHEN METABOLIC PANEL: CPT

## 2023-06-08 RX ORDER — SODIUM CHLORIDE 0.9 % (FLUSH) 0.9 %
10 SYRINGE (ML) INJECTION EVERY 12 HOURS PRN
Status: DISCONTINUED | OUTPATIENT
Start: 2023-06-08 | End: 2023-06-11 | Stop reason: HOSPADM

## 2023-06-08 RX ORDER — HYDRALAZINE HYDROCHLORIDE 20 MG/ML
10 INJECTION INTRAMUSCULAR; INTRAVENOUS EVERY 8 HOURS PRN
Status: DISCONTINUED | OUTPATIENT
Start: 2023-06-08 | End: 2023-06-08

## 2023-06-08 RX ORDER — IBUPROFEN 200 MG
24 TABLET ORAL
Status: DISCONTINUED | OUTPATIENT
Start: 2023-06-08 | End: 2023-06-08 | Stop reason: RX

## 2023-06-08 RX ORDER — DEXTROSE 40 %
30 GEL (GRAM) ORAL
Status: DISCONTINUED | OUTPATIENT
Start: 2023-06-08 | End: 2023-06-11 | Stop reason: HOSPADM

## 2023-06-08 RX ORDER — MORPHINE SULFATE 4 MG/ML
4 INJECTION, SOLUTION INTRAMUSCULAR; INTRAVENOUS
Status: COMPLETED | OUTPATIENT
Start: 2023-06-08 | End: 2023-06-08

## 2023-06-08 RX ORDER — SULFASALAZINE 500 MG/1
500 TABLET ORAL 2 TIMES DAILY
Status: DISCONTINUED | OUTPATIENT
Start: 2023-06-08 | End: 2023-06-10

## 2023-06-08 RX ORDER — PROCHLORPERAZINE EDISYLATE 5 MG/ML
10 INJECTION INTRAMUSCULAR; INTRAVENOUS EVERY 6 HOURS PRN
Status: DISCONTINUED | OUTPATIENT
Start: 2023-06-08 | End: 2023-06-11 | Stop reason: HOSPADM

## 2023-06-08 RX ORDER — AMLODIPINE BESYLATE 5 MG/1
5 TABLET ORAL DAILY
Status: DISCONTINUED | OUTPATIENT
Start: 2023-06-08 | End: 2023-06-11 | Stop reason: HOSPADM

## 2023-06-08 RX ORDER — SODIUM CHLORIDE, SODIUM LACTATE, POTASSIUM CHLORIDE, CALCIUM CHLORIDE 600; 310; 30; 20 MG/100ML; MG/100ML; MG/100ML; MG/100ML
INJECTION, SOLUTION INTRAVENOUS CONTINUOUS
Status: DISCONTINUED | OUTPATIENT
Start: 2023-06-08 | End: 2023-06-08

## 2023-06-08 RX ORDER — ONDANSETRON 2 MG/ML
4 INJECTION INTRAMUSCULAR; INTRAVENOUS
Status: COMPLETED | OUTPATIENT
Start: 2023-06-08 | End: 2023-06-08

## 2023-06-08 RX ORDER — ATORVASTATIN CALCIUM 20 MG/1
20 TABLET, FILM COATED ORAL DAILY
Status: DISCONTINUED | OUTPATIENT
Start: 2023-06-08 | End: 2023-06-11 | Stop reason: HOSPADM

## 2023-06-08 RX ORDER — ONDANSETRON 2 MG/ML
8 INJECTION INTRAMUSCULAR; INTRAVENOUS EVERY 8 HOURS PRN
Status: DISCONTINUED | OUTPATIENT
Start: 2023-06-08 | End: 2023-06-11 | Stop reason: HOSPADM

## 2023-06-08 RX ORDER — SODIUM CHLORIDE, SODIUM LACTATE, POTASSIUM CHLORIDE, CALCIUM CHLORIDE 600; 310; 30; 20 MG/100ML; MG/100ML; MG/100ML; MG/100ML
INJECTION, SOLUTION INTRAVENOUS CONTINUOUS
Status: DISCONTINUED | OUTPATIENT
Start: 2023-06-08 | End: 2023-06-09

## 2023-06-08 RX ORDER — HYDROMORPHONE HYDROCHLORIDE 1 MG/ML
1 INJECTION, SOLUTION INTRAMUSCULAR; INTRAVENOUS; SUBCUTANEOUS EVERY 4 HOURS PRN
Status: DISCONTINUED | OUTPATIENT
Start: 2023-06-08 | End: 2023-06-11 | Stop reason: HOSPADM

## 2023-06-08 RX ORDER — ONDANSETRON 2 MG/ML
4 INJECTION INTRAMUSCULAR; INTRAVENOUS EVERY 8 HOURS PRN
Status: DISCONTINUED | OUTPATIENT
Start: 2023-06-08 | End: 2023-06-08

## 2023-06-08 RX ORDER — PROCHLORPERAZINE EDISYLATE 5 MG/ML
5 INJECTION INTRAMUSCULAR; INTRAVENOUS EVERY 6 HOURS PRN
Status: DISCONTINUED | OUTPATIENT
Start: 2023-06-08 | End: 2023-06-08

## 2023-06-08 RX ORDER — SODIUM CHLORIDE, SODIUM LACTATE, POTASSIUM CHLORIDE, CALCIUM CHLORIDE 600; 310; 30; 20 MG/100ML; MG/100ML; MG/100ML; MG/100ML
INJECTION, SOLUTION INTRAVENOUS CONTINUOUS
Status: ACTIVE | OUTPATIENT
Start: 2023-06-08 | End: 2023-06-08

## 2023-06-08 RX ORDER — NALOXONE HCL 0.4 MG/ML
0.02 VIAL (ML) INJECTION
Status: DISCONTINUED | OUTPATIENT
Start: 2023-06-08 | End: 2023-06-11 | Stop reason: HOSPADM

## 2023-06-08 RX ORDER — POTASSIUM CHLORIDE 20 MEQ/1
40 TABLET, EXTENDED RELEASE ORAL ONCE
Status: COMPLETED | OUTPATIENT
Start: 2023-06-08 | End: 2023-06-08

## 2023-06-08 RX ORDER — ACETAMINOPHEN 325 MG/1
650 TABLET ORAL EVERY 4 HOURS PRN
Status: DISCONTINUED | OUTPATIENT
Start: 2023-06-08 | End: 2023-06-11 | Stop reason: HOSPADM

## 2023-06-08 RX ORDER — SOTALOL HYDROCHLORIDE 120 MG/1
120 TABLET ORAL DAILY
Status: DISCONTINUED | OUTPATIENT
Start: 2023-06-09 | End: 2023-06-11 | Stop reason: HOSPADM

## 2023-06-08 RX ORDER — TALC
6 POWDER (GRAM) TOPICAL NIGHTLY PRN
Status: DISCONTINUED | OUTPATIENT
Start: 2023-06-08 | End: 2023-06-11 | Stop reason: HOSPADM

## 2023-06-08 RX ORDER — LABETALOL HCL 20 MG/4 ML
10 SYRINGE (ML) INTRAVENOUS EVERY 6 HOURS PRN
Status: DISCONTINUED | OUTPATIENT
Start: 2023-06-08 | End: 2023-06-11 | Stop reason: HOSPADM

## 2023-06-08 RX ORDER — IBUPROFEN 200 MG
16 TABLET ORAL
Status: DISCONTINUED | OUTPATIENT
Start: 2023-06-08 | End: 2023-06-08 | Stop reason: RX

## 2023-06-08 RX ORDER — LABETALOL HCL 20 MG/4 ML
10 SYRINGE (ML) INTRAVENOUS EVERY 6 HOURS PRN
Status: DISCONTINUED | OUTPATIENT
Start: 2023-06-08 | End: 2023-06-08

## 2023-06-08 RX ORDER — AMOXICILLIN 250 MG
1 CAPSULE ORAL 2 TIMES DAILY
Status: DISCONTINUED | OUTPATIENT
Start: 2023-06-08 | End: 2023-06-10

## 2023-06-08 RX ORDER — GLUCAGON 1 MG
1 KIT INJECTION
Status: DISCONTINUED | OUTPATIENT
Start: 2023-06-08 | End: 2023-06-11 | Stop reason: HOSPADM

## 2023-06-08 RX ORDER — DEXTROSE 40 %
15 GEL (GRAM) ORAL
Status: DISCONTINUED | OUTPATIENT
Start: 2023-06-08 | End: 2023-06-11 | Stop reason: HOSPADM

## 2023-06-08 RX ORDER — POLYETHYLENE GLYCOL 3350 17 G/17G
17 POWDER, FOR SOLUTION ORAL DAILY
Status: DISCONTINUED | OUTPATIENT
Start: 2023-06-08 | End: 2023-06-10

## 2023-06-08 RX ORDER — MORPHINE SULFATE 2 MG/ML
2 INJECTION, SOLUTION INTRAMUSCULAR; INTRAVENOUS EVERY 4 HOURS PRN
Status: DISCONTINUED | OUTPATIENT
Start: 2023-06-08 | End: 2023-06-11 | Stop reason: HOSPADM

## 2023-06-08 RX ORDER — SOTALOL HYDROCHLORIDE 120 MG/1
120 TABLET ORAL 2 TIMES DAILY
Status: DISCONTINUED | OUTPATIENT
Start: 2023-06-08 | End: 2023-06-08

## 2023-06-08 RX ADMIN — PROCHLORPERAZINE EDISYLATE 10 MG: 5 INJECTION INTRAMUSCULAR; INTRAVENOUS at 01:06

## 2023-06-08 RX ADMIN — IOHEXOL 75 ML: 350 INJECTION, SOLUTION INTRAVENOUS at 03:06

## 2023-06-08 RX ADMIN — SODIUM CHLORIDE, POTASSIUM CHLORIDE, SODIUM LACTATE AND CALCIUM CHLORIDE: 600; 310; 30; 20 INJECTION, SOLUTION INTRAVENOUS at 08:06

## 2023-06-08 RX ADMIN — SODIUM CHLORIDE, POTASSIUM CHLORIDE, SODIUM LACTATE AND CALCIUM CHLORIDE 1000 ML: 600; 310; 30; 20 INJECTION, SOLUTION INTRAVENOUS at 01:06

## 2023-06-08 RX ADMIN — POTASSIUM CHLORIDE 40 MEQ: 1500 TABLET, EXTENDED RELEASE ORAL at 07:06

## 2023-06-08 RX ADMIN — HYDROMORPHONE HYDROCHLORIDE 1 MG: 1 INJECTION, SOLUTION INTRAMUSCULAR; INTRAVENOUS; SUBCUTANEOUS at 06:06

## 2023-06-08 RX ADMIN — APIXABAN 5 MG: 5 TABLET, FILM COATED ORAL at 08:06

## 2023-06-08 RX ADMIN — AMLODIPINE BESYLATE 5 MG: 5 TABLET ORAL at 10:06

## 2023-06-08 RX ADMIN — ATORVASTATIN CALCIUM 20 MG: 20 TABLET, FILM COATED ORAL at 10:06

## 2023-06-08 RX ADMIN — PROCHLORPERAZINE EDISYLATE 10 MG: 5 INJECTION INTRAMUSCULAR; INTRAVENOUS at 08:06

## 2023-06-08 RX ADMIN — LABETALOL HYDROCHLORIDE 10 MG: 5 INJECTION, SOLUTION INTRAVENOUS at 02:06

## 2023-06-08 RX ADMIN — LEVOTHYROXINE SODIUM 175 MCG: 125 TABLET ORAL at 10:06

## 2023-06-08 RX ADMIN — ONDANSETRON 4 MG: 2 INJECTION INTRAMUSCULAR; INTRAVENOUS at 07:06

## 2023-06-08 RX ADMIN — ONDANSETRON 4 MG: 2 INJECTION INTRAMUSCULAR; INTRAVENOUS at 12:06

## 2023-06-08 RX ADMIN — SODIUM CHLORIDE, POTASSIUM CHLORIDE, SODIUM LACTATE AND CALCIUM CHLORIDE: 600; 310; 30; 20 INJECTION, SOLUTION INTRAVENOUS at 10:06

## 2023-06-08 RX ADMIN — ONDANSETRON 4 MG: 2 INJECTION INTRAMUSCULAR; INTRAVENOUS at 03:06

## 2023-06-08 RX ADMIN — MORPHINE SULFATE 4 MG: 4 INJECTION INTRAVENOUS at 02:06

## 2023-06-08 RX ADMIN — APIXABAN 5 MG: 5 TABLET, FILM COATED ORAL at 10:06

## 2023-06-08 RX ADMIN — SULFASALAZINE 500 MG: 500 TABLET ORAL at 08:06

## 2023-06-08 RX ADMIN — ONDANSETRON 8 MG: 2 INJECTION INTRAMUSCULAR; INTRAVENOUS at 10:06

## 2023-06-08 RX ADMIN — CEFTRIAXONE 2 G: 2 INJECTION, POWDER, FOR SOLUTION INTRAMUSCULAR; INTRAVENOUS at 07:06

## 2023-06-08 RX ADMIN — PROCHLORPERAZINE EDISYLATE 5 MG: 5 INJECTION INTRAMUSCULAR; INTRAVENOUS at 08:06

## 2023-06-08 RX ADMIN — HYDROMORPHONE HYDROCHLORIDE 1 MG: 1 INJECTION, SOLUTION INTRAMUSCULAR; INTRAVENOUS; SUBCUTANEOUS at 07:06

## 2023-06-08 RX ADMIN — MORPHINE SULFATE 4 MG: 4 INJECTION INTRAVENOUS at 12:06

## 2023-06-08 RX ADMIN — SODIUM CHLORIDE, POTASSIUM CHLORIDE, SODIUM LACTATE AND CALCIUM CHLORIDE 500 ML: 600; 310; 30; 20 INJECTION, SOLUTION INTRAVENOUS at 04:06

## 2023-06-08 RX ADMIN — SODIUM CHLORIDE, POTASSIUM CHLORIDE, SODIUM LACTATE AND CALCIUM CHLORIDE: 600; 310; 30; 20 INJECTION, SOLUTION INTRAVENOUS at 07:06

## 2023-06-08 RX ADMIN — ONDANSETRON 8 MG: 2 INJECTION INTRAMUSCULAR; INTRAVENOUS at 06:06

## 2023-06-08 NOTE — ED PROVIDER NOTES
Encounter Date: 6/8/2023       History     Chief Complaint   Patient presents with    Abdominal Pain     Abd pain, +N/V x 2 hours, hx pancreatic cancer, last treatment in may. Scheduled for port insertion today     Pauline Cronin is a 83 y.o. female with PMH of pancreatic cancer, presenting to [facility] ED for abdominal pain, nausea/vomiting.  Patient reports left upper/right upper quadrant abdominal pain for the last few hours.  States that this feels like previous pancreatitis flares.  Patient has had 2 episodes of nonbilious/nonbloody vomiting.  Patient reports diarrhea but states this is normal because of her Crohn's disease.  Denies fevers, chills, rashes.        Review of patient's allergies indicates:   Allergen Reactions    Lisinopril Other (See Comments)     cough     Past Medical History:   Diagnosis Date    Anticoagulant long-term use     Atrial fibrillation     Crohn disease diagnosed in her 20's    GERD (gastroesophageal reflux disease)     Hyperlipidemia     Hypertension     Pancreatic adenocarcinoma 3/21/2023    Thyroid disease     s/p I 131     Past Surgical History:   Procedure Laterality Date    APPENDECTOMY      CARDIAC SURGERY  2014    pacemaker    COLONOSCOPY  ~2008    normal findings per patient report    ENDOSCOPIC ULTRASOUND OF UPPER GASTROINTESTINAL TRACT N/A 3/14/2023    Procedure: ULTRASOUND, UPPER GI TRACT, ENDOSCOPIC;  Surgeon: Andrei Swartz MD;  Location: [facility];  Service: Endoscopy;  Laterality: N/A;  Approval to hold Eliquis rec'd from Dr. Barbosa (see telephone encounter 3/3/23)-DS  [employer] AICD/PPM  3/3/23-Instructions via portal-DS    ERCP N/A 3/21/2023    Procedure: ERCP (ENDOSCOPIC RETROGRADE CHOLANGIOPANCREATOGRAPHY);  Surgeon: Celina Toney MD;  Location: [facility] (2ND FLR);  Service: Endoscopy;  Laterality: N/A;    ESOPHAGOGASTRODUODENOSCOPY N/A 7/17/2018    Procedure: EGD (ESOPHAGOGASTRODUODENOSCOPY);  Surgeon: Alondra Casiano MD;  Location: [facility];  Service:  Endoscopy;  Laterality: N/A;    HYSTERECTOMY      INSERTION OF IMPLANTABLE CARDIOVERTER-DEFIBRILLATOR (ICD) GENERATOR WITH TWO EXISTING LEADS Left 5/10/2021    Procedure: INSERTION, PULSE GENERATOR WITH 2 EXISTING LEADS, ICD;  Surgeon: Bo Leone MD;  [facility] CATH LAB;  Service: Cardiology;  Laterality: Left;    INSERTION OF PACEMAKER      REPLACEMENT OF PACEMAKER GENERATOR  05/10/2021    RETROGRADE PYELOGRAPHY Right 2020    Procedure: PYELOGRAM, RETROGRADE;  Surgeon: Jovan Kruse MD;  Location: [facility] OR;  Service: Urology;  Laterality: Right;    SMALL INTESTINE SURGERY  in her 20's    for crohn's    TONSILLECTOMY      UPPER GASTROINTESTINAL ENDOSCOPY  ~    normal findings per patient report     Family History   Problem Relation Age of Onset    Cancer Mother     Breast cancer Mother     Crohn's disease Other     Colon cancer Neg Hx     Colon polyps Neg Hx     Esophageal cancer Neg Hx     Stomach cancer Neg Hx     Ulcerative colitis Neg Hx      Social History     Tobacco Use    Smoking status: Former     Types: Cigarettes     Quit date:      Years since quittin.4    Smokeless tobacco: Never   Substance Use Topics    Alcohol use: No    Drug use: No     Review of Systems   Constitutional:  Negative for chills and fever.   HENT:  Negative for congestion.    Eyes:  Negative for visual disturbance.   Respiratory:  Negative for shortness of breath.    Cardiovascular:  Negative for chest pain and leg swelling.   Gastrointestinal:  Positive for abdominal pain, diarrhea, nausea and vomiting. Negative for abdominal distention and constipation.   Endocrine: Negative for polyuria.   Genitourinary:  Negative for decreased urine volume and dysuria.   Skin:  Positive for pallor.   Neurological:  Negative for headaches.     Physical Exam     Initial Vitals [23 0011]   BP Pulse Resp Temp SpO2   137/71 88 20 97.5 °F (36.4 °C) (!) 93 %      MAP       --         Physical Exam    Nursing note and  vitals reviewed.  Constitutional: She is cooperative. She appears ill.   HENT:   Head: Normocephalic and atraumatic.   Eyes: Conjunctivae and EOM are normal. Pupils are equal, round, and reactive to light.   Neck: Trachea normal and phonation normal. Neck supple. No tracheal deviation present.   Cardiovascular:  Normal rate, regular rhythm, normal heart sounds, intact distal pulses and normal pulses.     Exam reveals no gallop, no S3, no S4 and no friction rub.       No murmur heard.  Pulmonary/Chest: Breath sounds normal. No respiratory distress. She has no wheezes. She has no rales.   Abdominal: Abdomen is soft. She exhibits no distension. There is abdominal tenderness in the epigastric area and left upper quadrant.   Musculoskeletal:      Cervical back: Neck supple.     Neurological: She is alert and oriented to person, place, and time. No cranial nerve deficit or sensory deficit.   Skin: Skin is warm, dry and intact. Capillary refill takes less than 2 seconds.   Psychiatric: She has a normal mood and affect. Her speech is normal and behavior is normal. Thought content normal.       ED Course   Procedures  Labs Reviewed   CBC W/ AUTO DIFFERENTIAL - Abnormal; Notable for the following components:       Result Value    MCHC 31.2 (*)     RDW 23.5 (*)     Gran % 79.4 (*)     Lymph % 12.7 (*)     All other components within normal limits   COMPREHENSIVE METABOLIC PANEL - Abnormal; Notable for the following components:    Potassium 3.4 (*)     Glucose 132 (*)     Albumin 3.1 (*)     All other components within normal limits   LIPASE - Abnormal; Notable for the following components:    Lipase 185 (*)     All other components within normal limits   URINALYSIS, REFLEX TO URINE CULTURE - Abnormal; Notable for the following components:    Appearance, UA Hazy (*)     Leukocytes, UA 3+ (*)     All other components within normal limits    Narrative:     Specimen Source->Urine   URINALYSIS MICROSCOPIC - Abnormal; Notable for  the following components:    RBC, UA 8 (*)     WBC, UA 22 (*)     Non-Squam Epith 4 (*)     All other components within normal limits    Narrative:     Specimen Source->Urine   CULTURE, URINE   LACTIC ACID, PLASMA   SARS-COV-2 RNA AMPLIFICATION, QUAL     EKG Readings: (Independently Interpreted)   Initial Reading: No STEMI. Previous EKG: Compared with most recent EKG Rhythm: Paced Rhythm. Heart Rate: 74. Ectopy: No Ectopy. Conduction: Normal and LBBB. ST Segments: Normal ST Segments. T Waves Flipped: I and AVL. Axis: Left Axis Deviation. Clinical Impression: Paced Rhythm with LBBB     Imaging Results               CT Abdomen Pelvis With Contrast (Final result)  Result time 06/08/23 05:11:03      Final result by Armando Gant MD (06/08/23 05:11:03)                   Impression:      1. Increased size of pancreatic body mass, with continued suspected invasion of the gastric lesser curvature.  DDX might also include the possibility of a pseudocyst.  Associated peripancreatic and gastric lesser curvature inflammatory change, concerning for pancreatitis and/or gastritis.  2. Similar size of pancreatic head mass, with continued suspected invasion of the adjacent duodenum.  3. Cirrhotic liver morphology.  4. Cholelithiasis and mild pericholecystic fluid.  Findings are similar to prior and may relate to underlying hepatic dysfunction, though correlation advised for signs of acute cholecystitis.  5.  Additional findings as above.  This report was flagged in Epic as abnormal.    Electronically signed by resident: Nitesh Lagos  Date:    06/08/2023  Time:    03:57    Electronically signed by: Armando Gant  Date:    06/08/2023  Time:    05:11               Narrative:    EXAMINATION:  CT ABDOMEN PELVIS WITH CONTRAST    CLINICAL HISTORY:  Epigastric pain;    TECHNIQUE:  Low dose axial images were obtained from the lung bases to the pubic symphysis following the intravenous administration of 75 mL of Omnipaque 350.  Sagittal  and coronal reformats were provided.    COMPARISON:  CT abdomen pelvis 05/02/2023.    FINDINGS:  Heart: Partially visualized pacemaker leads.  Sternotomy wires.  Left atrial prominence.  No pericardial effusion.    Lung bases: Mild bibasilar subsegmental and dependent atelectasis.  No focal consolidation, pleural effusion, or pneumothorax.    Liver: Normal in size.  Mildly nodular contour, suggesting cirrhosis.  Calcified granuloma at the hepatic dome.  Portal vein is patent.    Gallbladder: Small layering calcified gallstones.  Mild pericholecystic fluid.    Bile Ducts: Biliary stent in place.  No significant intrahepatic biliary ductal dilation.    Pancreas: Increased size of ill-defined heterogeneous pancreatic body mass, measuring up to 4.5 x 3.5 cm maximal axial dimension (previously 4.2 x 3.1 cm).  Increased size of central hypoattenuating component, measuring up to 3.4 cm.  Mass abuts the lesser curvature of the stomach with adjacent gastric wall thickening.   Redemonstrated ill-defined heterogeneous pancreatic head mass, measuring 4.1 x 3.2 cm (previously 4.0 x 3.1 cm), with continued mass effect on the adjacent duodenum.   Pancreatic ductal dilatation up to 6 mm. Scattered peripancreatic stranding.  Trace lesser sac fluid.    Spleen: Unremarkable.    Adrenals: Unremarkable.    Kidneys/ Ureters: Mild bilateral cortical atrophy.  Kidneys enhance symmetrically.  Stable 5 mm calcification in the area of the right ureterovesicular junction, possible ureteral stone versus phlebolith.  No upstream hydroureteronephrosis.    Bladder: Smooth contours without bladder wall thickening.    Reproductive organs: Unremarkable.    GI Tract/Mesentery: Gastric wall thickening as detailed above.  No evidence of bowel obstruction.  Appendix is surgically absent.    Peritoneal Space: Trace lesser sac fluid.  No intraperitoneal free air.    Lymph nodes: No significant adenopathy.    Abdominal wall/extraperitoneal soft tissues:  Postoperative change of the anterior abdominal wall.    Vasculature: Abdominal aorta is normal in caliber, contour, and course .  Mild aortoiliac calcific atherosclerosis.    Bones: Degenerative changes without acute fracture or bone destructive process.  Diffuse osteopenia.                                       X-Ray Chest AP Portable (Final result)  Result time 06/08/23 05:23:30      Final result by Johny Almazan MD (06/08/23 05:23:30)                   Impression:      No significant detrimental change compared to prior study 05/04/2023.      Electronically signed by: Johny Almazan MD  Date:    06/08/2023  Time:    05:23               Narrative:    EXAMINATION:  XR CHEST AP PORTABLE    CLINICAL HISTORY:  Unspecified abdominal pain    TECHNIQUE:  Single frontal view of the chest was performed.    COMPARISON:  05/04/2023    FINDINGS:  There is a left chest wall cardiac pacing device present.  Cardiac monitoring leads overlie the chest.  There is postsurgical change of prior median sternotomy.  Cardiac silhouette is stable in size.  No new confluent airspace consolidation identified.  No significant volume of pleural fluid or pneumothorax appreciated.  Osseous structures demonstrate degenerative changes.  Presumed biliary stent projects over the upper abdomen.                                       Medications   lactated ringers bolus 500 mL (500 mLs Intravenous New Bag 6/8/23 0444)   ondansetron injection 4 mg (4 mg Intravenous Given 6/8/23 0053)   morphine injection 4 mg (4 mg Intravenous Given 6/8/23 0053)   lactated ringers bolus 1,000 mL (0 mLs Intravenous Stopped 6/8/23 0240)   morphine injection 4 mg (4 mg Intravenous Given 6/8/23 0241)   ondansetron injection 4 mg (4 mg Intravenous Given 6/8/23 0348)   iohexoL (OMNIPAQUE 350) injection 100 mL (75 mLs Intravenous Given 6/8/23 0325)     Medical Decision Making:   Initial Assessment:   83-year-old female with history of pancreatic cancer and Crohn's  disease presenting with abdominal pain, nausea/vomiting.  Initially, patient standing 93% on room air but overall hemodynamically stable, mentating normally.  Differential Diagnosis:   Pancreatitis, Crohn's exacerbation, electrolyte abnormality, sepsis, biliary disease, gastritis, gastroenteritis  Clinical Tests:   Lab Tests: Ordered and Reviewed  The following lab test(s) were unremarkable: CBC and CMP  Radiological Study: Ordered and Reviewed  Medical Tests: Ordered and Reviewed  ED Management:  Patient presents with symptoms concerning for acute pancreatitis or Crohn's flare based on patient's presentation and history.  Will evaluate for associated electrolyte abnormalities based on patient's multiple episodes of emesis.  Since patient is in active chemotherapy, concern for systemic infection due to be a immunocompromised.  Patient does not meet sepsis criteria since she is not febrile/tachycardic/hypotensive.  Will obtain blood cultures if lactate or white blood cells are elevated.    Workup as detailed below in ED course.  Patient elevated lipase but not 3 times greater the upper limit consistent pancreatitis.  CT abdomen concerning for invasion of pancreatic cancer into the stomach.  No surgical pathology on CT abdomen.      Treatments in the emergency department include multiple doses of IV pain medications and antiemetics.  Patient will require admission for intractable pain/vomiting.    Discussed patient's case with Hematology/Oncology Service who agreed to admit patient to their service for further management.  Patient is hemodynamically stable and appropriate for transfer to the floor.          Attending Attestation:             Attending ED Notes:   Attending Note:  I have seen the patient, have repeated the key portions of the history and physical, reviewed and agree with the medical documentation, and supervised and managed the medical care of the patient. Additionally, I was present for the critical  portion of any procedure(s) performed.      83-year-old female history above presenting today with abdominal pain associated with nausea and vomiting.   Vitals reviewed, hypertensive with borderline saturation 93%.  On exam, she appears uncomfortable with generalized abdominal tenderness  Labs reviewed with no leukocytosis, lipase elevated 185.  Patient received multiple doses of pain medication as well as IV antiemetics with persistent symptoms.  Pending CT read and likely admission    CT scan shows increase in pancreatic mass with gastritis, pancreatitis, possible pseudocyst.    Admit to oncology    DARIN Peña MD  Staff ED Physician  06/08/2023 4:27 AM          ED Course as of 06/08/23 0542   Thu Jun 08, 2023   0305 Lipase(!): 185 [ES]   0305 Comprehensive metabolic panel(!)  Within normal limits [ES]   0305 Lactate, Piyush: 1.1 [ES]   0305 CBC auto differential(!)  Within normal limits   [ES]   0505 WBC, UA(!): 22 [ES]   0505 RBC, UA(!): 8 [ES]   0540 CT Abdomen Pelvis With Contrast(!)   Increased size of pancreatic body mass, with continued suspected invasion of the gastric lesser curvature. [ES]      ED Course User Index  [ES] Margo Rojas MD                 Clinical Impression:   Final diagnoses:  [R10.9] Abdominal pain  [C25.9] Malignant neoplasm of pancreas, unspecified location of malignancy (Primary)  [R11.10] Vomiting, unspecified vomiting type, unspecified whether nausea present  [R74.8] Elevated lipase               Margo Rojas MD  Resident  06/08/23 0511       Margo Rojas MD  Resident  06/08/23 0543       She Peña MD  06/08/23 0553

## 2023-06-08 NOTE — ASSESSMENT & PLAN NOTE
83F with pancreatic cancer, recent hx of pancreatitis in May presents with acute onset of nausea, vomiting, and severe upper abdominal pain. Vitals are stable, no leukocytosis, lipase elevated to 185. CT A/P shows interval growth of her cancer with c/f possible invasion of the stomach. Patient s/p 500cc LR in ED.    Plan:  -gentle IVF given pt's hx of acute respiratory failure due to volume overload. LR 50cc/hr for 10 hours  -IV morphine PRN pain control

## 2023-06-08 NOTE — NURSING
Patient arrived to the unit from the ED. Dr Pinedo at the bedside. Purewick in place. Patient is 96% on 2L NC. Bed exit alarm in use. Reminded the patient to call for assistance. Call light and personal items are within reach.

## 2023-06-08 NOTE — HPI
Ms Cronin is an 83F with PMH pAF, SSS s/p pacemaker, HTN, HLD, pancreatic adenocarcinoma s/p stenting s/p 1 cycle chemotherapy 5/1 who presents with intractable nausea/vomiting. She reports that around 7PM last night she had the onset of nausea and vomiting and was not able to keep anything down. She also had severe stabbing upper abdominal pain. She was hospitalized following her chemotherapy treatment last month for similar symptoms and was found to have pancreatitis. Of note, she developed respiratory decompensation due to fluid overload during that hospitalization, and she required diuretics and breathing treatments.     In the ED, patient is afebrile, VSS. Labs notable for no leukocytosis, K 3.4, and lipase 185. Patient given 500cc LR and IV pain control. Patient will be admitted for management of acute pancreatitis.    Oncology History   Malignant neoplasm of pancreas   3/21/2023 Initial Diagnosis     Pancreatic adenocarcinoma      5/1/2023 -  Chemotherapy     Treatment Summary   Plan Name: OP PANC NAB-PACLITAXEL + GEMCITABINE Q4W  Treatment Goal: Palliative  Status: Active  Start Date: 5/1/2023  End Date: 3/14/2024 (Planned)  Provider: Jim Hill MD  Chemotherapy: gemcitabine (GEMZAR) 1,400 mg in sodium chloride 0.9% SolP 321.82 mL chemo infusion, 1,448 mg (100 % of original dose 800 mg/m2), Intravenous, Clinic/HOD 1 time, 1 of 12 cycles  Dose modification: 800 mg/m2 (original dose 800 mg/m2, Cycle 1, Reason: Other (see comments), Comment: poor PS)

## 2023-06-08 NOTE — PLAN OF CARE
Cardiology Review Note    Brief HPI: Ms. Pauline Cronin is a 83 y.o. female with pafib on sotalol, SSS s/p PPM admitted for pancreatitis. Patient was on differing sotalol dosing 2/2 Cr clearance for pAfib. Reviewed consult note from 5/4/23 and discussed with our pharmacist. CrCl 49.     1) pAFib    Plan:  - recommend sotalol 120 daily given current Cr clearance of 49  - discussed with staff and pharmacy    Margaux Bhakta MD PGY-2  Cardiology  [facility]

## 2023-06-08 NOTE — CONSULTS
Consult Note:    Please see H&P for additional details.      Massimo Amaral D.O.  Hematology/Oncology Fellow, PGY-IV

## 2023-06-08 NOTE — ASSESSMENT & PLAN NOTE
This is an 83 year old female with a PMH significant for atrial fibrillation (on Apixaban), reported Crohn's disease (unspecified disease involvement; status post reported small bowel resection in 1960's; not currently on therapy), pancreatic adenocarcinoma (diagnosed in 03/2023, associated with biliary stricture requiring biliary stent placement at that time, and status post initiation of chemotherapy in 05/2023), and sick sinus syndrome (status post pacemaker) who was admitted to [facility] on 06/08 for pancreatitis. Presentation notable for imaging showing enlarged lesion in pancreatic body abutting gastric wall with stable appearing pancreatic head mass; AES consulted with suspicion for pseudocyst.     Recommendations:     -Suspect enlarging lesion seen in pancreatic body is likely pseudocyst secondary to pancreatic ductal damage from pancreatic head mass preventing proper flow of pancreatic enzymes. However, lesion is currently too small to drain endoscopically. Recommend repeating CT A/P in 6 weeks for reassessment of size.   -Continue IVF, pain control, and anti-emetics PRN.

## 2023-06-08 NOTE — ASSESSMENT & PLAN NOTE
Home meds: sotalol, eliquis  There have been many recent dose changes with sotalol. It was decreased during previous hospitalization d/t low Cr clearance. Outpatient cardiologist subsequently increased it due to lack of insurance coverage for Toprol.    Plan:  -continue home meds  -consult cardiology for assistance with sotalol dosing

## 2023-06-08 NOTE — NURSING
Dr Muir at the bedside and notified about the patient's elevated BP. New orders noted for IV hydralazine. Awaiting pharmacy to verify the med.

## 2023-06-08 NOTE — SUBJECTIVE & OBJECTIVE
Past Medical History:   Diagnosis Date    Anticoagulant long-term use     Atrial fibrillation     Crohn disease diagnosed in her 20's    GERD (gastroesophageal reflux disease)     Hyperlipidemia     Hypertension     Pancreatic adenocarcinoma 3/21/2023    Thyroid disease     s/p I 131       Past Surgical History:   Procedure Laterality Date    APPENDECTOMY      CARDIAC SURGERY  2014    pacemaker    COLONOSCOPY  ~2008    normal findings per patient report    ENDOSCOPIC ULTRASOUND OF UPPER GASTROINTESTINAL TRACT N/A 3/14/2023    Procedure: ULTRASOUND, UPPER GI TRACT, ENDOSCOPIC;  Surgeon: Andrei Swartz MD;  Location: [facility];  Service: Endoscopy;  Laterality: N/A;  Approval to hold Eliquis rec'd from Dr. Barbosa (see telephone encounter 3/3/23)-DS  [employer] AICD/PPM  3/3/23-Instructions via portal-DS    ERCP N/A 3/21/2023    Procedure: ERCP (ENDOSCOPIC RETROGRADE CHOLANGIOPANCREATOGRAPHY);  Surgeon: Celina Toney MD;  Location: [facility] ([address] [place] [address]);  Service: Endoscopy;  Laterality: N/A;    ESOPHAGOGASTRODUODENOSCOPY N/A 7/17/2018    Procedure: EGD (ESOPHAGOGASTRODUODENOSCOPY);  Surgeon: Alondra Casiano MD;  Location: [facility] ENDO;  Service: Endoscopy;  Laterality: N/A;    HYSTERECTOMY      INSERTION OF IMPLANTABLE CARDIOVERTER-DEFIBRILLATOR (ICD) GENERATOR WITH TWO EXISTING LEADS Left 5/10/2021    Procedure: INSERTION, PULSE GENERATOR WITH 2 EXISTING LEADS, ICD;  Surgeon: Bo Leone MD;  Location: [facility] CATH LAB;  Service: Cardiology;  Laterality: Left;    INSERTION OF PACEMAKER      REPLACEMENT OF PACEMAKER GENERATOR  05/10/2021    RETROGRADE PYELOGRAPHY Right 2/18/2020    Procedure: PYELOGRAM, RETROGRADE;  Surgeon: Jovan Kruse MD;  Location: [facility] OR;  Service: Urology;  Laterality: Right;    SMALL INTESTINE SURGERY  in her 20's    for crohn's    TONSILLECTOMY      UPPER GASTROINTESTINAL ENDOSCOPY  ~2008    normal findings per patient report       Review of patient's allergies indicates:    Allergen Reactions    Lisinopril Other (See Comments)     cough     Family History       Problem Relation (Age of Onset)    Breast cancer Mother    Cancer Mother    Crohn's disease Other          Tobacco Use    Smoking status: Former     Types: Cigarettes     Quit date:      Years since quittin.4    Smokeless tobacco: Never   Substance and Sexual Activity    Alcohol use: No    Drug use: No    Sexual activity: Never     Review of Systems   Constitutional:  Positive for fatigue. Negative for appetite change, chills and fever.   HENT:  Negative for congestion and trouble swallowing.    Eyes:  Negative for pain and redness.   Respiratory:  Negative for cough and shortness of breath.    Cardiovascular:  Negative for chest pain and palpitations.   Gastrointestinal:  Positive for abdominal pain, nausea and vomiting. Negative for constipation and diarrhea.   Genitourinary:  Negative for difficulty urinating and dysuria.   Musculoskeletal:  Negative for back pain and neck pain.   Skin:  Negative for rash.   Neurological:  Positive for weakness. Negative for dizziness, light-headedness and headaches.   Objective:     Vital Signs (Most Recent):  Temp: 98.2 °F (36.8 °C) (23 1226)  Pulse: 81 (23 1226)  Resp: 20 (23 0801)  BP: (!) 203/89 (23 1226)  SpO2: 98 % (23 1226) Vital Signs (24h Range):  Temp:  [97.5 °F (36.4 °C)-98.6 °F (37 °C)] 98.2 °F (36.8 °C)  Pulse:  [69-88] 81  Resp:  [16-20] 20  SpO2:  [93 %-98 %] 98 %  BP: (137-222)/(71-89) 203/89     Weight: 74.8 kg (165 lb) (23 0011)  Body mass index is 32.22 kg/m².      Intake/Output Summary (Last 24 hours) at 2023 1257  Last data filed at 2023 0956  Gross per 24 hour   Intake 1599 ml   Output 700 ml   Net 899 ml       Lines/Drains/Airways       Drain  Duration             Female External Urinary Catheter 23 0205 <1 day              Peripheral Intravenous Line  Duration                  Peripheral IV - Single Lumen  06/08/23 0109 20 G;1 3/4 in Anterior;Left Forearm <1 day                     Physical Exam  Constitutional:       Appearance: Normal appearance. She is obese.   Eyes:      General: No scleral icterus.  Cardiovascular:      Rate and Rhythm: Normal rate and regular rhythm.      Pulses: Normal pulses.      Heart sounds: Normal heart sounds.   Pulmonary:      Effort: Pulmonary effort is normal. No respiratory distress.      Breath sounds: Normal breath sounds.   Abdominal:      General: Bowel sounds are normal. There is no distension.      Palpations: Abdomen is soft.      Tenderness: There is generalized abdominal tenderness. There is no guarding or rebound.   Skin:     General: Skin is warm and dry.      Coloration: Skin is not jaundiced.   Neurological:      Mental Status: She is alert and oriented to person, place, and time.        Significant Labs:  All pertinent lab results from the last 24 hours have been reviewed.    Significant Imaging:  Imaging results within the past 24 hours have been reviewed.

## 2023-06-08 NOTE — SUBJECTIVE & OBJECTIVE
Oncology Treatment Plan:   OP PANC NAB-PACLITAXEL + GEMCITABINE Q4W    Medications:  Continuous Infusions:   lactated ringers 50 mL/hr at 06/08/23 0748     Scheduled Meds:   cefTRIAXone (ROCEPHIN) IVPB  2 g Intravenous Q24H    polyethylene glycol  17 g Oral Daily    senna-docusate 8.6-50 mg  1 tablet Oral BID     PRN Meds:acetaminophen, dextrose 10%, dextrose 10%, dextrose, dextrose, glucagon (human recombinant), HYDROmorphone, melatonin, morphine, naloxone, ondansetron, prochlorperazine, sodium chloride 0.9%     Review of patient's allergies indicates:   Allergen Reactions    Lisinopril Other (See Comments)     cough        Past Medical History:   Diagnosis Date    Anticoagulant long-term use     Atrial fibrillation     Crohn disease diagnosed in her 20's    GERD (gastroesophageal reflux disease)     Hyperlipidemia     Hypertension     Pancreatic adenocarcinoma 3/21/2023    Thyroid disease     s/p I 131     Past Surgical History:   Procedure Laterality Date    APPENDECTOMY      CARDIAC SURGERY  2014    pacemaker    COLONOSCOPY  ~2008    normal findings per patient report    ENDOSCOPIC ULTRASOUND OF UPPER GASTROINTESTINAL TRACT N/A 3/14/2023    Procedure: ULTRASOUND, UPPER GI TRACT, ENDOSCOPIC;  Surgeon: Andrei Swartz MD;  Location: [facility];  Service: Endoscopy;  Laterality: N/A;  Approval to hold Eliquis rec'd from Dr. Barbosa (see telephone encounter 3/3/23)-DS  [employer] AICD/PPM  3/3/23-Instructions via portal-DS    ERCP N/A 3/21/2023    Procedure: ERCP (ENDOSCOPIC RETROGRADE CHOLANGIOPANCREATOGRAPHY);  Surgeon: Celina Toney MD;  Location: [facility] ([address] [place] [address]);  Service: Endoscopy;  Laterality: N/A;    ESOPHAGOGASTRODUODENOSCOPY N/A 7/17/2018    Procedure: EGD (ESOPHAGOGASTRODUODENOSCOPY);  Surgeon: Alondra Casiano MD;  Location: [facility] ENDO;  Service: Endoscopy;  Laterality: N/A;    HYSTERECTOMY      INSERTION OF IMPLANTABLE CARDIOVERTER-DEFIBRILLATOR (ICD) GENERATOR WITH TWO EXISTING LEADS Left  5/10/2021    Procedure: INSERTION, PULSE GENERATOR WITH 2 EXISTING LEADS, ICD;  Surgeon: Bo Leone MD;  Location: [facility] CATH LAB;  Service: Cardiology;  Laterality: Left;    INSERTION OF PACEMAKER      REPLACEMENT OF PACEMAKER GENERATOR  05/10/2021    RETROGRADE PYELOGRAPHY Right 2020    Procedure: PYELOGRAM, RETROGRADE;  Surgeon: Jovan Kruse MD;  Location: [facility] OR;  Service: Urology;  Laterality: Right;    SMALL INTESTINE SURGERY  in her 20's    for crohn's    TONSILLECTOMY      UPPER GASTROINTESTINAL ENDOSCOPY  ~    normal findings per patient report     Family History       Problem Relation (Age of Onset)    Breast cancer Mother    Cancer Mother    Crohn's disease Other          Tobacco Use    Smoking status: Former     Types: Cigarettes     Quit date:      Years since quittin.4    Smokeless tobacco: Never   Substance and Sexual Activity    Alcohol use: No    Drug use: No    Sexual activity: Never       Review of Systems   Constitutional:  Negative for chills and fever.   HENT:  Negative for rhinorrhea and sore throat.    Eyes:  Negative for visual disturbance.   Respiratory:  Negative for cough and shortness of breath.    Cardiovascular:  Negative for chest pain and leg swelling.   Gastrointestinal:  Positive for abdominal pain, nausea and vomiting. Negative for diarrhea.   Genitourinary:  Negative for dysuria.   Musculoskeletal:  Negative for arthralgias and myalgias.   Skin:  Negative for rash.   Neurological:  Negative for light-headedness and headaches.   Psychiatric/Behavioral:  Negative for agitation and confusion.    Objective:     Vital Signs (Most Recent):  Temp: 98.4 °F (36.9 °C) (23 0749)  Pulse: 70 (23 0503)  Resp: 18 (23 0739)  BP: (!) 184/76 (23 0503)  SpO2: 98 % (23 0503) Vital Signs (24h Range):  Temp:  [97.5 °F (36.4 °C)-98.6 °F (37 °C)] 98.4 °F (36.9 °C)  Pulse:  [69-88] 70  Resp:  [16-20] 18  SpO2:  [93 %-98 %] 98 %  BP:  (137-184)/(71-76) 184/76     Weight: 74.8 kg (165 lb)  Body mass index is 32.22 kg/m².  Body surface area is 1.78 meters squared.      Intake/Output Summary (Last 24 hours) at 6/8/2023 0836  Last data filed at 6/8/2023 0549  Gross per 24 hour   Intake 1499 ml   Output --   Net 1499 ml        Physical Exam  Constitutional:       General: She is in acute distress.   HENT:      Head: Normocephalic and atraumatic.      Right Ear: External ear normal.      Left Ear: External ear normal.      Nose: Nose normal.      Mouth/Throat:      Mouth: Mucous membranes are moist.      Pharynx: Oropharynx is clear.   Eyes:      General: No scleral icterus.     Extraocular Movements: Extraocular movements intact.      Conjunctiva/sclera: Conjunctivae normal.   Cardiovascular:      Rate and Rhythm: Normal rate and regular rhythm.   Pulmonary:      Effort: Pulmonary effort is normal.      Breath sounds: Normal breath sounds. No wheezing or rales.   Abdominal:      General: Abdomen is flat. Bowel sounds are decreased. There is no distension.      Palpations: Abdomen is soft.      Tenderness: There is abdominal tenderness in the right upper quadrant, epigastric area and left upper quadrant.   Musculoskeletal:         General: Normal range of motion.      Cervical back: Normal range of motion.   Skin:     General: Skin is warm and dry.   Neurological:      General: No focal deficit present.      Mental Status: She is alert.   Psychiatric:         Mood and Affect: Mood normal.         Behavior: Behavior normal.        Significant Labs:   All pertinent labs from the last 24 hours have been reviewed.    Diagnostic Results:  I have reviewed all pertinent imaging results/findings within the past 24 hours.

## 2023-06-08 NOTE — ED NOTES
Pauline Cronin, an 83 y.o. female presents to the ED c/o 4/10 left upper and right upper abd pain and N/V that began tonight. Pt has hx of pancreatic ca, received last tx in may. Denies chest pain, SOB< fever. Pt is AAOx4.      Review of patient's allergies indicates:   Allergen Reactions    Lisinopril Other (See Comments)     cough     Chief Complaint   Patient presents with    Abdominal Pain     Abd pain, +N/V x 2 hours, hx pancreatic cancer, last treatment in may. Scheduled for port insertion today     Past Medical History:   Diagnosis Date    Anticoagulant long-term use     Atrial fibrillation     Crohn disease diagnosed in her 20's    GERD (gastroesophageal reflux disease)     Hyperlipidemia     Hypertension     Pancreatic adenocarcinoma 3/21/2023    Thyroid disease     s/p I 131

## 2023-06-08 NOTE — ED NOTES
Assumed care of patient. Plan of care discussed and patient has no complaints or questions. Pt is in bed awake and alert. Pt is resting comfortably and is in no acute distress. Respirations are even and unlabored. Pt denies chest pain or SOB. Patient on continuous cardiac monitor, automatic blood pressure cuff and continuous pulse oximeter. VSS. AAOx3. No needs expressed at this time. Side rails up and bed in lowest position. Call light in reach. Instructed patient to call staff for assistance with mobility. Will continue to monitor. Rounding completed on patient.     LOC: Patient name and date of birth verified. The patient is awake, alert and aware of environment with an appropriate affect, the patient is oriented x 3 and speaking appropriately.   APPEARANCE: Patient resting, in visible pain and discomfort.   SKIN: The skin is warm and dry, color consistent with ethnicity, patient has normal skin turgor and moist mucus membranes, skin intact. Signficiant bruising on extremities noted.   MUSCULOSKELETAL: Patient moving all extremities, no obvious swelling or deformities noted. Pt is very weak  RESPIRATORY: Respirations are spontaneous, patient has a normal effort and rate, no accessory muscle use noted. Pt on 2L O2 for slight hypoxia when resting.  CARDIAC: Patient has a normal rate and rhythm, no periphreal edema noted, capillary refill < 3 seconds.  ABDOMEN: Soft and non tender to palpation, no distention noted. Bowel sounds present in all four quadrants.  NEUROLOGIC: Eyes open spontaneously, behavior appropriate to situation, follows commands, facial expression symmetrical, bilateral hand grasp equal and even, purposeful motor response noted, normal sensation in all extremities when touched with a finger.

## 2023-06-08 NOTE — HPI
Ms. 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 She has a PMH significant for atrial fibrillation (on Apixaban), reported Crohn's disease (unspecified disease involvement; status post reported small bowel resection in 1960's; not currently on therapy), pancreatic adenocarcinoma (diagnosed in 03/2023, associated with biliary stricture requiring biliary stent placement at that time, and status post initiation of chemotherapy in 05/2023), and sick sinus syndrome (status post pacemaker).     Patient reports onset of nausea and non-bloody emesis on 06/06 associated with bilateral upper abdominal pain. She denies symptom association with fever, chills, skin/eye yellowing, and use of NSAID products at home. Due to symptoms, she presented to ED here on 06/08.     Hospital Course:   On arrival, vital signs normal on room air. Labs notable for normal WBC, normal INR, normal renal function, normal LFT's, and elevated lipase (185). CT A/P showed enlarging pancreatic body mass measuring 4.5 X 3.5 cm abuting lesser curvature of the stomach with additional 4.1 X 3.2 cm lesion noted in the pancreatic head, and cirrhotic appearing liver; radiology unable to fully rule out underlying pseudocyst. AES consulted for evaluation.     On initial bedside interview, patient reported on-going constant nausea that is preventing all PO intake.

## 2023-06-08 NOTE — ASSESSMENT & PLAN NOTE
Diagnosed in 3/2023. Patient has had once cycle of chemotherapy 5/1,, tolerated poorly - immediately after therapy presented to the ED with nausea and vomiting. Follows with Dr. Hill. Planning for second cycle later this month. Patient was supposed to have a port placed today prior to second cycle.

## 2023-06-08 NOTE — ED NOTES
Pt still vomiting despite receiving zofran and compazine. MD informed. VRBO to hold oral meds until vomiting controlled

## 2023-06-08 NOTE — CONSULTS
[facility]  Consult Note    Patient Name: Pauline Cronin  MRN: [id number]  Admission Date: 6/8/2023  Hospital Length of Stay: 0 days  Code Status: DNR   Attending Provider: Fabby Muir MD   Consulting Provider: Kiel Kohler MD  Primary Care Physician: Ga Waldrop MD  Principal Problem:Acute pancreatitis    Inpatient consult to [facility] ([facility])  Consult performed by: Kiel Kohler MD  Consult ordered by: Darrell Pinedo MD        Subjective:     HPI:  Ms. 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 She has a PMH significant for atrial fibrillation (on Apixaban), reported Crohn's disease (unspecified disease involvement; status post reported small bowel resection in 1960's; not currently on therapy), pancreatic adenocarcinoma (diagnosed in 03/2023, associated with biliary stricture requiring biliary stent placement at that time, and status post initiation of chemotherapy in 05/2023), and sick sinus syndrome (status post pacemaker).     Patient reports onset of nausea and non-bloody emesis on 06/06 associated with bilateral upper abdominal pain. She denies symptom association with fever, chills, skin/eye yellowing, and use of NSAID products at home. Due to symptoms, she presented to ED here on 06/08.     Hospital Course:   On arrival, vital signs normal on room air. Labs notable for normal WBC, normal INR, normal renal function, normal LFT's, and elevated lipase (185). CT A/P showed enlarging pancreatic body mass measuring 4.5 X 3.5 cm abuting lesser curvature of the stomach with additional 4.1 X 3.2 cm lesion noted in the pancreatic head, and cirrhotic appearing liver; radiology unable to fully rule out underlying pseudocyst. AES consulted for evaluation.     On initial bedside interview, patient reported on-going constant nausea that is preventing all PO intake.        Past Medical History:   Diagnosis Date    Anticoagulant long-term use     Atrial fibrillation     Crohn disease diagnosed in her 20's    GERD (gastroesophageal reflux disease)     Hyperlipidemia     Hypertension     Pancreatic adenocarcinoma 3/21/2023    Thyroid disease     s/p I 131       Past Surgical History:   Procedure Laterality Date    APPENDECTOMY      CARDIAC SURGERY  2014    pacemaker    COLONOSCOPY  ~2008    normal findings per patient report    ENDOSCOPIC ULTRASOUND OF UPPER GASTROINTESTINAL TRACT N/A 3/14/2023    Procedure: ULTRASOUND, UPPER GI TRACT, ENDOSCOPIC;  Surgeon: Andrei Swartz MD;  Location: [facility];  Service: Endoscopy;  Laterality: N/A;  Approval to hold Eliquis rec'd from Dr. Barbosa (see telephone encounter 3/3/23)-DS  [employer] AICD/PPM  3/3/23-Instructions via portal-DS    ERCP N/A 3/21/2023    Procedure: ERCP (ENDOSCOPIC RETROGRADE CHOLANGIOPANCREATOGRAPHY);  Surgeon: Celina Toney MD;  Location: [facility] ([address] [place] [address]);  Service: Endoscopy;  Laterality: N/A;    ESOPHAGOGASTRODUODENOSCOPY N/A 7/17/2018    Procedure: EGD (ESOPHAGOGASTRODUODENOSCOPY);  Surgeon: Alondra Casiano MD;  Location: [facility] ENDO;  Service: Endoscopy;  Laterality: N/A;    HYSTERECTOMY      INSERTION OF IMPLANTABLE CARDIOVERTER-DEFIBRILLATOR (ICD) GENERATOR WITH TWO EXISTING LEADS Left 5/10/2021    Procedure: INSERTION, PULSE GENERATOR WITH 2 EXISTING LEADS, ICD;  Surgeon: Bo Loene MD;  Location: [facility] CATH LAB;  Service: Cardiology;  Laterality: Left;    INSERTION OF PACEMAKER      REPLACEMENT OF PACEMAKER GENERATOR  05/10/2021    RETROGRADE PYELOGRAPHY Right 2/18/2020    Procedure: PYELOGRAM, RETROGRADE;  Surgeon: Jovan Kruse MD;  Location: [facility] OR;  Service: Urology;  Laterality: Right;    SMALL INTESTINE SURGERY  in her 20's    for crohn's    TONSILLECTOMY      UPPER GASTROINTESTINAL ENDOSCOPY  ~2008    normal findings per patient report       Review of patient's  allergies indicates:   Allergen Reactions    Lisinopril Other (See Comments)     cough     Family History       Problem Relation (Age of Onset)    Breast cancer Mother    Cancer Mother    Crohn's disease Other          Tobacco Use    Smoking status: Former     Types: Cigarettes     Quit date:      Years since quittin.4    Smokeless tobacco: Never   Substance and Sexual Activity    Alcohol use: No    Drug use: No    Sexual activity: Never     Review of Systems   Constitutional:  Positive for fatigue. Negative for appetite change, chills and fever.   HENT:  Negative for congestion and trouble swallowing.    Eyes:  Negative for pain and redness.   Respiratory:  Negative for cough and shortness of breath.    Cardiovascular:  Negative for chest pain and palpitations.   Gastrointestinal:  Positive for abdominal pain, nausea and vomiting. Negative for constipation and diarrhea.   Genitourinary:  Negative for difficulty urinating and dysuria.   Musculoskeletal:  Negative for back pain and neck pain.   Skin:  Negative for rash.   Neurological:  Positive for weakness. Negative for dizziness, light-headedness and headaches.   Objective:     Vital Signs (Most Recent):  Temp: 98.2 °F (36.8 °C) (23 1226)  Pulse: 81 (23 1226)  Resp: 20 (23 0801)  BP: (!) 203/89 (23 1226)  SpO2: 98 % (23 1226) Vital Signs (24h Range):  Temp:  [97.5 °F (36.4 °C)-98.6 °F (37 °C)] 98.2 °F (36.8 °C)  Pulse:  [69-88] 81  Resp:  [16-20] 20  SpO2:  [93 %-98 %] 98 %  BP: (137-222)/(71-89) 203/89     Weight: 74.8 kg (165 lb) (23 0011)  Body mass index is 32.22 kg/m².      Intake/Output Summary (Last 24 hours) at 2023 1257  Last data filed at 2023 0956  Gross per 24 hour   Intake 1599 ml   Output 700 ml   Net 899 ml       Lines/Drains/Airways       Drain  Duration             Female External Urinary Catheter 23 0205 <1 day              Peripheral Intravenous Line  Duration                   Peripheral IV - Single Lumen 06/08/23 0109 20 G;1 3/4 in Anterior;Left Forearm <1 day                     Physical Exam  Constitutional:       Appearance: Normal appearance. She is obese.   Eyes:      General: No scleral icterus.  Cardiovascular:      Rate and Rhythm: Normal rate and regular rhythm.      Pulses: Normal pulses.      Heart sounds: Normal heart sounds.   Pulmonary:      Effort: Pulmonary effort is normal. No respiratory distress.      Breath sounds: Normal breath sounds.   Abdominal:      General: Bowel sounds are normal. There is no distension.      Palpations: Abdomen is soft.      Tenderness: There is generalized abdominal tenderness. There is no guarding or rebound.   Skin:     General: Skin is warm and dry.      Coloration: Skin is not jaundiced.   Neurological:      Mental Status: She is alert and oriented to person, place, and time.        Significant Labs:  All pertinent lab results from the last 24 hours have been reviewed.    Significant Imaging:  Imaging results within the past 24 hours have been reviewed.    Assessment/Plan:     GI  Pancreatic pseudocyst  This is an 83 year old female with a PMH significant for atrial fibrillation (on Apixaban), reported Crohn's disease (unspecified disease involvement; status post reported small bowel resection in 1960's; not currently on therapy), pancreatic adenocarcinoma (diagnosed in 03/2023, associated with biliary stricture requiring biliary stent placement at that time, and status post initiation of chemotherapy in 05/2023), and sick sinus syndrome (status post pacemaker) who was admitted to [facility] on 06/08 for pancreatitis. Presentation notable for imaging showing enlarged lesion in pancreatic body abutting gastric wall with stable appearing pancreatic head mass; AES consulted with suspicion for pseudocyst.     Recommendations:     -Suspect enlarging lesion seen in pancreatic body is likely pseudocyst secondary to pancreatic ductal damage from  pancreatic head mass preventing proper flow of pancreatic enzymes. However, lesion is currently too small to drain endoscopically. Recommend repeating CT A/P in 6 weeks for reassessment of size.   -Continue IVF, pain control, and anti-emetics PRN.         Thank you for your consult. I will sign off. Please contact us if you have any additional questions.    Kiel Kohler MD  Gastroenterology  [facility]

## 2023-06-08 NOTE — TELEPHONE ENCOUNTER
Spoke with the patient to confirm she was indeed in the hospital. Informed her that I will reschedule her appointment for 6/9. She expressed her understanding and thanked me.

## 2023-06-08 NOTE — H&P
[facility] Emergency Dept  Hematology/Oncology  H&P    Patient Name: Pauline Cronin  MRN: [id number]  Admission Date: 6/8/2023  Code Status: DNR   Attending Provider: Fabby Muir MD  Primary Care Physician: Ga Waldrop MD  Principal Problem:Acute pancreatitis    Subjective:     HPI:   Ms Cronin is an 83F with PMH pAF, SSS s/p pacemaker, HTN, HLD, pancreatic adenocarcinoma s/p stenting s/p 1 cycle chemotherapy 5/1 who presents with intractable nausea/vomiting. She reports that around 7PM last night she had the onset of nausea and vomiting and was not able to keep anything down. She also had severe stabbing upper abdominal pain. She was hospitalized following her chemotherapy treatment last month for similar symptoms and was found to have pancreatitis. Of note, she developed respiratory decompensation due to fluid overload during that hospitalization, and she required diuretics and breathing treatments.     In the ED, patient is afebrile, VSS. Labs notable for no leukocytosis, K 3.4, and lipase 185. Patient given 500cc LR and IV pain control. Patient will be admitted for management of acute pancreatitis.    Oncology History   Malignant neoplasm of pancreas   3/21/2023 Initial Diagnosis     Pancreatic adenocarcinoma      5/1/2023 -  Chemotherapy     Treatment Summary   Plan Name: OP PANC NAB-PACLITAXEL + GEMCITABINE Q4W  Treatment Goal: Palliative  Status: Active  Start Date: 5/1/2023  End Date: 3/14/2024 (Planned)  Provider: Jim Hill MD  Chemotherapy: gemcitabine (GEMZAR) 1,400 mg in sodium chloride 0.9% SolP 321.82 mL chemo infusion, 1,448 mg (100 % of original dose 800 mg/m2), Intravenous, Clinic/HOD 1 time, 1 of 12 cycles  Dose modification: 800 mg/m2 (original dose 800 mg/m2, Cycle 1, Reason: Other (see comments), Comment: poor PS)        Oncology Treatment Plan:   OP PANC NAB-PACLITAXEL + GEMCITABINE Q4W    Medications:  Continuous Infusions:   lactated ringers 50 mL/hr at 06/08/23 0777      Scheduled Meds:   cefTRIAXone (ROCEPHIN) IVPB  2 g Intravenous Q24H    polyethylene glycol  17 g Oral Daily    senna-docusate 8.6-50 mg  1 tablet Oral BID     PRN Meds:acetaminophen, dextrose 10%, dextrose 10%, dextrose, dextrose, glucagon (human recombinant), HYDROmorphone, melatonin, morphine, naloxone, ondansetron, prochlorperazine, sodium chloride 0.9%     Review of patient's allergies indicates:   Allergen Reactions    Lisinopril Other (See Comments)     cough        Past Medical History:   Diagnosis Date    Anticoagulant long-term use     Atrial fibrillation     Crohn disease diagnosed in her 20's    GERD (gastroesophageal reflux disease)     Hyperlipidemia     Hypertension     Pancreatic adenocarcinoma 3/21/2023    Thyroid disease     s/p I 131     Past Surgical History:   Procedure Laterality Date    APPENDECTOMY      CARDIAC SURGERY  2014    pacemaker    COLONOSCOPY  ~2008    normal findings per patient report    ENDOSCOPIC ULTRASOUND OF UPPER GASTROINTESTINAL TRACT N/A 3/14/2023    Procedure: ULTRASOUND, UPPER GI TRACT, ENDOSCOPIC;  Surgeon: Andrei Swartz MD;  Location: [facility];  Service: Endoscopy;  Laterality: N/A;  Approval to hold Eliquis rec'd from Dr. Barbosa (see telephone encounter 3/3/23)-DS  [employer] AICD/PPM  3/3/23-Instructions via portal-DS    ERCP N/A 3/21/2023    Procedure: ERCP (ENDOSCOPIC RETROGRADE CHOLANGIOPANCREATOGRAPHY);  Surgeon: Celina Toney MD;  Location: [facility] ([address] [place] [address]);  Service: Endoscopy;  Laterality: N/A;    ESOPHAGOGASTRODUODENOSCOPY N/A 7/17/2018    Procedure: EGD (ESOPHAGOGASTRODUODENOSCOPY);  Surgeon: Alondra Casiano MD;  Location: [facility];  Service: Endoscopy;  Laterality: N/A;    HYSTERECTOMY      INSERTION OF IMPLANTABLE CARDIOVERTER-DEFIBRILLATOR (ICD) GENERATOR WITH TWO EXISTING LEADS Left 5/10/2021    Procedure: INSERTION, PULSE GENERATOR WITH 2 EXISTING LEADS, ICD;  Surgeon: Bo Leone MD;  Location: [facility] C[facility] LAB;   Service: Cardiology;  Laterality: Left;    INSERTION OF PACEMAKER      REPLACEMENT OF PACEMAKER GENERATOR  05/10/2021    RETROGRADE PYELOGRAPHY Right 2020    Procedure: PYELOGRAM, RETROGRADE;  Surgeon: Jovan Kruse MD;  Location: [facility];  Service: Urology;  Laterality: Right;    SMALL INTESTINE SURGERY  in her 20's    for crohn's    TONSILLECTOMY      UPPER GASTROINTESTINAL ENDOSCOPY  ~    normal findings per patient report     Family History       Problem Relation (Age of Onset)    Breast cancer Mother    Cancer Mother    Crohn's disease Other          Tobacco Use    Smoking status: Former     Types: Cigarettes     Quit date:      Years since quittin.4    Smokeless tobacco: Never   Substance and Sexual Activity    Alcohol use: No    Drug use: No    Sexual activity: Never       Review of Systems   Constitutional:  Negative for chills and fever.   HENT:  Negative for rhinorrhea and sore throat.    Eyes:  Negative for visual disturbance.   Respiratory:  Negative for cough and shortness of breath.    Cardiovascular:  Negative for chest pain and leg swelling.   Gastrointestinal:  Positive for abdominal pain, nausea and vomiting. Negative for diarrhea.   Genitourinary:  Negative for dysuria.   Musculoskeletal:  Negative for arthralgias and myalgias.   Skin:  Negative for rash.   Neurological:  Negative for light-headedness and headaches.   Psychiatric/Behavioral:  Negative for agitation and confusion.    Objective:     Vital Signs (Most Recent):  Temp: 98.4 °F (36.9 °C) (23 0749)  Pulse: 70 (23 0503)  Resp: 18 (23 0739)  BP: (!) 184/76 (23 0503)  SpO2: 98 % (23 0503) Vital Signs (24h Range):  Temp:  [97.5 °F (36.4 °C)-98.6 °F (37 °C)] 98.4 °F (36.9 °C)  Pulse:  [69-88] 70  Resp:  [16-20] 18  SpO2:  [93 %-98 %] 98 %  BP: (137-184)/(71-76) 184/76     Weight: 74.8 kg (165 lb)  Body mass index is 32.22 kg/m².  Body surface area is 1.78 meters  squared.      Intake/Output Summary (Last 24 hours) at 6/8/2023 0836  Last data filed at 6/8/2023 0549  Gross per 24 hour   Intake 1499 ml   Output --   Net 1499 ml        Physical Exam  Constitutional:       General: She is in acute distress.   HENT:      Head: Normocephalic and atraumatic.      Right Ear: External ear normal.      Left Ear: External ear normal.      Nose: Nose normal.      Mouth/Throat:      Mouth: Mucous membranes are moist.      Pharynx: Oropharynx is clear.   Eyes:      General: No scleral icterus.     Extraocular Movements: Extraocular movements intact.      Conjunctiva/sclera: Conjunctivae normal.   Cardiovascular:      Rate and Rhythm: Normal rate and regular rhythm.   Pulmonary:      Effort: Pulmonary effort is normal.      Breath sounds: Normal breath sounds. No wheezing or rales.   Abdominal:      General: Abdomen is flat. Bowel sounds are decreased. There is no distension.      Palpations: Abdomen is soft.      Tenderness: There is abdominal tenderness in the right upper quadrant, epigastric area and left upper quadrant.   Musculoskeletal:         General: Normal range of motion.      Cervical back: Normal range of motion.   Skin:     General: Skin is warm and dry.   Neurological:      General: No focal deficit present.      Mental Status: She is alert.   Psychiatric:         Mood and Affect: Mood normal.         Behavior: Behavior normal.        Significant Labs:   All pertinent labs from the last 24 hours have been reviewed.    Diagnostic Results:  I have reviewed all pertinent imaging results/findings within the past 24 hours.    Assessment/Plan:     * Acute pancreatitis  83F with pancreatic cancer, recent hx of pancreatitis in May presents with acute onset of nausea, vomiting, and severe upper abdominal pain. Vitals are stable, no leukocytosis, lipase elevated to 185. CT A/P shows interval growth of her cancer with c/f possible invasion of the stomach. Patient s/p 500cc LR in  ED.    Plan:  -gentle IVF given pt's hx of acute respiratory failure due to volume overload. LR 50cc/hr for 10 hours  -IV morphine PRN pain control    Malignant neoplasm of pancreas  Diagnosed in 3/2023. Patient has had once cycle of chemotherapy 5/1,, tolerated poorly - immediately after therapy presented to the ED with nausea and vomiting. Follows with Dr. Hill. Planning for second cycle later this month. Patient was supposed to have a port placed today prior to second cycle.     Paroxysmal atrial fibrillation  Home meds: sotalol, eliquis  There have been many recent dose changes with sotalol. It was decreased during previous hospitalization d/t low Cr clearance. Outpatient cardiologist subsequently increased it due to lack of insurance coverage for Toprol.    Plan:  -continue home meds  -consult cardiology for assistance with sotalol dosing    Sick sinus syndrome  S/p pacemaker placement    Hypothyroidism  Continue home levothyroxine    Essential hypertension, benign  Continue home amlodipine    Hyperlipidemia  Continue home statin        BRIELLE MURPHY MD  Hematology/Oncology  [facility] Dept

## 2023-06-08 NOTE — NURSING
IV hydralazine d/c'd. Dr Pinedo reports he would like to speak with cards before ordering IV BP meds.

## 2023-06-09 LAB
ALBUMIN SERPL BCP-MCNC: 2.7 G/DL (ref 3.5–5.2)
ALP SERPL-CCNC: 79 U/L (ref 55–135)
ALT SERPL W/O P-5'-P-CCNC: 11 U/L (ref 10–44)
ANION GAP SERPL CALC-SCNC: 10 MMOL/L (ref 8–16)
AST SERPL-CCNC: 19 U/L (ref 10–40)
BACTERIA UR CULT: NORMAL
BACTERIA UR CULT: NORMAL
BASOPHILS # BLD AUTO: 0.03 K/UL (ref 0–0.2)
BASOPHILS NFR BLD: 0.4 % (ref 0–1.9)
BILIRUB SERPL-MCNC: 0.7 MG/DL (ref 0.1–1)
BUN SERPL-MCNC: 4 MG/DL (ref 8–23)
CALCIUM SERPL-MCNC: 9.5 MG/DL (ref 8.7–10.5)
CHLORIDE SERPL-SCNC: 102 MMOL/L (ref 95–110)
CO2 SERPL-SCNC: 29 MMOL/L (ref 23–29)
CREAT SERPL-MCNC: 0.7 MG/DL (ref 0.5–1.4)
DIFFERENTIAL METHOD: ABNORMAL
EOSINOPHIL # BLD AUTO: 0.2 K/UL (ref 0–0.5)
EOSINOPHIL NFR BLD: 2.2 % (ref 0–8)
ERYTHROCYTE [DISTWIDTH] IN BLOOD BY AUTOMATED COUNT: 22.4 % (ref 11.5–14.5)
EST. GFR  (NO RACE VARIABLE): >60 ML/MIN/1.73 M^2
GLUCOSE SERPL-MCNC: 112 MG/DL (ref 70–110)
HCT VFR BLD AUTO: 40.9 % (ref 37–48.5)
HGB BLD-MCNC: 12.9 G/DL (ref 12–16)
IMM GRANULOCYTES # BLD AUTO: 0.03 K/UL (ref 0–0.04)
IMM GRANULOCYTES NFR BLD AUTO: 0.4 % (ref 0–0.5)
LYMPHOCYTES # BLD AUTO: 1 K/UL (ref 1–4.8)
LYMPHOCYTES NFR BLD: 11.5 % (ref 18–48)
MAGNESIUM SERPL-MCNC: 1.2 MG/DL (ref 1.6–2.6)
MCH RBC QN AUTO: 29.5 PG (ref 27–31)
MCHC RBC AUTO-ENTMCNC: 31.5 G/DL (ref 32–36)
MCV RBC AUTO: 93 FL (ref 82–98)
MONOCYTES # BLD AUTO: 0.7 K/UL (ref 0.3–1)
MONOCYTES NFR BLD: 8.6 % (ref 4–15)
NEUTROPHILS # BLD AUTO: 6.3 K/UL (ref 1.8–7.7)
NEUTROPHILS NFR BLD: 76.9 % (ref 38–73)
NRBC BLD-RTO: 0 /100 WBC
PHOSPHATE SERPL-MCNC: 3.3 MG/DL (ref 2.7–4.5)
PLATELET # BLD AUTO: 144 K/UL (ref 150–450)
PMV BLD AUTO: 10.5 FL (ref 9.2–12.9)
POTASSIUM SERPL-SCNC: 3.3 MMOL/L (ref 3.5–5.1)
PROT SERPL-MCNC: 6.5 G/DL (ref 6–8.4)
RBC # BLD AUTO: 4.38 M/UL (ref 4–5.4)
SODIUM SERPL-SCNC: 141 MMOL/L (ref 136–145)
WBC # BLD AUTO: 8.23 K/UL (ref 3.9–12.7)

## 2023-06-09 PROCEDURE — 99233 SBSQ HOSP IP/OBS HIGH 50: CPT | Mod: GC,,, | Performed by: INTERNAL MEDICINE

## 2023-06-09 PROCEDURE — 93010 ELECTROCARDIOGRAM REPORT: CPT | Mod: ,,, | Performed by: INTERNAL MEDICINE

## 2023-06-09 PROCEDURE — 36415 COLL VENOUS BLD VENIPUNCTURE: CPT | Performed by: NEUROLOGICAL SURGERY

## 2023-06-09 PROCEDURE — 63600175 PHARM REV CODE 636 W HCPCS

## 2023-06-09 PROCEDURE — 25000003 PHARM REV CODE 250: Performed by: INTERNAL MEDICINE

## 2023-06-09 PROCEDURE — 83735 ASSAY OF MAGNESIUM: CPT | Performed by: NEUROLOGICAL SURGERY

## 2023-06-09 PROCEDURE — 84100 ASSAY OF PHOSPHORUS: CPT | Performed by: NEUROLOGICAL SURGERY

## 2023-06-09 PROCEDURE — 93005 ELECTROCARDIOGRAM TRACING: CPT

## 2023-06-09 PROCEDURE — 25000003 PHARM REV CODE 250: Performed by: NEUROLOGICAL SURGERY

## 2023-06-09 PROCEDURE — 27000221 HC OXYGEN, UP TO 24 HOURS

## 2023-06-09 PROCEDURE — 25000003 PHARM REV CODE 250: Performed by: STUDENT IN AN ORGANIZED HEALTH CARE EDUCATION/TRAINING PROGRAM

## 2023-06-09 PROCEDURE — 20600001 HC STEP DOWN PRIVATE ROOM

## 2023-06-09 PROCEDURE — 25000003 PHARM REV CODE 250

## 2023-06-09 PROCEDURE — 63600175 PHARM REV CODE 636 W HCPCS: Performed by: STUDENT IN AN ORGANIZED HEALTH CARE EDUCATION/TRAINING PROGRAM

## 2023-06-09 PROCEDURE — 93010 EKG 12-LEAD: ICD-10-PCS | Mod: ,,, | Performed by: INTERNAL MEDICINE

## 2023-06-09 PROCEDURE — 94761 N-INVAS EAR/PLS OXIMETRY MLT: CPT

## 2023-06-09 PROCEDURE — 99233 PR SUBSEQUENT HOSPITAL CARE,LEVL III: ICD-10-PCS | Mod: GC,,, | Performed by: INTERNAL MEDICINE

## 2023-06-09 PROCEDURE — 80053 COMPREHEN METABOLIC PANEL: CPT | Performed by: NEUROLOGICAL SURGERY

## 2023-06-09 PROCEDURE — 63600175 PHARM REV CODE 636 W HCPCS: Performed by: NEUROLOGICAL SURGERY

## 2023-06-09 PROCEDURE — 85025 COMPLETE CBC W/AUTO DIFF WBC: CPT | Performed by: NEUROLOGICAL SURGERY

## 2023-06-09 RX ORDER — METOPROLOL TARTRATE 25 MG/1
25 TABLET, FILM COATED ORAL 4 TIMES DAILY
Status: CANCELLED | OUTPATIENT
Start: 2023-06-09

## 2023-06-09 RX ORDER — POTASSIUM CHLORIDE 750 MG/1
30 CAPSULE, EXTENDED RELEASE ORAL ONCE
Status: COMPLETED | OUTPATIENT
Start: 2023-06-09 | End: 2023-06-09

## 2023-06-09 RX ORDER — METOPROLOL TARTRATE 1 MG/ML
5 INJECTION, SOLUTION INTRAVENOUS EVERY 5 MIN PRN
Status: COMPLETED | OUTPATIENT
Start: 2023-06-09 | End: 2023-06-09

## 2023-06-09 RX ORDER — MAGNESIUM SULFATE HEPTAHYDRATE 40 MG/ML
4 INJECTION, SOLUTION INTRAVENOUS ONCE
Status: COMPLETED | OUTPATIENT
Start: 2023-06-09 | End: 2023-06-09

## 2023-06-09 RX ORDER — POTASSIUM CHLORIDE 20 MEQ/1
20 TABLET, EXTENDED RELEASE ORAL ONCE
Status: COMPLETED | OUTPATIENT
Start: 2023-06-09 | End: 2023-06-09

## 2023-06-09 RX ORDER — SODIUM CHLORIDE, SODIUM LACTATE, POTASSIUM CHLORIDE, CALCIUM CHLORIDE 600; 310; 30; 20 MG/100ML; MG/100ML; MG/100ML; MG/100ML
INJECTION, SOLUTION INTRAVENOUS CONTINUOUS
Status: ACTIVE | OUTPATIENT
Start: 2023-06-10 | End: 2023-06-10

## 2023-06-09 RX ADMIN — MAGNESIUM SULFATE 2 G: 2 INJECTION INTRAVENOUS at 04:06

## 2023-06-09 RX ADMIN — CEFTRIAXONE 2 G: 2 INJECTION, POWDER, FOR SOLUTION INTRAMUSCULAR; INTRAVENOUS at 09:06

## 2023-06-09 RX ADMIN — SULFASALAZINE 500 MG: 500 TABLET ORAL at 08:06

## 2023-06-09 RX ADMIN — AMLODIPINE BESYLATE 5 MG: 5 TABLET ORAL at 09:06

## 2023-06-09 RX ADMIN — SODIUM CHLORIDE, POTASSIUM CHLORIDE, SODIUM LACTATE AND CALCIUM CHLORIDE: 600; 310; 30; 20 INJECTION, SOLUTION INTRAVENOUS at 08:06

## 2023-06-09 RX ADMIN — ATORVASTATIN CALCIUM 20 MG: 20 TABLET, FILM COATED ORAL at 09:06

## 2023-06-09 RX ADMIN — APIXABAN 5 MG: 5 TABLET, FILM COATED ORAL at 08:06

## 2023-06-09 RX ADMIN — APIXABAN 5 MG: 5 TABLET, FILM COATED ORAL at 09:06

## 2023-06-09 RX ADMIN — METOROPROLOL TARTRATE 5 MG: 5 INJECTION, SOLUTION INTRAVENOUS at 05:06

## 2023-06-09 RX ADMIN — ONDANSETRON 8 MG: 2 INJECTION INTRAMUSCULAR; INTRAVENOUS at 08:06

## 2023-06-09 RX ADMIN — ACETAMINOPHEN 650 MG: 325 TABLET ORAL at 05:06

## 2023-06-09 RX ADMIN — POTASSIUM CHLORIDE 30 MEQ: 10 CAPSULE, COATED, EXTENDED RELEASE ORAL at 05:06

## 2023-06-09 RX ADMIN — LEVOTHYROXINE SODIUM 175 MCG: 125 TABLET ORAL at 05:06

## 2023-06-09 RX ADMIN — POTASSIUM CHLORIDE 20 MEQ: 1500 TABLET, EXTENDED RELEASE ORAL at 09:06

## 2023-06-09 RX ADMIN — SODIUM CHLORIDE, POTASSIUM CHLORIDE, SODIUM LACTATE AND CALCIUM CHLORIDE: 600; 310; 30; 20 INJECTION, SOLUTION INTRAVENOUS at 03:06

## 2023-06-09 RX ADMIN — SOTALOL HYDROCHLORIDE 120 MG: 120 TABLET ORAL at 09:06

## 2023-06-09 RX ADMIN — SULFASALAZINE 500 MG: 500 TABLET ORAL at 09:06

## 2023-06-09 RX ADMIN — METOROPROLOL TARTRATE 5 MG: 5 INJECTION, SOLUTION INTRAVENOUS at 04:06

## 2023-06-09 RX ADMIN — METOROPROLOL TARTRATE 5 MG: 5 INJECTION, SOLUTION INTRAVENOUS at 03:06

## 2023-06-09 NOTE — NURSING
Patient still in A-fib w/RVR. Patient received 3 doses of Metoprolol per orders. Patient's HR bouncing in between 110-130. Dr Prince is aware. Patient reports that she is starting to feel better.

## 2023-06-09 NOTE — PLAN OF CARE
[facility] Trans[facility]      HOME HEALTH ORDERS  FACE TO FACE ENCOUNTER    Patient Name: Pauline Cronin  YOB: 1940    PCP: Ga Waldrop MD   PCP Address: [address] / [place] [address]  PCP Phone Number: [phone]  PCP Fax: [phone]    Encounter Date: 6/8/23    Admit to Home Health    Diagnoses:  Active Hospital Problems    Diagnosis  POA    *Acute pancreatitis [K85.90]  Yes    Pancreatic pseudocyst [K86.3]  Yes    Malignant neoplasm of pancreas [C25.9]  Yes    Sick sinus syndrome [I49.5]  Yes     Chronic    Paroxysmal atrial fibrillation [I48.0]  Yes     Chronic     Followed by Dr. Leone      Hyperlipidemia [E78.5]  Yes     Chronic    Essential hypertension, benign [I10]  Yes     Chronic    Hypothyroidism [E03.9]  Yes     Chronic      Resolved Hospital Problems   No resolved problems to display.       Follow Up Appointments:  Future Appointments   Date Time Provider Department Center   6/13/2023 10:20 AM LAB, [facility] CANCER [facility] LAB [facility]   6/13/2023 11:30 AM JUSTINA Queen [facility] HEMONC2 [facility]   6/13/2023 12:30 PM CHAIR 12, [facility] BMT INF [facility] BMT INF [facility] BMT   6/14/2023  1:00 PM Christine Rodriguez DNP [facility]   [facility] 10:20 [facility]   6/26/2023 11:30 AM Jim Hill MD [facility] HEMONC2 [facility]   6/26/2023 12:30 PM CHEMO 38, [facility] CHEMO [facility]   7/12/2023  9:30 AM Jovan Barbosa MD [facility] CARDIO [facility]   10/18/2023  9:30 AM Ga Waldrop MD [facility] PRCAR [facility]       Allergies:  Review of patient's allergies indicates:   Allergen Reactions    Lisinopril Other (See Comments)     cough       Medications: Review discharge medications with patient and family and provide education.    Current Facility-Administered Medications   Medication Dose Route Frequency Provider Last Rate Last Admin    acetaminophen tablet 650 mg  650 mg Oral Q4H PRN  Linh Mehta MD        amLODIPine tablet 5 mg  5 mg Oral Daily Darrell Pinedo MD   5 mg at 06/09/23 0903    apixaban tablet 5 mg  5 mg Oral BID Darrell Pinedo MD   5 mg at 06/09/23 0903    atorvastatin tablet 20 mg  20 mg Oral Daily Darrell Pinedo MD   20 mg at 06/09/23 0903    cefTRIAXone (ROCEPHIN) 2 g in dextrose 5 % in water (D5W) 5 % 100 mL IVPB (MB+)  2 g Intravenous Q24H Linh Mehta  mL/hr at 06/09/23 0901 2 g at 06/09/23 0901    dextrose 10% bolus 125 mL 125 mL  12.5 g Intravenous PRN Fabby Muir MD        dextrose 10% bolus 250 mL 250 mL  25 g Intravenous PRN Fabby Muir MD        dextrose 40 % gel 15,000 mg  15 g Oral PRN Fabby Muir MD        dextrose 40 % gel 30,000 mg  30 g Oral PRN Fabby Muir MD        glucagon (human recombinant) injection 1 mg  1 mg Intramuscular PRN Linh Mehta MD        HYDROmorphone injection 1 mg  1 mg Intravenous Q4H PRN Linh Mehta MD   1 mg at 06/08/23 1808    labetalol 20 mg/4 mL (5 mg/mL) IV syring  10 mg Intravenous Q6H PRN Linh Mehta MD   10 mg at 06/08/23 1455    lactated ringers infusion   Intravenous Continuous Francine Prince  mL/hr at 06/09/23 0605 Rate Verify at 06/09/23 0605    levothyroxine tablet 175 mcg  175 mcg Oral Before breakfast Fabby Muir MD   175 mcg at 06/09/23 0548    lipase-protease-amylase 24,000-76,000-120,000 units capsule 2 capsule  2 capsule Oral TID  Darrell Pinedo MD        melatonin tablet 6 mg  6 mg Oral Nightly PRN Linh Mehta MD        morphine injection 2 mg  2 mg Intravenous Q4H PRN Linh Mehta MD        naloxone 0.4 mg/mL injection 0.02 mg  0.02 mg Intravenous PRN Linh Mehta MD        ondansetron injection 8 mg  8 mg Intravenous Q8H PRN Darrell Pinedo MD   8 mg at 06/09/23 0858    polyethylene glycol packet 17 g  17 g Oral Daily Linh Mehta MD        prochlorperazine injection Soln 10 mg  10 mg Intravenous Q6H PRN Fabby Muir MD   10 mg at 06/08/23 2003    senna-docusate  8.6-50 mg per tablet 1 tablet  1 tablet Oral BID Linh Mehta MD        sodium chloride 0.9% flush 10 mL  10 mL Intravenous Q12H PRN Linh Mehta MD        sotaloL tablet 120 mg  120 mg Oral Daily Darrell Pinedo MD   120 mg at 06/09/23 0903    sulfaSALAzine tablet 500 mg  500 mg Oral BID Darrell Pinedo MD   500 mg at 06/09/23 0903     Current Discharge Medication List        CONTINUE these medications which have NOT CHANGED    Details   acetaminophen (TYLENOL) 325 MG tablet Take 650 mg by mouth daily as needed (Headache).      albuterol (PROVENTIL) 2.5 mg /3 mL (0.083 %) nebulizer solution Take 3 mLs (2.5 mg total) by nebulization every 6 (six) hours as needed for Wheezing or Shortness of Breath. Rescue  Qty: 150 mL, Refills: 11    Associated Diagnoses: Pulmonary hypertension      alendronate (FOSAMAX) 70 MG tablet Take 1 tablet (70 mg total) by mouth every 7 days.  Qty: 12 tablet, Refills: 3    Associated Diagnoses: Osteoporosis, unspecified osteoporosis type, unspecified pathological fracture presence      amLODIPine (NORVASC) 5 MG tablet Take 1 tablet (5 mg total) by mouth once daily.  Qty: 30 tablet, Refills: 11      atorvastatin (LIPITOR) 20 MG tablet Take 1 tablet (20 mg total) by mouth once daily.  Qty: 90 tablet, Refills: 3      colestipoL (COLESTID) 1 gram Tab Take 1 tablet (1 g total) by mouth 2 (two) times daily.  Qty: 60 tablet, Refills: 5      diphenoxylate-atropine 2.5-0.025 mg (LOMOTIL) 2.5-0.025 mg per tablet TAKE 1 TABLET BY MOUTH 4 TIMES DAILY AS NEEDED FOR DIARRHEA  Qty: 60 tablet, Refills: 0    Associated Diagnoses: Chronic diarrhea      ELIQUIS 5 mg Tab Take 1 tablet (5 mg total) by mouth 2 (two) times daily.  Qty: 180 tablet, Refills: 3    Comments: PATIENT NEEDS APPOINTMENT FOR FURTHER REFILLS.  Associated Diagnoses: Paroxysmal atrial fibrillation      esomeprazole (NEXIUM) 40 MG capsule Take 1 capsule (40 mg total) by mouth once daily.  Qty: 90 capsule, Refills: 3      folic acid (FOLVITE) 1  MG tablet Take 1 tablet by mouth once daily  Qty: 90 tablet, Refills: 0      furosemide (LASIX) 40 MG tablet Take 1 tablet (40 mg total) by mouth daily as needed (For weight gain > 3 lb in one day, increasing leg swelling, or increasing SOB).  Qty: 30 tablet, Refills: 11      levothyroxine (SYNTHROID, LEVOTHROID) 175 MCG tablet Take 1 tablet by mouth once daily  Qty: 30 tablet, Refills: 5    Associated Diagnoses: Hypothyroidism, unspecified type      LIDOcaine-prilocaine (EMLA) cream Apply topically as needed (place to port site 45-60 minutes prior to port access).  Qty: 30 g, Refills: 6    Associated Diagnoses: Pancreatic adenocarcinoma      lipase-protease-amylase 24,000-76,000-120,000 units (CREON) 24,000-76,000 -120,000 unit capsule Take 2 capsules by mouth 3 (three) times daily with meals.  Qty: 180 capsule, Refills: 11    Associated Diagnoses: Exocrine pancreatic insufficiency      MELATONIN ORAL Take 1 tablet by mouth nightly as needed (Insomnia).      sotaloL (BETAPACE) 120 MG Tab Take 1 tablet (120 mg total) by mouth 2 (two) times daily.  Qty: 180 tablet, Refills: 3      sulfaSALAzine (AZULFIDINE) 500 mg Tab Take 1 tablet by mouth twice daily  Qty: 180 tablet, Refills: 3      tolterodine (DETROL LA) 4 MG 24 hr capsule Take 1 capsule by mouth once daily  Qty: 90 capsule, Refills: 3               I have seen and examined this patient within the last 30 days. My clinical findings that support the need for the home health skilled services and home bound status are the following:no   Weakness/numbness causing balance and gait disturbance due to Weakness/Debility and Malignancy/Cancer making it taxing to leave home.     Diet:   2 gram sodium diet    Referrals/ Consults  Physical Therapy to evaluate and treat. Evaluate for home safety and equipment needs; Establish/upgrade home exercise program. Perform / instruct on therapeutic exercises, gait training, transfer training, and Range of Motion.  Occupational  Therapy to evaluate and treat. Evaluate home environment for safety and equipment needs. Perform/Instruct on transfers, ADL training, ROM, and therapeutic exercises.    Activities:   activity as tolerated    Nursing:   Agency to admit patient within 24 hours of hospital discharge unless specified on physician order or at patient request    SN to complete comprehensive assessment including routine vital signs. Instruct on disease process and s/s of complications to report to MD. Review/verify medication list sent home with the patient at time of discharge  and instruct patient/caregiver as needed. Frequency may be adjusted depending on start of care date.     Skilled nurse to perform up to 3 visits PRN for symptoms related to diagnosis    Notify MD if SBP > 160 or < 90; DBP > 90 or < 50; HR > 120 or < 50; Temp > 101; O2 < 88    Ok to schedule additional visits based on staff availability and patient request on consecutive days within the home health episode.    When multiple disciplines ordered:    Start of Care occurs on Sunday - Wednesday schedule remaining discipline evaluations as ordered on separate consecutive days following the start of care.    Thursday SOC -schedule subsequent evaluations Friday and Monday the following week.     Friday - Saturday SOC - schedule subsequent discipline evaluations on consecutive days starting Monday of the following week.    For all post-discharge communication and subsequent orders please contact patient's primary care physician. If unable to reach primary care physician or do not receive response within 30 minutes, please contact Dr. Jim Hill for clinical staff order clarification    Home Health Aide:  Physical Therapy Three times weekly and Occupational Therapy Three times weekly    Wound Care Orders  no    I certify that this patient is confined to her home and needs physical therapy and occupational therapy.

## 2023-06-09 NOTE — NURSING
PHYSICAL THERAPY - DAILY TREATMENT NOTE    Patient Name: Rochelle Whtie        Date: 2022  : 1957   YES Patient  Verified  Visit #:     Insurance: Payor: Pam Sorianoxuan / Plan: [facility] [employer] / Product Type: Managed Care Medicaid /      In time: 9:40 Out time: 10:10   Total Treatment Time: 30     Medicare Time Tracking (below)   Total Timed Codes (min):  0 1:1 Treatment Time:  0     TREATMENT AREA = Primary osteoarthritis of left knee [M17.12]  Primary osteoarthritis of right knee [M17.11]  Chronic pain of right knee [M25.561, G89.29]  Chronic pain of left knee [M25.562, G89.29]  Chronic low back pain, unspecified back pain laterality, unspecified whether sciatica present [M54.50, G89.29]    SUBJECTIVE    Pain Level (on 0 to 10 scale):  7  / 10   Medication Changes/New allergies or changes in medical history, any new surgeries or procedures? NO    If yes, update Summary List   Subjective Functional Status/Changes:  []  No changes reported     Pt is a 60year old female who presents to PT today with c/o lower back pain and bilateral knee pain. DOI: chronic. DALE: none. She reports getting gel injections last week to bilateral knees which provided 50% relief. Aggravating factors for knees include walking >5 minutes and sitting for prolonged duration. For LBP, describes pain constant dull ache. Pain radiates into bilateral hips and anterior thighs R>L. Lumbar flexion is concordant sign with referral of symptoms down anterior lateral RLE. Limited knee AROM with pain at end ranges. Utilizes [facility] for community ambulation. Does not have stairs. Alleviating factors include Percocet and laying down. She reports having PT for knee pain previously. Symptoms  Pain   Today:7/10   Best:4/10   Worst:10/10         OBJECTIVE    Observation/Posture: Pt with impaired bed mobility; difficulty with supine to sit transition.  Pt demonstrates frequent weight shifting in seated position Patient returned to the unit from US with the CN and the Rapid nurse. Patient reports she started to feel SOB and her A-fib close to the end of the US. Patient placed back on 2L nasal cannula and the EKG monitor in the room. Patient is A-fib, HR 120s-130s. Med-Onc notified. Awaiting orders.    Gait: [] Normal     [x] Abnormal:slower costa, wide CLOVER, mild increase in hip IR, ambulates with SPC    5x sit<>stand:deferred   Single leg stance:deferred     BLE ROM / Strength  [] Unable to assess                  AROM          Strength (1-5)    Left Right Left Right   Hip Flexion   2+ 2+   Knee Flexion 105 p! 105* 4 4    Extension 10* lag 10* lag 4 4   Ankle Plantarflexion   4 4    Dorsiflexion   4 4     Lumbar spine Active Movements:   ROM  AROM Comments:pain, area   Forward flexion 40-60 Mod limited  Concordant sign, significant pain   Extension 20-30 Significantly  limited  Mild pain   SB right 20-30 Mildly limited     SB left 20-30 Mildly limited    Rotation right 5-10 Not tested    Rotation left 5-10 Not tested       Dural Mobility:       Slump Test: unable to assume position     Therapeutic Procedures:  Min Procedure Specifics + Rationale   n/a [x]  Patient Education (performed throughout session) [x] Review HEP    [] Progressed/Changed HEP based on:   [] proper performance and advancement of Therex/TA   [] reduction in pain level    [] increased functional capacity       [] change in directional preference         Post Treatment Pain Level (on 0 to 10) scale:   7  / 10     ASSESSMENT    Assessment/Changes in Function: Clinically, patient's signs and symptoms are consistent with bilateral knee and lower back pain. Skilled physical therapy is indicated to address noted deficits. Rehabilitation will address limitations noted in evaluation, follow MD orders and work toward improving BLE and lumbar strength, stability, and mobility so that patient may return to desired activities safely and with less discomfort.      Justification for Eval Code Complexity: mod  Patient History (low 0, mod 1-2, high 3-4): high   Examination (low 1-2, mod 3+, high 4+): mod   Clinical Presentation (low: stable/uncomplicated; mod: evolving; high: unstable/unpredictable): mod  Clinical Decision Making (low , mod 26-74, high 1-25): high    [x]  See Plan of Care  []  See Progress Note/ Recertification  []  See Discharge Summary        Patient will continue to benefit from skilled PT services to modify and progress therapeutic interventions, address functional mobility deficits, address ROM deficits, address strength deficits, analyze and address soft tissue restrictions, analyze and cue movement patterns, analyze and modify body mechanics/ergonomics, and assess and modify postural abnormalities  to attain remaining goals   Progress toward goals / Updated goals:    See POC     PLAN    [x]  Upgrade activities as tolerated  [x]  Update interventions per flow sheet YES Continue plan of care   []  Discharge due to :    []  Other:      Therapist: Cristofer Moody PT, DPT    Date: 9/16/2022 Time: 9:00 AM     Future Appointments   Date Time Provider Ra Freedman   9/16/2022  9:30 AM Roscoe Mallory PT [facility]

## 2023-06-09 NOTE — CODE/ RAPID DOCUMENTATION
RAPID RESPONSE NURSE NOTE        Admit Date: 2023  LOS: 1  Code Status: DNR   Date of Consult: 2023  : 1940  Age: 83 y.o.  Weight:   Wt Readings from Last 1 Encounters:   23 76 kg (167 lb 8.8 oz)     Sex: female  Race: White   [facility]:   MRN: [id number]  Time Rapid Response Team page Received: 1511  Time Rapid Response Team at Bedside: 1513  Time Rapid Response Team left Bedside: 1545  Was the patient discharged from an ICU this admission? No   Was the patient discharged from a PACU within last 24 hours? No   Did the patient receive conscious sedation/general anesthesia in last 24 hours? No  Was the patient in the ED within the past 24 hours? No  Was the patient on NIPPV within the past 24 hours? No   Did this progress into an ARC or CPA: No  Attending Physician: Fabby Muir MD  Primary Service: [facility]       SITUATION    Notified by pager.  Reason for alert: SOB  Called to evaluate the patient for Dysrythmia    BACKGROUND     Why is the patient in the hospital?: Acute pancreatitis    Patient has a past medical history of Anticoagulant long-term use, Atrial fibrillation, Crohn disease, GERD (gastroesophageal reflux disease), Hyperlipidemia, Hypertension, Pancreatic adenocarcinoma, and Thyroid disease.    Last Vitals:  Temp: 98.6 °F (37 °C) ( 1200)  Pulse: 126 ( 1615)  Resp: 26 ( 1615)  BP: 126/89 ( 1615)  SpO2: 94 % ( 1615)    24 Hours Vitals Range:  Temp:  [97.5 °F (36.4 °C)-98.6 °F (37 °C)]   Pulse:  []   Resp:  [16-27]   BP: (126-181)/(62-89)   SpO2:  [93 %-98 %]     Labs:  Recent Labs     23  0046 23  0657   WBC 8.74 8.23   HGB 13.2 12.9   HCT 42.3 40.9    144*       Recent Labs     23  0046 23  0657    141   K 3.4* 3.3*    102   CO2 24 29   CREATININE 0.8 0.7   * 112*   PHOS  --  3.3   MG  --  1.2*        No results for input(s): PH, PCO2, PO2, HCO3, POCSATURATED, BE in the last  72 hours.     ASSESSMENT    Physical Exam  Vitals reviewed.   Cardiovascular:      Rate and Rhythm: Tachycardia present. Rhythm irregular.   Pulmonary:      Effort: Pulmonary effort is normal.   Skin:     General: Skin is warm and dry.      Coloration: Skin is pale.   Neurological:      Mental Status: She is alert and oriented to person, place, and time. Mental status is at baseline.     Patient is awake and alert, resting on stretcher in ultrasound room. She states that she felt her heart racing and then became short of breath. She was placed on 2L/NC SPO2 97%, Current HR in the 120s and what appears to be afib. Ultrasound tech states that they were able to get most of the pictures needed for the study. Patient was then transported back to hospital room by RRN and RRT. She states she feels much better. STAT EKG pending. NOBLE Kirkland to contact primary team.     INTERVENTIONS    The patient was seen for Respiratory problem. Staff concerns included new onset of difficulty breathing. The following interventions were performed: supplemental oxygen and continuous pulse ox monitoring continued.  Cardiac problem. Staff concerns included tachycardia. The following interventions were performed: EKG and continuous cardiac monitoring continued.    RECOMMENDATIONS    We recommend:     - Transfer back to hospital room  - STAT EKG  - Continuous cardiac and SPO2 monitoring    PROVIDER ESCALATION    Orders received and case discussed with NA.    Primary team arrival time: n/a    Disposition:  Transfer back to hospital room [id number]    FOLLOW UP    Charge Lolita DICKEY and Bedside Marita DICKEY  updated on plan of care. Instructed to call the Rapid Response Nurse, Danyell Rice RN at [phone] for additional questions or concerns.

## 2023-06-09 NOTE — PLAN OF CARE
- Pt AAOx4, afebrile, VSS, saturating upper 90s on 2L  - Tele monitoring continued, HR 70-80s  - LR at 150 cc/hr  - Voiding per purewick, refer to flowsheets for output totals  - PRN compazine  - Bed in lowest locked position, bed alarm set, call light and personal items in reach, verbalized understanding to call for assistance

## 2023-06-09 NOTE — ASSESSMENT & PLAN NOTE
Home meds: sotalol, eliquis  There have been many recent dose changes with sotalol. It was decreased during previous hospitalization d/t low Cr clearance. Outpatient cardiologist subsequently increased it due to lack of insurance coverage for Toprol.    Plan:  -continue home meds

## 2023-06-09 NOTE — SUBJECTIVE & OBJECTIVE
Interval History: Patient reports improved nausea and abdominal pain. She says that she wants to try a diet today. In the past day, she used zofran 3x, compazine 2x and dilaudid 2x.     Oncology Treatment Plan:   OP PANC NAB-PACLITAXEL + GEMCITABINE Q4W    Medications:  Continuous Infusions:   lactated ringers 150 mL/hr at 06/09/23 0605     Scheduled Meds:   amLODIPine  5 mg Oral Daily    apixaban  5 mg Oral BID    atorvastatin  20 mg Oral Daily    cefTRIAXone (ROCEPHIN) IVPB  2 g Intravenous Q24H    levothyroxine  175 mcg Oral Before breakfast    lipase-protease-amylase 24,000-76,000-120,000 units  2 capsule Oral TID WM    polyethylene glycol  17 g Oral Daily    senna-docusate 8.6-50 mg  1 tablet Oral BID    sotaloL  120 mg Oral Daily    sulfaSALAzine  500 mg Oral BID     PRN Meds:acetaminophen, dextrose 10%, dextrose 10%, dextrose, dextrose, glucagon (human recombinant), HYDROmorphone, labetalol, melatonin, morphine, naloxone, ondansetron, prochlorperazine, sodium chloride 0.9%     Review of Systems   Constitutional:  Negative for chills and fever.   HENT:  Negative for rhinorrhea and sore throat.    Eyes:  Negative for visual disturbance.   Respiratory:  Negative for cough and shortness of breath.    Cardiovascular:  Negative for chest pain and leg swelling.   Gastrointestinal:  Positive for abdominal pain (improving). Negative for diarrhea, nausea and vomiting.   Genitourinary:  Negative for difficulty urinating and dysuria.   Musculoskeletal:  Negative for arthralgias and myalgias.   Skin:  Negative for rash.   Neurological:  Negative for light-headedness and headaches.   Psychiatric/Behavioral:  Negative for agitation and confusion.    Objective:     Vital Signs (Most Recent):  Temp: 98.5 °F (36.9 °C) (06/09/23 0431)  Pulse: 99 (06/09/23 0436)  Resp: 18 (06/09/23 0431)  BP: (!) 166/74 (06/09/23 0431)  SpO2: 97 % (06/09/23 0833) Vital Signs (24h Range):  Temp:  [97.5 °F (36.4 °C)-98.5 °F (36.9 °C)] 98.5 °F  (36.9 °C)  Pulse:  [] 99  Resp:  [16-18] 18  SpO2:  [89 %-98 %] 97 %  BP: (133-222)/(62-89) 166/74     Weight: 76 kg (167 lb 8.8 oz)  Body mass index is 32.72 kg/m².  Body surface area is 1.79 meters squared.      Intake/Output Summary (Last 24 hours) at 6/9/2023 0912  Last data filed at 6/9/2023 0605  Gross per 24 hour   Intake 2541.85 ml   Output 3650 ml   Net -1108.15 ml        Physical Exam  Constitutional:       General: She is not in acute distress.  HENT:      Head: Normocephalic and atraumatic.      Right Ear: External ear normal.      Left Ear: External ear normal.      Nose: Nose normal.      Mouth/Throat:      Mouth: Mucous membranes are moist.      Pharynx: Oropharynx is clear.   Eyes:      General: No scleral icterus.     Extraocular Movements: Extraocular movements intact.      Conjunctiva/sclera: Conjunctivae normal.   Cardiovascular:      Rate and Rhythm: Normal rate and regular rhythm.   Pulmonary:      Effort: Pulmonary effort is normal.      Breath sounds: Normal breath sounds. No wheezing or rales.   Abdominal:      General: Abdomen is flat. There is no distension.      Palpations: Abdomen is soft.      Tenderness: There is no abdominal tenderness.   Musculoskeletal:         General: Normal range of motion.      Cervical back: Normal range of motion and neck supple.   Skin:     General: Skin is warm and dry.   Neurological:      General: No focal deficit present.      Mental Status: She is alert.   Psychiatric:         Mood and Affect: Mood normal.         Behavior: Behavior normal.        Significant Labs:   CBC:   Recent Labs   Lab 06/08/23  0046 06/09/23  0657   WBC 8.74 8.23   HGB 13.2 12.9   HCT 42.3 40.9    144*    and CMP:   Recent Labs   Lab 06/08/23  0046 06/09/23  0657    141   K 3.4* 3.3*    102   CO2 24 29   * 112*   BUN 9 4*   CREATININE 0.8 0.7   CALCIUM 9.7 9.5   PROT 6.8 6.5   ALBUMIN 3.1* 2.7*   BILITOT 0.5 0.7   ALKPHOS 88 79   AST 29 19   ALT 15  11   ANIONGAP 8 10       Diagnostic Results:  I have reviewed all pertinent imaging results/findings within the past 24 hours.

## 2023-06-09 NOTE — HOSPITAL COURSE
Patient was admitted to Medical Oncology service for acute pancreatitis. She was started on IVF, pain and nausea meds with improvement in symptoms. Clear liquid diet was introduced the day after admission, advanced to full diet. RUQ US with c/f cholecystitis, gen surg states this is not likely based on imaging/presentation, started on broad spectrum abx. Patient stable for discharge with PO antibiotics.

## 2023-06-09 NOTE — PROGRESS NOTES
[facility] - Transplant Stepdown  Hematology/Oncology  Progress Note    Patient Name: Pauline Cronin  Admission Date: 6/8/2023  Hospital Length of Stay: 1 days  Code Status: DNR     Subjective:     HPI:  Ms Cronin is an 83F with PMH pAF, SSS s/p pacemaker, HTN, HLD, pancreatic adenocarcinoma s/p stenting s/p 1 cycle chemotherapy 5/1 who presents with intractable nausea/vomiting. She reports that around 7PM last night she had the onset of nausea and vomiting and was not able to keep anything down. She also had severe stabbing upper abdominal pain. She was hospitalized following her chemotherapy treatment last month for similar symptoms and was found to have pancreatitis. Of note, she developed respiratory decompensation due to fluid overload during that hospitalization, and she required diuretics and breathing treatments.     In the ED, patient is afebrile, VSS. Labs notable for no leukocytosis, K 3.4, and lipase 185. Patient given 500cc LR and IV pain control. Patient will be admitted for management of acute pancreatitis.    Oncology History   Malignant neoplasm of pancreas   3/21/2023 Initial Diagnosis     Pancreatic adenocarcinoma      5/1/2023 -  Chemotherapy     Treatment Summary   Plan Name: OP PANC NAB-PACLITAXEL + GEMCITABINE Q4W  Treatment Goal: Palliative  Status: Active  Start Date: 5/1/2023  End Date: 3/14/2024 (Planned)  Provider: Jim Hill MD  Chemotherapy: gemcitabine (GEMZAR) 1,400 mg in sodium chloride 0.9% SolP 321.82 mL chemo infusion, 1,448 mg (100 % of original dose 800 mg/m2), Intravenous, Clinic/HOD 1 time, 1 of 12 cycles  Dose modification: 800 mg/m2 (original dose 800 mg/m2, Cycle 1, Reason: Other (see comments), Comment: poor PS)       Interval History: Patient reports improved nausea and abdominal pain. She says that she wants to try a diet today. In the past day, she used zofran 3x, compazine 2x and dilaudid 2x.     Oncology Treatment Plan:   OP PANC NAB-PACLITAXEL + GEMCITABINE  Q4W    Medications:  Continuous Infusions:   lactated ringers 150 mL/hr at 06/09/23 0605     Scheduled Meds:   amLODIPine  5 mg Oral Daily    apixaban  5 mg Oral BID    atorvastatin  20 mg Oral Daily    cefTRIAXone (ROCEPHIN) IVPB  2 g Intravenous Q24H    levothyroxine  175 mcg Oral Before breakfast    lipase-protease-amylase 24,000-76,000-120,000 units  2 capsule Oral TID WM    polyethylene glycol  17 g Oral Daily    senna-docusate 8.6-50 mg  1 tablet Oral BID    sotaloL  120 mg Oral Daily    sulfaSALAzine  500 mg Oral BID     PRN Meds:acetaminophen, dextrose 10%, dextrose 10%, dextrose, dextrose, glucagon (human recombinant), HYDROmorphone, labetalol, melatonin, morphine, naloxone, ondansetron, prochlorperazine, sodium chloride 0.9%     Review of Systems   Constitutional:  Negative for chills and fever.   HENT:  Negative for rhinorrhea and sore throat.    Eyes:  Negative for visual disturbance.   Respiratory:  Negative for cough and shortness of breath.    Cardiovascular:  Negative for chest pain and leg swelling.   Gastrointestinal:  Positive for abdominal pain (improving). Negative for diarrhea, nausea and vomiting.   Genitourinary:  Negative for difficulty urinating and dysuria.   Musculoskeletal:  Negative for arthralgias and myalgias.   Skin:  Negative for rash.   Neurological:  Negative for light-headedness and headaches.   Psychiatric/Behavioral:  Negative for agitation and confusion.    Objective:     Vital Signs (Most Recent):  Temp: 98.5 °F (36.9 °C) (06/09/23 0431)  Pulse: 99 (06/09/23 0436)  Resp: 18 (06/09/23 0431)  BP: (!) 166/74 (06/09/23 0431)  SpO2: 97 % (06/09/23 0833) Vital Signs (24h Range):  Temp:  [97.5 °F (36.4 °C)-98.5 °F (36.9 °C)] 98.5 °F (36.9 °C)  Pulse:  [] 99  Resp:  [16-18] 18  SpO2:  [89 %-98 %] 97 %  BP: (133-222)/(62-89) 166/74     Weight: 76 kg (167 lb 8.8 oz)  Body mass index is 32.72 kg/m².  Body surface area is 1.79 meters squared.      Intake/Output Summary  (Last 24 hours) at 6/9/2023 0912  Last data filed at 6/9/2023 0605  Gross per 24 hour   Intake 2541.85 ml   Output 3650 ml   Net -1108.15 ml        Physical Exam  Constitutional:       General: She is not in acute distress.  HENT:      Head: Normocephalic and atraumatic.      Right Ear: External ear normal.      Left Ear: External ear normal.      Nose: Nose normal.      Mouth/Throat:      Mouth: Mucous membranes are moist.      Pharynx: Oropharynx is clear.   Eyes:      General: No scleral icterus.     Extraocular Movements: Extraocular movements intact.      Conjunctiva/sclera: Conjunctivae normal.   Cardiovascular:      Rate and Rhythm: Normal rate and regular rhythm.   Pulmonary:      Effort: Pulmonary effort is normal.      Breath sounds: Normal breath sounds. No wheezing or rales.   Abdominal:      General: Abdomen is flat. There is no distension.      Palpations: Abdomen is soft.      Tenderness: There is no abdominal tenderness.   Musculoskeletal:         General: Normal range of motion.      Cervical back: Normal range of motion and neck supple.   Skin:     General: Skin is warm and dry.   Neurological:      General: No focal deficit present.      Mental Status: She is alert.   Psychiatric:         Mood and Affect: Mood normal.         Behavior: Behavior normal.        Significant Labs:   CBC:   Recent Labs   Lab 06/08/23  0046 06/09/23  0657   WBC 8.74 8.23   HGB 13.2 12.9   HCT 42.3 40.9    144*    and CMP:   Recent Labs   Lab 06/08/23  0046 06/09/23  0657    141   K 3.4* 3.3*    102   CO2 24 29   * 112*   BUN 9 4*   CREATININE 0.8 0.7   CALCIUM 9.7 9.5   PROT 6.8 6.5   ALBUMIN 3.1* 2.7*   BILITOT 0.5 0.7   ALKPHOS 88 79   AST 29 19   ALT 15 11   ANIONGAP 8 10       Diagnostic Results:  I have reviewed all pertinent imaging results/findings within the past 24 hours.    Assessment/Plan:     * Acute pancreatitis  83F with pancreatic cancer, recent hx of pancreatitis in May presents  with acute onset of nausea, vomiting, and severe upper abdominal pain. Vitals are stable, no leukocytosis, lipase elevated to 185. CT A/P shows interval growth of her cancer with c/f possible invasion of the stomach.     Plan:  Overall, symptoms improving  -gentle IVF given pt's hx of acute respiratory failure due to volume overload.  -IV morphine PRN pain control  -advance diet as tolerated  -RUQ to evaluate for gallstones (may be component of gallstone pancreatitis)    Malignant neoplasm of pancreas  Diagnosed in 3/2023. Patient has had once cycle of chemotherapy 5/1,, tolerated poorly - immediately after therapy presented to the ED with nausea and vomiting. Follows with Dr. Hill. Planning for second cycle later this month. Patient was supposed to have a port placed prior to second cycle.     Sick sinus syndrome  S/p pacemaker placement    Hypothyroidism  Continue home levothyroxine    Essential hypertension, benign  Continue home amlodipine    Hyperlipidemia  Continue home statin    Paroxysmal atrial fibrillation  Home meds: sotalol, eliquis  There have been many recent dose changes with sotalol. It was decreased during previous hospitalization d/t low Cr clearance. Outpatient cardiologist subsequently increased it due to lack of insurance coverage for Toprol.    Plan:  -continue home meds        Francine Prince MD  Hematology/Oncology  [facility] - [facility] Step[facility]

## 2023-06-09 NOTE — PROGRESS NOTES
SW attempted to assess patient however she was out of her room in receiving ultrasound.    Patient was current with [facility] prior to hospitalization. KERWIN reached out to Saundra Peña who will notify [facility] that patient should be dc'd this weekend.    SW request HH orders from Dr. Prince. KERWIN also notified her that a nebulizer was ordered prior to hospitalization. KERWIN inquired if this needs to be provided to patient prior to dc. Dr. Prince felt it could be set up outpatient.    KERWIN updated MORALES GarciaW, BACS who is on call over the weekend.

## 2023-06-09 NOTE — CODE/ RAPID DOCUMENTATION
RAPID RESPONSE RESPIRATORY THERAPY NOTE             Code Status: DNR   : 1940  Bed: 69770/50525 A:   MRN: [id number]  Time page Received: 1513  Time Rapid Response RT at Bedside: 1515  Time Rapid Response RT left Bedside: 1532    SITUATION    Evaluated patient for: Circulatory & Respiratory    BACKGROUND    Why is the patient in the hospital?: Acute pancreatitis    Patient has a past medical history of Anticoagulant long-term use, Atrial fibrillation, Crohn disease, GERD (gastroesophageal reflux disease), Hyperlipidemia, Hypertension, Pancreatic adenocarcinoma, and Thyroid disease.    24 Hours Vitals Range:  Temp:  [97.5 °F (36.4 °C)-98.6 °F (37 °C)]   Pulse:  []   Resp:  [16-18]   BP: (136-181)/(62-77)   SpO2:  [92 %-98 %]     Labs:    Recent Labs     23  0046 23  0657    141   K 3.4* 3.3*    102   CO2 24 29   CREATININE 0.8 0.7   * 112*   PHOS  --  3.3   MG  --  1.2*        No results for input(s): PH, PCO2, PO2, HCO3, POCSATURATED, BE in the last 72 hours.    ASSESSMENT/INTERVENTIONS  Rapid response paged in ultrasound for pt with increasing SOB. On arrival, pt states she believes it is from her atrial fibrillation, and that the SOB has improved with addition of oxygen. Pt was transported back to her hospital room for further evaluation.    Last Vitals: Temp: 98.6 °F (37 °C) (1200)  Pulse: 130 ( 1451)  Resp: 16 (1200)  BP: 166/70 (1200)  SpO2: 92 % (1200)  Level of Consciousness: Level of Consciousness (AVPU): alert  Respiratory Effort: Respiratory Effort: Short of breath  Expansion/Accessory Muscle Usage: Expansion/Accessory Muscles/Retractions: no retractions, no use of accessory muscles  All Lung Field Breath Sounds: All Lung Fields Breath Sounds: diminished, equal bilaterally  O2 Device/Concentration: NC 2L  NIPPV: No Surgical airway: No Vent: No  ETCO2 monitored:    Ambu at bedside:      Active Orders   Respiratory Care    Oxygen  Continuous     Frequency: Continuous     Number of Occurrences: Until Specified     Order Questions:      Device type: Low flow      Device: Nasal Cannula (1- 5 Liters)      LPM: 2      Titrate O2 per Oxygen Titration Protocol: Yes      To maintain SpO2 goal of: >= 90%      Notify MD of: Inability to achieve desired SpO2; Sudden change in patient status and requires 20% increase in FiO2; Patient requires >60% FiO2       RECOMMENDATIONS  ?  We recommend: RRT Recs: Continue POC per primary team.    ESCALATION    No escalation from RT    FOLLOW-UP    Disposition:  TX back to [id number]    Please call back the Rapid Response RT, Jong Vega, RRT at x [phone] for any questions or concerns.

## 2023-06-09 NOTE — TREATMENT PLAN
AES Treatment Plan    Pauline Cronin is a 83 y.o. female admitted to hospital 6/8/2023 (Hospital Day: 2) due to Acute pancreatitis.     Interval History  AES re-contacted for assistance with management of pancreatitis. This morning at bedside, patient denies abdominal pain and further vomiting. She reports nausea controlled with PRN anti-emetics and is able to tolerate clear liquids and small bites of crackers. Vital signs stable on 2L nasal cannula. Labs notable for normal WBC, normal renal function, and normal LFT's.     Objective  Temp:  [97.5 °F (36.4 °C)-98.5 °F (36.9 °C)] 98.5 °F (36.9 °C) (06/09 0800)  Pulse:  [] 70 (06/09 1000)  BP: (133-203)/(62-89) 181/77 (06/09 0800)  Resp:  [16-18] 17 (06/09 0800)  SpO2:  [89 %-98 %] 97 % (06/09 0833)    General: Alert, Oriented x3, no distress  Abdomen: Non-distended. Normal tympany. Soft. Non-tender. No peritoneal signs.  MSK: No edema of extremities.     Laboratory  Lab Results   Component Value Date    WBC 8.23 06/09/2023    HGB 12.9 06/09/2023    HCT 40.9 06/09/2023    MCV 93 06/09/2023     (L) 06/09/2023       Lab Results   Component Value Date    ALT 11 06/09/2023    AST 19 06/09/2023    ALKPHOS 79 06/09/2023    BILITOT 0.7 06/09/2023     Assessment  This is an 83 year old female with a PMH significant for atrial fibrillation (on Apixaban), reported Crohn's disease (unspecified disease involvement; status post reported small bowel resection in 1960's; not currently on therapy), pancreatic adenocarcinoma (diagnosed in 03/2023, associated with biliary stricture requiring biliary stent placement at that time, and status post initiation of chemotherapy in 05/2023), and sick sinus syndrome (status post pacemaker) who was admitted to [facility] on 06/08 for pancreatitis. Presentation notable for imaging showing enlarged lesion in pancreatic body abutting gastric wall with stable appearing pancreatic head mass. AES consulted with suspicion for pseudocyst;  however, lesion is currently too small to drain endoscopically. She is status post initiation of IVF, pain control, and anti-emetics with improvement in symptoms.      Recommendations    -Continue IVF with LR, pain control and anti-emetics PRN.   -Okay to advance diet to clear liquids today and monitor symptoms.   -Repeat CT A/P in 6 weeks to re-assess pancreatic fluid collection.     Thank you for involving us in the care of Pauline Cronin. Please call with any additional questions, concerns or changes in the patient's clinical status.        Kiel Kohler MD, PGY-V  Gastroenterology Fellow  [facility]

## 2023-06-09 NOTE — ASSESSMENT & PLAN NOTE
Diagnosed in 3/2023. Patient has had once cycle of chemotherapy 5/1,, tolerated poorly - immediately after therapy presented to the ED with nausea and vomiting. Follows with Dr. Hill. Planning for second cycle later this month. Patient was supposed to have a port placed prior to second cycle.

## 2023-06-09 NOTE — ASSESSMENT & PLAN NOTE
83F with pancreatic cancer, recent hx of pancreatitis in May presents with acute onset of nausea, vomiting, and severe upper abdominal pain. Vitals are stable, no leukocytosis, lipase elevated to 185. CT A/P shows interval growth of her cancer with c/f possible invasion of the stomach.     Plan:  Overall, symptoms improving  -gentle IVF given pt's hx of acute respiratory failure due to volume overload.  -IV morphine PRN pain control  -advance diet as tolerated  -RUQ to evaluate for gallstones (may be component of gallstone pancreatitis)

## 2023-06-10 PROBLEM — E44.0 MODERATE MALNUTRITION: Status: ACTIVE | Noted: 2023-06-10

## 2023-06-10 LAB
ALBUMIN SERPL BCP-MCNC: 2.7 G/DL (ref 3.5–5.2)
ALP SERPL-CCNC: 100 U/L (ref 55–135)
ALT SERPL W/O P-5'-P-CCNC: 11 U/L (ref 10–44)
ANION GAP SERPL CALC-SCNC: 10 MMOL/L (ref 8–16)
AST SERPL-CCNC: 23 U/L (ref 10–40)
BASOPHILS # BLD AUTO: 0.03 K/UL (ref 0–0.2)
BASOPHILS NFR BLD: 0.5 % (ref 0–1.9)
BILIRUB SERPL-MCNC: 0.6 MG/DL (ref 0.1–1)
BUN SERPL-MCNC: 7 MG/DL (ref 8–23)
CALCIUM SERPL-MCNC: 9.7 MG/DL (ref 8.7–10.5)
CHLORIDE SERPL-SCNC: 106 MMOL/L (ref 95–110)
CO2 SERPL-SCNC: 22 MMOL/L (ref 23–29)
CREAT SERPL-MCNC: 0.7 MG/DL (ref 0.5–1.4)
DIFFERENTIAL METHOD: ABNORMAL
EOSINOPHIL # BLD AUTO: 0.3 K/UL (ref 0–0.5)
EOSINOPHIL NFR BLD: 4.5 % (ref 0–8)
ERYTHROCYTE [DISTWIDTH] IN BLOOD BY AUTOMATED COUNT: 22.2 % (ref 11.5–14.5)
EST. GFR  (NO RACE VARIABLE): >60 ML/MIN/1.73 M^2
GLUCOSE SERPL-MCNC: 90 MG/DL (ref 70–110)
HCT VFR BLD AUTO: 42.1 % (ref 37–48.5)
HGB BLD-MCNC: 13.5 G/DL (ref 12–16)
IMM GRANULOCYTES # BLD AUTO: 0.02 K/UL (ref 0–0.04)
IMM GRANULOCYTES NFR BLD AUTO: 0.3 % (ref 0–0.5)
LYMPHOCYTES # BLD AUTO: 1 K/UL (ref 1–4.8)
LYMPHOCYTES NFR BLD: 17.2 % (ref 18–48)
MAGNESIUM SERPL-MCNC: 2.2 MG/DL (ref 1.6–2.6)
MCH RBC QN AUTO: 29.3 PG (ref 27–31)
MCHC RBC AUTO-ENTMCNC: 32.1 G/DL (ref 32–36)
MCV RBC AUTO: 91 FL (ref 82–98)
MONOCYTES # BLD AUTO: 0.5 K/UL (ref 0.3–1)
MONOCYTES NFR BLD: 8.9 % (ref 4–15)
NEUTROPHILS # BLD AUTO: 4.2 K/UL (ref 1.8–7.7)
NEUTROPHILS NFR BLD: 68.6 % (ref 38–73)
NRBC BLD-RTO: 0 /100 WBC
PHOSPHATE SERPL-MCNC: 2.8 MG/DL (ref 2.7–4.5)
PLATELET # BLD AUTO: 169 K/UL (ref 150–450)
PMV BLD AUTO: 10.8 FL (ref 9.2–12.9)
POTASSIUM SERPL-SCNC: 4 MMOL/L (ref 3.5–5.1)
PROT SERPL-MCNC: 6.9 G/DL (ref 6–8.4)
RBC # BLD AUTO: 4.61 M/UL (ref 4–5.4)
SODIUM SERPL-SCNC: 138 MMOL/L (ref 136–145)
WBC # BLD AUTO: 6.06 K/UL (ref 3.9–12.7)

## 2023-06-10 PROCEDURE — 25000003 PHARM REV CODE 250

## 2023-06-10 PROCEDURE — 25000003 PHARM REV CODE 250: Performed by: NEUROLOGICAL SURGERY

## 2023-06-10 PROCEDURE — 99233 PR SUBSEQUENT HOSPITAL CARE,LEVL III: ICD-10-PCS | Mod: GC,,, | Performed by: STUDENT IN AN ORGANIZED HEALTH CARE EDUCATION/TRAINING PROGRAM

## 2023-06-10 PROCEDURE — 84100 ASSAY OF PHOSPHORUS: CPT | Performed by: NEUROLOGICAL SURGERY

## 2023-06-10 PROCEDURE — 20600001 HC STEP DOWN PRIVATE ROOM

## 2023-06-10 PROCEDURE — 83735 ASSAY OF MAGNESIUM: CPT | Performed by: NEUROLOGICAL SURGERY

## 2023-06-10 PROCEDURE — 25000003 PHARM REV CODE 250: Performed by: INTERNAL MEDICINE

## 2023-06-10 PROCEDURE — 80053 COMPREHEN METABOLIC PANEL: CPT | Performed by: NEUROLOGICAL SURGERY

## 2023-06-10 PROCEDURE — 63600175 PHARM REV CODE 636 W HCPCS: Performed by: NEUROLOGICAL SURGERY

## 2023-06-10 PROCEDURE — 36415 COLL VENOUS BLD VENIPUNCTURE: CPT | Performed by: NEUROLOGICAL SURGERY

## 2023-06-10 PROCEDURE — 85025 COMPLETE CBC W/AUTO DIFF WBC: CPT | Performed by: NEUROLOGICAL SURGERY

## 2023-06-10 PROCEDURE — S0030 INJECTION, METRONIDAZOLE: HCPCS

## 2023-06-10 PROCEDURE — 63600175 PHARM REV CODE 636 W HCPCS

## 2023-06-10 PROCEDURE — 99233 SBSQ HOSP IP/OBS HIGH 50: CPT | Mod: GC,,, | Performed by: STUDENT IN AN ORGANIZED HEALTH CARE EDUCATION/TRAINING PROGRAM

## 2023-06-10 RX ORDER — POLYETHYLENE GLYCOL 3350 17 G/17G
17 POWDER, FOR SOLUTION ORAL DAILY PRN
Status: DISCONTINUED | OUTPATIENT
Start: 2023-06-10 | End: 2023-06-11 | Stop reason: HOSPADM

## 2023-06-10 RX ORDER — METRONIDAZOLE 500 MG/100ML
500 INJECTION, SOLUTION INTRAVENOUS
Status: DISCONTINUED | OUTPATIENT
Start: 2023-06-10 | End: 2023-06-11 | Stop reason: HOSPADM

## 2023-06-10 RX ORDER — LOPERAMIDE HYDROCHLORIDE 2 MG/1
2 CAPSULE ORAL 4 TIMES DAILY PRN
Status: DISCONTINUED | OUTPATIENT
Start: 2023-06-10 | End: 2023-06-11 | Stop reason: HOSPADM

## 2023-06-10 RX ORDER — LOPERAMIDE HYDROCHLORIDE 2 MG/1
2 CAPSULE ORAL ONCE AS NEEDED
Status: COMPLETED | OUTPATIENT
Start: 2023-06-10 | End: 2023-06-10

## 2023-06-10 RX ORDER — AMOXICILLIN 250 MG
1 CAPSULE ORAL 2 TIMES DAILY PRN
Status: DISCONTINUED | OUTPATIENT
Start: 2023-06-10 | End: 2023-06-11 | Stop reason: HOSPADM

## 2023-06-10 RX ORDER — ENOXAPARIN SODIUM 100 MG/ML
1 INJECTION SUBCUTANEOUS EVERY 12 HOURS
Status: DISCONTINUED | OUTPATIENT
Start: 2023-06-10 | End: 2023-06-10

## 2023-06-10 RX ADMIN — CEFEPIME 2 G: 2 INJECTION, POWDER, FOR SOLUTION INTRAVENOUS at 11:06

## 2023-06-10 RX ADMIN — CEFTRIAXONE 2 G: 2 INJECTION, POWDER, FOR SOLUTION INTRAMUSCULAR; INTRAVENOUS at 10:06

## 2023-06-10 RX ADMIN — PANCRELIPASE 2 CAPSULE: 24000; 76000; 120000 CAPSULE, DELAYED RELEASE PELLETS ORAL at 12:06

## 2023-06-10 RX ADMIN — AMLODIPINE BESYLATE 5 MG: 5 TABLET ORAL at 10:06

## 2023-06-10 RX ADMIN — METRONIDAZOLE 500 MG: 5 INJECTION, SOLUTION INTRAVENOUS at 09:06

## 2023-06-10 RX ADMIN — CEFEPIME 2 G: 2 INJECTION, POWDER, FOR SOLUTION INTRAVENOUS at 12:06

## 2023-06-10 RX ADMIN — Medication 6 MG: at 12:06

## 2023-06-10 RX ADMIN — SULFASALAZINE 500 MG: 500 TABLET ORAL at 10:06

## 2023-06-10 RX ADMIN — APIXABAN 5 MG: 5 TABLET, FILM COATED ORAL at 09:06

## 2023-06-10 RX ADMIN — APIXABAN 5 MG: 5 TABLET, FILM COATED ORAL at 10:06

## 2023-06-10 RX ADMIN — SENNOSIDES AND DOCUSATE SODIUM 1 TABLET: 50; 8.6 TABLET ORAL at 10:06

## 2023-06-10 RX ADMIN — METRONIDAZOLE 500 MG: 5 INJECTION, SOLUTION INTRAVENOUS at 12:06

## 2023-06-10 RX ADMIN — SOTALOL HYDROCHLORIDE 120 MG: 120 TABLET ORAL at 10:06

## 2023-06-10 RX ADMIN — Medication 6 MG: at 09:06

## 2023-06-10 RX ADMIN — ATORVASTATIN CALCIUM 20 MG: 20 TABLET, FILM COATED ORAL at 10:06

## 2023-06-10 RX ADMIN — LOPERAMIDE HYDROCHLORIDE 2 MG: 2 CAPSULE ORAL at 05:06

## 2023-06-10 RX ADMIN — LEVOTHYROXINE SODIUM 175 MCG: 125 TABLET ORAL at 05:06

## 2023-06-10 RX ADMIN — LOPERAMIDE HYDROCHLORIDE 2 MG: 2 CAPSULE ORAL at 03:06

## 2023-06-10 NOTE — PLAN OF CARE
Recommendations    1. Continue Regular diet with texture per SLP  2. Add Boost Plus TID (Boost GC if Boost plus unavailable)  3. RD to monitor and follow    Goals: Meet % EEN, EPN by RD f/u  Nutrition Goal Status: new  Communication of RD Recs:  (POC)

## 2023-06-10 NOTE — CONSULTS
[facility] - Transplant Stepdown  Adult Nutrition  Consult Note    SUMMARY     Recommendations    1. Continue Regular diet with texture per SLP  2. Add Boost Plus TID (Boost GC if Boost plus unavailable)  3. RD to monitor and follow    Goals: Meet % EEN, EPN by RD f/u  Nutrition Goal Status: new  Communication of RD Recs:  (POC)    Assessment and Plan    Endocrine  Moderate malnutrition  Malnutrition Type:  Context: chronic illness  Level: moderate    Related to (etiology):   Inadequate energy intake    Signs and Symptoms (as evidenced by):   Malnutrition Characteristic Summary:  Energy Intake (Malnutrition): less than 75% for greater than or equal to 1 month  Muscle Mass (Malnutrition): mild depletion    Interventions/Recommendations (treatment strategy):  1. Continue Regular diet with texture per SLP  2. Add Boost Plus TID (Boost GC if Boost plus unavailable)  3. RD to monitor and follow    Nutrition Diagnosis Status:   New    Malnutrition Assessment  Malnutrition Context: chronic illness  Malnutrition Level: moderate    Energy Intake (Malnutrition): less than 75% for greater than or equal to 1 month  Muscle Mass (Malnutrition): mild depletion   Oriental orthodox Region (Muscle Loss): mild depletion  Clavicle Bone Region (Muscle Loss): mild depletion  Clavicle and Acromion Bone Region (Muscle Loss): mild depletion  Dorsal Hand (Muscle Loss): mild depletion  Patellar Region (Muscle Loss): mild depletion  Anterior Thigh Region (Muscle Loss): mild depletion  Posterior Calf Region (Muscle Loss): mild depletion     Reason for Assessment    Reason For Assessment: consult  Diagnosis:  (acute pancreatitis)  Relevant Medical History: HLD, HTN, hypothyroidism, malignant neoplasm of pancreas  Interdisciplinary Rounds: did not attend    General Information Comments:   Pt with hx of s/p pacemaker, HTN, HLD, pancreatic cancer on chemo present for N/V. Pt reports decreased appetite x 1 month PTA. Drink Ensure sometimes. Feeling  hungry during RD visits. Diet advanced to regular diet this AM. Denies N/V/D/C, chewing/swallowing difficulties. Stated  lb. Per wt hx, noted wt loss of 12 lb (7%) from Nov 2022 to April 2023 (5 months), wt stable at 170 since. NFPE completed today, noted mild muscle depletion. Pt meet criteria for moderate malnutrition in the context of chronic illness.     Nutrition Discharge Planning: Pending medical course    Nutrition Risk Screen    Nutrition Risk Screen: no indicators present    Anthropometrics    Temp: 97.7 °F (36.5 °C)  Height Method: Stated  Height: 5' (152.4 cm)  Height (inches): 60 in  Weight Method: Bed Scale  Weight: 77.5 kg (170 lb 13.7 oz)  Weight (lb): 170.86 lb  Ideal Body Weight (IBW), Female: 100 lb  % Ideal Body Weight, Female (lb): 171.3 %  BMI (Calculated): 33.4     Lab/Procedures/Meds    Pertinent Labs Reviewed: reviewed  Pertinent Labs Comments: BUN 7, albumin 2.7  Pertinent Medications Reviewed: reviewed  Pertinent Medications Comments: pancreatic enzymes, atorvastatin, lactated ringers    Estimated/Assessed Needs    Weight Used For Calorie Calculations: 77.1 kg (170 lb)  Energy Calorie Requirements (kcal): 1497 kcal  Energy Need Method: Sabine-St Jeor (PAL 1.3)  Protein Requirements: 68-81g (1.5-1.8g/kg IBW)  Weight Used For Protein Calculations: 45.4 kg (100 lb) (IBW)  Fluid Requirements (mL): 1ml/kcal or per MD  Estimated Fluid Requirement Method: RDA Method  RDA Method (mL): 1497     Nutrition Prescription Ordered    Current Diet Order: CLD    Evaluation of Received Nutrient/Fluid Intake    I/O: + 1.2 L since admit  Energy Calories Required: not meeting needs  Protein Required: not meeting needs  Comments: LBM 6/7  Tolerance: tolerating    Nutrition Risk    Level of Risk/Frequency of Follow-up:  (1x/week)     Monitor and Evaluation    Food and Nutrient Intake: energy intake, food and beverage intake  Food and Nutrient Adminstration: diet order  Physical Activity and Function:  nutrition-related ADLs and IADLs  Anthropometric Measurements: height/length, weight, weight change, body mass index  Biochemical Data, Medical Tests and Procedures: electrolyte and renal panel, gastrointestinal profile, glucose/endocrine profile, inflammatory profile, lipid profile  Nutrition-Focused Physical Findings: overall appearance     Nutrition Follow-Up    RD Follow-up?: Yes

## 2023-06-10 NOTE — ASSESSMENT & PLAN NOTE
83F with pancreatic cancer, recent hx of pancreatitis in May presents with acute onset of nausea, vomiting, and severe upper abdominal pain. Vitals are stable, no leukocytosis, lipase elevated to 185. CT A/P shows interval growth of her cancer with c/f possible invasion of the stomach. RUQ US shows gallstones and some sludge as well as borderline gallbladder wall edema. Per gen surg, presentation not likely to be acute cholecystitis. Patient tolerating solid food.    Plan:  Overall, symptoms improving  -gentle IVF given pt's hx of acute respiratory failure due to volume overload.  -IV morphine PRN pain control  -gen surg consulted

## 2023-06-10 NOTE — CARE UPDATE
RAPID RESPONSE NURSE ROUND       Rounding completed with charge RN, Keeley whitley.fib reports patient without symptoms, rate controlled at rest. No additional concerns verbalized at this time. Instructed to call [phone] for further concerns or assistance.

## 2023-06-10 NOTE — SUBJECTIVE & OBJECTIVE
Interval History: Patient reports improved nausea and abdominal pain. She is hungry and would like to advance her diet. She also wants to go home.     Oncology Treatment Plan:   OP PANC NAB-PACLITAXEL + GEMCITABINE Q4W    Medications:  Continuous Infusions:   lactated ringers       Scheduled Meds:   amLODIPine  5 mg Oral Daily    atorvastatin  20 mg Oral Daily    ceFEPime (MAXIPIME) IVPB  2 g Intravenous Q12H    enoxparin  1 mg/kg Subcutaneous Q12H (prophylaxis, 0900/2100)    levothyroxine  175 mcg Oral Before breakfast    lipase-protease-amylase 24,000-76,000-120,000 units  2 capsule Oral TID WM    metronidazole  500 mg Intravenous Q8H    polyethylene glycol  17 g Oral Daily    senna-docusate 8.6-50 mg  1 tablet Oral BID    sotaloL  120 mg Oral Daily     PRN Meds:acetaminophen, dextrose 10%, dextrose 10%, dextrose, dextrose, glucagon (human recombinant), HYDROmorphone, labetalol, melatonin, morphine, naloxone, ondansetron, prochlorperazine, sodium chloride 0.9%     Review of Systems   Constitutional:  Negative for chills and fever.   HENT:  Negative for rhinorrhea and sore throat.    Eyes:  Negative for visual disturbance.   Respiratory:  Negative for cough and shortness of breath.    Cardiovascular:  Negative for chest pain and leg swelling.   Gastrointestinal:  Negative for abdominal pain, diarrhea, nausea and vomiting.   Genitourinary:  Negative for difficulty urinating and dysuria.   Musculoskeletal:  Negative for arthralgias and myalgias.   Skin:  Negative for rash.   Neurological:  Negative for light-headedness and headaches.   Psychiatric/Behavioral:  Negative for agitation and confusion.    Objective:     Vital Signs (Most Recent):  Temp: 98.3 °F (36.8 °C) (06/10/23 1134)  Pulse: 71 (06/10/23 1134)  Resp: 18 (06/10/23 1134)  BP: 139/65 (06/10/23 1134)  SpO2: (!) 90 % (06/10/23 1134) Vital Signs (24h Range):  Temp:  [97.7 °F (36.5 °C)-98.3 °F (36.8 °C)] 98.3 °F (36.8 °C)  Pulse:  [] 71  Resp:  [13-29]  18  SpO2:  [90 %-97 %] 90 %  BP: (109-154)/(61-89) 139/65     Weight: 77.5 kg (170 lb 13.7 oz)  Body mass index is 33.37 kg/m².  Body surface area is 1.81 meters squared.      Intake/Output Summary (Last 24 hours) at 6/10/2023 1429  Last data filed at 6/10/2023 1120  Gross per 24 hour   Intake 3132.42 ml   Output 1100 ml   Net 2032.42 ml          Physical Exam  Constitutional:       General: She is not in acute distress.  HENT:      Head: Normocephalic and atraumatic.      Right Ear: External ear normal.      Left Ear: External ear normal.      Nose: Nose normal.      Mouth/Throat:      Mouth: Mucous membranes are moist.      Pharynx: Oropharynx is clear.   Eyes:      General: No scleral icterus.     Extraocular Movements: Extraocular movements intact.      Conjunctiva/sclera: Conjunctivae normal.   Cardiovascular:      Rate and Rhythm: Normal rate and regular rhythm.   Pulmonary:      Effort: Pulmonary effort is normal.      Breath sounds: Normal breath sounds. No wheezing or rales.   Abdominal:      General: Abdomen is flat. There is no distension.      Palpations: Abdomen is soft.      Tenderness: There is no abdominal tenderness.   Musculoskeletal:         General: Normal range of motion.      Cervical back: Normal range of motion and neck supple.   Skin:     General: Skin is warm and dry.   Neurological:      General: No focal deficit present.      Mental Status: She is alert.   Psychiatric:         Mood and Affect: Mood normal.         Behavior: Behavior normal.        Significant Labs:   CBC:   Recent Labs   Lab 06/09/23  0657 06/10/23  0620   WBC 8.23 6.06   HGB 12.9 13.5   HCT 40.9 42.1   * 169      and CMP:   Recent Labs   Lab 06/09/23  0657 06/10/23  0620    138   K 3.3* 4.0    106   CO2 29 22*   * 90   BUN 4* 7*   CREATININE 0.7 0.7   CALCIUM 9.5 9.7   PROT 6.5 6.9   ALBUMIN 2.7* 2.7*   BILITOT 0.7 0.6   ALKPHOS 79 100   AST 19 23   ALT 11 11   ANIONGAP 10 10         Diagnostic  Results:  I have reviewed all pertinent imaging results/findings within the past 24 hours.

## 2023-06-10 NOTE — PROGRESS NOTES
Pharmacist Intervention IV to PO Note    Pauline Cronin is a 83 y.o. female being treated with IV medication metronidazole    Patient Data:    Vital Signs (Most Recent):  Temp: 98.3 °F (36.8 °C) (06/10/23 1134)  Pulse: 71 (06/10/23 1134)  Resp: 18 (06/10/23 1134)  BP: 139/65 (06/10/23 1134)  SpO2: (!) 90 % (06/10/23 1134) Vital Signs (72h Range):  Temp:  [97.5 °F (36.4 °C)-98.6 °F (37 °C)]   Pulse:  []   Resp:  [13-29]   BP: (109-222)/(61-89)   SpO2:  [89 %-98 %]      CBC:  Recent Labs   Lab 06/08/23  0046 06/09/23  0657 06/10/23  0620   WBC 8.74 8.23 6.06   RBC 4.54 4.38 4.61   HGB 13.2 12.9 13.5   HCT 42.3 40.9 42.1    144* 169   MCV 93 93 91   MCH 29.1 29.5 29.3   MCHC 31.2* 31.5* 32.1     CMP:     Recent Labs   Lab 06/08/23  0046 06/09/23  0657 06/10/23  0620   * 112* 90   CALCIUM 9.7 9.5 9.7   ALBUMIN 3.1* 2.7* 2.7*   PROT 6.8 6.5 6.9    141 138   K 3.4* 3.3* 4.0   CO2 24 29 22*    102 106   BUN 9 4* 7*   CREATININE 0.8 0.7 0.7   ALKPHOS 88 79 100   ALT 15 11 11   AST 29 19 23   BILITOT 0.5 0.7 0.6       Dietary Orders:  Diet Orders            Diet Adult Regular (IDDSI Level 7): Regular starting at 06/10 1000            Based on the following criteria, this patient qualifies for intravenous to oral conversion:  [x] The patients gastrointestinal tract is functioning (tolerating medications via oral or enteral route for 24 hours and tolerating food or enteral feeds for 24 hours).  [x] The patient is hemodynamically stable for 24 hours (heart rate <100 beats per minute, systolic blood pressure >99 mm Hg, and respiratory rate <20 breaths per minute).  [x] The patient shows clinical improvement (afebrile for at least 24 hours and white blood cell count downtrending or normalized). Additionally, the patient must be non-neutropenic (absolute neutrophil count >500 cells/mm3).  [x] For antimicrobials, the patient has received IV therapy for at least 24 hours.    IV medication  metronidazole 500 mg Q8 will be changed to oral medication metronidazole 500 mg tablet PO Q8    Pharmacist's Name: Robyn Singh  Pharmacist's Extension: [phone]

## 2023-06-10 NOTE — CONSULTS
[facility]  General Surgery  Consult Note    Inpatient consult to General Surgery  Consult performed by: Maria Alejandra Clemons MD  Consult ordered by: Darrell Pinedo MD  Reason for consult: concern for possible acute cholecystitis  Assessment/Recommendations: 84yo F w/PMH pancreatic cancer (received 1 round of chemo in May but did not tolerate well, borderline resectable), Crohn's disease, GERD, HLD, HTN, thyroid disease, afib (on eliquis, last took 6/10) who general surgery is consulted for concern for acute cholecystitis given mild gallbladder wall thickening on US    - patient states she has no pain on exam this morning although was mildly tender to palpation in the RUQ which she states is chronic for her, US shows gallbladder thickened to 0.36cm, US in April showed GB was 0.34cm; WBC within normal limits with no left shift  - given lack of symptoms and equivocal imaging, low suspicion that patient has acute cholecystitis; can consider obtaining HIDA scan to rule out cholecystitis given patient's cardiac co-morbidities, pancreatic cancer, anticoagulation and her desires to avoid surgery if at all possible, discussed this with patient but she is against further imaging or procedures  - patient with history of pancreatitis secondary to pancreatic cancer, gallstones noted on imaging however favor that her pancreatitis is secondary to pancreatic cancer, less likely secondary to gallstones; would not recommend cholecystectomy given co-morbidities, pancreatic cancer and anticoagulation  - general surgery will examine the patient again in the morning, if patient remains asymptomatic, will sign off at that time, please call with questions      Subjective:     Chief Complaint/Reason for Admission: pancreatitis    History of Present Illness: Ms Cronin is an 84yo F with PMH afib (on eliquis, last took 6/10), pancreatic cancer (received 1 round of chemo in May but did not tolerate well, borderline resectable),  Crohn's disease, GERD, HLD, HTN, thyroid disease who is admitted to medicine for pancreatitis secondary to pancreatic cancer. Patient had an episode yesterday where she became short of breath with abdominal pain which prompted a gallbladder US. Today, patient states that her pain has resolved, no nausea or vomiting since yesterday, afebrile. She is tolerating a regular diet.     Workup for the gallbladder shows gallstones with mild gallbladder wall thickening, noted to be 4mm on the report but measures 0.36mm on imaging. No pericholecystic fluid or other inflammatory changes. WBC within normal limits at 6.06, no left shift.    No current facility-administered medications on file prior to encounter.     Current Outpatient Medications on File Prior to Encounter   Medication Sig    acetaminophen (TYLENOL) 325 MG tablet Take 650 mg by mouth daily as needed (Headache).    albuterol (PROVENTIL) 2.5 mg /3 mL (0.083 %) nebulizer solution Take 3 mLs (2.5 mg total) by nebulization every 6 (six) hours as needed for Wheezing or Shortness of Breath. Rescue    alendronate (FOSAMAX) 70 MG tablet Take 1 tablet (70 mg total) by mouth every 7 days.    amLODIPine (NORVASC) 5 MG tablet Take 1 tablet (5 mg total) by mouth once daily.    atorvastatin (LIPITOR) 20 MG tablet Take 1 tablet (20 mg total) by mouth once daily.    colestipoL (COLESTID) 1 gram Tab Take 1 tablet (1 g total) by mouth 2 (two) times daily.    diphenoxylate-atropine 2.5-0.025 mg (LOMOTIL) 2.5-0.025 mg per tablet TAKE 1 TABLET BY MOUTH 4 TIMES DAILY AS NEEDED FOR DIARRHEA    ELIQUIS 5 mg Tab Take 1 tablet (5 mg total) by mouth 2 (two) times daily.    esomeprazole (NEXIUM) 40 MG capsule Take 1 capsule (40 mg total) by mouth once daily.    folic acid (FOLVITE) 1 MG tablet Take 1 tablet by mouth once daily    furosemide (LASIX) 40 MG tablet Take 1 tablet (40 mg total) by mouth daily as needed (For weight gain > 3 lb in one day, increasing leg swelling, or increasing  SOB).    levothyroxine (SYNTHROID, LEVOTHROID) 175 MCG tablet Take 1 tablet by mouth once daily    LIDOcaine-prilocaine (EMLA) cream Apply topically as needed (place to port site 45-60 minutes prior to port access). (Patient not taking: Reported on 5/26/2023)    lipase-protease-amylase 24,000-76,000-120,000 units (CREON) 24,000-76,000 -120,000 unit capsule Take 2 capsules by mouth 3 (three) times daily with meals.    MELATONIN ORAL Take 1 tablet by mouth nightly as needed (Insomnia).    sotaloL (BETAPACE) 120 MG Tab Take 1 tablet (120 mg total) by mouth 2 (two) times daily.    sulfaSALAzine (AZULFIDINE) 500 mg Tab Take 1 tablet by mouth twice daily    tolterodine (DETROL LA) 4 MG 24 hr capsule Take 1 capsule by mouth once daily       Review of patient's allergies indicates:   Allergen Reactions    Lisinopril Other (See Comments)     cough       Past Medical History:   Diagnosis Date    Anticoagulant long-term use     Atrial fibrillation     Crohn disease diagnosed in her 20's    GERD (gastroesophageal reflux disease)     Hyperlipidemia     Hypertension     Pancreatic adenocarcinoma 3/21/2023    Thyroid disease     s/p I 131     Past Surgical History:   Procedure Laterality Date    APPENDECTOMY      CARDIAC SURGERY  2014    pacemaker    COLONOSCOPY  ~2008    normal findings per patient report    ENDOSCOPIC ULTRASOUND OF UPPER GASTROINTESTINAL TRACT N/A 3/14/2023    Procedure: ULTRASOUND, UPPER GI TRACT, ENDOSCOPIC;  Surgeon: Andrei Swartz MD;  Location: [facility];  Service: Endoscopy;  Laterality: N/A;  Approval to hold Eliquis rec'd from Dr. Barbosa (see telephone encounter 3/3/23)-DS  [employer] AICD/PPM  3/3/23-Instructions via portal-DS    ERCP N/A 3/21/2023    Procedure: ERCP (ENDOSCOPIC RETROGRADE CHOLANGIOPANCREATOGRAPHY);  Surgeon: Celina Toney MD;  Location: [facility] ([address] [place] [address]);  Service: Endoscopy;  Laterality: N/A;    ESOPHAGOGASTRODUODENOSCOPY N/A 7/17/2018    Procedure: EGD  (ESOPHAGOGASTRODUODENOSCOPY);  Surgeon: Alondra Casiano MD;  Location: [facility] ENDO;  Service: Endoscopy;  Laterality: N/A;    HYSTERECTOMY      INSERTION OF IMPLANTABLE CARDIOVERTER-DEFIBRILLATOR (ICD) GENERATOR WITH TWO EXISTING LEADS Left 5/10/2021    Procedure: INSERTION, PULSE GENERATOR WITH 2 EXISTING LEADS, ICD;  Surgeon: Bo Leone MD;  Location[facility] CATH LAB;  Service: Cardiology;  Laterality: Left;    INSERTION OF PACEMAKER      REPLACEMENT OF PACEMAKER GENERATOR  05/10/2021    RETROGRADE PYELOGRAPHY Right 2020    Procedure: PYELOGRAM, RETROGRADE;  Surgeon: Jovan Kruse MD;  Location: [facility] OR;  Service: Urology;  Laterality: Right;    SMALL INTESTINE SURGERY  in her 20's    for crohn's    TONSILLECTOMY      UPPER GASTROINTESTINAL ENDOSCOPY  ~    normal findings per patient report     Family History       Problem Relation (Age of Onset)    Breast cancer Mother    Cancer Mother    Crohn's disease Other          Tobacco Use    Smoking status: Former     Types: Cigarettes     Quit date:      Years since quittin.4    Smokeless tobacco: Never   Substance and Sexual Activity    Alcohol use: No    Drug use: No    Sexual activity: Never     Review of Systems   Constitutional:  Negative for chills and fever.   Respiratory:  Positive for shortness of breath. Negative for chest tightness.    Cardiovascular:  Negative for chest pain.   Gastrointestinal:  Positive for diarrhea (chronic). Negative for abdominal distention, abdominal pain, constipation, nausea and vomiting.   Genitourinary:  Negative for dysuria and hematuria.   Neurological:  Negative for dizziness and headaches.   Psychiatric/Behavioral:  Negative for agitation and confusion.    Objective:     Vital Signs (Most Recent):  Temp: 98.3 °F (36.8 °C) (06/10/23 1134)  Pulse: 71 (06/10/23 1134)  Resp: 18 (06/10/23 1134)  BP: 139/65 (06/10/23 1134)  SpO2: (!) 90 % (06/10/23 1134) Vital Signs (24h Range):  Temp:  [97.7 °F (36.5  °C)-98.3 °F (36.8 °C)] 98.3 °F (36.8 °C)  Pulse:  [] 71  Resp:  [13-29] 18  SpO2:  [90 %-97 %] 90 %  BP: (109-154)/(61-89) 139/65     Weight: 77.5 kg (170 lb 13.7 oz)  Body mass index is 33.37 kg/m².      Intake/Output Summary (Last 24 hours) at 6/10/2023 1251  Last data filed at 6/10/2023 1120  Gross per 24 hour   Intake 4019.85 ml   Output 1400 ml   Net 2619.85 ml       Physical Exam  HENT:      Head: Normocephalic and atraumatic.   Cardiovascular:      Rate and Rhythm: Normal rate.   Pulmonary:      Effort: Pulmonary effort is normal. No respiratory distress.   Abdominal:      General: There is no distension.      Palpations: Abdomen is soft.      Comments: Mild tenderness to palpation in RUQ   Skin:     General: Skin is warm and dry.      Capillary Refill: Capillary refill takes less than 2 seconds.   Neurological:      General: No focal deficit present.      Mental Status: She is alert.       Significant Labs:  CBC:   Recent Labs   Lab 06/10/23  0620   WBC 6.06   RBC 4.61   HGB 13.5   HCT 42.1      MCV 91   MCH 29.3   MCHC 32.1     CMP:   Recent Labs   Lab 06/10/23  0620   GLU 90   CALCIUM 9.7   ALBUMIN 2.7*   PROT 6.9      K 4.0   CO2 22*      BUN 7*   CREATININE 0.7   ALKPHOS 100   ALT 11   AST 23   BILITOT 0.6       Significant Diagnostics:  I have reviewed all pertinent imaging results/findings within the past 24 hours.    Assessment/Plan:   84yo F w/PMH pancreatic cancer (received 1 round of chemo in May but did not tolerate well, borderline resectable), Crohn's disease, GERD, HLD, HTN, thyroid disease, afib (on eliquis, last took 6/10) who general surgery is consulted for concern for acute cholecystitis given mild gallbladder wall thickening on US    - patient states she has no pain on exam this morning although was mildly tender to palpation in the RUQ which she states is chronic for her, US shows gallbladder thickened to 0.36cm, US in April showed GB was 0.34cm; WBC within  normal limits with no left shift    - given lack of symptoms and equivocal imaging, low suspicion that patient has acute cholecystitis; can consider obtaining HIDA scan to rule out cholecystitis given patient's cardiac co-morbidities, pancreatic cancer, anticoagulation and her desires to avoid surgery if at all possible, discussed this with patient but she is against further imaging or procedures, does not desire surgery     - patient with history of pancreatitis secondary to pancreatic cancer, gallstones noted on imaging however favor that her pancreatitis is secondary to pancreatic cancer, less likely secondary to gallstones; would not recommend cholecystectomy given co-morbidities, pancreatic cancer and anticoagulation    - general surgery will examine the patient again in the morning, if patient asymptomatic, will sign off at that time, please call with questions      Thank you for your consult. I will follow-up with patient. Please contact us if you have any additional questions.    Maria Alejandra Clemons MD  General Surgery  [facility]

## 2023-06-10 NOTE — PROGRESS NOTES
[facility]  Hematology/Oncology  Progress Note    Patient Name: Pauline Cronin  Admission Date: 6/8/2023  Hospital Length of Stay: 2 days  Code Status: DNR     Subjective:     HPI:  Ms Cronin is an 83F with PMH pAF, SSS s/p pacemaker, HTN, HLD, pancreatic adenocarcinoma s/p stenting s/p 1 cycle chemotherapy 5/1 who presents with intractable nausea/vomiting. She reports that around 7PM last night she had the onset of nausea and vomiting and was not able to keep anything down. She also had severe stabbing upper abdominal pain. She was hospitalized following her chemotherapy treatment last month for similar symptoms and was found to have pancreatitis. Of note, she developed respiratory decompensation due to fluid overload during that hospitalization, and she required diuretics and breathing treatments.     In the ED, patient is afebrile, VSS. Labs notable for no leukocytosis, K 3.4, and lipase 185. Patient given 500cc LR and IV pain control. Patient will be admitted for management of acute pancreatitis.    Oncology History   Malignant neoplasm of pancreas   3/21/2023 Initial Diagnosis     Pancreatic adenocarcinoma      5/1/2023 -  Chemotherapy     Treatment Summary   Plan Name: OP PANC NAB-PACLITAXEL + GEMCITABINE Q4W  Treatment Goal: Palliative  Status: Active  Start Date: 5/1/2023  End Date: 3/14/2024 (Planned)  Provider: Jim Hill MD  Chemotherapy: gemcitabine (GEMZAR) 1,400 mg in sodium chloride 0.9% SolP 321.82 mL chemo infusion, 1,448 mg (100 % of original dose 800 mg/m2), Intravenous, Clinic/HOD 1 time, 1 of 12 cycles  Dose modification: 800 mg/m2 (original dose 800 mg/m2, Cycle 1, Reason: Other (see comments), Comment: poor PS)       Interval History: Patient reports improved nausea and abdominal pain. She is hungry and would like to advance her diet. She also wants to go home.     Oncology Treatment Plan:   OP PANC NAB-PACLITAXEL + GEMCITABINE Q4W    Medications:  Continuous  Infusions:   lactated ringers       Scheduled Meds:   amLODIPine  5 mg Oral Daily    atorvastatin  20 mg Oral Daily    ceFEPime (MAXIPIME) IVPB  2 g Intravenous Q12H    enoxparin  1 mg/kg Subcutaneous Q12H (prophylaxis, 0900/2100)    levothyroxine  175 mcg Oral Before breakfast    lipase-protease-amylase 24,000-76,000-120,000 units  2 capsule Oral TID WM    metronidazole  500 mg Intravenous Q8H    polyethylene glycol  17 g Oral Daily    senna-docusate 8.6-50 mg  1 tablet Oral BID    sotaloL  120 mg Oral Daily     PRN Meds:acetaminophen, dextrose 10%, dextrose 10%, dextrose, dextrose, glucagon (human recombinant), HYDROmorphone, labetalol, melatonin, morphine, naloxone, ondansetron, prochlorperazine, sodium chloride 0.9%     Review of Systems   Constitutional:  Negative for chills and fever.   HENT:  Negative for rhinorrhea and sore throat.    Eyes:  Negative for visual disturbance.   Respiratory:  Negative for cough and shortness of breath.    Cardiovascular:  Negative for chest pain and leg swelling.   Gastrointestinal:  Negative for abdominal pain, diarrhea, nausea and vomiting.   Genitourinary:  Negative for difficulty urinating and dysuria.   Musculoskeletal:  Negative for arthralgias and myalgias.   Skin:  Negative for rash.   Neurological:  Negative for light-headedness and headaches.   Psychiatric/Behavioral:  Negative for agitation and confusion.    Objective:     Vital Signs (Most Recent):  Temp: 98.3 °F (36.8 °C) (06/10/23 1134)  Pulse: 71 (06/10/23 1134)  Resp: 18 (06/10/23 1134)  BP: 139/65 (06/10/23 1134)  SpO2: (!) 90 % (06/10/23 1134) Vital Signs (24h Range):  Temp:  [97.7 °F (36.5 °C)-98.3 °F (36.8 °C)] 98.3 °F (36.8 °C)  Pulse:  [] 71  Resp:  [13-29] 18  SpO2:  [90 %-97 %] 90 %  BP: (109-154)/(61-89) 139/65     Weight: 77.5 kg (170 lb 13.7 oz)  Body mass index is 33.37 kg/m².  Body surface area is 1.81 meters squared.      Intake/Output Summary (Last 24 hours) at 6/10/2023  1429  Last data filed at 6/10/2023 1120  Gross per 24 hour   Intake 3132.42 ml   Output 1100 ml   Net 2032.42 ml          Physical Exam  Constitutional:       General: She is not in acute distress.  HENT:      Head: Normocephalic and atraumatic.      Right Ear: External ear normal.      Left Ear: External ear normal.      Nose: Nose normal.      Mouth/Throat:      Mouth: Mucous membranes are moist.      Pharynx: Oropharynx is clear.   Eyes:      General: No scleral icterus.     Extraocular Movements: Extraocular movements intact.      Conjunctiva/sclera: Conjunctivae normal.   Cardiovascular:      Rate and Rhythm: Normal rate and regular rhythm.   Pulmonary:      Effort: Pulmonary effort is normal.      Breath sounds: Normal breath sounds. No wheezing or rales.   Abdominal:      General: Abdomen is flat. There is no distension.      Palpations: Abdomen is soft.      Tenderness: There is no abdominal tenderness.   Musculoskeletal:         General: Normal range of motion.      Cervical back: Normal range of motion and neck supple.   Skin:     General: Skin is warm and dry.   Neurological:      General: No focal deficit present.      Mental Status: She is alert.   Psychiatric:         Mood and Affect: Mood normal.         Behavior: Behavior normal.        Significant Labs:   CBC:   Recent Labs   Lab 06/09/23  0657 06/10/23  0620   WBC 8.23 6.06   HGB 12.9 13.5   HCT 40.9 42.1   * 169      and CMP:   Recent Labs   Lab 06/09/23  0657 06/10/23  0620    138   K 3.3* 4.0    106   CO2 29 22*   * 90   BUN 4* 7*   CREATININE 0.7 0.7   CALCIUM 9.5 9.7   PROT 6.5 6.9   ALBUMIN 2.7* 2.7*   BILITOT 0.7 0.6   ALKPHOS 79 100   AST 19 23   ALT 11 11   ANIONGAP 10 10         Diagnostic Results:  I have reviewed all pertinent imaging results/findings within the past 24 hours.    Assessment/Plan:     * Acute pancreatitis  83F with pancreatic cancer, recent hx of pancreatitis in May presents with acute onset of  nausea, vomiting, and severe upper abdominal pain. Vitals are stable, no leukocytosis, lipase elevated to 185. CT A/P shows interval growth of her cancer with c/f possible invasion of the stomach. RUQ US shows gallstones and some sludge as well as borderline gallbladder wall edema. Per gen surg, presentation not likely to be acute cholecystitis. Patient tolerating solid food.    Plan:  Overall, symptoms improving  -gentle IVF given pt's hx of acute respiratory failure due to volume overload.  -IV morphine PRN pain control  -gen surg consulted    Malignant neoplasm of pancreas  Diagnosed in 3/2023. Patient has had once cycle of chemotherapy 5/1,, tolerated poorly - immediately after therapy presented to the ED with nausea and vomiting. Follows with Dr. Hill. Planning for second cycle later this month. Patient was supposed to have a port placed prior to second cycle.     Paroxysmal atrial fibrillation  Home meds: sotalol, eliquis  There have been many recent dose changes with sotalol. It was decreased during previous hospitalization d/t low Cr clearance. Outpatient cardiologist subsequently increased it due to lack of insurance coverage for Toprol.    Plan:  -continue home meds    Sick sinus syndrome  S/p pacemaker placement    Hypothyroidism  Continue home levothyroxine    Essential hypertension, benign  Continue home amlodipine    Hyperlipidemia  Continue home statin             BRIELLE MURPHY MD  Hematology/Oncology  [facility]

## 2023-06-10 NOTE — PROGRESS NOTES
Pharmacist Renal Dose Adjustment Note    Pauline Cronin is a 83 y.o. female being treated with the medication Cefepime    Patient Data:    Vital Signs (Most Recent):  Temp: 98.2 °F (36.8 °C) (06/10/23 0533)  Pulse: 78 (06/10/23 0533)  Resp: 18 (06/10/23 0533)  BP: 129/62 (06/10/23 0533)  SpO2: (!) 90 % (06/10/23 0533) Vital Signs (72h Range):  Temp:  [97.5 °F (36.4 °C)-98.6 °F (37 °C)]   Pulse:  []   Resp:  [13-29]   BP: (109-222)/(61-89)   SpO2:  [89 %-98 %]      Recent Labs   Lab 06/08/23  0046 06/09/23  0657 06/10/23  0620   CREATININE 0.8 0.7 0.7     Serum creatinine: 0.7 mg/dL 06/10/23 0620  Estimated creatinine clearance: 57 mL/min    Medication:Cefepime 2 gm IV every 8 hours will be changed to medication:Cefepime 2 gm IV every 12 hours    Pharmacist's Name: Wolf Bah  Pharmacist's Extension: [phone]

## 2023-06-10 NOTE — PROGRESS NOTES
Pharmacist Renal Dose Adjustment Note    Pauline Cronin is a 83 y.o. female being treated with the medication Cefepime    Patient Data:    Vital Signs (Most Recent):  Temp: 98.2 °F (36.8 °C) (06/10/23 0533)  Pulse: 78 (06/10/23 0533)  Resp: 18 (06/10/23 0533)  BP: 129/62 (06/10/23 0533)  SpO2: (!) 90 % (06/10/23 0533) Vital Signs (72h Range):  Temp:  [97.5 °F (36.4 °C)-98.6 °F (37 °C)]   Pulse:  []   Resp:  [13-29]   BP: (109-222)/(61-89)   SpO2:  [89 %-98 %]      Recent Labs   Lab 06/08/23  0046 06/09/23  0657 06/10/23  0620   CREATININE 0.8 0.7 0.7     Serum creatinine: 0.7 mg/dL 06/10/23 0620  Estimated creatinine clearance: 57 mL/min    Medication:Cefepime 2 gm IV every 8 hours will be changed to medication:Cefepime 2 gm IV every 12 hours    Pharmacist's Name: Wolf Bah  Pharmacist's Extension: [phone]

## 2023-06-10 NOTE — PLAN OF CARE
Patient remaining free from injury today.  Patient ambulates independently with standby assist to the restroom.  Patient denies pain.  Patient initiated on IV ABX for gallbladder.  Patient continues at 50cc/hr LR.  Patient tolerating advancement of diet, but then noted 4 bouts of diarrhea.  Patient reports HX of Crohn's and reports this is normal for her and that she takes Lomotil 2xday at home to keep under control.  Dr. Pinedo notified and Lomotil added to her prn medication regiment.  Patient discussed with MD plan of care and reports that she does not wish to have surgery for her gall bladder at this time and will re-visit if condition becomes worse.  Patient states she is hoping to dc tomorrow.

## 2023-06-10 NOTE — ASSESSMENT & PLAN NOTE
Malnutrition Type:  Context: chronic illness  Level: moderate    Related to (etiology):   Inadequate energy intake    Signs and Symptoms (as evidenced by):   Malnutrition Characteristic Summary:  Energy Intake (Malnutrition): less than 75% for greater than or equal to 1 month  Muscle Mass (Malnutrition): mild depletion    Interventions/Recommendations (treatment strategy):  1. Continue Regular diet with texture per SLP  2. Add Boost Plus TID (Boost GC if Boost plus unavailable)  3. RD to monitor and follow    Nutrition Diagnosis Status:   New

## 2023-06-11 VITALS
HEART RATE: 70 BPM | OXYGEN SATURATION: 96 % | RESPIRATION RATE: 17 BRPM | BODY MASS INDEX: 33.55 KG/M2 | WEIGHT: 170.88 LBS | TEMPERATURE: 99 F | HEIGHT: 60 IN | DIASTOLIC BLOOD PRESSURE: 65 MMHG | SYSTOLIC BLOOD PRESSURE: 155 MMHG

## 2023-06-11 PROCEDURE — 63600175 PHARM REV CODE 636 W HCPCS

## 2023-06-11 PROCEDURE — 99239 PR HOSPITAL DISCHARGE DAY,>30 MIN: ICD-10-PCS | Mod: GC,,, | Performed by: STUDENT IN AN ORGANIZED HEALTH CARE EDUCATION/TRAINING PROGRAM

## 2023-06-11 PROCEDURE — S0030 INJECTION, METRONIDAZOLE: HCPCS

## 2023-06-11 PROCEDURE — 25000003 PHARM REV CODE 250

## 2023-06-11 PROCEDURE — 99239 HOSP IP/OBS DSCHRG MGMT >30: CPT | Mod: GC,,, | Performed by: STUDENT IN AN ORGANIZED HEALTH CARE EDUCATION/TRAINING PROGRAM

## 2023-06-11 PROCEDURE — 25000003 PHARM REV CODE 250: Performed by: INTERNAL MEDICINE

## 2023-06-11 RX ORDER — LEVOFLOXACIN 750 MG/1
750 TABLET ORAL DAILY
Qty: 5 TABLET | Refills: 0 | Status: SHIPPED | OUTPATIENT
Start: 2023-06-11 | End: 2023-06-16

## 2023-06-11 RX ORDER — AMOXICILLIN AND CLAVULANATE POTASSIUM 875; 125 MG/1; MG/1
1 TABLET, FILM COATED ORAL 2 TIMES DAILY
Qty: 14 TABLET | Refills: 0 | Status: ON HOLD | OUTPATIENT
Start: 2023-06-11 | End: 2023-08-29 | Stop reason: HOSPADM

## 2023-06-11 RX ORDER — OXYBUTYNIN CHLORIDE 5 MG/1
5 TABLET ORAL 2 TIMES DAILY
Status: DISCONTINUED | OUTPATIENT
Start: 2023-06-11 | End: 2023-06-11 | Stop reason: HOSPADM

## 2023-06-11 RX ADMIN — METRONIDAZOLE 500 MG: 5 INJECTION, SOLUTION INTRAVENOUS at 03:06

## 2023-06-11 RX ADMIN — LOPERAMIDE HYDROCHLORIDE 2 MG: 2 CAPSULE ORAL at 10:06

## 2023-06-11 RX ADMIN — CEFEPIME 2 G: 2 INJECTION, POWDER, FOR SOLUTION INTRAVENOUS at 10:06

## 2023-06-11 RX ADMIN — APIXABAN 5 MG: 5 TABLET, FILM COATED ORAL at 10:06

## 2023-06-11 RX ADMIN — LEVOTHYROXINE SODIUM 175 MCG: 125 TABLET ORAL at 05:06

## 2023-06-11 RX ADMIN — OXYBUTYNIN CHLORIDE 5 MG: 5 TABLET ORAL at 10:06

## 2023-06-11 RX ADMIN — SOTALOL HYDROCHLORIDE 120 MG: 120 TABLET ORAL at 10:06

## 2023-06-11 RX ADMIN — AMLODIPINE BESYLATE 5 MG: 5 TABLET ORAL at 10:06

## 2023-06-11 RX ADMIN — ATORVASTATIN CALCIUM 20 MG: 20 TABLET, FILM COATED ORAL at 10:06

## 2023-06-11 NOTE — PLAN OF CARE
Patient remained free from injury and ambulated independently.  Patient reports not pain.  Patient VSS and patient verbalizes she wants to dc.  Patient continues with IVABX and transitioning to po.  Lomotil taken for diarrhea.  No skin breakdown noted but bruising noted.  Daughter-in-law, Karen to  patient.

## 2023-06-11 NOTE — PROGRESS NOTES
Entered room to present patient with dc papers.  Patient not present and all belongings gone.  [place], [place], noted patient rolled out with family member about 20 minutes prior.  Called daughter-in-law, Karen, who confirmed patient had received home medications per bedside delivery before leaving.  Explained regiment, and Karen verbalized understanding.  Reviewed follow up appts.  Karen states will refer to discharge summary on [facility] if she needs clarification.

## 2023-06-11 NOTE — TREATMENT PLAN
General Surgery Update:   Patient seen and examined. Doing well. Absolutely no abdominal pain or tenderness. Feels well. No nausea or vomiting. Good bowel function.     -okay for d/c  -diet as tolerated    Ga Zavala, PGY V  Surgery

## 2023-06-11 NOTE — DISCHARGE SUMMARY
[facility] Trans[facility] Step[facility]  Hematology/Oncology  Discharge Summary      Patient Name: Pauline Cronin  MRN: [id number]  Admission Date: 6/8/2023  Hospital Length of Stay: 3 days  Discharge Date and Time:  06/11/2023 11:51 AM  Attending Physician: Lesa Lu MD   Discharging Provider: BRIELLE MURPHY MD  Primary Care Provider: Ga Waldrop MD    HPI:   Ms Cronin is an 83F with PMH pAF, SSS s/p pacemaker, HTN, HLD, pancreatic adenocarcinoma s/p stenting s/p 1 cycle chemotherapy 5/1 who presents with intractable nausea/vomiting. She reports that around 7PM last night she had the onset of nausea and vomiting and was not able to keep anything down. She also had severe stabbing upper abdominal pain. She was hospitalized following her chemotherapy treatment last month for similar symptoms and was found to have pancreatitis. Of note, she developed respiratory decompensation due to fluid overload during that hospitalization, and she required diuretics and breathing treatments.     In the ED, patient is afebrile, VSS. Labs notable for no leukocytosis, K 3.4, and lipase 185. Patient given 500cc LR and IV pain control. Patient will be admitted for management of acute pancreatitis.    Oncology History   Malignant neoplasm of pancreas   3/21/2023 Initial Diagnosis     Pancreatic adenocarcinoma      5/1/2023 -  Chemotherapy     Treatment Summary   Plan Name: OP PANC NAB-PACLITAXEL + GEMCITABINE Q4W  Treatment Goal: Palliative  Status: Active  Start Date: 5/1/2023  End Date: 3/14/2024 (Planned)  Provider: Jim Hill MD  Chemotherapy: gemcitabine (GEMZAR) 1,400 mg in sodium chloride 0.9% SolP 321.82 mL chemo infusion, 1,448 mg (100 % of original dose 800 mg/m2), Intravenous, Clinic/HOD 1 time, 1 of 12 cycles  Dose modification: 800 mg/m2 (original dose 800 mg/m2, Cycle 1, Reason: Other (see comments), Comment: poor PS)       * No surgery found *     Hospital Course: Patient was admitted to Medical Oncology service  for acute pancreatitis. She was started on IVF, pain and nausea meds with improvement in symptoms. Clear liquid diet was introduced the day after admission, advanced to full diet. RUQ US with c/f cholecystitis, gen surg states this is not likely based on imaging/presentation, started on broad spectrum abx. Patient stable for discharge with PO antibiotics.     Physical Exam  Constitutional:       General: She is not in acute distress.  HENT:      Head: Normocephalic and atraumatic.      Right Ear: External ear normal.      Left Ear: External ear normal.      Nose: Nose normal.      Mouth/Throat:      Mouth: Mucous membranes are moist.      Pharynx: Oropharynx is clear.   Eyes:      General: No scleral icterus.     Extraocular Movements: Extraocular movements intact.      Conjunctiva/sclera: Conjunctivae normal.   Cardiovascular:      Rate and Rhythm: Normal rate and regular rhythm.   Pulmonary:      Effort: Pulmonary effort is normal.      Breath sounds: Normal breath sounds. No wheezing or rales.   Abdominal:      General: Abdomen is flat. There is no distension.      Palpations: Abdomen is soft.      Tenderness: There is no abdominal tenderness.   Musculoskeletal:         General: Normal range of motion.      Cervical back: Normal range of motion and neck supple.   Skin:     General: Skin is warm and dry.   Neurological:      General: No focal deficit present.      Mental Status: She is alert.   Psychiatric:         Mood and Affect: Mood normal.         Behavior: Behavior normal.     Goals of Care Treatment Preferences:  Code Status: DNR    Health care agent: Karen Mejia  Health care agent number: No value filed.    Living Will: Yes     What is most important right now is to focus on remaining as independent as possible, symptom/pain control, quality of life, even if it means sacrificing a little time.  Accordingly, we have decided that the best plan to meet the patient's goals includes continuing with  treatment.      Consults:   Consults (From admission, onward)        Status Ordering Provider     Inpatient consult to General Surgery  Once        Provider:  (Not yet assigned)    Completed BRIELLE MURPHY     Inpatient consult to Registered Dietitian/Nutritionist  Once        Provider:  (Not yet assigned)    Completed FLORA BOYD     Inpatient consult to [facility]  Once        Provider:  (Not yet assigned)    Completed BRIELLE MURPHY     Inpatient consult to Hematology/Oncology  Once        Provider:  (Not yet assigned)    Completed JIMMY ORONA          Significant Diagnostic Studies: Labs: All labs within the past 24 hours have been reviewed  Microbiology:   Blood Culture   Lab Results   Component Value Date    LABBLOO No growth after 5 days. 05/03/2023       Pending Diagnostic Studies:     Procedure Component Value Units Date/Time    CBC with Automated Differential [[id number]]     Order Status: Sent Lab Status: No result     Specimen: Blood     Comprehensive Metabolic Panel (CMP) [[id number]]     Order Status: Sent Lab Status: No result     Specimen: Blood     Magnesium [[id number]]     Order Status: Sent Lab Status: No result     Specimen: Blood     Phosphorus [[id number]]     Order Status: Sent Lab Status: No result     Specimen: Blood         Final Active Diagnoses:    Diagnosis Date Noted POA    PRINCIPAL PROBLEM:  Acute pancreatitis [K85.90] 06/08/2023 Yes    Malignant neoplasm of pancreas [C25.9] 03/21/2023 Yes    Paroxysmal atrial fibrillation [I48.0] 04/19/2018 Yes     Chronic    Sick sinus syndrome [I49.5] 05/10/2021 Yes     Chronic    Moderate malnutrition [E44.0] 06/10/2023 Yes    Pancreatic pseudocyst [K86.3] 06/08/2023 Yes    Hyperlipidemia [E78.5] 04/19/2018 Yes     Chronic    Essential hypertension, benign [I10] 04/19/2018 Yes     Chronic    Hypothyroidism [E03.9] 04/19/2018 Yes     Chronic      Problems Resolved During this Admission:      Discharged Condition:  stable    Disposition: Home or Self Care    Follow Up:    Patient Instructions:   No discharge procedures on file.  Medications:  Reconciled Home Medications:      Medication List      START taking these medications    amoxicillin-clavulanate 875-125mg 875-125 mg per tablet  Commonly known as: AUGMENTIN  Take 1 tablet by mouth 2 (two) times daily.     levoFLOXacin 750 MG tablet  Commonly known as: LEVAQUIN  Take 1 tablet (750 mg total) by mouth once daily. for 5 days        CONTINUE taking these medications    acetaminophen 325 MG tablet  Commonly known as: TYLENOL  Take 650 mg by mouth daily as needed (Headache).     albuterol 2.5 mg /3 mL (0.083 %) nebulizer solution  Commonly known as: PROVENTIL  Take 3 mLs (2.5 mg total) by nebulization every 6 (six) hours as needed for Wheezing or Shortness of Breath. Rescue     alendronate 70 MG tablet  Commonly known as: FOSAMAX  Take 1 tablet (70 mg total) by mouth every 7 days.     amLODIPine 5 MG tablet  Commonly known as: NORVASC  Take 1 tablet (5 mg total) by mouth once daily.     atorvastatin 20 MG tablet  Commonly known as: LIPITOR  Take 1 tablet (20 mg total) by mouth once daily.     colestipoL 1 gram Tab  Commonly known as: COLESTID  Take 1 tablet (1 g total) by mouth 2 (two) times daily.     CREON 24,000-76,000 -120,000 unit capsule  Generic drug: lipase-protease-amylase 24,000-76,000-120,000 units  Take 2 capsules by mouth 3 (three) times daily with meals.     diphenoxylate-atropine 2.5-0.025 mg 2.5-0.025 mg per tablet  Commonly known as: LOMOTIL  TAKE 1 TABLET BY MOUTH 4 TIMES DAILY AS NEEDED FOR DIARRHEA     ELIQUIS 5 mg Tab  Generic drug: apixaban  Take 1 tablet (5 mg total) by mouth 2 (two) times daily.     esomeprazole 40 MG capsule  Commonly known as: NEXIUM  Take 1 capsule (40 mg total) by mouth once daily.     folic acid 1 MG tablet  Commonly known as: FOLVITE  Take 1 tablet by mouth once daily     furosemide 40 MG tablet  Commonly known as: LASIX  Take 1  tablet (40 mg total) by mouth daily as needed (For weight gain > 3 lb in one day, increasing leg swelling, or increasing SOB).     levothyroxine 175 MCG tablet  Commonly known as: SYNTHROID, LEVOTHROID  Take 1 tablet by mouth once daily     MELATONIN ORAL  Take 1 tablet by mouth nightly as needed (Insomnia).     sotaloL 120 MG Tab  Commonly known as: BETAPACE  Take 1 tablet (120 mg total) by mouth 2 (two) times daily.     sulfaSALAzine 500 mg Tab  Commonly known as: AZULFIDINE  Take 1 tablet by mouth twice daily     tolterodine 4 MG 24 hr capsule  Commonly known as: DETROL LA  Take 1 capsule by mouth once daily        ASK your doctor about these medications    LIDOcaine-prilocaine cream  Commonly known as: EMLA  Apply topically as needed (place to port site 45-60 minutes prior to port access).            BRIELLE MURPHY MD  Hematology/Oncology  [facility]

## 2023-06-12 ENCOUNTER — NURSE TRIAGE (OUTPATIENT)
Dept: ADMINISTRATIVE | Facility: CLINIC | Age: 83
End: 2023-06-12
Payer: MEDICARE

## 2023-06-12 ENCOUNTER — OUTPATIENT CASE MANAGEMENT (OUTPATIENT)
Dept: ADMINISTRATIVE | Facility: OTHER | Age: 83
End: 2023-06-12
Payer: MEDICARE

## 2023-06-12 DIAGNOSIS — N76.0 ACUTE VAGINITIS: Primary | ICD-10-CM

## 2023-06-12 RX ORDER — FLUCONAZOLE 150 MG/1
150 TABLET ORAL ONCE
Qty: 1 TABLET | Refills: 6 | Status: SHIPPED | OUTPATIENT
Start: 2023-06-12 | End: 2023-06-12

## 2023-06-12 NOTE — TELEPHONE ENCOUNTER
Spoke with álvaro. Patient started on levaquin yesterday. She has yeast infection. White discharge and itching.    Requesting script to be sent to [facility] in [place].      Message routed to shirley

## 2023-06-12 NOTE — TELEPHONE ENCOUNTER
PD 1 pt.  Pt responding to text message escalation. Pts daughter in law calling with pt, states that pt is on antibiotics and thinks she is getting yeast infection. Reports a lot of itching and pt is stating she needs medication. Mild white discharge. Per protocol advised to be seen today or tomorrow in office. Pt has appt tomorrow. verbalized understanding. Denies any further questions or concerns at this time, advised to call back if they have any that come up. Advised pt to call back with any other concerns or worsening symptoms. Verbalized understanding and will route message to provider.     Reason for Disposition   MODERATE-SEVERE itching (i.e., interferes with school, work, or sleep)    Additional Information   Negative: Sounds like a life-threatening emergency to the triager   Negative: Patient sounds very sick or weak to the triager   Negative: SEVERE pain and not improved 2 hours after pain medicine   Negative: Genital area looks infected (e.g., draining sore, spreading redness) and fever   Negative: Something is hanging out of the vagina and can't easily be pushed back inside   Negative: MILD-MODERATE pain and present > 24 hours  (Exception: Chronic pain.)   Negative: Genital area looks infected (e.g., draining sore, spreading redness)   Negative: Rash with painful tiny water blisters    Protocols used: Vaginal Symptoms-A-[place]

## 2023-06-13 ENCOUNTER — TELEPHONE (OUTPATIENT)
Dept: PALLIATIVE MEDICINE | Facility: CLINIC | Age: 83
End: 2023-06-13
Payer: MEDICARE

## 2023-06-13 ENCOUNTER — OFFICE VISIT (OUTPATIENT)
Dept: HEMATOLOGY/ONCOLOGY | Facility: CLINIC | Age: 83
End: 2023-06-13
Payer: MEDICARE

## 2023-06-13 ENCOUNTER — LAB VISIT (OUTPATIENT)
Dept: LAB | Facility: HOSPITAL | Age: 83
End: 2023-06-13
Payer: MEDICARE

## 2023-06-13 VITALS
HEART RATE: 78 BPM | SYSTOLIC BLOOD PRESSURE: 122 MMHG | BODY MASS INDEX: 32.08 KG/M2 | WEIGHT: 163.38 LBS | OXYGEN SATURATION: 98 % | DIASTOLIC BLOOD PRESSURE: 58 MMHG | HEIGHT: 60 IN | RESPIRATION RATE: 18 BRPM

## 2023-06-13 DIAGNOSIS — K50.10 CROHN'S DISEASE OF LARGE INTESTINE WITHOUT COMPLICATION: ICD-10-CM

## 2023-06-13 DIAGNOSIS — D49.9 IMMUNODEFICIENCY SECONDARY TO NEOPLASM: ICD-10-CM

## 2023-06-13 DIAGNOSIS — I48.0 PAROXYSMAL ATRIAL FIBRILLATION: ICD-10-CM

## 2023-06-13 DIAGNOSIS — I10 ESSENTIAL HYPERTENSION, BENIGN: ICD-10-CM

## 2023-06-13 DIAGNOSIS — E78.5 HYPERLIPIDEMIA, UNSPECIFIED HYPERLIPIDEMIA TYPE: ICD-10-CM

## 2023-06-13 DIAGNOSIS — C25.9 PANCREATIC ADENOCARCINOMA: Primary | ICD-10-CM

## 2023-06-13 DIAGNOSIS — E78.2 MIXED HYPERLIPIDEMIA: ICD-10-CM

## 2023-06-13 DIAGNOSIS — I70.0 AORTIC ATHEROSCLEROSIS: ICD-10-CM

## 2023-06-13 DIAGNOSIS — R41.3 OTHER AMNESIA: ICD-10-CM

## 2023-06-13 DIAGNOSIS — E87.6 HYPOKALEMIA: ICD-10-CM

## 2023-06-13 DIAGNOSIS — K86.81 EXOCRINE PANCREATIC INSUFFICIENCY: ICD-10-CM

## 2023-06-13 DIAGNOSIS — D50.0 IRON DEFICIENCY ANEMIA DUE TO CHRONIC BLOOD LOSS: ICD-10-CM

## 2023-06-13 DIAGNOSIS — D50.9 MICROCYTIC ANEMIA: ICD-10-CM

## 2023-06-13 DIAGNOSIS — Z79.899 IMMUNODEFICIENCY SECONDARY TO CHEMOTHERAPY: ICD-10-CM

## 2023-06-13 DIAGNOSIS — T45.1X5A IMMUNODEFICIENCY SECONDARY TO CHEMOTHERAPY: ICD-10-CM

## 2023-06-13 DIAGNOSIS — D84.821 IMMUNODEFICIENCY SECONDARY TO CHEMOTHERAPY: ICD-10-CM

## 2023-06-13 DIAGNOSIS — D84.81 IMMUNODEFICIENCY SECONDARY TO NEOPLASM: ICD-10-CM

## 2023-06-13 LAB
ALBUMIN SERPL BCP-MCNC: 2.8 G/DL (ref 3.5–5.2)
ALP SERPL-CCNC: 77 U/L (ref 55–135)
ALT SERPL W/O P-5'-P-CCNC: 8 U/L (ref 10–44)
ANION GAP SERPL CALC-SCNC: 12 MMOL/L (ref 8–16)
AST SERPL-CCNC: 16 U/L (ref 10–40)
BASOPHILS # BLD AUTO: 0.05 K/UL (ref 0–0.2)
BASOPHILS NFR BLD: 1.2 % (ref 0–1.9)
BILIRUB SERPL-MCNC: 0.4 MG/DL (ref 0.1–1)
BUN SERPL-MCNC: 6 MG/DL (ref 8–23)
CALCIUM SERPL-MCNC: 9.2 MG/DL (ref 8.7–10.5)
CHLORIDE SERPL-SCNC: 107 MMOL/L (ref 95–110)
CO2 SERPL-SCNC: 24 MMOL/L (ref 23–29)
CREAT SERPL-MCNC: 0.8 MG/DL (ref 0.5–1.4)
DIFFERENTIAL METHOD: ABNORMAL
EOSINOPHIL # BLD AUTO: 0.2 K/UL (ref 0–0.5)
EOSINOPHIL NFR BLD: 3.7 % (ref 0–8)
ERYTHROCYTE [DISTWIDTH] IN BLOOD BY AUTOMATED COUNT: 21.9 % (ref 11.5–14.5)
EST. GFR  (NO RACE VARIABLE): >60 ML/MIN/1.73 M^2
GLUCOSE SERPL-MCNC: 97 MG/DL (ref 70–110)
HCT VFR BLD AUTO: 42.2 % (ref 37–48.5)
HGB BLD-MCNC: 13.3 G/DL (ref 12–16)
IMM GRANULOCYTES # BLD AUTO: 0.01 K/UL (ref 0–0.04)
IMM GRANULOCYTES NFR BLD AUTO: 0.2 % (ref 0–0.5)
LYMPHOCYTES # BLD AUTO: 1.3 K/UL (ref 1–4.8)
LYMPHOCYTES NFR BLD: 31 % (ref 18–48)
MCH RBC QN AUTO: 29.8 PG (ref 27–31)
MCHC RBC AUTO-ENTMCNC: 31.5 G/DL (ref 32–36)
MCV RBC AUTO: 95 FL (ref 82–98)
MONOCYTES # BLD AUTO: 0.4 K/UL (ref 0.3–1)
MONOCYTES NFR BLD: 9.1 % (ref 4–15)
NEUTROPHILS # BLD AUTO: 2.2 K/UL (ref 1.8–7.7)
NEUTROPHILS NFR BLD: 54.8 % (ref 38–73)
NRBC BLD-RTO: 0 /100 WBC
PLATELET # BLD AUTO: 201 K/UL (ref 150–450)
PMV BLD AUTO: 10.4 FL (ref 9.2–12.9)
POTASSIUM SERPL-SCNC: 3.1 MMOL/L (ref 3.5–5.1)
PROT SERPL-MCNC: 6.8 G/DL (ref 6–8.4)
RBC # BLD AUTO: 4.46 M/UL (ref 4–5.4)
SODIUM SERPL-SCNC: 143 MMOL/L (ref 136–145)
WBC # BLD AUTO: 4.07 K/UL (ref 3.9–12.7)

## 2023-06-13 PROCEDURE — 1160F RVW MEDS BY RX/DR IN RCRD: CPT | Mod: CPTII,S$GLB,, | Performed by: REGISTERED NURSE

## 2023-06-13 PROCEDURE — 1160F PR REVIEW ALL MEDS BY PRESCRIBER/CLIN PHARMACIST DOCUMENTED: ICD-10-PCS | Mod: CPTII,S$GLB,, | Performed by: REGISTERED NURSE

## 2023-06-13 PROCEDURE — 1111F DSCHRG MED/CURRENT MED MERGE: CPT | Mod: CPTII,S$GLB,, | Performed by: REGISTERED NURSE

## 2023-06-13 PROCEDURE — 85025 COMPLETE CBC W/AUTO DIFF WBC: CPT | Performed by: FAMILY MEDICINE

## 2023-06-13 PROCEDURE — 3074F SYST BP LT 130 MM HG: CPT | Mod: CPTII,S$GLB,, | Performed by: REGISTERED NURSE

## 2023-06-13 PROCEDURE — 3288F PR FALLS RISK ASSESSMENT DOCUMENTED: ICD-10-PCS | Mod: CPTII,S$GLB,, | Performed by: REGISTERED NURSE

## 2023-06-13 PROCEDURE — 1157F PR ADVANCE CARE PLAN OR EQUIV PRESENT IN MEDICAL RECORD: ICD-10-PCS | Mod: CPTII,S$GLB,, | Performed by: REGISTERED NURSE

## 2023-06-13 PROCEDURE — 36415 COLL VENOUS BLD VENIPUNCTURE: CPT | Performed by: FAMILY MEDICINE

## 2023-06-13 PROCEDURE — 1159F MED LIST DOCD IN RCRD: CPT | Mod: CPTII,S$GLB,, | Performed by: REGISTERED NURSE

## 2023-06-13 PROCEDURE — 1101F PT FALLS ASSESS-DOCD LE1/YR: CPT | Mod: CPTII,S$GLB,, | Performed by: REGISTERED NURSE

## 2023-06-13 PROCEDURE — 99999 PR PBB SHADOW E&M-EST. PATIENT-LVL V: CPT | Mod: PBBFAC,,, | Performed by: REGISTERED NURSE

## 2023-06-13 PROCEDURE — 1111F PR DISCHARGE MEDS RECONCILED W/ CURRENT OUTPATIENT MED LIST: ICD-10-PCS | Mod: CPTII,S$GLB,, | Performed by: REGISTERED NURSE

## 2023-06-13 PROCEDURE — 1126F AMNT PAIN NOTED NONE PRSNT: CPT | Mod: CPTII,S$GLB,, | Performed by: REGISTERED NURSE

## 2023-06-13 PROCEDURE — 1126F PR PAIN SEVERITY QUANTIFIED, NO PAIN PRESENT: ICD-10-PCS | Mod: CPTII,S$GLB,, | Performed by: REGISTERED NURSE

## 2023-06-13 PROCEDURE — 1157F ADVNC CARE PLAN IN RCRD: CPT | Mod: CPTII,S$GLB,, | Performed by: REGISTERED NURSE

## 2023-06-13 PROCEDURE — 80053 COMPREHEN METABOLIC PANEL: CPT | Performed by: FAMILY MEDICINE

## 2023-06-13 PROCEDURE — 99999 PR PBB SHADOW E&M-EST. PATIENT-LVL V: ICD-10-PCS | Mod: PBBFAC,,, | Performed by: REGISTERED NURSE

## 2023-06-13 PROCEDURE — 3078F PR MOST RECENT DIASTOLIC BLOOD PRESSURE < 80 MM HG: ICD-10-PCS | Mod: CPTII,S$GLB,, | Performed by: REGISTERED NURSE

## 2023-06-13 PROCEDURE — 99215 PR OFFICE/OUTPT VISIT, EST, LEVL V, 40-54 MIN: ICD-10-PCS | Mod: S$GLB,,, | Performed by: REGISTERED NURSE

## 2023-06-13 PROCEDURE — 3078F DIAST BP <80 MM HG: CPT | Mod: CPTII,S$GLB,, | Performed by: REGISTERED NURSE

## 2023-06-13 PROCEDURE — 3074F PR MOST RECENT SYSTOLIC BLOOD PRESSURE < 130 MM HG: ICD-10-PCS | Mod: CPTII,S$GLB,, | Performed by: REGISTERED NURSE

## 2023-06-13 PROCEDURE — 1101F PR PT FALLS ASSESS DOC 0-1 FALLS W/OUT INJ PAST YR: ICD-10-PCS | Mod: CPTII,S$GLB,, | Performed by: REGISTERED NURSE

## 2023-06-13 PROCEDURE — 3288F FALL RISK ASSESSMENT DOCD: CPT | Mod: CPTII,S$GLB,, | Performed by: REGISTERED NURSE

## 2023-06-13 PROCEDURE — 99215 OFFICE O/P EST HI 40 MIN: CPT | Mod: S$GLB,,, | Performed by: REGISTERED NURSE

## 2023-06-13 PROCEDURE — 1159F PR MEDICATION LIST DOCUMENTED IN MEDICAL RECORD: ICD-10-PCS | Mod: CPTII,S$GLB,, | Performed by: REGISTERED NURSE

## 2023-06-13 RX ORDER — POTASSIUM CHLORIDE 20 MEQ/1
20 TABLET, EXTENDED RELEASE ORAL 2 TIMES DAILY
Qty: 10 TABLET | Refills: 0 | Status: SHIPPED | OUTPATIENT
Start: 2023-06-13 | End: 2023-06-14

## 2023-06-13 NOTE — PROGRESS NOTES
"MEDICAL ONCOLOGY - ESTABLISHED PATIENT VISIT    Reason for visit: Pancreatic adenocarcinoma    Best Contact Phone Number(s): [phone] (home)      Cancer/Stage/TNM:    Cancer Staging   Malignant neoplasm of pancreas  Staging form: Exocrine Pancreas, AJCC 8th Edition  - Clinical stage from 4/13/2023: Stage IB (cT2, cN0, cM0) - Unsigned       Oncology History   Malignant neoplasm of pancreas   3/21/2023 Initial Diagnosis    Pancreatic adenocarcinoma     5/1/2023 -  Chemotherapy    Treatment Summary   Plan Name: OP PANC NAB-PACLITAXEL + GEMCITABINE Q4W  Treatment Goal: Palliative  Status: Active  Start Date: 5/1/2023  End Date: 3/14/2024 (Planned)  Provider: Jim Hill MD  Chemotherapy: gemcitabine (GEMZAR) 1,400 mg in sodium chloride 0.9% SolP 321.82 mL chemo infusion, 1,448 mg (100 % of original dose 800 mg/m2), Intravenous, Clinic/HOD 1 time, 1 of 12 cycles  Dose modification: 800 mg/m2 (original dose 800 mg/m2, Cycle 1, Reason: Other (see comments), Comment: poor PS)          Interim History:   83 y.o. female with Crohn's disease, p.afib, SSS s/p PPM, HTN, HLD who presents for hospital follow up. She was admitted from 6/8-6/11 for intractable nausea/vomiting and abdominal pain c/w acute pancreatitis. She notes the pain started Thursday morning but was gone by Saturday. Her pain meds were not providing relief for her. She missed her port placement appointment with IR d/t admission as well.    She is feeling slightly better today. Reports diarrhea, which is generally controlled with lomotil. She has had more episodes of palpitations since "insurance isn't approving the heart medications." Stated she needed supplemental oxygen yesterday, but typically she is okay without oxygen. She also reports more confusion and forgetfulness recently. Does not want to go forward with additional infusions without a port d/t difficult access and bruises from recent PIV placements.     Presents with her daughter in law. " ECOG PS 2.     ROS:   Review of Systems   Constitutional:  Positive for malaise/fatigue and weight loss. Negative for chills and fever.   HENT:  Negative for congestion and sore throat.    Eyes:  Negative for blurred vision.   Respiratory:  Negative for shortness of breath.    Cardiovascular:  Positive for palpitations. Negative for chest pain and leg swelling.   Gastrointestinal:  Positive for diarrhea. Negative for abdominal pain, blood in stool, constipation, nausea and vomiting.   Genitourinary:  Negative for dysuria.   Musculoskeletal:  Negative for back pain, falls and myalgias.   Skin:  Negative for itching and rash.        +bruising   Neurological:  Positive for weakness (generalized). Negative for dizziness, tingling, tremors, sensory change and focal weakness.        +confusion   Endo/Heme/Allergies:  Does not bruise/bleed easily.   Psychiatric/Behavioral:  Negative for depression, substance abuse and suicidal ideas. The patient is not nervous/anxious.      Past Medical History:   Past Medical History:   Diagnosis Date    Anticoagulant long-term use     Atrial fibrillation     Crohn disease diagnosed in her 20's    GERD (gastroesophageal reflux disease)     Hyperlipidemia     Hypertension     Pancreatic adenocarcinoma 3/21/2023    Thyroid disease     s/p I 131        Past Surgical History:   Past Surgical History:   Procedure Laterality Date    APPENDECTOMY      CARDIAC SURGERY  2014    pacemaker    COLONOSCOPY  ~2008    normal findings per patient report    ENDOSCOPIC ULTRASOUND OF UPPER GASTROINTESTINAL TRACT N/A 3/14/2023    Procedure: ULTRASOUND, UPPER GI TRACT, ENDOSCOPIC;  Surgeon: Andrei Swartz MD;  Location: [facility];  Service: Endoscopy;  Laterality: N/A;  Approval to hold Eliquis rec'd from Dr. Barbosa (see telephone encounter 3/3/23)-DS  Medtronic AICD/PPM  3/3/23-Instructions via portal-DS    ERCP N/A 3/21/2023    Procedure: ERCP (ENDOSCOPIC RETROGRADE CHOLANGIOPANCREATOGRAPHY);  Surgeon:  Celina Toney MD;  Location: [facility] ENDO (2ND FLR);  Service: Endoscopy;  Laterality: N/A;    ESOPHAGOGASTRODUODENOSCOPY N/A 2018    Procedure: EGD (ESOPHAGOGASTRODUODENOSCOPY);  Surgeon: Alondra Casiano MD;  Location: [facility] ENDO;  Service: Endoscopy;  Laterality: N/A;    HYSTERECTOMY      INSERTION OF IMPLANTABLE CARDIOVERTER-DEFIBRILLATOR (ICD) GENERATOR WITH TWO EXISTING LEADS Left 5/10/2021    Procedure: INSERTION, PULSE GENERATOR WITH 2 EXISTING LEADS, ICD;  Surgeon: Bo Leone MD;  Location: [facility] CATH LAB;  Service: Cardiology;  Laterality: Left;    INSERTION OF PACEMAKER      REPLACEMENT OF PACEMAKER GENERATOR  05/10/2021    RETROGRADE PYELOGRAPHY Right 2020    Procedure: PYELOGRAM, RETROGRADE;  Surgeon: Jovan Kruse MD;  Location: [facility] OR;  Service: Urology;  Laterality: Right;    SMALL INTESTINE SURGERY  in her 20's    for crohn's    TONSILLECTOMY      UPPER GASTROINTESTINAL ENDOSCOPY  ~    normal findings per patient report        Family History:   Family History   Problem Relation Age of Onset    Cancer Mother     Breast cancer Mother     Crohn's disease Other     Colon cancer Neg Hx     Colon polyps Neg Hx     Esophageal cancer Neg Hx     Stomach cancer Neg Hx     Ulcerative colitis Neg Hx         Social History:   Social History     Tobacco Use    Smoking status: Former     Types: Cigarettes     Quit date:      Years since quittin.4    Smokeless tobacco: Never   Substance Use Topics    Alcohol use: No        I have reviewed and updated the patient's past medical, surgical, family and social histories.    Allergies:   Review of patient's allergies indicates:   Allergen Reactions    Lisinopril Other (See Comments)     cough        Medications:   Current Outpatient Medications   Medication Sig Dispense Refill    acetaminophen (TYLENOL) 325 MG tablet Take 650 mg by mouth daily as needed (Headache).      albuterol (PROVENTIL) 2.5 mg /3 mL (0.083 %) nebulizer solution Take  3 mLs (2.5 mg total) by nebulization every 6 (six) hours as needed for Wheezing or Shortness of Breath. Rescue 150 mL 11    alendronate (FOSAMAX) 70 MG tablet Take 1 tablet (70 mg total) by mouth every 7 days. 12 tablet 3    amLODIPine (NORVASC) 5 MG tablet Take 1 tablet (5 mg total) by mouth once daily. 30 tablet 11    amoxicillin-clavulanate 875-125mg (AUGMENTIN) 875-125 mg per tablet Take 1 tablet by mouth 2 (two) times daily. 14 tablet 0    atorvastatin (LIPITOR) 20 MG tablet Take 1 tablet (20 mg total) by mouth once daily. 90 tablet 3    colestipoL (COLESTID) 1 gram Tab Take 1 tablet (1 g total) by mouth 2 (two) times daily. 60 tablet 5    diphenoxylate-atropine 2.5-0.025 mg (LOMOTIL) 2.5-0.025 mg per tablet TAKE 1 TABLET BY MOUTH 4 TIMES DAILY AS NEEDED FOR DIARRHEA 60 tablet 0    ELIQUIS 5 mg Tab Take 1 tablet (5 mg total) by mouth 2 (two) times daily. 180 tablet 3    esomeprazole (NEXIUM) 40 MG capsule Take 1 capsule (40 mg total) by mouth once daily. 90 capsule 3    folic acid (FOLVITE) 1 MG tablet Take 1 tablet by mouth once daily 90 tablet 0    furosemide (LASIX) 40 MG tablet Take 1 tablet (40 mg total) by mouth daily as needed (For weight gain > 3 lb in one day, increasing leg swelling, or increasing SOB). 30 tablet 11    levoFLOXacin (LEVAQUIN) 750 MG tablet Take 1 tablet (750 mg total) by mouth once daily. for 5 days 5 tablet 0    levothyroxine (SYNTHROID, LEVOTHROID) 175 MCG tablet Take 1 tablet by mouth once daily 30 tablet 5    LIDOcaine-prilocaine (EMLA) cream Apply topically as needed (place to port site 45-60 minutes prior to port access). (Patient not taking: Reported on 5/26/2023) 30 g 6    lipase-protease-amylase 24,000-76,000-120,000 units (CREON) 24,000-76,000 -120,000 unit capsule Take 2 capsules by mouth 3 (three) times daily with meals. 180 capsule 11    MELATONIN ORAL Take 1 tablet by mouth nightly as needed (Insomnia).      potassium chloride (MICRO-K) 10 MEQ CpSR 1 po bid x 1 week then  Micro K qd 30 capsule 5    sotaloL (BETAPACE) 120 MG Tab Take 1 tablet (120 mg total) by mouth 2 (two) times daily. 180 tablet 3    sulfaSALAzine (AZULFIDINE) 500 mg Tab Take 1 tablet by mouth twice daily 180 tablet 3    tolterodine (DETROL LA) 4 MG 24 hr capsule Take 1 capsule by mouth once daily 90 capsule 3     No current facility-administered medications for this visit.      Physical Exam:   BP (!) 122/58 (BP Location: Left arm, Patient Position: Sitting, BP Method: Large (Automatic))   Pulse 78   Resp 18   Ht 5' (1.524 m)   Wt 74.1 kg (163 lb 5.8 oz)   SpO2 98%   BMI 31.90 kg/m²      ECOG Performance status: 2       Physical Exam  Vitals reviewed.   Constitutional:       General: She is not in acute distress.     Appearance: She is ill-appearing (chronically). She is not toxic-appearing or diaphoretic.   HENT:      Head: Normocephalic and atraumatic.      Right Ear: External ear normal.      Left Ear: External ear normal.      Nose: Nose normal. No congestion.      Mouth/Throat:      Pharynx: Oropharynx is clear. No oropharyngeal exudate.   Eyes:      General: No scleral icterus.     Conjunctiva/sclera: Conjunctivae normal.      Pupils: Pupils are equal, round, and reactive to light.   Cardiovascular:      Rate and Rhythm: Normal rate and regular rhythm.      Heart sounds: No murmur heard.  Pulmonary:      Effort: Pulmonary effort is normal. No respiratory distress.      Breath sounds: Normal breath sounds. No wheezing.   Abdominal:      General: Abdomen is flat. Bowel sounds are normal. There is no distension.      Palpations: Abdomen is soft. There is no mass.      Tenderness: There is no abdominal tenderness.   Musculoskeletal:         General: No swelling or deformity.   Skin:     General: Skin is warm and dry.      Coloration: Skin is not jaundiced or pale.      Findings: Bruising (multiple IV sites) present. No erythema or rash.   Neurological:      Mental Status: She is alert and oriented to  person, place, and time. Mental status is at baseline.      Motor: Weakness (generalized) present.      Gait: Gait abnormal (in wheelchair).   Psychiatric:      Comments: +intermittently tearful       Labs:   No results found for this or any previous visit (from the past 48 hour(s)).     I have reviewed the pertinent labs from 6/13/23 which are notable for hgb 13.3, albumin 2.8. K+ 3.1.     Imaging:    I have personally reviewed the imaging which is notable for a pancreatic head mass evident on CT abdomen pelvis with no evidence of metastatic disease.     Path:   PANCREAS, HEAD MASS, EUS-FNB:   Adenocarcinoma   Mismatch Repair (MMR) Proteins:    MLH1 - Intact nuclear expression    MSH2 - Intact nuclear expression    MSH6 - Intact nuclear expression    PMS2 - Intact nuclear expression    Assessment:       1. Pancreatic adenocarcinoma    2. Other amnesia    3. 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 Immunodeficiency secondary to neoplasm    6. Essential hypertension, benign    7. Mixed hyperlipidemia    8. Aortic atherosclerosis    9. Paroxysmal atrial fibrillation    10. Microcytic anemia    11. Iron deficiency anemia due to chronic blood loss    12. Exocrine pancreatic insufficiency    13. Crohn's disease of large intestine without complication       Plan:     #  Pancreatic adenocarcinoma.   83 y.o. F with Crohn's disease, p.afib, SSS s/p PPM, HTN, HLD, fatigue, presented with weight loss and jaundice and was found to have  pancreatic adenocarcinoma. She has early stage pancreatic cancer, and is considered surgically resectable. However, giving her age and multiple co-morbidities, surgery might not be feasible or safe.     We have discussed with her the diagnosis, stage, prognosis and treatment options. She understands that Surgery is the only curable option and she is unlikely to be sufficiently medically fit for it. We have discussed chemotherapy with a palliative intent to improve  symptoms and prolong life.   She expressed understanding and she values QoL but was willing to try chemotherapy.     We have discussed treatment with first line therapy Gemcitabine + Abraxane.  She agreed and wished to proceed.     Received cycle 1 of biweekly gem/abraxane with dose reductions of 800 mg/m2 and 100 mg/m2 respectively on 5/1/23. Subsequently hospitalized x 3 (had an ED visit same day as first discharge).     She does want to delay starting cycle 2 until she has a port placed. She unfortunately missed her initial IR appointment d/t 3rd admission. New orders and referral placed today as she was told process would need to be restarted.     Will have her RTC in 1-2 weeks for cycle 2 once port is placed.    Plan for MRI brain given worsening confusion/forgetfulness and dropping items.     Saw palliative care on 5/12. Continue to f/u as directed.     # HTN   BP controlled today.  On amlodipine 5 mg daily, coreg 6.25 mg bid     # HLD, Atherosclerosis   On Lipitor 20 mg   Follows with cardiology     # P. Afib   Rate controlled. On Eliquis.  Notes feeling better since med adjustments during admission.   Continue to monitor.     #. Microcytic anemia, Iron def due to chronic blood loss  Unclear etiology but likely from chronic blood loss 2/2 diverticulosis or tumor invasion of small bowel.  Iron studies consistent with iron deficiency.   Couldn't tolerate PO iron due to severe GI side effects (abdominal cramps and nausea)  Injectafer denied by insurance so will administer Venofer x 4-5 doses.  S/p 3 doses. Does not want #4 at this time.     #Pancreatic insufficiency   Improved with Creon   On lomotil for diarrhea   Continue to monitor.     # Crohn's disease.   On Sulfasalazine  Follows with GI     Follow up: 2 weeks for cycle 2 after port placement.    Patient is in agreement with the proposed treatment plan. All questions were answered to the patient's satisfaction. Pt knows to call clinic if anything is needed  before the next clinic visit.    Patient discussed with collaborating physician, Dr. Hill.    At least 40 minutes were spent today on this encounter including face to face time with the patient, data gathering/interpretation and documentation.       Luma Tran, MSN, APRN, [facility]  Hematology and Medical Oncology  Clinical Nurse Specialist to Dr. Hill, Dr. Ceja & Dr. Pandey Onc Chart Routing      Follow up with physician 2 weeks. as scheduled with labs (cbc cmp ca19-9) to see dr. hill in clinic with mri brain   Follow up with DOMINGO 4 weeks. rtc in 4 weeks with labs (cbc cmp ca19-9) to see odmingo for chemo   Infusion scheduling note   treatment every 2 weeks   Injection scheduling note    Labs CBC, CMP and CA 19-9   Scheduling:  Preferred lab:  Lab interval: every 2 weeks     Imaging MRI   stat mri brain first available   Pharmacy appointment    Other referrals     Additional referrals needed  IR port placement ASAP

## 2023-06-14 ENCOUNTER — PATIENT MESSAGE (OUTPATIENT)
Dept: PRIMARY CARE CLINIC | Facility: CLINIC | Age: 83
End: 2023-06-14
Payer: MEDICARE

## 2023-06-14 DIAGNOSIS — E87.6 HYPOKALEMIA: Primary | ICD-10-CM

## 2023-06-14 RX ORDER — POTASSIUM CHLORIDE 750 MG/1
CAPSULE, EXTENDED RELEASE ORAL
Qty: 30 CAPSULE | Refills: 5 | Status: ON HOLD | OUTPATIENT
Start: 2023-06-14 | End: 2023-08-29 | Stop reason: HOSPADM

## 2023-06-16 ENCOUNTER — CLINICAL SUPPORT (OUTPATIENT)
Dept: CARDIOLOGY | Facility: CLINIC | Age: 83
End: 2023-06-16
Payer: MEDICARE

## 2023-06-16 DIAGNOSIS — Z95.0 PACEMAKER: Primary | Chronic | ICD-10-CM

## 2023-06-16 PROCEDURE — 93294 REM INTERROG EVL PM/LDLS PM: CPT | Mod: S$GLB,,, | Performed by: INTERNAL MEDICINE

## 2023-06-16 PROCEDURE — 93296 PR INTERROG EVAL, REMOTE, UP TO 90 DAYS, PACER/DEFIB,TECH REVIEW: ICD-10-PCS | Mod: S$GLB,,, | Performed by: INTERNAL MEDICINE

## 2023-06-16 PROCEDURE — 93296 REM INTERROG EVL PM/IDS: CPT | Mod: S$GLB,,, | Performed by: INTERNAL MEDICINE

## 2023-06-16 PROCEDURE — 93294 PR INTERROG EVAL, REMOTE, UP TO 90 DAYS,PACEMAKER: ICD-10-PCS | Mod: S$GLB,,, | Performed by: INTERNAL MEDICINE

## 2023-06-20 ENCOUNTER — TELEPHONE (OUTPATIENT)
Dept: INTERVENTIONAL RADIOLOGY/VASCULAR | Facility: HOSPITAL | Age: 83
End: 2023-06-20
Payer: MEDICARE

## 2023-06-20 NOTE — PROGRESS NOTES
Subjective:      Patient ID: Pauline Cronin is a 83 y.o. female.    Chief Complaint: No chief complaint on file.    HPI:    ROS Medtronic pacemaker remote interrogation report downloaded and prepared by medical assistant and interpreted by me and full report scanned into Epic media.  Battery OK with AG[employer] 9.3 years.  Leads OK.  Nonsustained VT and a fib noted with a 7.7% burden of a fib.  Pt is on Eliquis and sotalol.  Normal device function.    Past Medical History:   Diagnosis Date    Anticoagulant long-term use     Atrial fibrillation     Crohn disease diagnosed in her 20's    GERD (gastroesophageal reflux disease)     Hyperlipidemia     Hypertension     Pancreatic adenocarcinoma 3/21/2023    Thyroid disease     s/p I 131        Past Surgical History:   Procedure Laterality Date    APPENDECTOMY      CARDIAC SURGERY  2014    pacemaker    COLONOSCOPY  ~2008    normal findings per patient report    ENDOSCOPIC ULTRASOUND OF UPPER GASTROINTESTINAL TRACT N/A 3/14/2023    Procedure: ULTRASOUND, UPPER GI TRACT, ENDOSCOPIC;  Surgeon: Andrei Swartz MD;  Location: [facility];  Service: Endoscopy;  Laterality: N/A;  Approval to hold Eliquis rec'd from Dr. Barbosa (see telephone encounter 3/3/23)-DS  Medtronic AICD/PPM  3/3/23-Instructions via portal-DS    ERCP N/A 3/21/2023    Procedure: ERCP (ENDOSCOPIC RETROGRADE CHOLANGIOPANCREATOGRAPHY);  Surgeon: Celina Toney MD;  Location: [facility] ([address] [place] [address]);  Service: Endoscopy;  Laterality: N/A;    ESOPHAGOGASTRODUODENOSCOPY N/A 7/17/2018    Procedure: EGD (ESOPHAGOGASTRODUODENOSCOPY);  Surgeon: Alondra Casiano MD;  Location: [facility];  Service: Endoscopy;  Laterality: N/A;    HYSTERECTOMY      INSERTION OF IMPLANTABLE CARDIOVERTER-DEFIBRILLATOR (ICD) GENERATOR WITH TWO EXISTING LEADS Left 5/10/2021    Procedure: INSERTION, PULSE GENERATOR WITH 2 EXISTING LEADS, ICD;  Surgeon: Bo Leone MD;  Location: [facility] CATH LAB;  Service: Cardiology;  Laterality: Left;     INSERTION OF PACEMAKER      REPLACEMENT OF PACEMAKER GENERATOR  05/10/2021    RETROGRADE PYELOGRAPHY Right 2020    Procedure: PYELOGRAM, RETROGRADE;  Surgeon: Jovan Kruse MD;  Location: [facility];  Service: Urology;  Laterality: Right;    SMALL INTESTINE SURGERY  in her 20's    for crohn's    TONSILLECTOMY      UPPER GASTROINTESTINAL ENDOSCOPY  ~    normal findings per patient report       Family History   Problem Relation Age of Onset    Cancer Mother     Breast cancer Mother     Crohn's disease Other     Colon cancer Neg Hx     Colon polyps Neg Hx     Esophageal cancer Neg Hx     Stomach cancer Neg Hx     Ulcerative colitis Neg Hx        Social History     Socioeconomic History    Marital status:    Tobacco Use    Smoking status: Former     Types: Cigarettes     Quit date:      Years since quittin.4    Smokeless tobacco: Never   Substance and Sexual Activity    Alcohol use: No    Drug use: No    Sexual activity: Never     Social Determinants of Health     Financial Resource Strain: Low Risk     Difficulty of Paying Living Expenses: Not hard at all   Food Insecurity: No Food Insecurity    Worried About Running Out of Food in the Last Year: Never true    Ran Out of Food in the Last Year: Never true   Transportation Needs: No Transportation Needs    Lack of Transportation (Medical): No    Lack of Transportation (Non-Medical): No   Physical Activity: Sufficiently Active    Days of Exercise per Week: 3 days    Minutes of Exercise per Session: 60 min   Stress: Stress Concern Present    Feeling of Stress : To some extent   Social Connections: Socially Isolated    Frequency of Communication with Friends and Family: More than three times a week    Frequency of Social Gatherings with Friends and Family: Never    Attends Gnosticism Services: Never    Active Member of Clubs or Organizations: No    Attends Club or Organization Meetings: Never    Marital Status:    Housing Stability: High  Risk    Unable to Pay for Housing in the Last Year: No    Number of Places Lived in the Last Year: 3    Unstable Housing in the Last Year: No       Current Outpatient Medications on File Prior to Visit   Medication Sig Dispense Refill    acetaminophen (TYLENOL) 325 MG tablet Take 650 mg by mouth daily as needed (Headache).      albuterol (PROVENTIL) 2.5 mg /3 mL (0.083 %) nebulizer solution Take 3 mLs (2.5 mg total) by nebulization every 6 (six) hours as needed for Wheezing or Shortness of Breath. Rescue 150 mL 11    alendronate (FOSAMAX) 70 MG tablet Take 1 tablet (70 mg total) by mouth every 7 days. 12 tablet 3    amLODIPine (NORVASC) 5 MG tablet Take 1 tablet (5 mg total) by mouth once daily. 30 tablet 11    amoxicillin-clavulanate 875-125mg (AUGMENTIN) 875-125 mg per tablet Take 1 tablet by mouth 2 (two) times daily. 14 tablet 0    atorvastatin (LIPITOR) 20 MG tablet Take 1 tablet (20 mg total) by mouth once daily. 90 tablet 3    colestipoL (COLESTID) 1 gram Tab Take 1 tablet (1 g total) by mouth 2 (two) times daily. 60 tablet 5    diphenoxylate-atropine 2.5-0.025 mg (LOMOTIL) 2.5-0.025 mg per tablet TAKE 1 TABLET BY MOUTH 4 TIMES DAILY AS NEEDED FOR DIARRHEA 60 tablet 0    ELIQUIS 5 mg Tab Take 1 tablet (5 mg total) by mouth 2 (two) times daily. 180 tablet 3    esomeprazole (NEXIUM) 40 MG capsule Take 1 capsule (40 mg total) by mouth once daily. 90 capsule 3    folic acid (FOLVITE) 1 MG tablet Take 1 tablet by mouth once daily 90 tablet 0    furosemide (LASIX) 40 MG tablet Take 1 tablet (40 mg total) by mouth daily as needed (For weight gain > 3 lb in one day, increasing leg swelling, or increasing SOB). 30 tablet 11    levothyroxine (SYNTHROID, LEVOTHROID) 175 MCG tablet Take 1 tablet by mouth once daily 30 tablet 5    LIDOcaine-prilocaine (EMLA) cream Apply topically as needed (place to port site 45-60 minutes prior to port access). (Patient not taking: Reported on 5/26/2023) 30 g 6    lipase-protease-amylase  24,000-76,000-120,000 units (CREON) 24,000-76,000 -120,000 unit capsule Take 2 capsules by mouth 3 (three) times daily with meals. 180 capsule 11    MELATONIN ORAL Take 1 tablet by mouth nightly as needed (Insomnia).      potassium chloride (MICRO-K) 10 MEQ CpSR 1 po bid x 1 week then Micro K qd 30 capsule 5    sotaloL (BETAPACE) 120 MG Tab Take 1 tablet (120 mg total) by mouth 2 (two) times daily. 180 tablet 3    sulfaSALAzine (AZULFIDINE) 500 mg Tab Take 1 tablet by mouth twice daily 180 tablet 3    tolterodine (DETROL LA) 4 MG 24 hr capsule Take 1 capsule by mouth once daily 90 capsule 3     No current facility-administered medications on file prior to visit.       Review of patient's allergies indicates:   Allergen Reactions    Lisinopril Other (See Comments)     cough     Objective:   There were no vitals filed for this visit.     Physical Exam     Assessment:     1. Pacemaker      Plan:   Diagnoses and all orders for this visit:    Pacemaker         No follow-ups on file.

## 2023-06-21 ENCOUNTER — OUTPATIENT CASE MANAGEMENT (OUTPATIENT)
Dept: ADMINISTRATIVE | Facility: OTHER | Age: 83
End: 2023-06-21
Payer: MEDICARE

## 2023-06-21 DIAGNOSIS — R41.3 OTHER AMNESIA: Primary | ICD-10-CM

## 2023-06-21 DIAGNOSIS — C25.9 PANCREATIC ADENOCARCINOMA: ICD-10-CM

## 2023-06-21 NOTE — PROGRESS NOTES
Outpatient Care Management  Plan of Care Follow Up Visit    Patient: Pauline Cronin  MRN: [id number]  Date of Service: 06/21/2023  Completed by: Neeta Horne RN  Referral Date: 04/24/2023    Reason for Visit   Patient presents with    OPCM RN Follow Up Call       Brief Summary:   OPCM RN follow-up call completed.   Continue education on nutritional intake, pain management, O2 safety- replacement tubing.  Next Steps: F/u with daughter-in-law in AM after calling [facility]. Patient is in agreement to follow-up call on or around 7/13/2023.

## 2023-06-22 ENCOUNTER — OUTPATIENT CASE MANAGEMENT (OUTPATIENT)
Dept: ADMINISTRATIVE | Facility: OTHER | Age: 83
End: 2023-06-22
Payer: MEDICARE

## 2023-06-22 ENCOUNTER — HOSPITAL ENCOUNTER (OUTPATIENT)
Dept: INTERVENTIONAL RADIOLOGY/VASCULAR | Facility: HOSPITAL | Age: 83
Discharge: HOME | End: 2023-06-22
Attending: REGISTERED NURSE
Payer: MEDICARE

## 2023-06-22 ENCOUNTER — TELEPHONE (OUTPATIENT)
Dept: PRIMARY CARE CLINIC | Facility: CLINIC | Age: 83
End: 2023-06-22
Payer: MEDICARE

## 2023-06-22 VITALS
WEIGHT: 160 LBS | TEMPERATURE: 97 F | DIASTOLIC BLOOD PRESSURE: 68 MMHG | HEIGHT: 60 IN | OXYGEN SATURATION: 98 % | RESPIRATION RATE: 17 BRPM | HEART RATE: 68 BPM | SYSTOLIC BLOOD PRESSURE: 146 MMHG | BODY MASS INDEX: 31.41 KG/M2

## 2023-06-22 DIAGNOSIS — C25.9 PANCREATIC ADENOCARCINOMA: ICD-10-CM

## 2023-06-22 PROCEDURE — A4550 SURGICAL TRAYS: HCPCS | Mod: HCNC

## 2023-06-22 PROCEDURE — 76937 PR  US GUIDE, VASCULAR ACCESS: ICD-10-PCS | Mod: 26,,, | Performed by: RADIOLOGY

## 2023-06-22 PROCEDURE — 63600175 PHARM REV CODE 636 W HCPCS: Mod: HCNC | Performed by: STUDENT IN AN ORGANIZED HEALTH CARE EDUCATION/TRAINING PROGRAM

## 2023-06-22 PROCEDURE — 77001 FLUOROGUIDE FOR VEIN DEVICE: CPT | Mod: 26,,, | Performed by: RADIOLOGY

## 2023-06-22 PROCEDURE — 99152 MOD SED SAME PHYS/QHP 5/>YRS: CPT | Performed by: RADIOLOGY

## 2023-06-22 PROCEDURE — 36561 INSERT TUNNELED CV CATH: CPT | Mod: RT | Performed by: RADIOLOGY

## 2023-06-22 PROCEDURE — 76937 US GUIDE VASCULAR ACCESS: CPT | Mod: 26,,, | Performed by: RADIOLOGY

## 2023-06-22 PROCEDURE — 77001 CHG FLUOROGUIDE CNTRL VEN ACCESS,PLACE,REPLACE,REMOVE: ICD-10-PCS | Mod: 26,,, | Performed by: RADIOLOGY

## 2023-06-22 PROCEDURE — 63600175 PHARM REV CODE 636 W HCPCS: Mod: HCNC

## 2023-06-22 PROCEDURE — 76937 US GUIDE VASCULAR ACCESS: CPT | Mod: TC | Performed by: RADIOLOGY

## 2023-06-22 PROCEDURE — 36561 IR TUNNELED CATH PLACEMENT WITH PORT: ICD-10-PCS | Mod: RT,,, | Performed by: RADIOLOGY

## 2023-06-22 PROCEDURE — 77001 FLUOROGUIDE FOR VEIN DEVICE: CPT | Mod: TC | Performed by: RADIOLOGY

## 2023-06-22 PROCEDURE — 99153 MOD SED SAME PHYS/QHP EA: CPT | Performed by: RADIOLOGY

## 2023-06-22 PROCEDURE — 99152 PR MOD CONSCIOUS SEDATION, SAME PHYS, 5+ YRS, FIRST 15 MIN: ICD-10-PCS | Mod: ,,, | Performed by: RADIOLOGY

## 2023-06-22 PROCEDURE — 36561 INSERT TUNNELED CV CATH: CPT | Mod: RT,,, | Performed by: RADIOLOGY

## 2023-06-22 PROCEDURE — 99152 MOD SED SAME PHYS/QHP 5/>YRS: CPT | Mod: ,,, | Performed by: RADIOLOGY

## 2023-06-22 RX ORDER — CEFAZOLIN SODIUM 1 G/50ML
SOLUTION INTRAVENOUS
Status: COMPLETED | OUTPATIENT
Start: 2023-06-22 | End: 2023-06-22

## 2023-06-22 RX ORDER — LIDOCAINE HYDROCHLORIDE 10 MG/ML
1 INJECTION, SOLUTION EPIDURAL; INFILTRATION; INTRACAUDAL; PERINEURAL ONCE
Status: DISCONTINUED | OUTPATIENT
Start: 2023-06-22 | End: 2023-06-23 | Stop reason: HOSPADM

## 2023-06-22 RX ORDER — MIDAZOLAM HYDROCHLORIDE 1 MG/ML
INJECTION INTRAMUSCULAR; INTRAVENOUS
Status: COMPLETED | OUTPATIENT
Start: 2023-06-22 | End: 2023-06-22

## 2023-06-22 RX ORDER — SODIUM CHLORIDE 9 MG/ML
INJECTION, SOLUTION INTRAVENOUS CONTINUOUS
Status: DISCONTINUED | OUTPATIENT
Start: 2023-06-22 | End: 2023-06-23 | Stop reason: HOSPADM

## 2023-06-22 RX ORDER — HEPARIN 100 UNIT/ML
SYRINGE INTRAVENOUS
Status: COMPLETED | OUTPATIENT
Start: 2023-06-22 | End: 2023-06-22

## 2023-06-22 RX ORDER — ONDANSETRON 2 MG/ML
INJECTION INTRAMUSCULAR; INTRAVENOUS
Status: COMPLETED
Start: 2023-06-22 | End: 2023-06-22

## 2023-06-22 RX ORDER — FENTANYL CITRATE 50 UG/ML
INJECTION, SOLUTION INTRAMUSCULAR; INTRAVENOUS
Status: COMPLETED | OUTPATIENT
Start: 2023-06-22 | End: 2023-06-22

## 2023-06-22 RX ADMIN — CEFAZOLIN SODIUM 1 G: 1 SOLUTION INTRAVENOUS at 01:06

## 2023-06-22 RX ADMIN — MIDAZOLAM HYDROCHLORIDE 1 MG: 1 INJECTION INTRAMUSCULAR; INTRAVENOUS at 01:06

## 2023-06-22 RX ADMIN — MIDAZOLAM HYDROCHLORIDE 0.5 MG: 1 INJECTION INTRAMUSCULAR; INTRAVENOUS at 01:06

## 2023-06-22 RX ADMIN — HEPARIN 5 ML: 100 SYRINGE at 02:06

## 2023-06-22 RX ADMIN — ONDANSETRON 4 MG: 2 INJECTION INTRAMUSCULAR; INTRAVENOUS at 03:06

## 2023-06-22 RX ADMIN — FENTANYL CITRATE 25 MCG: 50 INJECTION, SOLUTION INTRAMUSCULAR; INTRAVENOUS at 01:06

## 2023-06-22 NOTE — PROGRESS NOTES
Outpatient Care Management  Plan of Care Follow Up Visit    Patient: Pauline Cronin  MRN: [id number]  Date of Service: 06/22/2023  Completed by: Neeta Horne RN  Referral Date: 04/24/2023    Reason for Visit   Patient presents with    Follow-up       Brief Summary:   Facilitated to [facility] O2 replacement request. Informed they have no record of anyone calling for replacement O2 tubing as marisa Jonest-in-law reports. O2 tubing to be sent to home of son & dgt-in-law [id number] [id number] via regular mail service in the next day or so.   Called Karen to update/expect O2 tubing in mail, to call every month for replacements using the [facility] number verified as correct that Karen has.   Next Steps: F/u in 3 wks (6/13/2023) as originally planned yesterday.

## 2023-06-22 NOTE — PLAN OF CARE
Procedure complete. Pt tolerated well. Recovery for 2 hours. VSS. Site CDI. Pt transferred to MPU bay 6 and report to be given bedside.

## 2023-06-22 NOTE — DISCHARGE SUMMARY
Radiology Discharge Summary      Hospital Course: No complications    Admit Date: 6/22/2023  Discharge Date: 06/22/2023     Instructions Given to Patient: Yes  Diet: Resume prior diet  Activity: activity as tolerated and no driving for today    Description of Condition on Discharge: Stable  Vital Signs (Most Recent): Temp: 97.5 °F (36.4 °C) (06/22/23 1256)  Pulse: 70 (06/22/23 1405)  Resp: (!) 23 (06/22/23 1405)  BP: (!) 150/57 (06/22/23 1405)  SpO2: (!) 91 % (06/22/23 1405)    Discharge Disposition: Home    Discharge Diagnosis: Status post right chest port placement without immediate complication.    Follow Up: With primary care team.    Andrés Huggins MD  Fellow, Dept.Of Interventional Radiology  [facility]

## 2023-06-22 NOTE — TELEPHONE ENCOUNTER
----- Message from Bethany Scott sent at 6/22/2023  1:23 PM CDT -----  Contact: Yobany washington [place]/[facility] phone [phone]  Calling because they will not have staffing for OT next week. Thanks.    
Yobany GUNN  wanted to notify staff patient's eval will be late before of staffing.  
Heterosexual

## 2023-06-22 NOTE — H&P
Radiology History & Physical      SUBJECTIVE:     Chief Complaint: pancreatic adenocarcinoma    History of Present Illness:  Pauline Cronin is a 83 y.o. female who presents for port insertion (recently diagnosed pancreatic adenocarcinoma).    Past Medical History:   Diagnosis Date    Anticoagulant long-term use     Atrial fibrillation     Crohn disease diagnosed in her 20's    GERD (gastroesophageal reflux disease)     Hyperlipidemia     Hypertension     Pancreatic adenocarcinoma 3/21/2023    Thyroid disease     s/p I 131     Past Surgical History:   Procedure Laterality Date    APPENDECTOMY      CARDIAC SURGERY  2014    pacemaker    COLONOSCOPY  ~2008    normal findings per patient report    ENDOSCOPIC ULTRASOUND OF UPPER GASTROINTESTINAL TRACT N/A 3/14/2023    Procedure: ULTRASOUND, UPPER GI TRACT, ENDOSCOPIC;  Surgeon: Andrei Swartz MD;  Location: [facility] ENDO;  Service: Endoscopy;  Laterality: N/A;  Approval to hold Eliquis rec'd from Dr. Barbosa (see telephone encounter 3/3/23)-DS  [employer] AICD/PPM  3/3/23-Instructions via portal-DS    ERCP N/A 3/21/2023    Procedure: ERCP (ENDOSCOPIC RETROGRADE CHOLANGIOPANCREATOGRAPHY);  Surgeon: Celina Toney MD;  Location: [facility] ([facility] FLR);  Service: Endoscopy;  Laterality: N/A;    ESOPHAGOGASTRODUODENOSCOPY N/A 7/17/2018    Procedure: EGD (ESOPHAGOGASTRODUODENOSCOPY);  Surgeon: Alondra Casiano MD;  Location: [facility];  Service: Endoscopy;  Laterality: N/A;    HYSTERECTOMY      INSERTION OF IMPLANTABLE CARDIOVERTER-DEFIBRILLATOR (ICD) GENERATOR WITH TWO EXISTING LEADS Left 5/10/2021    Procedure: INSERTION, PULSE GENERATOR WITH 2 EXISTING LEADS, ICD;  Surgeon: Bo Leone MD;  Location: [facility] CATH LAB;  Service: Cardiology;  Laterality: Left;    INSERTION OF PACEMAKER      REPLACEMENT OF PACEMAKER GENERATOR  05/10/2021    RETROGRADE PYELOGRAPHY Right 2/18/2020    Procedure: PYELOGRAM, RETROGRADE;  Surgeon: Jovan Kruse MD;  Location: [facility] OR;   Service: Urology;  Laterality: Right;    SMALL INTESTINE SURGERY  in her 20's    for crohn's    TONSILLECTOMY      UPPER GASTROINTESTINAL ENDOSCOPY  ~2008    normal findings per patient report       Home Meds:   Prior to Admission medications    Medication Sig Start Date End Date Taking? Authorizing Provider   acetaminophen (TYLENOL) 325 MG tablet Take 650 mg by mouth daily as needed (Headache).    Historical Provider   albuterol (PROVENTIL) 2.5 mg /3 mL (0.083 %) nebulizer solution Take 3 mLs (2.5 mg total) by nebulization every 6 (six) hours as needed for Wheezing or Shortness of Breath. Rescue 6/7/23   Ga Waldrop MD   alendronate (FOSAMAX) 70 MG tablet Take 1 tablet (70 mg total) by mouth every 7 days. 11/7/22   Ga Waldrop MD   amLODIPine (NORVASC) 5 MG tablet Take 1 tablet (5 mg total) by mouth once daily. 3/16/23 3/15/24  Jovan Barbosa MD   amoxicillin-clavulanate 875-125mg (AUGMENTIN) 875-125 mg per tablet Take 1 tablet by mouth 2 (two) times daily. 6/11/23   Darrell Pinedo MD   atorvastatin (LIPITOR) 20 MG tablet Take 1 tablet (20 mg total) by mouth once daily. 6/27/22   Joavn Barbosa MD   colestipoL (COLESTID) 1 gram Tab Take 1 tablet (1 g total) by mouth 2 (two) times daily. 5/22/23   Ga Waldrop MD   diphenoxylate-atropine 2.5-0.025 mg (LOMOTIL) 2.5-0.025 mg per tablet TAKE 1 TABLET BY MOUTH 4 TIMES DAILY AS NEEDED FOR DIARRHEA 4/14/23   Kareem Blackman MD   ELIQUIS 5 mg Tab Take 1 tablet (5 mg total) by mouth 2 (two) times daily. 5/29/23   Jovan Barbosa MD   esomeprazole (NEXIUM) 40 MG capsule Take 1 capsule (40 mg total) by mouth once daily. 6/7/22   Valdo Jo MD   folic acid (FOLVITE) 1 MG tablet Take 1 tablet by mouth once daily 5/1/23   Ga Waldrop MD   furosemide (LASIX) 40 MG tablet Take 1 tablet (40 mg total) by mouth daily as needed (For weight gain > 3 lb in one day, increasing leg swelling, or increasing SOB). 5/7/23 5/6/24  Negrito Leon,  MD   levothyroxine (SYNTHROID, LEVOTHROID) 175 MCG tablet Take 1 tablet by mouth once daily 12/14/22   Ga Waldrop MD   LIDOcaine-prilocaine (EMLA) cream Apply topically as needed (place to port site 45-60 minutes prior to port access).  Patient not taking: Reported on 5/26/2023 5/15/23   Luma Tran, JUSTINA   lipase-protease-amylase 24,000-76,000-120,000 units (CREON) 24,000-76,000 -120,000 unit capsule Take 2 capsules by mouth 3 (three) times daily with meals. 4/13/23 4/12/24  Jim Hill MD   MELATONIN ORAL Take 1 tablet by mouth nightly as needed (Insomnia).    Historical Provider   potassium chloride (MICRO-K) 10 MEQ CpSR 1 po bid x 1 week then Micro K qd 6/14/23   Barry Clark MD   sotaloL (BETAPACE) 120 MG Tab Take 1 tablet (120 mg total) by mouth 2 (two) times daily. 5/18/23   Jovan Barbosa MD   sulfaSALAzine (AZULFIDINE) 500 mg Tab Take 1 tablet by mouth twice daily 11/7/22   Ga Waldrop MD   tolterodine (DETROL LA) 4 MG 24 hr capsule Take 1 capsule by mouth once daily 4/11/23   Ga Waldrop MD     Anticoagulants/Antiplatelets:  apixaban on hold since 6/19/23    Allergies:   Review of patient's allergies indicates:   Allergen Reactions    Lisinopril Other (See Comments)     cough     Sedation History:  have not been any systemic reactions    Review of Systems:   Hematological: no known coagulopathies  Respiratory: no shortness of breath  Cardiovascular: no chest pain  Gastrointestinal: no abdominal pain  Genito-Urinary: no dysuria  Musculoskeletal: negative  Neurological: no TIA or stroke symptoms         OBJECTIVE:     Vital Signs (Most Recent)       Physical Exam:  ASA: 3  Mallampati: 2    General: no acute distress  Mental Status: alert and oriented to person, place and time  HEENT: normocephalic, atraumatic  Chest: unlabored breathing  Heart: regular heart rate  Abdomen: nondistended  Extremity: moves all extremities    Laboratory  Lab Results   Component Value  Date    INR 1.1 06/08/2023       Lab Results   Component Value Date    WBC 4.07 06/13/2023    HGB 13.3 06/13/2023    HCT 42.2 06/13/2023    MCV 95 06/13/2023     06/13/2023      Lab Results   Component Value Date    GLU 97 06/13/2023     06/13/2023    K 3.1 (L) 06/13/2023     06/13/2023    CO2 24 06/13/2023    BUN 6 (L) 06/13/2023    CREATININE 0.8 06/13/2023    CALCIUM 9.2 06/13/2023    MG 2.2 06/10/2023    ALT 8 (L) 06/13/2023    AST 16 06/13/2023    ALBUMIN 2.8 (L) 06/13/2023    BILITOT 0.4 06/13/2023    BILIDIR 3.2 (H) 03/18/2023       ASSESSMENT/PLAN:     Sedation Plan: up to moderate    Patient will undergo port insertion.    Jeremiah Olmstead MD  PGY-4  RADIOLOGY

## 2023-06-22 NOTE — PROCEDURES
IR POST PROCEDURE NOTE    Date of Procedure: 6/22/2023    Procedure: Port placement    Pre-Procedure Diagnosis: Pancreatic cancer    Post-Procedure Diagnosis: Same    Procedure Personnel: Arsalan Pinedo MD and Andrés Huggins MD    Written Informed Consent Obtained: Yes    Specimen Removed: None    Estimated Blood Loss: Minimal    Findings: Under direct US and fluoroscopic guidance, 8Fr Bard port placed via right internal jugular approach. Catheter tip in right atrium. Port flushed and aspirated appropriately and was heparinized. Ok for immediate use.     Complications: None immediate.      Andrés Huggins MD  Fellow, Dept. Of Interventional Radiology  [facility]

## 2023-06-22 NOTE — PLAN OF CARE
2hr IR recovery. VSS. Dressing CDI. IV removed. Discharge instructions reviewed and given.  Pt. Ready for transport via wheelchair to garage and private vehicle.

## 2023-06-22 NOTE — PLAN OF CARE
Pt arrived to 189 for Port placement. Pt oriented to unit and staff. Plan of care reviewed with patient, patient verbalizes understanding. Comfort measures utilized. Pt safely transferred from stretcher to procedural table. Fall risk reviewed with patient, fall risk interventions maintained. Safety strap applied, positioner pillows utilized to minimize pressure points. Blankets applied. Pt prepped and draped utilizing standard sterile technique. Patient placed on continuous monitoring, as required by sedation policy. Timeouts completed utilizing standard universal time-out, per department and facility policy. RN to remain at bedside, continuous monitoring maintained. Pt resting comfortably. Denies pain/discomfort. Will continue to monitor. See flow sheets for monitoring, medication administration, and updates.

## 2023-06-22 NOTE — DISCHARGE INSTRUCTIONS
[facility] [phone] (MON-FRI 8 AM- 5PM). Radiology Resident on call [phone].

## 2023-06-26 ENCOUNTER — INFUSION (OUTPATIENT)
Dept: INFUSION THERAPY | Facility: HOSPITAL | Age: 83
End: 2023-06-26
Payer: MEDICARE

## 2023-06-26 ENCOUNTER — OFFICE VISIT (OUTPATIENT)
Dept: HEMATOLOGY/ONCOLOGY | Facility: CLINIC | Age: 83
End: 2023-06-26
Payer: MEDICARE

## 2023-06-26 VITALS
WEIGHT: 157.94 LBS | DIASTOLIC BLOOD PRESSURE: 75 MMHG | HEIGHT: 60 IN | BODY MASS INDEX: 31.01 KG/M2 | HEART RATE: 95 BPM | SYSTOLIC BLOOD PRESSURE: 144 MMHG | RESPIRATION RATE: 18 BRPM | TEMPERATURE: 98 F | OXYGEN SATURATION: 95 %

## 2023-06-26 VITALS
HEART RATE: 80 BPM | TEMPERATURE: 98 F | HEIGHT: 60 IN | RESPIRATION RATE: 18 BRPM | SYSTOLIC BLOOD PRESSURE: 155 MMHG | WEIGHT: 157.88 LBS | BODY MASS INDEX: 30.99 KG/M2 | DIASTOLIC BLOOD PRESSURE: 72 MMHG

## 2023-06-26 DIAGNOSIS — R41.3 OTHER AMNESIA: ICD-10-CM

## 2023-06-26 DIAGNOSIS — I10 ESSENTIAL HYPERTENSION, BENIGN: ICD-10-CM

## 2023-06-26 DIAGNOSIS — E78.2 MIXED HYPERLIPIDEMIA: ICD-10-CM

## 2023-06-26 DIAGNOSIS — R63.4 WEIGHT LOSS, UNINTENTIONAL: ICD-10-CM

## 2023-06-26 DIAGNOSIS — C25.9 MALIGNANT NEOPLASM OF PANCREAS: Primary | ICD-10-CM

## 2023-06-26 DIAGNOSIS — D49.9 IMMUNODEFICIENCY SECONDARY TO NEOPLASM: ICD-10-CM

## 2023-06-26 DIAGNOSIS — D84.821 IMMUNODEFICIENCY SECONDARY TO CHEMOTHERAPY: ICD-10-CM

## 2023-06-26 DIAGNOSIS — E44.0 MODERATE MALNUTRITION: ICD-10-CM

## 2023-06-26 DIAGNOSIS — C25.9 MALIGNANT NEOPLASM OF PANCREAS, UNSPECIFIED LOCATION OF MALIGNANCY: Primary | ICD-10-CM

## 2023-06-26 DIAGNOSIS — K86.81 EXOCRINE PANCREATIC INSUFFICIENCY: ICD-10-CM

## 2023-06-26 DIAGNOSIS — T45.1X5A IMMUNODEFICIENCY SECONDARY TO CHEMOTHERAPY: ICD-10-CM

## 2023-06-26 DIAGNOSIS — K50.10 CROHN'S DISEASE OF LARGE INTESTINE WITHOUT COMPLICATION: ICD-10-CM

## 2023-06-26 DIAGNOSIS — I70.0 AORTIC ATHEROSCLEROSIS: ICD-10-CM

## 2023-06-26 DIAGNOSIS — Z79.899 IMMUNODEFICIENCY SECONDARY TO CHEMOTHERAPY: ICD-10-CM

## 2023-06-26 DIAGNOSIS — I48.0 PAROXYSMAL ATRIAL FIBRILLATION: ICD-10-CM

## 2023-06-26 DIAGNOSIS — D50.0 IRON DEFICIENCY ANEMIA DUE TO CHRONIC BLOOD LOSS: ICD-10-CM

## 2023-06-26 DIAGNOSIS — D50.9 MICROCYTIC ANEMIA: ICD-10-CM

## 2023-06-26 DIAGNOSIS — C25.9 PANCREATIC ADENOCARCINOMA: Primary | ICD-10-CM

## 2023-06-26 DIAGNOSIS — D84.81 IMMUNODEFICIENCY SECONDARY TO NEOPLASM: ICD-10-CM

## 2023-06-26 PROCEDURE — 1101F PT FALLS ASSESS-DOCD LE1/YR: CPT | Mod: CPTII,S$GLB,, | Performed by: INTERNAL MEDICINE

## 2023-06-26 PROCEDURE — 3077F PR MOST RECENT SYSTOLIC BLOOD PRESSURE >= 140 MM HG: ICD-10-PCS | Mod: CPTII,S$GLB,, | Performed by: INTERNAL MEDICINE

## 2023-06-26 PROCEDURE — 3078F DIAST BP <80 MM HG: CPT | Mod: CPTII,S$GLB,, | Performed by: INTERNAL MEDICINE

## 2023-06-26 PROCEDURE — 1157F ADVNC CARE PLAN IN RCRD: CPT | Mod: CPTII,S$GLB,, | Performed by: INTERNAL MEDICINE

## 2023-06-26 PROCEDURE — 1126F AMNT PAIN NOTED NONE PRSNT: CPT | Mod: CPTII,S$GLB,, | Performed by: INTERNAL MEDICINE

## 2023-06-26 PROCEDURE — A4216 STERILE WATER/SALINE, 10 ML: HCPCS | Performed by: REGISTERED NURSE

## 2023-06-26 PROCEDURE — 1111F PR DISCHARGE MEDS RECONCILED W/ CURRENT OUTPATIENT MED LIST: ICD-10-PCS | Mod: CPTII,S$GLB,, | Performed by: INTERNAL MEDICINE

## 2023-06-26 PROCEDURE — 99215 OFFICE O/P EST HI 40 MIN: CPT | Mod: S$GLB,,, | Performed by: INTERNAL MEDICINE

## 2023-06-26 PROCEDURE — 1160F PR REVIEW ALL MEDS BY PRESCRIBER/CLIN PHARMACIST DOCUMENTED: ICD-10-PCS | Mod: CPTII,S$GLB,, | Performed by: INTERNAL MEDICINE

## 2023-06-26 PROCEDURE — 99999 PR PBB SHADOW E&M-EST. PATIENT-LVL IV: CPT | Mod: PBBFAC,,, | Performed by: INTERNAL MEDICINE

## 2023-06-26 PROCEDURE — 63600175 PHARM REV CODE 636 W HCPCS: Mod: JW,JG | Performed by: REGISTERED NURSE

## 2023-06-26 PROCEDURE — 1157F PR ADVANCE CARE PLAN OR EQUIV PRESENT IN MEDICAL RECORD: ICD-10-PCS | Mod: CPTII,S$GLB,, | Performed by: INTERNAL MEDICINE

## 2023-06-26 PROCEDURE — 1159F MED LIST DOCD IN RCRD: CPT | Mod: CPTII,S$GLB,, | Performed by: INTERNAL MEDICINE

## 2023-06-26 PROCEDURE — 1159F PR MEDICATION LIST DOCUMENTED IN MEDICAL RECORD: ICD-10-PCS | Mod: CPTII,S$GLB,, | Performed by: INTERNAL MEDICINE

## 2023-06-26 PROCEDURE — 96413 CHEMO IV INFUSION 1 HR: CPT

## 2023-06-26 PROCEDURE — 99999 PR PBB SHADOW E&M-EST. PATIENT-LVL IV: ICD-10-PCS | Mod: PBBFAC,,, | Performed by: INTERNAL MEDICINE

## 2023-06-26 PROCEDURE — 1126F PR PAIN SEVERITY QUANTIFIED, NO PAIN PRESENT: ICD-10-PCS | Mod: CPTII,S$GLB,, | Performed by: INTERNAL MEDICINE

## 2023-06-26 PROCEDURE — 3288F PR FALLS RISK ASSESSMENT DOCUMENTED: ICD-10-PCS | Mod: CPTII,S$GLB,, | Performed by: INTERNAL MEDICINE

## 2023-06-26 PROCEDURE — 1111F DSCHRG MED/CURRENT MED MERGE: CPT | Mod: CPTII,S$GLB,, | Performed by: INTERNAL MEDICINE

## 2023-06-26 PROCEDURE — 3078F PR MOST RECENT DIASTOLIC BLOOD PRESSURE < 80 MM HG: ICD-10-PCS | Mod: CPTII,S$GLB,, | Performed by: INTERNAL MEDICINE

## 2023-06-26 PROCEDURE — 1160F RVW MEDS BY RX/DR IN RCRD: CPT | Mod: CPTII,S$GLB,, | Performed by: INTERNAL MEDICINE

## 2023-06-26 PROCEDURE — 3288F FALL RISK ASSESSMENT DOCD: CPT | Mod: CPTII,S$GLB,, | Performed by: INTERNAL MEDICINE

## 2023-06-26 PROCEDURE — 3077F SYST BP >= 140 MM HG: CPT | Mod: CPTII,S$GLB,, | Performed by: INTERNAL MEDICINE

## 2023-06-26 PROCEDURE — 99215 PR OFFICE/OUTPT VISIT, EST, LEVL V, 40-54 MIN: ICD-10-PCS | Mod: S$GLB,,, | Performed by: INTERNAL MEDICINE

## 2023-06-26 PROCEDURE — 25000003 PHARM REV CODE 250: Performed by: REGISTERED NURSE

## 2023-06-26 PROCEDURE — 1101F PR PT FALLS ASSESS DOC 0-1 FALLS W/OUT INJ PAST YR: ICD-10-PCS | Mod: CPTII,S$GLB,, | Performed by: INTERNAL MEDICINE

## 2023-06-26 PROCEDURE — 96375 TX/PRO/DX INJ NEW DRUG ADDON: CPT

## 2023-06-26 PROCEDURE — 96417 CHEMO IV INFUS EACH ADDL SEQ: CPT

## 2023-06-26 RX ORDER — SODIUM CHLORIDE 0.9 % (FLUSH) 0.9 %
10 SYRINGE (ML) INJECTION
Status: CANCELLED | OUTPATIENT
Start: 2023-06-26

## 2023-06-26 RX ORDER — HEPARIN 100 UNIT/ML
500 SYRINGE INTRAVENOUS
Status: DISCONTINUED | OUTPATIENT
Start: 2023-06-26 | End: 2023-06-26 | Stop reason: HOSPADM

## 2023-06-26 RX ORDER — HEPARIN 100 UNIT/ML
500 SYRINGE INTRAVENOUS
Status: CANCELLED | OUTPATIENT
Start: 2023-06-26

## 2023-06-26 RX ORDER — DEXAMETHASONE SODIUM PHOSPHATE 4 MG/ML
4 INJECTION, SOLUTION INTRA-ARTICULAR; INTRALESIONAL; INTRAMUSCULAR; INTRAVENOUS; SOFT TISSUE
Status: CANCELLED
Start: 2023-06-26

## 2023-06-26 RX ORDER — DEXAMETHASONE SODIUM PHOSPHATE 4 MG/ML
4 INJECTION, SOLUTION INTRA-ARTICULAR; INTRALESIONAL; INTRAMUSCULAR; INTRAVENOUS; SOFT TISSUE
Status: COMPLETED | OUTPATIENT
Start: 2023-06-26 | End: 2023-06-26

## 2023-06-26 RX ORDER — SODIUM CHLORIDE 0.9 % (FLUSH) 0.9 %
10 SYRINGE (ML) INJECTION
Status: DISCONTINUED | OUTPATIENT
Start: 2023-06-26 | End: 2023-06-26 | Stop reason: HOSPADM

## 2023-06-26 RX ADMIN — SODIUM CHLORIDE: 0.9 INJECTION, SOLUTION INTRAVENOUS at 12:06

## 2023-06-26 RX ADMIN — GEMCITABINE HYDROCHLORIDE 1400 MG: 1 INJECTION, SOLUTION INTRAVENOUS at 02:06

## 2023-06-26 RX ADMIN — DEXAMETHASONE SODIUM PHOSPHATE 4 MG: 4 INJECTION INTRA-ARTICULAR; INTRALESIONAL; INTRAMUSCULAR; INTRAVENOUS; SOFT TISSUE at 12:06

## 2023-06-26 RX ADMIN — PACLITAXEL 180 MG: 100 INJECTION, POWDER, LYOPHILIZED, FOR SUSPENSION INTRAVENOUS at 01:06

## 2023-06-26 RX ADMIN — HEPARIN 500 UNITS: 100 SYRINGE at 02:06

## 2023-06-26 RX ADMIN — Medication 10 ML: at 02:06

## 2023-06-26 NOTE — PROGRESS NOTES
MEDICAL ONCOLOGY - ESTABLISHED PATIENT VISIT    Reason for visit: Pancreatic adenocarcinoma    Best Contact Phone Number(s): [phone] (home)      Cancer/Stage/TNM:    Cancer Staging   Malignant neoplasm of pancreas  Staging form: Exocrine Pancreas, AJCC 8th Edition  - Clinical stage from 4/13/2023: Stage IB (cT2, cN0, cM0) - Unsigned       Oncology History   Malignant neoplasm of pancreas   3/21/2023 Initial Diagnosis    Pancreatic adenocarcinoma     5/1/2023 -  Chemotherapy    Treatment Summary   Plan Name: OP PANC NAB-PACLITAXEL + GEMCITABINE Q4W  Treatment Goal: Palliative  Status: Active  Start Date: 5/1/2023  End Date: 3/14/2024 (Planned)  Provider: Jim Hill MD  Chemotherapy: gemcitabine (GEMZAR) 1,400 mg in sodium chloride 0.9% SolP 321.82 mL chemo infusion, 1,448 mg (100 % of original dose 800 mg/m2), Intravenous, Clinic/HOD 1 time, 1 of 12 cycles  Dose modification: 800 mg/m2 (original dose 800 mg/m2, Cycle 1, Reason: Other (see comments), Comment: poor PS)          Interim History:   83 y.o. female with Crohn's disease, p.afib, SSS s/p PPM, HTN, HLD who presents for follow up prior to next cycle of chemotherapy. She has not had any hospital admissions since her last visit x 2 weeks ago. Had port placed per IR on 6/22/23.    She is feeling generally well today. Has been going on occasional shopping trips. Appetite is doing well despite weight loss. She notes frequent abdominal cramps and gas pains. Continues on imodium and lomotil for chronic diarrhea. She reported continued forgetfulness and some trouble with coordination. When she handles small objects or pills, she uses something under her hands in case she drops it. Port site healing well.     Presents with her daughter in law. ECOG PS 2.     ROS:   Review of Systems   Constitutional:  Positive for malaise/fatigue and weight loss. Negative for chills and fever.   HENT:  Negative for congestion and sore throat.    Eyes:  Negative for  blurred vision.   Respiratory:  Negative for shortness of breath.    Cardiovascular:  Negative for chest pain and leg swelling.   Gastrointestinal:  Positive for diarrhea. Negative for abdominal pain, blood in stool, constipation, nausea and vomiting.   Genitourinary:  Negative for dysuria.   Musculoskeletal:  Negative for back pain, falls and myalgias.   Skin:  Negative for itching and rash.   Neurological:  Positive for weakness (generalized). Negative for dizziness, tingling, tremors, sensory change and focal weakness.   Endo/Heme/Allergies:  Does not bruise/bleed easily.   Psychiatric/Behavioral:  Negative for depression, substance abuse and suicidal ideas. The patient is not nervous/anxious.      Past Medical History:   Past Medical History:   Diagnosis Date    Anticoagulant long-term use     Atrial fibrillation     Crohn disease diagnosed in her 20's    GERD (gastroesophageal reflux disease)     Hyperlipidemia     Hypertension     Pancreatic adenocarcinoma 3/21/2023    Thyroid disease     s/p I 131        Past Surgical History:   Past Surgical History:   Procedure Laterality Date    APPENDECTOMY      CARDIAC SURGERY  2014    pacemaker    COLONOSCOPY  ~2008    normal findings per patient report    ENDOSCOPIC ULTRASOUND OF UPPER GASTROINTESTINAL TRACT N/A 3/14/2023    Procedure: ULTRASOUND, UPPER GI TRACT, ENDOSCOPIC;  Surgeon: Andrei Swartz MD;  Location: [facility];  Service: Endoscopy;  Laterality: N/A;  Approval to hold Eliquis rec'd from Dr. Barbosa (see telephone encounter 3/3/23)-DS  [employer] AICD/PPM  3/3/23-Instructions via portal-DS    ERCP N/A 3/21/2023    Procedure: ERCP (ENDOSCOPIC RETROGRADE CHOLANGIOPANCREATOGRAPHY);  Surgeon: Celina Toney MD;  Location: [address]);  Service: Endoscopy;  Laterality: N/A;    ESOPHAGOGASTRODUODENOSCOPY N/A 7/17/2018    Procedure: EGD (ESOPHAGOGASTRODUODENOSCOPY);  Surgeon: Alondra Casiano MD;  Location: [facility];  Service: Endoscopy;  Laterality:  N/A;    HYSTERECTOMY      INSERTION OF IMPLANTABLE CARDIOVERTER-DEFIBRILLATOR (ICD) GENERATOR WITH TWO EXISTING LEADS Left 5/10/2021    Procedure: INSERTION, PULSE GENERATOR WITH 2 EXISTING LEADS, ICD;  Surgeon: Bo Leone MD;  Location: [facility] CATH LAB;  Service: Cardiology;  Laterality: Left;    INSERTION OF PACEMAKER      REPLACEMENT OF PACEMAKER GENERATOR  05/10/2021    RETROGRADE PYELOGRAPHY Right 2020    Procedure: PYELOGRAM, RETROGRADE;  Surgeon: Jovan Kruse MD;  Location: [facility] OR;  Service: Urology;  Laterality: Right;    SMALL INTESTINE SURGERY  in her 20's    for crohn's    TONSILLECTOMY      UPPER GASTROINTESTINAL ENDOSCOPY  ~    normal findings per patient report        Family History:   Family History   Problem Relation Age of Onset    Cancer Mother     Breast cancer Mother     Crohn's disease Other     Colon cancer Neg Hx     Colon polyps Neg Hx     Esophageal cancer Neg Hx     Stomach cancer Neg Hx     Ulcerative colitis Neg Hx         Social History:   Social History     Tobacco Use    Smoking status: Former     Types: Cigarettes     Quit date:      Years since quittin.5    Smokeless tobacco: Never   Substance Use Topics    Alcohol use: No        I have reviewed and updated the patient's past medical, surgical, family and social histories.    Allergies:   Review of patient's allergies indicates:   Allergen Reactions    Lisinopril Other (See Comments)     cough        Medications:   Current Outpatient Medications   Medication Sig Dispense Refill    acetaminophen (TYLENOL) 325 MG tablet Take 650 mg by mouth daily as needed (Headache).      albuterol (PROVENTIL) 2.5 mg /3 mL (0.083 %) nebulizer solution Take 3 mLs (2.5 mg total) by nebulization every 6 (six) hours as needed for Wheezing or Shortness of Breath. Rescue 150 mL 11    alendronate (FOSAMAX) 70 MG tablet Take 1 tablet (70 mg total) by mouth every 7 days. 12 tablet 3    amLODIPine (NORVASC) 5 MG tablet Take 1 tablet  (5 mg total) by mouth once daily. 30 tablet 11    amoxicillin-clavulanate 875-125mg (AUGMENTIN) 875-125 mg per tablet Take 1 tablet by mouth 2 (two) times daily. 14 tablet 0    atorvastatin (LIPITOR) 20 MG tablet Take 1 tablet (20 mg total) by mouth once daily. 90 tablet 3    colestipoL (COLESTID) 1 gram Tab Take 1 tablet (1 g total) by mouth 2 (two) times daily. 60 tablet 5    diphenoxylate-atropine 2.5-0.025 mg (LOMOTIL) 2.5-0.025 mg per tablet TAKE 1 TABLET BY MOUTH 4 TIMES DAILY AS NEEDED FOR DIARRHEA 60 tablet 0    ELIQUIS 5 mg Tab Take 1 tablet (5 mg total) by mouth 2 (two) times daily. 180 tablet 3    esomeprazole (NEXIUM) 40 MG capsule Take 1 capsule (40 mg total) by mouth once daily. 90 capsule 3    folic acid (FOLVITE) 1 MG tablet Take 1 tablet by mouth once daily 90 tablet 0    furosemide (LASIX) 40 MG tablet Take 1 tablet (40 mg total) by mouth daily as needed (For weight gain > 3 lb in one day, increasing leg swelling, or increasing SOB). 30 tablet 11    levothyroxine (SYNTHROID, LEVOTHROID) 175 MCG tablet Take 1 tablet by mouth once daily 30 tablet 5    LIDOcaine-prilocaine (EMLA) cream Apply topically as needed (place to port site 45-60 minutes prior to port access). (Patient not taking: Reported on 5/26/2023) 30 g 6    lipase-protease-amylase 24,000-76,000-120,000 units (CREON) 24,000-76,000 -120,000 unit capsule Take 2 capsules by mouth 3 (three) times daily with meals. 180 capsule 11    MELATONIN ORAL Take 1 tablet by mouth nightly as needed (Insomnia).      potassium chloride (MICRO-K) 10 MEQ CpSR 1 po bid x 1 week then Micro K qd 30 capsule 5    sotaloL (BETAPACE) 120 MG Tab Take 1 tablet (120 mg total) by mouth 2 (two) times daily. 180 tablet 3    sulfaSALAzine (AZULFIDINE) 500 mg Tab Take 1 tablet by mouth twice daily 180 tablet 3    tolterodine (DETROL LA) 4 MG 24 hr capsule Take 1 capsule by mouth once daily 90 capsule 3     No current facility-administered medications for this visit.       Physical Exam:   BP (!) 144/75 (BP Location: Left arm, Patient Position: Sitting, BP Method: Large (Automatic))   Pulse 95   Temp 98 °F (36.7 °C) (Oral)   Resp 18   Ht 5' (1.524 m)   Wt 71.6 kg (157 lb 15.4 oz)   SpO2 95%   BMI 30.85 kg/m²      ECOG Performance status: 2       Physical Exam  Vitals reviewed.   Constitutional:       General: She is not in acute distress.     Appearance: She is not ill-appearing, toxic-appearing or diaphoretic.   HENT:      Head: Normocephalic and atraumatic.      Right Ear: External ear normal.      Left Ear: External ear normal.      Nose: Nose normal. No congestion.      Mouth/Throat:      Pharynx: Oropharynx is clear. No oropharyngeal exudate.   Eyes:      General: No scleral icterus.     Conjunctiva/sclera: Conjunctivae normal.      Pupils: Pupils are equal, round, and reactive to light.   Cardiovascular:      Rate and Rhythm: Normal rate and regular rhythm.      Heart sounds: No murmur heard.  Pulmonary:      Effort: Pulmonary effort is normal. No respiratory distress.      Breath sounds: Normal breath sounds. No wheezing.   Chest:      Comments: RCW port in place, bandage over site.   Abdominal:      General: Abdomen is flat. Bowel sounds are normal. There is no distension.      Palpations: Abdomen is soft. There is no mass.      Tenderness: There is no abdominal tenderness.   Musculoskeletal:         General: No swelling or deformity.   Skin:     General: Skin is warm and dry.      Coloration: Skin is not jaundiced or pale.      Findings: No erythema or rash.   Neurological:      Mental Status: She is alert and oriented to person, place, and time. Mental status is at baseline.      Motor: Weakness (generalized) present.   Psychiatric:         Mood and Affect: Mood normal.       Labs:   Recent Results (from the past 48 hour(s))   CBC Oncology    Collection Time: 06/26/23 10:58 AM   Result Value Ref Range    WBC 4.67 3.90 - 12.70 K/uL    RBC 4.41 4.00 - 5.40 M/uL     Hemoglobin 13.5 12.0 - 16.0 g/dL    Hematocrit 41.5 37.0 - 48.5 %    MCV 94 82 - 98 fL    MCH 30.6 27.0 - 31.0 pg    MCHC 32.5 32.0 - 36.0 g/dL    RDW 19.2 (H) 11.5 - 14.5 %    Platelets 209 150 - 450 K/uL    MPV 10.8 9.2 - 12.9 fL    Gran # (ANC) 2.7 1.8 - 7.7 K/uL    Immature Grans (Abs) 0.01 0.00 - 0.04 K/uL        I have reviewed the pertinent labs from 6/26/23 which are notable for normal blood counts. CMP pending.      Imaging:    I have personally reviewed the imaging which is notable for a pancreatic head mass evident on CT abdomen pelvis with no evidence of metastatic disease.     CT head imaging without evidence of metastatic disease.     Path:   PANCREAS, HEAD MASS, EUS-FNB:   Adenocarcinoma   Mismatch Repair (MMR) Proteins:    MLH1 - Intact nuclear expression    MSH2 - Intact nuclear expression    MSH6 - Intact nuclear expression    PMS2 - Intact nuclear expression    Assessment:       1. 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 Immunodeficiency secondary to neoplasm    4. Essential hypertension, benign    5. Mixed hyperlipidemia    6. Aortic atherosclerosis    7. Paroxysmal atrial fibrillation    8. Microcytic anemia    9. Iron deficiency anemia due to chronic blood loss    10. Exocrine pancreatic insufficiency    11. Crohn's disease of large intestine without complication    12. Other amnesia       Plan:     #  Pancreatic adenocarcinoma.   83 y.o. F with Crohn's disease, p.afib, SSS s/p PPM, HTN, HLD, fatigue, presented with weight loss and jaundice and was found to have  pancreatic adenocarcinoma. She has early stage pancreatic cancer, and is considered surgically resectable. However, giving her age and multiple co-morbidities, surgery might not be feasible or safe.     We have discussed with her the diagnosis, stage, prognosis and treatment options. She understands that Surgery is the only curable option and she is unlikely to be sufficiently medically fit for  it. We have discussed chemotherapy with a palliative intent to improve symptoms and prolong life.   She expressed understanding and she values QoL but was willing to try chemotherapy.     We have discussed treatment with first line therapy Gemcitabine + Abraxane.  She agreed and wished to proceed.     Received cycle 1 of biweekly gem/abraxane with dose reductions of 800 mg/m2 and 100 mg/m2 respectively on 5/1/23. Subsequently hospitalized x 3 (had an ED visit same day as first discharge).     She does want to delay starting cycle 2 until she has a port placed. She unfortunately missed her initial IR appointment d/t 3rd admission. New orders and referral placed today as she was told process would need to be restarted.     Port placed per IR on 6/22/23. Site healing well overall.   MRI brain done 6/23/23 without evidence of metastatic disease.   Encouraged use of Ensure for malnutrition and continued weight loss. She is agreeable.     Labs reviewed, acceptable to proceed with cycle 2 today.   RTC in 2 weeks for next cycle.     Saw palliative care on 5/12. Continue to f/u as directed.     # HTN   BP mildly elevated today.  On amlodipine 5 mg daily, coreg 6.25 mg bid     # HLD, Atherosclerosis   On Lipitor 20 mg   Follows with cardiology     # P. Afib   Rate controlled. On Eliquis.  Notes feeling better since med adjustments during admission.   Continue to monitor.     #. Microcytic anemia, Iron def due to chronic blood loss  Unclear etiology but likely from chronic blood loss 2/2 diverticulosis or tumor invasion of small bowel.  Iron studies consistent with iron deficiency.   Couldn't tolerate PO iron due to severe GI side effects (abdominal cramps and nausea)  Injectafer denied by insurance so will administer Venofer x 4-5 doses.  S/p 3 doses. Does not want #4 at this time.   Monitor on treatment.     #Pancreatic insufficiency   Improved with Creon   On lomotil for diarrhea   Continue to monitor.     # Crohn's  disease.   On Sulfasalazine  Follows with GI     Follow up: 2 weeks for cycle 3    Patient is in agreement with the proposed treatment plan. All questions were answered to the patient's satisfaction. Pt knows to call clinic if anything is needed before the next clinic visit.    Patient discussed with collaborating physician, Dr. Hill.    At least 40 minutes were spent today on this encounter including face to face time with the patient, data gathering/interpretation and documentation.       Luma Tran, MSN, APRN, [facility]  Hematology and Medical Oncology  Clinical Nurse Specialist to Dr. Hill, Dr. Ceja & Dr. Pandey Onc Chart Routing      Follow up with physician 4 weeks. as scheduled   Follow up with DOMINGO 2 weeks. as scheduled   Infusion scheduling note   treatment every 2 weeks.   Injection scheduling note    Labs CBC, CMP and CA 19-9   Scheduling:  Preferred lab:  Lab interval: every 2 weeks     Imaging    Pharmacy appointment    Other referrals

## 2023-06-28 DIAGNOSIS — G89.3 NEOPLASM RELATED PAIN: Primary | ICD-10-CM

## 2023-06-28 RX ORDER — TRAMADOL HYDROCHLORIDE 50 MG/1
50 TABLET ORAL EVERY 6 HOURS PRN
Qty: 25 TABLET | Refills: 0 | Status: SHIPPED | OUTPATIENT
Start: 2023-06-28

## 2023-06-29 ENCOUNTER — TELEPHONE (OUTPATIENT)
Dept: HEMATOLOGY/ONCOLOGY | Facility: CLINIC | Age: 83
End: 2023-06-29
Payer: MEDICARE

## 2023-06-29 NOTE — TELEPHONE ENCOUNTER
----- Message from Jim Hill MD sent at 6/29/2023  8:18 AM CDT -----  Regarding: RE: same day - pt RX request - high priorty  Thanks.  They got a hold of me last night and I sent tramadol.  ----- Message -----  From: Rivka Johnson RN  Sent: 6/28/2023   5:42 PM CDT  To: JUSTINA Queen, Jim Hill MD  Subject: FW: same day - pt RX request - high priorty      Spoke with Karen.  Pt complained of headache starting Monday but has been getting relief with ibuprofen.  Today this hasn't helped and would like to try prescription med.    - Rivka    ----- Message -----  From: Tremontana Chevalier  Sent: 6/28/2023   4:48 PM CDT  To: Sergio Fernandez Staff  Subject: same day - pt RX request - high priorty          Daughter Karen clling advise  that pt has had a severe ongoing headache since chemo on Monday. Caller sys pt has been taking ibupropin but it's not helping.     Callback to Karen @ [phone]  Caller miguelq RX     Pls send to  [place] [id number] [facility] [address] [facility]   Phone:  [phone]  Fax:  [phone]

## 2023-06-30 DIAGNOSIS — C25.9 MALIGNANT NEOPLASM OF PANCREAS, UNSPECIFIED LOCATION OF MALIGNANCY: Primary | ICD-10-CM

## 2023-06-30 RX ORDER — OLANZAPINE 2.5 MG/1
2.5 TABLET ORAL NIGHTLY
Qty: 3 TABLET | Refills: 0 | Status: SHIPPED | OUTPATIENT
Start: 2023-06-30 | End: 2023-07-10 | Stop reason: SDUPTHER

## 2023-06-30 RX ORDER — PROCHLORPERAZINE MALEATE 10 MG
10 TABLET ORAL 4 TIMES DAILY PRN
Qty: 90 TABLET | Refills: 11 | Status: SHIPPED | OUTPATIENT
Start: 2023-06-30

## 2023-06-30 NOTE — PROGRESS NOTES
Received call from patient. Noted to have emesis x3 and poor po intak. No abdominal pain. No orthostasis. Taking ondansetron q8 hours with modest benefit. aSndra had C1D15 [place]/Abraxane 6/26/23 for pancreatic cancer.    Called in prochlorperazine and olanzapine x3 nights to local pharmacy. Qtc most recent EKG not prolonged    Instructed to present to ED for ongoing inability to tolerate po, abdominal pain, orthostasis or other concerning symptom.

## 2023-07-05 ENCOUNTER — DOCUMENT SCAN (OUTPATIENT)
Dept: HOME HEALTH SERVICES | Facility: HOSPITAL | Age: 83
End: 2023-07-05
Payer: MEDICARE

## 2023-07-05 ENCOUNTER — TELEPHONE (OUTPATIENT)
Dept: HEMATOLOGY/ONCOLOGY | Facility: CLINIC | Age: 83
End: 2023-07-05
Payer: MEDICARE

## 2023-07-05 ENCOUNTER — PATIENT MESSAGE (OUTPATIENT)
Dept: HEMATOLOGY/ONCOLOGY | Facility: CLINIC | Age: 83
End: 2023-07-05
Payer: MEDICARE

## 2023-07-05 NOTE — TELEPHONE ENCOUNTER
----- Message from Luma Tran, CNS sent at 6/26/2023 11:49 AM CDT -----  Regarding: Call in 1 week

## 2023-07-05 NOTE — TELEPHONE ENCOUNTER
"Spoke with daughter in law, Karen. Notes patient is "up and down" since chemo. Feeling better today. Has non-pruritic rash to lower extremities. Karen will try to send picture through portal. Nausea better controlled with addition of compazine. Will see her again in clinic on Monday.   "

## 2023-07-06 NOTE — TELEPHONE ENCOUNTER
No care due was identified.  [facility] Embedded Care Due Messages. Reference number: [id number].   7/06/2023 3:05:52 PM CDT

## 2023-07-06 NOTE — TELEPHONE ENCOUNTER
----- Message from Alysha Wills sent at 7/6/2023  2:48 PM CDT -----  Contact: pt [phone]  Requesting an RX refill or new RX.  Is this a refill or new RX: Refill  RX name and strength: Nexium  Is this a 30 day or 90 day RX: 90 day with refills  Patient advised refills can take 72 hours and [facility] be [facility] for refills?:  n/a  Pharmacy name and phone #:   [facility] [address]  [place] [facility] [address] [facility]  [address] [facility] [place] [address]  Phone: [phone] Fax: [phone]    Comments:     Thank You

## 2023-07-07 NOTE — TELEPHONE ENCOUNTER
----- Message from Mickie Liz sent at 7/7/2023  1:57 PM CDT -----  Contact: [phone]  2nd message      Per pt, this is her second time calling in regards to a refill on her esomeprazole (NEXIUM) 40 MG capsule 90 capsule. Pt states her pharmacy has also reached out sometime ago in regards to this refill. Pt is using   [address] [employer]  [facility] [employer]  [facility] [address]  Phone: [phone] Fax: [phone]. Pt would like a callback with an update. Per pt, she is needing this filled today, she only has one pill left.             Thank you

## 2023-07-07 NOTE — TELEPHONE ENCOUNTER
Called pt's daughter in law regarding message. Informed pt's daughter in law refill request has been sent.    3

## 2023-07-07 NOTE — TELEPHONE ENCOUNTER
No care due was identified.  [facility] Embedded Care Due Messages. Reference number: [id number].   7/07/2023 2:03:59 PM CDT   Parent

## 2023-07-07 NOTE — TELEPHONE ENCOUNTER
Refill Routing Note   Medication(s) are not appropriate for processing by [facility] for the following reason(s):      Patient seen in ED/Hospital since LOV with PCP    [facility] action(s):  Defer None identified     Medication Therapy Plan: Patient has been seen in ED multiple times since OV      Appointments  past 12m or future 3m with PCP    Date Provider   Last Visit   4/18/2023 Ga Waldrop MD   Next Visit   10/18/2023 Ga Waldrop MD   ED visits in past 90 days: 0        Note composed:6:59 PM 07/07/2023

## 2023-07-10 ENCOUNTER — OFFICE VISIT (OUTPATIENT)
Dept: HEMATOLOGY/ONCOLOGY | Facility: CLINIC | Age: 83
End: 2023-07-10
Payer: MEDICARE

## 2023-07-10 ENCOUNTER — INFUSION (OUTPATIENT)
Dept: INFUSION THERAPY | Facility: HOSPITAL | Age: 83
End: 2023-07-10
Payer: MEDICARE

## 2023-07-10 VITALS
RESPIRATION RATE: 18 BRPM | WEIGHT: 156.06 LBS | TEMPERATURE: 98 F | DIASTOLIC BLOOD PRESSURE: 66 MMHG | SYSTOLIC BLOOD PRESSURE: 125 MMHG | BODY MASS INDEX: 30.48 KG/M2 | HEART RATE: 99 BPM

## 2023-07-10 VITALS
RESPIRATION RATE: 18 BRPM | OXYGEN SATURATION: 98 % | DIASTOLIC BLOOD PRESSURE: 66 MMHG | BODY MASS INDEX: 30.64 KG/M2 | HEART RATE: 99 BPM | WEIGHT: 156.06 LBS | HEIGHT: 60 IN | SYSTOLIC BLOOD PRESSURE: 125 MMHG

## 2023-07-10 DIAGNOSIS — I10 ESSENTIAL HYPERTENSION, BENIGN: ICD-10-CM

## 2023-07-10 DIAGNOSIS — C25.9 MALIGNANT NEOPLASM OF PANCREAS, UNSPECIFIED LOCATION OF MALIGNANCY: ICD-10-CM

## 2023-07-10 DIAGNOSIS — Z79.899 IMMUNODEFICIENCY SECONDARY TO CHEMOTHERAPY: ICD-10-CM

## 2023-07-10 DIAGNOSIS — K86.81 EXOCRINE PANCREATIC INSUFFICIENCY: ICD-10-CM

## 2023-07-10 DIAGNOSIS — D84.821 IMMUNODEFICIENCY SECONDARY TO CHEMOTHERAPY: ICD-10-CM

## 2023-07-10 DIAGNOSIS — C25.9 MALIGNANT NEOPLASM OF PANCREAS, UNSPECIFIED LOCATION OF MALIGNANCY: Primary | ICD-10-CM

## 2023-07-10 DIAGNOSIS — D84.81 IMMUNODEFICIENCY SECONDARY TO NEOPLASM: ICD-10-CM

## 2023-07-10 DIAGNOSIS — I70.0 AORTIC ATHEROSCLEROSIS: ICD-10-CM

## 2023-07-10 DIAGNOSIS — K50.10 CROHN'S DISEASE OF LARGE INTESTINE WITHOUT COMPLICATION: ICD-10-CM

## 2023-07-10 DIAGNOSIS — D70.1 CHEMOTHERAPY INDUCED NEUTROPENIA: ICD-10-CM

## 2023-07-10 DIAGNOSIS — I48.0 PAROXYSMAL ATRIAL FIBRILLATION: ICD-10-CM

## 2023-07-10 DIAGNOSIS — D50.9 MICROCYTIC ANEMIA: ICD-10-CM

## 2023-07-10 DIAGNOSIS — T45.1X5A CHEMOTHERAPY INDUCED NEUTROPENIA: ICD-10-CM

## 2023-07-10 DIAGNOSIS — C25.9 PANCREATIC ADENOCARCINOMA: Primary | ICD-10-CM

## 2023-07-10 DIAGNOSIS — D50.0 IRON DEFICIENCY ANEMIA DUE TO CHRONIC BLOOD LOSS: ICD-10-CM

## 2023-07-10 DIAGNOSIS — G89.3 NEOPLASM RELATED PAIN: ICD-10-CM

## 2023-07-10 DIAGNOSIS — D49.9 IMMUNODEFICIENCY SECONDARY TO NEOPLASM: ICD-10-CM

## 2023-07-10 DIAGNOSIS — E78.2 MIXED HYPERLIPIDEMIA: ICD-10-CM

## 2023-07-10 DIAGNOSIS — C80.1 CANCER: Primary | ICD-10-CM

## 2023-07-10 DIAGNOSIS — T45.1X5A IMMUNODEFICIENCY SECONDARY TO CHEMOTHERAPY: ICD-10-CM

## 2023-07-10 DIAGNOSIS — E87.6 HYPOKALEMIA: ICD-10-CM

## 2023-07-10 LAB
ALBUMIN SERPL BCP-MCNC: 2.9 G/DL (ref 3.5–5.2)
ALP SERPL-CCNC: 78 U/L (ref 55–135)
ALT SERPL W/O P-5'-P-CCNC: 18 U/L (ref 10–44)
ANION GAP SERPL CALC-SCNC: 11 MMOL/L (ref 8–16)
AST SERPL-CCNC: 43 U/L (ref 10–40)
BILIRUB SERPL-MCNC: 0.3 MG/DL (ref 0.1–1)
BUN SERPL-MCNC: 6 MG/DL (ref 8–23)
CALCIUM SERPL-MCNC: 8.1 MG/DL (ref 8.7–10.5)
CANCER AG19-9 SERPL-ACNC: 7.3 U/ML (ref 0–40)
CHLORIDE SERPL-SCNC: 105 MMOL/L (ref 95–110)
CO2 SERPL-SCNC: 26 MMOL/L (ref 23–29)
CREAT SERPL-MCNC: 0.8 MG/DL (ref 0.5–1.4)
ERYTHROCYTE [DISTWIDTH] IN BLOOD BY AUTOMATED COUNT: 16.9 % (ref 11.5–14.5)
EST. GFR  (NO RACE VARIABLE): >60 ML/MIN/1.73 M^2
GLUCOSE SERPL-MCNC: 128 MG/DL (ref 70–110)
HCT VFR BLD AUTO: 35.8 % (ref 37–48.5)
HGB BLD-MCNC: 11.8 G/DL (ref 12–16)
IMM GRANULOCYTES # BLD AUTO: 0.02 K/UL (ref 0–0.04)
MCH RBC QN AUTO: 31.1 PG (ref 27–31)
MCHC RBC AUTO-ENTMCNC: 33 G/DL (ref 32–36)
MCV RBC AUTO: 95 FL (ref 82–98)
NEUTROPHILS # BLD AUTO: 1.3 K/UL (ref 1.8–7.7)
PLATELET # BLD AUTO: 377 K/UL (ref 150–450)
PMV BLD AUTO: 10.4 FL (ref 9.2–12.9)
POTASSIUM SERPL-SCNC: 3 MMOL/L (ref 3.5–5.1)
PROT SERPL-MCNC: 6.5 G/DL (ref 6–8.4)
RBC # BLD AUTO: 3.79 M/UL (ref 4–5.4)
SODIUM SERPL-SCNC: 142 MMOL/L (ref 136–145)
WBC # BLD AUTO: 3.16 K/UL (ref 3.9–12.7)

## 2023-07-10 PROCEDURE — 96413 CHEMO IV INFUSION 1 HR: CPT

## 2023-07-10 PROCEDURE — A4216 STERILE WATER/SALINE, 10 ML: HCPCS | Performed by: HOSPITALIST

## 2023-07-10 PROCEDURE — 25000003 PHARM REV CODE 250: Performed by: REGISTERED NURSE

## 2023-07-10 PROCEDURE — 3074F SYST BP LT 130 MM HG: CPT | Mod: CPTII,S$GLB,, | Performed by: REGISTERED NURSE

## 2023-07-10 PROCEDURE — 99999 PR PBB SHADOW E&M-EST. PATIENT-LVL V: CPT | Mod: PBBFAC,,, | Performed by: REGISTERED NURSE

## 2023-07-10 PROCEDURE — 3078F DIAST BP <80 MM HG: CPT | Mod: CPTII,S$GLB,, | Performed by: REGISTERED NURSE

## 2023-07-10 PROCEDURE — 1157F ADVNC CARE PLAN IN RCRD: CPT | Mod: CPTII,S$GLB,, | Performed by: REGISTERED NURSE

## 2023-07-10 PROCEDURE — 96361 HYDRATE IV INFUSION ADD-ON: CPT

## 2023-07-10 PROCEDURE — 1126F AMNT PAIN NOTED NONE PRSNT: CPT | Mod: CPTII,S$GLB,, | Performed by: REGISTERED NURSE

## 2023-07-10 PROCEDURE — 36415 COLL VENOUS BLD VENIPUNCTURE: CPT | Performed by: REGISTERED NURSE

## 2023-07-10 PROCEDURE — 3288F PR FALLS RISK ASSESSMENT DOCUMENTED: ICD-10-PCS | Mod: CPTII,S$GLB,, | Performed by: REGISTERED NURSE

## 2023-07-10 PROCEDURE — 1101F PR PT FALLS ASSESS DOC 0-1 FALLS W/OUT INJ PAST YR: ICD-10-PCS | Mod: CPTII,S$GLB,, | Performed by: REGISTERED NURSE

## 2023-07-10 PROCEDURE — 1126F PR PAIN SEVERITY QUANTIFIED, NO PAIN PRESENT: ICD-10-PCS | Mod: CPTII,S$GLB,, | Performed by: REGISTERED NURSE

## 2023-07-10 PROCEDURE — 25000003 PHARM REV CODE 250: Performed by: HOSPITALIST

## 2023-07-10 PROCEDURE — 1111F DSCHRG MED/CURRENT MED MERGE: CPT | Mod: CPTII,S$GLB,, | Performed by: REGISTERED NURSE

## 2023-07-10 PROCEDURE — 1111F PR DISCHARGE MEDS RECONCILED W/ CURRENT OUTPATIENT MED LIST: ICD-10-PCS | Mod: CPTII,S$GLB,, | Performed by: REGISTERED NURSE

## 2023-07-10 PROCEDURE — A4216 STERILE WATER/SALINE, 10 ML: HCPCS | Performed by: REGISTERED NURSE

## 2023-07-10 PROCEDURE — 3288F FALL RISK ASSESSMENT DOCD: CPT | Mod: CPTII,S$GLB,, | Performed by: REGISTERED NURSE

## 2023-07-10 PROCEDURE — 3074F PR MOST RECENT SYSTOLIC BLOOD PRESSURE < 130 MM HG: ICD-10-PCS | Mod: CPTII,S$GLB,, | Performed by: REGISTERED NURSE

## 2023-07-10 PROCEDURE — 99999 PR PBB SHADOW E&M-EST. PATIENT-LVL V: ICD-10-PCS | Mod: PBBFAC,,, | Performed by: REGISTERED NURSE

## 2023-07-10 PROCEDURE — 80053 COMPREHEN METABOLIC PANEL: CPT | Performed by: REGISTERED NURSE

## 2023-07-10 PROCEDURE — 63600175 PHARM REV CODE 636 W HCPCS: Performed by: HOSPITALIST

## 2023-07-10 PROCEDURE — 3078F PR MOST RECENT DIASTOLIC BLOOD PRESSURE < 80 MM HG: ICD-10-PCS | Mod: CPTII,S$GLB,, | Performed by: REGISTERED NURSE

## 2023-07-10 PROCEDURE — 99215 PR OFFICE/OUTPT VISIT, EST, LEVL V, 40-54 MIN: ICD-10-PCS | Mod: S$GLB,,, | Performed by: REGISTERED NURSE

## 2023-07-10 PROCEDURE — 96366 THER/PROPH/DIAG IV INF ADDON: CPT

## 2023-07-10 PROCEDURE — 63600175 PHARM REV CODE 636 W HCPCS: Performed by: REGISTERED NURSE

## 2023-07-10 PROCEDURE — 96417 CHEMO IV INFUS EACH ADDL SEQ: CPT

## 2023-07-10 PROCEDURE — 99215 OFFICE O/P EST HI 40 MIN: CPT | Mod: S$GLB,,, | Performed by: REGISTERED NURSE

## 2023-07-10 PROCEDURE — 85027 COMPLETE CBC AUTOMATED: CPT | Performed by: REGISTERED NURSE

## 2023-07-10 PROCEDURE — 86301 IMMUNOASSAY TUMOR CA 19-9: CPT | Performed by: REGISTERED NURSE

## 2023-07-10 PROCEDURE — 96375 TX/PRO/DX INJ NEW DRUG ADDON: CPT

## 2023-07-10 PROCEDURE — 1157F PR ADVANCE CARE PLAN OR EQUIV PRESENT IN MEDICAL RECORD: ICD-10-PCS | Mod: CPTII,S$GLB,, | Performed by: REGISTERED NURSE

## 2023-07-10 PROCEDURE — 1101F PT FALLS ASSESS-DOCD LE1/YR: CPT | Mod: CPTII,S$GLB,, | Performed by: REGISTERED NURSE

## 2023-07-10 RX ORDER — SODIUM CHLORIDE 0.9 % (FLUSH) 0.9 %
10 SYRINGE (ML) INJECTION
Status: DISCONTINUED | OUTPATIENT
Start: 2023-07-10 | End: 2023-07-10 | Stop reason: HOSPADM

## 2023-07-10 RX ORDER — HEPARIN 100 UNIT/ML
500 SYRINGE INTRAVENOUS
Status: DISCONTINUED | OUTPATIENT
Start: 2023-07-10 | End: 2023-07-10 | Stop reason: HOSPADM

## 2023-07-10 RX ORDER — SODIUM CHLORIDE AND POTASSIUM CHLORIDE 150; 900 MG/100ML; MG/100ML
INJECTION, SOLUTION INTRAVENOUS
Status: COMPLETED | OUTPATIENT
Start: 2023-07-10 | End: 2023-07-10

## 2023-07-10 RX ORDER — DEXAMETHASONE SODIUM PHOSPHATE 4 MG/ML
4 INJECTION, SOLUTION INTRA-ARTICULAR; INTRALESIONAL; INTRAMUSCULAR; INTRAVENOUS; SOFT TISSUE
Status: CANCELLED
Start: 2023-07-10

## 2023-07-10 RX ORDER — ESOMEPRAZOLE MAGNESIUM 40 MG/1
40 CAPSULE, DELAYED RELEASE ORAL DAILY
Qty: 90 CAPSULE | Refills: 1 | Status: SHIPPED | OUTPATIENT
Start: 2023-07-10 | End: 2023-12-29

## 2023-07-10 RX ORDER — OLANZAPINE 2.5 MG/1
2.5 TABLET ORAL NIGHTLY
Qty: 7 TABLET | Refills: 0 | Status: SHIPPED | OUTPATIENT
Start: 2023-07-10 | End: 2023-07-24 | Stop reason: SDUPTHER

## 2023-07-10 RX ORDER — SODIUM CHLORIDE AND POTASSIUM CHLORIDE 150; 900 MG/100ML; MG/100ML
INJECTION, SOLUTION INTRAVENOUS
Status: CANCELLED
Start: 2023-07-10

## 2023-07-10 RX ORDER — DEXAMETHASONE SODIUM PHOSPHATE 4 MG/ML
4 INJECTION, SOLUTION INTRA-ARTICULAR; INTRALESIONAL; INTRAMUSCULAR; INTRAVENOUS; SOFT TISSUE
Status: COMPLETED | OUTPATIENT
Start: 2023-07-10 | End: 2023-07-10

## 2023-07-10 RX ORDER — HEPARIN 100 UNIT/ML
500 SYRINGE INTRAVENOUS
Status: COMPLETED | OUTPATIENT
Start: 2023-07-10 | End: 2023-07-10

## 2023-07-10 RX ORDER — SODIUM CHLORIDE 0.9 % (FLUSH) 0.9 %
10 SYRINGE (ML) INJECTION
Status: CANCELLED | OUTPATIENT
Start: 2023-07-10

## 2023-07-10 RX ORDER — ESOMEPRAZOLE MAGNESIUM 40 MG/1
40 CAPSULE, DELAYED RELEASE ORAL DAILY
Qty: 90 CAPSULE | Refills: 3 | OUTPATIENT
Start: 2023-07-10

## 2023-07-10 RX ORDER — HEPARIN 100 UNIT/ML
500 SYRINGE INTRAVENOUS
Status: CANCELLED | OUTPATIENT
Start: 2023-07-10

## 2023-07-10 RX ADMIN — PACLITAXEL 180 MG: 100 INJECTION, POWDER, LYOPHILIZED, FOR SUSPENSION INTRAVENOUS at 03:07

## 2023-07-10 RX ADMIN — DEXAMETHASONE SODIUM PHOSPHATE 4 MG: 4 INJECTION INTRA-ARTICULAR; INTRALESIONAL; INTRAMUSCULAR; INTRAVENOUS; SOFT TISSUE at 01:07

## 2023-07-10 RX ADMIN — HEPARIN 500 UNITS: 100 SYRINGE at 04:07

## 2023-07-10 RX ADMIN — GEMCITABINE HYDROCHLORIDE 1400 MG: 1 INJECTION, SOLUTION INTRAVENOUS at 04:07

## 2023-07-10 RX ADMIN — SODIUM CHLORIDE AND POTASSIUM CHLORIDE: .9; .15 SOLUTION INTRAVENOUS at 01:07

## 2023-07-10 RX ADMIN — HEPARIN 500 UNITS: 100 SYRINGE at 11:07

## 2023-07-10 RX ADMIN — Medication 10 ML: at 11:07

## 2023-07-10 RX ADMIN — SODIUM CHLORIDE: 9 INJECTION, SOLUTION INTRAVENOUS at 01:07

## 2023-07-10 RX ADMIN — Medication 10 ML: at 04:07

## 2023-07-10 NOTE — PLAN OF CARE
1640 pt tolerated Abraxane/Gemzar without issue, pt to rtc 7/24/23, no distress noted upon d/c to home

## 2023-07-10 NOTE — NURSING
Pt here for lab draw from PAC. Jose Manuel ordered blood work and sent to lab. Flushed PAC and left accessed for treatment later today. No questions or concerns. Pt ambulated out of unit unassisted.

## 2023-07-10 NOTE — PROGRESS NOTES
"MEDICAL ONCOLOGY - ESTABLISHED PATIENT VISIT    Reason for visit: Pancreatic adenocarcinoma    Best Contact Phone Number(s): [phone] (home)      Cancer/Stage/TNM:    Cancer Staging   Malignant neoplasm of pancreas  Staging form: Exocrine Pancreas, AJCC 8th Edition  - Clinical stage from 4/13/2023: Stage IB (cT2, cN0, cM0) - Unsigned       Oncology History   Malignant neoplasm of pancreas   3/21/2023 Initial Diagnosis    Pancreatic adenocarcinoma     5/1/2023 -  Chemotherapy    Treatment Summary   Plan Name: OP PANC NAB-PACLITAXEL + GEMCITABINE Q4W  Treatment Goal: Palliative  Status: Active  Start Date: 5/1/2023  End Date: 4/16/2024 (Planned)  Provider: Jim Hill MD  Chemotherapy: gemcitabine (GEMZAR) 1,400 mg in sodium chloride 0.9% SolP 321.82 mL chemo infusion, 1,448 mg (100 % of original dose 800 mg/m2), Intravenous, Clinic/HOD 1 time, 2 of 12 cycles  Dose modification: 800 mg/m2 (original dose 800 mg/m2, Cycle 1, Reason: Other (see comments), Comment: poor PS)  Administration: 1,400 mg (5/1/2023), 1,400 mg (6/26/2023)          Interim History:   83 y.o. female with Crohn's disease, p.afib, SSS s/p PPM, HTN, HLD who presents for follow up prior to next cycle of chemotherapy. Feeling generally well today. Reports she felt good for ~2 days following infusion, but then had 4-5 days of fatigue, weakness, headaches, and nausea. Family notes a few times where she could "barely get out of bed" and had very little appetite. Does not feel she was well hydrated after chemo. Continues with abdominal cramps, gas pains, and chronic diarrhea. No changes in bowel habits since restarting chemo. No fevers/chills, SOB, CP, palpitations, constipation, blood in urine/stool.     Presents with her daughter in law. ECOG PS 2.     ROS:   Review of Systems   Constitutional:  Positive for malaise/fatigue and weight loss. Negative for chills and fever.   HENT:  Negative for congestion and sore throat.    Eyes:  Negative " for blurred vision.   Respiratory:  Negative for shortness of breath.    Cardiovascular:  Negative for chest pain, palpitations and leg swelling.   Gastrointestinal:  Positive for abdominal pain (cramping), diarrhea and nausea. Negative for blood in stool, constipation and vomiting.   Genitourinary:  Negative for dysuria, frequency, hematuria and urgency.   Musculoskeletal:  Negative for back pain, falls and myalgias.   Skin:  Negative for itching and rash.   Neurological:  Positive for weakness (generalized). Negative for dizziness, tingling, tremors, sensory change and focal weakness.   Endo/Heme/Allergies:  Does not bruise/bleed easily.   Psychiatric/Behavioral:  Negative for depression, substance abuse and suicidal ideas. The patient is not nervous/anxious.      Past Medical History:   Past Medical History:   Diagnosis Date    Anticoagulant long-term use     Atrial fibrillation     Crohn disease diagnosed in her 20's    GERD (gastroesophageal reflux disease)     Hyperlipidemia     Hypertension     Pancreatic adenocarcinoma 3/21/2023    Thyroid disease     s/p I 131        Past Surgical History:   Past Surgical History:   Procedure Laterality Date    APPENDECTOMY      CARDIAC SURGERY  2014    pacemaker    COLONOSCOPY  ~2008    normal findings per patient report    ENDOSCOPIC ULTRASOUND OF UPPER GASTROINTESTINAL TRACT N/A 3/14/2023    Procedure: ULTRASOUND, UPPER GI TRACT, ENDOSCOPIC;  Surgeon: Andrei Swartz MD;  Location: [facility];  Service: Endoscopy;  Laterality: N/A;  Approval to hold Eliquis rec'd from Dr. Barbosa (see telephone encounter 3/3/23)-DS  [employer] AICD/PPM  3/3/23-Instructions via portal-DS    ERCP N/A 3/21/2023    Procedure: ERCP (ENDOSCOPIC RETROGRADE CHOLANGIOPANCREATOGRAPHY);  Surgeon: Celina Toney MD;  Location: [address]);  Service: Endoscopy;  Laterality: N/A;    ESOPHAGOGASTRODUODENOSCOPY N/A 7/17/2018    Procedure: EGD (ESOPHAGOGASTRODUODENOSCOPY);  Surgeon: Alondra MOREIRA  MD Oh;  Location: [facility] ENDO;  Service: Endoscopy;  Laterality: N/A;    HYSTERECTOMY      INSERTION OF IMPLANTABLE CARDIOVERTER-DEFIBRILLATOR (ICD) GENERATOR WITH TWO EXISTING LEADS Left 5/10/2021    Procedure: INSERTION, PULSE GENERATOR WITH 2 EXISTING LEADS, ICD;  Surgeon: Bo Leone MD;  Location[facility] CATH LAB;  Service: Cardiology;  Laterality: Left;    INSERTION OF PACEMAKER      REPLACEMENT OF PACEMAKER GENERATOR  05/10/2021    RETROGRADE PYELOGRAPHY Right 2020    Procedure: PYELOGRAM, RETROGRADE;  Surgeon: Jovan Kruse MD;  Location: [facility] OR;  Service: Urology;  Laterality: Right;    SMALL INTESTINE SURGERY  in her 20's    for crohn's    TONSILLECTOMY      UPPER GASTROINTESTINAL ENDOSCOPY  ~    normal findings per patient report        Family History:   Family History   Problem Relation Age of Onset    Cancer Mother     Breast cancer Mother     Crohn's disease Other     Colon cancer Neg Hx     Colon polyps Neg Hx     Esophageal cancer Neg Hx     Stomach cancer Neg Hx     Ulcerative colitis Neg Hx         Social History:   Social History     Tobacco Use    Smoking status: Former     Types: Cigarettes     Quit date:      Years since quittin.5    Smokeless tobacco: Never   Substance Use Topics    Alcohol use: No        I have reviewed and updated the patient's past medical, surgical, family and social histories.    Allergies:   Review of patient's allergies indicates:   Allergen Reactions    Lisinopril Other (See Comments)     cough        Medications:   Current Outpatient Medications   Medication Sig Dispense Refill    acetaminophen (TYLENOL) 325 MG tablet Take 650 mg by mouth daily as needed (Headache).      albuterol (PROVENTIL) 2.5 mg /3 mL (0.083 %) nebulizer solution Take 3 mLs (2.5 mg total) by nebulization every 6 (six) hours as needed for Wheezing or Shortness of Breath. Rescue 150 mL 11    alendronate (FOSAMAX) 70 MG tablet Take 1 tablet (70 mg total) by mouth every 7  days. 12 tablet 3    amLODIPine (NORVASC) 5 MG tablet Take 1 tablet (5 mg total) by mouth once daily. 30 tablet 11    amoxicillin-clavulanate 875-125mg (AUGMENTIN) 875-125 mg per tablet Take 1 tablet by mouth 2 (two) times daily. 14 tablet 0    atorvastatin (LIPITOR) 20 MG tablet Take 1 tablet (20 mg total) by mouth once daily. 90 tablet 3    colestipoL (COLESTID) 1 gram Tab Take 1 tablet (1 g total) by mouth 2 (two) times daily. 60 tablet 5    diphenoxylate-atropine 2.5-0.025 mg (LOMOTIL) 2.5-0.025 mg per tablet TAKE 1 TABLET BY MOUTH 4 TIMES DAILY AS NEEDED FOR DIARRHEA 60 tablet 0    ELIQUIS 5 mg Tab Take 1 tablet (5 mg total) by mouth 2 (two) times daily. 180 tablet 3    esomeprazole (NEXIUM) 40 MG capsule Take 1 capsule (40 mg total) by mouth once daily. 90 capsule 1    folic acid (FOLVITE) 1 MG tablet Take 1 tablet by mouth once daily 90 tablet 0    furosemide (LASIX) 40 MG tablet Take 1 tablet (40 mg total) by mouth daily as needed (For weight gain > 3 lb in one day, increasing leg swelling, or increasing SOB). 30 tablet 11    levothyroxine (SYNTHROID, LEVOTHROID) 175 MCG tablet Take 1 tablet by mouth once daily 90 tablet 2    LIDOcaine-prilocaine (EMLA) cream Apply topically as needed (place to port site 45-60 minutes prior to port access). (Patient not taking: Reported on 5/26/2023) 30 g 6    lipase-protease-amylase 24,000-76,000-120,000 units (CREON) 24,000-76,000 -120,000 unit capsule Take 2 capsules by mouth 3 (three) times daily with meals. 180 capsule 11    MELATONIN ORAL Take 1 tablet by mouth nightly as needed (Insomnia).      OLANZapine (ZYPREXA) 2.5 MG tablet Take 1 tablet (2.5 mg total) by mouth every evening. for 7 days 7 tablet 0    potassium chloride (MICRO-K) 10 MEQ CpSR 1 po bid x 1 week then Micro K qd 30 capsule 5    prochlorperazine (COMPAZINE) 10 MG tablet Take 1 tablet (10 mg total) by mouth 4 (four) times daily as needed (nausea). 90 tablet 11    sotaloL (BETAPACE) 120 MG Tab Take 1  tablet (120 mg total) by mouth 2 (two) times daily. 180 tablet 3    sulfaSALAzine (AZULFIDINE) 500 mg Tab Take 1 tablet by mouth twice daily 180 tablet 3    tolterodine (DETROL LA) 4 MG 24 hr capsule Take 1 capsule by mouth once daily 90 capsule 3    traMADoL (ULTRAM) 50 mg tablet Take 1 tablet (50 mg total) by mouth every 6 (six) hours as needed for Pain. 25 tablet 0     No current facility-administered medications for this visit.     Facility-Administered Medications Ordered in Other Visits   Medication Dose Route Frequency Provider Last Rate Last Admin    alteplase injection 2 mg  2 mg Intra-Catheter PRN Luma Tran, JUSTINA        gemcitabine (GEMZAR) 1,400 mg in sodium chloride 0.9% SolP 321.82 mL chemo infusion  1,400 mg Intravenous 1 time in Clinic/HOD JUSTINA Queen        heparin, porcine (PF) 100 unit/mL injection flush 500 Units  500 Units Intravenous PRN Luma Tran, CNS        PACLitaxeL protein-bound (ABRAXANE) 100 mg/m2 = 180 mg in 43 mL infusion  100 mg/m2 (Treatment Plan Recorded) Intravenous 1 time in Clinic/HOD JUSTINA Queen        sodium chloride 0.9% flush 10 mL  10 mL Intravenous PRN Danyel Palacios MD   10 mL at 07/10/23 1119    sodium chloride 0.9% flush 10 mL  10 mL Intravenous PRN Luma Tran, JUSTINA          Physical Exam:   /66 (BP Location: Left arm, Patient Position: Sitting, BP Method: Medium (Automatic))   Pulse 99   Resp 18   Ht 5' (1.524 m)   Wt 70.8 kg (156 lb 1.4 oz)   SpO2 98%   BMI 30.48 kg/m²      ECOG Performance status: 2       Physical Exam  Vitals reviewed.   Constitutional:       General: She is not in acute distress.     Appearance: She is not ill-appearing, toxic-appearing or diaphoretic.   HENT:      Head: Normocephalic and atraumatic.      Right Ear: External ear normal.      Left Ear: External ear normal.      Nose: Nose normal. No congestion.      Mouth/Throat:      Pharynx: Oropharynx is clear. No oropharyngeal exudate.   Eyes:       General: No scleral icterus.     Conjunctiva/sclera: Conjunctivae normal.      Pupils: Pupils are equal, round, and reactive to light.   Cardiovascular:      Rate and Rhythm: Normal rate and regular rhythm.      Heart sounds: No murmur heard.  Pulmonary:      Effort: Pulmonary effort is normal. No respiratory distress.      Breath sounds: Normal breath sounds. No wheezing.   Chest:      Comments: RCW port in place, bandage over site.   Abdominal:      General: Abdomen is flat. Bowel sounds are normal. There is no distension.      Palpations: Abdomen is soft. There is no mass.      Tenderness: There is no abdominal tenderness.   Musculoskeletal:         General: No swelling or deformity.   Skin:     General: Skin is warm and dry.      Coloration: Skin is not jaundiced or pale.      Findings: No erythema or rash.   Neurological:      Mental Status: She is alert and oriented to person, place, and time. Mental status is at baseline.      Motor: Weakness (generalized) present.   Psychiatric:         Mood and Affect: Mood normal.         Behavior: Behavior normal.       Labs:   Recent Results (from the past 48 hour(s))   Comprehensive Metabolic Panel    Collection Time: 07/10/23 11:11 AM   Result Value Ref Range    Sodium 142 136 - 145 mmol/L    Potassium 3.0 (L) 3.5 - 5.1 mmol/L    Chloride 105 95 - 110 mmol/L    CO2 26 23 - 29 mmol/L    Glucose 128 (H) 70 - 110 mg/dL    BUN 6 (L) 8 - 23 mg/dL    Creatinine 0.8 0.5 - 1.4 mg/dL    Calcium 8.1 (L) 8.7 - 10.5 mg/dL    Total Protein 6.5 6.0 - 8.4 g/dL    Albumin 2.9 (L) 3.5 - 5.2 g/dL    Total Bilirubin 0.3 0.1 - 1.0 mg/dL    Alkaline Phosphatase 78 55 - 135 U/L    AST 43 (H) 10 - 40 U/L    ALT 18 10 - 44 U/L    eGFR >60.0 >60 mL/min/1.73 m^2    Anion Gap 11 8 - 16 mmol/L   CBC Oncology    Collection Time: 07/10/23 11:11 AM   Result Value Ref Range    WBC 3.16 (L) 3.90 - 12.70 K/uL    RBC 3.79 (L) 4.00 - 5.40 M/uL    Hemoglobin 11.8 (L) 12.0 - 16.0 g/dL    Hematocrit 35.8  (L) 37.0 - 48.5 %    MCV 95 82 - 98 fL    MCH 31.1 (H) 27.0 - 31.0 pg    MCHC 33.0 32.0 - 36.0 g/dL    RDW 16.9 (H) 11.5 - 14.5 %    Platelets 377 150 - 450 K/uL    MPV 10.4 9.2 - 12.9 fL    Gran # (ANC) 1.3 (L) 1.8 - 7.7 K/uL    Immature Grans (Abs) 0.02 0.00 - 0.04 K/uL   Cancer Antigen 19-9    Collection Time: 07/10/23 11:11 AM   Result Value Ref Range    CA 19-9 7.3 0.0 - 40.0 U/mL     I have reviewed the pertinent labs from 7/10/23 which are notable for mild anemia and neutropenia. K+ 3.0. Mild hypoalbuminemia. Ca 19-9 pending.     Imaging:    I have personally reviewed the imaging which is notable for a pancreatic head mass evident on CT abdomen pelvis with no evidence of metastatic disease.     CT head imaging without evidence of metastatic disease.     Path:   PANCREAS, HEAD MASS, EUS-FNB:   Adenocarcinoma   Mismatch Repair (MMR) Proteins:    MLH1 - Intact nuclear expression    MSH2 - Intact nuclear expression    MSH6 - Intact nuclear expression    PMS2 - Intact nuclear expression    Assessment:       1. Pancreatic adenocarcinoma    2. Malignant neoplasm of pancreas, unspecified location of malignancy    3. Neoplasm related pain    4. 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 Immunodeficiency secondary to neoplasm    7. Essential hypertension, benign    8. Mixed hyperlipidemia    9. Aortic atherosclerosis    10. Paroxysmal atrial fibrillation    11. Microcytic anemia    12. Iron deficiency anemia due to chronic blood loss    13. Exocrine pancreatic insufficiency    14. Crohn's disease of large intestine without complication    15. Hypokalemia       Plan:     #  Pancreatic adenocarcinoma.   83 y.o. F with Crohn's disease, p.afib, SSS s/p PPM, HTN, HLD, fatigue, presented with weight loss and jaundice and was found to have pancreatic adenocarcinoma. She has early stage pancreatic cancer, and is considered surgically resectable. However, giving her age and multiple  co-morbidities, surgery might not be feasible or safe.     We have discussed with her the diagnosis, stage, prognosis and treatment options. She understands that surgery is the only curable option and she is unlikely to be sufficiently medically fit for it. We have discussed chemotherapy with a palliative intent to improve symptoms and prolong life.   She expressed understanding and she values QoL but was willing to try chemotherapy.     We have discussed treatment with first line therapy Gemcitabine + Abraxane. She agreed and wished to proceed.     Received cycle 1 of biweekly gem/abraxane with dose reductions of 800 mg/m2 and 100 mg/m2 respectively on 5/1/23. Subsequently hospitalized x 3 (had an ED visit same day as first discharge).     She wanted to delay starting cycle 2 until she had a port placed.   Port placed per IR on 6/22/23. Site healing well overall.   MRI brain done 6/23/23 without evidence of metastatic disease.   Encouraged use of Ensure for malnutrition and continued weight loss. She is agreeable.     Labs reviewed, acceptable to proceed with cycle 3.   Will give 1L IVF with 20 mEq Kcl with infusion today.   Refilled zyprexa today in clinic. Continue compazine PRN.   RTC in 2 weeks for next cycle.     Saw palliative care on 5/12. Continue to f/u as directed.     # HTN   BP controlled in clinic today.  On amlodipine 5 mg daily, coreg 6.25 mg bid   Monitor. Sees PCP this week.     # HLD, Atherosclerosis   On Lipitor 20 mg   Follows with cardiology     # P. Afib   Rate controlled. On Eliquis.  Notes feeling better since med adjustments during admission.   Continue to monitor.     #. Microcytic anemia, Iron def due to chronic blood loss  Unclear etiology but likely from chronic blood loss 2/2 diverticulosis or tumor invasion of small bowel.  Iron studies consistent with iron deficiency.   Couldn't tolerate PO iron due to severe GI side effects (abdominal cramps and nausea)  Injectafer denied by  insurance so will administer Venofer x 4-5 doses.  S/p 3 doses. Does not want #4 at this time.   Monitor on treatment.     #Pancreatic insufficiency   Improved with Creon   On lomotil for diarrhea   Continue to monitor.     # Crohn's disease.   On Sulfasalazine  Follows with GI     Follow up: 2 weeks for cycle 4    Patient is in agreement with the proposed treatment plan. All questions were answered to the patient's satisfaction. Pt knows to call clinic if anything is needed before the next clinic visit.    Patient discussed with collaborating physician, Dr. Hill.    At least 40 minutes were spent today on this encounter including face to face time with the patient, data gathering/interpretation and documentation.       Luma Tran, MSN, APRN, ACCNS-AG  Hematology and Medical Oncology  Clinical Nurse Specialist to Dr. Hlil, Dr. Ceja & Dr. Temple    [facility] Onc Chart Routing      Follow up with physician 2 weeks. as scheduled   Follow up with DOMINGO 4 weeks. rtc in 4 weeks with labs from port (cbc cmp ca19-9) to see domingo for chemo   Infusion scheduling note   treatment every 2 weeks   Injection scheduling note    Labs CBC, CMP and CA 19-9   Scheduling:  Preferred lab:  Lab interval: every 2 weeks  drawn from port please   Imaging    Pharmacy appointment    Other referrals

## 2023-07-10 NOTE — PLAN OF CARE
1315 pt here for abraxane/gemzar infusion D1C2 and 1 liter NS with 20 meq KCL, labs, hx, meds, allergies reviewed, pt with no new complaints at this time, reclined in chair, continue to monitor, warm blanket provided

## 2023-07-10 NOTE — TELEPHONE ENCOUNTER
Refill Decision Note   Pauline Cronin  is requesting a refill authorization.  Brief Assessment and Rationale for Refill:  Quick Discontinue     Medication Therapy Plan:         Comments:     Note composed:6:00 PM 07/10/2023

## 2023-07-13 ENCOUNTER — DOCUMENT SCAN (OUTPATIENT)
Dept: HOME HEALTH SERVICES | Facility: HOSPITAL | Age: 83
End: 2023-07-13
Payer: MEDICARE

## 2023-07-18 ENCOUNTER — PES CALL (OUTPATIENT)
Dept: ADMINISTRATIVE | Facility: CLINIC | Age: 83
End: 2023-07-18
Payer: MEDICARE

## 2023-07-18 RX ORDER — TOLTERODINE 4 MG/1
4 CAPSULE, EXTENDED RELEASE ORAL DAILY
Qty: 90 CAPSULE | Refills: 3 | Status: SHIPPED | OUTPATIENT
Start: 2023-07-18

## 2023-07-18 NOTE — TELEPHONE ENCOUNTER
No care due was identified.  [facility] Embedded Care Due Messages. Reference number: [id number].   7/18/2023 8:20:12 AM CDT

## 2023-07-18 NOTE — TELEPHONE ENCOUNTER
Refill Routing Note   Medication(s) are not appropriate for processing by [facility] for the following reason(s):      Patient seen in ED/Hospital since LOV with provider    [facility] action(s):  Defer Care Due:  None identified            Appointments  past 12m or future 3m with PCP    Date Provider   Last Visit   4/18/2023 Ga Waldrop MD   Next Visit   10/18/2023 Ga Waldrop MD   ED visits in past 90 days: 0        Note composed:2:06 PM 07/18/2023

## 2023-07-24 ENCOUNTER — INFUSION (OUTPATIENT)
Dept: INFUSION THERAPY | Facility: HOSPITAL | Age: 83
End: 2023-07-24
Payer: MEDICARE

## 2023-07-24 ENCOUNTER — OFFICE VISIT (OUTPATIENT)
Dept: HEMATOLOGY/ONCOLOGY | Facility: CLINIC | Age: 83
End: 2023-07-24
Payer: MEDICARE

## 2023-07-24 VITALS
RESPIRATION RATE: 18 BRPM | TEMPERATURE: 98 F | SYSTOLIC BLOOD PRESSURE: 148 MMHG | DIASTOLIC BLOOD PRESSURE: 78 MMHG | OXYGEN SATURATION: 97 % | HEART RATE: 102 BPM

## 2023-07-24 VITALS
HEIGHT: 60 IN | BODY MASS INDEX: 31.38 KG/M2 | DIASTOLIC BLOOD PRESSURE: 67 MMHG | SYSTOLIC BLOOD PRESSURE: 147 MMHG | WEIGHT: 159.81 LBS | HEART RATE: 97 BPM | OXYGEN SATURATION: 98 % | TEMPERATURE: 98 F | RESPIRATION RATE: 18 BRPM

## 2023-07-24 DIAGNOSIS — K50.10 CROHN'S DISEASE OF LARGE INTESTINE WITHOUT COMPLICATION: ICD-10-CM

## 2023-07-24 DIAGNOSIS — D84.81 IMMUNODEFICIENCY SECONDARY TO NEOPLASM: ICD-10-CM

## 2023-07-24 DIAGNOSIS — D50.9 MICROCYTIC ANEMIA: ICD-10-CM

## 2023-07-24 DIAGNOSIS — D84.821 IMMUNODEFICIENCY SECONDARY TO CHEMOTHERAPY: ICD-10-CM

## 2023-07-24 DIAGNOSIS — E78.2 MIXED HYPERLIPIDEMIA: ICD-10-CM

## 2023-07-24 DIAGNOSIS — C25.9 MALIGNANT NEOPLASM OF PANCREAS, UNSPECIFIED LOCATION OF MALIGNANCY: Primary | ICD-10-CM

## 2023-07-24 DIAGNOSIS — C25.9 MALIGNANT NEOPLASM OF PANCREAS, UNSPECIFIED LOCATION OF MALIGNANCY: ICD-10-CM

## 2023-07-24 DIAGNOSIS — D70.1 CHEMOTHERAPY INDUCED NEUTROPENIA: ICD-10-CM

## 2023-07-24 DIAGNOSIS — D50.0 IRON DEFICIENCY ANEMIA DUE TO CHRONIC BLOOD LOSS: ICD-10-CM

## 2023-07-24 DIAGNOSIS — I70.0 AORTIC ATHEROSCLEROSIS: ICD-10-CM

## 2023-07-24 DIAGNOSIS — T45.1X5A IMMUNODEFICIENCY SECONDARY TO CHEMOTHERAPY: ICD-10-CM

## 2023-07-24 DIAGNOSIS — T45.1X5A CHEMOTHERAPY INDUCED NEUTROPENIA: ICD-10-CM

## 2023-07-24 DIAGNOSIS — R11.0 CHEMOTHERAPY-INDUCED NAUSEA: ICD-10-CM

## 2023-07-24 DIAGNOSIS — K86.81 EXOCRINE PANCREATIC INSUFFICIENCY: ICD-10-CM

## 2023-07-24 DIAGNOSIS — I48.0 PAROXYSMAL ATRIAL FIBRILLATION: ICD-10-CM

## 2023-07-24 DIAGNOSIS — D49.9 IMMUNODEFICIENCY SECONDARY TO NEOPLASM: ICD-10-CM

## 2023-07-24 DIAGNOSIS — I10 ESSENTIAL HYPERTENSION, BENIGN: ICD-10-CM

## 2023-07-24 DIAGNOSIS — Z79.899 IMMUNODEFICIENCY SECONDARY TO CHEMOTHERAPY: ICD-10-CM

## 2023-07-24 DIAGNOSIS — G89.3 NEOPLASM RELATED PAIN: ICD-10-CM

## 2023-07-24 DIAGNOSIS — T45.1X5A CHEMOTHERAPY-INDUCED NAUSEA: ICD-10-CM

## 2023-07-24 PROBLEM — J96.01 ACUTE HYPOXEMIC RESPIRATORY FAILURE: Status: RESOLVED | Noted: 2023-04-22 | Resolved: 2023-07-24

## 2023-07-24 LAB
ALBUMIN SERPL BCP-MCNC: 2.8 G/DL (ref 3.5–5.2)
ALP SERPL-CCNC: 89 U/L (ref 55–135)
ALT SERPL W/O P-5'-P-CCNC: 29 U/L (ref 10–44)
ANION GAP SERPL CALC-SCNC: 7 MMOL/L (ref 8–16)
AST SERPL-CCNC: 71 U/L (ref 10–40)
BILIRUB SERPL-MCNC: 0.4 MG/DL (ref 0.1–1)
BUN SERPL-MCNC: 8 MG/DL (ref 8–23)
CALCIUM SERPL-MCNC: 8 MG/DL (ref 8.7–10.5)
CANCER AG19-9 SERPL-ACNC: 7.4 U/ML (ref 0–40)
CHLORIDE SERPL-SCNC: 106 MMOL/L (ref 95–110)
CO2 SERPL-SCNC: 27 MMOL/L (ref 23–29)
CREAT SERPL-MCNC: 0.8 MG/DL (ref 0.5–1.4)
ERYTHROCYTE [DISTWIDTH] IN BLOOD BY AUTOMATED COUNT: 17.2 % (ref 11.5–14.5)
EST. GFR  (NO RACE VARIABLE): >60 ML/MIN/1.73 M^2
GLUCOSE SERPL-MCNC: 102 MG/DL (ref 70–110)
HCT VFR BLD AUTO: 32.3 % (ref 37–48.5)
HGB BLD-MCNC: 10.7 G/DL (ref 12–16)
IMM GRANULOCYTES # BLD AUTO: 0.02 K/UL (ref 0–0.04)
MCH RBC QN AUTO: 31.8 PG (ref 27–31)
MCHC RBC AUTO-ENTMCNC: 33.1 G/DL (ref 32–36)
MCV RBC AUTO: 96 FL (ref 82–98)
NEUTROPHILS # BLD AUTO: 0.6 K/UL (ref 1.8–7.7)
PLATELET # BLD AUTO: 174 K/UL (ref 150–450)
PMV BLD AUTO: 11.3 FL (ref 9.2–12.9)
POTASSIUM SERPL-SCNC: 3.2 MMOL/L (ref 3.5–5.1)
PROT SERPL-MCNC: 6.3 G/DL (ref 6–8.4)
RBC # BLD AUTO: 3.36 M/UL (ref 4–5.4)
SODIUM SERPL-SCNC: 140 MMOL/L (ref 136–145)
WBC # BLD AUTO: 2.5 K/UL (ref 3.9–12.7)

## 2023-07-24 PROCEDURE — 99215 PR OFFICE/OUTPT VISIT, EST, LEVL V, 40-54 MIN: ICD-10-PCS | Mod: S$GLB,,, | Performed by: INTERNAL MEDICINE

## 2023-07-24 PROCEDURE — 63600175 PHARM REV CODE 636 W HCPCS: Mod: HCNC | Performed by: REGISTERED NURSE

## 2023-07-24 PROCEDURE — 85027 COMPLETE CBC AUTOMATED: CPT | Mod: HCNC | Performed by: REGISTERED NURSE

## 2023-07-24 PROCEDURE — 36592 COLLECT BLOOD FROM PICC: CPT | Mod: HCNC

## 2023-07-24 PROCEDURE — 86301 IMMUNOASSAY TUMOR CA 19-9: CPT | Mod: HCNC | Performed by: REGISTERED NURSE

## 2023-07-24 PROCEDURE — 36415 COLL VENOUS BLD VENIPUNCTURE: CPT | Mod: HCNC | Performed by: REGISTERED NURSE

## 2023-07-24 PROCEDURE — 1101F PT FALLS ASSESS-DOCD LE1/YR: CPT | Mod: CPTII,S$GLB,, | Performed by: INTERNAL MEDICINE

## 2023-07-24 PROCEDURE — 3077F SYST BP >= 140 MM HG: CPT | Mod: CPTII,S$GLB,, | Performed by: INTERNAL MEDICINE

## 2023-07-24 PROCEDURE — 99999 PR PBB SHADOW E&M-EST. PATIENT-LVL V: CPT | Mod: PBBFAC,HCNC,, | Performed by: INTERNAL MEDICINE

## 2023-07-24 PROCEDURE — 99215 OFFICE O/P EST HI 40 MIN: CPT | Mod: S$GLB,,, | Performed by: INTERNAL MEDICINE

## 2023-07-24 PROCEDURE — 1157F PR ADVANCE CARE PLAN OR EQUIV PRESENT IN MEDICAL RECORD: ICD-10-PCS | Mod: CPTII,S$GLB,, | Performed by: INTERNAL MEDICINE

## 2023-07-24 PROCEDURE — 3078F DIAST BP <80 MM HG: CPT | Mod: CPTII,S$GLB,, | Performed by: INTERNAL MEDICINE

## 2023-07-24 PROCEDURE — 1126F PR PAIN SEVERITY QUANTIFIED, NO PAIN PRESENT: ICD-10-PCS | Mod: CPTII,S$GLB,, | Performed by: INTERNAL MEDICINE

## 2023-07-24 PROCEDURE — 3288F FALL RISK ASSESSMENT DOCD: CPT | Mod: CPTII,S$GLB,, | Performed by: INTERNAL MEDICINE

## 2023-07-24 PROCEDURE — 1157F ADVNC CARE PLAN IN RCRD: CPT | Mod: CPTII,S$GLB,, | Performed by: INTERNAL MEDICINE

## 2023-07-24 PROCEDURE — 3288F PR FALLS RISK ASSESSMENT DOCUMENTED: ICD-10-PCS | Mod: CPTII,S$GLB,, | Performed by: INTERNAL MEDICINE

## 2023-07-24 PROCEDURE — 3078F PR MOST RECENT DIASTOLIC BLOOD PRESSURE < 80 MM HG: ICD-10-PCS | Mod: CPTII,S$GLB,, | Performed by: INTERNAL MEDICINE

## 2023-07-24 PROCEDURE — 1101F PR PT FALLS ASSESS DOC 0-1 FALLS W/OUT INJ PAST YR: ICD-10-PCS | Mod: CPTII,S$GLB,, | Performed by: INTERNAL MEDICINE

## 2023-07-24 PROCEDURE — 1126F AMNT PAIN NOTED NONE PRSNT: CPT | Mod: CPTII,S$GLB,, | Performed by: INTERNAL MEDICINE

## 2023-07-24 PROCEDURE — 3077F PR MOST RECENT SYSTOLIC BLOOD PRESSURE >= 140 MM HG: ICD-10-PCS | Mod: CPTII,S$GLB,, | Performed by: INTERNAL MEDICINE

## 2023-07-24 PROCEDURE — 99999 PR PBB SHADOW E&M-EST. PATIENT-LVL V: ICD-10-PCS | Mod: PBBFAC,HCNC,, | Performed by: INTERNAL MEDICINE

## 2023-07-24 PROCEDURE — 80053 COMPREHEN METABOLIC PANEL: CPT | Mod: HCNC | Performed by: REGISTERED NURSE

## 2023-07-24 RX ORDER — SODIUM CHLORIDE 0.9 % (FLUSH) 0.9 %
10 SYRINGE (ML) INJECTION
Status: CANCELLED | OUTPATIENT
Start: 2023-08-07

## 2023-07-24 RX ORDER — HEPARIN 100 UNIT/ML
500 SYRINGE INTRAVENOUS
Status: COMPLETED | OUTPATIENT
Start: 2023-07-24 | End: 2023-07-24

## 2023-07-24 RX ORDER — DEXAMETHASONE SODIUM PHOSPHATE 4 MG/ML
4 INJECTION, SOLUTION INTRA-ARTICULAR; INTRALESIONAL; INTRAMUSCULAR; INTRAVENOUS; SOFT TISSUE
Status: CANCELLED
Start: 2023-08-07

## 2023-07-24 RX ORDER — HEPARIN 100 UNIT/ML
500 SYRINGE INTRAVENOUS
Status: CANCELLED | OUTPATIENT
Start: 2023-08-07

## 2023-07-24 RX ORDER — OLANZAPINE 5 MG/1
5 TABLET ORAL NIGHTLY
Qty: 30 TABLET | Refills: 5 | Status: SHIPPED | OUTPATIENT
Start: 2023-07-24

## 2023-07-24 RX ADMIN — HEPARIN 500 UNITS: 100 SYRINGE at 01:07

## 2023-07-24 NOTE — PLAN OF CARE
1315 Patient ANC is 0.6. Dr. Hill notified and chemo will be held today. Instructed patient to get scan as discussed with MD and to see him again in 2 weeks. She asked to see the MD again. He was notified and I informed patient that her MD will call her. Port flushed, heparin locked and deaccessed. Bandaid to port site. Patient declined the AVS and ambulated out.

## 2023-07-24 NOTE — PROGRESS NOTES
MEDICAL ONCOLOGY - ESTABLISHED PATIENT VISIT    Reason for visit: Pancreatic adenocarcinoma    Best Contact Phone Number(s): [phone] (home)      Cancer/Stage/TNM:    Cancer Staging   Malignant neoplasm of pancreas  Staging form: Exocrine Pancreas, AJCC 8th Edition  - Clinical stage from 4/13/2023: Stage IB (cT2, cN0, cM0) - Signed by Jim Hill MD on 7/24/2023       Oncology History   Malignant neoplasm of pancreas   3/21/2023 Initial Diagnosis    Pancreatic adenocarcinoma     4/13/2023 Cancer Staged    Staging form: Exocrine Pancreas, AJCC 8th Edition  - Clinical stage from 4/13/2023: Stage IB (cT2, cN0, cM0)     5/1/2023 -  Chemotherapy    Treatment Summary   Plan Name: OP PANC NAB-PACLITAXEL + GEMCITABINE Q4W  Treatment Goal: Palliative  Status: Active  Start Date: 5/1/2023  End Date: 4/29/2024 (Planned)  Provider: Jim Hill MD  Chemotherapy: gemcitabine (GEMZAR) 1,400 mg in sodium chloride 0.9% SolP 321.82 mL chemo infusion, 1,448 mg (100 % of original dose 800 mg/m2), Intravenous, Clinic/HOD 1 time, 2 of 12 cycles  Dose modification: 800 mg/m2 (original dose 800 mg/m2, Cycle 1, Reason: Other (see comments), Comment: poor PS)  Administration: 1,400 mg (5/1/2023), 1,400 mg (6/26/2023), 1,400 mg (7/10/2023)          Interim History:   83 y.o. female with Crohn's disease, p.afib, SSS s/p PPM, HTN, HLD who presents for follow up prior to cycle 4 of chemotherapy.  She reports feeling ok today.  She had 1-2 days of feeling ok and then 2 days of feeling very poorly and then started feeling better.  When she was feeling poorly she was nauseated and had diarrhea - up to 3-4 x per day.  Diarrhea has improved with Imodium.  She has about 1 week of mildly pruritic rash on her legs, cheeks, abdomen that improves with topical steroid after each infusion.     Presents with her daughter in law. ECOG PS 2.     ROS:   Review of Systems   Constitutional:  Positive for malaise/fatigue and weight loss.  Negative for chills and fever.   HENT:  Negative for congestion and sore throat.    Eyes:  Negative for blurred vision.   Respiratory:  Negative for shortness of breath.    Cardiovascular:  Negative for chest pain, palpitations and leg swelling.   Gastrointestinal:  Positive for abdominal pain (cramping), diarrhea and nausea. Negative for blood in stool, constipation and vomiting.   Genitourinary:  Negative for dysuria, frequency, hematuria and urgency.   Musculoskeletal:  Negative for back pain, falls and myalgias.   Skin:  Negative for itching and rash.   Neurological:  Positive for weakness (generalized). Negative for dizziness, tingling, tremors, sensory change and focal weakness.   Endo/Heme/Allergies:  Does not bruise/bleed easily.   Psychiatric/Behavioral:  Negative for depression, substance abuse and suicidal ideas. The patient is not nervous/anxious.      Past Medical History:   Past Medical History:   Diagnosis Date    Anticoagulant long-term use     Atrial fibrillation     Crohn disease diagnosed in her 20's    GERD (gastroesophageal reflux disease)     Hyperlipidemia     Hypertension     Pancreatic adenocarcinoma 3/21/2023    Thyroid disease     s/p I 131        Past Surgical History:   Past Surgical History:   Procedure Laterality Date    APPENDECTOMY      CARDIAC SURGERY  2014    pacemaker    COLONOSCOPY  ~2008    normal findings per patient report    ENDOSCOPIC ULTRASOUND OF UPPER GASTROINTESTINAL TRACT N/A 3/14/2023    Procedure: ULTRASOUND, UPPER GI TRACT, ENDOSCOPIC;  Surgeon: Andrei Swartz MD;  Location: [facility];  Service: Endoscopy;  Laterality: N/A;  Approval to hold Eliquis rec'd from Dr. Barbosa (see telephone encounter 3/3/23)-DS  [employer] AICD/PPM  3/3/23-Instructions via portal-DS    ERCP N/A 3/21/2023    Procedure: ERCP (ENDOSCOPIC RETROGRADE CHOLANGIOPANCREATOGRAPHY);  Surgeon: Celina Toney MD;  Location: [facility] (2ND FLR);  Service: Endoscopy;  Laterality: N/A;     ESOPHAGOGASTRODUODENOSCOPY N/A 2018    Procedure: EGD (ESOPHAGOGASTRODUODENOSCOPY);  Surgeon: Alondra Casiano MD;  Location: [facility];  Service: Endoscopy;  Laterality: N/A;    HYSTERECTOMY      INSERTION OF IMPLANTABLE CARDIOVERTER-DEFIBRILLATOR (ICD) GENERATOR WITH TWO EXISTING LEADS Left 5/10/2021    Procedure: INSERTION, PULSE GENERATOR WITH 2 EXISTING LEADS, ICD;  Surgeon: Bo Leone MD;  Location: [facility] CATH LAB;  Service: Cardiology;  Laterality: Left;    INSERTION OF PACEMAKER      REPLACEMENT OF PACEMAKER GENERATOR  05/10/2021    RETROGRADE PYELOGRAPHY Right 2020    Procedure: PYELOGRAM, RETROGRADE;  Surgeon: Jovan Kruse MD;  Location: [facility] OR;  Service: Urology;  Laterality: Right;    SMALL INTESTINE SURGERY  in her 20's    for crohn's    TONSILLECTOMY      UPPER GASTROINTESTINAL ENDOSCOPY  ~    normal findings per patient report        Family History:   Family History   Problem Relation Age of Onset    Cancer Mother     Breast cancer Mother     Crohn's disease Other     Colon cancer Neg Hx     Colon polyps Neg Hx     Esophageal cancer Neg Hx     Stomach cancer Neg Hx     Ulcerative colitis Neg Hx         Social History:   Social History     Tobacco Use    Smoking status: Former     Types: Cigarettes     Quit date:      Years since quittin.5    Smokeless tobacco: Never   Substance Use Topics    Alcohol use: No        I have reviewed and updated the patient's past medical, surgical, family and social histories.    Allergies:   Review of patient's allergies indicates:   Allergen Reactions    Lisinopril Other (See Comments)     cough        Medications:   Current Outpatient Medications   Medication Sig Dispense Refill    acetaminophen (TYLENOL) 325 MG tablet Take 650 mg by mouth daily as needed (Headache).      albuterol (PROVENTIL) 2.5 mg /3 mL (0.083 %) nebulizer solution Take 3 mLs (2.5 mg total) by nebulization every 6 (six) hours as needed for Wheezing or Shortness  of Breath. Rescue 150 mL 11    alendronate (FOSAMAX) 70 MG tablet Take 1 tablet (70 mg total) by mouth every 7 days. 12 tablet 3    amLODIPine (NORVASC) 5 MG tablet Take 1 tablet (5 mg total) by mouth once daily. 30 tablet 11    amoxicillin-clavulanate 875-125mg (AUGMENTIN) 875-125 mg per tablet Take 1 tablet by mouth 2 (two) times daily. 14 tablet 0    atorvastatin (LIPITOR) 20 MG tablet Take 1 tablet (20 mg total) by mouth once daily. 90 tablet 3    colestipoL (COLESTID) 1 gram Tab Take 1 tablet (1 g total) by mouth 2 (two) times daily. 60 tablet 5    diphenoxylate-atropine 2.5-0.025 mg (LOMOTIL) 2.5-0.025 mg per tablet TAKE 1 TABLET BY MOUTH 4 TIMES DAILY AS NEEDED FOR DIARRHEA 60 tablet 0    ELIQUIS 5 mg Tab Take 1 tablet (5 mg total) by mouth 2 (two) times daily. 180 tablet 3    esomeprazole (NEXIUM) 40 MG capsule Take 1 capsule (40 mg total) by mouth once daily. 90 capsule 1    folic acid (FOLVITE) 1 MG tablet Take 1 tablet by mouth once daily 90 tablet 0    furosemide (LASIX) 40 MG tablet Take 1 tablet (40 mg total) by mouth daily as needed (For weight gain > 3 lb in one day, increasing leg swelling, or increasing SOB). 30 tablet 11    levothyroxine (SYNTHROID, LEVOTHROID) 175 MCG tablet Take 1 tablet by mouth once daily 90 tablet 2    LIDOcaine-prilocaine (EMLA) cream Apply topically as needed (place to port site 45-60 minutes prior to port access). (Patient not taking: Reported on 5/26/2023) 30 g 6    lipase-protease-amylase 24,000-76,000-120,000 units (CREON) 24,000-76,000 -120,000 unit capsule Take 2 capsules by mouth 3 (three) times daily with meals. 180 capsule 11    MELATONIN ORAL Take 1 tablet by mouth nightly as needed (Insomnia).      OLANZapine (ZYPREXA) 5 MG tablet Take 1 tablet (5 mg total) by mouth every evening. 30 tablet 5    potassium chloride (MICRO-K) 10 MEQ CpSR 1 po bid x 1 week then Micro K qd 30 capsule 5    prochlorperazine (COMPAZINE) 10 MG tablet Take 1 tablet (10 mg total) by mouth  4 (four) times daily as needed (nausea). 90 tablet 11    sotaloL (BETAPACE) 120 MG Tab Take 1 tablet (120 mg total) by mouth 2 (two) times daily. 180 tablet 3    sulfaSALAzine (AZULFIDINE) 500 mg Tab Take 1 tablet by mouth twice daily 180 tablet 3    tolterodine (DETROL LA) 4 MG 24 hr capsule Take 1 capsule (4 mg total) by mouth once daily. 90 capsule 3    traMADoL (ULTRAM) 50 mg tablet Take 1 tablet (50 mg total) by mouth every 6 (six) hours as needed for Pain. 25 tablet 0     No current facility-administered medications for this visit.     Facility-Administered Medications Ordered in Other Visits   Medication Dose Route Frequency Provider Last Rate Last Admin    heparin, porcine (PF) 100 unit/mL injection flush 500 Units  500 Units Intravenous 1 time in Clinic/HOD Luma Tran, CNS          Physical Exam:   BP (!) 147/67 (BP Location: Left arm, Patient Position: Sitting, BP Method: Large (Automatic))   Pulse 97   Temp 98.2 °F (36.8 °C) (Oral)   Resp 18   Ht 5' (1.524 m)   Wt 72.5 kg (159 lb 13.3 oz)   SpO2 98%   BMI 31.22 kg/m²      ECOG Performance status: 2       Physical Exam  Vitals reviewed.   Constitutional:       General: She is not in acute distress.     Appearance: She is not ill-appearing, toxic-appearing or diaphoretic.   HENT:      Head: Normocephalic and atraumatic.      Right Ear: External ear normal.      Left Ear: External ear normal.      Nose: Nose normal. No congestion.      Mouth/Throat:      Pharynx: Oropharynx is clear. No oropharyngeal exudate.   Eyes:      General: No scleral icterus.     Conjunctiva/sclera: Conjunctivae normal.      Pupils: Pupils are equal, round, and reactive to light.   Cardiovascular:      Rate and Rhythm: Normal rate and regular rhythm.      Heart sounds: No murmur heard.  Pulmonary:      Effort: Pulmonary effort is normal. No respiratory distress.      Breath sounds: Normal breath sounds. No wheezing.   Chest:      Comments: RCW port in place, bandage over  site.   Abdominal:      General: Abdomen is flat. Bowel sounds are normal. There is no distension.      Palpations: Abdomen is soft. There is no mass.      Tenderness: There is no abdominal tenderness.   Musculoskeletal:         General: No swelling or deformity.   Skin:     General: Skin is warm and dry.      Coloration: Skin is not jaundiced or pale.      Findings: No erythema or rash.   Neurological:      Mental Status: She is alert and oriented to person, place, and time. Mental status is at baseline.      Motor: Weakness (generalized) present.   Psychiatric:         Mood and Affect: Mood normal.         Behavior: Behavior normal.       Labs:   Recent Results (from the past 48 hour(s))   Comprehensive Metabolic Panel    Collection Time: 07/24/23 11:24 AM   Result Value Ref Range    Sodium 140 136 - 145 mmol/L    Potassium 3.2 (L) 3.5 - 5.1 mmol/L    Chloride 106 95 - 110 mmol/L    CO2 27 23 - 29 mmol/L    Glucose 102 70 - 110 mg/dL    BUN 8 8 - 23 mg/dL    Creatinine 0.8 0.5 - 1.4 mg/dL    Calcium 8.0 (L) 8.7 - 10.5 mg/dL    Total Protein 6.3 6.0 - 8.4 g/dL    Albumin 2.8 (L) 3.5 - 5.2 g/dL    Total Bilirubin 0.4 0.1 - 1.0 mg/dL    Alkaline Phosphatase 89 55 - 135 U/L    AST 71 (H) 10 - 40 U/L    ALT 29 10 - 44 U/L    eGFR >60.0 >60 mL/min/1.73 m^2    Anion Gap 7 (L) 8 - 16 mmol/L   CBC Oncology    Collection Time: 07/24/23 11:24 AM   Result Value Ref Range    WBC 2.50 (L) 3.90 - 12.70 K/uL    RBC 3.36 (L) 4.00 - 5.40 M/uL    Hemoglobin 10.7 (L) 12.0 - 16.0 g/dL    Hematocrit 32.3 (L) 37.0 - 48.5 %    MCV 96 82 - 98 fL    MCH 31.8 (H) 27.0 - 31.0 pg    MCHC 33.1 32.0 - 36.0 g/dL    RDW 17.2 (H) 11.5 - 14.5 %    Platelets 174 150 - 450 K/uL    MPV 11.3 9.2 - 12.9 fL    Gran # (ANC) 0.6 (L) 1.8 - 7.7 K/uL    Immature Grans (Abs) 0.02 0.00 - 0.04 K/uL       I have reviewed the pertinent labs from today which are notable for ANC 0.6. Albumin 2.8.    Imaging:    I have personally reviewed the imaging which is notable  for a pancreatic head mass evident on CT abdomen pelvis with no evidence of metastatic disease.     CT head imaging without evidence of metastatic disease.     Path:   PANCREAS, HEAD MASS, EUS-FNB:   Adenocarcinoma   Mismatch Repair (MMR) Proteins:    MLH1 - Intact nuclear expression    MSH2 - Intact nuclear expression    MSH6 - Intact nuclear expression    PMS2 - Intact nuclear expression    Assessment:       1. Malignant neoplasm of pancreas, unspecified location of malignancy    2. Neoplasm related pain    3. 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 Immunodeficiency secondary to neoplasm    6. Essential hypertension, benign    7. Mixed hyperlipidemia    8. Aortic atherosclerosis    9. Paroxysmal atrial fibrillation    10. Microcytic anemia    11. Iron deficiency anemia due to chronic blood loss    12. Exocrine pancreatic insufficiency    13. Crohn's disease of large intestine without complication    14. Chemotherapy-induced nausea         Plan:     #  Pancreatic adenocarcinoma.   83 y.o. F with Crohn's disease, p.afib, SSS s/p PPM, HTN, HLD, fatigue, presented with weight loss and jaundice and was found to have pancreatic adenocarcinoma. She has early stage pancreatic cancer, and is considered surgically resectable. However, giving her age and multiple co-morbidities, surgery might not be feasible or safe.     We have discussed with her the diagnosis, stage, prognosis and treatment options. She understands that surgery is the only curable option and she is unlikely to be sufficiently medically fit for it. We have discussed chemotherapy with a palliative intent to improve symptoms and prolong life.   She expressed understanding and she values QoL but was willing to try chemotherapy.     We have discussed treatment with first line therapy Gemcitabine + Abraxane. She agreed and wished to proceed.     Received cycle 1 of biweekly gem/abraxane with dose reductions of 800  mg/m2 and 100 mg/m2 respectively on 5/1/23. Subsequently hospitalized x 3 (had an ED visit same day as first discharge).     She wanted to delay starting cycle 2 until she had a port placed.   Port placed per IR on 6/22/23. Site healing well overall.   MRI brain done 6/23/23 without evidence of metastatic disease.     Presents today for cycle 4.    ANC is 0.6 which will preclude administration of cycle 4.  Will repeat imaging in 1-2 weeks and have her return prior to potential cycle 4 (rescheduled).    Refilled zyprexa at 5mg today in clinic. Continue compazine PRN.   RTC in 2 weeks for next cycle.     Saw palliative care on 5/12. Continue to f/u as directed.     # HTN   BP mildly elevated in clinic today.  On amlodipine 5 mg daily, coreg 6.25 mg bid   Monitor.     # HLD, Atherosclerosis   On Lipitor 20 mg   Follows with cardiology     # P. Afib   Rate controlled. On Eliquis.  Notes feeling better since med adjustments during admission.   Continue to monitor.     #. Microcytic anemia, Iron def due to chronic blood loss  Unclear etiology but likely from chronic blood loss 2/2 diverticulosis or tumor invasion of small bowel.  Iron studies consistent with iron deficiency.   Couldn't tolerate PO iron due to severe GI side effects (abdominal cramps and nausea)  Injectafer denied by insurance so will administer Venofer x 4-5 doses.  S/p 3 doses. Does not want #4 at this time.   Monitor on treatment.     #Pancreatic insufficiency   Improved with Creon   On Imodium for diarrhea   Continue to monitor.     # Crohn's disease.   On Sulfasalazine  Follows with GI     Follow up: 2 weeks for potential cycle 4    Patient is in agreement with the proposed treatment plan. All questions were answered to the patient's satisfaction. Pt knows to call clinic if anything is needed before the next clinic visit.    Jim Hill MD  Hematology/Oncology  [facility]      Med Onc Chart  Routing  Urgent    Follow up with physician 2 weeks. for gem/abraxane   Follow up with DOMINGO    Infusion scheduling note    Injection scheduling note    Labs CBC, CMP and CA 19-9   Scheduling:  Preferred lab:  Lab interval:     Imaging CT chest abdomen pelvis   CT prior to f/u   Pharmacy appointment    Other referrals

## 2023-07-24 NOTE — PLAN OF CARE
1245 Patient here for C2D15 abraxane/gemzar. Labs are pending at this time. Port was accessed in injections this am for labs. Site is clear and blood return seen. NS started at 25ml/hr. [place] provided.

## 2023-07-24 NOTE — PLAN OF CARE
Pt arrived for labs from port. Pt A&Ox4. Port accessed with sterile technique. Positive blood return noted. Labs collected. Name and date of birth confirmed with Pt. Labs sent. Port saline locked.

## 2023-07-26 DIAGNOSIS — K52.9 CHRONIC DIARRHEA: ICD-10-CM

## 2023-07-26 RX ORDER — DIPHENOXYLATE HYDROCHLORIDE AND ATROPINE SULFATE 2.5; .025 MG/1; MG/1
TABLET ORAL
Qty: 60 TABLET | Refills: 2 | Status: SHIPPED | OUTPATIENT
Start: 2023-07-26

## 2023-08-07 ENCOUNTER — INFUSION (OUTPATIENT)
Dept: INFUSION THERAPY | Facility: HOSPITAL | Age: 83
End: 2023-08-07
Payer: MEDICARE

## 2023-08-07 ENCOUNTER — OFFICE VISIT (OUTPATIENT)
Dept: HEMATOLOGY/ONCOLOGY | Facility: CLINIC | Age: 83
End: 2023-08-07
Payer: MEDICARE

## 2023-08-07 VITALS
HEIGHT: 60 IN | RESPIRATION RATE: 18 BRPM | TEMPERATURE: 98 F | TEMPERATURE: 99 F | BODY MASS INDEX: 30.64 KG/M2 | HEART RATE: 100 BPM | DIASTOLIC BLOOD PRESSURE: 65 MMHG | SYSTOLIC BLOOD PRESSURE: 135 MMHG | WEIGHT: 156.06 LBS | SYSTOLIC BLOOD PRESSURE: 140 MMHG | BODY MASS INDEX: 30.64 KG/M2 | HEART RATE: 95 BPM | OXYGEN SATURATION: 97 % | WEIGHT: 156.06 LBS | HEIGHT: 60 IN | RESPIRATION RATE: 18 BRPM | DIASTOLIC BLOOD PRESSURE: 65 MMHG

## 2023-08-07 DIAGNOSIS — G89.3 NEOPLASM RELATED PAIN: ICD-10-CM

## 2023-08-07 DIAGNOSIS — I70.0 AORTIC ATHEROSCLEROSIS: ICD-10-CM

## 2023-08-07 DIAGNOSIS — K86.81 EXOCRINE PANCREATIC INSUFFICIENCY: ICD-10-CM

## 2023-08-07 DIAGNOSIS — I10 ESSENTIAL HYPERTENSION, BENIGN: ICD-10-CM

## 2023-08-07 DIAGNOSIS — D50.9 MICROCYTIC ANEMIA: ICD-10-CM

## 2023-08-07 DIAGNOSIS — D70.1 CHEMOTHERAPY INDUCED NEUTROPENIA: ICD-10-CM

## 2023-08-07 DIAGNOSIS — K50.10 CROHN'S DISEASE OF LARGE INTESTINE WITHOUT COMPLICATION: ICD-10-CM

## 2023-08-07 DIAGNOSIS — Z79.899 IMMUNODEFICIENCY SECONDARY TO CHEMOTHERAPY: ICD-10-CM

## 2023-08-07 DIAGNOSIS — T45.1X5A IMMUNODEFICIENCY SECONDARY TO CHEMOTHERAPY: ICD-10-CM

## 2023-08-07 DIAGNOSIS — D84.81 IMMUNODEFICIENCY SECONDARY TO NEOPLASM: ICD-10-CM

## 2023-08-07 DIAGNOSIS — E78.2 MIXED HYPERLIPIDEMIA: ICD-10-CM

## 2023-08-07 DIAGNOSIS — D50.0 IRON DEFICIENCY ANEMIA DUE TO CHRONIC BLOOD LOSS: ICD-10-CM

## 2023-08-07 DIAGNOSIS — C25.9 MALIGNANT NEOPLASM OF PANCREAS, UNSPECIFIED LOCATION OF MALIGNANCY: Primary | ICD-10-CM

## 2023-08-07 DIAGNOSIS — D49.9 IMMUNODEFICIENCY SECONDARY TO NEOPLASM: ICD-10-CM

## 2023-08-07 DIAGNOSIS — I48.0 PAROXYSMAL ATRIAL FIBRILLATION: ICD-10-CM

## 2023-08-07 DIAGNOSIS — T45.1X5A CHEMOTHERAPY INDUCED NEUTROPENIA: ICD-10-CM

## 2023-08-07 DIAGNOSIS — D84.821 IMMUNODEFICIENCY SECONDARY TO CHEMOTHERAPY: ICD-10-CM

## 2023-08-07 PROBLEM — A41.9 SEPSIS: Status: RESOLVED | Noted: 2023-05-04 | Resolved: 2023-08-07

## 2023-08-07 PROBLEM — I21.4 NSTEMI (NON-ST ELEVATED MYOCARDIAL INFARCTION): Status: RESOLVED | Noted: 2023-05-04 | Resolved: 2023-08-07

## 2023-08-07 LAB
ALBUMIN SERPL BCP-MCNC: 3.2 G/DL (ref 3.5–5.2)
ALP SERPL-CCNC: 118 U/L (ref 55–135)
ALT SERPL W/O P-5'-P-CCNC: 24 U/L (ref 10–44)
ANION GAP SERPL CALC-SCNC: 11 MMOL/L (ref 8–16)
AST SERPL-CCNC: 40 U/L (ref 10–40)
BILIRUB SERPL-MCNC: 0.5 MG/DL (ref 0.1–1)
BUN SERPL-MCNC: 10 MG/DL (ref 8–23)
CALCIUM SERPL-MCNC: 9.4 MG/DL (ref 8.7–10.5)
CANCER AG19-9 SERPL-ACNC: 10.1 U/ML (ref 0–40)
CHLORIDE SERPL-SCNC: 108 MMOL/L (ref 95–110)
CO2 SERPL-SCNC: 24 MMOL/L (ref 23–29)
CREAT SERPL-MCNC: 0.7 MG/DL (ref 0.5–1.4)
ERYTHROCYTE [DISTWIDTH] IN BLOOD BY AUTOMATED COUNT: 17.2 % (ref 11.5–14.5)
EST. GFR  (NO RACE VARIABLE): >60 ML/MIN/1.73 M^2
GLUCOSE SERPL-MCNC: 144 MG/DL (ref 70–110)
HCT VFR BLD AUTO: 36.7 % (ref 37–48.5)
HGB BLD-MCNC: 12.3 G/DL (ref 12–16)
IMM GRANULOCYTES # BLD AUTO: 0.02 K/UL (ref 0–0.04)
MCH RBC QN AUTO: 32.4 PG (ref 27–31)
MCHC RBC AUTO-ENTMCNC: 33.5 G/DL (ref 32–36)
MCV RBC AUTO: 97 FL (ref 82–98)
NEUTROPHILS # BLD AUTO: 3.9 K/UL (ref 1.8–7.7)
PLATELET # BLD AUTO: 211 K/UL (ref 150–450)
PMV BLD AUTO: 11 FL (ref 9.2–12.9)
POTASSIUM SERPL-SCNC: 3.3 MMOL/L (ref 3.5–5.1)
PROT SERPL-MCNC: 7 G/DL (ref 6–8.4)
RBC # BLD AUTO: 3.8 M/UL (ref 4–5.4)
SODIUM SERPL-SCNC: 143 MMOL/L (ref 136–145)
WBC # BLD AUTO: 6.25 K/UL (ref 3.9–12.7)

## 2023-08-07 PROCEDURE — 25000003 PHARM REV CODE 250: Mod: HCNC | Performed by: INTERNAL MEDICINE

## 2023-08-07 PROCEDURE — 1159F MED LIST DOCD IN RCRD: CPT | Mod: HCNC,CPTII,S$GLB, | Performed by: INTERNAL MEDICINE

## 2023-08-07 PROCEDURE — 3077F SYST BP >= 140 MM HG: CPT | Mod: HCNC,CPTII,S$GLB, | Performed by: INTERNAL MEDICINE

## 2023-08-07 PROCEDURE — 1101F PR PT FALLS ASSESS DOC 0-1 FALLS W/OUT INJ PAST YR: ICD-10-PCS | Mod: HCNC,CPTII,S$GLB, | Performed by: INTERNAL MEDICINE

## 2023-08-07 PROCEDURE — 1157F ADVNC CARE PLAN IN RCRD: CPT | Mod: HCNC,CPTII,S$GLB, | Performed by: INTERNAL MEDICINE

## 2023-08-07 PROCEDURE — 3078F PR MOST RECENT DIASTOLIC BLOOD PRESSURE < 80 MM HG: ICD-10-PCS | Mod: HCNC,CPTII,S$GLB, | Performed by: INTERNAL MEDICINE

## 2023-08-07 PROCEDURE — 1157F PR ADVANCE CARE PLAN OR EQUIV PRESENT IN MEDICAL RECORD: ICD-10-PCS | Mod: HCNC,CPTII,S$GLB, | Performed by: INTERNAL MEDICINE

## 2023-08-07 PROCEDURE — 36415 COLL VENOUS BLD VENIPUNCTURE: CPT | Mod: HCNC | Performed by: REGISTERED NURSE

## 2023-08-07 PROCEDURE — 1159F PR MEDICATION LIST DOCUMENTED IN MEDICAL RECORD: ICD-10-PCS | Mod: HCNC,CPTII,S$GLB, | Performed by: INTERNAL MEDICINE

## 2023-08-07 PROCEDURE — 96413 CHEMO IV INFUSION 1 HR: CPT | Mod: HCNC

## 2023-08-07 PROCEDURE — A4216 STERILE WATER/SALINE, 10 ML: HCPCS | Mod: HCNC | Performed by: INTERNAL MEDICINE

## 2023-08-07 PROCEDURE — 1126F PR PAIN SEVERITY QUANTIFIED, NO PAIN PRESENT: ICD-10-PCS | Mod: HCNC,CPTII,S$GLB, | Performed by: INTERNAL MEDICINE

## 2023-08-07 PROCEDURE — 3288F FALL RISK ASSESSMENT DOCD: CPT | Mod: HCNC,CPTII,S$GLB, | Performed by: INTERNAL MEDICINE

## 2023-08-07 PROCEDURE — 80053 COMPREHEN METABOLIC PANEL: CPT | Mod: HCNC | Performed by: REGISTERED NURSE

## 2023-08-07 PROCEDURE — 99999 PR PBB SHADOW E&M-EST. PATIENT-LVL IV: CPT | Mod: PBBFAC,HCNC,, | Performed by: INTERNAL MEDICINE

## 2023-08-07 PROCEDURE — 99215 OFFICE O/P EST HI 40 MIN: CPT | Mod: HCNC,S$GLB,, | Performed by: INTERNAL MEDICINE

## 2023-08-07 PROCEDURE — 1101F PT FALLS ASSESS-DOCD LE1/YR: CPT | Mod: HCNC,CPTII,S$GLB, | Performed by: INTERNAL MEDICINE

## 2023-08-07 PROCEDURE — 63600175 PHARM REV CODE 636 W HCPCS: Mod: HCNC | Performed by: INTERNAL MEDICINE

## 2023-08-07 PROCEDURE — 3078F DIAST BP <80 MM HG: CPT | Mod: HCNC,CPTII,S$GLB, | Performed by: INTERNAL MEDICINE

## 2023-08-07 PROCEDURE — 86301 IMMUNOASSAY TUMOR CA 19-9: CPT | Mod: HCNC | Performed by: REGISTERED NURSE

## 2023-08-07 PROCEDURE — 96417 CHEMO IV INFUS EACH ADDL SEQ: CPT | Mod: HCNC

## 2023-08-07 PROCEDURE — 85027 COMPLETE CBC AUTOMATED: CPT | Mod: HCNC | Performed by: REGISTERED NURSE

## 2023-08-07 PROCEDURE — 96377 APPLICATON ON-BODY INJECTOR: CPT | Mod: HCNC

## 2023-08-07 PROCEDURE — 96375 TX/PRO/DX INJ NEW DRUG ADDON: CPT | Mod: HCNC

## 2023-08-07 PROCEDURE — 99215 PR OFFICE/OUTPT VISIT, EST, LEVL V, 40-54 MIN: ICD-10-PCS | Mod: HCNC,S$GLB,, | Performed by: INTERNAL MEDICINE

## 2023-08-07 PROCEDURE — 99999 PR PBB SHADOW E&M-EST. PATIENT-LVL IV: ICD-10-PCS | Mod: PBBFAC,HCNC,, | Performed by: INTERNAL MEDICINE

## 2023-08-07 PROCEDURE — 3077F PR MOST RECENT SYSTOLIC BLOOD PRESSURE >= 140 MM HG: ICD-10-PCS | Mod: HCNC,CPTII,S$GLB, | Performed by: INTERNAL MEDICINE

## 2023-08-07 PROCEDURE — 3288F PR FALLS RISK ASSESSMENT DOCUMENTED: ICD-10-PCS | Mod: HCNC,CPTII,S$GLB, | Performed by: INTERNAL MEDICINE

## 2023-08-07 PROCEDURE — 1126F AMNT PAIN NOTED NONE PRSNT: CPT | Mod: HCNC,CPTII,S$GLB, | Performed by: INTERNAL MEDICINE

## 2023-08-07 RX ORDER — SODIUM CHLORIDE 0.9 % (FLUSH) 0.9 %
10 SYRINGE (ML) INJECTION
Status: DISCONTINUED | OUTPATIENT
Start: 2023-08-07 | End: 2023-08-07 | Stop reason: HOSPADM

## 2023-08-07 RX ORDER — HEPARIN 100 UNIT/ML
500 SYRINGE INTRAVENOUS
Status: DISCONTINUED | OUTPATIENT
Start: 2023-08-07 | End: 2023-08-07 | Stop reason: HOSPADM

## 2023-08-07 RX ORDER — HEPARIN 100 UNIT/ML
500 SYRINGE INTRAVENOUS
Status: CANCELLED | OUTPATIENT
Start: 2023-08-07

## 2023-08-07 RX ORDER — DEXAMETHASONE SODIUM PHOSPHATE 4 MG/ML
4 INJECTION, SOLUTION INTRA-ARTICULAR; INTRALESIONAL; INTRAMUSCULAR; INTRAVENOUS; SOFT TISSUE
Status: COMPLETED | OUTPATIENT
Start: 2023-08-07 | End: 2023-08-07

## 2023-08-07 RX ORDER — SODIUM CHLORIDE 0.9 % (FLUSH) 0.9 %
10 SYRINGE (ML) INJECTION
Status: CANCELLED | OUTPATIENT
Start: 2023-08-07

## 2023-08-07 RX ADMIN — Medication 10 ML: at 03:08

## 2023-08-07 RX ADMIN — SODIUM CHLORIDE: 9 INJECTION, SOLUTION INTRAVENOUS at 01:08

## 2023-08-07 RX ADMIN — PACLITAXEL 180 MG: 100 INJECTION, POWDER, LYOPHILIZED, FOR SUSPENSION INTRAVENOUS at 02:08

## 2023-08-07 RX ADMIN — GEMCITABINE HYDROCHLORIDE 1400 MG: 1 INJECTION, SOLUTION INTRAVENOUS at 03:08

## 2023-08-07 RX ADMIN — PEGFILGRASTIM 6 MG: KIT SUBCUTANEOUS at 03:08

## 2023-08-07 RX ADMIN — DEXAMETHASONE SODIUM PHOSPHATE 4 MG: 4 INJECTION INTRA-ARTICULAR; INTRALESIONAL; INTRAMUSCULAR; INTRAVENOUS; SOFT TISSUE at 02:08

## 2023-08-07 RX ADMIN — Medication 10 ML: at 11:08

## 2023-08-07 RX ADMIN — HEPARIN 500 UNITS: 100 SYRINGE at 03:08

## 2023-08-07 NOTE — PROGRESS NOTES
MEDICAL ONCOLOGY - ESTABLISHED PATIENT VISIT    Reason for visit: Pancreatic adenocarcinoma    Best Contact Phone Number(s): [phone] (home)      Cancer/Stage/TNM:    Cancer Staging   Malignant neoplasm of pancreas  Staging form: Exocrine Pancreas, AJCC 8th Edition  - Clinical stage from 4/13/2023: Stage IB (cT2, cN0, cM0) - Signed by Jim Hill MD on 7/24/2023       Oncology History   Malignant neoplasm of pancreas   3/21/2023 Initial Diagnosis    Pancreatic adenocarcinoma     4/13/2023 Cancer Staged    Staging form: Exocrine Pancreas, AJCC 8th Edition  - Clinical stage from 4/13/2023: Stage IB (cT2, cN0, cM0)     5/1/2023 -  Chemotherapy    Treatment Summary   Plan Name: OP PANC NAB-PACLITAXEL + GEMCITABINE Q4W  Treatment Goal: Palliative  Status: Active  Start Date: 5/1/2023  End Date: 5/13/2024 (Planned)  Provider: Jim Hill MD  Chemotherapy: gemcitabine (GEMZAR) 1,400 mg in sodium chloride 0.9% SolP 321.82 mL chemo infusion, 1,448 mg (100 % of original dose 800 mg/m2), Intravenous, Clinic/HOD 1 time, 2 of 12 cycles  Dose modification: 800 mg/m2 (original dose 800 mg/m2, Cycle 1, Reason: Other (see comments), Comment: poor PS)  Administration: 1,400 mg (5/1/2023), 1,400 mg (6/26/2023), 1,400 mg (7/10/2023)          Interim History:   83 y.o. female with Crohn's disease, p.afib, SSS s/p PPM, HTN, HLD who presents for follow up prior to potential cycle 4 of chemotherapy.  She is feeling well today. We did not administer chemotherapy two weeks ago because of neutropenia.  She has felt well with the break.  Still battling diarrhea so she takes Imodium.  Denies neuropathy.    Presents alone today. ECOG PS 2.     ROS:   Review of Systems   Constitutional:  Positive for malaise/fatigue. Negative for chills, fever and weight loss.   HENT:  Negative for congestion and sore throat.    Eyes:  Negative for blurred vision.   Respiratory:  Negative for shortness of breath.    Cardiovascular:   Negative for chest pain, palpitations and leg swelling.   Gastrointestinal:  Positive for diarrhea. Negative for abdominal pain, blood in stool, constipation, nausea and vomiting.   Genitourinary:  Negative for dysuria, frequency, hematuria and urgency.   Musculoskeletal:  Negative for back pain, falls and myalgias.   Skin:  Negative for itching and rash.   Neurological:  Positive for weakness (generalized). Negative for dizziness, tingling, tremors, sensory change and focal weakness.   Endo/Heme/Allergies:  Does not bruise/bleed easily.   Psychiatric/Behavioral:  Negative for depression, substance abuse and suicidal ideas. The patient is not nervous/anxious.        Past Medical History:   Past Medical History:   Diagnosis Date    Anticoagulant long-term use     Atrial fibrillation     Crohn disease diagnosed in her 20's    GERD (gastroesophageal reflux disease)     Hyperlipidemia     Hypertension     Pancreatic adenocarcinoma 3/21/2023    Thyroid disease     s/p I 131        Past Surgical History:   Past Surgical History:   Procedure Laterality Date    APPENDECTOMY      CARDIAC SURGERY  2014    pacemaker    COLONOSCOPY  ~2008    normal findings per patient report    ENDOSCOPIC ULTRASOUND OF UPPER GASTROINTESTINAL TRACT N/A 3/14/2023    Procedure: ULTRASOUND, UPPER GI TRACT, ENDOSCOPIC;  Surgeon: Andrei Swartz MD;  Location: [facility];  Service: Endoscopy;  Laterality: N/A;  Approval to hold Eliquis rec'd from Dr. Barbosa (see telephone encounter 3/3/23)-DS  [employer] AICD/PPM  3/3/23-Instructions via portal-DS    ERCP N/A 3/21/2023    Procedure: ERCP (ENDOSCOPIC RETROGRADE CHOLANGIOPANCREATOGRAPHY);  Surgeon: Celina Toney MD;  Location: [facility] ([address] [place] [address]);  Service: Endoscopy;  Laterality: N/A;    ESOPHAGOGASTRODUODENOSCOPY N/A 7/17/2018    Procedure: EGD (ESOPHAGOGASTRODUODENOSCOPY);  Surgeon: Alondra Casiano MD;  Location: [facility];  Service: Endoscopy;  Laterality: N/A;    HYSTERECTOMY      INSERTION  OF IMPLANTABLE CARDIOVERTER-DEFIBRILLATOR (ICD) GENERATOR WITH TWO EXISTING LEADS Left 5/10/2021    Procedure: INSERTION, PULSE GENERATOR WITH 2 EXISTING LEADS, ICD;  Surgeon: Bo Leone MD;  Location: [facility] CATH LAB;  Service: Cardiology;  Laterality: Left;    INSERTION OF PACEMAKER      REPLACEMENT OF PACEMAKER GENERATOR  05/10/2021    RETROGRADE PYELOGRAPHY Right 2020    Procedure: PYELOGRAM, RETROGRADE;  Surgeon: Jovan Kruse MD;  Location: [facility] OR;  Service: Urology;  Laterality: Right;    SMALL INTESTINE SURGERY  in her 20's    for crohn's    TONSILLECTOMY      UPPER GASTROINTESTINAL ENDOSCOPY  ~    normal findings per patient report        Family History:   Family History   Problem Relation Age of Onset    Cancer Mother     Breast cancer Mother     Crohn's disease Other     Colon cancer Neg Hx     Colon polyps Neg Hx     Esophageal cancer Neg Hx     Stomach cancer Neg Hx     Ulcerative colitis Neg Hx         Social History:   Social History     Tobacco Use    Smoking status: Former     Current packs/day: 0.00     Types: Cigarettes     Quit date:      Years since quittin.6    Smokeless tobacco: Never   Substance Use Topics    Alcohol use: No        I have reviewed and updated the patient's past medical, surgical, family and social histories.    Allergies:   Review of patient's allergies indicates:   Allergen Reactions    Lisinopril Other (See Comments)     cough        Medications:   Current Outpatient Medications   Medication Sig Dispense Refill    acetaminophen (TYLENOL) 325 MG tablet Take 650 mg by mouth daily as needed (Headache).      albuterol (PROVENTIL) 2.5 mg /3 mL (0.083 %) nebulizer solution Take 3 mLs (2.5 mg total) by nebulization every 6 (six) hours as needed for Wheezing or Shortness of Breath. Rescue 150 mL 11    alendronate (FOSAMAX) 70 MG tablet Take 1 tablet (70 mg total) by mouth every 7 days. 12 tablet 3    amLODIPine (NORVASC) 5 MG tablet Take 1 tablet (5 mg  total) by mouth once daily. 30 tablet 11    amoxicillin-clavulanate 875-125mg (AUGMENTIN) 875-125 mg per tablet Take 1 tablet by mouth 2 (two) times daily. 14 tablet 0    atorvastatin (LIPITOR) 20 MG tablet Take 1 tablet (20 mg total) by mouth once daily. 90 tablet 3    colestipoL (COLESTID) 1 gram Tab Take 1 tablet (1 g total) by mouth 2 (two) times daily. 60 tablet 5    diphenoxylate-atropine 2.5-0.025 mg (LOMOTIL) 2.5-0.025 mg per tablet TAKE 1 TABLET BY MOUTH 4 TIMES DAILY AS NEEDED FOR DIARRHEA 60 tablet 2    ELIQUIS 5 mg Tab Take 1 tablet (5 mg total) by mouth 2 (two) times daily. 180 tablet 3    esomeprazole (NEXIUM) 40 MG capsule Take 1 capsule (40 mg total) by mouth once daily. 90 capsule 1    folic acid (FOLVITE) 1 MG tablet Take 1 tablet by mouth once daily 90 tablet 0    furosemide (LASIX) 40 MG tablet Take 1 tablet (40 mg total) by mouth daily as needed (For weight gain > 3 lb in one day, increasing leg swelling, or increasing SOB). 30 tablet 11    levothyroxine (SYNTHROID, LEVOTHROID) 175 MCG tablet Take 1 tablet by mouth once daily 90 tablet 2    LIDOcaine-prilocaine (EMLA) cream Apply topically as needed (place to port site 45-60 minutes prior to port access). (Patient not taking: Reported on 5/26/2023) 30 g 6    lipase-protease-amylase 24,000-76,000-120,000 units (CREON) 24,000-76,000 -120,000 unit capsule Take 2 capsules by mouth 3 (three) times daily with meals. 180 capsule 11    MELATONIN ORAL Take 1 tablet by mouth nightly as needed (Insomnia).      OLANZapine (ZYPREXA) 5 MG tablet Take 1 tablet (5 mg total) by mouth every evening. 30 tablet 5    potassium chloride (MICRO-K) 10 MEQ CpSR 1 po bid x 1 week then Micro K qd 30 capsule 5    prochlorperazine (COMPAZINE) 10 MG tablet Take 1 tablet (10 mg total) by mouth 4 (four) times daily as needed (nausea). 90 tablet 11    sotaloL (BETAPACE) 120 MG Tab Take 1 tablet (120 mg total) by mouth 2 (two) times daily. 180 tablet 3    sulfaSALAzine  (AZULFIDINE) 500 mg Tab Take 1 tablet by mouth twice daily 180 tablet 3    tolterodine (DETROL LA) 4 MG 24 hr capsule Take 1 capsule (4 mg total) by mouth once daily. 90 capsule 3    traMADoL (ULTRAM) 50 mg tablet Take 1 tablet (50 mg total) by mouth every 6 (six) hours as needed for Pain. 25 tablet 0     No current facility-administered medications for this visit.      Physical Exam:   BP (!) 140/65 (BP Location: Left arm, Patient Position: Sitting, BP Method: Large (Automatic))   Pulse 95   Temp 98.2 °F (36.8 °C) (Oral)   Resp 18   Ht 5' (1.524 m)   Wt 70.8 kg (156 lb 1.4 oz)   SpO2 97%   BMI 30.48 kg/m²      ECOG Performance status: 2       Physical Exam  Vitals reviewed.   Constitutional:       General: She is not in acute distress.     Appearance: She is not ill-appearing, toxic-appearing or diaphoretic.   HENT:      Head: Normocephalic and atraumatic.      Right Ear: External ear normal.      Left Ear: External ear normal.      Nose: Nose normal. No congestion.      Mouth/Throat:      Pharynx: Oropharynx is clear. No oropharyngeal exudate.   Eyes:      General: No scleral icterus.     Conjunctiva/sclera: Conjunctivae normal.      Pupils: Pupils are equal, round, and reactive to light.   Cardiovascular:      Rate and Rhythm: Normal rate and regular rhythm.      Heart sounds: No murmur heard.  Pulmonary:      Effort: Pulmonary effort is normal. No respiratory distress.      Breath sounds: Normal breath sounds. No wheezing.   Chest:      Comments: RCW port in place, bandage over site.   Abdominal:      General: Abdomen is flat. Bowel sounds are normal. There is no distension.      Palpations: Abdomen is soft. There is no mass.      Tenderness: There is no abdominal tenderness.   Musculoskeletal:         General: No swelling or deformity.   Skin:     General: Skin is warm and dry.      Coloration: Skin is not jaundiced or pale.      Findings: No erythema or rash.   Neurological:      Mental Status: She is  alert and oriented to person, place, and time. Mental status is at baseline.      Motor: Weakness (generalized) present.   Psychiatric:         Mood and Affect: Mood normal.         Behavior: Behavior normal.         Labs:   Recent Results (from the past 48 hour(s))   Comprehensive Metabolic Panel    Collection Time: 08/07/23 11:34 AM   Result Value Ref Range    Sodium 143 136 - 145 mmol/L    Potassium 3.3 (L) 3.5 - 5.1 mmol/L    Chloride 108 95 - 110 mmol/L    CO2 24 23 - 29 mmol/L    Glucose 144 (H) 70 - 110 mg/dL    BUN 10 8 - 23 mg/dL    Creatinine 0.7 0.5 - 1.4 mg/dL    Calcium 9.4 8.7 - 10.5 mg/dL    Total Protein 7.0 6.0 - 8.4 g/dL    Albumin 3.2 (L) 3.5 - 5.2 g/dL    Total Bilirubin 0.5 0.1 - 1.0 mg/dL    Alkaline Phosphatase 118 55 - 135 U/L    AST 40 10 - 40 U/L    ALT 24 10 - 44 U/L    eGFR >60.0 >60 mL/min/1.73 m^2    Anion Gap 11 8 - 16 mmol/L   CBC Oncology    Collection Time: 08/07/23 11:34 AM   Result Value Ref Range    WBC 6.25 3.90 - 12.70 K/uL    RBC 3.80 (L) 4.00 - 5.40 M/uL    Hemoglobin 12.3 12.0 - 16.0 g/dL    Hematocrit 36.7 (L) 37.0 - 48.5 %    MCV 97 82 - 98 fL    MCH 32.4 (H) 27.0 - 31.0 pg    MCHC 33.5 32.0 - 36.0 g/dL    RDW 17.2 (H) 11.5 - 14.5 %    Platelets 211 150 - 450 K/uL    MPV 11.0 9.2 - 12.9 fL    Gran # (ANC) 3.9 1.8 - 7.7 K/uL    Immature Grans (Abs) 0.02 0.00 - 0.04 K/uL   Cancer Antigen 19-9    Collection Time: 08/07/23 11:34 AM   Result Value Ref Range    CA 19-9 10.1 0.0 - 40.0 U/mL       I have reviewed the pertinent labs from today which are notable for normal blood counts.    Imaging:      CT CAP - 8/2/23:  Impression:     Patient with reported history of pancreatic cancer.  Overall similar size of heterogeneous masslike fullness at the pancreatic head with abutment or involvement of the nearby traversing duodenum.     Increased size of rounded hypodense lesion at the pancreatic body with abutment of the stomach lesser curvature.  Differential considerations similar as  before to include pseudocyst or others cystic lesion.  Degree of nearby peripancreatic and perigastric inflammatory change have improved in the interval.     Cholelithiasis with biliary stent in place.     Cirrhotic liver.     Left upper lobe 0.2 cm pulmonary nodule, technically indeterminate and additional findings as above.    Path:   PANCREAS, HEAD MASS, EUS-FNB:   Adenocarcinoma   Mismatch Repair (MMR) Proteins:    MLH1 - Intact nuclear expression    MSH2 - Intact nuclear expression    MSH6 - Intact nuclear expression    PMS2 - Intact nuclear expression    Assessment:       1. Malignant neoplasm of pancreas, unspecified location of malignancy    2. Neoplasm related pain    3. 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 Immunodeficiency secondary to neoplasm    6. Essential hypertension, benign    7. Mixed hyperlipidemia    8. Aortic atherosclerosis    9. Paroxysmal atrial fibrillation    10. Microcytic anemia    11. Iron deficiency anemia due to chronic blood loss    12. Exocrine pancreatic insufficiency    13. Crohn's disease of large intestine without complication           Plan:     #  Pancreatic adenocarcinoma.   83 y.o. F with Crohn's disease, p.afib, SSS s/p PPM, HTN, HLD, fatigue, presented with weight loss and jaundice and was found to have pancreatic adenocarcinoma. She has early stage pancreatic cancer, and is considered surgically resectable. However, giving her age and multiple co-morbidities, surgery might not be feasible or safe.     We have discussed with her the diagnosis, stage, prognosis and treatment options. She understands that surgery is the only curable option and she is unlikely to be sufficiently medically fit for it. We have discussed chemotherapy with a palliative intent to improve symptoms and prolong life.   She expressed understanding and she values QoL but was willing to try chemotherapy.     We have discussed treatment with first line  therapy Gemcitabine + Abraxane. She agreed and wished to proceed.     Received cycle 1 of biweekly gem/abraxane with dose reductions of 800 mg/m2 and 100 mg/m2 respectively on 5/1/23. Subsequently hospitalized x 3 (had an ED visit same day as first discharge).     She wanted to delay starting cycle 2 until she had a port placed.   Port placed per IR on 6/22/23. Site healing well overall.   MRI brain done 6/23/23 without evidence of metastatic disease.     Delayed prior cycle 4 due to neutropenia.  CT imaging prior to this cycle 4 shows stable disease.  Presents today for cycle 4.  ANC recovered.  Will add OBI Neulasta to her treatment.  Will proceed with cycle 4 today.    Continue Zyprexa 5mg QHS. Continue compazine PRN.   RTC in 2 weeks for next cycle.     Saw palliative care on 5/12. Continue to f/u as directed.     # HTN   BP mildly elevated in clinic today.  On amlodipine 5 mg daily, coreg 6.25 mg bid   Monitor.     # HLD, Atherosclerosis   On Lipitor 20 mg   Follows with cardiology     # P. Afib   Rate controlled. On Eliquis.  Continue to monitor.     #. Microcytic anemia, Iron def due to chronic blood loss  Unclear etiology but likely from chronic blood loss 2/2 diverticulosis or tumor invasion of small bowel.  Iron studies consistent with iron deficiency.   Couldn't tolerate PO iron due to severe GI side effects (abdominal cramps and nausea)  Injectafer denied by insurance so will administer Venofer x 4-5 doses.  S/p 3 doses. Does not want #4 at this time.   Monitor on treatment.     #Pancreatic insufficiency   Improved with Creon   On Imodium for diarrhea   Continue to monitor.     # Crohn's disease.   On Sulfasalazine  Follows with GI     Follow up: 2 weeks for potential cycle 5    Patient is in agreement with the proposed treatment plan. All questions were answered to the patient's satisfaction. Pt knows to call clinic if anything is needed before the next clinic visit.    Jim Hill,  MD  Hematology/Oncology  [facility]      Med Onc Chart Routing      Follow up with physician 4 weeks. early PM appointments   Follow up with [facility] 2 weeks. early PM appointments   Infusion scheduling note   gem + abraxane every 2 weeks   Injection scheduling note    Labs CBC, CMP and CA 19-9   Scheduling:  Preferred lab:  Lab interval: every 2 weeks     Imaging    Pharmacy appointment    Other referrals

## 2023-08-07 NOTE — PLAN OF CARE
Patient tolerated Abraxane, Gemzar, and neulasta OBI with no complications. Neualsta OBI education given to patient. VSS. Pt instructed to call MD with any problems. Pt discharged home independently with OBI flashing green light.

## 2023-08-07 NOTE — ASSESSMENT & PLAN NOTE
Home medications: Amlodipine 5 mg daily    - Hold for now given hypotension on admission and abundance of caution as re-starting home sotalol, metoprolol.   - If blood pressure trending up throughout the day, can consider restarting.   Calcipotriene Counseling:  I discussed with the patient the risks of calcipotriene including but not limited to erythema, scaling, itching, and irritation.

## 2023-08-14 RX ORDER — FOLIC ACID 1 MG/1
TABLET ORAL
Qty: 90 TABLET | Refills: 1 | Status: SHIPPED | OUTPATIENT
Start: 2023-08-14

## 2023-08-14 NOTE — TELEPHONE ENCOUNTER
Refill Routing Note   Medication(s) are not appropriate for processing by [facility] for the following reason(s):      Medication outside of protocol    [facility] action(s):  Route Care Due:  None identified              Appointments  past 12m or future 3m with PCP    Date Provider   Last Visit   4/18/2023 Ga Waldrop MD   Next Visit   10/18/2023 Ga Waldrop MD   ED visits in past 90 days: 0        Note composed:1:03 PM 08/14/2023

## 2023-08-15 ENCOUNTER — DOCUMENT SCAN (OUTPATIENT)
Dept: HOME HEALTH SERVICES | Facility: HOSPITAL | Age: 83
End: 2023-08-15
Payer: MEDICARE

## 2023-08-16 ENCOUNTER — OUTPATIENT CASE MANAGEMENT (OUTPATIENT)
Dept: ADMINISTRATIVE | Facility: OTHER | Age: 83
End: 2023-08-16
Payer: MEDICARE

## 2023-08-21 ENCOUNTER — INFUSION (OUTPATIENT)
Dept: INFUSION THERAPY | Facility: HOSPITAL | Age: 83
End: 2023-08-21
Payer: MEDICARE

## 2023-08-21 ENCOUNTER — OFFICE VISIT (OUTPATIENT)
Dept: HEMATOLOGY/ONCOLOGY | Facility: CLINIC | Age: 83
End: 2023-08-21
Payer: MEDICARE

## 2023-08-21 ENCOUNTER — OUTPATIENT CASE MANAGEMENT (OUTPATIENT)
Dept: ADMINISTRATIVE | Facility: OTHER | Age: 83
End: 2023-08-21
Payer: MEDICARE

## 2023-08-21 VITALS
WEIGHT: 153.88 LBS | SYSTOLIC BLOOD PRESSURE: 116 MMHG | HEART RATE: 97 BPM | BODY MASS INDEX: 30.21 KG/M2 | DIASTOLIC BLOOD PRESSURE: 57 MMHG | RESPIRATION RATE: 18 BRPM | TEMPERATURE: 98 F | HEIGHT: 60 IN | OXYGEN SATURATION: 97 %

## 2023-08-21 DIAGNOSIS — I10 ESSENTIAL HYPERTENSION, BENIGN: ICD-10-CM

## 2023-08-21 DIAGNOSIS — T45.1X5A CHEMOTHERAPY INDUCED NEUTROPENIA: ICD-10-CM

## 2023-08-21 DIAGNOSIS — R11.0 CHEMOTHERAPY-INDUCED NAUSEA: ICD-10-CM

## 2023-08-21 DIAGNOSIS — K50.10 CROHN'S DISEASE OF LARGE INTESTINE WITHOUT COMPLICATION: ICD-10-CM

## 2023-08-21 DIAGNOSIS — G89.3 NEOPLASM RELATED PAIN: ICD-10-CM

## 2023-08-21 DIAGNOSIS — E78.2 MIXED HYPERLIPIDEMIA: ICD-10-CM

## 2023-08-21 DIAGNOSIS — K86.81 EXOCRINE PANCREATIC INSUFFICIENCY: ICD-10-CM

## 2023-08-21 DIAGNOSIS — C25.9 MALIGNANT NEOPLASM OF PANCREAS, UNSPECIFIED LOCATION OF MALIGNANCY: Primary | ICD-10-CM

## 2023-08-21 DIAGNOSIS — T45.1X5A IMMUNODEFICIENCY SECONDARY TO CHEMOTHERAPY: ICD-10-CM

## 2023-08-21 DIAGNOSIS — D50.0 IRON DEFICIENCY ANEMIA DUE TO CHRONIC BLOOD LOSS: ICD-10-CM

## 2023-08-21 DIAGNOSIS — D70.1 CHEMOTHERAPY INDUCED NEUTROPENIA: ICD-10-CM

## 2023-08-21 DIAGNOSIS — Z79.899 IMMUNODEFICIENCY SECONDARY TO CHEMOTHERAPY: ICD-10-CM

## 2023-08-21 DIAGNOSIS — I70.0 AORTIC ATHEROSCLEROSIS: ICD-10-CM

## 2023-08-21 DIAGNOSIS — D50.9 MICROCYTIC ANEMIA: ICD-10-CM

## 2023-08-21 DIAGNOSIS — D84.81 IMMUNODEFICIENCY SECONDARY TO NEOPLASM: ICD-10-CM

## 2023-08-21 DIAGNOSIS — D49.9 IMMUNODEFICIENCY SECONDARY TO NEOPLASM: ICD-10-CM

## 2023-08-21 DIAGNOSIS — I48.0 PAROXYSMAL ATRIAL FIBRILLATION: ICD-10-CM

## 2023-08-21 DIAGNOSIS — T45.1X5A CHEMOTHERAPY-INDUCED NAUSEA: ICD-10-CM

## 2023-08-21 DIAGNOSIS — D84.821 IMMUNODEFICIENCY SECONDARY TO CHEMOTHERAPY: ICD-10-CM

## 2023-08-21 LAB
ALBUMIN SERPL BCP-MCNC: 2.7 G/DL (ref 3.5–5.2)
ALP SERPL-CCNC: 89 U/L (ref 55–135)
ALT SERPL W/O P-5'-P-CCNC: 9 U/L (ref 10–44)
ANION GAP SERPL CALC-SCNC: 8 MMOL/L (ref 8–16)
AST SERPL-CCNC: 21 U/L (ref 10–40)
BILIRUB SERPL-MCNC: 0.5 MG/DL (ref 0.1–1)
BUN SERPL-MCNC: 7 MG/DL (ref 8–23)
CALCIUM SERPL-MCNC: 8.2 MG/DL (ref 8.7–10.5)
CANCER AG19-9 SERPL-ACNC: 11.1 U/ML (ref 0–40)
CHLORIDE SERPL-SCNC: 106 MMOL/L (ref 95–110)
CO2 SERPL-SCNC: 25 MMOL/L (ref 23–29)
CREAT SERPL-MCNC: 0.7 MG/DL (ref 0.5–1.4)
ERYTHROCYTE [DISTWIDTH] IN BLOOD BY AUTOMATED COUNT: 16.2 % (ref 11.5–14.5)
EST. GFR  (NO RACE VARIABLE): >60 ML/MIN/1.73 M^2
GLUCOSE SERPL-MCNC: 138 MG/DL (ref 70–110)
HCT VFR BLD AUTO: 32.9 % (ref 37–48.5)
HGB BLD-MCNC: 10.9 G/DL (ref 12–16)
IMM GRANULOCYTES # BLD AUTO: 0.31 K/UL (ref 0–0.04)
MCH RBC QN AUTO: 32.3 PG (ref 27–31)
MCHC RBC AUTO-ENTMCNC: 33.1 G/DL (ref 32–36)
MCV RBC AUTO: 98 FL (ref 82–98)
NEUTROPHILS # BLD AUTO: 6.7 K/UL (ref 1.8–7.7)
PLATELET # BLD AUTO: 223 K/UL (ref 150–450)
PMV BLD AUTO: 11.7 FL (ref 9.2–12.9)
POTASSIUM SERPL-SCNC: 2.9 MMOL/L (ref 3.5–5.1)
PROT SERPL-MCNC: 6.5 G/DL (ref 6–8.4)
RBC # BLD AUTO: 3.37 M/UL (ref 4–5.4)
SODIUM SERPL-SCNC: 139 MMOL/L (ref 136–145)
WBC # BLD AUTO: 9.52 K/UL (ref 3.9–12.7)

## 2023-08-21 PROCEDURE — 96367 TX/PROPH/DG ADDL SEQ IV INF: CPT | Mod: HCNC

## 2023-08-21 PROCEDURE — 3288F PR FALLS RISK ASSESSMENT DOCUMENTED: ICD-10-PCS | Mod: HCNC,CPTII,S$GLB, | Performed by: REGISTERED NURSE

## 2023-08-21 PROCEDURE — 1159F PR MEDICATION LIST DOCUMENTED IN MEDICAL RECORD: ICD-10-PCS | Mod: HCNC,CPTII,S$GLB, | Performed by: REGISTERED NURSE

## 2023-08-21 PROCEDURE — 96417 CHEMO IV INFUS EACH ADDL SEQ: CPT | Mod: HCNC

## 2023-08-21 PROCEDURE — 80053 COMPREHEN METABOLIC PANEL: CPT | Mod: HCNC | Performed by: REGISTERED NURSE

## 2023-08-21 PROCEDURE — 3074F SYST BP LT 130 MM HG: CPT | Mod: HCNC,CPTII,S$GLB, | Performed by: REGISTERED NURSE

## 2023-08-21 PROCEDURE — 1157F PR ADVANCE CARE PLAN OR EQUIV PRESENT IN MEDICAL RECORD: ICD-10-PCS | Mod: HCNC,CPTII,S$GLB, | Performed by: REGISTERED NURSE

## 2023-08-21 PROCEDURE — A4216 STERILE WATER/SALINE, 10 ML: HCPCS | Mod: HCNC | Performed by: INTERNAL MEDICINE

## 2023-08-21 PROCEDURE — 3078F PR MOST RECENT DIASTOLIC BLOOD PRESSURE < 80 MM HG: ICD-10-PCS | Mod: HCNC,CPTII,S$GLB, | Performed by: REGISTERED NURSE

## 2023-08-21 PROCEDURE — 96413 CHEMO IV INFUSION 1 HR: CPT | Mod: HCNC

## 2023-08-21 PROCEDURE — 36415 COLL VENOUS BLD VENIPUNCTURE: CPT | Mod: HCNC | Performed by: REGISTERED NURSE

## 2023-08-21 PROCEDURE — 63600175 PHARM REV CODE 636 W HCPCS: Mod: HCNC | Performed by: INTERNAL MEDICINE

## 2023-08-21 PROCEDURE — 1160F PR REVIEW ALL MEDS BY PRESCRIBER/CLIN PHARMACIST DOCUMENTED: ICD-10-PCS | Mod: HCNC,CPTII,S$GLB, | Performed by: REGISTERED NURSE

## 2023-08-21 PROCEDURE — 1160F RVW MEDS BY RX/DR IN RCRD: CPT | Mod: HCNC,CPTII,S$GLB, | Performed by: REGISTERED NURSE

## 2023-08-21 PROCEDURE — 25000003 PHARM REV CODE 250: Mod: HCNC | Performed by: INTERNAL MEDICINE

## 2023-08-21 PROCEDURE — 1101F PT FALLS ASSESS-DOCD LE1/YR: CPT | Mod: HCNC,CPTII,S$GLB, | Performed by: REGISTERED NURSE

## 2023-08-21 PROCEDURE — A4216 STERILE WATER/SALINE, 10 ML: HCPCS | Mod: HCNC | Performed by: REGISTERED NURSE

## 2023-08-21 PROCEDURE — 1126F AMNT PAIN NOTED NONE PRSNT: CPT | Mod: HCNC,CPTII,S$GLB, | Performed by: REGISTERED NURSE

## 2023-08-21 PROCEDURE — 96377 APPLICATON ON-BODY INJECTOR: CPT | Mod: 59,HCNC

## 2023-08-21 PROCEDURE — 1157F ADVNC CARE PLAN IN RCRD: CPT | Mod: HCNC,CPTII,S$GLB, | Performed by: REGISTERED NURSE

## 2023-08-21 PROCEDURE — 86301 IMMUNOASSAY TUMOR CA 19-9: CPT | Mod: HCNC | Performed by: REGISTERED NURSE

## 2023-08-21 PROCEDURE — 85027 COMPLETE CBC AUTOMATED: CPT | Mod: HCNC | Performed by: REGISTERED NURSE

## 2023-08-21 PROCEDURE — 3074F PR MOST RECENT SYSTOLIC BLOOD PRESSURE < 130 MM HG: ICD-10-PCS | Mod: HCNC,CPTII,S$GLB, | Performed by: REGISTERED NURSE

## 2023-08-21 PROCEDURE — 1101F PR PT FALLS ASSESS DOC 0-1 FALLS W/OUT INJ PAST YR: ICD-10-PCS | Mod: HCNC,CPTII,S$GLB, | Performed by: REGISTERED NURSE

## 2023-08-21 PROCEDURE — 96375 TX/PRO/DX INJ NEW DRUG ADDON: CPT | Mod: HCNC

## 2023-08-21 PROCEDURE — 63600175 PHARM REV CODE 636 W HCPCS: Mod: JZ,JG,HCNC | Performed by: REGISTERED NURSE

## 2023-08-21 PROCEDURE — 99215 PR OFFICE/OUTPT VISIT, EST, LEVL V, 40-54 MIN: ICD-10-PCS | Mod: HCNC,S$GLB,, | Performed by: REGISTERED NURSE

## 2023-08-21 PROCEDURE — 3288F FALL RISK ASSESSMENT DOCD: CPT | Mod: HCNC,CPTII,S$GLB, | Performed by: REGISTERED NURSE

## 2023-08-21 PROCEDURE — 96366 THER/PROPH/DIAG IV INF ADDON: CPT | Mod: HCNC

## 2023-08-21 PROCEDURE — 1159F MED LIST DOCD IN RCRD: CPT | Mod: HCNC,CPTII,S$GLB, | Performed by: REGISTERED NURSE

## 2023-08-21 PROCEDURE — 63600175 PHARM REV CODE 636 W HCPCS: Mod: HCNC | Performed by: REGISTERED NURSE

## 2023-08-21 PROCEDURE — 25000003 PHARM REV CODE 250: Mod: HCNC | Performed by: REGISTERED NURSE

## 2023-08-21 PROCEDURE — 99999 PR PBB SHADOW E&M-EST. PATIENT-LVL V: ICD-10-PCS | Mod: PBBFAC,HCNC,, | Performed by: REGISTERED NURSE

## 2023-08-21 PROCEDURE — 99999 PR PBB SHADOW E&M-EST. PATIENT-LVL V: CPT | Mod: PBBFAC,HCNC,, | Performed by: REGISTERED NURSE

## 2023-08-21 PROCEDURE — 3078F DIAST BP <80 MM HG: CPT | Mod: HCNC,CPTII,S$GLB, | Performed by: REGISTERED NURSE

## 2023-08-21 PROCEDURE — 99215 OFFICE O/P EST HI 40 MIN: CPT | Mod: HCNC,S$GLB,, | Performed by: REGISTERED NURSE

## 2023-08-21 PROCEDURE — 1126F PR PAIN SEVERITY QUANTIFIED, NO PAIN PRESENT: ICD-10-PCS | Mod: HCNC,CPTII,S$GLB, | Performed by: REGISTERED NURSE

## 2023-08-21 RX ORDER — HEPARIN 100 UNIT/ML
500 SYRINGE INTRAVENOUS
OUTPATIENT
Start: 2023-08-21

## 2023-08-21 RX ORDER — SODIUM CHLORIDE 0.9 % (FLUSH) 0.9 %
10 SYRINGE (ML) INJECTION
Status: DISCONTINUED | OUTPATIENT
Start: 2023-08-21 | End: 2023-08-21 | Stop reason: HOSPADM

## 2023-08-21 RX ORDER — SODIUM CHLORIDE AND POTASSIUM CHLORIDE 150; 900 MG/100ML; MG/100ML
INJECTION, SOLUTION INTRAVENOUS
Status: CANCELLED
Start: 2023-08-21

## 2023-08-21 RX ORDER — SODIUM CHLORIDE 0.9 % (FLUSH) 0.9 %
10 SYRINGE (ML) INJECTION
OUTPATIENT
Start: 2023-08-21

## 2023-08-21 RX ORDER — DEXAMETHASONE SODIUM PHOSPHATE 4 MG/ML
4 INJECTION, SOLUTION INTRA-ARTICULAR; INTRALESIONAL; INTRAMUSCULAR; INTRAVENOUS; SOFT TISSUE
Status: CANCELLED
Start: 2023-08-21

## 2023-08-21 RX ORDER — HEPARIN 100 UNIT/ML
500 SYRINGE INTRAVENOUS
Status: DISCONTINUED | OUTPATIENT
Start: 2023-08-21 | End: 2023-08-21 | Stop reason: HOSPADM

## 2023-08-21 RX ORDER — HEPARIN 100 UNIT/ML
500 SYRINGE INTRAVENOUS
Status: CANCELLED | OUTPATIENT
Start: 2023-08-21

## 2023-08-21 RX ORDER — SODIUM CHLORIDE 0.9 % (FLUSH) 0.9 %
10 SYRINGE (ML) INJECTION
Status: CANCELLED | OUTPATIENT
Start: 2023-08-21

## 2023-08-21 RX ORDER — DEXAMETHASONE SODIUM PHOSPHATE 4 MG/ML
4 INJECTION, SOLUTION INTRA-ARTICULAR; INTRALESIONAL; INTRAMUSCULAR; INTRAVENOUS; SOFT TISSUE
Status: COMPLETED | OUTPATIENT
Start: 2023-08-21 | End: 2023-08-21

## 2023-08-21 RX ORDER — SODIUM CHLORIDE AND POTASSIUM CHLORIDE 150; 900 MG/100ML; MG/100ML
INJECTION, SOLUTION INTRAVENOUS
Status: COMPLETED | OUTPATIENT
Start: 2023-08-21 | End: 2023-08-21

## 2023-08-21 RX ADMIN — PACLITAXEL 180 MG: 100 INJECTION, POWDER, LYOPHILIZED, FOR SUSPENSION INTRAVENOUS at 01:08

## 2023-08-21 RX ADMIN — Medication 10 ML: at 03:08

## 2023-08-21 RX ADMIN — SODIUM CHLORIDE, PRESERVATIVE FREE 10 ML: 5 INJECTION INTRAVENOUS at 10:08

## 2023-08-21 RX ADMIN — SODIUM CHLORIDE AND POTASSIUM CHLORIDE 1000 ML: .9; .15 SOLUTION INTRAVENOUS at 12:08

## 2023-08-21 RX ADMIN — HEPARIN 500 UNITS: 100 SYRINGE at 10:08

## 2023-08-21 RX ADMIN — PEGFILGRASTIM 6 MG: KIT SUBCUTANEOUS at 03:08

## 2023-08-21 RX ADMIN — HEPARIN 500 UNITS: 100 SYRINGE at 03:08

## 2023-08-21 RX ADMIN — SODIUM CHLORIDE: 9 INJECTION, SOLUTION INTRAVENOUS at 12:08

## 2023-08-21 RX ADMIN — GEMCITABINE HYDROCHLORIDE 1400 MG: 1 INJECTION, SOLUTION INTRAVENOUS at 02:08

## 2023-08-21 RX ADMIN — DEXAMETHASONE SODIUM PHOSPHATE 4 MG: 4 INJECTION INTRA-ARTICULAR; INTRALESIONAL; INTRAMUSCULAR; INTRAVENOUS; SOFT TISSUE at 12:08

## 2023-08-21 NOTE — PLAN OF CARE
Pt arrived AAOx3, VSS, labs reviewed, orders signed by DEBORAH Pierre. Pt tolerated abraxane/gem and also received 1 LNS with 20 meq of potassium.

## 2023-08-21 NOTE — PROGRESS NOTES
MEDICAL ONCOLOGY - ESTABLISHED PATIENT VISIT    Reason for visit: Pancreatic adenocarcinoma    Best Contact Phone Number(s): [phone] (home) [phone] (work)     Cancer/Stage/TNM:    Cancer Staging   Malignant neoplasm of pancreas  Staging form: Exocrine Pancreas, AJCC 8th Edition  - Clinical stage from 4/13/2023: Stage IB (cT2, cN0, cM0) - Signed by Jim Hill MD on 7/24/2023       Oncology History   Malignant neoplasm of pancreas   3/21/2023 Initial Diagnosis    Pancreatic adenocarcinoma     4/13/2023 Cancer Staged    Staging form: Exocrine Pancreas, AJCC 8th Edition  - Clinical stage from 4/13/2023: Stage IB (cT2, cN0, cM0)     5/1/2023 -  Chemotherapy    Treatment Summary   Plan Name: OP PANC NAB-PACLITAXEL + GEMCITABINE Q4W  Treatment Goal: Palliative  Status: Active  Start Date: 5/1/2023  End Date: 5/13/2024 (Planned)  Provider: Jim Hill MD  Chemotherapy: gemcitabine (GEMZAR) 1,400 mg in sodium chloride 0.9% SolP 321.82 mL chemo infusion, 1,448 mg (100 % of original dose 800 mg/m2), Intravenous, Clinic/HOD 1 time, 2 of 12 cycles  Dose modification: 800 mg/m2 (original dose 800 mg/m2, Cycle 1, Reason: Other (see comments), Comment: poor PS)  Administration: 1,400 mg (5/1/2023), 1,400 mg (6/26/2023), 1,400 mg (7/10/2023), 1,400 mg (8/7/2023)          Interim History:   83 y.o. female with Crohn's disease, p.afib, SSS s/p PPM, HTN, HLD who presents for follow up prior to cycle 5 of chemotherapy. Feels fairly well overall. Notes worsening fatigue, nausea and diarrhea on days 3-5. Can generally tolerate chicken broth without significant issues on these days. Has been able to eat over last week. Reports having diarrhea for the last few days 2/2 Chron's. Takes imodium, now taking 2 tablets per day. No other concerns.     Presents alone today. ECOG PS 2.     ROS:   Review of Systems   Constitutional:  Positive for malaise/fatigue. Negative for chills, fever and weight loss.   HENT:   Negative for congestion and sore throat.    Eyes:  Negative for blurred vision.   Respiratory:  Negative for shortness of breath.    Cardiovascular:  Negative for chest pain, palpitations and leg swelling.   Gastrointestinal:  Positive for diarrhea. Negative for abdominal pain, blood in stool, constipation, nausea and vomiting.   Genitourinary:  Negative for dysuria, frequency, hematuria and urgency.   Musculoskeletal:  Negative for back pain, falls and myalgias.   Skin:  Negative for itching and rash.   Neurological:  Positive for weakness (generalized). Negative for dizziness, tingling, tremors, sensory change and focal weakness.   Endo/Heme/Allergies:  Does not bruise/bleed easily.   Psychiatric/Behavioral:  Negative for depression, substance abuse and suicidal ideas. The patient is not nervous/anxious.      Past Medical History:   Past Medical History:   Diagnosis Date    Anticoagulant long-term use     Atrial fibrillation     Crohn disease diagnosed in her 20's    GERD (gastroesophageal reflux disease)     Hyperlipidemia     Hypertension     Pancreatic adenocarcinoma 3/21/2023    Thyroid disease     s/p I 131      Past Surgical History:   Past Surgical History:   Procedure Laterality Date    APPENDECTOMY      CARDIAC SURGERY  2014    pacemaker    COLONOSCOPY  ~2008    normal findings per patient report    ENDOSCOPIC ULTRASOUND OF UPPER GASTROINTESTINAL TRACT N/A 3/14/2023    Procedure: ULTRASOUND, UPPER GI TRACT, ENDOSCOPIC;  Surgeon: Andrei Swartz MD;  Location: [facility];  Service: Endoscopy;  Laterality: N/A;  Approval to hold Eliquis rec'd from Dr. Barbosa (see telephone encounter 3/3/23)-DS  [employer] AICD/PPM  3/3/23-Instructions via portal-DS    ERCP N/A 3/21/2023    Procedure: ERCP (ENDOSCOPIC RETROGRADE CHOLANGIOPANCREATOGRAPHY);  Surgeon: Celina Toney MD;  Location: [facility] ([address] [place] [address]);  Service: Endoscopy;  Laterality: N/A;    ESOPHAGOGASTRODUODENOSCOPY N/A 7/17/2018    Procedure: EGD  (ESOPHAGOGASTRODUODENOSCOPY);  Surgeon: Alondra Casiano MD;  Location: [facility] ENDO;  Service: Endoscopy;  Laterality: N/A;    HYSTERECTOMY      INSERTION OF IMPLANTABLE CARDIOVERTER-DEFIBRILLATOR (ICD) GENERATOR WITH TWO EXISTING LEADS Left 5/10/2021    Procedure: INSERTION, PULSE GENERATOR WITH 2 EXISTING LEADS, ICD;  Surgeon: Bo Leone MD;  Location: [facility] CATH LAB;  Service: Cardiology;  Laterality: Left;    INSERTION OF PACEMAKER      REPLACEMENT OF PACEMAKER GENERATOR  05/10/2021    RETROGRADE PYELOGRAPHY Right 2020    Procedure: PYELOGRAM, RETROGRADE;  Surgeon: Jovan Kruse MD;  Location: [facility] OR;  Service: Urology;  Laterality: Right;    SMALL INTESTINE SURGERY  in her 20's    for crohn's    TONSILLECTOMY      UPPER GASTROINTESTINAL ENDOSCOPY  ~    normal findings per patient report      Family History:   Family History   Problem Relation Age of Onset    Cancer Mother     Breast cancer Mother     Crohn's disease Other     Colon cancer Neg Hx     Colon polyps Neg Hx     Esophageal cancer Neg Hx     Stomach cancer Neg Hx     Ulcerative colitis Neg Hx       Social History:   Social History     Tobacco Use    Smoking status: Former     Current packs/day: 0.00     Types: Cigarettes     Quit date:      Years since quittin.6    Smokeless tobacco: Never   Substance Use Topics    Alcohol use: No      I have reviewed and updated the patient's past medical, surgical, family and social histories.    Allergies:   Review of patient's allergies indicates:   Allergen Reactions    Lisinopril Other (See Comments)     cough      Medications:   Current Outpatient Medications   Medication Sig Dispense Refill    acetaminophen (TYLENOL) 325 MG tablet Take 650 mg by mouth daily as needed (Headache).      albuterol (PROVENTIL) 2.5 mg /3 mL (0.083 %) nebulizer solution Take 3 mLs (2.5 mg total) by nebulization every 6 (six) hours as needed for Wheezing or Shortness of Breath. Rescue 150 mL 11     alendronate (FOSAMAX) 70 MG tablet Take 1 tablet (70 mg total) by mouth every 7 days. 12 tablet 3    amLODIPine (NORVASC) 5 MG tablet Take 1 tablet (5 mg total) by mouth once daily. 30 tablet 11    amoxicillin-clavulanate 875-125mg (AUGMENTIN) 875-125 mg per tablet Take 1 tablet by mouth 2 (two) times daily. 14 tablet 0    atorvastatin (LIPITOR) 20 MG tablet Take 1 tablet (20 mg total) by mouth once daily. 90 tablet 3    colestipoL (COLESTID) 1 gram Tab Take 1 tablet (1 g total) by mouth 2 (two) times daily. 60 tablet 5    diphenoxylate-atropine 2.5-0.025 mg (LOMOTIL) 2.5-0.025 mg per tablet TAKE 1 TABLET BY MOUTH 4 TIMES DAILY AS NEEDED FOR DIARRHEA 60 tablet 2    ELIQUIS 5 mg Tab Take 1 tablet (5 mg total) by mouth 2 (two) times daily. 180 tablet 3    esomeprazole (NEXIUM) 40 MG capsule Take 1 capsule (40 mg total) by mouth once daily. 90 capsule 1    folic acid (FOLVITE) 1 MG tablet Take 1 tablet by mouth once daily 90 tablet 1    furosemide (LASIX) 40 MG tablet Take 1 tablet (40 mg total) by mouth daily as needed (For weight gain > 3 lb in one day, increasing leg swelling, or increasing SOB). 30 tablet 11    levothyroxine (SYNTHROID, LEVOTHROID) 175 MCG tablet Take 1 tablet by mouth once daily 90 tablet 2    LIDOcaine-prilocaine (EMLA) cream Apply topically as needed (place to port site 45-60 minutes prior to port access). 30 g 6    lipase-protease-amylase 24,000-76,000-120,000 units (CREON) 24,000-76,000 -120,000 unit capsule Take 2 capsules by mouth 3 (three) times daily with meals. 180 capsule 11    MELATONIN ORAL Take 1 tablet by mouth nightly as needed (Insomnia).      OLANZapine (ZYPREXA) 5 MG tablet Take 1 tablet (5 mg total) by mouth every evening. 30 tablet 5    potassium chloride (MICRO-K) 10 MEQ CpSR 1 po bid x 1 week then Micro K qd 30 capsule 5    prochlorperazine (COMPAZINE) 10 MG tablet Take 1 tablet (10 mg total) by mouth 4 (four) times daily as needed (nausea). 90 tablet 11    sotaloL (BETAPACE)  120 MG Tab Take 1 tablet (120 mg total) by mouth 2 (two) times daily. 180 tablet 3    sulfaSALAzine (AZULFIDINE) 500 mg Tab Take 1 tablet by mouth twice daily 180 tablet 3    tolterodine (DETROL LA) 4 MG 24 hr capsule Take 1 capsule (4 mg total) by mouth once daily. 90 capsule 3    traMADoL (ULTRAM) 50 mg tablet Take 1 tablet (50 mg total) by mouth every 6 (six) hours as needed for Pain. 25 tablet 0     No current facility-administered medications for this visit.     Facility-Administered Medications Ordered in Other Visits   Medication Dose Route Frequency Provider Last Rate Last Admin    heparin, porcine (PF) 100 unit/mL injection flush 500 Units  500 Units Intravenous PRN Jim Hill MD   500 Units at 08/21/23 1023    sodium chloride 0.9% flush 10 mL  10 mL Intravenous PRN Jim Hill MD   10 mL at 08/21/23 1023      Physical Exam:   BP (!) 116/57 (BP Location: Right arm, Patient Position: Sitting, BP Method: Large (Automatic))   Pulse 97   Temp 98.2 °F (36.8 °C) (Oral)   Resp 18   Ht 5' (1.524 m)   Wt 69.8 kg (153 lb 14.1 oz)   SpO2 97%   BMI 30.05 kg/m²      ECOG Performance status: 2       Physical Exam  Vitals reviewed.   Constitutional:       General: She is not in acute distress.     Appearance: She is not ill-appearing, toxic-appearing or diaphoretic.   HENT:      Head: Normocephalic and atraumatic.      Right Ear: External ear normal.      Left Ear: External ear normal.      Nose: Nose normal. No congestion.      Mouth/Throat:      Pharynx: Oropharynx is clear. No oropharyngeal exudate.   Eyes:      General: No scleral icterus.     Conjunctiva/sclera: Conjunctivae normal.      Pupils: Pupils are equal, round, and reactive to light.   Cardiovascular:      Rate and Rhythm: Normal rate and regular rhythm.      Heart sounds: No murmur heard.  Pulmonary:      Effort: Pulmonary effort is normal. No respiratory distress.      Breath sounds: Normal breath sounds. No wheezing.   Chest:       Comments: RCW port in place, bandage over site.   Abdominal:      General: Abdomen is flat. Bowel sounds are normal. There is no distension.      Palpations: Abdomen is soft. There is no mass.      Tenderness: There is no abdominal tenderness.   Musculoskeletal:         General: No swelling or deformity.   Skin:     General: Skin is warm and dry.      Coloration: Skin is not jaundiced or pale.      Findings: No erythema or rash.   Neurological:      Mental Status: She is alert and oriented to person, place, and time. Mental status is at baseline.      Motor: Weakness (generalized) present.   Psychiatric:         Mood and Affect: Mood normal.         Behavior: Behavior normal.         Labs:   Recent Results (from the past 48 hour(s))   Comprehensive Metabolic Panel    Collection Time: 08/21/23 10:18 AM   Result Value Ref Range    Sodium 139 136 - 145 mmol/L    Potassium 2.9 (L) 3.5 - 5.1 mmol/L    Chloride 106 95 - 110 mmol/L    CO2 25 23 - 29 mmol/L    Glucose 138 (H) 70 - 110 mg/dL    BUN 7 (L) 8 - 23 mg/dL    Creatinine 0.7 0.5 - 1.4 mg/dL    Calcium 8.2 (L) 8.7 - 10.5 mg/dL    Total Protein 6.5 6.0 - 8.4 g/dL    Albumin 2.7 (L) 3.5 - 5.2 g/dL    Total Bilirubin 0.5 0.1 - 1.0 mg/dL    Alkaline Phosphatase 89 55 - 135 U/L    AST 21 10 - 40 U/L    ALT 9 (L) 10 - 44 U/L    eGFR >60.0 >60 mL/min/1.73 m^2    Anion Gap 8 8 - 16 mmol/L   CBC Oncology    Collection Time: 08/21/23 10:18 AM   Result Value Ref Range    WBC 9.52 3.90 - 12.70 K/uL    RBC 3.37 (L) 4.00 - 5.40 M/uL    Hemoglobin 10.9 (L) 12.0 - 16.0 g/dL    Hematocrit 32.9 (L) 37.0 - 48.5 %    MCV 98 82 - 98 fL    MCH 32.3 (H) 27.0 - 31.0 pg    MCHC 33.1 32.0 - 36.0 g/dL    RDW 16.2 (H) 11.5 - 14.5 %    Platelets 223 150 - 450 K/uL    MPV 11.7 9.2 - 12.9 fL    Gran # (ANC) 6.7 1.8 - 7.7 K/uL    Immature Grans (Abs) 0.31 (H) 0.00 - 0.04 K/uL   Cancer Antigen 19-9    Collection Time: 08/21/23 10:18 AM   Result Value Ref Range    CA 19-9 11.1 0.0 - 40.0 U/mL      I have reviewed the pertinent labs from today which are notable for stable anemia, hypokalemia. Ca19-9 11.1.    Imaging:      CT CAP - 8/2/23:  Impression:     Patient with reported history of pancreatic cancer.  Overall similar size of heterogeneous masslike fullness at the pancreatic head with abutment or involvement of the nearby traversing duodenum.     Increased size of rounded hypodense lesion at the pancreatic body with abutment of the stomach lesser curvature.  Differential considerations similar as before to include pseudocyst or others cystic lesion.  Degree of nearby peripancreatic and perigastric inflammatory change have improved in the interval.     Cholelithiasis with biliary stent in place.     Cirrhotic liver.     Left upper lobe 0.2 cm pulmonary nodule, technically indeterminate and additional findings as above.    Path:   PANCREAS, HEAD MASS, EUS-FNB:   Adenocarcinoma   Mismatch Repair (MMR) Proteins:    MLH1 - Intact nuclear expression    MSH2 - Intact nuclear expression    MSH6 - Intact nuclear expression    PMS2 - Intact nuclear expression    Assessment:       1. Malignant neoplasm of pancreas, unspecified location of malignancy    2. Neoplasm related pain    3. 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 Immunodeficiency secondary to neoplasm    6. Essential hypertension, benign    7. Mixed hyperlipidemia    8. Aortic atherosclerosis    9. Paroxysmal atrial fibrillation    10. Microcytic anemia    11. Iron deficiency anemia due to chronic blood loss    12. Exocrine pancreatic insufficiency    13. Crohn's disease of large intestine without complication    14. Chemotherapy-induced nausea       Plan:     #  Pancreatic adenocarcinoma.   83 y.o. F with Crohn's disease, p.afib, SSS s/p PPM, HTN, HLD, fatigue, presented with weight loss and jaundice and was found to have pancreatic adenocarcinoma. She has early stage pancreatic cancer, and is considered  surgically resectable. However, giving her age and multiple co-morbidities, surgery might not be feasible or safe.     We have discussed with her the diagnosis, stage, prognosis and treatment options. She understands that surgery is the only curable option and she is unlikely to be sufficiently medically fit for it. We have discussed chemotherapy with a palliative intent to improve symptoms and prolong life.   She expressed understanding and she values QoL but was willing to try chemotherapy.     We have discussed treatment with first line therapy Gemcitabine + Abraxane. She agreed and wished to proceed.     Received cycle 1 of biweekly gem/abraxane with dose reductions of 800 mg/m2 and 100 mg/m2 respectively on 5/1/23. Subsequently hospitalized x 3 (had an ED visit same day as first discharge).     She wanted to delay starting cycle 2 until she had a port placed.   Port placed per IR on 6/22/23. Site healing well overall.   MRI brain done 6/23/23 without evidence of metastatic disease.     Delayed prior to cycle 4 due to neutropenia.  CT imaging prior to cycle 4 shows stable disease.    Presents today for cycle 5. Feeling fairly well overall.   ANC recovered. Will continue OBI Neulasta with treatment.  Will proceed with cycle 5 today. Labs reviewed, adequate to proceed.     Continue Zyprexa 5mg QHS. Continue compazine PRN.   RTC in 2 weeks for next cycle.     Saw palliative care on 5/12. Continue to f/u as directed.     # HTN   BP controlled in clinic today.  On amlodipine 5 mg daily, coreg 6.25 mg bid   Monitor.     # HLD, Atherosclerosis   On Lipitor 20 mg   Follows with cardiology     # P. Afib   Rate controlled. On Eliquis.  Continue to monitor.     #. Microcytic anemia, Iron def due to chronic blood loss  Unclear etiology but likely from chronic blood loss 2/2 diverticulosis or tumor invasion of small bowel.  Iron studies consistent with iron deficiency.   Couldn't tolerate PO iron due to severe GI side  effects (abdominal cramps and nausea)  Injectafer denied by insurance so will administer Venofer x 4-5 doses.  S/p 3 doses. Does not want #4 at this time.   Monitor on treatment. Parameters stable at this time.     #Pancreatic insufficiency   Improved with Creon   On Imodium for diarrhea   Continue to monitor.     # Crohn's disease.   On Sulfasalazine  Follows with GI     Follow up: 2 weeks for potential cycle 6    Patient is in agreement with the proposed treatment plan. All questions were answered to the patient's satisfaction. Pt knows to call clinic if anything is needed before the next clinic visit.    Patient discussed with collaborating physician, Dr. Hill.    At least 40 minutes were spent today on this encounter including face to face time with the patient, data gathering/interpretation and documentation.       Luma Tran, MSN, APRN, [facility]-  Hematology and Medical Oncology  Clinical Nurse Specialist to Dr. Hill, Dr. Ceja & Dr. Pandey Onc Chart Routing  Urgent    Follow up with physician 4 weeks. to see Dr. Hill on 9/22 with labs from port and chemo to follow   Follow up with DOMINGO 2 weeks. please reschedule DOMINGO visit to Friday 9/8 with labs from port and chemo to follow   Infusion scheduling note   treatment every 2 weeks, needs same day appointments on Fridays d/t travel and mainly has transportation on Fridays and labs from port   Injection scheduling note    Labs CBC, CMP and CA 19-9   Scheduling:  Preferred lab:  Lab interval: every 2 weeks  from port   Imaging    Pharmacy appointment    Other referrals

## 2023-08-22 ENCOUNTER — PATIENT MESSAGE (OUTPATIENT)
Dept: HEMATOLOGY/ONCOLOGY | Facility: CLINIC | Age: 83
End: 2023-08-22
Payer: MEDICARE

## 2023-08-24 ENCOUNTER — OUTPATIENT CASE MANAGEMENT (OUTPATIENT)
Dept: ADMINISTRATIVE | Facility: OTHER | Age: 83
End: 2023-08-24
Payer: MEDICARE

## 2023-08-24 NOTE — PROGRESS NOTES
Outpatient Care Management  Plan of Care Follow Up Visit    Patient: Pauline Cronin  MRN: [id number]  Date of Service: 08/24/2023  Completed by: Neeta Horne RN  Referral Date: 04/24/2023    Reason for Visit   Patient presents with    OPCM Chart Review    OPCM RN Follow Up Call       OPCM RN follow-up call completed.   Continue education on Cancer Treatment Care Plan.   Next Steps: Patient is in agreement to follow-up call on or around 8/29/2023 to speak with patient directly and to close case as appropriate.

## 2023-08-26 ENCOUNTER — HOSPITAL ENCOUNTER (INPATIENT)
Facility: HOSPITAL | Age: 83
LOS: 1 days | Discharge: HOME | DRG: 641 | End: 2023-08-29
Attending: STUDENT IN AN ORGANIZED HEALTH CARE EDUCATION/TRAINING PROGRAM | Admitting: STUDENT IN AN ORGANIZED HEALTH CARE EDUCATION/TRAINING PROGRAM
Payer: MEDICARE

## 2023-08-26 DIAGNOSIS — R07.9 CHEST PAIN: ICD-10-CM

## 2023-08-26 DIAGNOSIS — C25.9 MALIGNANT NEOPLASM OF PANCREAS, UNSPECIFIED LOCATION OF MALIGNANCY: Primary | ICD-10-CM

## 2023-08-26 DIAGNOSIS — I48.20 CHRONIC A-FIB: ICD-10-CM

## 2023-08-26 DIAGNOSIS — E87.6 HYPOKALEMIA: ICD-10-CM

## 2023-08-26 DIAGNOSIS — R94.31 QT PROLONGATION: ICD-10-CM

## 2023-08-26 DIAGNOSIS — R10.13 EPIGASTRIC PAIN: ICD-10-CM

## 2023-08-26 DIAGNOSIS — I47.20 VENTRICULAR TACHYCARDIA SEEN ON CARDIAC MONITOR: ICD-10-CM

## 2023-08-26 PROBLEM — I50.32 CHRONIC HEART FAILURE WITH PRESERVED EJECTION FRACTION: Status: ACTIVE | Noted: 2023-03-28

## 2023-08-26 LAB
ANISOCYTOSIS BLD QL SMEAR: SLIGHT
BASOPHILS # BLD AUTO: 0.01 K/UL (ref 0–0.2)
BASOPHILS NFR BLD: 0.1 % (ref 0–1.9)
BNP SERPL-MCNC: 214 PG/ML (ref 0–99)
BUN SERPL-MCNC: 11 MG/DL (ref 6–30)
CHLORIDE SERPL-SCNC: 99 MMOL/L (ref 95–110)
CREAT SERPL-MCNC: 0.8 MG/DL (ref 0.5–1.4)
DIFFERENTIAL METHOD: ABNORMAL
EOSINOPHIL # BLD AUTO: 0.1 K/UL (ref 0–0.5)
EOSINOPHIL NFR BLD: 0.4 % (ref 0–8)
ERYTHROCYTE [DISTWIDTH] IN BLOOD BY AUTOMATED COUNT: 16.8 % (ref 11.5–14.5)
GLUCOSE SERPL-MCNC: 112 MG/DL (ref 70–110)
HCT VFR BLD AUTO: 35.3 % (ref 37–48.5)
HCT VFR BLD CALC: 32 %PCV (ref 36–54)
HGB BLD-MCNC: 11.5 G/DL (ref 12–16)
IMM GRANULOCYTES # BLD AUTO: 0.16 K/UL (ref 0–0.04)
IMM GRANULOCYTES NFR BLD AUTO: 1.4 % (ref 0–0.5)
LYMPHOCYTES # BLD AUTO: 0.6 K/UL (ref 1–4.8)
LYMPHOCYTES NFR BLD: 5.2 % (ref 18–48)
MCH RBC QN AUTO: 32.5 PG (ref 27–31)
MCHC RBC AUTO-ENTMCNC: 32.6 G/DL (ref 32–36)
MCV RBC AUTO: 100 FL (ref 82–98)
MONOCYTES # BLD AUTO: 0.2 K/UL (ref 0.3–1)
MONOCYTES NFR BLD: 1.8 % (ref 4–15)
NEUTROPHILS # BLD AUTO: 10.3 K/UL (ref 1.8–7.7)
NEUTROPHILS NFR BLD: 91.1 % (ref 38–73)
NRBC BLD-RTO: 0 /100 WBC
PLATELET # BLD AUTO: 176 K/UL (ref 150–450)
PLATELET BLD QL SMEAR: ABNORMAL
PMV BLD AUTO: 12.5 FL (ref 9.2–12.9)
POC IONIZED CALCIUM: 0.85 MMOL/L (ref 1.06–1.42)
POC TCO2 (MEASURED): 25 MMOL/L (ref 23–29)
POTASSIUM BLD-SCNC: 2.8 MMOL/L (ref 3.5–5.1)
RBC # BLD AUTO: 3.54 M/UL (ref 4–5.4)
SAMPLE: ABNORMAL
SODIUM BLD-SCNC: 140 MMOL/L (ref 136–145)
TOXIC GRANULES BLD QL SMEAR: PRESENT
TROPONIN I SERPL DL<=0.01 NG/ML-MCNC: 0.12 NG/ML (ref 0–0.03)
WBC # BLD AUTO: 11.33 K/UL (ref 3.9–12.7)

## 2023-08-26 PROCEDURE — 99285 EMERGENCY DEPT VISIT HI MDM: CPT | Mod: 25,HCNC

## 2023-08-26 PROCEDURE — G0378 HOSPITAL OBSERVATION PER HR: HCPCS | Mod: HCNC

## 2023-08-26 PROCEDURE — 83880 ASSAY OF NATRIURETIC PEPTIDE: CPT | Mod: HCNC | Performed by: STUDENT IN AN ORGANIZED HEALTH CARE EDUCATION/TRAINING PROGRAM

## 2023-08-26 PROCEDURE — 25000003 PHARM REV CODE 250: Mod: HCNC

## 2023-08-26 PROCEDURE — 99223 PR INITIAL HOSPITAL CARE,LEVL III: ICD-10-PCS | Mod: HCNC,,, | Performed by: STUDENT IN AN ORGANIZED HEALTH CARE EDUCATION/TRAINING PROGRAM

## 2023-08-26 PROCEDURE — 96361 HYDRATE IV INFUSION ADD-ON: CPT | Mod: HCNC

## 2023-08-26 PROCEDURE — 85025 COMPLETE CBC W/AUTO DIFF WBC: CPT | Mod: HCNC

## 2023-08-26 PROCEDURE — 99223 1ST HOSP IP/OBS HIGH 75: CPT | Mod: HCNC,,, | Performed by: STUDENT IN AN ORGANIZED HEALTH CARE EDUCATION/TRAINING PROGRAM

## 2023-08-26 PROCEDURE — 84484 ASSAY OF TROPONIN QUANT: CPT | Mod: HCNC | Performed by: STUDENT IN AN ORGANIZED HEALTH CARE EDUCATION/TRAINING PROGRAM

## 2023-08-26 PROCEDURE — 93010 EKG 12-LEAD: ICD-10-PCS | Mod: HCNC,,, | Performed by: INTERNAL MEDICINE

## 2023-08-26 PROCEDURE — 25000003 PHARM REV CODE 250: Mod: HCNC | Performed by: STUDENT IN AN ORGANIZED HEALTH CARE EDUCATION/TRAINING PROGRAM

## 2023-08-26 PROCEDURE — 93010 ELECTROCARDIOGRAM REPORT: CPT | Mod: HCNC,,, | Performed by: INTERNAL MEDICINE

## 2023-08-26 PROCEDURE — 93005 ELECTROCARDIOGRAM TRACING: CPT | Mod: HCNC

## 2023-08-26 RX ORDER — ASPIRIN 81 MG/1
162 TABLET ORAL
Status: COMPLETED | OUTPATIENT
Start: 2023-08-26 | End: 2023-08-26

## 2023-08-26 RX ORDER — IBUPROFEN 200 MG
16 TABLET ORAL
Status: DISCONTINUED | OUTPATIENT
Start: 2023-08-26 | End: 2023-08-29 | Stop reason: HOSPADM

## 2023-08-26 RX ORDER — PANTOPRAZOLE SODIUM 40 MG/1
40 TABLET, DELAYED RELEASE ORAL DAILY
Status: DISCONTINUED | OUTPATIENT
Start: 2023-08-27 | End: 2023-08-29 | Stop reason: HOSPADM

## 2023-08-26 RX ORDER — SODIUM CHLORIDE 0.9 % (FLUSH) 0.9 %
10 SYRINGE (ML) INJECTION EVERY 12 HOURS PRN
Status: DISCONTINUED | OUTPATIENT
Start: 2023-08-26 | End: 2023-08-29 | Stop reason: HOSPADM

## 2023-08-26 RX ORDER — POTASSIUM CHLORIDE 20 MEQ/1
40 TABLET, EXTENDED RELEASE ORAL
Status: DISCONTINUED | OUTPATIENT
Start: 2023-08-26 | End: 2023-08-27

## 2023-08-26 RX ORDER — GLUCAGON 1 MG
1 KIT INJECTION
Status: DISCONTINUED | OUTPATIENT
Start: 2023-08-26 | End: 2023-08-29 | Stop reason: HOSPADM

## 2023-08-26 RX ORDER — CHOLESTYRAMINE 4 G/4.8G
4 POWDER, FOR SUSPENSION ORAL 2 TIMES DAILY
Status: DISCONTINUED | OUTPATIENT
Start: 2023-08-26 | End: 2023-08-29 | Stop reason: HOSPADM

## 2023-08-26 RX ORDER — ATORVASTATIN CALCIUM 20 MG/1
20 TABLET, FILM COATED ORAL DAILY
Status: DISCONTINUED | OUTPATIENT
Start: 2023-08-27 | End: 2023-08-29 | Stop reason: HOSPADM

## 2023-08-26 RX ORDER — FAMOTIDINE 20 MG/1
20 TABLET, FILM COATED ORAL
Status: COMPLETED | OUTPATIENT
Start: 2023-08-26 | End: 2023-08-26

## 2023-08-26 RX ORDER — TRAMADOL HYDROCHLORIDE 50 MG/1
50 TABLET ORAL EVERY 6 HOURS PRN
Status: DISCONTINUED | OUTPATIENT
Start: 2023-08-26 | End: 2023-08-29 | Stop reason: HOSPADM

## 2023-08-26 RX ORDER — ONDANSETRON 4 MG/1
4 TABLET, ORALLY DISINTEGRATING ORAL
Status: COMPLETED | OUTPATIENT
Start: 2023-08-26 | End: 2023-08-26

## 2023-08-26 RX ORDER — SOTALOL HYDROCHLORIDE 120 MG/1
120 TABLET ORAL 2 TIMES DAILY
Status: DISCONTINUED | OUTPATIENT
Start: 2023-08-26 | End: 2023-08-27

## 2023-08-26 RX ORDER — NALOXONE HCL 0.4 MG/ML
0.02 VIAL (ML) INJECTION
Status: DISCONTINUED | OUTPATIENT
Start: 2023-08-26 | End: 2023-08-29 | Stop reason: HOSPADM

## 2023-08-26 RX ORDER — AMLODIPINE BESYLATE 5 MG/1
5 TABLET ORAL DAILY
Status: DISCONTINUED | OUTPATIENT
Start: 2023-08-27 | End: 2023-08-29 | Stop reason: HOSPADM

## 2023-08-26 RX ORDER — IBUPROFEN 200 MG
24 TABLET ORAL
Status: DISCONTINUED | OUTPATIENT
Start: 2023-08-26 | End: 2023-08-29 | Stop reason: HOSPADM

## 2023-08-26 RX ADMIN — APIXABAN 5 MG: 5 TABLET, FILM COATED ORAL at 11:08

## 2023-08-26 RX ADMIN — FAMOTIDINE 20 MG: 20 TABLET, FILM COATED ORAL at 07:08

## 2023-08-26 RX ADMIN — ASPIRIN 162 MG: 81 TABLET, COATED ORAL at 08:08

## 2023-08-26 RX ADMIN — SODIUM CHLORIDE 500 ML: 9 INJECTION, SOLUTION INTRAVENOUS at 07:08

## 2023-08-26 RX ADMIN — ONDANSETRON 4 MG: 4 TABLET, ORALLY DISINTEGRATING ORAL at 07:08

## 2023-08-27 LAB
ALBUMIN SERPL BCP-MCNC: 2.3 G/DL (ref 3.5–5.2)
ALBUMIN SERPL BCP-MCNC: 2.3 G/DL (ref 3.5–5.2)
ALP SERPL-CCNC: 285 U/L (ref 55–135)
ALP SERPL-CCNC: 288 U/L (ref 55–135)
ALT SERPL W/O P-5'-P-CCNC: 61 U/L (ref 10–44)
ALT SERPL W/O P-5'-P-CCNC: 71 U/L (ref 10–44)
ANION GAP SERPL CALC-SCNC: 10 MMOL/L (ref 8–16)
ANION GAP SERPL CALC-SCNC: 10 MMOL/L (ref 8–16)
ANION GAP SERPL CALC-SCNC: 7 MMOL/L (ref 8–16)
AST SERPL-CCNC: 157 U/L (ref 10–40)
AST SERPL-CCNC: 230 U/L (ref 10–40)
BILIRUB SERPL-MCNC: 2.5 MG/DL (ref 0.1–1)
BILIRUB SERPL-MCNC: 3.1 MG/DL (ref 0.1–1)
BILIRUB UR QL STRIP: ABNORMAL
BUN SERPL-MCNC: 10 MG/DL (ref 8–23)
BUN SERPL-MCNC: 7 MG/DL (ref 8–23)
BUN SERPL-MCNC: 7 MG/DL (ref 8–23)
CA-I BLDV-SCNC: 0.88 MMOL/L (ref 1.06–1.42)
CALCIUM SERPL-MCNC: 6.6 MG/DL (ref 8.7–10.5)
CALCIUM SERPL-MCNC: 6.9 MG/DL (ref 8.7–10.5)
CALCIUM SERPL-MCNC: 7 MG/DL (ref 8.7–10.5)
CHLORIDE SERPL-SCNC: 102 MMOL/L (ref 95–110)
CLARITY UR REFRACT.AUTO: CLEAR
CO2 SERPL-SCNC: 25 MMOL/L (ref 23–29)
CO2 SERPL-SCNC: 25 MMOL/L (ref 23–29)
CO2 SERPL-SCNC: 26 MMOL/L (ref 23–29)
COLOR UR AUTO: YELLOW
CREAT SERPL-MCNC: 0.7 MG/DL (ref 0.5–1.4)
CREAT SERPL-MCNC: 0.8 MG/DL (ref 0.5–1.4)
CREAT SERPL-MCNC: 0.8 MG/DL (ref 0.5–1.4)
ERYTHROCYTE [DISTWIDTH] IN BLOOD BY AUTOMATED COUNT: 16.3 % (ref 11.5–14.5)
EST. GFR  (NO RACE VARIABLE): >60 ML/MIN/1.73 M^2
GLUCOSE SERPL-MCNC: 110 MG/DL (ref 70–110)
GLUCOSE SERPL-MCNC: 138 MG/DL (ref 70–110)
GLUCOSE SERPL-MCNC: 148 MG/DL (ref 70–110)
GLUCOSE UR QL STRIP: NEGATIVE
HCT VFR BLD AUTO: 27.2 % (ref 37–48.5)
HGB BLD-MCNC: 9.1 G/DL (ref 12–16)
HGB UR QL STRIP: NEGATIVE
INR PPP: 1.4 (ref 0.8–1.2)
KETONES UR QL STRIP: NEGATIVE
LEUKOCYTE ESTERASE UR QL STRIP: NEGATIVE
LIPASE SERPL-CCNC: 36 U/L (ref 4–60)
MAGNESIUM SERPL-MCNC: 1.2 MG/DL (ref 1.6–2.6)
MCH RBC QN AUTO: 32.6 PG (ref 27–31)
MCHC RBC AUTO-ENTMCNC: 33.5 G/DL (ref 32–36)
MCV RBC AUTO: 98 FL (ref 82–98)
NITRITE UR QL STRIP: NEGATIVE
PH UR STRIP: 6 [PH] (ref 5–8)
PHOSPHATE SERPL-MCNC: 2.3 MG/DL (ref 2.7–4.5)
PLATELET # BLD AUTO: 130 K/UL (ref 150–450)
PMV BLD AUTO: 12.3 FL (ref 9.2–12.9)
POTASSIUM SERPL-SCNC: 2.8 MMOL/L (ref 3.5–5.1)
POTASSIUM SERPL-SCNC: 4.2 MMOL/L (ref 3.5–5.1)
POTASSIUM SERPL-SCNC: 4.4 MMOL/L (ref 3.5–5.1)
PROT SERPL-MCNC: 5.7 G/DL (ref 6–8.4)
PROT SERPL-MCNC: 5.9 G/DL (ref 6–8.4)
PROT UR QL STRIP: ABNORMAL
PROTHROMBIN TIME: 14.6 SEC (ref 9–12.5)
RBC # BLD AUTO: 2.79 M/UL (ref 4–5.4)
SARS-COV-2 RDRP RESP QL NAA+PROBE: NEGATIVE
SODIUM SERPL-SCNC: 135 MMOL/L (ref 136–145)
SODIUM SERPL-SCNC: 137 MMOL/L (ref 136–145)
SODIUM SERPL-SCNC: 137 MMOL/L (ref 136–145)
SP GR UR STRIP: 1.02 (ref 1–1.03)
TROPONIN I SERPL DL<=0.01 NG/ML-MCNC: 0.11 NG/ML (ref 0–0.03)
TROPONIN I SERPL DL<=0.01 NG/ML-MCNC: 0.13 NG/ML (ref 0–0.03)
URN SPEC COLLECT METH UR: ABNORMAL
WBC # BLD AUTO: 7.72 K/UL (ref 3.9–12.7)

## 2023-08-27 PROCEDURE — 25000003 PHARM REV CODE 250: Mod: HCNC | Performed by: STUDENT IN AN ORGANIZED HEALTH CARE EDUCATION/TRAINING PROGRAM

## 2023-08-27 PROCEDURE — 83690 ASSAY OF LIPASE: CPT | Mod: HCNC

## 2023-08-27 PROCEDURE — 99222 1ST HOSP IP/OBS MODERATE 55: CPT | Mod: HCNC,GC,, | Performed by: INTERNAL MEDICINE

## 2023-08-27 PROCEDURE — 84484 ASSAY OF TROPONIN QUANT: CPT | Mod: 91,HCNC | Performed by: STUDENT IN AN ORGANIZED HEALTH CARE EDUCATION/TRAINING PROGRAM

## 2023-08-27 PROCEDURE — 80048 BASIC METABOLIC PNL TOTAL CA: CPT | Mod: HCNC,XB

## 2023-08-27 PROCEDURE — 96365 THER/PROPH/DIAG IV INF INIT: CPT

## 2023-08-27 PROCEDURE — 96375 TX/PRO/DX INJ NEW DRUG ADDON: CPT

## 2023-08-27 PROCEDURE — 25000003 PHARM REV CODE 250: Mod: HCNC

## 2023-08-27 PROCEDURE — 93010 ELECTROCARDIOGRAM REPORT: CPT | Mod: HCNC,,, | Performed by: INTERNAL MEDICINE

## 2023-08-27 PROCEDURE — 83735 ASSAY OF MAGNESIUM: CPT | Mod: HCNC | Performed by: STUDENT IN AN ORGANIZED HEALTH CARE EDUCATION/TRAINING PROGRAM

## 2023-08-27 PROCEDURE — 85610 PROTHROMBIN TIME: CPT | Mod: HCNC | Performed by: STUDENT IN AN ORGANIZED HEALTH CARE EDUCATION/TRAINING PROGRAM

## 2023-08-27 PROCEDURE — 63600175 PHARM REV CODE 636 W HCPCS: Mod: HCNC | Performed by: STUDENT IN AN ORGANIZED HEALTH CARE EDUCATION/TRAINING PROGRAM

## 2023-08-27 PROCEDURE — 96366 THER/PROPH/DIAG IV INF ADDON: CPT

## 2023-08-27 PROCEDURE — 82330 ASSAY OF CALCIUM: CPT | Mod: HCNC

## 2023-08-27 PROCEDURE — 63600175 PHARM REV CODE 636 W HCPCS: Mod: HCNC | Performed by: PHYSICIAN ASSISTANT

## 2023-08-27 PROCEDURE — 99223 PR INITIAL HOSPITAL CARE,LEVL III: ICD-10-PCS | Mod: HCNC,GC,, | Performed by: INTERNAL MEDICINE

## 2023-08-27 PROCEDURE — 99900035 HC TECH TIME PER 15 MIN (STAT): Mod: HCNC

## 2023-08-27 PROCEDURE — G0378 HOSPITAL OBSERVATION PER HR: HCPCS | Mod: HCNC

## 2023-08-27 PROCEDURE — 51798 US URINE CAPACITY MEASURE: CPT | Mod: HCNC

## 2023-08-27 PROCEDURE — 25000003 PHARM REV CODE 250: Mod: HCNC | Performed by: PHYSICIAN ASSISTANT

## 2023-08-27 PROCEDURE — 84484 ASSAY OF TROPONIN QUANT: CPT | Mod: HCNC

## 2023-08-27 PROCEDURE — 99222 PR INITIAL HOSPITAL CARE,LEVL II: ICD-10-PCS | Mod: HCNC,GC,, | Performed by: INTERNAL MEDICINE

## 2023-08-27 PROCEDURE — 96367 TX/PROPH/DG ADDL SEQ IV INF: CPT

## 2023-08-27 PROCEDURE — 93010 EKG 12-LEAD: ICD-10-PCS | Mod: HCNC,,, | Performed by: INTERNAL MEDICINE

## 2023-08-27 PROCEDURE — U0002 COVID-19 LAB TEST NON-CDC: HCPCS | Mod: HCNC

## 2023-08-27 PROCEDURE — 99223 1ST HOSP IP/OBS HIGH 75: CPT | Mod: HCNC,GC,, | Performed by: INTERNAL MEDICINE

## 2023-08-27 PROCEDURE — 85027 COMPLETE CBC AUTOMATED: CPT | Mod: HCNC | Performed by: STUDENT IN AN ORGANIZED HEALTH CARE EDUCATION/TRAINING PROGRAM

## 2023-08-27 PROCEDURE — 84100 ASSAY OF PHOSPHORUS: CPT | Mod: HCNC | Performed by: STUDENT IN AN ORGANIZED HEALTH CARE EDUCATION/TRAINING PROGRAM

## 2023-08-27 PROCEDURE — 80053 COMPREHEN METABOLIC PANEL: CPT | Mod: HCNC | Performed by: STUDENT IN AN ORGANIZED HEALTH CARE EDUCATION/TRAINING PROGRAM

## 2023-08-27 PROCEDURE — 96376 TX/PRO/DX INJ SAME DRUG ADON: CPT

## 2023-08-27 PROCEDURE — 93005 ELECTROCARDIOGRAM TRACING: CPT | Mod: HCNC

## 2023-08-27 PROCEDURE — 51701 INSERT BLADDER CATHETER: CPT | Mod: HCNC

## 2023-08-27 PROCEDURE — 81003 URINALYSIS AUTO W/O SCOPE: CPT | Mod: HCNC | Performed by: STUDENT IN AN ORGANIZED HEALTH CARE EDUCATION/TRAINING PROGRAM

## 2023-08-27 PROCEDURE — 80053 COMPREHEN METABOLIC PANEL: CPT | Mod: 91,HCNC

## 2023-08-27 RX ORDER — CALCIUM GLUCONATE 20 MG/ML
1 INJECTION, SOLUTION INTRAVENOUS
Status: COMPLETED | OUTPATIENT
Start: 2023-08-27 | End: 2023-08-27

## 2023-08-27 RX ORDER — POTASSIUM CHLORIDE 20 MEQ/1
20 TABLET, EXTENDED RELEASE ORAL ONCE
Status: DISCONTINUED | OUTPATIENT
Start: 2023-08-27 | End: 2023-08-27

## 2023-08-27 RX ORDER — MAGNESIUM SULFATE HEPTAHYDRATE 40 MG/ML
2 INJECTION, SOLUTION INTRAVENOUS ONCE
Status: COMPLETED | OUTPATIENT
Start: 2023-08-27 | End: 2023-08-27

## 2023-08-27 RX ORDER — POTASSIUM CHLORIDE 7.45 MG/ML
10 INJECTION INTRAVENOUS
Status: COMPLETED | OUTPATIENT
Start: 2023-08-27 | End: 2023-08-27

## 2023-08-27 RX ORDER — MAGNESIUM SULFATE 1 G/100ML
1 INJECTION INTRAVENOUS ONCE
Status: COMPLETED | OUTPATIENT
Start: 2023-08-27 | End: 2023-08-27

## 2023-08-27 RX ORDER — FERROUS SULFATE, DRIED 160(50) MG
1 TABLET, EXTENDED RELEASE ORAL DAILY
Status: DISCONTINUED | OUTPATIENT
Start: 2023-08-27 | End: 2023-08-29 | Stop reason: HOSPADM

## 2023-08-27 RX ORDER — DIPHENHYDRAMINE HYDROCHLORIDE 50 MG/ML
25 INJECTION INTRAMUSCULAR; INTRAVENOUS EVERY 6 HOURS PRN
Status: DISCONTINUED | OUTPATIENT
Start: 2023-08-27 | End: 2023-08-29 | Stop reason: HOSPADM

## 2023-08-27 RX ORDER — LORAZEPAM 1 MG/1
1 TABLET ORAL EVERY 6 HOURS PRN
Status: DISCONTINUED | OUTPATIENT
Start: 2023-08-27 | End: 2023-08-29 | Stop reason: HOSPADM

## 2023-08-27 RX ORDER — MAGNESIUM SULFATE HEPTAHYDRATE 40 MG/ML
2 INJECTION, SOLUTION INTRAVENOUS ONCE
Status: DISCONTINUED | OUTPATIENT
Start: 2023-08-27 | End: 2023-08-27

## 2023-08-27 RX ORDER — POTASSIUM CHLORIDE 20 MEQ/1
40 TABLET, EXTENDED RELEASE ORAL 3 TIMES DAILY
Status: DISPENSED | OUTPATIENT
Start: 2023-08-27 | End: 2023-08-28

## 2023-08-27 RX ADMIN — POTASSIUM CHLORIDE 40 MEQ: 1500 TABLET, EXTENDED RELEASE ORAL at 09:08

## 2023-08-27 RX ADMIN — CEFTRIAXONE SODIUM 2 G: 2 INJECTION, POWDER, FOR SOLUTION INTRAMUSCULAR; INTRAVENOUS at 01:08

## 2023-08-27 RX ADMIN — Medication 1 TABLET: at 09:08

## 2023-08-27 RX ADMIN — MAGNESIUM SULFATE HEPTAHYDRATE 2 G: 40 INJECTION, SOLUTION INTRAVENOUS at 07:08

## 2023-08-27 RX ADMIN — DIPHENHYDRAMINE HYDROCHLORIDE 25 MG: 50 INJECTION, SOLUTION INTRAMUSCULAR; INTRAVENOUS at 02:08

## 2023-08-27 RX ADMIN — LORAZEPAM 1 MG: 0.5 TABLET ORAL at 12:08

## 2023-08-27 RX ADMIN — POTASSIUM CHLORIDE 10 MEQ: 7.46 INJECTION, SOLUTION INTRAVENOUS at 02:08

## 2023-08-27 RX ADMIN — CHOLESTYRAMINE 4 G: 4 POWDER, FOR SUSPENSION ORAL at 09:08

## 2023-08-27 RX ADMIN — POTASSIUM CHLORIDE 10 MEQ: 7.46 INJECTION, SOLUTION INTRAVENOUS at 05:08

## 2023-08-27 RX ADMIN — PANCRELIPASE 2 CAPSULE: 24000; 76000; 120000 CAPSULE, DELAYED RELEASE PELLETS ORAL at 11:08

## 2023-08-27 RX ADMIN — PANCRELIPASE 2 CAPSULE: 24000; 76000; 120000 CAPSULE, DELAYED RELEASE PELLETS ORAL at 09:08

## 2023-08-27 RX ADMIN — POTASSIUM CHLORIDE 10 MEQ: 7.46 INJECTION, SOLUTION INTRAVENOUS at 07:08

## 2023-08-27 RX ADMIN — AMLODIPINE BESYLATE 5 MG: 5 TABLET ORAL at 09:08

## 2023-08-27 RX ADMIN — SODIUM PHOSPHATE, MONOBASIC, MONOHYDRATE AND SODIUM PHOSPHATE, DIBASIC, ANHYDROUS 30 MMOL: 142; 276 INJECTION, SOLUTION INTRAVENOUS at 09:08

## 2023-08-27 RX ADMIN — CHOLESTYRAMINE 4 G: 4 POWDER, FOR SUSPENSION ORAL at 10:08

## 2023-08-27 RX ADMIN — POTASSIUM CHLORIDE 10 MEQ: 7.46 INJECTION, SOLUTION INTRAVENOUS at 09:08

## 2023-08-27 RX ADMIN — POTASSIUM CHLORIDE 10 MEQ: 7.46 INJECTION, SOLUTION INTRAVENOUS at 04:08

## 2023-08-27 RX ADMIN — APIXABAN 5 MG: 5 TABLET, FILM COATED ORAL at 09:08

## 2023-08-27 RX ADMIN — LEVOTHYROXINE SODIUM 175 MCG: 125 TABLET ORAL at 05:08

## 2023-08-27 RX ADMIN — APIXABAN 5 MG: 5 TABLET, FILM COATED ORAL at 10:08

## 2023-08-27 RX ADMIN — PANTOPRAZOLE SODIUM 40 MG: 40 TABLET, DELAYED RELEASE ORAL at 09:08

## 2023-08-27 RX ADMIN — ATORVASTATIN CALCIUM 20 MG: 20 TABLET, FILM COATED ORAL at 09:08

## 2023-08-27 RX ADMIN — CALCIUM GLUCONATE 1 G: 20 INJECTION, SOLUTION INTRAVENOUS at 04:08

## 2023-08-27 RX ADMIN — CALCIUM GLUCONATE 1 G: 20 INJECTION, SOLUTION INTRAVENOUS at 02:08

## 2023-08-27 RX ADMIN — POTASSIUM CHLORIDE 40 MEQ: 1500 TABLET, EXTENDED RELEASE ORAL at 02:08

## 2023-08-27 RX ADMIN — MAGNESIUM SULFATE HEPTAHYDRATE 1 G: 500 INJECTION, SOLUTION INTRAMUSCULAR; INTRAVENOUS at 01:08

## 2023-08-27 NOTE — NURSING
Pt c/o nausea, prn ativan and benadyl given, unable to give zofran d/t prolonged Qtc. Sotalol also held d/t prolonged Qtc, provider notified and order discontinued. Pt refusing cholestyramine d/t nausea.

## 2023-08-27 NOTE — ED PROVIDER NOTES
Encounter Date: 8/26/2023       History     Chief Complaint   Patient presents with    Abdominal Pain     Hx of pancreatic CA. Last chemo 8/21. Denies fever.      Ms. Cronin is a 82yo F w/PMHx of pAF on Eliquis, pancreatic adenocarcinoma, HFpEF, SSS s/p PM, Crohn's Disease, HTN, HLD, hypothryoidism who is presenting to the ED for abdominal pain. She reports the pain started a few hours ago after she consumed an egg roll. She described a constant pain in her epigastric area that she rates a 6 out of 10. On her way to the ED she started passing gas and the pain began to radiate down to her hypogastric area. She reports her last BM was yesterday. She took an Imodium today and believes that is contributing to her not having a bowel movement. She reports some nausea. She denies SOB, HA, vomiting, diarrhea, chest pain, or blurry vision. Per her daughter in law, she had chemo on Monday and had some diarrhea and vomiting this past week. She has had this stomach pain before and the pain usually moves to her back.     The history is provided by the patient, a relative and medical records. No  was used.     Review of patient's allergies indicates:   Allergen Reactions    Cleocin [clindamycin hcl]     Lisinopril Other (See Comments)     cough     Past Medical History:   Diagnosis Date    Anticoagulant long-term use     Atrial fibrillation     Crohn disease diagnosed in her 20's    GERD (gastroesophageal reflux disease)     Hyperlipidemia     Hypertension     Pancreatic adenocarcinoma 3/21/2023    Thyroid disease     s/p I 131     Past Surgical History:   Procedure Laterality Date    APPENDECTOMY      CARDIAC SURGERY  2014    pacemaker    COLONOSCOPY  ~2008    normal findings per patient report    ENDOSCOPIC ULTRASOUND OF UPPER GASTROINTESTINAL TRACT N/A 3/14/2023    Procedure: ULTRASOUND, UPPER GI TRACT, ENDOSCOPIC;  Surgeon: Andrei Swartz MD;  Location: [facility];  Service: Endoscopy;  Laterality: N/A;   Approval to hold Eliquis rec'd from Dr. Barbosa (see telephone encounter 3/3/23)-DS  [employer] AICD/PPM  3/3/23-Instructions via portal-DS    ERCP N/A 3/21/2023    Procedure: ERCP (ENDOSCOPIC RETROGRADE CHOLANGIOPANCREATOGRAPHY);  Surgeon: Celina Toney MD;  Location: [facility] ENDO [address]);  Service: Endoscopy;  Laterality: N/A;    ESOPHAGOGASTRODUODENOSCOPY N/A 2018    Procedure: EGD (ESOPHAGOGASTRODUODENOSCOPY);  Surgeon: Alondra Casiano MD;  Location: [facility] ENDO;  Service: Endoscopy;  Laterality: N/A;    HYSTERECTOMY      INSERTION OF IMPLANTABLE CARDIOVERTER-DEFIBRILLATOR (ICD) GENERATOR WITH TWO EXISTING LEADS Left 5/10/2021    Procedure: INSERTION, PULSE GENERATOR WITH 2 EXISTING LEADS, ICD;  Surgeon: Bo Leone MD;  Location: [facility] CATH LAB;  Service: Cardiology;  Laterality: Left;    INSERTION OF PACEMAKER      REPLACEMENT OF PACEMAKER GENERATOR  05/10/2021    RETROGRADE PYELOGRAPHY Right 2020    Procedure: PYELOGRAM, RETROGRADE;  Surgeon: Jovan Kruse MD;  Location: [facility];  Service: Urology;  Laterality: Right;    SMALL INTESTINE SURGERY  in her 20's    for crohn's    TONSILLECTOMY      UPPER GASTROINTESTINAL ENDOSCOPY  ~    normal findings per patient report     Family History   Problem Relation Age of Onset    Cancer Mother     Breast cancer Mother     Crohn's disease Other     Colon cancer Neg Hx     Colon polyps Neg Hx     Esophageal cancer Neg Hx     Stomach cancer Neg Hx     Ulcerative colitis Neg Hx      Social History     Tobacco Use    Smoking status: Former     Current packs/day: 0.00     Types: Cigarettes     Quit date:      Years since quittin.6    Smokeless tobacco: Never   Substance Use Topics    Alcohol use: No    Drug use: No     Review of Systems    Physical Exam     Initial Vitals [23 1757]   BP Pulse Resp Temp SpO2   (!) 105/58 87 16 98.5 °F (36.9 °C) 95 %      MAP       --         Physical Exam    Nursing note and vitals  reviewed.  Constitutional: No distress.   HENT:   Head: Atraumatic.   Mouth/Throat: Oropharynx is clear and moist and mucous membranes are normal.   Eyes: Conjunctivae and EOM are normal. Right conjunctiva is not injected. Left conjunctiva is not injected. No scleral icterus.   Neck: Neck supple.    Full passive range of motion without pain.     Cardiovascular:  S1 normal, S2 normal, normal heart sounds and normal pulses.           No murmur heard.  Pulses:       Radial pulses are 2+ on the right side and 2+ on the left side.   Pulmonary/Chest: Effort normal and breath sounds normal. No respiratory distress.   Abdominal: Abdomen is soft. She exhibits no distension. There is abdominal tenderness.   Epigastric ttp, no rebound   Musculoskeletal:         General: Normal range of motion.      Cervical back: Full passive range of motion without pain and neck supple.     Neurological: She is alert and oriented to person, place, and time. No cranial nerve deficit. Gait normal.   Skin: Skin is warm. No ecchymosis and no rash noted.         ED Course   Procedures  Labs Reviewed   CBC W/ AUTO DIFFERENTIAL - Abnormal; Notable for the following components:       Result Value    RBC 3.54 (*)     Hemoglobin 11.5 (*)     Hematocrit 35.3 (*)      (*)     MCH 32.5 (*)     RDW 16.8 (*)     Immature Granulocytes 1.4 (*)     Gran # (ANC) 10.3 (*)     Immature Grans (Abs) 0.16 (*)     Lymph # 0.6 (*)     Mono # 0.2 (*)     Gran % 91.1 (*)     Lymph % 5.2 (*)     Mono % 1.8 (*)     All other components within normal limits   TROPONIN I - Abnormal; Notable for the following components:    Troponin I 0.119 (*)     All other components within normal limits   B-TYPE NATRIURETIC PEPTIDE - Abnormal; Notable for the following components:     (*)     All other components within normal limits   ISTAT PROCEDURE - Abnormal; Notable for the following components:    POC Glucose 112 (*)     POC Potassium 2.8 (*)     POC Ionized Calcium  0.85 (*)     POC Hematocrit 32 (*)     All other components within normal limits   URINALYSIS, REFLEX TO URINE CULTURE   PROTIME-INR   ISTAT CHEM8          Imaging Results              X-Ray Chest AP Portable (Final result)  Result time 08/26/23 22:06:10      Final result by Damion Pacheco MD (08/26/23 22:06:10)                   Impression:      No significant change in cardiopulmonary status.  See above comments.      Electronically signed by: Damion Pacheco  Date:    08/26/2023  Time:    22:06               Narrative:    EXAMINATION:  XR CHEST AP PORTABLE    CLINICAL HISTORY:  Chronic atrial fibrillation, unspecified    TECHNIQUE:  Single frontal view of the chest was performed.    COMPARISON:  06/08/2023    FINDINGS:  Sternotomy wires are present.  Cardiac pacemaker device on the left.    Right IJ central venous catheter.    Cardiac silhouette is borderline enlarged.    No effusion or pneumothorax.  No mass or lobar consolidation.  Minimal nonspecific interstitial changes.    No acute osseous abnormality.    No significant change.                                       Medications   potassium chloride SA CR tablet 40 mEq (40 mEq Oral Not Given 8/26/23 2228)   amLODIPine tablet 5 mg (has no administration in time range)   atorvastatin tablet 20 mg (has no administration in time range)   cholestyramine-aspartame 4 gram packet 4 g (has no administration in time range)   apixaban tablet 5 mg (5 mg Oral Given 8/26/23 2330)   pantoprazole EC tablet 40 mg (has no administration in time range)   levothyroxine tablet 175 mcg (has no administration in time range)   lipase-protease-amylase 24,000-76,000-120,000 units capsule 2 capsule (has no administration in time range)   sotaloL tablet 120 mg (has no administration in time range)   traMADoL tablet 50 mg (has no administration in time range)   sodium chloride 0.9% flush 10 mL (has no administration in time range)   naloxone 0.4 mg/mL injection 0.02 mg (has no  administration in time range)   glucose chewable tablet 16 g (has no administration in time range)   glucose chewable tablet 24 g (has no administration in time range)   glucagon (human recombinant) injection 1 mg (has no administration in time range)   dextrose 10% bolus 125 mL 125 mL (has no administration in time range)   dextrose 10% bolus 250 mL 250 mL (has no administration in time range)   diphenhydrAMINE injection 25 mg (has no administration in time range)   LORazepam tablet 1 mg (has no administration in time range)   magnesium sulfate in dextrose IVPB (premix) 1 g (has no administration in time range)   sodium chloride 0.9% bolus 500 mL 500 mL (0 mLs Intravenous Stopped 8/26/23 2221)   ondansetron disintegrating tablet 4 mg (4 mg Oral Given 8/26/23 1919)   famotidine tablet 20 mg (20 mg Oral Given 8/26/23 1938)   aspirin EC tablet 162 mg (162 mg Oral Given 8/26/23 2051)     Medical Decision Making  Ms. Cronin is a 84yo F w/PMHx of pAF on Eliquis, pancreatic adenocarcinoma, HFpEF, SSS s/p PM, Crohn's Disease, HTN, HLD, hypothryoidism who is presenting to the ED for abdominal pain. Suspicious for indigestion given relief after flatulence. Suspicious for pancreatitis flare, will obtain labs. Less suspicious for ACS but with cardiac hx will do ACS workup. Less suspicious for bowel obstruction. Less suspicious for cholelithiasis. Less suspicious for diverticular disease.     Amount and/or Complexity of Data Reviewed  Labs: ordered. Decision-making details documented in ED Course.  Radiology: ordered.  Discussion of management or test interpretation with external provider(s): She was administered 1/2 NS Bolus. Patient was found to have an elevated Troponin and BNP. EKG showing LBBB paced rhythm but not concerning for a STEMI. She was given ASA 162mg. CMP remarkable for hypokalemia. Replenish with PO medication. Admitted to hospital medicine for NSTEMI rule out.     Risk  OTC drugs.  Prescription drug  management.               ED Course as of 08/27/23 0039   Sat Aug 26, 2023   2024 CBC auto differential(!)  CBC with significant leukocytosis or severe anemia in need of blood transfusion.  [JM]   2058 Trop elevated .119, EKG with LBBB paced rhythm not meeting sgarbossa's criteria for STEMI equivalent, no active chest pain/abd pain in ED. Given asa [JM]   2155 ISTAT PROCEDURE(!!)  Hypokalemia likely secondary to recent episodes of vomiting, will replenish.  [JM]      ED Course User Index  [JM] Ann Bob MD                      Clinical Impression:   Final diagnoses:  [R10.13] Epigastric pain  [E87.6] Hypokalemia (Primary)        ED Disposition Condition    Observation                 Ann Bob MD  Resident  08/27/23 0040       Linda Sullivan MD  10/04/23 [id number]

## 2023-08-27 NOTE — ASSESSMENT & PLAN NOTE
Appears resolved with passing gas. Back pain appears to be related to position in ED bed. Patient denies need for medication.

## 2023-08-27 NOTE — PLAN OF CARE
Pt involved in plan of care and communicating needs throughout shift. Pt transferred to med-onc service this am. Pt stated had not voided since Saturday am, bladder scan completed. Straight cath with 500 ml out and u/a collected. Electrolytes replaced as ordered. Up to BSC with assistance. No c/o pain or nausea.Telemetry maintained; normal sinus rythmn. All VSS; no acute events so far this shift.  Pt remaining free from falls or injury throughout shift; bed locked and in lowest position; call light within reach.  Pt instructed to call for assistance as needed.  Q1H rounding done on pt.

## 2023-08-27 NOTE — SUBJECTIVE & OBJECTIVE
Past Medical History:   Diagnosis Date    Anticoagulant long-term use     Atrial fibrillation     Crohn disease diagnosed in her 20's    GERD (gastroesophageal reflux disease)     Hyperlipidemia     Hypertension     Pancreatic adenocarcinoma 3/21/2023    Thyroid disease     s/p I 131       Past Surgical History:   Procedure Laterality Date    APPENDECTOMY      CARDIAC SURGERY  2014    pacemaker    COLONOSCOPY  ~2008    normal findings per patient report    ENDOSCOPIC ULTRASOUND OF UPPER GASTROINTESTINAL TRACT N/A 3/14/2023    Procedure: ULTRASOUND, UPPER GI TRACT, ENDOSCOPIC;  Surgeon: Andrei Swartz MD;  Location: [facility];  Service: Endoscopy;  Laterality: N/A;  Approval to hold Eliquis rec'd from Dr. Barbosa (see telephone encounter 3/3/23)-DS  [employer] AICD/PPM  3/3/23-Instructions via portal-DS    ERCP N/A 3/21/2023    Procedure: ERCP (ENDOSCOPIC RETROGRADE CHOLANGIOPANCREATOGRAPHY);  Surgeon: Celina Tnoey MD;  Location: [facility] ([address] [place] [address]);  Service: Endoscopy;  Laterality: N/A;    ESOPHAGOGASTRODUODENOSCOPY N/A 7/17/2018    Procedure: EGD (ESOPHAGOGASTRODUODENOSCOPY);  Surgeon: Alondra Casiano MD;  Location: [facility] ENDO;  Service: Endoscopy;  Laterality: N/A;    HYSTERECTOMY      INSERTION OF IMPLANTABLE CARDIOVERTER-DEFIBRILLATOR (ICD) GENERATOR WITH TWO EXISTING LEADS Left 5/10/2021    Procedure: INSERTION, PULSE GENERATOR WITH 2 EXISTING LEADS, ICD;  Surgeon: Bo Leone MD;  Location: [facility] CATH LAB;  Service: Cardiology;  Laterality: Left;    INSERTION OF PACEMAKER      REPLACEMENT OF PACEMAKER GENERATOR  05/10/2021    RETROGRADE PYELOGRAPHY Right 2/18/2020    Procedure: PYELOGRAM, RETROGRADE;  Surgeon: Jovan Kruse MD;  Location: [facility] OR;  Service: Urology;  Laterality: Right;    SMALL INTESTINE SURGERY  in her 20's    for crohn's    TONSILLECTOMY      UPPER GASTROINTESTINAL ENDOSCOPY  ~2008    normal findings per patient report       Review of patient's allergies indicates:    Allergen Reactions    Cleocin [clindamycin hcl]     Lisinopril Other (See Comments)     cough       No current facility-administered medications on file prior to encounter.     Current Outpatient Medications on File Prior to Encounter   Medication Sig    alendronate (FOSAMAX) 70 MG tablet Take 1 tablet (70 mg total) by mouth every 7 days.    amLODIPine (NORVASC) 5 MG tablet Take 1 tablet (5 mg total) by mouth once daily.    atorvastatin (LIPITOR) 20 MG tablet Take 1 tablet (20 mg total) by mouth once daily.    colestipoL (COLESTID) 1 gram Tab Take 1 tablet (1 g total) by mouth 2 (two) times daily.    diphenoxylate-atropine 2.5-0.025 mg (LOMOTIL) 2.5-0.025 mg per tablet TAKE 1 TABLET BY MOUTH 4 TIMES DAILY AS NEEDED FOR DIARRHEA    ELIQUIS 5 mg Tab Take 1 tablet (5 mg total) by mouth 2 (two) times daily.    esomeprazole (NEXIUM) 40 MG capsule Take 1 capsule (40 mg total) by mouth once daily.    folic acid (FOLVITE) 1 MG tablet Take 1 tablet by mouth once daily    furosemide (LASIX) 40 MG tablet Take 1 tablet (40 mg total) by mouth daily as needed (For weight gain > 3 lb in one day, increasing leg swelling, or increasing SOB).    levothyroxine (SYNTHROID, LEVOTHROID) 175 MCG tablet Take 1 tablet by mouth once daily    lipase-protease-amylase 24,000-76,000-120,000 units (CREON) 24,000-76,000 -120,000 unit capsule Take 2 capsules by mouth 3 (three) times daily with meals.    MELATONIN ORAL Take 1 tablet by mouth nightly as needed (Insomnia).    OLANZapine (ZYPREXA) 5 MG tablet Take 1 tablet (5 mg total) by mouth every evening.    potassium chloride (MICRO-K) 10 MEQ CpSR 1 po bid x 1 week then Micro K qd    prochlorperazine (COMPAZINE) 10 MG tablet Take 1 tablet (10 mg total) by mouth 4 (four) times daily as needed (nausea).    sotaloL (BETAPACE) 120 MG Tab Take 1 tablet (120 mg total) by mouth 2 (two) times daily.    sulfaSALAzine (AZULFIDINE) 500 mg Tab Take 1 tablet by mouth twice daily    tolterodine (DETROL LA) 4  MG 24 hr capsule Take 1 capsule (4 mg total) by mouth once daily.    traMADoL (ULTRAM) 50 mg tablet Take 1 tablet (50 mg total) by mouth every 6 (six) hours as needed for Pain.    acetaminophen (TYLENOL) 325 MG tablet Take 650 mg by mouth daily as needed (Headache).    albuterol (PROVENTIL) 2.5 mg /3 mL (0.083 %) nebulizer solution Take 3 mLs (2.5 mg total) by nebulization every 6 (six) hours as needed for Wheezing or Shortness of Breath. Rescue    amoxicillin-clavulanate 875-125mg (AUGMENTIN) 875-125 mg per tablet Take 1 tablet by mouth 2 (two) times daily.    LIDOcaine-prilocaine (EMLA) cream Apply topically as needed (place to port site 45-60 minutes prior to port access).     Family History       Problem Relation (Age of Onset)    Breast cancer Mother    Cancer Mother    Crohn's disease Other          Tobacco Use    Smoking status: Former     Current packs/day: 0.00     Types: Cigarettes     Quit date:      Years since quittin.6    Smokeless tobacco: Never   Substance and Sexual Activity    Alcohol use: No    Drug use: No    Sexual activity: Never     Review of Systems   Constitutional:  Negative for chills and fever.   HENT:  Negative for congestion and sore throat.    Eyes:  Negative for visual disturbance.   Respiratory:  Negative for chest tightness and shortness of breath.    Cardiovascular:  Negative for chest pain and palpitations.   Gastrointestinal:  Positive for abdominal pain. Negative for abdominal distention, constipation, diarrhea, nausea and vomiting.   Musculoskeletal:  Positive for back pain.   Skin:  Negative for rash.   Neurological:  Negative for dizziness, weakness and headaches.   Hematological:  Does not bruise/bleed easily.     Objective:     Vital Signs (Most Recent):  Temp: 98 °F (36.7 °C) (23)  Pulse: 70 (23)  Resp: 15 (23)  BP: (!) 140/64 (23)  SpO2: 95 % (23) Vital Signs (24h Range):  Temp:  [98 °F (36.7 °C)-98.5 °F  (36.9 °C)] 98 °F (36.7 °C)  Pulse:  [70-87] 70  Resp:  [15-20] 15  SpO2:  [95 %-97 %] 95 %  BP: (105-155)/(58-67) 140/64     Weight: 68 kg (150 lb)  Body mass index is 29.29 kg/m².     Physical Exam  Vitals and nursing note reviewed.   Constitutional:       General: She is not in acute distress.     Appearance: Normal appearance. She is ill-appearing. She is not toxic-appearing.      Comments: pale   HENT:      Head: Normocephalic and atraumatic.      Mouth/Throat:      Mouth: Mucous membranes are moist.   Eyes:      General: No scleral icterus.  Cardiovascular:      Rate and Rhythm: Normal rate and regular rhythm.      Pulses: Normal pulses.      Heart sounds: Murmur (systolic murmur on the 2nd intercostal space left sternal margin) heard.   Pulmonary:      Effort: Pulmonary effort is normal. No respiratory distress.      Breath sounds: Normal breath sounds.   Abdominal:      General: There is no distension.      Palpations: Abdomen is soft. There is no mass.      Tenderness: There is no abdominal tenderness. There is no right CVA tenderness or left CVA tenderness.   Musculoskeletal:      Cervical back: No rigidity.      Right lower leg: No edema.      Left lower leg: No edema.   Lymphadenopathy:      Cervical: No cervical adenopathy.   Skin:     General: Skin is warm and dry.      Capillary Refill: Capillary refill takes less than 2 seconds.      Coloration: Skin is not jaundiced.   Neurological:      General: No focal deficit present.      Mental Status: She is alert and oriented to person, place, and time.                Significant Labs: All pertinent labs within the past 24 hours have been reviewed.    Significant Imaging: I have reviewed all pertinent imaging results/findings within the past 24 hours.

## 2023-08-27 NOTE — ASSESSMENT & PLAN NOTE
Patient with Persistent (7 days or more) atrial fibrillation which is controlled currently with Sotalol. Patient is currently in sinus rhythm.[id number] Score: 3. HASBLED Score: >4. Anticoagulation indicated. Anticoagulation done with apixaban.

## 2023-08-27 NOTE — SIGNIFICANT EVENT
Patient refused K in the ED PO. Wants IV K accessed through her port. She does not want other IV access and wants the 22 g IV removed from her hand. Due to patient's profound hypokalemia and refusal of PO, will give repletion via IV through her port although this is less than ideal.

## 2023-08-27 NOTE — H&P
[facility] - Emergency Dept  [facility] Medicine  History & Physical    Patient Name: Pauline Cronin  MRN: [id number]  Patient Class: OP- Observation  Admission Date: 8/26/2023  Attending Physician: Feliberto Petersen MD   Primary Care Provider: Ga Waldrop MD         Patient information was obtained from patient, past medical records and ER records.     Subjective:     Principal Problem:Hypokalemia    Chief Complaint:   Chief Complaint   Patient presents with    Abdominal Pain     Hx of pancreatic CA. Last chemo 8/21. Denies fever.         HPI: Patient is an 83 year old female with past paroxysmal AFib on Eliquis, pancreatic adenocarcinoma heart failure with preserved ejection fraction, sick sinus syndrome with pacemaker, Crohn's disease, hypertension, hyperlipidemia, hypothyroidism presenting to the emergency room with abdominal pain which started a few hours prior to presentation.  She states that who was a constant pressure/pain in her epigastric re chin which was a 6/10.  She started passing gas and the pain resolved however her family insisted that she present to the emergency room for proper evaluation.  In the emergency room she was subsequently found to have hypokalemia to 2.9 on an i-STAT and was admitted for hypokalemia as well as uncontrolled pain.  On my assessment today she is in no acute distress and appears to be doing well.  She does not want to be here and does not want to take the potassium supplements.  She requests that her IV be removed and she denies any need for pain medications.  She is currently undergoing treatment for her pancreatic adenocarcinoma by Dr. Hill.  She is no fevers, chills, night sweats.  She has no cardiac chest pain or atypical cardiac chest pain.  She is currently complaining of thoracic back pain which is related to her position in bed which she does not feel she can improve with repositioning.  Her troponin on admission was 0.1 approximately which is at her baseline  where was 3 months ago.  EKG done in the emergency room shows stable left bundle branch block as well as ST segment changes which are the same as they were back in June.  While it is a diffusely abnormal EKG there appears to be no changes compared to three months ago.        Past Medical History:   Diagnosis Date    Anticoagulant long-term use     Atrial fibrillation     Crohn disease diagnosed in her 20's    GERD (gastroesophageal reflux disease)     Hyperlipidemia     Hypertension     Pancreatic adenocarcinoma 3/21/2023    Thyroid disease     s/p I 131       Past Surgical History:   Procedure Laterality Date    APPENDECTOMY      CARDIAC SURGERY  2014    pacemaker    COLONOSCOPY  ~2008    normal findings per patient report    ENDOSCOPIC ULTRASOUND OF UPPER GASTROINTESTINAL TRACT N/A 3/14/2023    Procedure: ULTRASOUND, UPPER GI TRACT, ENDOSCOPIC;  Surgeon: Andrei Swartz MD;  Location: [facility] ENDO;  Service: Endoscopy;  Laterality: N/A;  Approval to hold Eliquis rec'd from Dr. Barbosa (see telephone encounter 3/3/23)-DS  [employer] AICD/PPM  3/3/23-Instructions via portal-DS    ERCP N/A 3/21/2023    Procedure: ERCP (ENDOSCOPIC RETROGRADE CHOLANGIOPANCREATOGRAPHY);  Surgeon: Celina Toney MD;  Location: [facility] ENDO ([facility] FLR);  Service: Endoscopy;  Laterality: N/A;    ESOPHAGOGASTRODUODENOSCOPY N/A 7/17/2018    Procedure: EGD (ESOPHAGOGASTRODUODENOSCOPY);  Surgeon: Alondra Casiano MD;  Location: [facility] ENDO;  Service: Endoscopy;  Laterality: N/A;    HYSTERECTOMY      INSERTION OF IMPLANTABLE CARDIOVERTER-DEFIBRILLATOR (ICD) GENERATOR WITH TWO EXISTING LEADS Left 5/10/2021    Procedure: INSERTION, PULSE GENERATOR WITH 2 EXISTING LEADS, ICD;  Surgeon: Bo Leone MD;  Location: [facility] CATH LAB;  Service: Cardiology;  Laterality: Left;    INSERTION OF PACEMAKER      REPLACEMENT OF PACEMAKER GENERATOR  05/10/2021    RETROGRADE PYELOGRAPHY Right 2/18/2020    Procedure: PYELOGRAM, RETROGRADE;   Surgeon: Jovan Kruse MD;  Location: [facility];  Service: Urology;  Laterality: Right;    SMALL INTESTINE SURGERY  in her 20's    for crohn's    TONSILLECTOMY      UPPER GASTROINTESTINAL ENDOSCOPY  ~2008    normal findings per patient report       Review of patient's allergies indicates:   Allergen Reactions    Cleocin [clindamycin hcl]     Lisinopril Other (See Comments)     cough       No current facility-administered medications on file prior to encounter.     Current Outpatient Medications on File Prior to Encounter   Medication Sig    alendronate (FOSAMAX) 70 MG tablet Take 1 tablet (70 mg total) by mouth every 7 days.    amLODIPine (NORVASC) 5 MG tablet Take 1 tablet (5 mg total) by mouth once daily.    atorvastatin (LIPITOR) 20 MG tablet Take 1 tablet (20 mg total) by mouth once daily.    colestipoL (COLESTID) 1 gram Tab Take 1 tablet (1 g total) by mouth 2 (two) times daily.    diphenoxylate-atropine 2.5-0.025 mg (LOMOTIL) 2.5-0.025 mg per tablet TAKE 1 TABLET BY MOUTH 4 TIMES DAILY AS NEEDED FOR DIARRHEA    ELIQUIS 5 mg Tab Take 1 tablet (5 mg total) by mouth 2 (two) times daily.    esomeprazole (NEXIUM) 40 MG capsule Take 1 capsule (40 mg total) by mouth once daily.    folic acid (FOLVITE) 1 MG tablet Take 1 tablet by mouth once daily    furosemide (LASIX) 40 MG tablet Take 1 tablet (40 mg total) by mouth daily as needed (For weight gain > 3 lb in one day, increasing leg swelling, or increasing SOB).    levothyroxine (SYNTHROID, LEVOTHROID) 175 MCG tablet Take 1 tablet by mouth once daily    lipase-protease-amylase 24,000-76,000-120,000 units (CREON) 24,000-76,000 -120,000 unit capsule Take 2 capsules by mouth 3 (three) times daily with meals.    MELATONIN ORAL Take 1 tablet by mouth nightly as needed (Insomnia).    OLANZapine (ZYPREXA) 5 MG tablet Take 1 tablet (5 mg total) by mouth every evening.    potassium chloride (MICRO-K) 10 MEQ CpSR 1 po bid x 1 week then Micro K qd     prochlorperazine (COMPAZINE) 10 MG tablet Take 1 tablet (10 mg total) by mouth 4 (four) times daily as needed (nausea).    sotaloL (BETAPACE) 120 MG Tab Take 1 tablet (120 mg total) by mouth 2 (two) times daily.    sulfaSALAzine (AZULFIDINE) 500 mg Tab Take 1 tablet by mouth twice daily    tolterodine (DETROL LA) 4 MG 24 hr capsule Take 1 capsule (4 mg total) by mouth once daily.    traMADoL (ULTRAM) 50 mg tablet Take 1 tablet (50 mg total) by mouth every 6 (six) hours as needed for Pain.    acetaminophen (TYLENOL) 325 MG tablet Take 650 mg by mouth daily as needed (Headache).    albuterol (PROVENTIL) 2.5 mg /3 mL (0.083 %) nebulizer solution Take 3 mLs (2.5 mg total) by nebulization every 6 (six) hours as needed for Wheezing or Shortness of Breath. Rescue    amoxicillin-clavulanate 875-125mg (AUGMENTIN) 875-125 mg per tablet Take 1 tablet by mouth 2 (two) times daily.    LIDOcaine-prilocaine (EMLA) cream Apply topically as needed (place to port site 45-60 minutes prior to port access).     Family History       Problem Relation (Age of Onset)    Breast cancer Mother    Cancer Mother    Crohn's disease Other          Tobacco Use    Smoking status: Former     Current packs/day: 0.00     Types: Cigarettes     Quit date:      Years since quittin.6    Smokeless tobacco: Never   Substance and Sexual Activity    Alcohol use: No    Drug use: No    Sexual activity: Never     Review of Systems   Constitutional:  Negative for chills and fever.   HENT:  Negative for congestion and sore throat.    Eyes:  Negative for visual disturbance.   Respiratory:  Negative for chest tightness and shortness of breath.    Cardiovascular:  Negative for chest pain and palpitations.   Gastrointestinal:  Positive for abdominal pain. Negative for abdominal distention, constipation, diarrhea, nausea and vomiting.   Musculoskeletal:  Positive for back pain.   Skin:  Negative for rash.   Neurological:  Negative for dizziness,  weakness and headaches.   Hematological:  Does not bruise/bleed easily.     Objective:     Vital Signs (Most Recent):  Temp: 98 °F (36.7 °C) (08/26/23 1858)  Pulse: 70 (08/26/23 2232)  Resp: 15 (08/26/23 2232)  BP: (!) 140/64 (08/26/23 2232)  SpO2: 95 % (08/26/23 2232) Vital Signs (24h Range):  Temp:  [98 °F (36.7 °C)-98.5 °F (36.9 °C)] 98 °F (36.7 °C)  Pulse:  [70-87] 70  Resp:  [15-20] 15  SpO2:  [95 %-97 %] 95 %  BP: (105-155)/(58-67) 140/64     Weight: 68 kg (150 lb)  Body mass index is 29.29 kg/m².     Physical Exam  Vitals and nursing note reviewed.   Constitutional:       General: She is not in acute distress.     Appearance: Normal appearance. She is ill-appearing. She is not toxic-appearing.      Comments: pale   HENT:      Head: Normocephalic and atraumatic.      Mouth/Throat:      Mouth: Mucous membranes are moist.   Eyes:      General: No scleral icterus.  Cardiovascular:      Rate and Rhythm: Normal rate and regular rhythm.      Pulses: Normal pulses.      Heart sounds: Murmur (systolic murmur on the 2nd intercostal space left sternal margin) heard.   Pulmonary:      Effort: Pulmonary effort is normal. No respiratory distress.      Breath sounds: Normal breath sounds.   Abdominal:      General: There is no distension.      Palpations: Abdomen is soft. There is no mass.      Tenderness: There is no abdominal tenderness. There is no right CVA tenderness or left CVA tenderness.   Musculoskeletal:      Cervical back: No rigidity.      Right lower leg: No edema.      Left lower leg: No edema.   Lymphadenopathy:      Cervical: No cervical adenopathy.   Skin:     General: Skin is warm and dry.      Capillary Refill: Capillary refill takes less than 2 seconds.      Coloration: Skin is not jaundiced.   Neurological:      General: No focal deficit present.      Mental Status: She is alert and oriented to person, place, and time.                Significant Labs: All pertinent labs within the past 24 hours have  "been reviewed.    Significant Imaging: I have reviewed all pertinent imaging results/findings within the past 24 hours.    Assessment/Plan:     * Hypokalemia  Patient is an 83-year-old female with past medical history significant for metastatic pancreatic adenocarcinoma paroxysmal AFib who is presenting with acute abdominal pain which resolved with passing gas.  She was admitted to the hospital due to troponins Krista however her current troponin is slightly improved from her baseline at 0.1 and she has no EKG changes or symptoms of cardiac chest pain or atypical cardiac chest pain.  Patient's potassium on an i-STAT was 2.9 however and the patient was admitted for hypokalemia and chest pain rule out which is ruled out.    Plan:  - Repeat metabolic panel  - replete potassium greater than 3.5-4.0      Abdominal pain  Appears resolved with passing gas. Back pain appears to be related to position in ED bed. Patient denies need for medication.      Chronic heart failure with preserved ejection fraction  Appears euvolemic. Continue to monitor volume status with judicious IV resuscitation.      Malignant neoplasm of pancreas  From 5/1/23 [facility] Note: Oncology History "She presented initially to her PCP on 03/01/23 with several weeks of fatigue, nausea and several pounds of weight loss. She had a CT abdomen pelvis which showed a pancreatic head mass that abuts or invades the adjacent 2nd portion of the duodenum it was measured about 3.3 x 2.8 cm. There was some degree of GOO. The portal vein, splenic vein, SMV, celiac axis and SMA all are patent.   She underwent an ERCP w biopsy of the mass on 03/14 which was positive for adenocarcinoma, pMMR. She was admitted on 03/17 for hyperbilirubinemia. She underwent ERCP with stenting on 03/21 c/w post-ERCP pancreatitis.   She was admitted again 03/28 for afib with RVR and ADHF. She was discharged on 04/05.   Admitted 4/22-4/24 for N/V and abdominal pain that resolved with " "conservative management.   Admitted 5/02 to 5/03 for intractable to nausea and vomiting, treated for pancreatitis."      Sick sinus syndrome  On PM      Hypothyroidism  Continue levothyroxine      Essential hypertension, benign  Target BP < 160 SBP. Resume home meds      Hyperlipidemia    Continue atorvastatin    Paroxysmal atrial fibrillation  Patient with Persistent (7 days or more) atrial fibrillation which is controlled currently with Sotalol. Patient is currently in sinus rhythm.[id number] Score: 3. HASBLED Score: >4. Anticoagulation indicated. Anticoagulation done with apixaban.    VTE Risk Mitigation (From admission, onward)         Ordered     apixaban tablet 5 mg  2 times daily         08/26/23 2248     Reason for No Pharmacological VTE Prophylaxis  Once        Question:  Reasons:  Answer:  Already adequately anticoagulated on oral Anticoagulants    08/26/23 2248     IP VTE HIGH RISK PATIENT  Once         08/26/23 2248     Place sequential compression device  Until discontinued         08/26/23 2248                   On 08/26/2023, patient should be placed in hospital observation services under my care.        Daniel Jaeger MD  Department of Hospital Medicine  [facility] - Emergency Dept  "

## 2023-08-27 NOTE — PLAN OF CARE
POC reviewed with patient; understanding verbalized. Pt received IV potassium, magnesium, phosphorous, and calcium replacement. Pt on tele monitor. EKG done. Pt is currently NPO, awaiting abd US. Cardiology consult in place. Pt is a 1x assist and voids via hat. No BM this shift. Bed alarm set. Pt. with nonskid footwear on, bed in lowest position, and locked with bed rails up x2.  Pt. instructed to call prior to getting OOB.  Pt. has call light and personal items within reach. VSS and afebrile this shift. All questions and concerns addressed at this time.

## 2023-08-27 NOTE — ED NOTES
Pt care assumed. Report received by NOBLE Stevenson. Pt lying in stretcher in low and locked position and side rails raised x2. Call light, pt's belongings, and bedside table within pt's reach. Pt on continuous cardiac monitoring, pulse oximetry, and BP cycling every 30 minutes. Pt in NAD and verbalized no needs at this time.

## 2023-08-27 NOTE — HPI
Patient is an 83 year old female with past paroxysmal AFib on Eliquis, pancreatic adenocarcinoma heart failure with preserved ejection fraction, sick sinus syndrome with pacemaker, Crohn's disease, hypertension, hyperlipidemia, hypothyroidism presenting to the emergency room with abdominal pain which started a few hours prior to presentation.  She states that who was a constant pressure/pain in her epigastric re chin which was a 6/10.  She started passing gas and the pain resolved however her family insisted that she present to the emergency room for proper evaluation.  In the emergency room she was subsequently found to have hypokalemia to 2.9 on an i-STAT and was admitted for hypokalemia as well as uncontrolled pain.  On my assessment today she is in no acute distress and appears to be doing well.  She does not want to be here and does not want to take the potassium supplements.  She requests that her IV be removed and she denies any need for pain medications.  She is currently undergoing treatment for her pancreatic adenocarcinoma by Dr. Hill.  She is no fevers, chills, night sweats.  She has no cardiac chest pain or atypical cardiac chest pain.  She is currently complaining of thoracic back pain which is related to her position in bed which she does not feel she can improve with repositioning.  Her troponin on admission was 0.1 approximately which is at her baseline where was 3 months ago.  EKG done in the emergency room shows stable left bundle branch block as well as ST segment changes which are the same as they were back in June.  While it is a diffusely abnormal EKG there appears to be no changes compared to three months ago.

## 2023-08-27 NOTE — NURSING
Nurses Note -- 4 Eyes      8/27/2023   12:57 AM      Skin assessed during: Admit      [] No Altered Skin Integrity Present    []Prevention Measures Documented      [x] Yes- Altered Skin Integrity Present or Discovered   [x] LDA Added if Not in Epic (Describe Wound)   [x] New Altered Skin Integrity was Present on Admit and Documented in LDA   [] Wound Image Taken    Wound Care Consulted? Yes    Attending Nurse:  Adair Benz RN/Staff Member:  Done with Kenia Ball RN

## 2023-08-27 NOTE — HOSPITAL COURSE
Patient admitted for further and drain workup elevated troponin, and hypokalemia.  Patient troponin peaked at 0.133.  Likely due to demand ischemia.  Initial potassium in the ED 2.8.  Repleted with IV potassium as patient did not want to swallow large pills.  This is likely due to her history of Crohn's induced diarrhea, and chemotherapy.  Patient also has a elevated T bili of 3.1 (repeated).  Pending liver ultrasound for possible biliary obstruction.  Patient with extensive cardiac history, Cardiology was consulted for rate control management as patient has prolonged QTC and is on sotalol 120 mg b.i.d.  Qtc prolongation likely due to electrolyte derangement. Cardiology recommends to stop sotalol due to electrolyte disturbances and Qtc prolongation. Pt started on metoprolol and to be follow outpatient with Dr. Barbosa. AES consulted for elevated T.bili and alk phos. CTAP w/ pancreas was done and it showed  stable appearance of pancreatic heterogeneous masslike fullness at the pancreatic head, abutting the duodenum.  Stable hypodense lesion at the pancreatic body abutting the stomach with adjacent wall thickening.  Small amount of right upper quadrant ascites around the liver and gallbladder.  Biliary stent in place with associated pneumobilia. New small amount of right pleural fluid. Cholelithiasis.Questionable 6 mm stone at the ureterovesical junction vs phlebolith, similar to prior. No GI intervention required. Pt stable and ready to be discharged.

## 2023-08-27 NOTE — ASSESSMENT & PLAN NOTE
Patient is an 83-year-old female with past medical history significant for metastatic pancreatic adenocarcinoma paroxysmal AFib who is presenting with acute abdominal pain which resolved with passing gas.  She was admitted to the hospital due to troponins Krista however her current troponin is slightly improved from her baseline at 0.1 and she has no EKG changes or symptoms of cardiac chest pain or atypical cardiac chest pain.  Patient's potassium on an i-STAT was 2.9 however and the patient was admitted for hypokalemia and chest pain rule out which is ruled out.    Plan:  - Repeat metabolic panel  - replete potassium greater than 3.5-4.0

## 2023-08-27 NOTE — HPI
Mrs. Cronin is a 82 yo F with PMH pAF, SSS s/p pacemaker, HTN, HLD, pancreatic adenocarcinoma on Dr. Hill s/p stenting on paclitaxel/gemcitabine (last dose 8/21), and obesity who presents to [facility] with abdominal pain x 1 days. She reports the pain started a few hours ago after she consumed an egg roll. She described a constant pain in her epigastric area that she rates a 6 out of 10. Patient had recent history of diarrhea and N/V with recent intake of imodium.                 Oncology History   Malignant neoplasm of pancreas   3/21/2023 Initial Diagnosis     Pancreatic adenocarcinoma      4/13/2023 Cancer Staged     Staging form: Exocrine Pancreas, AJCC 8th Edition  - Clinical stage from 4/13/2023: Stage IB (cT2, cN0, cM0)      5/1/2023 -  Chemotherapy     Treatment Summary   Plan Name: OP PANC NAB-PACLITAXEL + GEMCITABINE Q4W  Treatment Goal: Palliative  Status: Active  Start Date: 5/1/2023  End Date: 5/13/2024 (Planned)  Provider: Jim Hill MD  Chemotherapy: gemcitabine (GEMZAR) 1,400 mg in sodium chloride 0.9% SolP 321.82 mL chemo infusion, 1,448 mg (100 % of original dose 800 mg/m2), Intravenous, Clinic/HOD 1 time, 2 of 12 cycles  Dose modification: 800 mg/m2 (original dose 800 mg/m2, Cycle 1, Reason: Other (see comments), Comment: poor PS)  Administration: 1,400 mg (5/1/2023), 1,400 mg (6/26/2023), 1,400 mg (7/10/2023), 1,400 mg (8/7/2023)

## 2023-08-27 NOTE — PLAN OF CARE
Discussed case with oncology fellow, and pt will transfer to the medical oncology service this morning in real time.      Feliberto Petersen MD  Attending Physician  Medical Director - [facility] Observation Unit  Department of Hospital Medicine  8/27/2023

## 2023-08-27 NOTE — NURSING
Nurses Note -- 4 Eyes      8/27/2023   6:34 AM      Skin assessed during: Transfer      [] No Altered Skin Integrity Present    []Prevention Measures Documented      [x] Yes- Altered Skin Integrity Present or Discovered   [] LDA Added if Not in Epic (Describe Wound)   [] New Altered Skin Integrity was Present on Admit and Documented in LDA   [] Wound Image Taken    Wound Care Consulted? Yes, by previous nurse.    Attending Nurse:  Love Page RN    Second RN/Staff Member: Done with NOBLE Harris    As noted before, red blanchable skin noted to buttock. Also noted a small, nickel sized wound to right buttock. Skin dry, clean, and intact. Sacral foam in place. Turn q2hr initiated. Wound care placed by previous nurse.

## 2023-08-27 NOTE — ASSESSMENT & PLAN NOTE
"From 5/1/23 [facility] Note: Oncology History "She presented initially to her PCP on 03/01/23 with several weeks of fatigue, nausea and several pounds of weight loss. She had a CT abdomen pelvis which showed a pancreatic head mass that abuts or invades the adjacent 2nd portion of the duodenum it was measured about 3.3 x 2.8 cm. There was some degree of GOO. The portal vein, splenic vein, SMV, celiac axis and SMA all are patent.   She underwent an ERCP w biopsy of the mass on 03/14 which was positive for adenocarcinoma, pMMR. She was admitted on 03/17 for hyperbilirubinemia. She underwent ERCP with stenting on 03/21 c/w post-ERCP pancreatitis.   She was admitted again 03/28 for afib with RVR and ADHF. She was discharged on 04/05.   Admitted 4/22-4/24 for N/V and abdominal pain that resolved with conservative management.   Admitted 5/02 to 5/03 for intractable to nausea and vomiting, treated for pancreatitis."    "

## 2023-08-27 NOTE — NURSING
Assumed care of pt 0030 as transfer from ED. Troponin lab was due to be drawn 2230. ED charge RN and ED RN called and asked to draw lab prior to sending patient to floor. Lab never drawn. On-call provider messaged to obtain new troponin order, blood draw pending.

## 2023-08-27 NOTE — NURSING TRANSFER
Nursing Transfer Note      8/27/2023   6:37 AM    Nurse giving handoff: Adair Winn RN  Nurse receiving handoff: Love Page RN    Reason patient is being transferred: Transfer to [facility] onc service    Transfer From 14 W to 845    Transfer via bed    Transfer with cardiac monitoring and 2 L of oxygen.    Transported by 2 RNs    Transfer Vital Signs:  See flow sheet for vitals and assessment.    Order for Tele Monitor? Yes    Additional Lines: Oxygen    4eyes on Skin: yes    Medicines sent: No    Any special needs or follow-up needed: N/A    Patient belongings transferred with patient: Yes    Chart send with patient: Yes    Patient reassessed at: 8/27/2023 at 418    Upon arrival to floor: cardiac monitor applied, pt oriented to room, floor, and call light. Bed alarm set.

## 2023-08-27 NOTE — ED NOTES
I-STAT Chem-8+ Results:   Value Reference Range   Sodium 140 136-145 mmol/L   Potassium  2.8 3.5-5.1 mmol/L   Chloride 99  mmol/L   Ionized Calcium 0.85 1.06-1.42 mmol/L   CO2 (measured) 25 23-29 mmol/L   Glucose 112  mg/dL   BUN 11 6-30 mg/dL   Creatinine 0.8 0.5-1.4 mg/dL   Hematocrit 32 36-54%         no

## 2023-08-27 NOTE — NURSING
Magnesium replaced w/1g. Ceftriaxone given. Potassium being replaced IV, bag 1 of 5 infusing now for a total of 50mEq.

## 2023-08-27 NOTE — ED NOTES
Telemetry Verification   Patient placed on Telemetry Box  Verified with War Room  Box # [id number]   Monitor Tech Max   Rate 74   Rhythm Normal Sinus

## 2023-08-28 PROBLEM — E80.6 HYPERBILIRUBINEMIA: Status: ACTIVE | Noted: 2023-08-28

## 2023-08-28 LAB
ALBUMIN SERPL BCP-MCNC: 2.3 G/DL (ref 3.5–5.2)
ALP SERPL-CCNC: 274 U/L (ref 55–135)
ALT SERPL W/O P-5'-P-CCNC: 46 U/L (ref 10–44)
ANION GAP SERPL CALC-SCNC: 9 MMOL/L (ref 8–16)
AST SERPL-CCNC: 94 U/L (ref 10–40)
BILIRUB SERPL-MCNC: 3.4 MG/DL (ref 0.1–1)
BUN SERPL-MCNC: 6 MG/DL (ref 8–23)
CA-I BLDV-SCNC: 1.07 MMOL/L (ref 1.06–1.42)
CALCIUM SERPL-MCNC: 8 MG/DL (ref 8.7–10.5)
CHLORIDE SERPL-SCNC: 105 MMOL/L (ref 95–110)
CO2 SERPL-SCNC: 25 MMOL/L (ref 23–29)
CREAT SERPL-MCNC: 0.8 MG/DL (ref 0.5–1.4)
ERYTHROCYTE [DISTWIDTH] IN BLOOD BY AUTOMATED COUNT: 17.1 % (ref 11.5–14.5)
EST. GFR  (NO RACE VARIABLE): >60 ML/MIN/1.73 M^2
GLUCOSE SERPL-MCNC: 74 MG/DL (ref 70–110)
HCT VFR BLD AUTO: 29 % (ref 37–48.5)
HGB BLD-MCNC: 9.5 G/DL (ref 12–16)
MAGNESIUM SERPL-MCNC: 1.9 MG/DL (ref 1.6–2.6)
MCH RBC QN AUTO: 32.9 PG (ref 27–31)
MCHC RBC AUTO-ENTMCNC: 32.8 G/DL (ref 32–36)
MCV RBC AUTO: 100 FL (ref 82–98)
PHOSPHATE SERPL-MCNC: 2.5 MG/DL (ref 2.7–4.5)
PLATELET # BLD AUTO: 112 K/UL (ref 150–450)
PMV BLD AUTO: 12.7 FL (ref 9.2–12.9)
POTASSIUM SERPL-SCNC: 4.2 MMOL/L (ref 3.5–5.1)
PROT SERPL-MCNC: 6 G/DL (ref 6–8.4)
RBC # BLD AUTO: 2.89 M/UL (ref 4–5.4)
SODIUM SERPL-SCNC: 139 MMOL/L (ref 136–145)
WBC # BLD AUTO: 8.63 K/UL (ref 3.9–12.7)

## 2023-08-28 PROCEDURE — 99233 PR SUBSEQUENT HOSPITAL CARE,LEVL III: ICD-10-PCS | Mod: HCNC,GC,, | Performed by: INTERNAL MEDICINE

## 2023-08-28 PROCEDURE — 84100 ASSAY OF PHOSPHORUS: CPT | Mod: HCNC | Performed by: STUDENT IN AN ORGANIZED HEALTH CARE EDUCATION/TRAINING PROGRAM

## 2023-08-28 PROCEDURE — 83735 ASSAY OF MAGNESIUM: CPT | Mod: HCNC | Performed by: STUDENT IN AN ORGANIZED HEALTH CARE EDUCATION/TRAINING PROGRAM

## 2023-08-28 PROCEDURE — 25000003 PHARM REV CODE 250: Mod: HCNC

## 2023-08-28 PROCEDURE — 94761 N-INVAS EAR/PLS OXIMETRY MLT: CPT | Mod: HCNC

## 2023-08-28 PROCEDURE — 93010 ELECTROCARDIOGRAM REPORT: CPT | Mod: HCNC,,, | Performed by: INTERNAL MEDICINE

## 2023-08-28 PROCEDURE — 25500020 PHARM REV CODE 255: Mod: HCNC | Performed by: INTERNAL MEDICINE

## 2023-08-28 PROCEDURE — 93005 ELECTROCARDIOGRAM TRACING: CPT | Mod: HCNC

## 2023-08-28 PROCEDURE — 25000003 PHARM REV CODE 250: Mod: HCNC | Performed by: STUDENT IN AN ORGANIZED HEALTH CARE EDUCATION/TRAINING PROGRAM

## 2023-08-28 PROCEDURE — 99233 SBSQ HOSP IP/OBS HIGH 50: CPT | Mod: HCNC,GC,, | Performed by: INTERNAL MEDICINE

## 2023-08-28 PROCEDURE — 85027 COMPLETE CBC AUTOMATED: CPT | Mod: HCNC | Performed by: STUDENT IN AN ORGANIZED HEALTH CARE EDUCATION/TRAINING PROGRAM

## 2023-08-28 PROCEDURE — 20600001 HC STEP DOWN PRIVATE ROOM: Mod: HCNC

## 2023-08-28 PROCEDURE — 80053 COMPREHEN METABOLIC PANEL: CPT | Mod: HCNC | Performed by: STUDENT IN AN ORGANIZED HEALTH CARE EDUCATION/TRAINING PROGRAM

## 2023-08-28 PROCEDURE — 63600175 PHARM REV CODE 636 W HCPCS: Mod: HCNC

## 2023-08-28 PROCEDURE — 93010 EKG 12-LEAD: ICD-10-PCS | Mod: HCNC,,, | Performed by: INTERNAL MEDICINE

## 2023-08-28 PROCEDURE — 82330 ASSAY OF CALCIUM: CPT | Mod: HCNC | Performed by: STUDENT IN AN ORGANIZED HEALTH CARE EDUCATION/TRAINING PROGRAM

## 2023-08-28 RX ORDER — MAGNESIUM SULFATE HEPTAHYDRATE 40 MG/ML
2 INJECTION, SOLUTION INTRAVENOUS ONCE
Status: COMPLETED | OUTPATIENT
Start: 2023-08-28 | End: 2023-08-28

## 2023-08-28 RX ORDER — METOPROLOL TARTRATE 25 MG/1
25 TABLET, FILM COATED ORAL 3 TIMES DAILY
Status: DISCONTINUED | OUTPATIENT
Start: 2023-08-28 | End: 2023-08-29 | Stop reason: HOSPADM

## 2023-08-28 RX ORDER — TALC
6 POWDER (GRAM) TOPICAL NIGHTLY PRN
Status: DISCONTINUED | OUTPATIENT
Start: 2023-08-28 | End: 2023-08-29 | Stop reason: HOSPADM

## 2023-08-28 RX ADMIN — ATORVASTATIN CALCIUM 20 MG: 20 TABLET, FILM COATED ORAL at 08:08

## 2023-08-28 RX ADMIN — METOPROLOL TARTRATE 25 MG: 25 TABLET, FILM COATED ORAL at 03:08

## 2023-08-28 RX ADMIN — METOPROLOL TARTRATE 25 MG: 25 TABLET, FILM COATED ORAL at 08:08

## 2023-08-28 RX ADMIN — PANTOPRAZOLE SODIUM 40 MG: 40 TABLET, DELAYED RELEASE ORAL at 08:08

## 2023-08-28 RX ADMIN — METOPROLOL TARTRATE 25 MG: 25 TABLET, FILM COATED ORAL at 09:08

## 2023-08-28 RX ADMIN — LEVOTHYROXINE SODIUM 175 MCG: 125 TABLET ORAL at 05:08

## 2023-08-28 RX ADMIN — AMLODIPINE BESYLATE 5 MG: 5 TABLET ORAL at 08:08

## 2023-08-28 RX ADMIN — Medication 6 MG: at 09:08

## 2023-08-28 RX ADMIN — Medication 1 TABLET: at 08:08

## 2023-08-28 RX ADMIN — APIXABAN 5 MG: 5 TABLET, FILM COATED ORAL at 08:08

## 2023-08-28 RX ADMIN — CHOLESTYRAMINE 4 G: 4 POWDER, FOR SUSPENSION ORAL at 08:08

## 2023-08-28 RX ADMIN — IOHEXOL 75 ML: 350 INJECTION, SOLUTION INTRAVENOUS at 02:08

## 2023-08-28 RX ADMIN — MAGNESIUM SULFATE HEPTAHYDRATE 2 G: 40 INJECTION, SOLUTION INTRAVENOUS at 03:08

## 2023-08-28 RX ADMIN — PANCRELIPASE 2 CAPSULE: 24000; 76000; 120000 CAPSULE, DELAYED RELEASE PELLETS ORAL at 05:08

## 2023-08-28 NOTE — ASSESSMENT & PLAN NOTE
Patient with pancreatic cancer and pAF admitted with abdominal pain and sotalol held due to prolonged QTc. This was in the setting of severely depleted electrolytes (all significantly low), and her QTc trended back toward her baseline with repletion.    Recommendation:  - Hold sotalol in the setting of poor nutrition, concern for future electrolyte imbalances (at risk for prolonged QT)  - Beta blockade with metoprolol to treat heart rate, goal , transition from metoprolol tartrate TID to metoprolol succinate QD on discharge  - F/u with Dr. Barbosa in clinic  - Continue home anticoagulation  - Most importantly, replete lytes aggressively. Mg >2, K >4, Calcium and Phos to normal  - Will need close cardiology follow up for rate control    Discussed with staff

## 2023-08-28 NOTE — CONSULTS
[facility] ([facility])  Cardiology  Consult Note    Patient Name: Pauline Cronin  MRN: [id number]  Admission Date: 8/26/2023  Hospital Length of Stay: 0 days  Code Status: Full Code   Attending Provider: Fabby Muir MD   Consulting Provider: Connor M Gillies, MD  Primary Care Physician: Ga Waldrop MD  Principal Problem:Hypokalemia    Patient information was obtained from patient, past medical records and ER records.     Inpatient consult to Cardiology  Consult performed by: Gillies, Connor M, MD  Consult ordered by: Tam Webster DO        Subjective:     Chief Complaint:  Abdominal pain     HPI:   83-year-old female with a past medical history of paroxysmal atrial fibrillation on Eliquis, HFpEF, sick sinus syndrome s/p PPM, Crohn's disease, hypertension, hyperlipidemia, hypothyroidism, pancreatic adenocarcinoma on paclitaxel and gemcitabine every 4 weeks presented to the emergency department with epigastric pain that resolved with flatus.  While in the emergency department she was found to have severe hypokalemia, hypomagnesemia, hypocalcemia, and hypophosphatemia.  She is on Eliquis and sotalol for her atrial fibrillation.  On presentation her EKG shows an atrial paced rhythm with a prolonged AV conduction and left bundle branch block present.  Her QTC on that initial EKG was 565 (corrected for LBBB).  She has since been repleted on many of her electrolytes, and has a similar rhythm on EKG but her QTC has shortened.  It is now in the low 500s, closer to her baseline.  Her sotalol was held during this admission due to prolonged QT, and Cardiology was consulted for recommendations on antiarrhythmic agents in this patient. She has been rate controlled in a paced rhythm since admission. Previously she has been on beta blockers as well. One of the concerns is her PO intake. Her electrolytes are likely depleted due to her poor PO intake around her chemotherapy sessions.       Past Medical History:    Diagnosis Date    Anticoagulant long-term use     Atrial fibrillation     Crohn disease diagnosed in her 20's    GERD (gastroesophageal reflux disease)     Hyperlipidemia     Hypertension     Pancreatic adenocarcinoma 3/21/2023    Thyroid disease     s/p I 131       Past Surgical History:   Procedure Laterality Date    APPENDECTOMY      CARDIAC SURGERY  2014    pacemaker    COLONOSCOPY  ~2008    normal findings per patient report    ENDOSCOPIC ULTRASOUND OF UPPER GASTROINTESTINAL TRACT N/A 3/14/2023    Procedure: ULTRASOUND, UPPER GI TRACT, ENDOSCOPIC;  Surgeon: Andrei Swartz MD;  Location: [facility];  Service: Endoscopy;  Laterality: N/A;  Approval to hold Eliquis rec'd from Dr. Barbosa (see telephone encounter 3/3/23)-DS  [employer] AICD/PPM  3/3/23-Instructions via portal-DS    ERCP N/A 3/21/2023    Procedure: ERCP (ENDOSCOPIC RETROGRADE CHOLANGIOPANCREATOGRAPHY);  Surgeon: Celina Toney MD;  Location: [facility] (2ND FLR);  Service: Endoscopy;  Laterality: N/A;    ESOPHAGOGASTRODUODENOSCOPY N/A 7/17/2018    Procedure: EGD (ESOPHAGOGASTRODUODENOSCOPY);  Surgeon: Alondra Casiano MD;  Location: [facility] ENDO;  Service: Endoscopy;  Laterality: N/A;    HYSTERECTOMY      INSERTION OF IMPLANTABLE CARDIOVERTER-DEFIBRILLATOR (ICD) GENERATOR WITH TWO EXISTING LEADS Left 5/10/2021    Procedure: INSERTION, PULSE GENERATOR WITH 2 EXISTING LEADS, ICD;  Surgeon: Bo Leone MD;  Location: [facility] CATH LAB;  Service: Cardiology;  Laterality: Left;    INSERTION OF PACEMAKER      REPLACEMENT OF PACEMAKER GENERATOR  05/10/2021    RETROGRADE PYELOGRAPHY Right 2/18/2020    Procedure: PYELOGRAM, RETROGRADE;  Surgeon: Jovan Kruse MD;  Location: [facility] OR;  Service: Urology;  Laterality: Right;    SMALL INTESTINE SURGERY  in her 20's    for crohn's    TONSILLECTOMY      UPPER GASTROINTESTINAL ENDOSCOPY  ~2008    normal findings per patient report       Review of patient's allergies indicates:   Allergen Reactions    Cleocin  [clindamycin hcl]     Lisinopril Other (See Comments)     cough       No current facility-administered medications on file prior to encounter.     Current Outpatient Medications on File Prior to Encounter   Medication Sig    alendronate (FOSAMAX) 70 MG tablet Take 1 tablet (70 mg total) by mouth every 7 days.    amLODIPine (NORVASC) 5 MG tablet Take 1 tablet (5 mg total) by mouth once daily.    atorvastatin (LIPITOR) 20 MG tablet Take 1 tablet (20 mg total) by mouth once daily.    colestipoL (COLESTID) 1 gram Tab Take 1 tablet (1 g total) by mouth 2 (two) times daily.    diphenoxylate-atropine 2.5-0.025 mg (LOMOTIL) 2.5-0.025 mg per tablet TAKE 1 TABLET BY MOUTH 4 TIMES DAILY AS NEEDED FOR DIARRHEA    ELIQUIS 5 mg Tab Take 1 tablet (5 mg total) by mouth 2 (two) times daily.    esomeprazole (NEXIUM) 40 MG capsule Take 1 capsule (40 mg total) by mouth once daily.    folic acid (FOLVITE) 1 MG tablet Take 1 tablet by mouth once daily    furosemide (LASIX) 40 MG tablet Take 1 tablet (40 mg total) by mouth daily as needed (For weight gain > 3 lb in one day, increasing leg swelling, or increasing SOB).    levothyroxine (SYNTHROID, LEVOTHROID) 175 MCG tablet Take 1 tablet by mouth once daily    lipase-protease-amylase 24,000-76,000-120,000 units (CREON) 24,000-76,000 -120,000 unit capsule Take 2 capsules by mouth 3 (three) times daily with meals.    MELATONIN ORAL Take 1 tablet by mouth nightly as needed (Insomnia).    OLANZapine (ZYPREXA) 5 MG tablet Take 1 tablet (5 mg total) by mouth every evening.    potassium chloride (MICRO-K) 10 MEQ CpSR 1 po bid x 1 week then Micro K qd    prochlorperazine (COMPAZINE) 10 MG tablet Take 1 tablet (10 mg total) by mouth 4 (four) times daily as needed (nausea).    sotaloL (BETAPACE) 120 MG Tab Take 1 tablet (120 mg total) by mouth 2 (two) times daily.    sulfaSALAzine (AZULFIDINE) 500 mg Tab Take 1 tablet by mouth twice daily    tolterodine (DETROL LA) 4 MG 24 hr capsule Take 1 capsule  (4 mg total) by mouth once daily.    traMADoL (ULTRAM) 50 mg tablet Take 1 tablet (50 mg total) by mouth every 6 (six) hours as needed for Pain.    acetaminophen (TYLENOL) 325 MG tablet Take 650 mg by mouth daily as needed (Headache).    albuterol (PROVENTIL) 2.5 mg /3 mL (0.083 %) nebulizer solution Take 3 mLs (2.5 mg total) by nebulization every 6 (six) hours as needed for Wheezing or Shortness of Breath. Rescue    amoxicillin-clavulanate 875-125mg (AUGMENTIN) 875-125 mg per tablet Take 1 tablet by mouth 2 (two) times daily.    LIDOcaine-prilocaine (EMLA) cream Apply topically as needed (place to port site 45-60 minutes prior to port access).     Family History       Problem Relation (Age of Onset)    Breast cancer Mother    Cancer Mother    Crohn's disease Other          Tobacco Use    Smoking status: Former     Current packs/day: 0.00     Types: Cigarettes     Quit date:      Years since quittin.6    Smokeless tobacco: Never   Substance and Sexual Activity    Alcohol use: No    Drug use: No    Sexual activity: Never     Review of Systems   Constitutional: Negative for diaphoresis and fever.   Cardiovascular:  Negative for chest pain, leg swelling, near-syncope, orthopnea, paroxysmal nocturnal dyspnea and syncope.   Respiratory:  Negative for cough and shortness of breath.    Gastrointestinal:  Negative for abdominal pain, diarrhea, nausea and vomiting.   Neurological:  Negative for light-headedness.   Psychiatric/Behavioral:  Negative for altered mental status and substance abuse.      Objective:     Vital Signs (Most Recent):  Temp: 98.6 °F (37 °C) (23)  Pulse: 82 (23)  Resp: 17 (23)  BP: 134/62 (23)  SpO2: 96 % (23) Vital Signs (24h Range):  Temp:  [96.1 °F (35.6 °C)-100.1 °F (37.8 °C)] 98.6 °F (37 °C)  Pulse:  [68-87] 82  Resp:  [16-20] 17  SpO2:  [88 %-100 %] 96 %  BP: (119-146)/(57-63) 134/62     Weight: 71.7 kg (158 lb 1.1 oz)  Body mass index  is 30.87 kg/m².    SpO2: 96 %         Intake/Output Summary (Last 24 hours) at 8/27/2023 2321  Last data filed at 8/27/2023 1706  Gross per 24 hour   Intake 991.14 ml   Output 500 ml   Net 491.14 ml       Lines/Drains/Airways       Central Venous Catheter Line  Duration                  PowerPort A Cath Single Lumen 06/22/23 1356 Atrial Right 66 days              Peripheral Intravenous Line  Duration                  Peripheral IV - Single Lumen 08/26/23 1932 22 G;1 in Right Antecubital 1 day                     Physical Exam  Constitutional:       Appearance: Normal appearance.   Cardiovascular:      Rate and Rhythm: Normal rate and regular rhythm.      Pulses: Normal pulses.      Heart sounds: Normal heart sounds.   Pulmonary:      Effort: Pulmonary effort is normal.      Breath sounds: Normal breath sounds. No wheezing or rales.   Abdominal:      General: Abdomen is flat. There is no distension.      Palpations: Abdomen is soft.      Tenderness: There is no abdominal tenderness.   Musculoskeletal:      Right lower leg: No edema.      Left lower leg: No edema.   Skin:     General: Skin is warm and dry.   Neurological:      Mental Status: She is alert and oriented to person, place, and time. Mental status is at baseline.   Psychiatric:         Mood and Affect: Mood normal.         Behavior: Behavior normal.          Significant Labs: All pertinent lab results from the last 24 hours have been reviewed.     Latest Reference Range & Units 08/27/23 04:30 08/27/23 10:35 08/27/23 13:56   Sodium 136 - 145 mmol/L 137 137 135 (L)   Potassium 3.5 - 5.1 mmol/L 2.8 (L) 4.4 4.2   Chloride 95 - 110 mmol/L 102 102 102   CO2 23 - 29 mmol/L 25 25 26   Anion Gap 8 - 16 mmol/L 10 10 7 (L)   BUN 8 - 23 mg/dL 10 7 (L) 7 (L)   Creatinine 0.5 - 1.4 mg/dL 0.7 0.8 0.8   eGFR >60 mL/min/1.73 m^2 >60.0 >60.0 >60.0   Glucose 70 - 110 mg/dL 110 148 (H) 138 (H)   Calcium 8.7 - 10.5 mg/dL 6.6 (LL) 6.9 (LL) 7.0 (L)   Ionized Calcium 1.06 - 1.42  mmol/L   0.88 (L)   Phosphorus 2.7 - 4.5 mg/dL 2.3 (L)     Magnesium 1.6 - 2.6 mg/dL 1.2 (L)     Alkaline Phosphatase 55 - 135 U/L 285 (H)  288 (H)   PROTEIN TOTAL 6.0 - 8.4 g/dL 5.7 (L)  5.9 (L)   Albumin 3.5 - 5.2 g/dL 2.3 (L)  2.3 (L)   BILIRUBIN TOTAL 0.1 - 1.0 mg/dL 2.5 (H)  3.1 (H)   AST 10 - 40 U/L 230 (H)  157 (H)   ALT 10 - 44 U/L 71 (H)  61 (H)   (LL): Data is critically low  (L): Data is abnormally low  (H): Data is abnormally high      Significant Imaging:  Reviewed    The estimated ejection fraction is 60%.  The left ventricle is normal in size with concentric hypertrophy and normal systolic function. There is systolic anterior motion without LVOT obstruction.  There is abnormal septal wall motion.  Grade II left ventricular diastolic dysfunction.  Normal right ventricular size with normal right ventricular systolic function.  Mild left atrial enlargement.  The estimated PA systolic pressure is 39 mmHg.  Normal central venous pressure (3 mmHg).    Assessment and Plan:     Paroxysmal atrial fibrillation  Patient with pancreatic cancer and pAF admitted with abdominal pain and sotalol held due to prolonged QTc. This was in the setting of severely depleted electrolytes (all significantly low), and her QTc trended back toward her baseline with repletion.    Recommendation:  - Hold sotalol in the setting of poor nutrition, concern for future electrolyte imbalances (at risk for prolonged QT)  - Beta blockade with metoprolol to treat heart rate, goal   - Continue home anticoagulation  - Most importantly, replete lytes aggressively. Mg >2, K >4, Calcium and Phos to normal  - Will need close cardiology follow up for rate control    Discussed with staff    Sick sinus syndrome  Stable        VTE Risk Mitigation (From admission, onward)           Ordered     apixaban tablet 5 mg  2 times daily         08/26/23 7538     Reason for No Pharmacological VTE Prophylaxis  Once        Question:  Reasons:  Answer:   Already adequately anticoagulated on oral Anticoagulants    08/26/23 2248     IP VTE HIGH RISK PATIENT  Once         08/26/23 2248     Place sequential compression device  Until discontinued         08/26/23 2248                    Thank you for your consult. I will follow-up with patient. Please contact us if you have any additional questions.    Connor M Gillies, MD  Cardiology   [facility] - Oncology ([facility])

## 2023-08-28 NOTE — ASSESSMENT & PLAN NOTE
Patient is an 83-year-old female with past medical history significant for metastatic pancreatic adenocarcinoma. Lab work remarkable for T.Bili of 3.4 and Alk. Phos of 274.    -AES consulted  -CTAP W/ pancreas protocol today  -NPO

## 2023-08-28 NOTE — SUBJECTIVE & OBJECTIVE
Interval History: No acute events overnight, afebrile, hemodynamically stable. Patient was aware of the concerning findings. Patient decided to stay for the night for further workup. No chest pain      Oncology Treatment Plan:   OP PANC NAB-PACLITAXEL + GEMCITABINE Q4W    Medications:  Continuous Infusions:  Scheduled Meds:   amLODIPine  5 mg Oral Daily    atorvastatin  20 mg Oral Daily    calcium-vitamin D3  1 tablet Oral Daily    cholestyramine-aspartame  4 g Oral BID    levothyroxine  175 mcg Oral Before breakfast    lipase-protease-amylase 24,000-76,000-120,000 units  2 capsule Oral TID WM    magnesium sulfate IVPB  2 g Intravenous Once    metoprolol tartrate  25 mg Oral TID    pantoprazole  40 mg Oral Daily     PRN Meds:dextrose 10%, dextrose 10%, diphenhydrAMINE, glucagon (human recombinant), glucose, glucose, LORazepam, naloxone, sodium chloride 0.9%, traMADoL     Review of Systems   Constitutional: Negative.    HENT: Negative.     Respiratory: Negative.     Cardiovascular:  Negative for chest pain, palpitations and leg swelling.   Gastrointestinal: Negative.    Genitourinary: Negative.    Musculoskeletal: Negative.    Skin: Negative.    Neurological: Negative.    Psychiatric/Behavioral: Negative.       Objective:     Vital Signs (Most Recent):  Temp: 98.4 °F (36.9 °C) (08/28/23 1125)  Pulse: 69 (08/28/23 1442)  Resp: 16 (08/28/23 1125)  BP: (!) 125/58 (08/28/23 1125)  SpO2: 99 % (08/28/23 1125) Vital Signs (24h Range):  Temp:  [97.6 °F (36.4 °C)-99.9 °F (37.7 °C)] 98.4 °F (36.9 °C)  Pulse:  [69-82] 69  Resp:  [16-17] 16  SpO2:  [93 %-100 %] 99 %  BP: (119-147)/(57-65) 125/58     Weight: 71.7 kg (158 lb 1.1 oz)  Body mass index is 30.87 kg/m².  Body surface area is 1.74 meters squared.      Intake/Output Summary (Last 24 hours) at 8/28/2023 1520  Last data filed at 8/28/2023 1335  Gross per 24 hour   Intake 335.5 ml   Output 200 ml   Net 135.5 ml        Physical Exam  HENT:      Head: Normocephalic and  atraumatic.      Nose: Nose normal.      Mouth/Throat:      Mouth: Mucous membranes are moist.   Cardiovascular:      Rate and Rhythm: Normal rate. Rhythm irregular.   Abdominal:      General: Bowel sounds are normal.   Musculoskeletal:         General: Normal range of motion.   Skin:     General: Skin is warm.   Neurological:      Mental Status: She is alert and oriented to person, place, and time. Mental status is at baseline.   Psychiatric:         Mood and Affect: Mood normal.         Behavior: Behavior normal.         Thought Content: Thought content normal.         Judgment: Judgment normal.          Significant Labs:   CBC:   Recent Labs   Lab 08/26/23  1931 08/26/23  2113 08/27/23  0430 08/28/23  0511   WBC 11.33  --  7.72 8.63   HGB 11.5*  --  9.1* 9.5*   HCT 35.3* 32* 27.2* 29.0*     --  130* 112*    and CMP:   Recent Labs   Lab 08/27/23  0430 08/27/23  1035 08/27/23  1356 08/28/23  0511    137 135* 139   K 2.8* 4.4 4.2 4.2    102 102 105   CO2 25 25 26 25    148* 138* 74   BUN 10 7* 7* 6*   CREATININE 0.7 0.8 0.8 0.8   CALCIUM 6.6* 6.9* 7.0* 8.0*   PROT 5.7*  --  5.9* 6.0   ALBUMIN 2.3*  --  2.3* 2.3*   BILITOT 2.5*  --  3.1* 3.4*   ALKPHOS 285*  --  288* 274*   *  --  157* 94*   ALT 71*  --  61* 46*   ANIONGAP 10 10 7* 9       Diagnostic Results:  I have reviewed all pertinent imaging results/findings within the past 24 hours.

## 2023-08-28 NOTE — ASSESSMENT & PLAN NOTE
Patient is an 83-year-old female with past medical history significant for metastatic pancreatic adenocarcinoma. Lab work remarkable for rising T.Bili of 2.5 to 3.1 and Alk. Phos    Plan:  - f/u liver ultrasound  - consider AES consult

## 2023-08-28 NOTE — ASSESSMENT & PLAN NOTE
Pt presenting with elecetrolytes abnormalities, currently on sotalol. QTc prolongation x2 repeated EKG    Plan:   -Hold sotalol until electrolytes correction is achieved  - consult cardiology for further recommendations on rate control medications before dc

## 2023-08-28 NOTE — PLAN OF CARE
POC reviewed with patient; understanding verbalized. IV d/c'd. Right port dressing integrity maintained. IV Mag replacement. Went to CT for abd/pelvic scan. NPO after midnight for possible procedure. Pt voids yellow urine via hat. No BM this shift. Bed alarm set. 1x assist. Pt. with nonskid footwear on, bed in lowest position, and locked with bed rails up x2.  Pt. instructed to call prior to getting OOB.  Pt. has call light and personal items within reach. . VSS and afebrile this shift. All questions and concerns addressed at this time.

## 2023-08-28 NOTE — SUBJECTIVE & OBJECTIVE
Interval History: NAOE, VSS. Pt resting comfortably. She is scheduled for CTAP w/ pancreas protocol today. She has mentioned wanting to leave AMA several times.     Oncology Treatment Plan:   OP PANC NAB-PACLITAXEL + GEMCITABINE Q4W    Medications:  Continuous Infusions:  Scheduled Meds:   amLODIPine  5 mg Oral Daily    atorvastatin  20 mg Oral Daily    calcium-vitamin D3  1 tablet Oral Daily    cholestyramine-aspartame  4 g Oral BID    levothyroxine  175 mcg Oral Before breakfast    lipase-protease-amylase 24,000-76,000-120,000 units  2 capsule Oral TID WM    metoprolol tartrate  25 mg Oral TID    pantoprazole  40 mg Oral Daily     PRN Meds:dextrose 10%, dextrose 10%, diphenhydrAMINE, glucagon (human recombinant), glucose, glucose, LORazepam, naloxone, sodium chloride 0.9%, traMADoL     Review of Systems   Constitutional: Negative.    HENT: Negative.     Respiratory: Negative.     Cardiovascular:  Positive for leg swelling.   Gastrointestinal: Negative.    Genitourinary: Negative.    Musculoskeletal: Negative.    Skin: Negative.    Neurological: Negative.    Psychiatric/Behavioral: Negative.       Objective:     Vital Signs (Most Recent):  Temp: 98.4 °F (36.9 °C) (08/28/23 1125)  Pulse: 71 (08/28/23 1125)  Resp: 16 (08/28/23 1125)  BP: (!) 125/58 (08/28/23 1125)  SpO2: 99 % (08/28/23 1125) Vital Signs (24h Range):  Temp:  [97.6 °F (36.4 °C)-99.9 °F (37.7 °C)] 98.4 °F (36.9 °C)  Pulse:  [69-82] 71  Resp:  [16-17] 16  SpO2:  [93 %-100 %] 99 %  BP: (119-147)/(57-65) 125/58     Weight: 71.7 kg (158 lb 1.1 oz)  Body mass index is 30.87 kg/m².  Body surface area is 1.74 meters squared.      Intake/Output Summary (Last 24 hours) at 8/28/2023 1331  Last data filed at 8/27/2023 1706  Gross per 24 hour   Intake 335.5 ml   Output --   Net 335.5 ml        Physical Exam  HENT:      Head: Normocephalic and atraumatic.      Nose: Nose normal.      Mouth/Throat:      Mouth: Mucous membranes are moist.   Cardiovascular:      Rate  and Rhythm: Normal rate. Rhythm irregular.   Abdominal:      General: Bowel sounds are normal.   Musculoskeletal:         General: Normal range of motion.   Skin:     General: Skin is warm.   Neurological:      Mental Status: She is alert and oriented to person, place, and time. Mental status is at baseline.   Psychiatric:         Mood and Affect: Mood normal.         Behavior: Behavior normal.         Thought Content: Thought content normal.         Judgment: Judgment normal.          Significant Labs:   All pertinent labs from the last 24 hours have been reviewed.    Diagnostic Results:  I have reviewed all pertinent imaging results/findings within the past 24 hours.

## 2023-08-28 NOTE — SUBJECTIVE & OBJECTIVE
Interval History: NAEON. Pt continues to improve with electrolyte repletion.      Objective:     Vital Signs (Most Recent):  Temp: 98.4 °F (36.9 °C) (08/28/23 1125)  Pulse: 71 (08/28/23 1125)  Resp: 16 (08/28/23 1125)  BP: (!) 125/58 (08/28/23 1125)  SpO2: 99 % (08/28/23 1125) Vital Signs (24h Range):  Temp:  [97.6 °F (36.4 °C)-99.9 °F (37.7 °C)] 98.4 °F (36.9 °C)  Pulse:  [69-82] 71  Resp:  [16-17] 16  SpO2:  [93 %-100 %] 99 %  BP: (119-147)/(57-65) 125/58     Weight: 71.7 kg (158 lb 1.1 oz)  Body mass index is 30.87 kg/m².     SpO2: 99 %         Intake/Output Summary (Last 24 hours) at 8/28/2023 1307  Last data filed at 8/27/2023 1706  Gross per 24 hour   Intake 335.5 ml   Output --   Net 335.5 ml       Lines/Drains/Airways       Central Venous Catheter Line  Duration                  PowerPort A Cath Single Lumen 06/22/23 1356 Atrial Right 66 days              Peripheral Intravenous Line  Duration                  Peripheral IV - Single Lumen 08/26/23 1932 22 G;1 in Right Antecubital 1 day                       Physical Exam  Constitutional:       Appearance: Normal appearance.   HENT:      Head: Normocephalic and atraumatic.      Right Ear: External ear normal.      Left Ear: External ear normal.      Nose: Nose normal.      Mouth/Throat:      Mouth: Mucous membranes are moist.   Eyes:      Extraocular Movements: Extraocular movements intact.      Pupils: Pupils are equal, round, and reactive to light.   Cardiovascular:      Rate and Rhythm: Normal rate and regular rhythm.      Pulses: Normal pulses.      Heart sounds: Murmur heard.      Comments: Systolic murmur 2nd left intercostal space  Pulmonary:      Effort: Pulmonary effort is normal.      Breath sounds: Normal breath sounds. No wheezing or rales.   Abdominal:      General: Abdomen is flat. There is no distension.      Palpations: Abdomen is soft.      Tenderness: There is no abdominal tenderness.   Musculoskeletal:      Cervical back: Normal range of  motion.      Right lower leg: No edema.      Left lower leg: No edema.   Skin:     General: Skin is warm and dry.   Neurological:      Mental Status: She is alert and oriented to person, place, and time. Mental status is at baseline.   Psychiatric:         Mood and Affect: Mood normal.         Behavior: Behavior normal.            Significant Labs: All pertinent lab results from the last 24 hours have been reviewed.    Significant Imaging:  Reviewed

## 2023-08-28 NOTE — PROGRESS NOTES
[facility] ([facility])  Cardiology  Progress Note    Patient Name: Pauline Cronin  MRN: [id number]  Admission Date: 8/26/2023  Hospital Length of Stay: 0 days  Code Status: Full Code   Attending Physician: Fabby Muir MD   Primary Care Physician: Ga Waldrop MD  Expected Discharge Date: 8/29/2023  Principal Problem:Hypokalemia    Subjective:     Interval History: NAEON. Pt continues to improve with electrolyte repletion.      Objective:     Vital Signs (Most Recent):  Temp: 98.4 °F (36.9 °C) (08/28/23 1125)  Pulse: 71 (08/28/23 1125)  Resp: 16 (08/28/23 1125)  BP: (!) 125/58 (08/28/23 1125)  SpO2: 99 % (08/28/23 1125) Vital Signs (24h Range):  Temp:  [97.6 °F (36.4 °C)-99.9 °F (37.7 °C)] 98.4 °F (36.9 °C)  Pulse:  [69-82] 71  Resp:  [16-17] 16  SpO2:  [93 %-100 %] 99 %  BP: (119-147)/(57-65) 125/58     Weight: 71.7 kg (158 lb 1.1 oz)  Body mass index is 30.87 kg/m².     SpO2: 99 %         Intake/Output Summary (Last 24 hours) at 8/28/2023 1307  Last data filed at 8/27/2023 1706  Gross per 24 hour   Intake 335.5 ml   Output --   Net 335.5 ml       Lines/Drains/Airways       Central Venous Catheter Line  Duration                  PowerPort A Cath Single Lumen 06/22/23 1356 Atrial Right 66 days              Peripheral Intravenous Line  Duration                  Peripheral IV - Single Lumen 08/26/23 1932 22 G;1 in Right Antecubital 1 day                       Physical Exam  Constitutional:       Appearance: Normal appearance.   HENT:      Head: Normocephalic and atraumatic.      Right Ear: External ear normal.      Left Ear: External ear normal.      Nose: Nose normal.      Mouth/Throat:      Mouth: Mucous membranes are moist.   Eyes:      Extraocular Movements: Extraocular movements intact.      Pupils: Pupils are equal, round, and reactive to light.   Cardiovascular:      Rate and Rhythm: Normal rate and regular rhythm.      Pulses: Normal pulses.      Heart sounds: Murmur heard.      Comments:  Systolic murmur 2nd left intercostal space  Pulmonary:      Effort: Pulmonary effort is normal.      Breath sounds: Normal breath sounds. No wheezing or rales.   Abdominal:      General: Abdomen is flat. There is no distension.      Palpations: Abdomen is soft.      Tenderness: There is no abdominal tenderness.   Musculoskeletal:      Cervical back: Normal range of motion.      Right lower leg: No edema.      Left lower leg: No edema.   Skin:     General: Skin is warm and dry.   Neurological:      Mental Status: She is alert and oriented to person, place, and time. Mental status is at baseline.   Psychiatric:         Mood and Affect: Mood normal.         Behavior: Behavior normal.            Significant Labs: All pertinent lab results from the last 24 hours have been reviewed.    Significant Imaging:  Reviewed    Assessment and Plan:     Sick sinus syndrome  Stable    Paroxysmal atrial fibrillation  Patient with pancreatic cancer and pAF admitted with abdominal pain and sotalol held due to prolonged QTc. This was in the setting of severely depleted electrolytes (all significantly low), and her QTc trended back toward her baseline with repletion.    Recommendation:  - Hold sotalol in the setting of poor nutrition, concern for future electrolyte imbalances (at risk for prolonged QT)  - Beta blockade with metoprolol to treat heart rate, goal , transition from metoprolol tartrate TID to metoprolol succinate QD on discharge  - F/u with Dr. Barbosa in clinic  - Continue home anticoagulation  - Most importantly, replete lytes aggressively. Mg >2, K >4, Calcium and Phos to normal  - Will need close cardiology follow up for rate control    Discussed with staff        VTE Risk Mitigation (From admission, onward)         Ordered     Reason for No Pharmacological VTE Prophylaxis  Once        Question:  Reasons:  Answer:  Already adequately anticoagulated on oral Anticoagulants    08/26/23 9962     IP VTE HIGH RISK PATIENT   Once         08/26/23 2248     Place sequential compression device  Until discontinued         08/26/23 2248                Aubrey Beckman MD  Cardiology  [facility] - Oncology ([facility])

## 2023-08-28 NOTE — PROGRESS NOTES
[facility] - Oncology ([facility])  Hematology/Oncology  Progress Note    Patient Name: Pauline Cronin  Admission Date: 8/26/2023  Hospital Length of Stay: 0 days  Code Status: Full Code     Subjective:     HPI:  Patient is an 83 year old female with past paroxysmal AFib on Eliquis, pancreatic adenocarcinoma heart failure with preserved ejection fraction, sick sinus syndrome with pacemaker, Crohn's disease, hypertension, hyperlipidemia, hypothyroidism presenting to the emergency room with abdominal pain which started a few hours prior to presentation.  She states that who was a constant pressure/pain in her epigastric re chin which was a 6/10.  She started passing gas and the pain resolved however her family insisted that she present to the emergency room for proper evaluation.  In the emergency room she was subsequently found to have hypokalemia to 2.9 on an i-STAT and was admitted for hypokalemia as well as uncontrolled pain.  On my assessment today she is in no acute distress and appears to be doing well.  She does not want to be here and does not want to take the potassium supplements.  She requests that her IV be removed and she denies any need for pain medications.  She is currently undergoing treatment for her pancreatic adenocarcinoma by Dr. Hill.  She is no fevers, chills, night sweats.  She has no cardiac chest pain or atypical cardiac chest pain.  She is currently complaining of thoracic back pain which is related to her position in bed which she does not feel she can improve with repositioning.  Her troponin on admission was 0.1 approximately which is at her baseline where was 3 months ago.  EKG done in the emergency room shows stable left bundle branch block as well as ST segment changes which are the same as they were back in June.  While it is a diffusely abnormal EKG there appears to be no changes compared to three months ago.      Interval History: No acute events overnight, afebrile,  hemodynamically stable. Patient was aware of the concerning findings. Patient decided to stay for the night for further workup. No chest pain      Oncology Treatment Plan:   OP PANC NAB-PACLITAXEL + GEMCITABINE Q4W    Medications:  Continuous Infusions:  Scheduled Meds:   amLODIPine  5 mg Oral Daily    atorvastatin  20 mg Oral Daily    calcium-vitamin D3  1 tablet Oral Daily    cholestyramine-aspartame  4 g Oral BID    levothyroxine  175 mcg Oral Before breakfast    lipase-protease-amylase 24,000-76,000-120,000 units  2 capsule Oral TID WM    magnesium sulfate IVPB  2 g Intravenous Once    metoprolol tartrate  25 mg Oral TID    pantoprazole  40 mg Oral Daily     PRN Meds:dextrose 10%, dextrose 10%, diphenhydrAMINE, glucagon (human recombinant), glucose, glucose, LORazepam, naloxone, sodium chloride 0.9%, traMADoL     Review of Systems   Constitutional: Negative.    HENT: Negative.     Respiratory: Negative.     Cardiovascular:  Negative for chest pain, palpitations and leg swelling.   Gastrointestinal: Negative.    Genitourinary: Negative.    Musculoskeletal: Negative.    Skin: Negative.    Neurological: Negative.    Psychiatric/Behavioral: Negative.       Objective:     Vital Signs (Most Recent):  Temp: 98.4 °F (36.9 °C) (08/28/23 1125)  Pulse: 69 (08/28/23 1442)  Resp: 16 (08/28/23 1125)  BP: (!) 125/58 (08/28/23 1125)  SpO2: 99 % (08/28/23 1125) Vital Signs (24h Range):  Temp:  [97.6 °F (36.4 °C)-99.9 °F (37.7 °C)] 98.4 °F (36.9 °C)  Pulse:  [69-82] 69  Resp:  [16-17] 16  SpO2:  [93 %-100 %] 99 %  BP: (119-147)/(57-65) 125/58     Weight: 71.7 kg (158 lb 1.1 oz)  Body mass index is 30.87 kg/m².  Body surface area is 1.74 meters squared.      Intake/Output Summary (Last 24 hours) at 8/28/2023 1520  Last data filed at 8/28/2023 1335  Gross per 24 hour   Intake 335.5 ml   Output 200 ml   Net 135.5 ml        Physical Exam  HENT:      Head: Normocephalic and atraumatic.      Nose: Nose normal.       Mouth/Throat:      Mouth: Mucous membranes are moist.   Cardiovascular:      Rate and Rhythm: Normal rate. Rhythm irregular.   Abdominal:      General: Bowel sounds are normal.   Musculoskeletal:         General: Normal range of motion.   Skin:     General: Skin is warm.   Neurological:      Mental Status: She is alert and oriented to person, place, and time. Mental status is at baseline.   Psychiatric:         Mood and Affect: Mood normal.         Behavior: Behavior normal.         Thought Content: Thought content normal.         Judgment: Judgment normal.          Significant Labs:   CBC:   Recent Labs   Lab 08/26/23  1931 08/26/23  2113 08/27/23  0430 08/28/23  0511   WBC 11.33  --  7.72 8.63   HGB 11.5*  --  9.1* 9.5*   HCT 35.3* 32* 27.2* 29.0*     --  130* 112*    and CMP:   Recent Labs   Lab 08/27/23  0430 08/27/23  1035 08/27/23  1356 08/28/23  0511    137 135* 139   K 2.8* 4.4 4.2 4.2    102 102 105   CO2 25 25 26 25    148* 138* 74   BUN 10 7* 7* 6*   CREATININE 0.7 0.8 0.8 0.8   CALCIUM 6.6* 6.9* 7.0* 8.0*   PROT 5.7*  --  5.9* 6.0   ALBUMIN 2.3*  --  2.3* 2.3*   BILITOT 2.5*  --  3.1* 3.4*   ALKPHOS 285*  --  288* 274*   *  --  157* 94*   ALT 71*  --  61* 46*   ANIONGAP 10 10 7* 9       Diagnostic Results:  I have reviewed all pertinent imaging results/findings within the past 24 hours.    Assessment/Plan:     * Hypokalemia  Patient is an 83-year-old female with past medical history significant for metastatic pancreatic adenocarcinoma paroxysmal AFib who is presenting with acute abdominal pain which resolved with passing gas.  She was admitted to the hospital due to troponins Krista however her current troponin is slightly improved from her baseline at 0.1 and she has no EKG changes or symptoms of cardiac chest pain or atypical cardiac chest pain.  Patient's potassium on an i-STAT was 2.9 however and the patient was admitted for hypokalemia and chest pain rule out which is  "ruled out.     Plan:  - Repeat metabolic panel  - replete potassium greater than 3.5-4.0    Hyperbilirubinemia  Patient is an 83-year-old female with past medical history significant for metastatic pancreatic adenocarcinoma. Lab work remarkable for rising T.Bili of 2.5 to 3.1 and Alk. Phos    Plan:  - f/u liver ultrasound  - consider AES consult    Abdominal pain  Appears resolved with passing gas. Back pain appears to be related to position in ED bed. Patient denies need for medication.    Chronic heart failure with preserved ejection fraction  Appears euvolemic. Continue to monitor volume status with judicious IV resuscitation    Malignant neoplasm of pancreas  From 5/1/23 [facility] Note: Oncology History "She presented initially to her PCP on 03/01/23 with several weeks of fatigue, nausea and several pounds of weight loss. She had a CT abdomen pelvis which showed a pancreatic head mass that abuts or invades the adjacent 2nd portion of the duodenum it was measured about 3.3 x 2.8 cm. There was some degree of GOO. The portal vein, splenic vein, SMV, celiac axis and SMA all are patent.   She underwent an ERCP w biopsy of the mass on 03/14 which was positive for adenocarcinoma, pMMR. She was admitted on 03/17 for hyperbilirubinemia. She underwent ERCP with stenting on 03/21 c/w post-ERCP pancreatitis.   She was admitted again 03/28 for afib with RVR and ADHF. She was discharged on 04/05.   Admitted 4/22-4/24 for N/V and abdominal pain that resolved with conservative management.   Admitted 5/02 to 5/03 for intractable to nausea and vomiting, treated for pancreatitis."    Sick sinus syndrome  asymptomatic     Plan:  - will continue to monitor    Hypothyroidism  Continue levothyroxine    Essential hypertension, benign  Plan:  -Continue amlodipine      Hyperlipidemia  Continue atorvastatin    Paroxysmal atrial fibrillation  Pt presenting with elecetrolytes abnormalities, currently on sotalol. QTc prolongation x2 repeated " EKG    Plan:   -Hold sotalol until electrolytes correction is achieved  - consult cardiology for further recommendations on rate control medications before dc             Tam Webster DO  Hematology/Oncology  [facility] On[facility] ([facility])

## 2023-08-28 NOTE — SUBJECTIVE & OBJECTIVE
Past Medical History:   Diagnosis Date    Anticoagulant long-term use     Atrial fibrillation     Crohn disease diagnosed in her 20's    GERD (gastroesophageal reflux disease)     Hyperlipidemia     Hypertension     Pancreatic adenocarcinoma 3/21/2023    Thyroid disease     s/p I 131       Past Surgical History:   Procedure Laterality Date    APPENDECTOMY      CARDIAC SURGERY  2014    pacemaker    COLONOSCOPY  ~2008    normal findings per patient report    ENDOSCOPIC ULTRASOUND OF UPPER GASTROINTESTINAL TRACT N/A 3/14/2023    Procedure: ULTRASOUND, UPPER GI TRACT, ENDOSCOPIC;  Surgeon: Andrei Swartz MD;  Location: [facility];  Service: Endoscopy;  Laterality: N/A;  Approval to hold Eliquis rec'd from Dr. Barbosa (see telephone encounter 3/3/23)-DS  [employer] AICD/PPM  3/3/23-Instructions via portal-DS    ERCP N/A 3/21/2023    Procedure: ERCP (ENDOSCOPIC RETROGRADE CHOLANGIOPANCREATOGRAPHY);  Surgeon: Celina Toney MD;  Location: [facility] ([address] [place] [address]);  Service: Endoscopy;  Laterality: N/A;    ESOPHAGOGASTRODUODENOSCOPY N/A 7/17/2018    Procedure: EGD (ESOPHAGOGASTRODUODENOSCOPY);  Surgeon: Alondra Casiano MD;  Location: [facility] ENDO;  Service: Endoscopy;  Laterality: N/A;    HYSTERECTOMY      INSERTION OF IMPLANTABLE CARDIOVERTER-DEFIBRILLATOR (ICD) GENERATOR WITH TWO EXISTING LEADS Left 5/10/2021    Procedure: INSERTION, PULSE GENERATOR WITH 2 EXISTING LEADS, ICD;  Surgeon: Bo Leone MD;  Location: [facility] CATH LAB;  Service: Cardiology;  Laterality: Left;    INSERTION OF PACEMAKER      REPLACEMENT OF PACEMAKER GENERATOR  05/10/2021    RETROGRADE PYELOGRAPHY Right 2/18/2020    Procedure: PYELOGRAM, RETROGRADE;  Surgeon: Jovan Kruse MD;  Location: [facility] OR;  Service: Urology;  Laterality: Right;    SMALL INTESTINE SURGERY  in her 20's    for crohn's    TONSILLECTOMY      UPPER GASTROINTESTINAL ENDOSCOPY  ~2008    normal findings per patient report       Review of patient's allergies indicates:    Allergen Reactions    Cleocin [clindamycin hcl]     Lisinopril Other (See Comments)     cough       No current facility-administered medications on file prior to encounter.     Current Outpatient Medications on File Prior to Encounter   Medication Sig    alendronate (FOSAMAX) 70 MG tablet Take 1 tablet (70 mg total) by mouth every 7 days.    amLODIPine (NORVASC) 5 MG tablet Take 1 tablet (5 mg total) by mouth once daily.    atorvastatin (LIPITOR) 20 MG tablet Take 1 tablet (20 mg total) by mouth once daily.    colestipoL (COLESTID) 1 gram Tab Take 1 tablet (1 g total) by mouth 2 (two) times daily.    diphenoxylate-atropine 2.5-0.025 mg (LOMOTIL) 2.5-0.025 mg per tablet TAKE 1 TABLET BY MOUTH 4 TIMES DAILY AS NEEDED FOR DIARRHEA    ELIQUIS 5 mg Tab Take 1 tablet (5 mg total) by mouth 2 (two) times daily.    esomeprazole (NEXIUM) 40 MG capsule Take 1 capsule (40 mg total) by mouth once daily.    folic acid (FOLVITE) 1 MG tablet Take 1 tablet by mouth once daily    furosemide (LASIX) 40 MG tablet Take 1 tablet (40 mg total) by mouth daily as needed (For weight gain > 3 lb in one day, increasing leg swelling, or increasing SOB).    levothyroxine (SYNTHROID, LEVOTHROID) 175 MCG tablet Take 1 tablet by mouth once daily    lipase-protease-amylase 24,000-76,000-120,000 units (CREON) 24,000-76,000 -120,000 unit capsule Take 2 capsules by mouth 3 (three) times daily with meals.    MELATONIN ORAL Take 1 tablet by mouth nightly as needed (Insomnia).    OLANZapine (ZYPREXA) 5 MG tablet Take 1 tablet (5 mg total) by mouth every evening.    potassium chloride (MICRO-K) 10 MEQ CpSR 1 po bid x 1 week then Micro K qd    prochlorperazine (COMPAZINE) 10 MG tablet Take 1 tablet (10 mg total) by mouth 4 (four) times daily as needed (nausea).    sotaloL (BETAPACE) 120 MG Tab Take 1 tablet (120 mg total) by mouth 2 (two) times daily.    sulfaSALAzine (AZULFIDINE) 500 mg Tab Take 1 tablet by mouth twice daily    tolterodine (DETROL LA) 4  MG 24 hr capsule Take 1 capsule (4 mg total) by mouth once daily.    traMADoL (ULTRAM) 50 mg tablet Take 1 tablet (50 mg total) by mouth every 6 (six) hours as needed for Pain.    acetaminophen (TYLENOL) 325 MG tablet Take 650 mg by mouth daily as needed (Headache).    albuterol (PROVENTIL) 2.5 mg /3 mL (0.083 %) nebulizer solution Take 3 mLs (2.5 mg total) by nebulization every 6 (six) hours as needed for Wheezing or Shortness of Breath. Rescue    amoxicillin-clavulanate 875-125mg (AUGMENTIN) 875-125 mg per tablet Take 1 tablet by mouth 2 (two) times daily.    LIDOcaine-prilocaine (EMLA) cream Apply topically as needed (place to port site 45-60 minutes prior to port access).     Family History       Problem Relation (Age of Onset)    Breast cancer Mother    Cancer Mother    Crohn's disease Other          Tobacco Use    Smoking status: Former     Current packs/day: 0.00     Types: Cigarettes     Quit date:      Years since quittin.6    Smokeless tobacco: Never   Substance and Sexual Activity    Alcohol use: No    Drug use: No    Sexual activity: Never     Review of Systems   Constitutional: Negative for diaphoresis and fever.   Cardiovascular:  Negative for chest pain, leg swelling, near-syncope, orthopnea, paroxysmal nocturnal dyspnea and syncope.   Respiratory:  Negative for cough and shortness of breath.    Gastrointestinal:  Negative for abdominal pain, diarrhea, nausea and vomiting.   Neurological:  Negative for light-headedness.   Psychiatric/Behavioral:  Negative for altered mental status and substance abuse.      Objective:     Vital Signs (Most Recent):  Temp: 98.6 °F (37 °C) (23)  Pulse: 82 (23)  Resp: 17 (23)  BP: 134/62 (23)  SpO2: 96 % (23) Vital Signs (24h Range):  Temp:  [96.1 °F (35.6 °C)-100.1 °F (37.8 °C)] 98.6 °F (37 °C)  Pulse:  [68-87] 82  Resp:  [16-20] 17  SpO2:  [88 %-100 %] 96 %  BP: (119-146)/(57-63) 134/62     Weight: 71.7 kg  (158 lb 1.1 oz)  Body mass index is 30.87 kg/m².    SpO2: 96 %         Intake/Output Summary (Last 24 hours) at 8/27/2023 2321  Last data filed at 8/27/2023 1706  Gross per 24 hour   Intake 991.14 ml   Output 500 ml   Net 491.14 ml       Lines/Drains/Airways       Central Venous Catheter Line  Duration                  PowerPort A Cath Single Lumen 06/22/23 1356 Atrial Right 66 days              Peripheral Intravenous Line  Duration                  Peripheral IV - Single Lumen 08/26/23 1932 22 G;1 in Right Antecubital 1 day                     Physical Exam  Constitutional:       Appearance: Normal appearance.   Cardiovascular:      Rate and Rhythm: Normal rate and regular rhythm.      Pulses: Normal pulses.      Heart sounds: Normal heart sounds.   Pulmonary:      Effort: Pulmonary effort is normal.      Breath sounds: Normal breath sounds. No wheezing or rales.   Abdominal:      General: Abdomen is flat. There is no distension.      Palpations: Abdomen is soft.      Tenderness: There is no abdominal tenderness.   Musculoskeletal:      Right lower leg: No edema.      Left lower leg: No edema.   Skin:     General: Skin is warm and dry.   Neurological:      Mental Status: She is alert and oriented to person, place, and time. Mental status is at baseline.   Psychiatric:         Mood and Affect: Mood normal.         Behavior: Behavior normal.          Significant Labs: All pertinent lab results from the last 24 hours have been reviewed.     Latest Reference Range & Units 08/27/23 04:30 08/27/23 10:35 08/27/23 13:56   Sodium 136 - 145 mmol/L 137 137 135 (L)   Potassium 3.5 - 5.1 mmol/L 2.8 (L) 4.4 4.2   Chloride 95 - 110 mmol/L 102 102 102   CO2 23 - 29 mmol/L 25 25 26   Anion Gap 8 - 16 mmol/L 10 10 7 (L)   BUN 8 - 23 mg/dL 10 7 (L) 7 (L)   Creatinine 0.5 - 1.4 mg/dL 0.7 0.8 0.8   eGFR >60 mL/min/1.73 m^2 >60.0 >60.0 >60.0   Glucose 70 - 110 mg/dL 110 148 (H) 138 (H)   Calcium 8.7 - 10.5 mg/dL 6.6 (LL) 6.9 (LL) 7.0  (L)   Ionized Calcium 1.06 - 1.42 mmol/L   0.88 (L)   Phosphorus 2.7 - 4.5 mg/dL 2.3 (L)     Magnesium 1.6 - 2.6 mg/dL 1.2 (L)     Alkaline Phosphatase 55 - 135 U/L 285 (H)  288 (H)   PROTEIN TOTAL 6.0 - 8.4 g/dL 5.7 (L)  5.9 (L)   Albumin 3.5 - 5.2 g/dL 2.3 (L)  2.3 (L)   BILIRUBIN TOTAL 0.1 - 1.0 mg/dL 2.5 (H)  3.1 (H)   AST 10 - 40 U/L 230 (H)  157 (H)   ALT 10 - 44 U/L 71 (H)  61 (H)   (LL): Data is critically low  (L): Data is abnormally low  (H): Data is abnormally high      Significant Imaging:  Reviewed    The estimated ejection fraction is 60%.  The left ventricle is normal in size with concentric hypertrophy and normal systolic function. There is systolic anterior motion without LVOT obstruction.  There is abnormal septal wall motion.  Grade II left ventricular diastolic dysfunction.  Normal right ventricular size with normal right ventricular systolic function.  Mild left atrial enlargement.  The estimated PA systolic pressure is 39 mmHg.  Normal central venous pressure (3 mmHg).

## 2023-08-28 NOTE — CONSULTS
[facility]  Gastroenterology  Consult Note    Patient Name: Pauline Cronin  MRN: [id number]  Admission Date: 8/26/2023  Hospital Length of Stay: 0 days  Code Status: Full Code   Attending Provider: Fabby Muir MD   Consulting Provider: Chance Mari MD  Primary Care Physician: Ga Waldrop MD  Principal Problem:Hypokalemia    Inpatient consult to [facility] ([facility])  Consult performed by: Chance Mari MD  Consult ordered by: Mason Lorenzana MD        Subjective:     HPI:   Ms Cronni is an 82yo PMHx HTN, pAF on eliquis, HFpEF, sick sinus syndrome s/p PPM, compensated cirrhosis, ?Crohn's disease s/p SBR (unknown date, not on therapy), pancreatic adenocarcinoma (Dx EUS 03/14/2023) c/b biliary obstruction s/p ERCP with uncovered metal stent (03/21/2023) on chemotherapy (05/2023) presents to [facility] ED on 08/26 for abdominal pain.    AES consulted 08/28 for possible obstruction    Recently admitted 06/08- 06/11 for pancreatitis. AES consulted 06/08 for pseudocyst which was too small for drainage. Suspected fluid collection was from ductal damage from head mass preventing proper flow of enzymes. Recommended conservative management.     Currently denies fevers, abdominal pain.     VS since arrival unremarkable, except 2L NC  CBC w/o leukocytosis, Hgb 11.1-->9.5, Plts 110s  BMP w/ BUN/ Cr 6/ 0.8  LFTs w/ BR 3.1--> 3.4, -->274, AST/ / 61-->94/ 46, INR 1.4.    LFTs 08/21 were normal    US notable for cirrhotic liver, extrahepatic ductal prominence up to 0.9cm at pancreatic head (stent not well visualized), GB stones without cholecystitis      Objective:     Vitals:    08/28/23 0728   BP: (!) 123/59   Pulse: 72   Resp: 17   Temp: 98.2 °F (36.8 °C)     Constitutional:  not in acute distress and well developed  HENT: Head: Normal, normocephalic, atraumatic.  Eyes: sclera icteric  GI: soft, non-tender, without masses or organomegaly  Skin: jaundice  present  Neurological: alert    Assessment/Plan:     82yo PMHx HTN, pAF on eliquis, HFpEF, sick sinus syndrome s/p PPM, compensated cirrhosis, ?Crohn's disease s/p SBR (unknown date, not on therapy), pancreatic adenocarcinoma (Dx EUS 03/14/2023) c/b biliary obstruction s/p ERCP with uncovered metal stent (03/21/2023) on chemotherapy (05/2023) [place] [facility] [place] for abdominal pain.    AES consulted 08/28 for possible obstruction in setting of cholestatic liver injury and mild extrahepatic biliary ductal dilation on US (unable to denote if stent in place)    Concerning for stent migration vs obstruction    Problem List:  Cholestatic liver injury  Pancreatic adenocarcinoma c/b biliary obstruction    Recommendations:  Hold eliquis if able per primary team  Daily CMP  CTAP w/ pancreas protocol    Thank you for involving us in the care of Pauline Cronin. Please call with any additional questions, concerns or changes in the patient's clinical status. We will continue to follow.     Chance Mari MD  Gastroenterology Fellow PGY VI  [facility]

## 2023-08-28 NOTE — ASSESSMENT & PLAN NOTE
Patient with pancreatic cancer and pAF admitted with abdominal pain and sotalol held due to prolonged QTc. This was in the setting of severely depleted electrolytes (all significantly low), and her QTc trended back toward her baseline with repletion.    Recommendation:  - Okay to continue home meds  - Continue home anticoagulation  - Most importantly, replete lytes aggressively. Mg >2, K >4, Calcium and Phos to normal    These recommendations will be discussed with staff in the morning and are subject to change

## 2023-08-28 NOTE — PROGRESS NOTES
" Admit Assessment    Patient Identification  Pauline Cronin   :  1940  Admit Date:  2023  Attending Provider:  Fabby Muir MD              Referral:   Pt was admitted to  with a diagnosis of Hypokalemia, and was admitted this hospital stay due to Hypokalemia [E87.6]  Epigastric pain [R10.13]  Chronic a-fib [I48.20]  Chest pain [R07.9].       is involved was referred to the Social Work Department via routine referral Patient presents as a 83 y.o. year old  female.    Persons interviewed : patient    Living Situation:      Res[place] at [address] [place] [address] [place] [address], phone: [phone] (home).  Patient lives with her son and daughter in law        Current or Past Agencies and Description of Services/Supplies    DME    Agency Phone Number: Reports that she owns a walker but does not currently use it       Home Health  Agency Name: Patient does not remember the name. She said she has had services in the past but it is not currently on service with any agency.       IV Infusion : n/a      Nutrition: Oral     Outpatient Pharmacy:     [address] [facility]  [address] [facility]  [place] [address]  Phone: [phone] Fax: [phone]      Patient Preference of agencies include : She does not express a preference    Patient/Caregiver informed of right to choose providers or agencies.  Patient provides permission to release any necessary information to [facility] and to [facility] agencies as needed to facilitate patient care, treatment planning, and patient discharge planning.  Written and verbal resources provided.      Coping; " I just take it one day at a time. I have my up days and my down days." Says she family, friends and her ipad are what help her cope.          Adjustment to Diagnosis and Treatment:Appropriate      Emotional/Behavioral/Cognitive Issues: None observed             History/Current " Symptoms of Anxiety/Depression: No:   History/Current Substance Use:   Social History     Tobacco Use    Smoking status: Former     Current packs/day: 0.00     Types: Cigarettes     Quit date:      Years since quittin.6    Smokeless tobacco: Never   Substance and Sexual Activity    Alcohol use: No    Drug use: No    Sexual activity: Never       Indications of Abuse/Neglect: No:   Abuse Screen (yes response referral indicated)  Feels Unsafe at Home or Work/School: no  Feels Threatened by Someone: no  Does anyone try to keep you from having contact with others or doing things outside your home?: no  Physical Signs of Abuse Present: no    Financial:  Payer/Plan Subscr  Sex Relation Sub. Ins. ID Effective Group Num   1. [employer] MANAGE* LORELEI CADET 1940 Female Self [id number]                                   [address] [address]                            Other identified concerns/needs: None at this time    Plan: Return home    Interventions/Referrals: None at this time  Patient/caregiver engaged in treatment planning process.     providing psychosocial and supportive counseling, resources, education, assistance and discharge planning as appropriate.  Patient/caregiver state understanding of  available resources,  following, remains available.

## 2023-08-28 NOTE — ASSESSMENT & PLAN NOTE
Pt presenting with elecetrolytes abnormalities, currently on sotalol. Cardiology consulted    -Stop sotalol until electrolytes correction is achieved  -Started on metoprolol  -F/U outpatient with Dr. Barbosa

## 2023-08-28 NOTE — PROGRESS NOTES
[facility] ([facility])  Hematology/Oncology  Progress Note    Patient Name: Pauline Cronin  Admission Date: 8/26/2023  Hospital Length of Stay: 0 days  Code Status: Full Code     Subjective:     HPI:  Patient is an 83 year old female with past paroxysmal AFib on Eliquis, pancreatic adenocarcinoma heart failure with preserved ejection fraction, sick sinus syndrome with pacemaker, Crohn's disease, hypertension, hyperlipidemia, hypothyroidism presenting to the emergency room with abdominal pain which started a few hours prior to presentation.  She states that who was a constant pressure/pain in her epigastric re chin which was a 6/10.  She started passing gas and the pain resolved however her family insisted that she present to the emergency room for proper evaluation.  In the emergency room she was subsequently found to have hypokalemia to 2.9 on an i-STAT and was admitted for hypokalemia as well as uncontrolled pain.  On my assessment today she is in no acute distress and appears to be doing well.  She does not want to be here and does not want to take the potassium supplements.  She requests that her IV be removed and she denies any need for pain medications.  She is currently undergoing treatment for her pancreatic adenocarcinoma by Dr. Hill.  She is no fevers, chills, night sweats.  She has no cardiac chest pain or atypical cardiac chest pain.  She is currently complaining of thoracic back pain which is related to her position in bed which she does not feel she can improve with repositioning.  Her troponin on admission was 0.1 approximately which is at her baseline where was 3 months ago.  EKG done in the emergency room shows stable left bundle branch block as well as ST segment changes which are the same as they were back in June.  While it is a diffusely abnormal EKG there appears to be no changes compared to three months ago.      Interval History: NAOE, VSS. Pt resting comfortably. She is scheduled  for CTAP w/ pancreas protocol today. She has mentioned wanting to leave AMA several times.     Oncology Treatment Plan:   OP PANC NAB-PACLITAXEL + GEMCITABINE Q4W    Medications:  Continuous Infusions:  Scheduled Meds:   amLODIPine  5 mg Oral Daily    atorvastatin  20 mg Oral Daily    calcium-vitamin D3  1 tablet Oral Daily    cholestyramine-aspartame  4 g Oral BID    levothyroxine  175 mcg Oral Before breakfast    lipase-protease-amylase 24,000-76,000-120,000 units  2 capsule Oral TID WM    metoprolol tartrate  25 mg Oral TID    pantoprazole  40 mg Oral Daily     PRN Meds:dextrose 10%, dextrose 10%, diphenhydrAMINE, glucagon (human recombinant), glucose, glucose, LORazepam, naloxone, sodium chloride 0.9%, traMADoL     Review of Systems   Constitutional: Negative.    HENT: Negative.     Respiratory: Negative.     Cardiovascular:  Positive for leg swelling.   Gastrointestinal: Negative.    Genitourinary: Negative.    Musculoskeletal: Negative.    Skin: Negative.    Neurological: Negative.    Psychiatric/Behavioral: Negative.       Objective:     Vital Signs (Most Recent):  Temp: 98.4 °F (36.9 °C) (08/28/23 1125)  Pulse: 71 (08/28/23 1125)  Resp: 16 (08/28/23 1125)  BP: (!) 125/58 (08/28/23 1125)  SpO2: 99 % (08/28/23 1125) Vital Signs (24h Range):  Temp:  [97.6 °F (36.4 °C)-99.9 °F (37.7 °C)] 98.4 °F (36.9 °C)  Pulse:  [69-82] 71  Resp:  [16-17] 16  SpO2:  [93 %-100 %] 99 %  BP: (119-147)/(57-65) 125/58     Weight: 71.7 kg (158 lb 1.1 oz)  Body mass index is 30.87 kg/m².  Body surface area is 1.74 meters squared.      Intake/Output Summary (Last 24 hours) at 8/28/2023 1331  Last data filed at 8/27/2023 1706  Gross per 24 hour   Intake 335.5 ml   Output --   Net 335.5 ml        Physical Exam  HENT:      Head: Normocephalic and atraumatic.      Nose: Nose normal.      Mouth/Throat:      Mouth: Mucous membranes are moist.   Cardiovascular:      Rate and Rhythm: Normal rate. Rhythm irregular.   Abdominal:       General: Bowel sounds are normal.   Musculoskeletal:         General: Normal range of motion.   Skin:     General: Skin is warm.   Neurological:      Mental Status: She is alert and oriented to person, place, and time. Mental status is at baseline.   Psychiatric:         Mood and Affect: Mood normal.         Behavior: Behavior normal.         Thought Content: Thought content normal.         Judgment: Judgment normal.          Significant Labs:   All pertinent labs from the last 24 hours have been reviewed.    Diagnostic Results:  I have reviewed all pertinent imaging results/findings within the past 24 hours.    Assessment/Plan:     * Hypokalemia  Patient is an 83-year-old female with past medical history significant for metastatic pancreatic adenocarcinoma paroxysmal AFib who is presenting with acute abdominal pain which resolved with passing gas.  She was admitted to the hospital due to troponins Krista however her current troponin is slightly improved from her baseline at 0.1 and she has no EKG changes or symptoms of cardiac chest pain or atypical cardiac chest pain.  Patient's potassium on an i-STAT was 2.9 however and the patient was admitted for hypokalemia and chest pain rule out which is ruled out.     Plan:  - Repeat metabolic panel  - replete potassium greater than 3.5-4.0    Hyperbilirubinemia  Patient is an 83-year-old female with past medical history significant for metastatic pancreatic adenocarcinoma. Lab work remarkable for T.Bili of 3.4 and Alk. Phos of 274.    -AES consulted  -CTAP W/ pancreas protocol today  -NPO     Abdominal pain  Appears resolved with passing gas. Back pain appears to be related to position in ED bed. Patient denies need for medication.    Chronic heart failure with preserved ejection fraction  Appears euvolemic. Continue to monitor volume status with judicious IV resuscitation    Hypothyroidism  Continue levothyroxine    Essential hypertension, benign    -Continue  amlodipine  -Started on metoprolol    Hyperlipidemia  Continue atorvastatin    Paroxysmal atrial fibrillation    Pt presenting with elecetrolytes abnormalities, currently on sotalol. Cardiology consulted    -Stop sotalol until electrolytes correction is achieved  -Started on metoprolol  -F/U outpatient with Dr. Chelsey Roman MD  Hematology/Oncology  [facility] ([facility])

## 2023-08-29 ENCOUNTER — OUTPATIENT CASE MANAGEMENT (OUTPATIENT)
Dept: ADMINISTRATIVE | Facility: OTHER | Age: 83
End: 2023-08-29
Payer: MEDICARE

## 2023-08-29 VITALS
RESPIRATION RATE: 18 BRPM | WEIGHT: 158.06 LBS | SYSTOLIC BLOOD PRESSURE: 130 MMHG | BODY MASS INDEX: 31.03 KG/M2 | HEART RATE: 72 BPM | HEIGHT: 60 IN | TEMPERATURE: 99 F | OXYGEN SATURATION: 94 % | DIASTOLIC BLOOD PRESSURE: 60 MMHG

## 2023-08-29 LAB
ALBUMIN SERPL BCP-MCNC: 2.2 G/DL (ref 3.5–5.2)
ALP SERPL-CCNC: 266 U/L (ref 55–135)
ALT SERPL W/O P-5'-P-CCNC: 34 U/L (ref 10–44)
ANION GAP SERPL CALC-SCNC: 7 MMOL/L (ref 8–16)
AST SERPL-CCNC: 64 U/L (ref 10–40)
BILIRUB SERPL-MCNC: 1.9 MG/DL (ref 0.1–1)
BUN SERPL-MCNC: 6 MG/DL (ref 8–23)
CALCIUM SERPL-MCNC: 8.1 MG/DL (ref 8.7–10.5)
CHLORIDE SERPL-SCNC: 106 MMOL/L (ref 95–110)
CO2 SERPL-SCNC: 25 MMOL/L (ref 23–29)
CREAT SERPL-MCNC: 0.8 MG/DL (ref 0.5–1.4)
ERYTHROCYTE [DISTWIDTH] IN BLOOD BY AUTOMATED COUNT: 17.5 % (ref 11.5–14.5)
EST. GFR  (NO RACE VARIABLE): >60 ML/MIN/1.73 M^2
GLUCOSE SERPL-MCNC: 102 MG/DL (ref 70–110)
HCT VFR BLD AUTO: 28.3 % (ref 37–48.5)
HGB BLD-MCNC: 9.2 G/DL (ref 12–16)
MAGNESIUM SERPL-MCNC: 2 MG/DL (ref 1.6–2.6)
MCH RBC QN AUTO: 32.1 PG (ref 27–31)
MCHC RBC AUTO-ENTMCNC: 32.5 G/DL (ref 32–36)
MCV RBC AUTO: 99 FL (ref 82–98)
PHOSPHATE SERPL-MCNC: 1.8 MG/DL (ref 2.7–4.5)
PLATELET # BLD AUTO: 107 K/UL (ref 150–450)
PMV BLD AUTO: 12.3 FL (ref 9.2–12.9)
POTASSIUM SERPL-SCNC: 3.8 MMOL/L (ref 3.5–5.1)
PROT SERPL-MCNC: 6 G/DL (ref 6–8.4)
RBC # BLD AUTO: 2.87 M/UL (ref 4–5.4)
SODIUM SERPL-SCNC: 138 MMOL/L (ref 136–145)
WBC # BLD AUTO: 9.63 K/UL (ref 3.9–12.7)

## 2023-08-29 PROCEDURE — 63600175 PHARM REV CODE 636 W HCPCS: Mod: HCNC | Performed by: STUDENT IN AN ORGANIZED HEALTH CARE EDUCATION/TRAINING PROGRAM

## 2023-08-29 PROCEDURE — 80053 COMPREHEN METABOLIC PANEL: CPT | Mod: HCNC | Performed by: STUDENT IN AN ORGANIZED HEALTH CARE EDUCATION/TRAINING PROGRAM

## 2023-08-29 PROCEDURE — 25000003 PHARM REV CODE 250: Mod: HCNC | Performed by: STUDENT IN AN ORGANIZED HEALTH CARE EDUCATION/TRAINING PROGRAM

## 2023-08-29 PROCEDURE — 85027 COMPLETE CBC AUTOMATED: CPT | Mod: HCNC | Performed by: STUDENT IN AN ORGANIZED HEALTH CARE EDUCATION/TRAINING PROGRAM

## 2023-08-29 PROCEDURE — 25000003 PHARM REV CODE 250: Mod: HCNC

## 2023-08-29 PROCEDURE — 99233 SBSQ HOSP IP/OBS HIGH 50: CPT | Mod: HCNC,GC,, | Performed by: INTERNAL MEDICINE

## 2023-08-29 PROCEDURE — 83735 ASSAY OF MAGNESIUM: CPT | Mod: HCNC | Performed by: STUDENT IN AN ORGANIZED HEALTH CARE EDUCATION/TRAINING PROGRAM

## 2023-08-29 PROCEDURE — 94761 N-INVAS EAR/PLS OXIMETRY MLT: CPT | Mod: HCNC

## 2023-08-29 PROCEDURE — 84100 ASSAY OF PHOSPHORUS: CPT | Mod: HCNC | Performed by: STUDENT IN AN ORGANIZED HEALTH CARE EDUCATION/TRAINING PROGRAM

## 2023-08-29 PROCEDURE — 99233 PR SUBSEQUENT HOSPITAL CARE,LEVL III: ICD-10-PCS | Mod: HCNC,GC,, | Performed by: INTERNAL MEDICINE

## 2023-08-29 RX ORDER — FERROUS SULFATE, DRIED 160(50) MG
1 TABLET, EXTENDED RELEASE ORAL DAILY
Qty: 30 TABLET | Refills: 11 | Status: SHIPPED | OUTPATIENT
Start: 2023-08-30 | End: 2023-10-18

## 2023-08-29 RX ORDER — SODIUM,POTASSIUM PHOSPHATES 280-250MG
2 POWDER IN PACKET (EA) ORAL ONCE
Status: DISCONTINUED | OUTPATIENT
Start: 2023-08-29 | End: 2023-08-29

## 2023-08-29 RX ORDER — METOPROLOL SUCCINATE 25 MG/1
75 TABLET, EXTENDED RELEASE ORAL DAILY
Qty: 90 TABLET | Refills: 11 | Status: SHIPPED | OUTPATIENT
Start: 2023-08-29 | End: 2023-08-30

## 2023-08-29 RX ORDER — HEPARIN 100 UNIT/ML
3 SYRINGE INTRAVENOUS ONCE
Status: COMPLETED | OUTPATIENT
Start: 2023-08-29 | End: 2023-08-29

## 2023-08-29 RX ADMIN — METOPROLOL TARTRATE 25 MG: 25 TABLET, FILM COATED ORAL at 09:08

## 2023-08-29 RX ADMIN — Medication 1 TABLET: at 09:08

## 2023-08-29 RX ADMIN — ATORVASTATIN CALCIUM 20 MG: 20 TABLET, FILM COATED ORAL at 09:08

## 2023-08-29 RX ADMIN — PANTOPRAZOLE SODIUM 40 MG: 40 TABLET, DELAYED RELEASE ORAL at 09:08

## 2023-08-29 RX ADMIN — DIBASIC SODIUM PHOSPHATE, MONOBASIC POTASSIUM PHOSPHATE AND MONOBASIC SODIUM PHOSPHATE 2 TABLET: 852; 155; 130 TABLET ORAL at 10:08

## 2023-08-29 RX ADMIN — PANCRELIPASE 2 CAPSULE: 24000; 76000; 120000 CAPSULE, DELAYED RELEASE PELLETS ORAL at 12:08

## 2023-08-29 RX ADMIN — HEPARIN 300 UNITS: 100 SYRINGE at 03:08

## 2023-08-29 RX ADMIN — AMLODIPINE BESYLATE 5 MG: 5 TABLET ORAL at 09:08

## 2023-08-29 RX ADMIN — PANCRELIPASE 2 CAPSULE: 24000; 76000; 120000 CAPSULE, DELAYED RELEASE PELLETS ORAL at 09:08

## 2023-08-29 RX ADMIN — LEVOTHYROXINE SODIUM 175 MCG: 125 TABLET ORAL at 05:08

## 2023-08-29 RX ADMIN — METOPROLOL TARTRATE 25 MG: 25 TABLET, FILM COATED ORAL at 03:08

## 2023-08-29 NOTE — DISCHARGE SUMMARY
[facility] ([facility])  Hematology/Oncology  Discharge Summary      Patient Name: Pauline Cronin  MRN: [id number]  Admission Date: 8/26/2023  Hospital Length of Stay: 1 days  Discharge Date and Time:  08/29/2023 3:21 PM  Attending Physician: Fabby Muir MD   Discharging Provider: Mason Roman MD  Primary Care Provider: Ga Waldrop MD    HPI: Patient is an 83 year old female with past paroxysmal AFib on Eliquis, pancreatic adenocarcinoma heart failure with preserved ejection fraction, sick sinus syndrome with pacemaker, Crohn's disease, hypertension, hyperlipidemia, hypothyroidism presenting to the emergency room with abdominal pain which started a few hours prior to presentation.  She states that who was a constant pressure/pain in her epigastric re chin which was a 6/10.  She started passing gas and the pain resolved however her family insisted that she present to the emergency room for proper evaluation.  In the emergency room she was subsequently found to have hypokalemia to 2.9 on an i-STAT and was admitted for hypokalemia as well as uncontrolled pain.  On my assessment today she is in no acute distress and appears to be doing well.  She does not want to be here and does not want to take the potassium supplements.  She requests that her IV be removed and she denies any need for pain medications.  She is currently undergoing treatment for her pancreatic adenocarcinoma by Dr. Hill.  She is no fevers, chills, night sweats.  She has no cardiac chest pain or atypical cardiac chest pain.  She is currently complaining of thoracic back pain which is related to her position in bed which she does not feel she can improve with repositioning.  Her troponin on admission was 0.1 approximately which is at her baseline where was 3 months ago.  EKG done in the emergency room shows stable left bundle branch block as well as ST segment changes which are the same as they were back in June.   While it is a diffusely abnormal EKG there appears to be no changes compared to three months ago.      * No surgery found *     Hospital Course: Patient admitted for further and drain workup elevated troponin, and hypokalemia.  Patient troponin peaked at 0.133.  Likely due to demand ischemia.  Initial potassium in the ED 2.8.  Repleted with IV potassium as patient did not want to swallow large pills.  This is likely due to her history of Crohn's induced diarrhea, and chemotherapy.  Patient also has a elevated T bili of 3.1 (repeated).  Pending liver ultrasound for possible biliary obstruction.  Patient with extensive cardiac history, Cardiology was consulted for rate control management as patient has prolonged QTC and is on sotalol 120 mg b.i.d.  Qtc prolongation likely due to electrolyte derangement. Cardiology recommends to stop sotalol due to electrolyte disturbances and Qtc prolongation. Pt started on metoprolol and to be follow outpatient with Dr. Barbosa. AES consulted for elevated T.bili and alk phos. CTAP w/ pancreas was done and it showed  stable appearance of pancreatic heterogeneous masslike fullness at the pancreatic head, abutting the duodenum.  Stable hypodense lesion at the pancreatic body abutting the stomach with adjacent wall thickening.  Small amount of right upper quadrant ascites around the liver and gallbladder.  Biliary stent in place with associated pneumobilia. New small amount of right pleural fluid. Cholelithiasis.Questionable 6 mm stone at the ureterovesical junction vs phlebolith, similar to prior. No GI intervention required. Pt stable and ready to be discharged.     Physical Exam  HENT:      Head: Normocephalic and atraumatic.      Nose: Nose normal.      Mouth/Throat:      Mouth: Mucous membranes are moist.   Cardiovascular:      Rate and Rhythm: Normal rate.   Abdominal:      General: Bowel sounds are normal.   Musculoskeletal:         General: Normal range of motion.   Skin:      General: Skin is warm.   Neurological:      Mental Status: She is alert and oriented to person, place, and time. Mental status is at baseline.   Psychiatric:         Mood and Affect: Mood normal.         Behavior: Behavior normal.         Thought Content: Thought content normal.         Judgment: Judgment normal.     Vitals:    08/29/23 1041 08/29/23 1131 08/29/23 1441 08/29/23 1510   BP:  (!) 146/65  130/60   BP Location:       Patient Position:  Lying  Lying   Pulse: 69 90 69 72   Resp:  18  18   Temp:  97.4 °F (36.3 °C)  98.5 °F (36.9 °C)   TempSrc:  Oral  Oral   SpO2:  95%  (!) 94%   Weight:       Height:         Goals of Care Treatment Preferences:  Code Status: Full Code    Health care agent: Karen Mejia  Health care agent number: No value filed.    Living Will: Yes     What is most important right now is to focus on remaining as independent as possible, symptom/pain control, quality of life, even if it means sacrificing a little time.  Accordingly, we have decided that the best plan to meet the patient's goals includes continuing with treatment.      Consults:   Consults (From admission, onward)        Status Ordering Provider     Inpatient consult to [facility] ([facility])  Once        Provider:  (Not yet assigned)    Completed NEY PHIPPS     Inpatient consult to Cardiology  Once        Provider:  (Not yet assigned)    Completed JACKIE MIRANDA     Inpatient consult to Hematology/Oncology  Once        Provider:  (Not yet assigned)    Completed ELENA CAAL          Significant Diagnostic Studies: N/A    Pending Diagnostic Studies:     None        Final Active Diagnoses:    Diagnosis Date Noted POA    PRINCIPAL PROBLEM:  Hypokalemia [E87.6] 08/26/2023 Yes    Hyperbilirubinemia [E80.6] 08/28/2023 Unknown    Abdominal pain [R10.9] 04/22/2023 Yes    Chronic heart failure with preserved ejection fraction [I50.32] 03/28/2023 Yes    Malignant neoplasm of pancreas [C25.9]  03/21/2023 Yes    Sick sinus syndrome [I49.5] 05/10/2021 Yes     Chronic    Paroxysmal atrial fibrillation [I48.0] 04/19/2018 Yes     Chronic    Hyperlipidemia [E78.5] 04/19/2018 Yes     Chronic    Essential hypertension, benign [I10] 04/19/2018 Yes     Chronic    Hypothyroidism [E03.9] 04/19/2018 Yes     Chronic      Problems Resolved During this Admission:      Discharged Condition: stable    Disposition: Home or Self Care    Follow Up:    Patient Instructions:      Diet Adult Regular     Notify your health care provider if you experience any of the following:  temperature >100.4     Notify your health care provider if you experience any of the following:  persistent nausea and vomiting or diarrhea     Notify your health care provider if you experience any of the following:  severe uncontrolled pain     Notify your health care provider if you experience any of the following:  difficulty breathing or increased cough     Notify your health care provider if you experience any of the following:  persistent dizziness, light-headedness, or visual disturbances     Notify your health care provider if you experience any of the following:  increased confusion or weakness     Activity as tolerated     Medications:  Reconciled Home Medications:      Medication List      START taking these medications    metoprolol succinate 25 MG 24 hr tablet  Commonly known as: TOPROL-XL  Take 3 tablets (75 mg total) by mouth once daily.     OYSTER SHELL CALCIUM-VIT D3 500 mg-5 mcg (200 unit) per tablet  Generic drug: calcium-vitamin D3  Take 1 tablet by mouth once daily.  Start taking on: August 30, 2023     PHOSPHA 250 NEUTRAL 250 mg Tab  Generic drug: k phos di & mono-sod phos mono  Take 2 tablets by mouth 2 (two) times a day. for 14 days        CONTINUE taking these medications    acetaminophen 325 MG tablet  Commonly known as: TYLENOL  Take 650 mg by mouth daily as needed (Headache).     albuterol 2.5 mg /3 mL (0.083 %) nebulizer  solution  Commonly known as: PROVENTIL  Take 3 mLs (2.5 mg total) by nebulization every 6 (six) hours as needed for Wheezing or Shortness of Breath. Rescue     alendronate 70 MG tablet  Commonly known as: FOSAMAX  Take 1 tablet (70 mg total) by mouth every 7 days.     amLODIPine 5 MG tablet  Commonly known as: NORVASC  Take 1 tablet (5 mg total) by mouth once daily.     atorvastatin 20 MG tablet  Commonly known as: LIPITOR  Take 1 tablet (20 mg total) by mouth once daily.     colestipoL 1 gram Tab  Commonly known as: COLESTID  Take 1 tablet (1 g total) by mouth 2 (two) times daily.     CREON 24,000-76,000 -120,000 unit capsule  Generic drug: lipase-protease-amylase 24,000-76,000-120,000 units  Take 2 capsules by mouth 3 (three) times daily with meals.     diphenoxylate-atropine 2.5-0.025 mg 2.5-0.025 mg per tablet  Commonly known as: LOMOTIL  TAKE 1 TABLET BY MOUTH 4 TIMES DAILY AS NEEDED FOR DIARRHEA     ELIQUIS 5 mg Tab  Generic drug: apixaban  Take 1 tablet (5 mg total) by mouth 2 (two) times daily.     esomeprazole 40 MG capsule  Commonly known as: NEXIUM  Take 1 capsule (40 mg total) by mouth once daily.     folic acid 1 MG tablet  Commonly known as: FOLVITE  Take 1 tablet by mouth once daily     furosemide 40 MG tablet  Commonly known as: LASIX  Take 1 tablet (40 mg total) by mouth daily as needed (For weight gain > 3 lb in one day, increasing leg swelling, or increasing SOB).     levothyroxine 175 MCG tablet  Commonly known as: SYNTHROID, LEVOTHROID  Take 1 tablet by mouth once daily     LIDOcaine-prilocaine cream  Commonly known as: EMLA  Apply topically as needed (place to port site 45-60 minutes prior to port access).     MELATONIN ORAL  Take 1 tablet by mouth nightly as needed (Insomnia).     OLANZapine 5 MG tablet  Commonly known as: ZyPREXA  Take 1 tablet (5 mg total) by mouth every evening.     prochlorperazine 10 MG tablet  Commonly known as: COMPAZINE  Take 1 tablet (10 mg total) by mouth 4 (four)  times daily as needed (nausea).     sulfaSALAzine 500 mg Tab  Commonly known as: AZULFIDINE  Take 1 tablet by mouth twice daily     tolterodine 4 MG 24 hr capsule  Commonly known as: DETROL LA  Take 1 capsule (4 mg total) by mouth once daily.     traMADoL 50 mg tablet  Commonly known as: ULTRAM  Take 1 tablet (50 mg total) by mouth every 6 (six) hours as needed for Pain.        STOP taking these medications    amoxicillin-clavulanate 875-125mg 875-125 mg per tablet  Commonly known as: AUGMENTIN     potassium chloride 10 MEQ Cpsr  Commonly known as: MICRO-K     sotaloL 120 MG Tab  Commonly known as: BETAPACE            Mason Roman MD  Hematology/Oncology  [facility] - Oncology ([facility])

## 2023-08-29 NOTE — CONSULTS
[facility] - Oncology ([facility])  Wound Care    Patient Name:  Pauline Cronin   MRN:  [id number]  Date: 2023  Diagnosis: Hypokalemia    History:     Past Medical History:   Diagnosis Date    Anticoagulant long-term use     Atrial fibrillation     Crohn disease diagnosed in her 20's    GERD (gastroesophageal reflux disease)     Hyperlipidemia     Hypertension     Pancreatic adenocarcinoma 3/21/2023    Thyroid disease     s/p I 131       Social History     Socioeconomic History    Marital status:    Tobacco Use    Smoking status: Former     Current packs/day: 0.00     Types: Cigarettes     Quit date:      Years since quittin.6    Smokeless tobacco: Never   Substance and Sexual Activity    Alcohol use: No    Drug use: No    Sexual activity: Never     Social Determinants of Health     Financial Resource Strain: Unknown (2023)    Overall Financial Resource Strain (CARDIA)     Difficulty of Paying Living Expenses: Patient refused   Food Insecurity: Unknown (2023)    Hunger Vital Sign     Worried About Running Out of Food in the Last Year: Patient refused     Ran Out of Food in the Last Year: Patient refused   Transportation Needs: No Transportation Needs (2023)    PRAPARE - Transportation     Lack of Transportation (Medical): No     Lack of Transportation (Non-Medical): No   Physical Activity: Inactive (2023)    Exercise Vital Sign     Days of Exercise per Week: 0 days     Minutes of Exercise per Session: 0 min   Stress: Stress Concern Present (2023)    [employer] - Occupational Stress Questionnaire     Feeling of Stress : Very much   Social Connections: Socially Isolated (2023)    Social Connection and Isolation Panel [NHANES]     Frequency of Communication with Friends and Family: Never     Frequency of Social Gatherings with Friends and Family: Never     Attends Voodoo Services: Never     Active Member of Clubs or Organizations: No      Attends Club or Organization Meetings: Never     Marital Status:    Housing Stability: High Risk (8/29/2023)    Housing Stability Vital Sign     Unable to Pay for Housing in the Last Year: No     Number of Places Lived in the Last Year: 3     Unstable Housing in the Last Year: No       Precautions:     Allergies as of 08/26/2023 - Reviewed 08/26/2023   Allergen Reaction Noted    Cleocin [clindamycin hcl]  05/07/2023    Lisinopril Other (See Comments) 04/28/2021       [facility] Assessment Details/Treatment     Patient seen for wound care consultation- placed by RN for buttocks.    Reviewed chart for this encounter.   See Flow Sheet for findings.    Pt awake/ alert- sitting up in bed working on iPad; agreeable to targeted skin assmt at this time. Able to turn self and maintain position needed for care independently/ without assistance. Pt reports that wound started at home and has been using Calmoseptine as provided by Home Care nurse. R inner buttock wound appears to be healing- red to continue Calmoseptine here while admitted twice daily- no need for border sacral dressing at this time. Pt tolerated care without c/o discomfort- and voices good understanding of plan of care.     RECOMMENDATIONS:   Buttocks (R inner buttock)- altered skin integrity: 1)clean skin with cleansing wipe.  2)apply thin layer of Calmoseptine to area of discoloration. Provide care twice daily.    -encourage frequent repositioning.     Discussed POC with patient and primary nurse.   See EMR for orders & patient education.      Nursing to continue care.  Nursing to maintain pressure injury prevention interventions.  Current documented Colin score is 19 with a nutrition subscale score of 3.   08/29/23 1030   WOCN Assessment   WOCN Total Time (mins) 45   Visit Date 08/29/23   Visit Time 1030   Consult Type New   WOCN Speciality Wound   Intervention assessed;chart review;orders   Teaching on-going  (pt- continue home regimen for R buttock-  calmoseptine.)   Skin Interventions   Device Skin Pressure Protection adhesive use limited   Pressure Reduction Techniques frequent weight shift encouraged   Skin Protection adhesive use limited;skin sealant/moisture barrier applied   Positioning   Body Position position changed independently   Head of Bed (HOB) Positioning HOB elevated   Pressure Injury Prevention    Check Moisture Management Pad Done   Sacral Foam Dressing Remove   Check Medical Devices Done        Altered Skin Integrity 08/27/23 0057 Buttocks   Date First Assessed/Time First Assessed: 08/27/23 0057   Altered Skin Integrity Present on Admission - Did Patient arrive to the hospital with altered skin?: yes  Location: Buttocks   Wound Image    Dressing Appearance Dry;Intact   Drainage Amount None   Appearance Pink;Purple;Dry   Periwound Area Dry;Intact   Dressing Change Due 08/29/23       Will continue to follow as needed until discharge.    Sarah López BSN, RN, CWOCN  08/29/2023

## 2023-08-29 NOTE — PROGRESS NOTES
8/29/2023--Planned with daughter-in-law, Karen for this RN CM to contact patient directly today 8/29/2023. Pt is currently IP Hosp. Contacted Karen to collaborate on next follow-up call to patient. She states the son is at the hosp bedside right now. He brought her the club sandwich she wanted Karen to bring to her all night.   Karen agrees to be called by this RN CM on 8/31/2023.

## 2023-08-29 NOTE — ASSESSMENT & PLAN NOTE
Patient is an 83-year-old female with past medical history significant for metastatic pancreatic adenocarcinoma. Lab work remarkable for rising T.Bili of 2.5 to 3.1 now 1.9 and Alk. Phos elevated.     Plan:  -CTAP pancrease with no acute changes needing GI interventions  -Will do lab work outpatient in 1 week

## 2023-08-29 NOTE — ASSESSMENT & PLAN NOTE
Pt presenting with elecetrolytes abnormalities, currently on sotalol. QTc prolongation x2 repeated EKG    Plan:   -Hold sotalol until electrolytes correction is achieved  -Pt started on metoprolol

## 2023-08-29 NOTE — TREATMENT PLAN
[facility]  AES  Treatment Plan    Patient Name: Pauline Cronin  MRN: [id number]  Admission Date: 8/26/2023  Hospital Length of Stay: 1 days    Subjective:     Interval History:  CT scan with stable 4x3.8cm cystic pancreatic lesion, biliary stent in place, cholelithiasis    LFTs improving as well     BR 3.4-->1.9, -->266, AST/ ALT 95/ 46--> 64/ 34      No current facility-administered medications on file prior to encounter.     Current Outpatient Medications on File Prior to Encounter   Medication Sig Dispense Refill    alendronate (FOSAMAX) 70 MG tablet Take 1 tablet (70 mg total) by mouth every 7 days. 12 tablet 3    amLODIPine (NORVASC) 5 MG tablet Take 1 tablet (5 mg total) by mouth once daily. 30 tablet 11    atorvastatin (LIPITOR) 20 MG tablet Take 1 tablet (20 mg total) by mouth once daily. 90 tablet 3    colestipoL (COLESTID) 1 gram Tab Take 1 tablet (1 g total) by mouth 2 (two) times daily. 60 tablet 5    diphenoxylate-atropine 2.5-0.025 mg (LOMOTIL) 2.5-0.025 mg per tablet TAKE 1 TABLET BY MOUTH 4 TIMES DAILY AS NEEDED FOR DIARRHEA 60 tablet 2    ELIQUIS 5 mg Tab Take 1 tablet (5 mg total) by mouth 2 (two) times daily. 180 tablet 3    esomeprazole (NEXIUM) 40 MG capsule Take 1 capsule (40 mg total) by mouth once daily. 90 capsule 1    folic acid (FOLVITE) 1 MG tablet Take 1 tablet by mouth once daily 90 tablet 1    furosemide (LASIX) 40 MG tablet Take 1 tablet (40 mg total) by mouth daily as needed (For weight gain > 3 lb in one day, increasing leg swelling, or increasing SOB). 30 tablet 11    levothyroxine (SYNTHROID, LEVOTHROID) 175 MCG tablet Take 1 tablet by mouth once daily 90 tablet 2    lipase-protease-amylase 24,000-76,000-120,000 units (CREON) 24,000-76,000 -120,000 unit capsule Take 2 capsules by mouth 3 (three) times daily with meals. 180 capsule 11    MELATONIN ORAL Take 1 tablet by mouth nightly as needed (Insomnia).      OLANZapine (ZYPREXA) 5 MG tablet Take 1 tablet  (5 mg total) by mouth every evening. 30 tablet 5    potassium chloride (MICRO-K) 10 MEQ CpSR 1 po bid x 1 week then Micro K qd 30 capsule 5    prochlorperazine (COMPAZINE) 10 MG tablet Take 1 tablet (10 mg total) by mouth 4 (four) times daily as needed (nausea). 90 tablet 11    sotaloL (BETAPACE) 120 MG Tab Take 1 tablet (120 mg total) by mouth 2 (two) times daily. 180 tablet 3    sulfaSALAzine (AZULFIDINE) 500 mg Tab Take 1 tablet by mouth twice daily 180 tablet 3    tolterodine (DETROL LA) 4 MG 24 hr capsule Take 1 capsule (4 mg total) by mouth once daily. 90 capsule 3    traMADoL (ULTRAM) 50 mg tablet Take 1 tablet (50 mg total) by mouth every 6 (six) hours as needed for Pain. 25 tablet 0    acetaminophen (TYLENOL) 325 MG tablet Take 650 mg by mouth daily as needed (Headache).      albuterol (PROVENTIL) 2.5 mg /3 mL (0.083 %) nebulizer solution Take 3 mLs (2.5 mg total) by nebulization every 6 (six) hours as needed for Wheezing or Shortness of Breath. Rescue 150 mL 11    amoxicillin-clavulanate 875-125mg (AUGMENTIN) 875-125 mg per tablet Take 1 tablet by mouth 2 (two) times daily. 14 tablet 0    LIDOcaine-prilocaine (EMLA) cream Apply topically as needed (place to port site 45-60 minutes prior to port access). 30 g 6       Review of patient's allergies indicates:   Allergen Reactions    Cleocin [clindamycin hcl]     Lisinopril Other (See Comments)     cough         Objective:     Vitals:    08/29/23 0752   BP: 131/60   Pulse: 73   Resp: 16   Temp: 98.2 °F (36.8 °C)       Significant Labs:  Recent Labs   Lab 08/27/23  0430 08/28/23  0511 08/29/23  0553   HGB 9.1* 9.5* 9.2*       Lab Results   Component Value Date    WBC 9.63 08/29/2023    HGB 9.2 (L) 08/29/2023    HCT 28.3 (L) 08/29/2023    MCV 99 (H) 08/29/2023     (L) 08/29/2023       Lab Results   Component Value Date     08/29/2023    K 3.8 08/29/2023     08/29/2023    CO2 25 08/29/2023    BUN 6 (L) 08/29/2023    CREATININE 0.8 08/29/2023     CALCIUM 8.1 (L) 08/29/2023    ANIONGAP 7 (L) 08/29/2023    ESTGFRAFRICA >60.0 04/27/2022    EGFRNONAA >60.0 04/27/2022       Lab Results   Component Value Date    ALT 34 08/29/2023    AST 64 (H) 08/29/2023    ALKPHOS 266 (H) 08/29/2023    BILITOT 1.9 (H) 08/29/2023       Lab Results   Component Value Date    INR 1.4 (H) 08/27/2023    INR 1.1 06/08/2023    INR 1.2 05/03/2023       Significant Imaging:  Reviewed pertinent radiology findings.       Assessment/Plan:        84yo PMHx HTN, pAF on eliquis, HFpEF, sick sinus syndrome s/p PPM, compensated cirrhosis, ?Crohn's disease s/p SBR (unknown date, not on therapy), pancreatic adenocarcinoma (Dx EUS 03/14/2023) c/b biliary obstruction s/p ERCP with uncovered metal stent (03/21/2023) on chemotherapy (05/2023) [place] [facility] [place] pain.     AES consulted 08/28 for possible obstruction in setting of cholestatic liver injury and mild extrahepatic biliary ductal dilation on US (unable to denote if stent in place)     CT scan with stable appearing pancreatic mass and biliary stent in place, no biliary dilation    LFTs improving spontaneously    No abdominal pain, CBC w/o leukocytosis, remains afebrile     Problem List:  Cholestatic liver injury (improved)  Pancreatic adenocarcinoma c/b biliary obstruction     Recommendations:  Hold off on procedure at this time     Thank you for involving us in the care of Pauline Cronin. Please call with any additional questions, concerns or changes in the patient's clinical status.     Chance Mari MD  Gastroenterology Fellow PGY IV   [facility]

## 2023-08-29 NOTE — PROGRESS NOTES
Discharge instructions given and explained to pt.  Pt. verbalized understanding with no further questions.  Chest port heparinized with roberson needle D/C'd with tip intact.  VS WDL.     ROAD TEST  O=SpO2 94% on RA  A=Ambulating around room   D=Roberson D/C'd  T=Tolerating regular diet  E=Voids  S=Performs self care independently

## 2023-08-30 ENCOUNTER — OFFICE VISIT (OUTPATIENT)
Dept: CARDIOLOGY | Facility: CLINIC | Age: 83
End: 2023-08-30
Payer: MEDICARE

## 2023-08-30 VITALS
BODY MASS INDEX: 30.87 KG/M2 | DIASTOLIC BLOOD PRESSURE: 58 MMHG | OXYGEN SATURATION: 94 % | HEIGHT: 60 IN | SYSTOLIC BLOOD PRESSURE: 121 MMHG | HEART RATE: 69 BPM

## 2023-08-30 DIAGNOSIS — I44.7 LBBB (LEFT BUNDLE BRANCH BLOCK): ICD-10-CM

## 2023-08-30 DIAGNOSIS — E78.5 HYPERLIPIDEMIA, UNSPECIFIED HYPERLIPIDEMIA TYPE: Chronic | ICD-10-CM

## 2023-08-30 DIAGNOSIS — I48.0 PAROXYSMAL ATRIAL FIBRILLATION: Primary | Chronic | ICD-10-CM

## 2023-08-30 DIAGNOSIS — C25.0 MALIGNANT NEOPLASM OF HEAD OF PANCREAS: ICD-10-CM

## 2023-08-30 DIAGNOSIS — K50.10 CROHN'S DISEASE OF LARGE INTESTINE WITHOUT COMPLICATION: Chronic | ICD-10-CM

## 2023-08-30 DIAGNOSIS — E03.9 ACQUIRED HYPOTHYROIDISM: Chronic | ICD-10-CM

## 2023-08-30 DIAGNOSIS — Z79.899 DRUG-INDUCED IMMUNODEFICIENCY: ICD-10-CM

## 2023-08-30 DIAGNOSIS — Z95.0 PACEMAKER: Chronic | ICD-10-CM

## 2023-08-30 DIAGNOSIS — K83.1 BILIARY OBSTRUCTION: ICD-10-CM

## 2023-08-30 DIAGNOSIS — I44.2 ATRIOVENTRICULAR BLOCK, COMPLETE: ICD-10-CM

## 2023-08-30 DIAGNOSIS — D84.821 DRUG-INDUCED IMMUNODEFICIENCY: ICD-10-CM

## 2023-08-30 DIAGNOSIS — I10 ESSENTIAL HYPERTENSION, BENIGN: Chronic | ICD-10-CM

## 2023-08-30 DIAGNOSIS — Z79.01 CHRONIC ANTICOAGULATION: ICD-10-CM

## 2023-08-30 DIAGNOSIS — I49.5 SICK SINUS SYNDROME: Chronic | ICD-10-CM

## 2023-08-30 PROCEDURE — 1101F PT FALLS ASSESS-DOCD LE1/YR: CPT | Mod: HCNC,CPTII,S$GLB, | Performed by: INTERNAL MEDICINE

## 2023-08-30 PROCEDURE — 3078F DIAST BP <80 MM HG: CPT | Mod: HCNC,CPTII,S$GLB, | Performed by: INTERNAL MEDICINE

## 2023-08-30 PROCEDURE — 1157F ADVNC CARE PLAN IN RCRD: CPT | Mod: HCNC,CPTII,S$GLB, | Performed by: INTERNAL MEDICINE

## 2023-08-30 PROCEDURE — 1159F MED LIST DOCD IN RCRD: CPT | Mod: HCNC,CPTII,S$GLB, | Performed by: INTERNAL MEDICINE

## 2023-08-30 PROCEDURE — 99999 PR PBB SHADOW E&M-EST. PATIENT-LVL IV: CPT | Mod: PBBFAC,HCNC,, | Performed by: INTERNAL MEDICINE

## 2023-08-30 PROCEDURE — 1126F PR PAIN SEVERITY QUANTIFIED, NO PAIN PRESENT: ICD-10-PCS | Mod: HCNC,CPTII,S$GLB, | Performed by: INTERNAL MEDICINE

## 2023-08-30 PROCEDURE — 3074F PR MOST RECENT SYSTOLIC BLOOD PRESSURE < 130 MM HG: ICD-10-PCS | Mod: HCNC,CPTII,S$GLB, | Performed by: INTERNAL MEDICINE

## 2023-08-30 PROCEDURE — 3288F PR FALLS RISK ASSESSMENT DOCUMENTED: ICD-10-PCS | Mod: HCNC,CPTII,S$GLB, | Performed by: INTERNAL MEDICINE

## 2023-08-30 PROCEDURE — 99214 OFFICE O/P EST MOD 30 MIN: CPT | Mod: HCNC,S$GLB,, | Performed by: INTERNAL MEDICINE

## 2023-08-30 PROCEDURE — 99214 PR OFFICE/OUTPT VISIT, EST, LEVL IV, 30-39 MIN: ICD-10-PCS | Mod: HCNC,S$GLB,, | Performed by: INTERNAL MEDICINE

## 2023-08-30 PROCEDURE — 1101F PR PT FALLS ASSESS DOC 0-1 FALLS W/OUT INJ PAST YR: ICD-10-PCS | Mod: HCNC,CPTII,S$GLB, | Performed by: INTERNAL MEDICINE

## 2023-08-30 PROCEDURE — 1157F PR ADVANCE CARE PLAN OR EQUIV PRESENT IN MEDICAL RECORD: ICD-10-PCS | Mod: HCNC,CPTII,S$GLB, | Performed by: INTERNAL MEDICINE

## 2023-08-30 PROCEDURE — 1160F RVW MEDS BY RX/DR IN RCRD: CPT | Mod: HCNC,CPTII,S$GLB, | Performed by: INTERNAL MEDICINE

## 2023-08-30 PROCEDURE — 1111F DSCHRG MED/CURRENT MED MERGE: CPT | Mod: HCNC,CPTII,S$GLB, | Performed by: INTERNAL MEDICINE

## 2023-08-30 PROCEDURE — 1126F AMNT PAIN NOTED NONE PRSNT: CPT | Mod: HCNC,CPTII,S$GLB, | Performed by: INTERNAL MEDICINE

## 2023-08-30 PROCEDURE — 3288F FALL RISK ASSESSMENT DOCD: CPT | Mod: HCNC,CPTII,S$GLB, | Performed by: INTERNAL MEDICINE

## 2023-08-30 PROCEDURE — 99999 PR PBB SHADOW E&M-EST. PATIENT-LVL IV: ICD-10-PCS | Mod: PBBFAC,HCNC,, | Performed by: INTERNAL MEDICINE

## 2023-08-30 PROCEDURE — 3078F PR MOST RECENT DIASTOLIC BLOOD PRESSURE < 80 MM HG: ICD-10-PCS | Mod: HCNC,CPTII,S$GLB, | Performed by: INTERNAL MEDICINE

## 2023-08-30 PROCEDURE — 3074F SYST BP LT 130 MM HG: CPT | Mod: HCNC,CPTII,S$GLB, | Performed by: INTERNAL MEDICINE

## 2023-08-30 PROCEDURE — 1159F PR MEDICATION LIST DOCUMENTED IN MEDICAL RECORD: ICD-10-PCS | Mod: HCNC,CPTII,S$GLB, | Performed by: INTERNAL MEDICINE

## 2023-08-30 PROCEDURE — 1160F PR REVIEW ALL MEDS BY PRESCRIBER/CLIN PHARMACIST DOCUMENTED: ICD-10-PCS | Mod: HCNC,CPTII,S$GLB, | Performed by: INTERNAL MEDICINE

## 2023-08-30 PROCEDURE — 1111F PR DISCHARGE MEDS RECONCILED W/ CURRENT OUTPATIENT MED LIST: ICD-10-PCS | Mod: HCNC,CPTII,S$GLB, | Performed by: INTERNAL MEDICINE

## 2023-08-30 RX ORDER — AMLODIPINE BESYLATE 2.5 MG/1
2.5 TABLET ORAL DAILY
Qty: 90 TABLET | Refills: 3 | Status: SHIPPED | OUTPATIENT
Start: 2023-08-30 | End: 2024-08-29

## 2023-08-30 RX ORDER — METOPROLOL SUCCINATE 25 MG/1
75 TABLET, EXTENDED RELEASE ORAL DAILY
Qty: 270 TABLET | Refills: 3 | Status: SHIPPED | OUTPATIENT
Start: 2023-08-30 | End: 2024-08-29

## 2023-08-30 NOTE — PROGRESS NOTES
Subjective:      Patient ID: Pauline Cronin is a 83 y.o. female.    Chief Complaint: Hospital Follow Up    HPI:  Pt is undergoing chemotherapy for CA pf pancreas.    Pt c/o nausea after chemo    Pt c/o chronic diarrhea associated with Crohn's disease    The sotalol was changed to a higher dose of metoprolol due to electrolyte abnormalities and a prolonged QT interval    No palpitations on current regimen    Pt c/o weakness at times      Review of Systems   Cardiovascular:  Negative for chest pain, claudication, dyspnea on exertion, irregular heartbeat, leg swelling, near-syncope, orthopnea, palpitations and syncope.      No bleeding on Eliquis      Past Medical History:   Diagnosis Date    Anticoagulant long-term use     Atrial fibrillation     Crohn disease diagnosed in her 20's    GERD (gastroesophageal reflux disease)     Hyperlipidemia     Hypertension     Pancreatic adenocarcinoma 3/21/2023    Thyroid disease     s/p I 131        Past Surgical History:   Procedure Laterality Date    APPENDECTOMY      CARDIAC SURGERY  2014    pacemaker    COLONOSCOPY  ~2008    normal findings per patient report    ENDOSCOPIC ULTRASOUND OF UPPER GASTROINTESTINAL TRACT N/A 3/14/2023    Procedure: ULTRASOUND, UPPER GI TRACT, ENDOSCOPIC;  Surgeon: Andrei Swartz MD;  Location: [facility];  Service: Endoscopy;  Laterality: N/A;  Approval to hold Eliquis rec'd from Dr. Barbosa (see telephone encounter 3/3/23)-DS  [employer] AICD/PPM  3/3/23-Instructions via portal-DS    ERCP N/A 3/21/2023    Procedure: ERCP (ENDOSCOPIC RETROGRADE CHOLANGIOPANCREATOGRAPHY);  Surgeon: Celina Toney MD;  Location: [facility] ([address] [place] [address]);  Service: Endoscopy;  Laterality: N/A;    ESOPHAGOGASTRODUODENOSCOPY N/A 7/17/2018    Procedure: EGD (ESOPHAGOGASTRODUODENOSCOPY);  Surgeon: Alondra Casiano MD;  Location: [facility];  Service: Endoscopy;  Laterality: N/A;    HYSTERECTOMY      INSERTION OF IMPLANTABLE CARDIOVERTER-DEFIBRILLATOR (ICD) GENERATOR WITH  TWO EXISTING LEADS Left 5/10/2021    Procedure: INSERTION, PULSE GENERATOR WITH 2 EXISTING LEADS, ICD;  Surgeon: Bo Leone MD;  Location: [facility] CATH LAB;  Service: Cardiology;  Laterality: Left;    INSERTION OF PACEMAKER      REPLACEMENT OF PACEMAKER GENERATOR  05/10/2021    RETROGRADE PYELOGRAPHY Right 2020    Procedure: PYELOGRAM, RETROGRADE;  Surgeon: Jovan Kruse MD;  Location: [facility] OR;  Service: Urology;  Laterality: Right;    SMALL INTESTINE SURGERY  in her 20's    for crohn's    TONSILLECTOMY      UPPER GASTROINTESTINAL ENDOSCOPY  ~    normal findings per patient report       Family History   Problem Relation Age of Onset    Cancer Mother     Breast cancer Mother     Crohn's disease Other     Colon cancer Neg Hx     Colon polyps Neg Hx     Esophageal cancer Neg Hx     Stomach cancer Neg Hx     Ulcerative colitis Neg Hx        Social History     Socioeconomic History    Marital status:    Tobacco Use    Smoking status: Former     Current packs/day: 0.00     Types: Cigarettes     Quit date:      Years since quittin.6    Smokeless tobacco: Never   Substance and Sexual Activity    Alcohol use: No    Drug use: No    Sexual activity: Never     Social Determinants of Health     Financial Resource Strain: Unknown (2023)    Overall Financial Resource Strain (CARDIA)     Difficulty of Paying Living Expenses: Patient refused   Food Insecurity: Unknown (2023)    Hunger Vital Sign     Worried About Running Out of Food in the Last Year: Patient refused     Ran Out of Food in the Last Year: Patient refused   Transportation Needs: No Transportation Needs (2023)    PRAPARE - Transportation     Lack of Transportation (Medical): No     Lack of Transportation (Non-Medical): No   Physical Activity: Inactive (2023)    Exercise Vital Sign     Days of Exercise per Week: 0 days     Minutes of Exercise per Session: 0 min   Stress: Stress Concern Present (2023)    [place]  [place] [employer] - Occupational Stress Questionnaire     Feeling of Stress : Very much   Social Connections: Socially Isolated (8/29/2023)    Social Connection and Isolation Panel [NHANES]     Frequency of Communication with Friends and Family: Never     Frequency of Social Gatherings with Friends and Family: Never     Attends Buddhism Services: Never     Active Member of Clubs or Organizations: No     Attends Club or Organization Meetings: Never     Marital Status:    Housing Stability: High Risk (8/29/2023)    Housing Stability Vital Sign     Unable to Pay for Housing in the Last Year: No     Number of Places Lived in the Last Year: 3     Unstable Housing in the Last Year: No       Current Outpatient Medications on File Prior to Visit   Medication Sig Dispense Refill    acetaminophen (TYLENOL) 325 MG tablet Take 650 mg by mouth daily as needed (Headache).      albuterol (PROVENTIL) 2.5 mg /3 mL (0.083 %) nebulizer solution Take 3 mLs (2.5 mg total) by nebulization every 6 (six) hours as needed for Wheezing or Shortness of Breath. Rescue 150 mL 11    atorvastatin (LIPITOR) 20 MG tablet Take 1 tablet (20 mg total) by mouth once daily. 90 tablet 3    calcium-vitamin D3 (OS-RADHA 500 + D3) 500 mg-5 mcg (200 unit) per tablet Take 1 tablet by mouth once daily. 30 tablet 11    colestipoL (COLESTID) 1 gram Tab Take 1 tablet (1 g total) by mouth 2 (two) times daily. 60 tablet 5    diphenoxylate-atropine 2.5-0.025 mg (LOMOTIL) 2.5-0.025 mg per tablet TAKE 1 TABLET BY MOUTH 4 TIMES DAILY AS NEEDED FOR DIARRHEA 60 tablet 2    ELIQUIS 5 mg Tab Take 1 tablet (5 mg total) by mouth 2 (two) times daily. 180 tablet 3    esomeprazole (NEXIUM) 40 MG capsule Take 1 capsule (40 mg total) by mouth once daily. 90 capsule 1    folic acid (FOLVITE) 1 MG tablet Take 1 tablet by mouth once daily 90 tablet 1    furosemide (LASIX) 40 MG tablet Take 1 tablet (40 mg total) by mouth daily as needed (For weight gain > 3 lb in  one day, increasing leg swelling, or increasing SOB). 30 tablet 11    k phos di & mono-sod phos mono (K-PHOS-NEUTRAL) 250 mg Tab Take 2 tablets by mouth 2 (two) times a day. for 14 days 56 tablet 0    lipase-protease-amylase 24,000-76,000-120,000 units (CREON) 24,000-76,000 -120,000 unit capsule Take 2 capsules by mouth 3 (three) times daily with meals. 180 capsule 11    MELATONIN ORAL Take 1 tablet by mouth nightly as needed (Insomnia).      OLANZapine (ZYPREXA) 5 MG tablet Take 1 tablet (5 mg total) by mouth every evening. 30 tablet 5    prochlorperazine (COMPAZINE) 10 MG tablet Take 1 tablet (10 mg total) by mouth 4 (four) times daily as needed (nausea). 90 tablet 11    sulfaSALAzine (AZULFIDINE) 500 mg Tab Take 1 tablet by mouth twice daily 180 tablet 3    tolterodine (DETROL LA) 4 MG 24 hr capsule Take 1 capsule (4 mg total) by mouth once daily. 90 capsule 3    traMADoL (ULTRAM) 50 mg tablet Take 1 tablet (50 mg total) by mouth every 6 (six) hours as needed for Pain. 25 tablet 0    [DISCONTINUED] alendronate (FOSAMAX) 70 MG tablet Take 1 tablet (70 mg total) by mouth every 7 days. 12 tablet 3    [DISCONTINUED] amLODIPine (NORVASC) 5 MG tablet Take 1 tablet (5 mg total) by mouth once daily. 30 tablet 11    [DISCONTINUED] metoprolol succinate (TOPROL-XL) 25 MG 24 hr tablet Take 3 tablets (75 mg total) by mouth once daily. 90 tablet 11    levothyroxine (SYNTHROID, LEVOTHROID) 175 MCG tablet Take 1 tablet by mouth once daily 90 tablet 2    LIDOcaine-prilocaine (EMLA) cream Apply topically as needed (place to port site 45-60 minutes prior to port access). 30 g 6     Current Facility-Administered Medications on File Prior to Visit   Medication Dose Route Frequency Provider Last Rate Last Admin    [DISCONTINUED] amLODIPine tablet 5 mg  5 mg Oral Daily Daniel Jaeger MD   5 mg at 08/29/23 0912    [DISCONTINUED] atorvastatin tablet 20 mg  20 mg Oral Daily Daniel Jaeger MD   20 mg at 08/29/23 0912    [DISCONTINUED]  calcium-vitamin D3 500 mg-5 mcg (200 unit) per tablet 1 tablet  1 tablet Oral Daily Mami Muro PA-C   1 tablet at 08/29/23 0912    [DISCONTINUED] cholestyramine-aspartame 4 gram packet 4 g  4 g Oral BID Daniel Jaeger MD   4 g at 08/28/23 0842    [DISCONTINUED] dextrose 10% bolus 125 mL 125 mL  12.5 g Intravenous PRN Daniel Jaeger MD        [DISCONTINUED] dextrose 10% bolus 250 mL 250 mL  25 g Intravenous PRN Daniel Jaeger MD        [DISCONTINUED] diphenhydrAMINE injection 25 mg  25 mg Intravenous Q6H PRN Rachana Chambers PA-C   25 mg at 08/27/23 0204    [DISCONTINUED] glucagon (human recombinant) injection 1 mg  1 mg Intramuscular PRN Daniel Jaeger MD        [DISCONTINUED] glucose chewable tablet 16 g  16 g Oral PRN Daniel Jaeger MD        [DISCONTINUED] glucose chewable tablet 24 g  24 g Oral PRN Daniel Jaeger MD        [DISCONTINUED] k phos di & mono-sod phos mono 250 mg tablet 2 tablet  2 tablet Oral BID Mason Lorenzana MD   2 tablet at 08/29/23 1053    [DISCONTINUED] levothyroxine tablet 175 mcg  175 mcg Oral Before breakfast Daniel Jaeger MD   175 mcg at 08/29/23 0542    [DISCONTINUED] lipase-protease-amylase 24,000-76,000-120,000 units capsule 2 capsule  2 capsule Oral TID WM Daniel Jaeger MD   2 capsule at 08/29/23 1236    [DISCONTINUED] LORazepam tablet 1 mg  1 mg Oral Q6H PRN Rachana Chambers PA-C   1 mg at 08/27/23 0049    [DISCONTINUED] melatonin tablet 6 mg  6 mg Oral Nightly PRN Mason Lorenzana MD   6 mg at 08/28/23 2108    [DISCONTINUED] metoprolol tartrate (LOPRESSOR) tablet 25 mg  25 mg Oral TID Lalo Diaz DO   25 mg at 08/29/23 1535    [DISCONTINUED] naloxone 0.4 mg/mL injection 0.02 mg  0.02 mg Intravenous PRN Daniel Jaeger MD        [DISCONTINUED] pantoprazole EC tablet 40 mg  40 mg Oral Daily Daniel Jaeger MD   40 mg at 08/29/23 0912    [DISCONTINUED] sodium chloride 0.9% flush 10 mL  10 mL Intravenous Q12H PRN Daniel Jaeger MD         [DISCONTINUED] traMADoL tablet 50 mg  50 mg Oral Q6H PRN Daniel Jaeger MD           Review of patient's allergies indicates:   Allergen Reactions    Cleocin [clindamycin hcl]     Lisinopril Other (See Comments)     cough     Objective:     Vitals:    08/30/23 1515   BP: (!) 121/58   BP Location: Left arm   Patient Position: Sitting   BP Method: Large (Automatic)   Pulse: 69   SpO2: (!) 94%   Weight: Comment: Wheelchair   Height: 5' (1.524 m)        Physical Exam  Vitals reviewed.   Constitutional:       Appearance: She is well-developed.   Eyes:      General: No scleral icterus.  Neck:      Vascular: Carotid bruit (bilateral vs. transmitted systolic murmur) present. No JVD.   Cardiovascular:      Rate and Rhythm: Normal rate and regular rhythm.      Heart sounds: Murmur (II/VI systolic murmur radiates to the neck) heard.      No gallop.   Pulmonary:      Breath sounds: Normal breath sounds.   Musculoskeletal:      Right lower leg: No edema.      Left lower leg: No edema.   Skin:     General: Skin is warm and dry.   Neurological:      Mental Status: She is alert and oriented to person, place, and time.   Psychiatric:         Behavior: Behavior normal.         Thought Content: Thought content normal.         Judgment: Judgment normal.        ECG 8/28/23: atrial pacing with native AV conduction. LBBB. Reviewed by me.  No significant change      Admission on 08/26/2023, Discharged on 08/29/2023   Component Date Value Ref Range Status    WBC 08/26/2023 11.33  3.90 - 12.70 K/uL Final    RBC 08/26/2023 3.54 (L)  4.00 - 5.40 M/uL Final    Hemoglobin 08/26/2023 11.5 (L)  12.0 - 16.0 g/dL Final    Hematocrit 08/26/2023 35.3 (L)  37.0 - 48.5 % Final    MCV 08/26/2023 100 (H)  82 - 98 fL Final    MCH 08/26/2023 32.5 (H)  27.0 - 31.0 pg Final    MCHC 08/26/2023 32.6  32.0 - 36.0 g/dL Final    RDW 08/26/2023 16.8 (H)  11.5 - 14.5 % Final    Platelets 08/26/2023 176  150 - 450 K/uL Final    MPV 08/26/2023 12.5  9.2 - 12.9 fL Final     Immature Granulocytes 08/26/2023 1.4 (H)  0.0 - 0.5 % Final    Gran # (ANC) 08/26/2023 10.3 (H)  1.8 - 7.7 K/uL Final    Immature Grans (Abs) 08/26/2023 0.16 (H)  0.00 - 0.04 K/uL Final    Lymph # 08/26/2023 0.6 (L)  1.0 - 4.8 K/uL Final    Mono # 08/26/2023 0.2 (L)  0.3 - 1.0 K/uL Final    Eos # 08/26/2023 0.1  0.0 - 0.5 K/uL Final    Baso # 08/26/2023 0.01  0.00 - 0.20 K/uL Final    nRBC 08/26/2023 0  0 /100 WBC Final    Gran % 08/26/2023 91.1 (H)  38.0 - 73.0 % Final    Lymph % 08/26/2023 5.2 (L)  18.0 - 48.0 % Final    Mono % 08/26/2023 1.8 (L)  4.0 - 15.0 % Final    Eosinophil % 08/26/2023 0.4  0.0 - 8.0 % Final    Basophil % 08/26/2023 0.1  0.0 - 1.9 % Final    Platelet Estimate 08/26/2023 Appears normal   Final    Aniso 08/26/2023 Slight   Final    Toxic Granulation 08/26/2023 Present   Final    Differential Method 08/26/2023 Automated   Final    Specimen UA 08/27/2023 Urine, Catheterized   Final    Color, UA 08/27/2023 Yellow  Yellow, Straw, Marilyn Final    Appearance, UA 08/27/2023 Clear  Clear Final    pH, UA 08/27/2023 6.0  5.0 - 8.0 Final    Specific Gravity, UA 08/27/2023 1.020  1.005 - 1.030 Final    Protein, UA 08/27/2023 Trace (A)  Negative Final    Glucose, UA 08/27/2023 Negative  Negative Final    Ketones, UA 08/27/2023 Negative  Negative Final    Bilirubin (UA) 08/27/2023 1+ (A)  Negative Final    Occult Blood UA 08/27/2023 Negative  Negative Final    Nitrite, UA 08/27/2023 Negative  Negative Final    Leukocytes, UA 08/27/2023 Negative  Negative Final    Troponin I 08/26/2023 0.119 (H)  0.000 - 0.026 ng/mL Final    BNP 08/26/2023 214 (H)  0 - 99 pg/mL Final    Prothrombin Time 08/27/2023 14.6 (H)  9.0 - 12.5 sec Final    INR 08/27/2023 1.4 (H)  0.8 - 1.2 Final    POC Glucose 08/26/2023 112 (H)  70 - 110 mg/dL Final    POC BUN 08/26/2023 11  6 - 30 mg/dL Final    POC Creatinine 08/26/2023 0.8  0.5 - 1.4 mg/dL Final    POC Sodium 08/26/2023 140  136 - 145 mmol/L Final    POC Potassium 08/26/2023  2.8 (LL)  3.5 - 5.1 mmol/L Final    POC Chloride 08/26/2023 99  95 - 110 mmol/L Final    POC TCO2 (MEASURED) 08/26/2023 25  23 - 29 mmol/L Final    POC Ionized Calcium 08/26/2023 0.85 (L)  1.06 - 1.42 mmol/L Final    POC Hematocrit 08/26/2023 32 (L)  36 - 54 %PCV Final    Sample 08/26/2023 JOSELYN   Final    Sodium 08/27/2023 137  136 - 145 mmol/L Final    Potassium 08/27/2023 2.8 (L)  3.5 - 5.1 mmol/L Final    Chloride 08/27/2023 102  95 - 110 mmol/L Final    CO2 08/27/2023 25  23 - 29 mmol/L Final    Glucose 08/27/2023 110  70 - 110 mg/dL Final    BUN 08/27/2023 10  8 - 23 mg/dL Final    Creatinine 08/27/2023 0.7  0.5 - 1.4 mg/dL Final    Calcium 08/27/2023 6.6 (LL)  8.7 - 10.5 mg/dL Final    Total Protein 08/27/2023 5.7 (L)  6.0 - 8.4 g/dL Final    Albumin 08/27/2023 2.3 (L)  3.5 - 5.2 g/dL Final    Total Bilirubin 08/27/2023 2.5 (H)  0.1 - 1.0 mg/dL Final    Alkaline Phosphatase 08/27/2023 285 (H)  55 - 135 U/L Final    AST 08/27/2023 230 (H)  10 - 40 U/L Final    ALT 08/27/2023 71 (H)  10 - 44 U/L Final    eGFR 08/27/2023 >60.0  >60 mL/min/1.73 m^2 Final    Anion Gap 08/27/2023 10  8 - 16 mmol/L Final    Magnesium 08/27/2023 1.2 (L)  1.6 - 2.6 mg/dL Final    Phosphorus 08/27/2023 2.3 (L)  2.7 - 4.5 mg/dL Final    WBC 08/27/2023 7.72  3.90 - 12.70 K/uL Final    RBC 08/27/2023 2.79 (L)  4.00 - 5.40 M/uL Final    Hemoglobin 08/27/2023 9.1 (L)  12.0 - 16.0 g/dL Final    Hematocrit 08/27/2023 27.2 (L)  37.0 - 48.5 % Final    MCV 08/27/2023 98  82 - 98 fL Final    MCH 08/27/2023 32.6 (H)  27.0 - 31.0 pg Final    MCHC 08/27/2023 33.5  32.0 - 36.0 g/dL Final    RDW 08/27/2023 16.3 (H)  11.5 - 14.5 % Final    Platelets 08/27/2023 130 (L)  150 - 450 K/uL Final    MPV 08/27/2023 12.3  9.2 - 12.9 fL Final    Troponin I 08/27/2023 0.133 (H)  0.000 - 0.026 ng/mL Final    SARS-CoV-2 RNA, Amplification, Qual 08/27/2023 Negative  Negative Final    Troponin I 08/27/2023 0.109 (H)  0.000 - 0.026 ng/mL Final    Lipase 08/27/2023 36  4  - 60 U/L Final    Sodium 08/27/2023 137  136 - 145 mmol/L Final    Potassium 08/27/2023 4.4  3.5 - 5.1 mmol/L Final    Chloride 08/27/2023 102  95 - 110 mmol/L Final    CO2 08/27/2023 25  23 - 29 mmol/L Final    Glucose 08/27/2023 148 (H)  70 - 110 mg/dL Final    BUN 08/27/2023 7 (L)  8 - 23 mg/dL Final    Creatinine 08/27/2023 0.8  0.5 - 1.4 mg/dL Final    Calcium 08/27/2023 6.9 (LL)  8.7 - 10.5 mg/dL Final    Anion Gap 08/27/2023 10  8 - 16 mmol/L Final    eGFR 08/27/2023 >60.0  >60 mL/min/1.73 m^2 Final    Sodium 08/27/2023 135 (L)  136 - 145 mmol/L Final    Potassium 08/27/2023 4.2  3.5 - 5.1 mmol/L Final    Chloride 08/27/2023 102  95 - 110 mmol/L Final    CO2 08/27/2023 26  23 - 29 mmol/L Final    Glucose 08/27/2023 138 (H)  70 - 110 mg/dL Final    BUN 08/27/2023 7 (L)  8 - 23 mg/dL Final    Creatinine 08/27/2023 0.8  0.5 - 1.4 mg/dL Final    Calcium 08/27/2023 7.0 (L)  8.7 - 10.5 mg/dL Final    Total Protein 08/27/2023 5.9 (L)  6.0 - 8.4 g/dL Final    Albumin 08/27/2023 2.3 (L)  3.5 - 5.2 g/dL Final    Total Bilirubin 08/27/2023 3.1 (H)  0.1 - 1.0 mg/dL Final    Alkaline Phosphatase 08/27/2023 288 (H)  55 - 135 U/L Final    AST 08/27/2023 157 (H)  10 - 40 U/L Final    ALT 08/27/2023 61 (H)  10 - 44 U/L Final    eGFR 08/27/2023 >60.0  >60 mL/min/1.73 m^2 Final    Anion Gap 08/27/2023 7 (L)  8 - 16 mmol/L Final    Ionized Calcium 08/27/2023 0.88 (L)  1.06 - 1.42 mmol/L Final    Sodium 08/28/2023 139  136 - 145 mmol/L Final    Potassium 08/28/2023 4.2  3.5 - 5.1 mmol/L Final    Chloride 08/28/2023 105  95 - 110 mmol/L Final    CO2 08/28/2023 25  23 - 29 mmol/L Final    Glucose 08/28/2023 74  70 - 110 mg/dL Final    BUN 08/28/2023 6 (L)  8 - 23 mg/dL Final    Creatinine 08/28/2023 0.8  0.5 - 1.4 mg/dL Final    Calcium 08/28/2023 8.0 (L)  8.7 - 10.5 mg/dL Final    Total Protein 08/28/2023 6.0  6.0 - 8.4 g/dL Final    Albumin 08/28/2023 2.3 (L)  3.5 - 5.2 g/dL Final    Total Bilirubin 08/28/2023 3.4 (H)  0.1 - 1.0  mg/dL Final    Alkaline Phosphatase 08/28/2023 274 (H)  55 - 135 U/L Final    AST 08/28/2023 94 (H)  10 - 40 U/L Final    ALT 08/28/2023 46 (H)  10 - 44 U/L Final    eGFR 08/28/2023 >60.0  >60 mL/min/1.73 m^2 Final    Anion Gap 08/28/2023 9  8 - 16 mmol/L Final    Magnesium 08/28/2023 1.9  1.6 - 2.6 mg/dL Final    Phosphorus 08/28/2023 2.5 (L)  2.7 - 4.5 mg/dL Final    WBC 08/28/2023 8.63  3.90 - 12.70 K/uL Final    RBC 08/28/2023 2.89 (L)  4.00 - 5.40 M/uL Final    Hemoglobin 08/28/2023 9.5 (L)  12.0 - 16.0 g/dL Final    Hematocrit 08/28/2023 29.0 (L)  37.0 - 48.5 % Final    MCV 08/28/2023 100 (H)  82 - 98 fL Final    MCH 08/28/2023 32.9 (H)  27.0 - 31.0 pg Final    MCHC 08/28/2023 32.8  32.0 - 36.0 g/dL Final    RDW 08/28/2023 17.1 (H)  11.5 - 14.5 % Final    Platelets 08/28/2023 112 (L)  150 - 450 K/uL Final    MPV 08/28/2023 12.7  9.2 - 12.9 fL Final    Ionized Calcium 08/28/2023 1.07  1.06 - 1.42 mmol/L Final    Sodium 08/29/2023 138  136 - 145 mmol/L Final    Potassium 08/29/2023 3.8  3.5 - 5.1 mmol/L Final    Chloride 08/29/2023 106  95 - 110 mmol/L Final    CO2 08/29/2023 25  23 - 29 mmol/L Final    Glucose 08/29/2023 102  70 - 110 mg/dL Final    BUN 08/29/2023 6 (L)  8 - 23 mg/dL Final    Creatinine 08/29/2023 0.8  0.5 - 1.4 mg/dL Final    Calcium 08/29/2023 8.1 (L)  8.7 - 10.5 mg/dL Final    Total Protein 08/29/2023 6.0  6.0 - 8.4 g/dL Final    Albumin 08/29/2023 2.2 (L)  3.5 - 5.2 g/dL Final    Total Bilirubin 08/29/2023 1.9 (H)  0.1 - 1.0 mg/dL Final    Alkaline Phosphatase 08/29/2023 266 (H)  55 - 135 U/L Final    AST 08/29/2023 64 (H)  10 - 40 U/L Final    ALT 08/29/2023 34  10 - 44 U/L Final    eGFR 08/29/2023 >60.0  >60 mL/min/1.73 m^2 Final    Anion Gap 08/29/2023 7 (L)  8 - 16 mmol/L Final    Magnesium 08/29/2023 2.0  1.6 - 2.6 mg/dL Final    Phosphorus 08/29/2023 1.8 (L)  2.7 - 4.5 mg/dL Final    WBC 08/29/2023 9.63  3.90 - 12.70 K/uL Final    RBC 08/29/2023 2.87 (L)  4.00 - 5.40 M/uL Final     Hemoglobin 08/29/2023 9.2 (L)  12.0 - 16.0 g/dL Final    Hematocrit 08/29/2023 28.3 (L)  37.0 - 48.5 % Final    MCV 08/29/2023 99 (H)  82 - 98 fL Final    MCH 08/29/2023 32.1 (H)  27.0 - 31.0 pg Final    MCHC 08/29/2023 32.5  32.0 - 36.0 g/dL Final    RDW 08/29/2023 17.5 (H)  11.5 - 14.5 % Final    Platelets 08/29/2023 107 (L)  150 - 450 K/uL Final    MPV 08/29/2023 12.3  9.2 - 12.9 fL Final   Infusion on 08/21/2023   Component Date Value Ref Range Status    Sodium 08/21/2023 139  136 - 145 mmol/L Final    Potassium 08/21/2023 2.9 (L)  3.5 - 5.1 mmol/L Final    Chloride 08/21/2023 106  95 - 110 mmol/L Final    CO2 08/21/2023 25  23 - 29 mmol/L Final    Glucose 08/21/2023 138 (H)  70 - 110 mg/dL Final    BUN 08/21/2023 7 (L)  8 - 23 mg/dL Final    Creatinine 08/21/2023 0.7  0.5 - 1.4 mg/dL Final    Calcium 08/21/2023 8.2 (L)  8.7 - 10.5 mg/dL Final    Total Protein 08/21/2023 6.5  6.0 - 8.4 g/dL Final    Albumin 08/21/2023 2.7 (L)  3.5 - 5.2 g/dL Final    Total Bilirubin 08/21/2023 0.5  0.1 - 1.0 mg/dL Final    Alkaline Phosphatase 08/21/2023 89  55 - 135 U/L Final    AST 08/21/2023 21  10 - 40 U/L Final    ALT 08/21/2023 9 (L)  10 - 44 U/L Final    eGFR 08/21/2023 >60.0  >60 mL/min/1.73 m^2 Final    Anion Gap 08/21/2023 8  8 - 16 mmol/L Final    WBC 08/21/2023 9.52  3.90 - 12.70 K/uL Final    RBC 08/21/2023 3.37 (L)  4.00 - 5.40 M/uL Final    Hemoglobin 08/21/2023 10.9 (L)  12.0 - 16.0 g/dL Final    Hematocrit 08/21/2023 32.9 (L)  37.0 - 48.5 % Final    MCV 08/21/2023 98  82 - 98 fL Final    MCH 08/21/2023 32.3 (H)  27.0 - 31.0 pg Final    MCHC 08/21/2023 33.1  32.0 - 36.0 g/dL Final    RDW 08/21/2023 16.2 (H)  11.5 - 14.5 % Final    Platelets 08/21/2023 223  150 - 450 K/uL Final    MPV 08/21/2023 11.7  9.2 - 12.9 fL Final    Gran # (ANC) 08/21/2023 6.7  1.8 - 7.7 K/uL Final    Immature Grans (Abs) 08/21/2023 0.31 (H)  0.00 - 0.04 K/uL Final    CA 19-9 08/21/2023 11.1  0.0 - 40.0 U/mL Final   Infusion on 08/07/2023    Component Date Value Ref Range Status    Sodium 08/07/2023 143  136 - 145 mmol/L Final    Potassium 08/07/2023 3.3 (L)  3.5 - 5.1 mmol/L Final    Chloride 08/07/2023 108  95 - 110 mmol/L Final    CO2 08/07/2023 24  23 - 29 mmol/L Final    Glucose 08/07/2023 144 (H)  70 - 110 mg/dL Final    BUN 08/07/2023 10  8 - 23 mg/dL Final    Creatinine 08/07/2023 0.7  0.5 - 1.4 mg/dL Final    Calcium 08/07/2023 9.4  8.7 - 10.5 mg/dL Final    Total Protein 08/07/2023 7.0  6.0 - 8.4 g/dL Final    Albumin 08/07/2023 3.2 (L)  3.5 - 5.2 g/dL Final    Total Bilirubin 08/07/2023 0.5  0.1 - 1.0 mg/dL Final    Alkaline Phosphatase 08/07/2023 118  55 - 135 U/L Final    AST 08/07/2023 40  10 - 40 U/L Final    ALT 08/07/2023 24  10 - 44 U/L Final    eGFR 08/07/2023 >60.0  >60 mL/min/1.73 m^2 Final    Anion Gap 08/07/2023 11  8 - 16 mmol/L Final    WBC 08/07/2023 6.25  3.90 - 12.70 K/uL Final    RBC 08/07/2023 3.80 (L)  4.00 - 5.40 M/uL Final    Hemoglobin 08/07/2023 12.3  12.0 - 16.0 g/dL Final    Hematocrit 08/07/2023 36.7 (L)  37.0 - 48.5 % Final    MCV 08/07/2023 97  82 - 98 fL Final    MCH 08/07/2023 32.4 (H)  27.0 - 31.0 pg Final    MCHC 08/07/2023 33.5  32.0 - 36.0 g/dL Final    RDW 08/07/2023 17.2 (H)  11.5 - 14.5 % Final    Platelets 08/07/2023 211  150 - 450 K/uL Final    MPV 08/07/2023 11.0  9.2 - 12.9 fL Final    Gran # (ANC) 08/07/2023 3.9  1.8 - 7.7 K/uL Final    Immature Grans (Abs) 08/07/2023 0.02  0.00 - 0.04 K/uL Final    CA 19-9 08/07/2023 10.1  0.0 - 40.0 U/mL Final   Infusion on 07/24/2023   Component Date Value Ref Range Status    Sodium 07/24/2023 140  136 - 145 mmol/L Final    Potassium 07/24/2023 3.2 (L)  3.5 - 5.1 mmol/L Final    Chloride 07/24/2023 106  95 - 110 mmol/L Final    CO2 07/24/2023 27  23 - 29 mmol/L Final    Glucose 07/24/2023 102  70 - 110 mg/dL Final    BUN 07/24/2023 8  8 - 23 mg/dL Final    Creatinine 07/24/2023 0.8  0.5 - 1.4 mg/dL Final    Calcium 07/24/2023 8.0 (L)  8.7 - 10.5 mg/dL Final     Total Protein 07/24/2023 6.3  6.0 - 8.4 g/dL Final    Albumin 07/24/2023 2.8 (L)  3.5 - 5.2 g/dL Final    Total Bilirubin 07/24/2023 0.4  0.1 - 1.0 mg/dL Final    Alkaline Phosphatase 07/24/2023 89  55 - 135 U/L Final    AST 07/24/2023 71 (H)  10 - 40 U/L Final    ALT 07/24/2023 29  10 - 44 U/L Final    eGFR 07/24/2023 >60.0  >60 mL/min/1.73 m^2 Final    Anion Gap 07/24/2023 7 (L)  8 - 16 mmol/L Final    WBC 07/24/2023 2.50 (L)  3.90 - 12.70 K/uL Final    RBC 07/24/2023 3.36 (L)  4.00 - 5.40 M/uL Final    Hemoglobin 07/24/2023 10.7 (L)  12.0 - 16.0 g/dL Final    Hematocrit 07/24/2023 32.3 (L)  37.0 - 48.5 % Final    MCV 07/24/2023 96  82 - 98 fL Final    MCH 07/24/2023 31.8 (H)  27.0 - 31.0 pg Final    MCHC 07/24/2023 33.1  32.0 - 36.0 g/dL Final    RDW 07/24/2023 17.2 (H)  11.5 - 14.5 % Final    Platelets 07/24/2023 174  150 - 450 K/uL Final    MPV 07/24/2023 11.3  9.2 - 12.9 fL Final    Gran # (ANC) 07/24/2023 0.6 (L)  1.8 - 7.7 K/uL Final    Immature Grans (Abs) 07/24/2023 0.02  0.00 - 0.04 K/uL Final    CA 19-9 07/24/2023 7.4  0.0 - 40.0 U/mL Final   Infusion on 07/10/2023   Component Date Value Ref Range Status    Sodium 07/10/2023 142  136 - 145 mmol/L Final    Potassium 07/10/2023 3.0 (L)  3.5 - 5.1 mmol/L Final    Chloride 07/10/2023 105  95 - 110 mmol/L Final    CO2 07/10/2023 26  23 - 29 mmol/L Final    Glucose 07/10/2023 128 (H)  70 - 110 mg/dL Final    BUN 07/10/2023 6 (L)  8 - 23 mg/dL Final    Creatinine 07/10/2023 0.8  0.5 - 1.4 mg/dL Final    Calcium 07/10/2023 8.1 (L)  8.7 - 10.5 mg/dL Final    Total Protein 07/10/2023 6.5  6.0 - 8.4 g/dL Final    Albumin 07/10/2023 2.9 (L)  3.5 - 5.2 g/dL Final    Total Bilirubin 07/10/2023 0.3  0.1 - 1.0 mg/dL Final    Alkaline Phosphatase 07/10/2023 78  55 - 135 U/L Final    AST 07/10/2023 43 (H)  10 - 40 U/L Final    ALT 07/10/2023 18  10 - 44 U/L Final    eGFR 07/10/2023 >60.0  >60 mL/min/1.73 m^2 Final    Anion Gap 07/10/2023 11  8 - 16 mmol/L Final    WBC  07/10/2023 3.16 (L)  3.90 - 12.70 K/uL Final    RBC 07/10/2023 3.79 (L)  4.00 - 5.40 M/uL Final    Hemoglobin 07/10/2023 11.8 (L)  12.0 - 16.0 g/dL Final    Hematocrit 07/10/2023 35.8 (L)  37.0 - 48.5 % Final    MCV 07/10/2023 95  82 - 98 fL Final    MCH 07/10/2023 31.1 (H)  27.0 - 31.0 pg Final    MCHC 07/10/2023 33.0  32.0 - 36.0 g/dL Final    RDW 07/10/2023 16.9 (H)  11.5 - 14.5 % Final    Platelets 07/10/2023 377  150 - 450 K/uL Final    MPV 07/10/2023 10.4  9.2 - 12.9 fL Final    Gran # (ANC) 07/10/2023 1.3 (L)  1.8 - 7.7 K/uL Final    Immature Grans (Abs) 07/10/2023 0.02  0.00 - 0.04 K/uL Final    CA 19-9 07/10/2023 7.3  0.0 - 40.0 U/mL Final   Lab Visit on 06/26/2023   Component Date Value Ref Range Status    WBC 06/26/2023 4.67  3.90 - 12.70 K/uL Final    RBC 06/26/2023 4.41  4.00 - 5.40 M/uL Final    Hemoglobin 06/26/2023 13.5  12.0 - 16.0 g/dL Final    Hematocrit 06/26/2023 41.5  37.0 - 48.5 % Final    MCV 06/26/2023 94  82 - 98 fL Final    MCH 06/26/2023 30.6  27.0 - 31.0 pg Final    MCHC 06/26/2023 32.5  32.0 - 36.0 g/dL Final    RDW 06/26/2023 19.2 (H)  11.5 - 14.5 % Final    Platelets 06/26/2023 209  150 - 450 K/uL Final    MPV 06/26/2023 10.8  9.2 - 12.9 fL Final    Gran # (ANC) 06/26/2023 2.7  1.8 - 7.7 K/uL Final    Immature Grans (Abs) 06/26/2023 0.01  0.00 - 0.04 K/uL Final    Sodium 06/26/2023 141  136 - 145 mmol/L Final    Potassium 06/26/2023 3.8  3.5 - 5.1 mmol/L Final    Chloride 06/26/2023 107  95 - 110 mmol/L Final    CO2 06/26/2023 24  23 - 29 mmol/L Final    Glucose 06/26/2023 137 (H)  70 - 110 mg/dL Final    BUN 06/26/2023 9  8 - 23 mg/dL Final    Creatinine 06/26/2023 0.8  0.5 - 1.4 mg/dL Final    Calcium 06/26/2023 9.3  8.7 - 10.5 mg/dL Final    Total Protein 06/26/2023 7.0  6.0 - 8.4 g/dL Final    Albumin 06/26/2023 3.2 (L)  3.5 - 5.2 g/dL Final    Total Bilirubin 06/26/2023 0.3  0.1 - 1.0 mg/dL Final    Alkaline Phosphatase 06/26/2023 101  55 - 135 U/L Final    AST 06/26/2023 25  10  - 40 U/L Final    ALT 06/26/2023 16  10 - 44 U/L Final    eGFR 06/26/2023 >60.0  >60 mL/min/1.73 m^2 Final    Anion Gap 06/26/2023 10  8 - 16 mmol/L Final    CA 19-9 06/26/2023 6.6  0.0 - 40.0 U/mL Final   Lab Visit on 06/13/2023   Component Date Value Ref Range Status    WBC 06/13/2023 4.07  3.90 - 12.70 K/uL Final    RBC 06/13/2023 4.46  4.00 - 5.40 M/uL Final    Hemoglobin 06/13/2023 13.3  12.0 - 16.0 g/dL Final    Hematocrit 06/13/2023 42.2  37.0 - 48.5 % Final    MCV 06/13/2023 95  82 - 98 fL Final    MCH 06/13/2023 29.8  27.0 - 31.0 pg Final    MCHC 06/13/2023 31.5 (L)  32.0 - 36.0 g/dL Final    RDW 06/13/2023 21.9 (H)  11.5 - 14.5 % Final    Platelets 06/13/2023 201  150 - 450 K/uL Final    MPV 06/13/2023 10.4  9.2 - 12.9 fL Final    Immature Granulocytes 06/13/2023 0.2  0.0 - 0.5 % Final    Gran # (ANC) 06/13/2023 2.2  1.8 - 7.7 K/uL Final    Immature Grans (Abs) 06/13/2023 0.01  0.00 - 0.04 K/uL Final    Lymph # 06/13/2023 1.3  1.0 - 4.8 K/uL Final    Mono # 06/13/2023 0.4  0.3 - 1.0 K/uL Final    Eos # 06/13/2023 0.2  0.0 - 0.5 K/uL Final    Baso # 06/13/2023 0.05  0.00 - 0.20 K/uL Final    nRBC 06/13/2023 0  0 /100 WBC Final    Gran % 06/13/2023 54.8  38.0 - 73.0 % Final    Lymph % 06/13/2023 31.0  18.0 - 48.0 % Final    Mono % 06/13/2023 9.1  4.0 - 15.0 % Final    Eosinophil % 06/13/2023 3.7  0.0 - 8.0 % Final    Basophil % 06/13/2023 1.2  0.0 - 1.9 % Final    Differential Method 06/13/2023 Automated   Final    Sodium 06/13/2023 143  136 - 145 mmol/L Final    Potassium 06/13/2023 3.1 (L)  3.5 - 5.1 mmol/L Final    Chloride 06/13/2023 107  95 - 110 mmol/L Final    CO2 06/13/2023 24  23 - 29 mmol/L Final    Glucose 06/13/2023 97  70 - 110 mg/dL Final    BUN 06/13/2023 6 (L)  8 - 23 mg/dL Final    Creatinine 06/13/2023 0.8  0.5 - 1.4 mg/dL Final    Calcium 06/13/2023 9.2  8.7 - 10.5 mg/dL Final    Total Protein 06/13/2023 6.8  6.0 - 8.4 g/dL Final    Albumin 06/13/2023 2.8 (L)  3.5 - 5.2 g/dL Final    Total  Bilirubin 06/13/2023 0.4  0.1 - 1.0 mg/dL Final    Alkaline Phosphatase 06/13/2023 77  55 - 135 U/L Final    AST 06/13/2023 16  10 - 40 U/L Final    ALT 06/13/2023 8 (L)  10 - 44 U/L Final    Anion Gap 06/13/2023 12  8 - 16 mmol/L Final    eGFR 06/13/2023 >60.0  >60 mL/min/1.73 m^2 Final   Admission on 06/08/2023, Discharged on 06/11/2023   Component Date Value Ref Range Status    WBC 06/08/2023 8.74  3.90 - 12.70 K/uL Final    RBC 06/08/2023 4.54  4.00 - 5.40 M/uL Final    Hemoglobin 06/08/2023 13.2  12.0 - 16.0 g/dL Final    Hematocrit 06/08/2023 42.3  37.0 - 48.5 % Final    MCV 06/08/2023 93  82 - 98 fL Final    MCH 06/08/2023 29.1  27.0 - 31.0 pg Final    MCHC 06/08/2023 31.2 (L)  32.0 - 36.0 g/dL Final    RDW 06/08/2023 23.5 (H)  11.5 - 14.5 % Final    Platelets 06/08/2023 172  150 - 450 K/uL Final    MPV 06/08/2023 10.1  9.2 - 12.9 fL Final    Immature Granulocytes 06/08/2023 0.2  0.0 - 0.5 % Final    Gran # (ANC) 06/08/2023 6.9  1.8 - 7.7 K/uL Final    Immature Grans (Abs) 06/08/2023 0.02  0.00 - 0.04 K/uL Final    Lymph # 06/08/2023 1.1  1.0 - 4.8 K/uL Final    Mono # 06/08/2023 0.5  0.3 - 1.0 K/uL Final    Eos # 06/08/2023 0.2  0.0 - 0.5 K/uL Final    Baso # 06/08/2023 0.03  0.00 - 0.20 K/uL Final    nRBC 06/08/2023 0  0 /100 WBC Final    Gran % 06/08/2023 79.4 (H)  38.0 - 73.0 % Final    Lymph % 06/08/2023 12.7 (L)  18.0 - 48.0 % Final    Mono % 06/08/2023 5.1  4.0 - 15.0 % Final    Eosinophil % 06/08/2023 2.3  0.0 - 8.0 % Final    Basophil % 06/08/2023 0.3  0.0 - 1.9 % Final    Differential Method 06/08/2023 Automated   Final    Sodium 06/08/2023 137  136 - 145 mmol/L Final    Potassium 06/08/2023 3.4 (L)  3.5 - 5.1 mmol/L Final    Chloride 06/08/2023 105  95 - 110 mmol/L Final    CO2 06/08/2023 24  23 - 29 mmol/L Final    Glucose 06/08/2023 132 (H)  70 - 110 mg/dL Final    BUN 06/08/2023 9  8 - 23 mg/dL Final    Creatinine 06/08/2023 0.8  0.5 - 1.4 mg/dL Final    Calcium 06/08/2023 9.7  8.7 - 10.5 mg/dL  Final    Total Protein 06/08/2023 6.8  6.0 - 8.4 g/dL Final    Albumin 06/08/2023 3.1 (L)  3.5 - 5.2 g/dL Final    Total Bilirubin 06/08/2023 0.5  0.1 - 1.0 mg/dL Final    Alkaline Phosphatase 06/08/2023 88  55 - 135 U/L Final    AST 06/08/2023 29  10 - 40 U/L Final    ALT 06/08/2023 15  10 - 44 U/L Final    Anion Gap 06/08/2023 8  8 - 16 mmol/L Final    eGFR 06/08/2023 >60.0  >60 mL/min/1.73 m^2 Final    Lipase 06/08/2023 185 (H)  4 - 60 U/L Final    Lactate (Lactic Acid) 06/08/2023 1.1  0.5 - 2.2 mmol/L Final    Specimen UA 06/08/2023 Urine, Clean Catch   Final    Color, UA 06/08/2023 Yellow  Yellow, Straw, Marilyn Final    Appearance, UA 06/08/2023 Hazy (A)  Clear Final    pH, UA 06/08/2023 7.0  5.0 - 8.0 Final    Specific Gravity, UA 06/08/2023 1.030  1.005 - 1.030 Final    Protein, UA 06/08/2023 Negative  Negative Final    Glucose, UA 06/08/2023 Negative  Negative Final    Ketones, UA 06/08/2023 Negative  Negative Final    Bilirubin (UA) 06/08/2023 Negative  Negative Final    Occult Blood UA 06/08/2023 Negative  Negative Final    Nitrite, UA 06/08/2023 Negative  Negative Final    Leukocytes, UA 06/08/2023 3+ (A)  Negative Final    RBC, UA 06/08/2023 8 (H)  0 - 4 /hpf Final    WBC, UA 06/08/2023 22 (H)  0 - 5 /hpf Final    Squam Epithel, UA 06/08/2023 4  /hpf Final    Non-Squam Epith 06/08/2023 4 (A)  <1/hpf /hpf Final    Microscopic Comment 06/08/2023 SEE COMMENT   Final    Urine Culture, Routine 06/08/2023 Multiple organisms isolated. None in predominance.  Repeat if   Final    Urine Culture, Routine 06/08/2023 clinically necessary.   Final    SARS-CoV-2 RNA, Amplification, Qual 06/08/2023 Negative  Negative Final    Prothrombin Time 06/08/2023 11.6  9.0 - 12.5 sec Final    INR 06/08/2023 1.1  0.8 - 1.2 Final    aPTT 06/08/2023 27.5  21.0 - 32.0 sec Final    Sodium 06/09/2023 141  136 - 145 mmol/L Final    Potassium 06/09/2023 3.3 (L)  3.5 - 5.1 mmol/L Final    Chloride 06/09/2023 102  95 - 110 mmol/L Final     CO2 06/09/2023 29  23 - 29 mmol/L Final    Glucose 06/09/2023 112 (H)  70 - 110 mg/dL Final    BUN 06/09/2023 4 (L)  8 - 23 mg/dL Final    Creatinine 06/09/2023 0.7  0.5 - 1.4 mg/dL Final    Calcium 06/09/2023 9.5  8.7 - 10.5 mg/dL Final    Total Protein 06/09/2023 6.5  6.0 - 8.4 g/dL Final    Albumin 06/09/2023 2.7 (L)  3.5 - 5.2 g/dL Final    Total Bilirubin 06/09/2023 0.7  0.1 - 1.0 mg/dL Final    Alkaline Phosphatase 06/09/2023 79  55 - 135 U/L Final    AST 06/09/2023 19  10 - 40 U/L Final    ALT 06/09/2023 11  10 - 44 U/L Final    Anion Gap 06/09/2023 10  8 - 16 mmol/L Final    eGFR 06/09/2023 >60.0  >60 mL/min/1.73 m^2 Final    Magnesium 06/09/2023 1.2 (L)  1.6 - 2.6 mg/dL Final    Phosphorus 06/09/2023 3.3  2.7 - 4.5 mg/dL Final    WBC 06/09/2023 8.23  3.90 - 12.70 K/uL Final    RBC 06/09/2023 4.38  4.00 - 5.40 M/uL Final    Hemoglobin 06/09/2023 12.9  12.0 - 16.0 g/dL Final    Hematocrit 06/09/2023 40.9  37.0 - 48.5 % Final    MCV 06/09/2023 93  82 - 98 fL Final    MCH 06/09/2023 29.5  27.0 - 31.0 pg Final    MCHC 06/09/2023 31.5 (L)  32.0 - 36.0 g/dL Final    RDW 06/09/2023 22.4 (H)  11.5 - 14.5 % Final    Platelets 06/09/2023 144 (L)  150 - 450 K/uL Final    MPV 06/09/2023 10.5  9.2 - 12.9 fL Final    Immature Granulocytes 06/09/2023 0.4  0.0 - 0.5 % Final    Gran # (ANC) 06/09/2023 6.3  1.8 - 7.7 K/uL Final    Immature Grans (Abs) 06/09/2023 0.03  0.00 - 0.04 K/uL Final    Lymph # 06/09/2023 1.0  1.0 - 4.8 K/uL Final    Mono # 06/09/2023 0.7  0.3 - 1.0 K/uL Final    Eos # 06/09/2023 0.2  0.0 - 0.5 K/uL Final    Baso # 06/09/2023 0.03  0.00 - 0.20 K/uL Final    nRBC 06/09/2023 0  0 /100 WBC Final    Gran % 06/09/2023 76.9 (H)  38.0 - 73.0 % Final    Lymph % 06/09/2023 11.5 (L)  18.0 - 48.0 % Final    Mono % 06/09/2023 8.6  4.0 - 15.0 % Final    Eosinophil % 06/09/2023 2.2  0.0 - 8.0 % Final    Basophil % 06/09/2023 0.4  0.0 - 1.9 % Final    Differential Method 06/09/2023 Automated   Final    Sodium  06/10/2023 138  136 - 145 mmol/L Final    Potassium 06/10/2023 4.0  3.5 - 5.1 mmol/L Final    Chloride 06/10/2023 106  95 - 110 mmol/L Final    CO2 06/10/2023 22 (L)  23 - 29 mmol/L Final    Glucose 06/10/2023 90  70 - 110 mg/dL Final    BUN 06/10/2023 7 (L)  8 - 23 mg/dL Final    Creatinine 06/10/2023 0.7  0.5 - 1.4 mg/dL Final    Calcium 06/10/2023 9.7  8.7 - 10.5 mg/dL Final    Total Protein 06/10/2023 6.9  6.0 - 8.4 g/dL Final    Albumin 06/10/2023 2.7 (L)  3.5 - 5.2 g/dL Final    Total Bilirubin 06/10/2023 0.6  0.1 - 1.0 mg/dL Final    Alkaline Phosphatase 06/10/2023 100  55 - 135 U/L Final    AST 06/10/2023 23  10 - 40 U/L Final    ALT 06/10/2023 11  10 - 44 U/L Final    Anion Gap 06/10/2023 10  8 - 16 mmol/L Final    eGFR 06/10/2023 >60.0  >60 mL/min/1.73 m^2 Final    Magnesium 06/10/2023 2.2  1.6 - 2.6 mg/dL Final    Phosphorus 06/10/2023 2.8  2.7 - 4.5 mg/dL Final    WBC 06/10/2023 6.06  3.90 - 12.70 K/uL Final    RBC 06/10/2023 4.61  4.00 - 5.40 M/uL Final    Hemoglobin 06/10/2023 13.5  12.0 - 16.0 g/dL Final    Hematocrit 06/10/2023 42.1  37.0 - 48.5 % Final    MCV 06/10/2023 91  82 - 98 fL Final    MCH 06/10/2023 29.3  27.0 - 31.0 pg Final    MCHC 06/10/2023 32.1  32.0 - 36.0 g/dL Final    RDW 06/10/2023 22.2 (H)  11.5 - 14.5 % Final    Platelets 06/10/2023 169  150 - 450 K/uL Final    MPV 06/10/2023 10.8  9.2 - 12.9 fL Final    Immature Granulocytes 06/10/2023 0.3  0.0 - 0.5 % Final    Gran # (ANC) 06/10/2023 4.2  1.8 - 7.7 K/uL Final    Immature Grans (Abs) 06/10/2023 0.02  0.00 - 0.04 K/uL Final    Lymph # 06/10/2023 1.0  1.0 - 4.8 K/uL Final    Mono # 06/10/2023 0.5  0.3 - 1.0 K/uL Final    Eos # 06/10/2023 0.3  0.0 - 0.5 K/uL Final    Baso # 06/10/2023 0.03  0.00 - 0.20 K/uL Final    nRBC 06/10/2023 0  0 /100 WBC Final    Gran % 06/10/2023 68.6  38.0 - 73.0 % Final    Lymph % 06/10/2023 17.2 (L)  18.0 - 48.0 % Final    Mono % 06/10/2023 8.9  4.0 - 15.0 % Final    Eosinophil % 06/10/2023 4.5  0.0 -  8.0 % Final    Basophil % 06/10/2023 0.5  0.0 - 1.9 % Final    Differential Method 06/10/2023 Automated   Final   Lab Visit on 05/29/2023   Component Date Value Ref Range Status    WBC 05/29/2023 5.15  3.90 - 12.70 K/uL Final    RBC 05/29/2023 4.27  4.00 - 5.40 M/uL Final    Hemoglobin 05/29/2023 12.0  12.0 - 16.0 g/dL Final    Hematocrit 05/29/2023 38.5  37.0 - 48.5 % Final    MCV 05/29/2023 90  82 - 98 fL Final    MCH 05/29/2023 28.1  27.0 - 31.0 pg Final    MCHC 05/29/2023 31.2 (L)  32.0 - 36.0 g/dL Final    RDW 05/29/2023 25.2 (H)  11.5 - 14.5 % Final    Platelets 05/29/2023 182  150 - 450 K/uL Final    MPV 05/29/2023 11.0  9.2 - 12.9 fL Final    Gran # (ANC) 05/29/2023 2.8  1.8 - 7.7 K/uL Final    Immature Grans (Abs) 05/29/2023 0.02  0.00 - 0.04 K/uL Final    Sodium 05/29/2023 140  136 - 145 mmol/L Final    Potassium 05/29/2023 3.2 (L)  3.5 - 5.1 mmol/L Final    Chloride 05/29/2023 107  95 - 110 mmol/L Final    CO2 05/29/2023 21 (L)  23 - 29 mmol/L Final    Glucose 05/29/2023 145 (H)  70 - 110 mg/dL Final    BUN 05/29/2023 9  8 - 23 mg/dL Final    Creatinine 05/29/2023 0.8  0.5 - 1.4 mg/dL Final    Calcium 05/29/2023 9.2  8.7 - 10.5 mg/dL Final    Total Protein 05/29/2023 6.8  6.0 - 8.4 g/dL Final    Albumin 05/29/2023 3.0 (L)  3.5 - 5.2 g/dL Final    Total Bilirubin 05/29/2023 0.4  0.1 - 1.0 mg/dL Final    Alkaline Phosphatase 05/29/2023 96  55 - 135 U/L Final    AST 05/29/2023 27  10 - 40 U/L Final    ALT 05/29/2023 15  10 - 44 U/L Final    Anion Gap 05/29/2023 12  8 - 16 mmol/L Final    eGFR 05/29/2023 >60.0  >60 mL/min/1.73 m^2 Final    CA 19-9 05/29/2023 11.9  0.0 - 40.0 U/mL Final   Lab Visit on 05/15/2023   Component Date Value Ref Range Status    WBC 05/15/2023 3.01 (L)  3.90 - 12.70 K/uL Final    RBC 05/15/2023 4.19  4.00 - 5.40 M/uL Final    Hemoglobin 05/15/2023 10.8 (L)  12.0 - 16.0 g/dL Final    Hematocrit 05/15/2023 36.9 (L)  37.0 - 48.5 % Final    MCV 05/15/2023 88  82 - 98 fL Final    MCH  05/15/2023 25.8 (L)  27.0 - 31.0 pg Final    MCHC 05/15/2023 29.3 (L)  32.0 - 36.0 g/dL Final    RDW 05/15/2023 24.3 (H)  11.5 - 14.5 % Final    Platelets 05/15/2023 339  150 - 450 K/uL Final    MPV 05/15/2023 10.2  9.2 - 12.9 fL Final    Gran # (ANC) 05/15/2023 1.0 (L)  1.8 - 7.7 K/uL Final    Immature Grans (Abs) 05/15/2023 0.04  0.00 - 0.04 K/uL Final    Sodium 05/15/2023 144  136 - 145 mmol/L Final    Potassium 05/15/2023 3.1 (L)  3.5 - 5.1 mmol/L Final    Chloride 05/15/2023 106  95 - 110 mmol/L Final    CO2 05/15/2023 28  23 - 29 mmol/L Final    Glucose 05/15/2023 122 (H)  70 - 110 mg/dL Final    BUN 05/15/2023 9  8 - 23 mg/dL Final    Creatinine 05/15/2023 0.8  0.5 - 1.4 mg/dL Final    Calcium 05/15/2023 8.8  8.7 - 10.5 mg/dL Final    Total Protein 05/15/2023 6.2  6.0 - 8.4 g/dL Final    Albumin 05/15/2023 2.6 (L)  3.5 - 5.2 g/dL Final    Total Bilirubin 05/15/2023 0.6  0.1 - 1.0 mg/dL Final    Alkaline Phosphatase 05/15/2023 87  55 - 135 U/L Final    AST 05/15/2023 51 (H)  10 - 40 U/L Final    ALT 05/15/2023 24  10 - 44 U/L Final    Anion Gap 05/15/2023 10  8 - 16 mmol/L Final    eGFR 05/15/2023 >60.0  >60 mL/min/1.73 m^2 Final   There may be more visits with results that are not included.   (    Assessment:     1. Paroxysmal atrial fibrillation    2. Sick sinus syndrome    3. Pacemaker    4. LBBB (left bundle branch block)    5. Hyperlipidemia, unspecified hyperlipidemia type    6. Essential hypertension, benign    7. Atrioventricular block, complete    8. Drug-induced immunodeficiency    9. Chronic anticoagulation    10. Malignant neoplasm of head of pancreas    11. Acquired hypothyroidism    12. Biliary obstruction    13. Crohn's disease of large intestine without complication      Plan:   Pauline was seen today for hospital follow up.    Diagnoses and all orders for this visit:    Paroxysmal atrial fibrillation    Sick sinus syndrome    Pacemaker    LBBB (left bundle branch block)    Hyperlipidemia,  unspecified hyperlipidemia type    Essential hypertension, benign    Atrioventricular block, complete    Drug-induced immunodeficiency    Chronic anticoagulation    Malignant neoplasm of head of pancreas    Acquired hypothyroidism    Biliary obstruction    Crohn's disease of large intestine without complication    Other orders  -     metoprolol succinate (TOPROL-XL) 25 MG 24 hr tablet; Take 3 tablets (75 mg total) by mouth once daily.  -     amLODIPine (NORVASC) 2.5 MG tablet; Take 1 tablet (2.5 mg total) by mouth once daily.     Due to nausea will hold the fosamax    Due to weakness and excellent blood pressure control will decrease the dose of amlodipine to 2.5 mg daily.    Since the lomotil and colestipol are not helping the diarrhea will try Imodium along with the lomotil as prescribed by Dr Hiral HEADLEY to stay off the sotalol    RTC 3 months    No follow-ups on file.

## 2023-08-31 ENCOUNTER — DOCUMENTATION ONLY (OUTPATIENT)
Dept: HEMATOLOGY/ONCOLOGY | Facility: CLINIC | Age: 83
End: 2023-08-31
Payer: MEDICARE

## 2023-08-31 NOTE — PROGRESS NOTES
Karen Mejia, [phone] (Mobile) called asking about transportation assistance for the patient.    KERWIN called back and left VM with Karen requesting a call back to discuss options. [employer] and [employer] should be able to provide 10 free cab rides and emergency transport available through [facility] funds.

## 2023-09-01 ENCOUNTER — DOCUMENTATION ONLY (OUTPATIENT)
Dept: HEMATOLOGY/ONCOLOGY | Facility: CLINIC | Age: 83
End: 2023-09-01
Payer: MEDICARE

## 2023-09-01 NOTE — PROGRESS NOTES
KERWIN received call from Karen. KERWIN explained [place] and [facility] [place] and emailed with intake form and instructions.     KERWIN also explained that there are [facility] transportation funds available if no other options are available. Karen understood. She explained that she is a  so she can often provide one way for transportation but not both. Also [place] so [place] able [place] assist at times.

## 2023-09-02 ENCOUNTER — HOSPITAL ENCOUNTER (INPATIENT)
Facility: HOSPITAL | Age: 83
LOS: 1 days | Discharge: HOME | DRG: 394 | End: 2023-09-03
Attending: EMERGENCY MEDICINE | Admitting: INTERNAL MEDICINE
Payer: MEDICARE

## 2023-09-02 DIAGNOSIS — C25.0 MALIGNANT NEOPLASM OF HEAD OF PANCREAS: Primary | ICD-10-CM

## 2023-09-02 DIAGNOSIS — R19.7 DIARRHEA, UNSPECIFIED TYPE: ICD-10-CM

## 2023-09-02 DIAGNOSIS — R07.9 CHEST PAIN: ICD-10-CM

## 2023-09-02 DIAGNOSIS — R10.9 ABDOMINAL PAIN: ICD-10-CM

## 2023-09-02 DIAGNOSIS — R11.2 NAUSEA AND VOMITING, UNSPECIFIED VOMITING TYPE: ICD-10-CM

## 2023-09-02 LAB
ALBUMIN SERPL BCP-MCNC: 2.6 G/DL (ref 3.5–5.2)
ALP SERPL-CCNC: 275 U/L (ref 55–135)
ALT SERPL W/O P-5'-P-CCNC: 24 U/L (ref 10–44)
ANION GAP SERPL CALC-SCNC: 12 MMOL/L (ref 8–16)
ANISOCYTOSIS BLD QL SMEAR: SLIGHT
AST SERPL-CCNC: 58 U/L (ref 10–40)
BACTERIA #/AREA URNS AUTO: ABNORMAL /HPF
BASO STIPL BLD QL SMEAR: ABNORMAL
BASOPHILS NFR BLD: 0 % (ref 0–1.9)
BILIRUB SERPL-MCNC: 1.2 MG/DL (ref 0.1–1)
BILIRUB UR QL STRIP: NEGATIVE
BUN SERPL-MCNC: 5 MG/DL (ref 8–23)
CALCIUM SERPL-MCNC: 7.9 MG/DL (ref 8.7–10.5)
CHLORIDE SERPL-SCNC: 104 MMOL/L (ref 95–110)
CLARITY UR REFRACT.AUTO: CLEAR
CO2 SERPL-SCNC: 24 MMOL/L (ref 23–29)
COLOR UR AUTO: YELLOW
CREAT SERPL-MCNC: 0.7 MG/DL (ref 0.5–1.4)
DIFFERENTIAL METHOD: ABNORMAL
EOSINOPHIL NFR BLD: 0 % (ref 0–8)
ERYTHROCYTE [DISTWIDTH] IN BLOOD BY AUTOMATED COUNT: 18 % (ref 11.5–14.5)
EST. GFR  (NO RACE VARIABLE): >60 ML/MIN/1.73 M^2
GLUCOSE SERPL-MCNC: 77 MG/DL (ref 70–110)
GLUCOSE UR QL STRIP: NEGATIVE
HCT VFR BLD AUTO: 27.5 % (ref 37–48.5)
HGB BLD-MCNC: 9.1 G/DL (ref 12–16)
HGB UR QL STRIP: NEGATIVE
HYALINE CASTS UR QL AUTO: 3 /LPF
IMM GRANULOCYTES # BLD AUTO: ABNORMAL K/UL (ref 0–0.04)
IMM GRANULOCYTES NFR BLD AUTO: ABNORMAL % (ref 0–0.5)
KETONES UR QL STRIP: NEGATIVE
LEUKOCYTE ESTERASE UR QL STRIP: ABNORMAL
LIPASE SERPL-CCNC: 30 U/L (ref 4–60)
LYMPHOCYTES NFR BLD: 12 % (ref 18–48)
MCH RBC QN AUTO: 33 PG (ref 27–31)
MCHC RBC AUTO-ENTMCNC: 33.1 G/DL (ref 32–36)
MCV RBC AUTO: 100 FL (ref 82–98)
METAMYELOCYTES NFR BLD MANUAL: 1 %
MICROSCOPIC COMMENT: ABNORMAL
MONOCYTES NFR BLD: 3 % (ref 4–15)
NEUTROPHILS NFR BLD: 80 % (ref 38–73)
NEUTS BAND NFR BLD MANUAL: 4 %
NITRITE UR QL STRIP: NEGATIVE
NON-SQ EPI CELLS #/AREA URNS AUTO: 1 /HPF
NRBC BLD-RTO: 3 /100 WBC
PH UR STRIP: 6 [PH] (ref 5–8)
PLATELET # BLD AUTO: 142 K/UL (ref 150–450)
PLATELET BLD QL SMEAR: ABNORMAL
PMV BLD AUTO: 11.3 FL (ref 9.2–12.9)
POTASSIUM SERPL-SCNC: 2.9 MMOL/L (ref 3.5–5.1)
PROT SERPL-MCNC: 6.3 G/DL (ref 6–8.4)
PROT UR QL STRIP: NEGATIVE
RBC # BLD AUTO: 2.76 M/UL (ref 4–5.4)
RBC #/AREA URNS AUTO: 2 /HPF (ref 0–4)
SARS-COV-2 RDRP RESP QL NAA+PROBE: NEGATIVE
SODIUM SERPL-SCNC: 140 MMOL/L (ref 136–145)
SP GR UR STRIP: 1.01 (ref 1–1.03)
SQUAMOUS #/AREA URNS AUTO: 4 /HPF
TOXIC GRANULES BLD QL SMEAR: PRESENT
URN SPEC COLLECT METH UR: ABNORMAL
WBC # BLD AUTO: 16.46 K/UL (ref 3.9–12.7)
WBC #/AREA URNS AUTO: 6 /HPF (ref 0–5)

## 2023-09-02 PROCEDURE — 85027 COMPLETE CBC AUTOMATED: CPT | Mod: HCNC | Performed by: PHYSICIAN ASSISTANT

## 2023-09-02 PROCEDURE — 25000003 PHARM REV CODE 250: Mod: HCNC | Performed by: STUDENT IN AN ORGANIZED HEALTH CARE EDUCATION/TRAINING PROGRAM

## 2023-09-02 PROCEDURE — 96376 TX/PRO/DX INJ SAME DRUG ADON: CPT | Mod: HCNC

## 2023-09-02 PROCEDURE — 85007 BL SMEAR W/DIFF WBC COUNT: CPT | Mod: HCNC | Performed by: PHYSICIAN ASSISTANT

## 2023-09-02 PROCEDURE — 25000003 PHARM REV CODE 250: Mod: HCNC | Performed by: PHYSICIAN ASSISTANT

## 2023-09-02 PROCEDURE — 27000207 HC ISOLATION: Mod: HCNC

## 2023-09-02 PROCEDURE — 20600001 HC STEP DOWN PRIVATE ROOM: Mod: HCNC

## 2023-09-02 PROCEDURE — 80053 COMPREHEN METABOLIC PANEL: CPT | Mod: HCNC | Performed by: PHYSICIAN ASSISTANT

## 2023-09-02 PROCEDURE — U0002 COVID-19 LAB TEST NON-CDC: HCPCS | Mod: HCNC | Performed by: PHYSICIAN ASSISTANT

## 2023-09-02 PROCEDURE — 96372 THER/PROPH/DIAG INJ SC/IM: CPT | Mod: 59 | Performed by: PHYSICIAN ASSISTANT

## 2023-09-02 PROCEDURE — 99223 1ST HOSP IP/OBS HIGH 75: CPT | Mod: AI,HCNC,GC, | Performed by: INTERNAL MEDICINE

## 2023-09-02 PROCEDURE — 93010 EKG 12-LEAD: ICD-10-PCS | Mod: HCNC,,, | Performed by: INTERNAL MEDICINE

## 2023-09-02 PROCEDURE — 99223 PR INITIAL HOSPITAL CARE,LEVL III: ICD-10-PCS | Mod: AI,HCNC,GC, | Performed by: INTERNAL MEDICINE

## 2023-09-02 PROCEDURE — 81001 URINALYSIS AUTO W/SCOPE: CPT | Mod: HCNC | Performed by: PHYSICIAN ASSISTANT

## 2023-09-02 PROCEDURE — 63600175 PHARM REV CODE 636 W HCPCS: Mod: HCNC | Performed by: PHYSICIAN ASSISTANT

## 2023-09-02 PROCEDURE — 96374 THER/PROPH/DIAG INJ IV PUSH: CPT | Mod: HCNC

## 2023-09-02 PROCEDURE — 80047 BASIC METABLC PNL IONIZED CA: CPT | Mod: HCNC

## 2023-09-02 PROCEDURE — 93010 ELECTROCARDIOGRAM REPORT: CPT | Mod: HCNC,,, | Performed by: INTERNAL MEDICINE

## 2023-09-02 PROCEDURE — 93005 ELECTROCARDIOGRAM TRACING: CPT | Mod: HCNC

## 2023-09-02 PROCEDURE — 25500020 PHARM REV CODE 255: Mod: HCNC | Performed by: EMERGENCY MEDICINE

## 2023-09-02 PROCEDURE — 99285 EMERGENCY DEPT VISIT HI MDM: CPT | Mod: 25,HCNC

## 2023-09-02 PROCEDURE — 87040 BLOOD CULTURE FOR BACTERIA: CPT | Mod: 59,HCNC | Performed by: PHYSICIAN ASSISTANT

## 2023-09-02 PROCEDURE — 83690 ASSAY OF LIPASE: CPT | Mod: HCNC | Performed by: PHYSICIAN ASSISTANT

## 2023-09-02 PROCEDURE — 96361 HYDRATE IV INFUSION ADD-ON: CPT | Mod: HCNC

## 2023-09-02 RX ORDER — DIPHENOXYLATE HYDROCHLORIDE AND ATROPINE SULFATE 2.5; .025 MG/1; MG/1
1 TABLET ORAL 4 TIMES DAILY PRN
Status: DISCONTINUED | OUTPATIENT
Start: 2023-09-02 | End: 2023-09-03 | Stop reason: HOSPADM

## 2023-09-02 RX ORDER — DIPHENOXYLATE HYDROCHLORIDE AND ATROPINE SULFATE 2.5; .025 MG/1; MG/1
2 TABLET ORAL
Status: COMPLETED | OUTPATIENT
Start: 2023-09-02 | End: 2023-09-02

## 2023-09-02 RX ORDER — OXYBUTYNIN CHLORIDE 5 MG/1
10 TABLET, EXTENDED RELEASE ORAL DAILY
Status: DISCONTINUED | OUTPATIENT
Start: 2023-09-03 | End: 2023-09-03 | Stop reason: HOSPADM

## 2023-09-02 RX ORDER — DICYCLOMINE HYDROCHLORIDE 10 MG/1
10 CAPSULE ORAL 4 TIMES DAILY
Status: DISCONTINUED | OUTPATIENT
Start: 2023-09-02 | End: 2023-09-03 | Stop reason: HOSPADM

## 2023-09-02 RX ORDER — SULFASALAZINE 500 MG/1
500 TABLET ORAL 2 TIMES DAILY
Status: DISCONTINUED | OUTPATIENT
Start: 2023-09-02 | End: 2023-09-03 | Stop reason: HOSPADM

## 2023-09-02 RX ORDER — TALC
6 POWDER (GRAM) TOPICAL NIGHTLY PRN
Status: DISCONTINUED | OUTPATIENT
Start: 2023-09-02 | End: 2023-09-03 | Stop reason: HOSPADM

## 2023-09-02 RX ORDER — GLUCAGON 1 MG
1 KIT INJECTION
Status: DISCONTINUED | OUTPATIENT
Start: 2023-09-02 | End: 2023-09-03 | Stop reason: HOSPADM

## 2023-09-02 RX ORDER — NALOXONE HCL 0.4 MG/ML
0.02 VIAL (ML) INJECTION
Status: DISCONTINUED | OUTPATIENT
Start: 2023-09-02 | End: 2023-09-03 | Stop reason: HOSPADM

## 2023-09-02 RX ORDER — FUROSEMIDE 40 MG/1
40 TABLET ORAL DAILY PRN
Status: DISCONTINUED | OUTPATIENT
Start: 2023-09-02 | End: 2023-09-03 | Stop reason: HOSPADM

## 2023-09-02 RX ORDER — SODIUM CHLORIDE 0.9 % (FLUSH) 0.9 %
10 SYRINGE (ML) INJECTION
Status: DISCONTINUED | OUTPATIENT
Start: 2023-09-02 | End: 2023-09-03 | Stop reason: HOSPADM

## 2023-09-02 RX ORDER — IBUPROFEN 200 MG
24 TABLET ORAL
Status: DISCONTINUED | OUTPATIENT
Start: 2023-09-02 | End: 2023-09-03 | Stop reason: HOSPADM

## 2023-09-02 RX ORDER — ACETAMINOPHEN 500 MG
1000 TABLET ORAL EVERY 8 HOURS PRN
Status: DISCONTINUED | OUTPATIENT
Start: 2023-09-02 | End: 2023-09-03 | Stop reason: HOSPADM

## 2023-09-02 RX ORDER — FOLIC ACID 1 MG/1
1000 TABLET ORAL DAILY
Status: DISCONTINUED | OUTPATIENT
Start: 2023-09-03 | End: 2023-09-03 | Stop reason: HOSPADM

## 2023-09-02 RX ORDER — PANTOPRAZOLE SODIUM 40 MG/1
40 TABLET, DELAYED RELEASE ORAL DAILY
Status: DISCONTINUED | OUTPATIENT
Start: 2023-09-03 | End: 2023-09-03 | Stop reason: HOSPADM

## 2023-09-02 RX ORDER — IBUPROFEN 200 MG
16 TABLET ORAL
Status: DISCONTINUED | OUTPATIENT
Start: 2023-09-02 | End: 2023-09-03 | Stop reason: HOSPADM

## 2023-09-02 RX ORDER — ONDANSETRON 2 MG/ML
8 INJECTION INTRAMUSCULAR; INTRAVENOUS
Status: COMPLETED | OUTPATIENT
Start: 2023-09-02 | End: 2023-09-02

## 2023-09-02 RX ORDER — CHOLESTYRAMINE 4 G/9G
1 POWDER, FOR SUSPENSION ORAL 2 TIMES DAILY
Status: DISCONTINUED | OUTPATIENT
Start: 2023-09-02 | End: 2023-09-03 | Stop reason: HOSPADM

## 2023-09-02 RX ORDER — ACETAMINOPHEN 325 MG/1
650 TABLET ORAL EVERY 4 HOURS PRN
Status: DISCONTINUED | OUTPATIENT
Start: 2023-09-02 | End: 2023-09-03 | Stop reason: HOSPADM

## 2023-09-02 RX ORDER — AMLODIPINE BESYLATE 2.5 MG/1
2.5 TABLET ORAL DAILY
Status: DISCONTINUED | OUTPATIENT
Start: 2023-09-03 | End: 2023-09-03 | Stop reason: HOSPADM

## 2023-09-02 RX ORDER — ONDANSETRON 2 MG/ML
4 INJECTION INTRAMUSCULAR; INTRAVENOUS
Status: COMPLETED | OUTPATIENT
Start: 2023-09-02 | End: 2023-09-02

## 2023-09-02 RX ORDER — DICYCLOMINE HYDROCHLORIDE 10 MG/ML
20 INJECTION INTRAMUSCULAR
Status: COMPLETED | OUTPATIENT
Start: 2023-09-02 | End: 2023-09-02

## 2023-09-02 RX ORDER — ATORVASTATIN CALCIUM 20 MG/1
20 TABLET, FILM COATED ORAL DAILY
Status: DISCONTINUED | OUTPATIENT
Start: 2023-09-03 | End: 2023-09-03 | Stop reason: HOSPADM

## 2023-09-02 RX ADMIN — POTASSIUM BICARBONATE 40 MEQ: 391 TABLET, EFFERVESCENT ORAL at 03:09

## 2023-09-02 RX ADMIN — ONDANSETRON 4 MG: 2 INJECTION INTRAMUSCULAR; INTRAVENOUS at 05:09

## 2023-09-02 RX ADMIN — SULFASALAZINE 500 MG: 500 TABLET ORAL at 09:09

## 2023-09-02 RX ADMIN — IOHEXOL 75 ML: 350 INJECTION, SOLUTION INTRAVENOUS at 02:09

## 2023-09-02 RX ADMIN — DICYCLOMINE HYDROCHLORIDE 20 MG: 20 INJECTION, SOLUTION INTRAMUSCULAR at 12:09

## 2023-09-02 RX ADMIN — DIPHENOXYLATE HYDROCHLORIDE AND ATROPINE SULFATE 2 TABLET: 2.5; .025 TABLET ORAL at 12:09

## 2023-09-02 RX ADMIN — APIXABAN 5 MG: 5 TABLET, FILM COATED ORAL at 08:09

## 2023-09-02 RX ADMIN — CHOLESTYRAMINE 4 G: 4 POWDER, FOR SUSPENSION ORAL at 10:09

## 2023-09-02 RX ADMIN — ONDANSETRON 8 MG: 2 INJECTION INTRAMUSCULAR; INTRAVENOUS at 11:09

## 2023-09-02 RX ADMIN — Medication 6 MG: at 11:09

## 2023-09-02 RX ADMIN — SODIUM CHLORIDE, POTASSIUM CHLORIDE, SODIUM LACTATE AND CALCIUM CHLORIDE 500 ML: 600; 310; 30; 20 INJECTION, SOLUTION INTRAVENOUS at 12:09

## 2023-09-02 NOTE — ED PROVIDER NOTES
Encounter Date: 9/2/2023       History     Chief Complaint   Patient presents with    Emesis    Diarrhea     83-year-old female with past medical history of atrial fibrillation (on Eliquis), pancreatic cancer, hypertension, hyperlipidemia, Crohn's disease hypothyroidism presents emergency department for nausea vomiting diarrhea and abdominal pain since yesterday.  Reports cramping abdominal pain until lower abdomen as well as 8-9 episodes of diarrhea despite Lomotil.  No bloody diarrhea, fevers/chills, chest pain or shortness of breath.  Also has nausea vomiting since last night with no improvement with Zofran.  Denies any suspicious foods, recent travel, antibiotic use or medication changes recently.        Review of patient's allergies indicates:   Allergen Reactions    Cleocin [clindamycin hcl]     Lisinopril Other (See Comments)     cough     Past Medical History:   Diagnosis Date    Anticoagulant long-term use     Atrial fibrillation     Crohn disease diagnosed in her 20's    GERD (gastroesophageal reflux disease)     Hyperlipidemia     Hypertension     Pancreatic adenocarcinoma 3/21/2023    Thyroid disease     s/p I 131     Past Surgical History:   Procedure Laterality Date    APPENDECTOMY      CARDIAC SURGERY  2014    pacemaker    COLONOSCOPY  ~2008    normal findings per patient report    ENDOSCOPIC ULTRASOUND OF UPPER GASTROINTESTINAL TRACT N/A 3/14/2023    Procedure: ULTRASOUND, UPPER GI TRACT, ENDOSCOPIC;  Surgeon: Andrei Swartz MD;  Location: [facility];  Service: Endoscopy;  Laterality: N/A;  Approval to hold Eliquis rec'd from Dr. Barbosa (see telephone encounter 3/3/23)-DS  [employer] AICD/PPM  3/3/23-Instructions via portal-DS    ERCP N/A 3/21/2023    Procedure: ERCP (ENDOSCOPIC RETROGRADE CHOLANGIOPANCREATOGRAPHY);  Surgeon: Celina Toney MD;  Location: [facility] ([address] [place] [address]);  Service: Endoscopy;  Laterality: N/A;    ESOPHAGOGASTRODUODENOSCOPY N/A 7/17/2018    Procedure: EGD  (ESOPHAGOGASTRODUODENOSCOPY);  Surgeon: Alondra Casiano MD;  Location: [facility] ENDO;  Service: Endoscopy;  Laterality: N/A;    HYSTERECTOMY      INSERTION OF IMPLANTABLE CARDIOVERTER-DEFIBRILLATOR (ICD) GENERATOR WITH TWO EXISTING LEADS Left 5/10/2021    Procedure: INSERTION, PULSE GENERATOR WITH 2 EXISTING LEADS, ICD;  Surgeon: Bo Leone MD;  Location: [facility] CATH LAB;  Service: Cardiology;  Laterality: Left;    INSERTION OF PACEMAKER      REPLACEMENT OF PACEMAKER GENERATOR  05/10/2021    RETROGRADE PYELOGRAPHY Right 2020    Procedure: PYELOGRAM, RETROGRADE;  Surgeon: Jovan Kruse MD;  Location: [facility] OR;  Service: Urology;  Laterality: Right;    SMALL INTESTINE SURGERY  in her 20's    for crohn's    TONSILLECTOMY      UPPER GASTROINTESTINAL ENDOSCOPY  ~    normal findings per patient report     Family History   Problem Relation Age of Onset    Cancer Mother     Breast cancer Mother     Crohn's disease Other     Colon cancer Neg Hx     Colon polyps Neg Hx     Esophageal cancer Neg Hx     Stomach cancer Neg Hx     Ulcerative colitis Neg Hx      Social History     Tobacco Use    Smoking status: Former     Current packs/day: 0.00     Types: Cigarettes     Quit date:      Years since quittin.6    Smokeless tobacco: Never   Substance Use Topics    Alcohol use: No    Drug use: No     Review of Systems   Constitutional:  Negative for chills and fever.   HENT:  Negative for sore throat.    Respiratory:  Negative for cough and shortness of breath.    Cardiovascular:  Negative for chest pain.   Gastrointestinal:  Positive for abdominal pain, diarrhea, nausea and vomiting.   Genitourinary:  Negative for difficulty urinating and dysuria.   Musculoskeletal: Negative.    Skin: Negative.    Neurological:  Negative for weakness.   Psychiatric/Behavioral:  Negative for confusion.        Physical Exam     Initial Vitals [23 1019]   BP Pulse Resp Temp SpO2   132/67 106 16 97.9 °F (36.6 °C) (!) 92 %       MAP       --         Physical Exam    Nursing note and vitals reviewed.  Constitutional: She appears well-developed and well-nourished.   HENT:   Head: Normocephalic and atraumatic.   Eyes: Conjunctivae are normal. Pupils are equal, round, and reactive to light.   Neck: Neck supple.   Normal range of motion.  Cardiovascular:  Normal rate.           Pulmonary/Chest: Breath sounds normal.   Abdominal: Abdomen is soft. She exhibits no distension and no mass. There is abdominal tenderness (Tenderness to the lower abdomen). There is no rebound and no guarding.   Musculoskeletal:         General: Normal range of motion.      Cervical back: Normal range of motion and neck supple.     Neurological: She is alert and oriented to person, place, and time. GCS score is 15. GCS eye subscore is 4. GCS verbal subscore is 5. GCS motor subscore is 6.   Skin: Skin is warm and dry. Capillary refill takes less than 2 seconds.   Psychiatric: She has a normal mood and affect.         ED Course   Procedures  Labs Reviewed   CBC W/ AUTO DIFFERENTIAL - Abnormal; Notable for the following components:       Result Value    WBC 16.46 (*)     RBC 2.76 (*)     Hemoglobin 9.1 (*)     Hematocrit 27.5 (*)      (*)     MCH 33.0 (*)     RDW 18.0 (*)     Platelets 142 (*)     nRBC 3 (*)     Gran % 80.0 (*)     Lymph % 12.0 (*)     Mono % 3.0 (*)     Platelet Estimate Decreased (*)     All other components within normal limits   COMPREHENSIVE METABOLIC PANEL - Abnormal; Notable for the following components:    Potassium 2.9 (*)     BUN 5 (*)     Calcium 7.9 (*)     Albumin 2.6 (*)     Total Bilirubin 1.2 (*)     Alkaline Phosphatase 275 (*)     AST 58 (*)     All other components within normal limits   URINALYSIS, REFLEX TO URINE CULTURE - Abnormal; Notable for the following components:    Leukocytes, UA 1+ (*)     All other components within normal limits    Narrative:     Specimen Source->Urine   URINALYSIS MICROSCOPIC - Abnormal; Notable  for the following components:    WBC, UA 6 (*)     Non-Squam Epith 1 (*)     Hyaline Casts, UA 3 (*)     All other components within normal limits    Narrative:     Specimen Source->Urine   CULTURE, BLOOD   CULTURE, BLOOD   CULTURE, STOOL   CLOSTRIDIUM DIFFICILE   LIPASE   SARS-COV-2 RNA AMPLIFICATION, QUAL    Narrative:     Is the patient symptomatic?->No  Is testing needed for patient travel?->No  Is this needed for pre-procedure or pre-op testing?->No   WBC, STOOL   STOOL EXAM-OVA,CYSTS,PARASITES   ISTAT CHEM8          Imaging Results              CT Abdomen Pelvis With Contrast (Final result)  Result time 09/02/23 14:27:20      Final result by Tammy Todd MD (09/02/23 14:27:20)                   Impression:      Thick wall cystic structure in the pancreatic body abuts the posterior margin of the lesser curvature of the stomach, similar to the prior examination, with continued dilatation of the proximal pancreatic duct.  Continued heterogenous appearance of the pancreatic head/uncinate process measuring 3.7 x 3.1 cm.  The patient has a known history of pancreatic adenocarcinoma.  Common bile duct stent remains in place.  There is pneumobilia.    Cholelithiasis.    Equivocal thickening of the walls of the stomach versus under distension.    High density material layering dependently within the mid right:.  This could simply relate to recent medication ingestion however component of GI hemorrhage is not entirely excluded.  Correlation with fecal studies recommended.    5 mm stone in the right distal ureter resulting in mild right-sided hydroureter without hydronephrosis.      Electronically signed by: Tammy Todd MD  Date:    09/02/2023  Time:    14:27               Narrative:    EXAMINATION:  CT ABDOMEN PELVIS WITH CONTRAST    CLINICAL HISTORY:  Abdominal pain, acute, nonlocalized;    TECHNIQUE:  Low dose axial images, sagittal and coronal reformations were obtained from the lung bases to the pubic  symphysis following the IV administration of 75 mL of Omnipaque 350 .  Oral contrast was not given.    COMPARISON:  08/28/2023    FINDINGS:  The lung bases are clear.  The base of the heart shows leads from a pacer device.  Calcified atheromatous disease affects the aorta and its branch vessels.    Cholelithiasis.  There is air within the gallbladder and within the biliary tree related to the patient's known CBD duct stent.  There is a micronodular contour of the liver in keeping with intrinsic hepatic disease/cirrhosis.  No focal hepatic lesion is seen.  The spleen has a normal appearance.  There is a cystic lesion abutting the stomach arising from the pancreas, measuring approximately 4.7 x 3.7 cm in transverse dimension, similar to the most recent prior study of 08/28/2023.  There does appear to be dilatation of the pancreatic duct proximal to this lesion.  Ill-defined focus of enhancement in cystic change in the region of the uncinate process of the pancreas measuring 3.7 x 3.1 cm.  There is mild right-sided hydroureter secondary to a 5 mm stone in the right distal ureter.  No obstructive uropathy on the left.  The urinary bladder is well distended and appears normal.  The uterus has been removed.  Adnexal regions are unremarkable.    There does appear to be some mild thickening of the walls of the stomach versus under distension.  There is some high density material seen in the lumen of the right mid:, nonspecific.  Oral contrast was not administered.  Formed stool is seen within the sigmoid colon and rectum.  No free air or obstruction.  No pathologically enlarged abdominal or pelvic lymph nodes are seen.    Age-appropriate degenerative changes affect the skeleton.                                       X-Ray Chest AP Portable (Final result)  Result time 09/02/23 12:12:21      Final result by Tammy Todd MD (09/02/23 12:12:21)                   Impression:      No acute abnormality.      Electronically  signed by: Tammy Todd MD  Date:    09/02/2023  Time:    12:12               Narrative:    EXAMINATION:  XR CHEST AP PORTABLE    CLINICAL HISTORY:  weakness;    TECHNIQUE:  Single frontal view of the chest was performed.    COMPARISON:  08/26/2023    FINDINGS:  The lungs are clear with normal appearance of pulmonary vasculature. No pleural effusion. No evident pneumothorax.    The cardiac silhouette is normal in size. The hilar and mediastinal contours are unremarkable.Right-sided Port-A-Cath has its tip in the distal SVC.  Left-sided pacemaker device is in place.    Bones are intact.                                       Medications   amLODIPine tablet 2.5 mg (has no administration in time range)   atorvastatin tablet 20 mg (has no administration in time range)   cholestyramine 4 gram packet 4 g (has no administration in time range)   diphenoxylate-atropine 2.5-0.025 mg per tablet 1 tablet (has no administration in time range)   apixaban tablet 5 mg (5 mg Oral Given 9/2/23 2027)   pantoprazole EC tablet 40 mg (has no administration in time range)   folic acid tablet 1,000 mcg (has no administration in time range)   furosemide tablet 40 mg (has no administration in time range)   levothyroxine tablet 175 mcg (has no administration in time range)   lipase-protease-amylase 24,000-76,000-120,000 units capsule 2 capsule (has no administration in time range)   metoprolol succinate 24 hr tablet 75 mg (has no administration in time range)   sulfaSALAzine tablet 500 mg (has no administration in time range)   oxybutynin 24 hr tablet 10 mg (has no administration in time range)   sodium chloride 0.9% flush 10 mL (has no administration in time range)   melatonin tablet 6 mg (has no administration in time range)   acetaminophen tablet 650 mg (has no administration in time range)   acetaminophen tablet 1,000 mg (has no administration in time range)   naloxone 0.4 mg/mL injection 0.02 mg (has no administration in time range)    glucose chewable tablet 16 g (has no administration in time range)   glucose chewable tablet 24 g (has no administration in time range)   glucagon (human recombinant) injection 1 mg (has no administration in time range)   dextrose 10% bolus 125 mL 125 mL (has no administration in time range)   dextrose 10% bolus 250 mL 250 mL (has no administration in time range)   dicyclomine injection 20 mg (20 mg Intramuscular Given 9/2/23 1208)   ondansetron injection 8 mg (8 mg Intravenous Given 9/2/23 1159)   lactated ringers bolus 500 mL (0 mLs Intravenous Stopped 9/2/23 1320)   diphenoxylate-atropine 2.5-0.025 mg per tablet 2 tablet (2 tablets Oral Given 9/2/23 1247)   iohexoL (OMNIPAQUE 350) injection 75 mL (75 mLs Intravenous Given 9/2/23 1414)   potassium bicarbonate disintegrating tablet 40 mEq (40 mEq Oral Given 9/2/23 1516)   ondansetron injection 4 mg (4 mg Intravenous Given 9/2/23 1714)     Medical Decision Making  83-year-old female with history of pancreatic cancer presents emergency department for nausea vomiting diarrhea and abdominal pain.      Differential includes but not limited to Crohn's flare, diarrhea, C diff, infectious diarrhea, colitis, UTI, electrolyte derangement, pancreatitis    Patient has intractable nausea vomiting despite home Zofran as well as severe diarrhea despite Lomotil at home.  She did have improvement with Zofran as well as Lomotil in the ED. noted to have a leukocytosis of 16.  Denies any recent antibiotics and has no bloody diarrhea and low suspicion for C diff however will threshold of testing given that she is currently on active chemotherapy with unclear etiology of her leukocytosis.  Will obtain stool studies including C diff as well as blood cultures.  UA not consistent with UTI and her chest x-ray does not show signs of pneumonia.  CT abdomen pelvis was obtained and no emergent pathology was found.  Has a 5 mm UVJ stone similar to previous.  Discussed case with heme Onc and  given her leukocytosis they recommend admission for further evaluation.    Amount and/or Complexity of Data Reviewed  Labs: ordered.  Radiology: ordered.    Risk  Prescription drug management.  Decision regarding hospitalization.              Attending Attestation:     Physician Attestation Statement for NP/PA:   I have directed and reviewed the workup performed by the PA/NP.  I performed the substantive portion of the medical decision making.     Other NP/PA Attestation Additions:    History of Present Illness: 83-year-old woman with comorbidities of atrial fibrillation on Eliquis as well as hypertension, hyperlipidemia, and Crohn's disease presents to the emergency department for evaluation of nausea, vomiting, and diarrhea which began yesterday.                               Clinical Impression:   Final diagnoses:  [R10.9] Abdominal pain  [R11.2] Nausea and vomiting, unspecified vomiting type (Primary)  [R19.7] Diarrhea, unspecified type        ED Disposition Condition    Admit Stable                Anshul Reyna PA-C  09/02/23 9975       Kedar Mitchell MD  09/04/23 1235

## 2023-09-02 NOTE — ED TRIAGE NOTES
Pt presents to ED with complaints of diarrhea and nausea with onset of last night. Pt also endorses abdominal cramping and tenderness. Pt has hx of pancreatic cancer as well as Crohns. Last round of chemo 8/27. Pt denies fever, chills, antibiotic use, blood in stool.

## 2023-09-02 NOTE — ED NOTES
I-STAT Chem-8+ Results:   Value Reference Range   Sodium 141 136-145 mmol/L   Potassium  2.8 3.5-5.1 mmol/L   Chloride 101  mmol/L   Ionized Calcium 0.95 1.06-1.42 mmol/L   CO2 (measured) 25 23-29 mmol/L   Glucose 81  mg/dL   BUN  < 3 6-30 mg/dL   Creatinine 0.7 0.5-1.4 mg/dL   Hematocrit 27 36-54%

## 2023-09-03 VITALS
OXYGEN SATURATION: 91 % | RESPIRATION RATE: 17 BRPM | WEIGHT: 158 LBS | TEMPERATURE: 97 F | BODY MASS INDEX: 31.02 KG/M2 | HEART RATE: 72 BPM | DIASTOLIC BLOOD PRESSURE: 57 MMHG | HEIGHT: 60 IN | SYSTOLIC BLOOD PRESSURE: 122 MMHG

## 2023-09-03 LAB
ALBUMIN SERPL BCP-MCNC: 2.4 G/DL (ref 3.5–5.2)
ALP SERPL-CCNC: 243 U/L (ref 55–135)
ALT SERPL W/O P-5'-P-CCNC: 20 U/L (ref 10–44)
ANION GAP SERPL CALC-SCNC: 11 MMOL/L (ref 8–16)
ANISOCYTOSIS BLD QL SMEAR: SLIGHT
AST SERPL-CCNC: 53 U/L (ref 10–40)
BASO STIPL BLD QL SMEAR: ABNORMAL
BASOPHILS NFR BLD: 0 % (ref 0–1.9)
BILIRUB SERPL-MCNC: 1.1 MG/DL (ref 0.1–1)
BUN SERPL-MCNC: 5 MG/DL (ref 8–23)
CALCIUM SERPL-MCNC: 7.8 MG/DL (ref 8.7–10.5)
CHLORIDE SERPL-SCNC: 102 MMOL/L (ref 95–110)
CO2 SERPL-SCNC: 27 MMOL/L (ref 23–29)
CREAT SERPL-MCNC: 0.7 MG/DL (ref 0.5–1.4)
DIFFERENTIAL METHOD: ABNORMAL
EOSINOPHIL NFR BLD: 0 % (ref 0–8)
ERYTHROCYTE [DISTWIDTH] IN BLOOD BY AUTOMATED COUNT: 18.6 % (ref 11.5–14.5)
EST. GFR  (NO RACE VARIABLE): >60 ML/MIN/1.73 M^2
GLUCOSE SERPL-MCNC: 59 MG/DL (ref 70–110)
HCT VFR BLD AUTO: 26.1 % (ref 37–48.5)
HGB BLD-MCNC: 8.4 G/DL (ref 12–16)
IMM GRANULOCYTES # BLD AUTO: ABNORMAL K/UL (ref 0–0.04)
IMM GRANULOCYTES NFR BLD AUTO: ABNORMAL % (ref 0–0.5)
LYMPHOCYTES NFR BLD: 9 % (ref 18–48)
MAGNESIUM SERPL-MCNC: 1.2 MG/DL (ref 1.6–2.6)
MCH RBC QN AUTO: 32.6 PG (ref 27–31)
MCHC RBC AUTO-ENTMCNC: 32.2 G/DL (ref 32–36)
MCV RBC AUTO: 101 FL (ref 82–98)
METAMYELOCYTES NFR BLD MANUAL: 1 %
MONOCYTES NFR BLD: 3 % (ref 4–15)
MYELOCYTES NFR BLD MANUAL: 2 %
NEUTROPHILS NFR BLD: 83 % (ref 38–73)
NEUTS BAND NFR BLD MANUAL: 2 %
NRBC BLD-RTO: 2 /100 WBC
OVALOCYTES BLD QL SMEAR: ABNORMAL
PLATELET # BLD AUTO: 147 K/UL (ref 150–450)
PLATELET BLD QL SMEAR: ABNORMAL
PMV BLD AUTO: 11.6 FL (ref 9.2–12.9)
POIKILOCYTOSIS BLD QL SMEAR: SLIGHT
POLYCHROMASIA BLD QL SMEAR: ABNORMAL
POTASSIUM SERPL-SCNC: 3.3 MMOL/L (ref 3.5–5.1)
PROT SERPL-MCNC: 5.5 G/DL (ref 6–8.4)
RBC # BLD AUTO: 2.58 M/UL (ref 4–5.4)
SODIUM SERPL-SCNC: 140 MMOL/L (ref 136–145)
SPHEROCYTES BLD QL SMEAR: ABNORMAL
TOXIC GRANULES BLD QL SMEAR: PRESENT
WBC # BLD AUTO: 14.12 K/UL (ref 3.9–12.7)

## 2023-09-03 PROCEDURE — 99900035 HC TECH TIME PER 15 MIN (STAT): Mod: HCNC

## 2023-09-03 PROCEDURE — 27000221 HC OXYGEN, UP TO 24 HOURS: Mod: HCNC

## 2023-09-03 PROCEDURE — 1111F DSCHRG MED/CURRENT MED MERGE: CPT | Mod: HCNC,CPTII,GC, | Performed by: INTERNAL MEDICINE

## 2023-09-03 PROCEDURE — 99239 PR HOSPITAL DISCHARGE DAY,>30 MIN: ICD-10-PCS | Mod: HCNC,GC,, | Performed by: INTERNAL MEDICINE

## 2023-09-03 PROCEDURE — 25000003 PHARM REV CODE 250: Mod: HCNC | Performed by: STUDENT IN AN ORGANIZED HEALTH CARE EDUCATION/TRAINING PROGRAM

## 2023-09-03 PROCEDURE — 99239 HOSP IP/OBS DSCHRG MGMT >30: CPT | Mod: HCNC,GC,, | Performed by: INTERNAL MEDICINE

## 2023-09-03 PROCEDURE — 85027 COMPLETE CBC AUTOMATED: CPT | Mod: HCNC | Performed by: STUDENT IN AN ORGANIZED HEALTH CARE EDUCATION/TRAINING PROGRAM

## 2023-09-03 PROCEDURE — 1111F PR DISCHARGE MEDS RECONCILED W/ CURRENT OUTPATIENT MED LIST: ICD-10-PCS | Mod: HCNC,CPTII,GC, | Performed by: INTERNAL MEDICINE

## 2023-09-03 PROCEDURE — 94761 N-INVAS EAR/PLS OXIMETRY MLT: CPT | Mod: HCNC

## 2023-09-03 PROCEDURE — 63600175 PHARM REV CODE 636 W HCPCS: Mod: HCNC | Performed by: STUDENT IN AN ORGANIZED HEALTH CARE EDUCATION/TRAINING PROGRAM

## 2023-09-03 PROCEDURE — 63600175 PHARM REV CODE 636 W HCPCS: Mod: HCNC

## 2023-09-03 PROCEDURE — 80053 COMPREHEN METABOLIC PANEL: CPT | Mod: HCNC | Performed by: STUDENT IN AN ORGANIZED HEALTH CARE EDUCATION/TRAINING PROGRAM

## 2023-09-03 PROCEDURE — 85007 BL SMEAR W/DIFF WBC COUNT: CPT | Mod: HCNC | Performed by: STUDENT IN AN ORGANIZED HEALTH CARE EDUCATION/TRAINING PROGRAM

## 2023-09-03 PROCEDURE — 83735 ASSAY OF MAGNESIUM: CPT | Mod: HCNC | Performed by: STUDENT IN AN ORGANIZED HEALTH CARE EDUCATION/TRAINING PROGRAM

## 2023-09-03 RX ORDER — MUPIROCIN 20 MG/G
OINTMENT TOPICAL 2 TIMES DAILY
Status: DISCONTINUED | OUTPATIENT
Start: 2023-09-03 | End: 2023-09-03 | Stop reason: HOSPADM

## 2023-09-03 RX ORDER — HEPARIN 100 UNIT/ML
100 SYRINGE INTRAVENOUS ONCE
Status: COMPLETED | OUTPATIENT
Start: 2023-09-03 | End: 2023-09-03

## 2023-09-03 RX ORDER — MAGNESIUM SULFATE HEPTAHYDRATE 40 MG/ML
2 INJECTION, SOLUTION INTRAVENOUS
Status: COMPLETED | OUTPATIENT
Start: 2023-09-03 | End: 2023-09-03

## 2023-09-03 RX ORDER — POTASSIUM CHLORIDE 20 MEQ/1
40 TABLET, EXTENDED RELEASE ORAL ONCE
Status: COMPLETED | OUTPATIENT
Start: 2023-09-03 | End: 2023-09-03

## 2023-09-03 RX ADMIN — PANCRELIPASE 2 CAPSULE: 24000; 76000; 120000 CAPSULE, DELAYED RELEASE PELLETS ORAL at 04:09

## 2023-09-03 RX ADMIN — FOLIC ACID 1000 MCG: 1 TABLET ORAL at 08:09

## 2023-09-03 RX ADMIN — ATORVASTATIN CALCIUM 20 MG: 20 TABLET, FILM COATED ORAL at 08:09

## 2023-09-03 RX ADMIN — PANCRELIPASE 2 CAPSULE: 24000; 76000; 120000 CAPSULE, DELAYED RELEASE PELLETS ORAL at 11:09

## 2023-09-03 RX ADMIN — AMLODIPINE BESYLATE 2.5 MG: 2.5 TABLET ORAL at 08:09

## 2023-09-03 RX ADMIN — SULFASALAZINE 500 MG: 500 TABLET ORAL at 08:09

## 2023-09-03 RX ADMIN — PANTOPRAZOLE SODIUM 40 MG: 40 TABLET, DELAYED RELEASE ORAL at 08:09

## 2023-09-03 RX ADMIN — HEPARIN 100 UNITS: 100 SYRINGE at 03:09

## 2023-09-03 RX ADMIN — METOPROLOL SUCCINATE 75 MG: 50 TABLET, EXTENDED RELEASE ORAL at 08:09

## 2023-09-03 RX ADMIN — DICYCLOMINE HYDROCHLORIDE 10 MG: 10 CAPSULE ORAL at 08:09

## 2023-09-03 RX ADMIN — DICYCLOMINE HYDROCHLORIDE 10 MG: 10 CAPSULE ORAL at 04:09

## 2023-09-03 RX ADMIN — MAGNESIUM SULFATE HEPTAHYDRATE 2 G: 40 INJECTION, SOLUTION INTRAVENOUS at 08:09

## 2023-09-03 RX ADMIN — DICYCLOMINE HYDROCHLORIDE 10 MG: 10 CAPSULE ORAL at 12:09

## 2023-09-03 RX ADMIN — LEVOTHYROXINE SODIUM 175 MCG: 125 TABLET ORAL at 08:09

## 2023-09-03 RX ADMIN — APIXABAN 5 MG: 5 TABLET, FILM COATED ORAL at 08:09

## 2023-09-03 RX ADMIN — OXYBUTYNIN CHLORIDE 10 MG: 5 TABLET, EXTENDED RELEASE ORAL at 08:09

## 2023-09-03 RX ADMIN — MAGNESIUM SULFATE HEPTAHYDRATE 2 G: 40 INJECTION, SOLUTION INTRAVENOUS at 11:09

## 2023-09-03 RX ADMIN — MAGNESIUM SULFATE HEPTAHYDRATE 2 G: 40 INJECTION, SOLUTION INTRAVENOUS at 01:09

## 2023-09-03 RX ADMIN — CHOLESTYRAMINE 4 G: 4 POWDER, FOR SUSPENSION ORAL at 08:09

## 2023-09-03 RX ADMIN — POTASSIUM CHLORIDE 40 MEQ: 1500 TABLET, EXTENDED RELEASE ORAL at 08:09

## 2023-09-03 NOTE — ASSESSMENT & PLAN NOTE
Patient with Long standing persistent (>12 months) atrial fibrillation which is controlled currently with Beta Blocker. Patient is currently in atrial fibrillation.[id number] Score: 3.       Plan:  - Rate control with target HR <110  - Metoprolol tartrate vs diltiazem  - Hold for SBP <90/60 or P<45 bpm  - Patient's [id number] score is 3, annual risk of stroke is 6.7  - Score > 1 requires anticoagulation preferably with Apixaban  - HAS-BLED Score:  - Hypertension (+1)  Yes  - Impaired Renal Function (Dialysis, transplant, Cr >2.26 mg/dL) (+1)  No  - Impaired Liver Function (Cirrhosis or bilirubin >2x normal with AST/ALT/AP >3x normal) (+1)  Yes  - History of Stroke (+1)  No  - History of Bleeding (+1)  No  - Labile INRs (Unstable/high INRs, time in therapeutic range <60%) (+1)  No  - >65 years (+1)  Yes  - Drugs (Antiplatelet agents, NSAIDs) (+1)  No  - Alcohol Consumption (+1)  No    - Total Score:  3  - Risk of bleedin.8%    - Score >=3 indicates high bleeding risk.  - HAS-BLED > CHADVAS indicates risk greater than benefit.  - Magnesium > 2, Potassium > 4  - Continuous telemetry  - Synchronized cardioversion if hemodynamically unstable

## 2023-09-03 NOTE — ED NOTES
Pt's family member, Karen, called and informed of pt's room number and that patient is being moved to room per patient's request.

## 2023-09-03 NOTE — PLAN OF CARE
Discharge instructions and prescriptions given and explained to pt.  Pt. verbalized understanding with no further questions. Right chest port deaccessed and heparin locked.  VS WDL.  Patient is awaiting ride home by daughter in law.      ROAD TEST  O=SpO2 100% on RA  A=Ambulating around room and hallway with assistance  D=Right chest port deaccessed and heparin locked  T=Tolerating regular diet  E=Voids  S=Performs self care with assistance  T=Teaching on wounds care complete, NA

## 2023-09-03 NOTE — ASSESSMENT & PLAN NOTE
Patient has stage I B pancreatic adenocarcinoma which may be resected following chemo.  She had placement of a biliary stent due to compression of the common biliary duct and is presenting today with nausea, vomiting, diarrhea.  She is receiving active chemotherapy and thus was admitted to the medical oncology team.    Plan:  - continue pancreatic lipase, amylase, protease replacement

## 2023-09-03 NOTE — SUBJECTIVE & OBJECTIVE
Past Medical History:   Diagnosis Date    Anticoagulant long-term use     Atrial fibrillation     Crohn disease diagnosed in her 20's    GERD (gastroesophageal reflux disease)     Hyperlipidemia     Hypertension     Pancreatic adenocarcinoma 3/21/2023    Thyroid disease     s/p I 131       Past Surgical History:   Procedure Laterality Date    APPENDECTOMY      CARDIAC SURGERY  2014    pacemaker    COLONOSCOPY  ~2008    normal findings per patient report    ENDOSCOPIC ULTRASOUND OF UPPER GASTROINTESTINAL TRACT N/A 3/14/2023    Procedure: ULTRASOUND, UPPER GI TRACT, ENDOSCOPIC;  Surgeon: Andrei Swartz MD;  Location: [facility];  Service: Endoscopy;  Laterality: N/A;  Approval to hold Eliquis rec'd from Dr. Barbosa (see telephone encounter 3/3/23)-DS  [employer] AICD/PPM  3/3/23-Instructions via portal-DS    ERCP N/A 3/21/2023    Procedure: ERCP (ENDOSCOPIC RETROGRADE CHOLANGIOPANCREATOGRAPHY);  Surgeon: Celina Toney MD;  Location: [facility] ([address] [place] [address]);  Service: Endoscopy;  Laterality: N/A;    ESOPHAGOGASTRODUODENOSCOPY N/A 7/17/2018    Procedure: EGD (ESOPHAGOGASTRODUODENOSCOPY);  Surgeon: Alondra Casiano MD;  Location: [facility] ENDO;  Service: Endoscopy;  Laterality: N/A;    HYSTERECTOMY      INSERTION OF IMPLANTABLE CARDIOVERTER-DEFIBRILLATOR (ICD) GENERATOR WITH TWO EXISTING LEADS Left 5/10/2021    Procedure: INSERTION, PULSE GENERATOR WITH 2 EXISTING LEADS, ICD;  Surgeon: Bo Leone MD;  Location: [facility] CATH LAB;  Service: Cardiology;  Laterality: Left;    INSERTION OF PACEMAKER      REPLACEMENT OF PACEMAKER GENERATOR  05/10/2021    RETROGRADE PYELOGRAPHY Right 2/18/2020    Procedure: PYELOGRAM, RETROGRADE;  Surgeon: Jovan Kruse MD;  Location: [facility] OR;  Service: Urology;  Laterality: Right;    SMALL INTESTINE SURGERY  in her 20's    for crohn's    TONSILLECTOMY      UPPER GASTROINTESTINAL ENDOSCOPY  ~2008    normal findings per patient report       Review of patient's allergies indicates:    Allergen Reactions    Cleocin [clindamycin hcl]     Lisinopril Other (See Comments)     cough       No current facility-administered medications on file prior to encounter.     Current Outpatient Medications on File Prior to Encounter   Medication Sig    amLODIPine (NORVASC) 2.5 MG tablet Take 1 tablet (2.5 mg total) by mouth once daily.    atorvastatin (LIPITOR) 20 MG tablet Take 1 tablet (20 mg total) by mouth once daily.    calcium-vitamin D3 (OS-RADHA 500 + D3) 500 mg-5 mcg (200 unit) per tablet Take 1 tablet by mouth once daily.    colestipoL (COLESTID) 1 gram Tab Take 1 tablet (1 g total) by mouth 2 (two) times daily.    diphenoxylate-atropine 2.5-0.025 mg (LOMOTIL) 2.5-0.025 mg per tablet TAKE 1 TABLET BY MOUTH 4 TIMES DAILY AS NEEDED FOR DIARRHEA    ELIQUIS 5 mg Tab Take 1 tablet (5 mg total) by mouth 2 (two) times daily.    esomeprazole (NEXIUM) 40 MG capsule Take 1 capsule (40 mg total) by mouth once daily.    folic acid (FOLVITE) 1 MG tablet Take 1 tablet by mouth once daily    k phos di & mono-sod phos mono (K-PHOS-NEUTRAL) 250 mg Tab Take 2 tablets by mouth 2 (two) times a day. for 14 days    levothyroxine (SYNTHROID, LEVOTHROID) 175 MCG tablet Take 1 tablet by mouth once daily    metoprolol succinate (TOPROL-XL) 25 MG 24 hr tablet Take 3 tablets (75 mg total) by mouth once daily.    OLANZapine (ZYPREXA) 5 MG tablet Take 1 tablet (5 mg total) by mouth every evening.    prochlorperazine (COMPAZINE) 10 MG tablet Take 1 tablet (10 mg total) by mouth 4 (four) times daily as needed (nausea).    sulfaSALAzine (AZULFIDINE) 500 mg Tab Take 1 tablet by mouth twice daily    tolterodine (DETROL LA) 4 MG 24 hr capsule Take 1 capsule (4 mg total) by mouth once daily.    traMADoL (ULTRAM) 50 mg tablet Take 1 tablet (50 mg total) by mouth every 6 (six) hours as needed for Pain.    acetaminophen (TYLENOL) 325 MG tablet Take 650 mg by mouth daily as needed (Headache).    albuterol (PROVENTIL) 2.5 mg /3 mL (0.083 %)  nebulizer solution Take 3 mLs (2.5 mg total) by nebulization every 6 (six) hours as needed for Wheezing or Shortness of Breath. Rescue    furosemide (LASIX) 40 MG tablet Take 1 tablet (40 mg total) by mouth daily as needed (For weight gain > 3 lb in one day, increasing leg swelling, or increasing SOB).    LIDOcaine-prilocaine (EMLA) cream Apply topically as needed (place to port site 45-60 minutes prior to port access).    lipase-protease-amylase 24,000-76,000-120,000 units (CREON) 24,000-76,000 -120,000 unit capsule Take 2 capsules by mouth 3 (three) times daily with meals.    MELATONIN ORAL Take 1 tablet by mouth nightly as needed (Insomnia).     Family History       Problem Relation (Age of Onset)    Breast cancer Mother    Cancer Mother    Crohn's disease Other          Tobacco Use    Smoking status: Former     Current packs/day: 0.00     Types: Cigarettes     Quit date:      Years since quittin.6    Smokeless tobacco: Never   Substance and Sexual Activity    Alcohol use: No    Drug use: No    Sexual activity: Never     Review of Systems   Constitutional:  Negative for chills and fever.   HENT:  Negative for congestion and sore throat.    Eyes:  Negative for visual disturbance.   Respiratory:  Negative for chest tightness and shortness of breath.    Cardiovascular:  Negative for chest pain and palpitations.   Gastrointestinal:  Positive for abdominal pain, diarrhea, nausea and vomiting. Negative for abdominal distention and constipation.   Musculoskeletal:  Negative for back pain.   Skin:  Negative for rash.   Neurological:  Negative for dizziness, weakness and headaches.   Hematological:  Does not bruise/bleed easily.     Objective:     Vital Signs (Most Recent):  Temp: 97.6 °F (36.4 °C) (23)  Pulse: 72 (23)  Resp: 16 (23)  BP: 134/62 (23)  SpO2: 95 % (23) Vital Signs (24h Range):  Temp:  [97.6 °F (36.4 °C)-98.8 °F (37.1 °C)] 97.6 °F (36.4  °C)  Pulse:  [] 72  Resp:  [14-16] 16  SpO2:  [92 %-97 %] 95 %  BP: (131-150)/(62-76) 134/62     Weight: 71.7 kg (158 lb)  Body mass index is 30.86 kg/m².     Physical Exam  Vitals and nursing note reviewed.   Constitutional:       General: She is not in acute distress.     Appearance: Normal appearance. She is obese. She is not ill-appearing or toxic-appearing.   HENT:      Head: Normocephalic and atraumatic.      Mouth/Throat:      Mouth: Mucous membranes are moist.   Eyes:      General: No scleral icterus.  Cardiovascular:      Rate and Rhythm: Normal rate. Rhythm irregular.      Pulses: Normal pulses.      Heart sounds: Normal heart sounds.   Pulmonary:      Effort: Pulmonary effort is normal. No respiratory distress.      Breath sounds: Normal breath sounds.   Abdominal:      General: There is no distension.      Palpations: Abdomen is soft. There is no mass.      Tenderness: There is no abdominal tenderness. There is no right CVA tenderness or left CVA tenderness.   Musculoskeletal:      Cervical back: No rigidity.      Right lower leg: No edema.      Left lower leg: No edema.   Lymphadenopathy:      Cervical: No cervical adenopathy.   Skin:     General: Skin is warm and dry.      Capillary Refill: Capillary refill takes less than 2 seconds.      Coloration: Skin is pale. Skin is not jaundiced.   Neurological:      General: No focal deficit present.      Mental Status: She is alert and oriented to person, place, and time.                Significant Labs: All pertinent labs within the past 24 hours have been reviewed.    Significant Imaging: I have reviewed all pertinent imaging results/findings within the past 24 hours.

## 2023-09-03 NOTE — HPI
Patient is an 83-year-old female with past medical history significant for paroxysmal AFib with Eliquis, pancreatic adenocarcinoma, heart failure preserved ejection fraction, sick sinus syndrome with pacemaker, Crohn's disease, hypertension, hyperlipidemia, hypothyroidism who presented to the emergency room with abdominal pain that started several hours prior to presentation.  She states the pain was constant and diffuse.  It has been associated with nausea/vomiting as well as multiple episodes of diarrhea.  Patient states that over the last 24 hours she is had approximately 8 episodes of nonbloody non mucoid diarrhea.  Additionally she is had 3 episodes of vomiting.  She denies any sick contacts, fevers, chills, night sweats.  She does have some bloating.  In the emergency room she was found to be hypokalemic likely secondary to her diarrhea.  She denies any oral course of antibiotics however she did have a recent hospital admission for which she was discharged on 08/29.  She is currently being treated for pancreatic adenocarcinoma by Dr. Hill.  She denies any shortness of breath, cough, chest pain.  In the emergency room she was given dicyclomine in addition to her normal antinausea medications with near complete resolution of her symptoms.  On my assessment she feels fine and significantly better from when she presented.  Additionally she received 40 mEq of potassium bicarb in the emergency department for her hypokalemia.  During her last hospitalization she to presented with hypokalemia which seems to be a recurrent problem for her.  Additionally she has had a evaluation with placement of a biliary stent.     Oncology History:      Malignant neoplasm of pancreas   3/21/2023 Initial Diagnosis     Pancreatic adenocarcinoma      4/13/2023 Cancer Staged     Staging form: Exocrine Pancreas, AJCC 8th Edition  - Clinical stage from 4/13/2023: Stage IB (cT2, cN0, cM0)      5/1/2023 -  Chemotherapy     Treatment  Summary   Plan Name: OP PANC NAB-PACLITAXEL + GEMCITABINE Q4W  Treatment Goal: Palliative  Status: Active  Start Date: 5/1/2023  End Date: 5/13/2024 (Planned)  Provider: Jim Hill MD  Chemotherapy: gemcitabine (GEMZAR) 1,400 mg in sodium chloride 0.9% SolP 321.82 mL chemo infusion, 1,448 mg (100 % of original dose 800 mg/m2), Intravenous, Clinic/HOD 1 time, 2 of 12 cycles  Dose modification: 800 mg/m2 (original dose 800 mg/m2, Cycle 1, Reason: Other (see comments), Comment: poor PS)  Administration: 1,400 mg (5/1/2023), 1,400 mg (6/26/2023), 1,400 mg (7/10/2023), 1,400 mg (8/7/2023)              Past Medical History:   Diagnosis Date    Anticoagulant long-term use      Atrial fibrillation      Crohn disease diagnosed in her 20's    GERD (gastroesophageal reflux disease)      Hyperlipidemia      Hypertension      Pancreatic adenocarcinoma 3/21/2023    Thyroid disease       s/p I 131

## 2023-09-03 NOTE — PLAN OF CARE
No acute events this shift. Patient AAOx4. Afebrile and VSS. No complaints of pain or nausea. No episodes of diarrhea. Patient resting peacefully throughout shift. Bed in lowest position and locked. Side rails up x2. All possessions and call bell within reach. Non-skid socks worn. Instructed to call for assistance and voiced understanding. All needs of patient currently met. Will continue to monitor with frequent rounding.

## 2023-09-03 NOTE — H&P
[facility] - Oncology ([facility])  [facility] Medicine  History & Physical    Patient Name: Pauline Cronin  MRN: [id number]  Patient Class: IP- Inpatient  Admission Date: 9/2/2023  Attending Physician: Alek Temple MD   Primary Care Provider: Ga Waldrop MD         Patient information was obtained from patient, past medical records and ER records.     Subjective:     Principal Problem:Diarrhea    Chief Complaint:   Chief Complaint   Patient presents with    Emesis    Diarrhea        HPI:   Patient is an 83-year-old female with past medical history significant for paroxysmal AFib with Eliquis, pancreatic adenocarcinoma, heart failure preserved ejection fraction, sick sinus syndrome with pacemaker, Crohn's disease, hypertension, hyperlipidemia, hypothyroidism who presented to the emergency room with abdominal pain that started several hours prior to presentation.  She states the pain was constant and diffuse.  It has been associated with nausea/vomiting as well as multiple episodes of diarrhea.  Patient states that over the last 24 hours she is had approximately 8 episodes of nonbloody non mucoid diarrhea.  Additionally she is had 3 episodes of vomiting.  She denies any sick contacts, fevers, chills, night sweats.  She does have some bloating.  In the emergency room she was found to be hypokalemic likely secondary to her diarrhea.  She denies any oral course of antibiotics however she did have a recent hospital admission for which she was discharged on 08/29.  She is currently being treated for pancreatic adenocarcinoma by Dr. Hill.  She denies any shortness of breath, cough, chest pain.  In the emergency room she was given dicyclomine in addition to her normal antinausea medications with near complete resolution of her symptoms.  On my assessment she feels fine and significantly better from when she presented.  Additionally she received 40 mEq of potassium bicarb in the emergency department for her  hypokalemia.  During her last hospitalization she to presented with hypokalemia which seems to be a recurrent problem for her.  Additionally she has had a evaluation with placement of a biliary stent.    Oncology History:  Malignant neoplasm of pancreas   3/21/2023 Initial Diagnosis     Pancreatic adenocarcinoma      4/13/2023 Cancer Staged     Staging form: Exocrine Pancreas, AJCC 8th Edition  - Clinical stage from 4/13/2023: Stage IB (cT2, cN0, cM0)      5/1/2023 -  Chemotherapy     Treatment Summary   Plan Name: OP PANC NAB-PACLITAXEL + GEMCITABINE Q4W  Treatment Goal: Palliative  Status: Active  Start Date: 5/1/2023  End Date: 5/13/2024 (Planned)  Provider: Jim Hill MD  Chemotherapy: gemcitabine (GEMZAR) 1,400 mg in sodium chloride 0.9% SolP 321.82 mL chemo infusion, 1,448 mg (100 % of original dose 800 mg/m2), Intravenous, Clinic/HOD 1 time, 2 of 12 cycles  Dose modification: 800 mg/m2 (original dose 800 mg/m2, Cycle 1, Reason: Other (see comments), Comment: poor PS)  Administration: 1,400 mg (5/1/2023), 1,400 mg (6/26/2023), 1,400 mg (7/10/2023), 1,400 mg (8/7/2023)       Past Medical History:   Diagnosis Date    Anticoagulant long-term use     Atrial fibrillation     Crohn disease diagnosed in her 20's    GERD (gastroesophageal reflux disease)     Hyperlipidemia     Hypertension     Pancreatic adenocarcinoma 3/21/2023    Thyroid disease     s/p I 131       Past Surgical History:   Procedure Laterality Date    APPENDECTOMY      CARDIAC SURGERY  2014    pacemaker    COLONOSCOPY  ~2008    normal findings per patient report    ENDOSCOPIC ULTRASOUND OF UPPER GASTROINTESTINAL TRACT N/A 3/14/2023    Procedure: ULTRASOUND, UPPER GI TRACT, ENDOSCOPIC;  Surgeon: Andrei Swartz MD;  Location: [facility] ENDO;  Service: Endoscopy;  Laterality: N/A;  Approval to hold Eliquis rec'd from Dr. Barbosa (see telephone encounter 3/3/23)-DS  [employer] AICD/PPM  3/3/23-Instructions via portal-DS    ERCP N/A  3/21/2023    Procedure: ERCP (ENDOSCOPIC RETROGRADE CHOLANGIOPANCREATOGRAPHY);  Surgeon: Celina Toney MD;  Location: [facility] ENDO ([address] [place] [address]);  Service: Endoscopy;  Laterality: N/A;    ESOPHAGOGASTRODUODENOSCOPY N/A 7/17/2018    Procedure: EGD (ESOPHAGOGASTRODUODENOSCOPY);  Surgeon: Alondra Casiano MD;  Location: [facility] ENDO;  Service: Endoscopy;  Laterality: N/A;    HYSTERECTOMY      INSERTION OF IMPLANTABLE CARDIOVERTER-DEFIBRILLATOR (ICD) GENERATOR WITH TWO EXISTING LEADS Left 5/10/2021    Procedure: INSERTION, PULSE GENERATOR WITH 2 EXISTING LEADS, ICD;  Surgeon: Bo Leone MD;  Location[facility] CATH LAB;  Service: Cardiology;  Laterality: Left;    INSERTION OF PACEMAKER      REPLACEMENT OF PACEMAKER GENERATOR  05/10/2021    RETROGRADE PYELOGRAPHY Right 2/18/2020    Procedure: PYELOGRAM, RETROGRADE;  Surgeon: Jovan Kruse MD;  Location: [facility];  Service: Urology;  Laterality: Right;    SMALL INTESTINE SURGERY  in her 20's    for crohn's    TONSILLECTOMY      UPPER GASTROINTESTINAL ENDOSCOPY  ~2008    normal findings per patient report       Review of patient's allergies indicates:   Allergen Reactions    Cleocin [clindamycin hcl]     Lisinopril Other (See Comments)     cough       No current facility-administered medications on file prior to encounter.     Current Outpatient Medications on File Prior to Encounter   Medication Sig    amLODIPine (NORVASC) 2.5 MG tablet Take 1 tablet (2.5 mg total) by mouth once daily.    atorvastatin (LIPITOR) 20 MG tablet Take 1 tablet (20 mg total) by mouth once daily.    calcium-vitamin D3 (OS-RADHA 500 + D3) 500 mg-5 mcg (200 unit) per tablet Take 1 tablet by mouth once daily.    colestipoL (COLESTID) 1 gram Tab Take 1 tablet (1 g total) by mouth 2 (two) times daily.    diphenoxylate-atropine 2.5-0.025 mg (LOMOTIL) 2.5-0.025 mg per tablet TAKE 1 TABLET BY MOUTH 4 TIMES DAILY AS NEEDED FOR DIARRHEA    ELIQUIS 5 mg Tab Take 1 tablet (5 mg total) by mouth  2 (two) times daily.    esomeprazole (NEXIUM) 40 MG capsule Take 1 capsule (40 mg total) by mouth once daily.    folic acid (FOLVITE) 1 MG tablet Take 1 tablet by mouth once daily    k phos di & mono-sod phos mono (K-PHOS-NEUTRAL) 250 mg Tab Take 2 tablets by mouth 2 (two) times a day. for 14 days    levothyroxine (SYNTHROID, LEVOTHROID) 175 MCG tablet Take 1 tablet by mouth once daily    metoprolol succinate (TOPROL-XL) 25 MG 24 hr tablet Take 3 tablets (75 mg total) by mouth once daily.    OLANZapine (ZYPREXA) 5 MG tablet Take 1 tablet (5 mg total) by mouth every evening.    prochlorperazine (COMPAZINE) 10 MG tablet Take 1 tablet (10 mg total) by mouth 4 (four) times daily as needed (nausea).    sulfaSALAzine (AZULFIDINE) 500 mg Tab Take 1 tablet by mouth twice daily    tolterodine (DETROL LA) 4 MG 24 hr capsule Take 1 capsule (4 mg total) by mouth once daily.    traMADoL (ULTRAM) 50 mg tablet Take 1 tablet (50 mg total) by mouth every 6 (six) hours as needed for Pain.    acetaminophen (TYLENOL) 325 MG tablet Take 650 mg by mouth daily as needed (Headache).    albuterol (PROVENTIL) 2.5 mg /3 mL (0.083 %) nebulizer solution Take 3 mLs (2.5 mg total) by nebulization every 6 (six) hours as needed for Wheezing or Shortness of Breath. Rescue    furosemide (LASIX) 40 MG tablet Take 1 tablet (40 mg total) by mouth daily as needed (For weight gain > 3 lb in one day, increasing leg swelling, or increasing SOB).    LIDOcaine-prilocaine (EMLA) cream Apply topically as needed (place to port site 45-60 minutes prior to port access).    lipase-protease-amylase 24,000-76,000-120,000 units (CREON) 24,000-76,000 -120,000 unit capsule Take 2 capsules by mouth 3 (three) times daily with meals.    MELATONIN ORAL Take 1 tablet by mouth nightly as needed (Insomnia).     Family History       Problem Relation (Age of Onset)    Breast cancer Mother    Cancer Mother    Crohn's disease Other          Tobacco Use    Smoking  status: Former     Current packs/day: 0.00     Types: Cigarettes     Quit date:      Years since quittin.6    Smokeless tobacco: Never   Substance and Sexual Activity    Alcohol use: No    Drug use: No    Sexual activity: Never     Review of Systems   Constitutional:  Negative for chills and fever.   HENT:  Negative for congestion and sore throat.    Eyes:  Negative for visual disturbance.   Respiratory:  Negative for chest tightness and shortness of breath.    Cardiovascular:  Negative for chest pain and palpitations.   Gastrointestinal:  Positive for abdominal pain, diarrhea, nausea and vomiting. Negative for abdominal distention and constipation.   Musculoskeletal:  Negative for back pain.   Skin:  Negative for rash.   Neurological:  Negative for dizziness, weakness and headaches.   Hematological:  Does not bruise/bleed easily.     Objective:     Vital Signs (Most Recent):  Temp: 97.6 °F (36.4 °C) (23)  Pulse: 72 (23)  Resp: 16 (23)  BP: 134/62 (23)  SpO2: 95 % (23) Vital Signs (24h Range):  Temp:  [97.6 °F (36.4 °C)-98.8 °F (37.1 °C)] 97.6 °F (36.4 °C)  Pulse:  [] 72  Resp:  [14-16] 16  SpO2:  [92 %-97 %] 95 %  BP: (131-150)/(62-76) 134/62     Weight: 71.7 kg (158 lb)  Body mass index is 30.86 kg/m².     Physical Exam  Vitals and nursing note reviewed.   Constitutional:       General: She is not in acute distress.     Appearance: Normal appearance. She is obese. She is not ill-appearing or toxic-appearing.   HENT:      Head: Normocephalic and atraumatic.      Mouth/Throat:      Mouth: Mucous membranes are moist.   Eyes:      General: No scleral icterus.  Cardiovascular:      Rate and Rhythm: Normal rate. Rhythm irregular.      Pulses: Normal pulses.      Heart sounds: Normal heart sounds.   Pulmonary:      Effort: Pulmonary effort is normal. No respiratory distress.      Breath sounds: Normal breath sounds.   Abdominal:      General: There  is no distension.      Palpations: Abdomen is soft. There is no mass.      Tenderness: There is no abdominal tenderness. There is no right CVA tenderness or left CVA tenderness.   Musculoskeletal:      Cervical back: No rigidity.      Right lower leg: No edema.      Left lower leg: No edema.   Lymphadenopathy:      Cervical: No cervical adenopathy.   Skin:     General: Skin is warm and dry.      Capillary Refill: Capillary refill takes less than 2 seconds.      Coloration: Skin is pale. Skin is not jaundiced.   Neurological:      General: No focal deficit present.      Mental Status: She is alert and oriented to person, place, and time.                Significant Labs: All pertinent labs within the past 24 hours have been reviewed.    Significant Imaging: I have reviewed all pertinent imaging results/findings within the past 24 hours.    Assessment/Plan:     * Diarrhea  Patient is an 83-year-old female with extensive past medical history who is currently be managed with active chemotherapy for stage I B pancreatic adenocarcinoma by Dr. Hill.  She is presenting with a 24 hour history of multiple episodes of nonbloody, non mucoid diarrhea which she states is    Acute  Immunosuppressed  Recent antibiotics/hospitalization  Nocturnal symptoms present  Low volume  [place] stool chart type 6  No new medication  No presence of mucus or blood    Plan:  - Stool cultures, Giardia antigen, fecal weight, stool electrolytes, fecal pH, fat content, fecal calprotectin, fecal blood and fecal leukocytes  - Acute vs chronic   - Acute     - Viral, bacterial, chemo?  - Obtain stool studies   - stool lytes, c diff, wbc, lactoferrin, occult blood   - ED ordered O/P, likely not needed but will leave in order          Nausea  See nausea      Malignant neoplasm of pancreas  Patient has stage I B pancreatic adenocarcinoma which may be resected following chemo.  She had placement of a biliary stent due to compression of the common biliary  duct and is presenting today with nausea, vomiting, diarrhea.  She is receiving active chemotherapy and thus was admitted to the medical oncology team.    Plan:  - continue pancreatic lipase, amylase, protease replacement      GERD (gastroesophageal reflux disease)  Start pantoprazole      Hypothyroidism  Continue levothyroxine      Essential hypertension, benign  Continue patient's amlodipine, metoprolol      Hyperlipidemia  Resume atorvastatin      Crohn's disease of large intestine without complication  Continue patient's sulfasalazine      Paroxysmal atrial fibrillation  Patient with Long standing persistent (>12 months) atrial fibrillation which is controlled currently with Beta Blocker. Patient is currently in atrial fibrillation.[id number] Score: 3.       Plan:  - Rate control with target HR <110  - Metoprolol tartrate vs diltiazem  - Hold for SBP <90/60 or P<45 bpm  - Patient's [id number] score is 3, annual risk of stroke is 6.7  - Score > 1 requires anticoagulation preferably with Apixaban  - HAS-BLED Score:  - Hypertension (+1)  Yes  - Impaired Renal Function (Dialysis, transplant, Cr >2.26 mg/dL) (+1)  No  - Impaired Liver Function (Cirrhosis or bilirubin >2x normal with AST/ALT/AP >3x normal) (+1)  Yes  - History of Stroke (+1)  No  - History of Bleeding (+1)  No  - Labile INRs (Unstable/high INRs, time in therapeutic range <60%) (+1)  No  - >65 years (+1)  Yes  - Drugs (Antiplatelet agents, NSAIDs) (+1)  No  - Alcohol Consumption (+1)  No    - Total Score:  3  - Risk of bleedin.8%    - Score >=3 indicates high bleeding risk.  - HAS-BLED > CHADVAS indicates risk greater than benefit.  - Magnesium > 2, Potassium > 4  - Continuous telemetry  - Synchronized cardioversion if hemodynamically unstable            VTE Risk Mitigation (From admission, onward)         Ordered     apixaban tablet 5 mg  2 times daily         23     Reason for No Pharmacological VTE Prophylaxis  Once        Question:   Reasons:  Answer:  Already adequately anticoagulated on oral Anticoagulants    09/02/23 2003     IP VTE HIGH RISK PATIENT  Once         09/02/23 2003     Place sequential compression device  Until discontinued         09/02/23 2003                           Daniel Jaeger MD  Department of Hospital Medicine  [facility] - Oncology ([facility])

## 2023-09-03 NOTE — HOSPITAL COURSE
PCP:  Patricia James MD    Chief Complaint   Patient presents with   • Left Knee - Pain   • Office Visit       HPI:  Franklin Rodriguez is a 75 year old male.  Patient is in the office today for LEFT knee pain. He states that the pain started about 3 months ago. No injury or trauma. He states that the pain is throughout the entire knee, and mostly medial side.  He denies any instability or buckling.  He complains of pain when sitting on the ground. He is not currently taking any medication for any pain. He denies any previous injury or trauma to the LEFT knee. He states that he received a cortisone injection in 2018 and feels it helped. He is here today requesting another injection.   He does mention he will take Tylenol on occasion and only other medication is vitamins.  He admits he stands on his feet all day in the left knee starts to ache by the end of the day.    Patient was referred for Consultation by Patricia James MD     Visit Vitals  Ht 5' 5\" (1.651 m)   Wt 54.1 kg (119 lb 2.5 oz)   BMI 19.83 kg/m²       Pain Scale: 4  Date of Injury:  3 months ago  Work Related: no  Current Position:  [employer]    Past Medical History:   Diagnosis Date   • Acute left-sided low back pain without sciatica 12/10/2019   • GI bleed due to NSAIDs    • GI bleed due to NSAIDs 2/8/2017   • Glaucoma     left eye is worse   • Influenza A 12/10/2019       Past Surgical History:   Procedure Laterality Date   • Cataract extraction w/  intraocular lens implant  09/01/2011    bilateral   • Eye surgery         Social History     Socioeconomic History   • Marital status: /Civil Union     Spouse name: Not on file   • Number of children: Not on file   • Years of education: Not on file   • Highest education level: Not on file   Occupational History   • Not on file   Tobacco Use   • Smoking status: Never Smoker   • Smokeless tobacco: Never Used   • Tobacco comment: .  Has 3 grown daughters.  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 Her labwork was remarkable for potassium of 3.3. On the morning of 09/03 pt reported not having any more episodes of diarrhea. Potassium was replenished. Pt feeling better and stable. Pt was discharged.    depot   Substance and Sexual Activity   • Alcohol use: No   • Drug use: No   • Sexual activity: Not on file   Other Topics Concern   •  Service Not Asked   • Blood Transfusions Not Asked   • Caffeine Concern Not Asked   • Occupational Exposure Not Asked   • Hobby Hazards Not Asked   • Sleep Concern Not Asked   • Stress Concern Not Asked   • Weight Concern Not Asked   • Special Diet Not Asked   • Back Care Not Asked   • Exercise Not Asked   • Bike Helmet Not Asked   • Seat Belt Yes   • Self-Exams Not Asked   Social History Narrative   • Not on file     Social Determinants of Health     Financial Resource Strain: Not on file   Food Insecurity: Not on file   Transportation Needs: Not on file   Physical Activity: Not on file   Stress: Not on file   Social Connections: Not on file   Intimate Partner Violence: Not on file       ALLERGIES:   Allergen Reactions   • Ibuprofen Other (See Comments)     Adverse reaction, started bleeding   • Naproxen Other (See Comments)     Bleeding  Adverse Reaction          Current Outpatient Medications   Medication Sig Dispense Refill   • latanoprost (XALATAN) 0.005 % ophthalmic solution      • Multiple Vitamin (Multivitamin Adult) Tab Take 1 tablet by mouth daily. 1 tablet 0   • dorzolamide-timolol (COSOPT) 22.3-6.8 MG/ML ophthalmic solution      • LUMIGAN 0.01 % ophthalmic solution      • ACETAMINOPHEN PO Take 500 mg by mouth.     • UNKNOWN TO PATIENT Indications: glaucoma eyedrops      • calcium (OYST-HOSSEIN) 500 MG tablet Take 1 tablet by mouth daily. 1 tablet 0   • cholecalciferol (VITAMIN D3) 1000 UNITS tablet Take 1,000 Units by mouth daily.       No current facility-administered medications for this visit.         Review of Systems:  All systems reviewed and are negative except as noted above or in the HPI/Patient intake form.    Physical Exam:  Constitutional: Oriented to person, place, and time. Appears well-developed and well-nourished. No distress.   HENT:   Head:  Normocephalic and atraumatic.   Nose: No nasal deformity.   Mouth/Throat: Normal dentition.   Eyes: Conjunctivae and EOM are normal.   Cardiovascular: Normal rate and intact distal pulses.   Pulmonary/Chest: Effort normal. No stridor. No respiratory distress.   Neurological: Alert and oriented to person, place, and time.   Skin: Skin is warm and intact.   Psychiatric: Has a normal mood and affect. Behavior is normal.     Right Knee Exam     Muscle Strength   The patient has normal right knee strength.    Range of Motion   The patient has normal right knee ROM.  Extension: 0   Flexion: 140     Tests   Varus: negative Valgus: negative    Other   Sensation: normal  Pulse: present  Swelling: none      Left Knee Exam     Muscle Strength   The patient has normal left knee strength.    Tenderness   The patient is experiencing tenderness in the medial joint line.    Range of Motion   Extension: 0   Flexion: 140     Tests   Kristin:  Medial - negative Lateral - negative  Varus: negative Valgus: negative  Drawer:  Anterior - negative     Posterior - negative    Other   Sensation: normal  Pulse: present  Swelling: none            Imaging Reading/Results:    XR KNEE MIN 4 VIEWS LEFT  Narrative: EXAM:  XR Knee left 4 views    INDICATION:  Left knee pain    FINDINGS:   PA 45 degree and AP Weightbearing, lateral, and sunrise views   show advanced arthritic changes in the medial and patellofemoral   compartment with joint space narrowing, subchondral sclerosis, and   osteophyte formation.  Impression: 1)  advanced osteoarthritis of the left knee    L Inj/Asp: L knee on 4/12/2022 8:54 AM  Details: 22 G needle, anterolateral approach  Medications: 5 mL lidocaine 1 %; 6 mg betamethasone acet/sod phos 6 (3-3) MG/ML  Outcome: tolerated well, no immediate complications    Procedure: Injection left knee joint    The patient was explained the risks and benefits of an injection.  There is risk of injuring a vein, soreness to the injection  site, and elevated blood sugars.There is no guarantee that the injection will help.  The patient may need more than one injection.  The patient seems to understand and has decided for the injection.    The anteriorlateral aspect of the left knee was sterilely prepped with Betadine and alcohol wipe. I used Ethyl Chloride to numb the skin. I then proceeded to inject 5cc of 1% Lidocaine plain and 1cc of Celestone 6mg.  A Band-Aid was applied.  The patient was instructed to ice the knee down tonight.    Procedure, treatment alternatives, risks and benefits explained, specific risks discussed. Consent was given by the patient. Patient was prepped and draped in the usual sterile fashion.           Assessment/Plan:    Arthritis of left knee  - XR KNEE MIN 4 VIEWS LEFT  - L Inj/Asp: L knee    He responded really well to cortisone injection several years ago.  X-rays were reviewed and he is aware that he has significant arthritis on the medial side.    The patient has arthritis of the left knee.  I reviewed the exam findings, radiological findings, and discussed the non-operative and operative treatment options including pros and cons.  Non-operative treatment includes weight loss, physical therapy, oral pain medication, cortisone injection, and viscosupplementation (gel injections).  Surgical treatment would consist of a knee replacement.  He is interested in proceeding with another cortisone injection on today's visit.  Risk and benefits were discussed.  He verbally agreed and consented.  He denies being diabetic or having any recent infections  Postinjection instructions were discussed  He will follow-up as needed    Eb Gann M.D    Orthopedic Surgery  [facility]  P: [phone]  F: [phone]    SCRIBE SIGNATURE    IKelsie PA-C,  scribed the services personally performed by Dr. Gann.    SCRIBE ATTESTATION     Portions of this note were transcribed by Kelsie Sheppard. IDr. Gann personally  performed the history, physical exam and medical decision making; and confirmed the accuracy of the information in the transcribed note.

## 2023-09-03 NOTE — HPI
Patient is an 83-year-old female with past medical history significant for paroxysmal AFib with Eliquis, pancreatic adenocarcinoma, heart failure preserved ejection fraction, sick sinus syndrome with pacemaker, Crohn's disease, hypertension, hyperlipidemia, hypothyroidism who presented to the emergency room with abdominal pain that started several hours prior to presentation.  She states the pain was constant and diffuse.  It has been associated with nausea/vomiting as well as multiple episodes of diarrhea.  Patient states that over the last 24 hours she is had approximately 8 episodes of nonbloody non mucoid diarrhea.  Additionally she is had 3 episodes of vomiting.  She denies any sick contacts, fevers, chills, night sweats.  She does have some bloating.  In the emergency room she was found to be hypokalemic likely secondary to her diarrhea.  She denies any oral course of antibiotics however she did have a recent hospital admission for which she was discharged on 08/29.  She is currently being treated for pancreatic adenocarcinoma by Dr. Hill.  She denies any shortness of breath, cough, chest pain.  In the emergency room she was given dicyclomine in addition to her normal antinausea medications with near complete resolution of her symptoms.  On my assessment she feels fine and significantly better from when she presented.  Additionally she received 40 mEq of potassium bicarb in the emergency department for her hypokalemia.  During her last hospitalization she to presented with hypokalemia which seems to be a recurrent problem for her.  Additionally she has had a evaluation with placement of a biliary stent.    Oncology History:  Malignant neoplasm of pancreas   3/21/2023 Initial Diagnosis     Pancreatic adenocarcinoma      4/13/2023 Cancer Staged     Staging form: Exocrine Pancreas, AJCC 8th Edition  - Clinical stage from 4/13/2023: Stage IB (cT2, cN0, cM0)      5/1/2023 -  Chemotherapy     Treatment Summary    Plan Name: OP PANC NAB-PACLITAXEL + GEMCITABINE Q4W  Treatment Goal: Palliative  Status: Active  Start Date: 5/1/2023  End Date: 5/13/2024 (Planned)  Provider: Jim Hill MD  Chemotherapy: gemcitabine (GEMZAR) 1,400 mg in sodium chloride 0.9% SolP 321.82 mL chemo infusion, 1,448 mg (100 % of original dose 800 mg/m2), Intravenous, Clinic/HOD 1 time, 2 of 12 cycles  Dose modification: 800 mg/m2 (original dose 800 mg/m2, Cycle 1, Reason: Other (see comments), Comment: poor PS)  Administration: 1,400 mg (5/1/2023), 1,400 mg (6/26/2023), 1,400 mg (7/10/2023), 1,400 mg (8/7/2023)

## 2023-09-03 NOTE — DISCHARGE SUMMARY
[facility] - Oncology ([facility])  Hematology/Oncology  Discharge Summary      Patient Name: Pauline Cronin  MRN: [id number]  Admission Date: 9/2/2023  Hospital Length of Stay: 1 days  Discharge Date and Time:  09/03/2023 1:25 PM  Attending Physician: Alek Temple MD   Discharging Provider: Mason Roman MD  Primary Care Provider: Ga Waldrop MD    HPI:   Patient is an 83-year-old female with past medical history significant for paroxysmal AFib with Eliquis, pancreatic adenocarcinoma, heart failure preserved ejection fraction, sick sinus syndrome with pacemaker, Crohn's disease, hypertension, hyperlipidemia, hypothyroidism who presented to the emergency room with abdominal pain that started several hours prior to presentation.  She states the pain was constant and diffuse.  It has been associated with nausea/vomiting as well as multiple episodes of diarrhea.  Patient states that over the last 24 hours she is had approximately 8 episodes of nonbloody non mucoid diarrhea.  Additionally she is had 3 episodes of vomiting.  She denies any sick contacts, fevers, chills, night sweats.  She does have some bloating.  In the emergency room she was found to be hypokalemic likely secondary to her diarrhea.  She denies any oral course of antibiotics however she did have a recent hospital admission for which she was discharged on 08/29.  She is currently being treated for pancreatic adenocarcinoma by Dr. Hill.  She denies any shortness of breath, cough, chest pain.  In the emergency room she was given dicyclomine in addition to her normal antinausea medications with near complete resolution of her symptoms.  On my assessment she feels fine and significantly better from when she presented.  Additionally she received 40 mEq of potassium bicarb in the emergency department for her hypokalemia.  During her last hospitalization she to presented with hypokalemia which seems to be a recurrent problem for her.   Additionally she has had a evaluation with placement of a biliary stent.     Oncology History:      Malignant neoplasm of pancreas   3/21/2023 Initial Diagnosis     Pancreatic adenocarcinoma      4/13/2023 Cancer Staged     Staging form: Exocrine Pancreas, AJCC 8th Edition  - Clinical stage from 4/13/2023: Stage IB (cT2, cN0, cM0)      5/1/2023 -  Chemotherapy     Treatment Summary   Plan Name: OP PANC NAB-PACLITAXEL + GEMCITABINE Q4W  Treatment Goal: Palliative  Status: Active  Start Date: 5/1/2023  End Date: 5/13/2024 (Planned)  Provider: Jim Hill MD  Chemotherapy: gemcitabine (GEMZAR) 1,400 mg in sodium chloride 0.9% SolP 321.82 mL chemo infusion, 1,448 mg (100 % of original dose 800 mg/m2), Intravenous, Clinic/HOD 1 time, 2 of 12 cycles  Dose modification: 800 mg/m2 (original dose 800 mg/m2, Cycle 1, Reason: Other (see comments), Comment: poor PS)  Administration: 1,400 mg (5/1/2023), 1,400 mg (6/26/2023), 1,400 mg (7/10/2023), 1,400 mg (8/7/2023)              Past Medical History:   Diagnosis Date    Anticoagulant long-term use      Atrial fibrillation      Crohn disease diagnosed in her 20's    GERD (gastroesophageal reflux disease)      Hyperlipidemia      Hypertension      Pancreatic adenocarcinoma 3/21/2023    Thyroid disease       s/p I 131       * No surgery found *     Hospital Course: Pt with a history of pancreatic adenocarcinoma admitted to med/onc services for abdominal pain and diarrhea. Her labwork was remarkable for potassium of 3.3. On the morning of 09/03 pt reported not having any more episodes of diarrhea. Potassium was replenished. Pt feeling better and stable. Pt was discharged.     Physical Exam  Vitals and nursing note reviewed.   Constitutional:       General: She is not in acute distress.     Appearance: Normal appearance. She is obese. She is not ill-appearing or toxic-appearing.   HENT:      Head: Normocephalic and atraumatic.      Mouth/Throat:      Mouth: Mucous  membranes are moist.   Eyes:      General: No scleral icterus.  Cardiovascular:      Rate and Rhythm: Normal rate. Rhythm irregular.      Pulses: Normal pulses.      Heart sounds: Normal heart sounds.   Pulmonary:      Effort: Pulmonary effort is normal. No respiratory distress.      Breath sounds: Normal breath sounds.   Abdominal:      General: There is no distension.      Palpations: Abdomen is soft. There is no mass.      Tenderness: There is no abdominal tenderness. There is no right CVA tenderness or left CVA tenderness.   Musculoskeletal:      Cervical back: No rigidity.      Right lower leg: No edema.      Left lower leg: No edema.   Lymphadenopathy:      Cervical: No cervical adenopathy.   Skin:     General: Skin is warm and dry.      Capillary Refill: Capillary refill takes less than 2 seconds.      Coloration: Skin is pale. Skin is not jaundiced.   Neurological:      General: No focal deficit present.      Mental Status: She is alert and oriented to person, place, and time.     Vitals:    09/03/23 0338 09/03/23 0717 09/03/23 0917 09/03/23 1223   BP: (!) 120/57 (!) 125/58  (!) 125/58   BP Location:  Right arm     Patient Position: Lying Lying     Pulse: 76 70  70   Resp: 18 16  16   Temp: 97.6 °F (36.4 °C) 97.6 °F (36.4 °C)  97.6 °F (36.4 °C)   TempSrc: Oral Oral     SpO2: 96% 100% 98% 100%   Weight:    71.7 kg (158 lb)   Height:    5' (1.524 m)       Goals of Care Treatment Preferences:  Code Status: Full Code    Health care agent: Karen Mejia  [facility] care agent number: No value filed.    Living Will: Yes     What is most important right now is to focus on remaining as independent as possible, symptom/pain control, quality of life, even if it means sacrificing a little time.  Accordingly, we have decided that the best plan to meet the patient's goals includes continuing with treatment.      Consults:   Consults (From admission, onward)        Status Ordering Provider     Inpatient consult to  Hematology/Oncology  Once        Provider:  (Not yet assigned)    AMANDA Schwartz          Significant Diagnostic Studies: N/A    Pending Diagnostic Studies:     None        Final Active Diagnoses:    Diagnosis Date Noted POA    PRINCIPAL PROBLEM:  Diarrhea [R19.7] 09/02/2023 Yes    Nausea [R11.0] 05/02/2023 Yes    Malignant neoplasm of pancreas [C25.9] 03/21/2023 Yes    Paroxysmal atrial fibrillation [I48.0] 04/19/2018 Yes     Chronic    Crohn's disease of large intestine without complication [K50.10] 04/19/2018 Yes     Chronic    Essential hypertension, benign [I10] 04/19/2018 Yes     Chronic    Hyperlipidemia [E78.5] 04/19/2018 Yes     Chronic    Hypothyroidism [E03.9] 04/19/2018 Yes     Chronic    GERD (gastroesophageal reflux disease) [K21.9] 04/19/2018 Yes     Chronic      Problems Resolved During this Admission:      Discharged Condition: stable    Disposition: Home or Self Care    Follow Up:    Patient Instructions:      Diet Adult Regular     Notify your health care provider if you experience any of the following:  temperature >100.4     Notify your health care provider if you experience any of the following:  persistent nausea and vomiting or diarrhea     Notify your health care provider if you experience any of the following:  persistent dizziness, light-headedness, or visual disturbances     Notify your health care provider if you experience any of the following:  severe uncontrolled pain     Notify your health care provider if you experience any of the following:  increased confusion or weakness     Activity as tolerated     Medications:  Reconciled Home Medications:      Medication List      CONTINUE taking these medications    acetaminophen 325 MG tablet  Commonly known as: TYLENOL  Take 650 mg by mouth daily as needed (Headache).     albuterol 2.5 mg /3 mL (0.083 %) nebulizer solution  Commonly known as: PROVENTIL  Take 3 mLs (2.5 mg total) by nebulization every 6 (six) hours as needed  for Wheezing or Shortness of Breath. Rescue     amLODIPine 2.5 MG tablet  Commonly known as: NORVASC  Take 1 tablet (2.5 mg total) by mouth once daily.     atorvastatin 20 MG tablet  Commonly known as: LIPITOR  Take 1 tablet (20 mg total) by mouth once daily.     colestipoL 1 gram Tab  Commonly known as: COLESTID  Take 1 tablet (1 g total) by mouth 2 (two) times daily.     CREON 24,000-76,000 -120,000 unit capsule  Generic drug: lipase-protease-amylase 24,000-76,000-120,000 units  Take 2 capsules by mouth 3 (three) times daily with meals.     diphenoxylate-atropine 2.5-0.025 mg 2.5-0.025 mg per tablet  Commonly known as: LOMOTIL  TAKE 1 TABLET BY MOUTH 4 TIMES DAILY AS NEEDED FOR DIARRHEA     ELIQUIS 5 mg Tab  Generic drug: apixaban  Take 1 tablet (5 mg total) by mouth 2 (two) times daily.     esomeprazole 40 MG capsule  Commonly known as: NEXIUM  Take 1 capsule (40 mg total) by mouth once daily.     folic acid 1 MG tablet  Commonly known as: FOLVITE  Take 1 tablet by mouth once daily     furosemide 40 MG tablet  Commonly known as: LASIX  Take 1 tablet (40 mg total) by mouth daily as needed (For weight gain > 3 lb in one day, increasing leg swelling, or increasing SOB).     levothyroxine 175 MCG tablet  Commonly known as: SYNTHROID, LEVOTHROID  Take 1 tablet by mouth once daily     LIDOcaine-prilocaine cream  Commonly known as: EMLA  Apply topically as needed (place to port site 45-60 minutes prior to port access).     MELATONIN ORAL  Take 1 tablet by mouth nightly as needed (Insomnia).     metoprolol succinate 25 MG 24 hr tablet  Commonly known as: TOPROL-XL  Take 3 tablets (75 mg total) by mouth once daily.     OLANZapine 5 MG tablet  Commonly known as: ZyPREXA  Take 1 tablet (5 mg total) by mouth every evening.     OYSTER SHELL CALCIUM-VIT D3 500 mg-5 mcg (200 unit) per tablet  Generic drug: calcium-vitamin D3  Take 1 tablet by mouth once daily.     PHOSPHA 250 NEUTRAL 250 mg Tab  Generic drug: k phos di &  mono-sod phos mono  Take 2 tablets by mouth 2 (two) times a day. for 14 days     prochlorperazine 10 MG tablet  Commonly known as: COMPAZINE  Take 1 tablet (10 mg total) by mouth 4 (four) times daily as needed (nausea).     sulfaSALAzine 500 mg Tab  Commonly known as: AZULFIDINE  Take 1 tablet by mouth twice daily     tolterodine 4 MG 24 hr capsule  Commonly known as: DETROL LA  Take 1 capsule (4 mg total) by mouth once daily.     traMADoL 50 mg tablet  Commonly known as: ULTRAM  Take 1 tablet (50 mg total) by mouth every 6 (six) hours as needed for Pain.            Mason Roman MD  Hematology/Oncology  [facility] - Oncology ([facility])

## 2023-09-03 NOTE — ASSESSMENT & PLAN NOTE
Patient is an 83-year-old female with extensive past medical history who is currently be managed with active chemotherapy for stage I B pancreatic adenocarcinoma by Dr. Hill.  She is presenting with a 24 hour history of multiple episodes of nonbloody, non mucoid diarrhea which she states is    Acute  Immunosuppressed  Recent antibiotics/hospitalization  Nocturnal symptoms present  Low volume  [place] stool chart type 6  No new medication  No presence of mucus or blood    Plan:  - Stool cultures, Giardia antigen, fecal weight, stool electrolytes, fecal pH, fat content, fecal calprotectin, fecal blood and fecal leukocytes  - Acute vs chronic   - Acute     - Viral, bacterial, chemo?  - Obtain stool studies   - stool lytes, c diff, wbc, lactoferrin, occult blood   - ED ordered O/P, likely not needed but will leave in order

## 2023-09-05 ENCOUNTER — OUTPATIENT CASE MANAGEMENT (OUTPATIENT)
Dept: ADMINISTRATIVE | Facility: OTHER | Age: 83
End: 2023-09-05
Payer: MEDICARE

## 2023-09-05 LAB
BUN SERPL-MCNC: <3 MG/DL (ref 6–30)
CHLORIDE SERPL-SCNC: 101 MMOL/L (ref 95–110)
CREAT SERPL-MCNC: 0.7 MG/DL (ref 0.5–1.4)
GLUCOSE SERPL-MCNC: 81 MG/DL (ref 70–110)
HCT VFR BLD CALC: 27 %PCV (ref 36–54)
POC IONIZED CALCIUM: 0.95 MMOL/L (ref 1.06–1.42)
POC TCO2 (MEASURED): 25 MMOL/L (ref 23–29)
POTASSIUM BLD-SCNC: 2.8 MMOL/L (ref 3.5–5.1)
SAMPLE: ABNORMAL
SODIUM BLD-SCNC: 141 MMOL/L (ref 136–145)

## 2023-09-07 ENCOUNTER — OUTPATIENT CASE MANAGEMENT (OUTPATIENT)
Dept: ADMINISTRATIVE | Facility: OTHER | Age: 83
End: 2023-09-07
Payer: MEDICARE

## 2023-09-07 LAB
BACTERIA BLD CULT: NORMAL
BACTERIA BLD CULT: NORMAL

## 2023-09-08 ENCOUNTER — OFFICE VISIT (OUTPATIENT)
Dept: HEMATOLOGY/ONCOLOGY | Facility: CLINIC | Age: 83
End: 2023-09-08
Payer: MEDICARE

## 2023-09-08 VITALS
WEIGHT: 147.5 LBS | SYSTOLIC BLOOD PRESSURE: 127 MMHG | HEIGHT: 60 IN | RESPIRATION RATE: 18 BRPM | HEART RATE: 95 BPM | BODY MASS INDEX: 28.96 KG/M2 | DIASTOLIC BLOOD PRESSURE: 59 MMHG

## 2023-09-08 DIAGNOSIS — T45.1X5A CHEMOTHERAPY INDUCED NEUTROPENIA: ICD-10-CM

## 2023-09-08 DIAGNOSIS — D84.81 IMMUNODEFICIENCY SECONDARY TO NEOPLASM: ICD-10-CM

## 2023-09-08 DIAGNOSIS — G89.3 NEOPLASM RELATED PAIN: ICD-10-CM

## 2023-09-08 DIAGNOSIS — T45.1X5A IMMUNODEFICIENCY SECONDARY TO CHEMOTHERAPY: ICD-10-CM

## 2023-09-08 DIAGNOSIS — Z79.899 IMMUNODEFICIENCY SECONDARY TO CHEMOTHERAPY: ICD-10-CM

## 2023-09-08 DIAGNOSIS — C25.9 MALIGNANT NEOPLASM OF PANCREAS, UNSPECIFIED LOCATION OF MALIGNANCY: Primary | ICD-10-CM

## 2023-09-08 DIAGNOSIS — D70.1 CHEMOTHERAPY INDUCED NEUTROPENIA: ICD-10-CM

## 2023-09-08 DIAGNOSIS — D84.821 IMMUNODEFICIENCY SECONDARY TO CHEMOTHERAPY: ICD-10-CM

## 2023-09-08 DIAGNOSIS — D49.9 IMMUNODEFICIENCY SECONDARY TO NEOPLASM: ICD-10-CM

## 2023-09-08 PROCEDURE — 3078F DIAST BP <80 MM HG: CPT | Mod: HCNC,CPTII,S$GLB, | Performed by: PHYSICIAN ASSISTANT

## 2023-09-08 PROCEDURE — 1101F PR PT FALLS ASSESS DOC 0-1 FALLS W/OUT INJ PAST YR: ICD-10-PCS | Mod: HCNC,CPTII,S$GLB, | Performed by: PHYSICIAN ASSISTANT

## 2023-09-08 PROCEDURE — 1159F MED LIST DOCD IN RCRD: CPT | Mod: HCNC,CPTII,S$GLB, | Performed by: PHYSICIAN ASSISTANT

## 2023-09-08 PROCEDURE — 1101F PT FALLS ASSESS-DOCD LE1/YR: CPT | Mod: HCNC,CPTII,S$GLB, | Performed by: PHYSICIAN ASSISTANT

## 2023-09-08 PROCEDURE — 1111F DSCHRG MED/CURRENT MED MERGE: CPT | Mod: HCNC,CPTII,S$GLB, | Performed by: PHYSICIAN ASSISTANT

## 2023-09-08 PROCEDURE — 3288F PR FALLS RISK ASSESSMENT DOCUMENTED: ICD-10-PCS | Mod: HCNC,CPTII,S$GLB, | Performed by: PHYSICIAN ASSISTANT

## 2023-09-08 PROCEDURE — 1159F PR MEDICATION LIST DOCUMENTED IN MEDICAL RECORD: ICD-10-PCS | Mod: HCNC,CPTII,S$GLB, | Performed by: PHYSICIAN ASSISTANT

## 2023-09-08 PROCEDURE — 1111F PR DISCHARGE MEDS RECONCILED W/ CURRENT OUTPATIENT MED LIST: ICD-10-PCS | Mod: HCNC,CPTII,S$GLB, | Performed by: PHYSICIAN ASSISTANT

## 2023-09-08 PROCEDURE — 1157F ADVNC CARE PLAN IN RCRD: CPT | Mod: HCNC,CPTII,S$GLB, | Performed by: PHYSICIAN ASSISTANT

## 2023-09-08 PROCEDURE — 1160F RVW MEDS BY RX/DR IN RCRD: CPT | Mod: HCNC,CPTII,S$GLB, | Performed by: PHYSICIAN ASSISTANT

## 2023-09-08 PROCEDURE — 3288F FALL RISK ASSESSMENT DOCD: CPT | Mod: HCNC,CPTII,S$GLB, | Performed by: PHYSICIAN ASSISTANT

## 2023-09-08 PROCEDURE — 3078F PR MOST RECENT DIASTOLIC BLOOD PRESSURE < 80 MM HG: ICD-10-PCS | Mod: HCNC,CPTII,S$GLB, | Performed by: PHYSICIAN ASSISTANT

## 2023-09-08 PROCEDURE — 99999 PR PBB SHADOW E&M-EST. PATIENT-LVL IV: ICD-10-PCS | Mod: PBBFAC,HCNC,, | Performed by: PHYSICIAN ASSISTANT

## 2023-09-08 PROCEDURE — 99215 PR OFFICE/OUTPT VISIT, EST, LEVL V, 40-54 MIN: ICD-10-PCS | Mod: HCNC,S$GLB,, | Performed by: PHYSICIAN ASSISTANT

## 2023-09-08 PROCEDURE — 99999 PR PBB SHADOW E&M-EST. PATIENT-LVL IV: CPT | Mod: PBBFAC,HCNC,, | Performed by: PHYSICIAN ASSISTANT

## 2023-09-08 PROCEDURE — 3074F PR MOST RECENT SYSTOLIC BLOOD PRESSURE < 130 MM HG: ICD-10-PCS | Mod: HCNC,CPTII,S$GLB, | Performed by: PHYSICIAN ASSISTANT

## 2023-09-08 PROCEDURE — 1126F AMNT PAIN NOTED NONE PRSNT: CPT | Mod: HCNC,CPTII,S$GLB, | Performed by: PHYSICIAN ASSISTANT

## 2023-09-08 PROCEDURE — 1126F PR PAIN SEVERITY QUANTIFIED, NO PAIN PRESENT: ICD-10-PCS | Mod: HCNC,CPTII,S$GLB, | Performed by: PHYSICIAN ASSISTANT

## 2023-09-08 PROCEDURE — 99215 OFFICE O/P EST HI 40 MIN: CPT | Mod: HCNC,S$GLB,, | Performed by: PHYSICIAN ASSISTANT

## 2023-09-08 PROCEDURE — 1157F PR ADVANCE CARE PLAN OR EQUIV PRESENT IN MEDICAL RECORD: ICD-10-PCS | Mod: HCNC,CPTII,S$GLB, | Performed by: PHYSICIAN ASSISTANT

## 2023-09-08 PROCEDURE — 3074F SYST BP LT 130 MM HG: CPT | Mod: HCNC,CPTII,S$GLB, | Performed by: PHYSICIAN ASSISTANT

## 2023-09-08 PROCEDURE — 1160F PR REVIEW ALL MEDS BY PRESCRIBER/CLIN PHARMACIST DOCUMENTED: ICD-10-PCS | Mod: HCNC,CPTII,S$GLB, | Performed by: PHYSICIAN ASSISTANT

## 2023-09-08 NOTE — PHYSICIAN QUERY
PT Name: Pauline Cronin  MR #: [id number]     DOCUMENTATION CLARIFICATION      CDS: Arabella Herrera RN        Contact information:[email or web address] or (cell) [phone]   This form is a permanent document in the medical record.     Query Date: September 8, 2023    By submitting this query, we are merely seeking further clarification of documentation.  Please utilize your independent clinical judgment when addressing the question(s) below.     The Medical Record contains the following:    Clinical Information Location in Medical Record   Diarrhea  Patient is an 83-year-old female with extensive past medical history who is currently be managed with active chemotherapy for stage I B pancreatic adenocarcinoma by Dr. Hill.  She is presenting with a 24 hour history of multiple episodes of nonbloody, non mucoid diarrhea which she states is     Acute  Immunosuppressed  Recent antibiotics/hospitalization  Nocturnal symptoms present  Low volume  S[place] stool chart type 6  No new medication  No presence of mucus or blood    Malignant neoplasm of pancreas    Patient has stage I B pancreatic adenocarcinoma which may be resected following chemo.  She had placement of a biliary stent due to compression of the common biliary duct and is presenting today with nausea, vomiting, diarrhea.  She is receiving active chemotherapy and thus was admitted to the medical oncology team.    Crohn's disease of large intestine without complication  Continue patient's sulfasalazine   H&P 9/2     Please document your best medical opinion regarding the etiology of Diarrhea?       [ x  ] Chemotherapy     [   ] Chron's Disease     [   ] Other etiology (please specify):___________________     [  ] Clinically Undetermined

## 2023-09-08 NOTE — PHYSICIAN QUERY
PT Name: Pauline Cronin  MR #: [id number]  DOCUMENTATION CLARIFICATION      CDS: Arabella Herrera RN         Contact information:[email or web address] or (cell) [phone]     This form is a permanent document in the medical record.      Query Date: September 8, 2023    By submitting this query, we are merely seeking further clarification of documentation. Please utilize your independent clinical judgment when addressing the question(s) below.    The Medical Record contains the following:   Indicators  Supporting Clinical Findings Location in Medical Record   x PT        INR        PTT  08/27/23 00:56   Protime 14.6 (H)   INR 1.4 (H)      Labs   x Platelets  09/02/23 11:41 09/03/23 04:48   Platelets 142 (L) 147 (L)      Labs    Coagulopathy or Coagulation Defect documented     x Acute/Chronic Illness Malignant neoplasm of pancreas    Patient has stage I B pancreatic adenocarcinoma which may be resected following chemo.     H&P 9/2    Treatment      Other       Provider, please specify diagnosis or diagnoses associated with above clinical findings.    [   ] Coagulation Defect due to (please specify):_________     [   ] Coagulation deficiency unspecified     [  x ] Abnormal INR/Coagulation Profile not associated with a coagulation defect (lab finding only)     [   ] Other hematological diagnosis (please specify):_______     [  ] Clinically Undetermined         Form No. [id number]

## 2023-09-08 NOTE — PROGRESS NOTES
MEDICAL ONCOLOGY - ESTABLISHED PATIENT VISIT    Reason for visit: Pancreatic adenocarcinoma    Best Contact Phone Number(s): [phone] (home) [phone] (work)     Cancer/Stage/TNM:    Cancer Staging   Malignant neoplasm of pancreas  Staging form: Exocrine Pancreas, AJCC 8th Edition  - Clinical stage from 4/13/2023: Stage IB (cT2, cN0, cM0) - Signed by Jim Hill MD on 7/24/2023       Oncology History   Malignant neoplasm of pancreas   3/21/2023 Initial Diagnosis    Pancreatic adenocarcinoma     4/13/2023 Cancer Staged    Staging form: Exocrine Pancreas, AJCC 8th Edition  - Clinical stage from 4/13/2023: Stage IB (cT2, cN0, cM0)     5/1/2023 -  Chemotherapy    Treatment Summary   Plan Name: OP PANC NAB-PACLITAXEL + GEMCITABINE Q4W  Treatment Goal: Palliative  Status: Active  Start Date: 5/1/2023  End Date: 5/13/2024 (Planned)  Provider: Jim Hill MD  Chemotherapy: gemcitabine (GEMZAR) 1,400 mg in sodium chloride 0.9% SolP 321.82 mL chemo infusion, 1,448 mg (100 % of original dose 800 mg/m2), Intravenous, Clinic/HOD 1 time, 3 of 12 cycles  Dose modification: 800 mg/m2 (original dose 800 mg/m2, Cycle 1, Reason: Other (see comments), Comment: poor PS)  Administration: 1,400 mg (5/1/2023), 1,400 mg (6/26/2023), 1,400 mg (7/10/2023), 1,400 mg (8/7/2023), 1,400 mg (8/21/2023)          Interim History:   83 y.o. female with Crohn's disease, p.afib, SSS s/p PPM, HTN, HLD who presents for follow up prior to potential cycle 5 of chemotherapy. She is feeling fairly today but states that she does not want to pursue chemotherapy any longer. She has had a complicated medical course and feels that her QOL is not good. She has had multiple hospital admissions due to complications from the cancer and chemotherapy. She reports feeling better when she is not on chemotherapy and would therefore, like to stop.     Presents with her son today. ECOG PS 2.     ROS:   Review of Systems   Constitutional:  Positive  for malaise/fatigue and weight loss. Negative for chills and fever.        Decreased appetite   HENT:  Negative for congestion and sore throat.    Eyes:  Negative for blurred vision.   Respiratory:  Negative for shortness of breath.    Cardiovascular:  Negative for chest pain, palpitations and leg swelling.   Gastrointestinal:  Positive for diarrhea. Negative for abdominal pain, blood in stool, constipation, nausea and vomiting.   Genitourinary:  Negative for dysuria, frequency, hematuria and urgency.   Musculoskeletal:  Negative for back pain, falls and myalgias.   Skin:  Negative for itching and rash.   Neurological:  Positive for weakness (generalized). Negative for dizziness, tingling, tremors, sensory change and focal weakness.   Endo/Heme/Allergies:  Does not bruise/bleed easily.   Psychiatric/Behavioral:  Negative for depression, substance abuse and suicidal ideas. The patient is not nervous/anxious.        Past Medical History:   Past Medical History:   Diagnosis Date    Anticoagulant long-term use     Atrial fibrillation     Crohn disease diagnosed in her 20's    GERD (gastroesophageal reflux disease)     Hyperlipidemia     Hypertension     Pancreatic adenocarcinoma 3/21/2023    Thyroid disease     s/p I 131        Past Surgical History:   Past Surgical History:   Procedure Laterality Date    APPENDECTOMY      CARDIAC SURGERY  2014    pacemaker    COLONOSCOPY  ~2008    normal findings per patient report    ENDOSCOPIC ULTRASOUND OF UPPER GASTROINTESTINAL TRACT N/A 3/14/2023    Procedure: ULTRASOUND, UPPER GI TRACT, ENDOSCOPIC;  Surgeon: Andrei Swartz MD;  Location: [facility];  Service: Endoscopy;  Laterality: N/A;  Approval to hold Eliquis rec'd from Dr. Barbosa (see telephone encounter 3/3/23)-DS  [employer] AICD/PPM  3/3/23-Instructions via portal-DS    ERCP N/A 3/21/2023    Procedure: ERCP (ENDOSCOPIC RETROGRADE CHOLANGIOPANCREATOGRAPHY);  Surgeon: Celina Toney MD;  Location: [facility] (2ND FLR);   Service: Endoscopy;  Laterality: N/A;    ESOPHAGOGASTRODUODENOSCOPY N/A 2018    Procedure: EGD (ESOPHAGOGASTRODUODENOSCOPY);  Surgeon: Alondra Casiano MD;  Location: [facility];  Service: Endoscopy;  Laterality: N/A;    HYSTERECTOMY      INSERTION OF IMPLANTABLE CARDIOVERTER-DEFIBRILLATOR (ICD) GENERATOR WITH TWO EXISTING LEADS Left 5/10/2021    Procedure: INSERTION, PULSE GENERATOR WITH 2 EXISTING LEADS, ICD;  Surgeon: Bo Leone MD;  Location: [facility] CATH LAB;  Service: Cardiology;  Laterality: Left;    INSERTION OF PACEMAKER      REPLACEMENT OF PACEMAKER GENERATOR  05/10/2021    RETROGRADE PYELOGRAPHY Right 2020    Procedure: PYELOGRAM, RETROGRADE;  Surgeon: Jovan Kruse MD;  Location: [facility] OR;  Service: Urology;  Laterality: Right;    SMALL INTESTINE SURGERY  in her 20's    for crohn's    TONSILLECTOMY      UPPER GASTROINTESTINAL ENDOSCOPY  ~    normal findings per patient report        Family History:   Family History   Problem Relation Age of Onset    Cancer Mother     Breast cancer Mother     Crohn's disease Other     Colon cancer Neg Hx     Colon polyps Neg Hx     Esophageal cancer Neg Hx     Stomach cancer Neg Hx     Ulcerative colitis Neg Hx         Social History:   Social History     Tobacco Use    Smoking status: Former     Current packs/day: 0.00     Types: Cigarettes     Quit date:      Years since quittin.7    Smokeless tobacco: Never   Substance Use Topics    Alcohol use: No        I have reviewed and updated the patient's past medical, surgical, family and social histories.    Allergies:   Review of patient's allergies indicates:   Allergen Reactions    Cleocin [clindamycin hcl]     Lisinopril Other (See Comments)     cough        Medications:   Current Outpatient Medications   Medication Sig Dispense Refill    acetaminophen (TYLENOL) 325 MG tablet Take 650 mg by mouth daily as needed (Headache).      albuterol (PROVENTIL) 2.5 mg /3 mL (0.083 %) nebulizer solution  Take 3 mLs (2.5 mg total) by nebulization every 6 (six) hours as needed for Wheezing or Shortness of Breath. Rescue 150 mL 11    amLODIPine (NORVASC) 2.5 MG tablet Take 1 tablet (2.5 mg total) by mouth once daily. 90 tablet 3    atorvastatin (LIPITOR) 20 MG tablet Take 1 tablet (20 mg total) by mouth once daily. 90 tablet 3    calcium-vitamin D3 (OS-RADHA 500 + D3) 500 mg-5 mcg (200 unit) per tablet Take 1 tablet by mouth once daily. 30 tablet 11    colestipoL (COLESTID) 1 gram Tab Take 1 tablet (1 g total) by mouth 2 (two) times daily. 60 tablet 5    diphenoxylate-atropine 2.5-0.025 mg (LOMOTIL) 2.5-0.025 mg per tablet TAKE 1 TABLET BY MOUTH 4 TIMES DAILY AS NEEDED FOR DIARRHEA 60 tablet 2    ELIQUIS 5 mg Tab Take 1 tablet (5 mg total) by mouth 2 (two) times daily. 180 tablet 3    esomeprazole (NEXIUM) 40 MG capsule Take 1 capsule (40 mg total) by mouth once daily. 90 capsule 1    folic acid (FOLVITE) 1 MG tablet Take 1 tablet by mouth once daily 90 tablet 1    furosemide (LASIX) 40 MG tablet Take 1 tablet (40 mg total) by mouth daily as needed (For weight gain > 3 lb in one day, increasing leg swelling, or increasing SOB). 30 tablet 11    k phos di & mono-sod phos mono (K-PHOS-NEUTRAL) 250 mg Tab Take 2 tablets by mouth 2 (two) times a day. for 14 days 56 tablet 0    levothyroxine (SYNTHROID, LEVOTHROID) 175 MCG tablet Take 1 tablet by mouth once daily 90 tablet 2    LIDOcaine-prilocaine (EMLA) cream Apply topically as needed (place to port site 45-60 minutes prior to port access). 30 g 6    lipase-protease-amylase 24,000-76,000-120,000 units (CREON) 24,000-76,000 -120,000 unit capsule Take 2 capsules by mouth 3 (three) times daily with meals. 180 capsule 11    MELATONIN ORAL Take 1 tablet by mouth nightly as needed (Insomnia).      metoprolol succinate (TOPROL-XL) 25 MG 24 hr tablet Take 3 tablets (75 mg total) by mouth once daily. 270 tablet 3    OLANZapine (ZYPREXA) 5 MG tablet Take 1 tablet (5 mg total) by mouth  every evening. 30 tablet 5    prochlorperazine (COMPAZINE) 10 MG tablet Take 1 tablet (10 mg total) by mouth 4 (four) times daily as needed (nausea). 90 tablet 11    sulfaSALAzine (AZULFIDINE) 500 mg Tab Take 1 tablet by mouth twice daily 180 tablet 3    tolterodine (DETROL LA) 4 MG 24 hr capsule Take 1 capsule (4 mg total) by mouth once daily. 90 capsule 3    traMADoL (ULTRAM) 50 mg tablet Take 1 tablet (50 mg total) by mouth every 6 (six) hours as needed for Pain. 25 tablet 0     No current facility-administered medications for this visit.      Physical Exam:   BP (!) 127/59 (BP Location: Left arm, Patient Position: Sitting, BP Method: Large (Automatic))   Pulse 95   Resp 18   Ht 5' (1.524 m)   Wt 66.9 kg (147 lb 7.8 oz)   BMI 28.80 kg/m²      ECOG Performance status: 2       Physical Exam  Vitals reviewed.   Constitutional:       General: She is not in acute distress.     Appearance: She is not ill-appearing, toxic-appearing or diaphoretic.   HENT:      Head: Normocephalic and atraumatic.      Right Ear: External ear normal.      Left Ear: External ear normal.      Nose: Nose normal. No congestion.      Mouth/Throat:      Pharynx: Oropharynx is clear. No oropharyngeal exudate.   Eyes:      General: No scleral icterus.     Conjunctiva/sclera: Conjunctivae normal.      Pupils: Pupils are equal, round, and reactive to light.   Cardiovascular:      Rate and Rhythm: Normal rate and regular rhythm.      Heart sounds: No murmur heard.  Pulmonary:      Effort: Pulmonary effort is normal. No respiratory distress.      Breath sounds: Normal breath sounds. No wheezing.   Chest:      Comments: RCW port in place, bandage over site.   Abdominal:      General: Abdomen is flat. Bowel sounds are normal. There is no distension.      Palpations: Abdomen is soft. There is no mass.      Tenderness: There is no abdominal tenderness.   Musculoskeletal:         General: No swelling or deformity.   Skin:     General: Skin is warm  and dry.      Coloration: Skin is not jaundiced or pale.      Findings: No erythema or rash.   Neurological:      Mental Status: She is alert and oriented to person, place, and time. Mental status is at baseline.      Motor: Weakness (generalized) present.   Psychiatric:         Mood and Affect: Mood normal.         Behavior: Behavior normal.         Labs:   No results found for this or any previous visit (from the past 48 hour(s)).      I have reviewed the most recent and pertinent labs     Imaging:      CT CAP - 8/2/23:  Impression:     Patient with reported history of pancreatic cancer.  Overall similar size of heterogeneous masslike fullness at the pancreatic head with abutment or involvement of the nearby traversing duodenum.     Increased size of rounded hypodense lesion at the pancreatic body with abutment of the stomach lesser curvature.  Differential considerations similar as before to include pseudocyst or others cystic lesion.  Degree of nearby peripancreatic and perigastric inflammatory change have improved in the interval.     Cholelithiasis with biliary stent in place.     Cirrhotic liver.     Left upper lobe 0.2 cm pulmonary nodule, technically indeterminate and additional findings as above.    Path:   PANCREAS, HEAD MASS, EUS-FNB:   Adenocarcinoma   Mismatch Repair (MMR) Proteins:    MLH1 - Intact nuclear expression    MSH2 - Intact nuclear expression    MSH6 - Intact nuclear expression    PMS2 - Intact nuclear expression    Assessment:       1. Malignant neoplasm of pancreas, unspecified location of malignancy    2. Neoplasm related pain    3. 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 Immunodeficiency secondary to neoplasm             Plan:     #  Pancreatic adenocarcinoma, neoplasm related pain, chemotherapy induced neutropenia.   83 y.o. F with Crohn's disease, p.afib, SSS s/p PPM, HTN, HLD, fatigue, presented with weight loss and jaundice and was  found to have pancreatic adenocarcinoma. She has early stage pancreatic cancer, and is considered surgically resectable. However, giving her age and multiple co-morbidities, surgery might not be feasible or safe.     We have discussed with her the diagnosis, stage, prognosis and treatment options. She understands that surgery is the only curable option and she is unlikely to be sufficiently medically fit for it. We have discussed chemotherapy with a palliative intent to improve symptoms and prolong life.   She expressed understanding and she values QoL but was willing to try chemotherapy.     We have discussed treatment with first line therapy Gemcitabine + Abraxane. She agreed and wished to proceed.     Received cycle 1 of biweekly gem/abraxane with dose reductions of 800 mg/m2 and 100 mg/m2 respectively on 5/1/23. Subsequently hospitalized x 3 (had an ED visit same day as first discharge).     She wanted to delay starting cycle 2 until she had a port placed.   Port placed per IR on 6/22/23. Site healing well overall.   MRI brain done 6/23/23 without evidence of metastatic disease.     Delayed prior cycle 4 due to neutropenia.  CT imaging prior to this cycle 4 shows stable disease. However, she is only feeling fairly today. She states that she does not want to pursue chemotherapy any longer. She has had a complicated medical course and feels that her QOL is not good. She has had multiple hospital admissions due to complications from the cancer and chemotherapy. She reports feeling better when she is not on chemotherapy and would therefore, like to stop. In addition, she also understands that there is no guarantee that the chemotherapy will continue to work and control the cancer. Therefore, she wants to focus on her quality of life at this point. We find this a very reasonable decision. She has had a very in-depth discussion with Dr. Hill about this and is comfortable with her decision. We have suggested hospice  at this time and she is agreeable.     - Cancel chemotherapy today and future appointments in the clinic  - Will enroll in Hospice  - will reach out to  for assistance in enrollment    Follow up: No follow up needed. Pt agreeable to enroll in hospice    Patient is in agreement with the proposed treatment plan. All questions were answered to the patient's satisfaction. Pt knows to call clinic if anything is needed before the next clinic visit. Pt was seen with Dr. Hill as well.     I spent 50 minutes reviewing the chart, interpreting laboratory result and imaging data, coordinating patient's care, with at least 50% of the time on face-to-face counseling.        Med Onc Chart Routing  Urgent    Follow up with physician . No follow up needed. Pt will enroll in hospice. Please cancel future appointments and chemotherapy   Follow up with DOMINGO    Infusion scheduling note    Injection scheduling note    Labs    Imaging    Pharmacy appointment    Other referrals

## 2023-09-10 NOTE — PROGRESS NOTES
Outpatient Care Management  Plan of Care Follow Up Visit    Patient: Pauline Cronin  MRN: [id number]  Date of Service: 09/07/2023  Completed by: Neeta Horne RN  Referral Date: 04/24/2023    Reason for Visit   Patient presents with    OPCM Chart Review    OPCM RN Follow Up Call       Brief Summary:   OPCM RN follow-up call completed.   Continue education on Cancer--Malnutrition/Pain Care Plans.  Next Steps: Patient is in agreement to follow-up call on or around 9/14/2023.

## 2023-09-11 ENCOUNTER — DOCUMENTATION ONLY (OUTPATIENT)
Dept: HEMATOLOGY/ONCOLOGY | Facility: CLINIC | Age: 83
End: 2023-09-11
Payer: MEDICARE

## 2023-09-11 DIAGNOSIS — C25.9 MALIGNANT NEOPLASM OF PANCREAS, UNSPECIFIED LOCATION OF MALIGNANCY: Primary | ICD-10-CM

## 2023-09-11 NOTE — PROGRESS NOTES
KERWIN received call from Karen Mejia, patient's daughter. They have chosen [facility].    KERWIN called, [facility] is now [facility]. KERWIN called and left VM with Karen requesting call back to confirm they'd still like to use [facility].

## 2023-09-12 ENCOUNTER — DOCUMENTATION ONLY (OUTPATIENT)
Dept: HEMATOLOGY/ONCOLOGY | Facility: CLINIC | Age: 83
End: 2023-09-12
Payer: MEDICARE

## 2023-09-12 NOTE — PROGRESS NOTES
KERWIN spoke to patient's daughter, Karen. She confirmed they'd still like to use [facility]. KERWIN faxed referral packet to [facility].    Dr. Hill notified.    ROLAND dickerson Sherburn at [facility] spoke to Karen and will be meeting with the patient and family on 9/13.

## 2023-09-14 ENCOUNTER — OUTPATIENT CASE MANAGEMENT (OUTPATIENT)
Dept: ADMINISTRATIVE | Facility: OTHER | Age: 83
End: 2023-09-14
Payer: MEDICARE

## 2023-09-15 NOTE — PROGRESS NOTES
Outpatient Care Management  Plan of Care Follow Up Visit    Patient: Pauline Cronin  MRN: [id number]  Date of Service: 09/14/2023  Completed by: Neeta Horne RN  Referral Date: 04/24/2023    Reason for Visit   Patient presents with    Case Closure       Brief Summary:   OPCM RN follow-up call completed.   Care Plan for Cancer Treatment completed.   Pt has made decision to discontinue chemotherapy because of feeling ill and has transitioned to hospice care  OPCM case is closed.

## 2023-09-16 ENCOUNTER — CLINICAL SUPPORT (OUTPATIENT)
Dept: CARDIOLOGY | Facility: CLINIC | Age: 83
End: 2023-09-16
Payer: MEDICARE

## 2023-09-16 DIAGNOSIS — Z95.0 PACEMAKER: Primary | Chronic | ICD-10-CM

## 2023-09-16 PROCEDURE — 93294 REM INTERROG EVL PM/LDLS PM: CPT | Mod: HCNC,S$GLB,, | Performed by: INTERNAL MEDICINE

## 2023-09-16 PROCEDURE — 93294 PR INTERROG EVAL, REMOTE, UP TO 90 DAYS,PACEMAKER: ICD-10-PCS | Mod: HCNC,S$GLB,, | Performed by: INTERNAL MEDICINE

## 2023-09-16 PROCEDURE — 93296 REM INTERROG EVL PM/IDS: CPT | Mod: HCNC,S$GLB,, | Performed by: INTERNAL MEDICINE

## 2023-09-16 PROCEDURE — 93296 PR INTERROG EVAL, REMOTE, UP TO 90 DAYS, PACER/DEFIB,TECH REVIEW: ICD-10-PCS | Mod: HCNC,S$GLB,, | Performed by: INTERNAL MEDICINE

## 2023-09-18 ENCOUNTER — PATIENT MESSAGE (OUTPATIENT)
Dept: PRIMARY CARE CLINIC | Facility: CLINIC | Age: 83
End: 2023-09-18
Payer: MEDICARE

## 2023-09-18 NOTE — ADDENDUM NOTE
Addended by: ABRAHAM DOBBINS on: 9/18/2023 01:40 PM     Modules accepted: Orders, Level of Service

## 2023-09-21 RX ORDER — ATORVASTATIN CALCIUM 20 MG/1
20 TABLET, FILM COATED ORAL
Qty: 90 TABLET | Refills: 3 | Status: SHIPPED | OUTPATIENT
Start: 2023-09-21

## 2023-09-21 NOTE — PROGRESS NOTES
Subjective:      Patient ID: Pauline Cronin is a 83 y.o. female.    Chief Complaint: No chief complaint on file.    HPI:    ROS Medtronic pacemaker remote interrogation report downloaded and prepared by medical assistant and interpreted by me and full report scannned into Epic media.  Battery OK with AGATA 10.2 years.  Leads OK.  Known paroxysmal atrial fibrillation on Eliquis and metoprolol.  Normal device function.    Past Medical History:   Diagnosis Date    Anticoagulant long-term use     Atrial fibrillation     Crohn disease diagnosed in her 20's    GERD (gastroesophageal reflux disease)     Hyperlipidemia     Hypertension     Pancreatic adenocarcinoma 3/21/2023    Thyroid disease     s/p I 131        Past Surgical History:   Procedure Laterality Date    APPENDECTOMY      CARDIAC SURGERY  2014    pacemaker    COLONOSCOPY  ~2008    normal findings per patient report    ENDOSCOPIC ULTRASOUND OF UPPER GASTROINTESTINAL TRACT N/A 3/14/2023    Procedure: ULTRASOUND, UPPER GI TRACT, ENDOSCOPIC;  Surgeon: Andrei Swartz MD;  Location: [facility] ENDO;  Service: Endoscopy;  Laterality: N/A;  Approval to hold Eliquis rec'd from Dr. Barbosa (see telephone encounter 3/3/23)-DS  Medtronic AICD/PPM  3/3/23-Instructions via portal-DS    ERCP N/A 3/21/2023    Procedure: ERCP (ENDOSCOPIC RETROGRADE CHOLANGIOPANCREATOGRAPHY);  Surgeon: Celina Toney MD;  Location: [facility] ([address] [place] [address]);  Service: Endoscopy;  Laterality: N/A;    ESOPHAGOGASTRODUODENOSCOPY N/A 7/17/2018    Procedure: EGD (ESOPHAGOGASTRODUODENOSCOPY);  Surgeon: Alondra Casiano MD;  Location: [facility] ENDO;  Service: Endoscopy;  Laterality: N/A;    HYSTERECTOMY      INSERTION OF IMPLANTABLE CARDIOVERTER-DEFIBRILLATOR (ICD) GENERATOR WITH TWO EXISTING LEADS Left 5/10/2021    Procedure: INSERTION, PULSE GENERATOR WITH 2 EXISTING LEADS, ICD;  Surgeon: Bo Leone MD;  Location: [facility] CATH LAB;  Service: Cardiology;  Laterality: Left;    INSERTION OF PACEMAKER       REPLACEMENT OF PACEMAKER GENERATOR  05/10/2021    RETROGRADE PYELOGRAPHY Right 2020    Procedure: PYELOGRAM, RETROGRADE;  Surgeon: Jovan Kruse MD;  Location: [facility];  Service: Urology;  Laterality: Right;    SMALL INTESTINE SURGERY  in her 20's    for crohn's    TONSILLECTOMY      UPPER GASTROINTESTINAL ENDOSCOPY  ~    normal findings per patient report       Family History   Problem Relation Age of Onset    Cancer Mother     Breast cancer Mother     Crohn's disease Other     Colon cancer Neg Hx     Colon polyps Neg Hx     Esophageal cancer Neg Hx     Stomach cancer Neg Hx     Ulcerative colitis Neg Hx        Social History     Socioeconomic History    Marital status:    Tobacco Use    Smoking status: Former     Current packs/day: 0.00     Types: Cigarettes     Quit date:      Years since quittin.7    Smokeless tobacco: Never   Substance and Sexual Activity    Alcohol use: No    Drug use: No    Sexual activity: Never     Social Determinants of Health     Financial Resource Strain: Unknown (2023)    Overall Financial Resource Strain (CARDIA)     Difficulty of Paying Living Expenses: Patient refused   Food Insecurity: Unknown (2023)    Hunger Vital Sign     Worried About Running Out of Food in the Last Year: Patient refused     Ran Out of Food in the Last Year: Patient refused   Transportation Needs: No Transportation Needs (2023)    PRAPARE - Transportation     Lack of Transportation (Medical): No     Lack of Transportation (Non-Medical): No   Physical Activity: Inactive (2023)    Exercise Vital Sign     Days of Exercise per Week: 0 days     Minutes of Exercise per Session: 0 min   Stress: Stress Concern Present (2023)    [employer] [place] [employer] - Occupational Stress Questionnaire     Feeling of Stress : Very much   Social Connections: Socially Isolated (2023)    Social Connection and Isolation Panel [NHANES]     Frequency of  Communication with Friends and Family: Never     Frequency of Social Gatherings with Friends and Family: Never     Attends Spiritism Services: Never     Active Member of Clubs or Organizations: No     Attends Club or Organization Meetings: Never     Marital Status:    Housing Stability: High Risk (8/29/2023)    Housing Stability Vital Sign     Unable to Pay for Housing in the Last Year: No     Number of Places Lived in the Last Year: 3     Unstable Housing in the Last Year: No       Current Outpatient Medications on File Prior to Visit   Medication Sig Dispense Refill    acetaminophen (TYLENOL) 325 MG tablet Take 650 mg by mouth daily as needed (Headache).      albuterol (PROVENTIL) 2.5 mg /3 mL (0.083 %) nebulizer solution Take 3 mLs (2.5 mg total) by nebulization every 6 (six) hours as needed for Wheezing or Shortness of Breath. Rescue 150 mL 11    amLODIPine (NORVASC) 2.5 MG tablet Take 1 tablet (2.5 mg total) by mouth once daily. 90 tablet 3    calcium-vitamin D3 (OS-RADHA 500 + D3) 500 mg-5 mcg (200 unit) per tablet Take 1 tablet by mouth once daily. 30 tablet 11    colestipoL (COLESTID) 1 gram Tab Take 1 tablet (1 g total) by mouth 2 (two) times daily. 60 tablet 5    diphenoxylate-atropine 2.5-0.025 mg (LOMOTIL) 2.5-0.025 mg per tablet TAKE 1 TABLET BY MOUTH 4 TIMES DAILY AS NEEDED FOR DIARRHEA 60 tablet 2    ELIQUIS 5 mg Tab Take 1 tablet (5 mg total) by mouth 2 (two) times daily. 180 tablet 3    esomeprazole (NEXIUM) 40 MG capsule Take 1 capsule (40 mg total) by mouth once daily. 90 capsule 1    folic acid (FOLVITE) 1 MG tablet Take 1 tablet by mouth once daily 90 tablet 1    furosemide (LASIX) 40 MG tablet Take 1 tablet (40 mg total) by mouth daily as needed (For weight gain > 3 lb in one day, increasing leg swelling, or increasing SOB). 30 tablet 11    k phos di & mono-sod phos mono (K-PHOS-NEUTRAL) 250 mg Tab Take 2 tablets by mouth 2 (two) times a day. for 14 days 56 tablet 0    levothyroxine  (SYNTHROID, LEVOTHROID) 175 MCG tablet Take 1 tablet by mouth once daily 90 tablet 2    LIDOcaine-prilocaine (EMLA) cream Apply topically as needed (place to port site 45-60 minutes prior to port access). 30 g 6    lipase-protease-amylase 24,000-76,000-120,000 units (CREON) 24,000-76,000 -120,000 unit capsule Take 2 capsules by mouth 3 (three) times daily with meals. 180 capsule 11    MELATONIN ORAL Take 1 tablet by mouth nightly as needed (Insomnia).      metoprolol succinate (TOPROL-XL) 25 MG 24 hr tablet Take 3 tablets (75 mg total) by mouth once daily. 270 tablet 3    OLANZapine (ZYPREXA) 5 MG tablet Take 1 tablet (5 mg total) by mouth every evening. 30 tablet 5    prochlorperazine (COMPAZINE) 10 MG tablet Take 1 tablet (10 mg total) by mouth 4 (four) times daily as needed (nausea). 90 tablet 11    sulfaSALAzine (AZULFIDINE) 500 mg Tab Take 1 tablet by mouth twice daily 180 tablet 3    tolterodine (DETROL LA) 4 MG 24 hr capsule Take 1 capsule (4 mg total) by mouth once daily. 90 capsule 3    traMADoL (ULTRAM) 50 mg tablet Take 1 tablet (50 mg total) by mouth every 6 (six) hours as needed for Pain. 25 tablet 0    [DISCONTINUED] atorvastatin (LIPITOR) 20 MG tablet Take 1 tablet (20 mg total) by mouth once daily. 90 tablet 3     No current facility-administered medications on file prior to visit.       Review of patient's allergies indicates:   Allergen Reactions    Cleocin [clindamycin hcl]     Lisinopril Other (See Comments)     cough     Objective:   There were no vitals filed for this visit.     Physical Exam     Assessment:     1. Pacemaker      Plan:   Diagnoses and all orders for this visit:    Pacemaker         No follow-ups on file.

## 2023-10-10 ENCOUNTER — TELEPHONE (OUTPATIENT)
Dept: PRIMARY CARE CLINIC | Facility: CLINIC | Age: 83
End: 2023-10-10
Payer: MEDICARE

## 2023-10-10 RX ORDER — LACTULOSE 10 G/15ML
20 SOLUTION ORAL 2 TIMES DAILY PRN
Qty: 480 ML | Refills: 0 | Status: SHIPPED | OUTPATIENT
Start: 2023-10-10

## 2023-10-10 NOTE — TELEPHONE ENCOUNTER
----- Message from Ting Merino sent at 10/10/2023 10:32 AM CDT -----  Contact: Daughter in law/Karen/[phone]  [email or web address]     Patient is calling for Medical Advice regarding:Constipation    How long has patient had these symptoms: 4 days     Pharmacy name and phone#: [facility] [address]  [place], [place] - [address]  [address]  [place] [address]  Phone: [phone] Fax: [phone]         Would like response via [employer]:  Would like a return call     Comments:pt's daughter in law(Karen) stated that patient takes Creon and she has been constipated for 4 days, pt has been taking Gavilax 17 grams but that is not helping , she wants to know if pt can increase the dosage or if the doctor can prescribe something for the patient to take. Please advise

## 2023-10-10 NOTE — TELEPHONE ENCOUNTER
Called pt's daughter in law Karen regarding message. Karen stated pt has been constipated. Karen stated symptoms started 10/6.Karen stated pt has been taking Gavilax and it is not helping. Karen is requesting rx.

## 2023-10-10 NOTE — TELEPHONE ENCOUNTER
Called pt's daughter in law regarding message. Informed Karen prescription for lactulose sent to pharmacy

## 2023-10-18 ENCOUNTER — OFFICE VISIT (OUTPATIENT)
Dept: PRIMARY CARE CLINIC | Facility: CLINIC | Age: 83
End: 2023-10-18
Payer: MEDICARE

## 2023-10-18 ENCOUNTER — PATIENT MESSAGE (OUTPATIENT)
Dept: CARDIOLOGY | Facility: CLINIC | Age: 83
End: 2023-10-18
Payer: MEDICARE

## 2023-10-18 VITALS
WEIGHT: 142.06 LBS | HEART RATE: 87 BPM | DIASTOLIC BLOOD PRESSURE: 72 MMHG | OXYGEN SATURATION: 97 % | RESPIRATION RATE: 18 BRPM | HEIGHT: 60 IN | TEMPERATURE: 98 F | SYSTOLIC BLOOD PRESSURE: 126 MMHG | BODY MASS INDEX: 27.89 KG/M2

## 2023-10-18 DIAGNOSIS — C25.9 MALIGNANT NEOPLASM OF PANCREAS, UNSPECIFIED LOCATION OF MALIGNANCY: Primary | ICD-10-CM

## 2023-10-18 DIAGNOSIS — Z51.5 PALLIATIVE CARE ENCOUNTER: ICD-10-CM

## 2023-10-18 DIAGNOSIS — Z23 NEED FOR VACCINATION: ICD-10-CM

## 2023-10-18 DIAGNOSIS — D69.6 THROMBOCYTOPENIA, UNSPECIFIED: ICD-10-CM

## 2023-10-18 PROBLEM — E66.811 OBESITY (BMI 30.0-34.9): Status: RESOLVED | Noted: 2019-02-14 | Resolved: 2023-10-18

## 2023-10-18 PROBLEM — E66.9 OBESITY (BMI 30.0-34.9): Status: RESOLVED | Noted: 2019-02-14 | Resolved: 2023-10-18

## 2023-10-18 PROCEDURE — 1160F PR REVIEW ALL MEDS BY PRESCRIBER/CLIN PHARMACIST DOCUMENTED: ICD-10-PCS | Mod: HCNC,CPTII,S$GLB, | Performed by: FAMILY MEDICINE

## 2023-10-18 PROCEDURE — 99214 OFFICE O/P EST MOD 30 MIN: CPT | Mod: HCNC,S$GLB,, | Performed by: FAMILY MEDICINE

## 2023-10-18 PROCEDURE — 3288F FALL RISK ASSESSMENT DOCD: CPT | Mod: HCNC,CPTII,S$GLB, | Performed by: FAMILY MEDICINE

## 2023-10-18 PROCEDURE — 3074F SYST BP LT 130 MM HG: CPT | Mod: HCNC,CPTII,S$GLB, | Performed by: FAMILY MEDICINE

## 2023-10-18 PROCEDURE — 90694 FLU VACCINE - QUADRIVALENT - ADJUVANTED: ICD-10-PCS | Mod: HCNC,S$GLB,, | Performed by: FAMILY MEDICINE

## 2023-10-18 PROCEDURE — 1157F ADVNC CARE PLAN IN RCRD: CPT | Mod: HCNC,CPTII,S$GLB, | Performed by: FAMILY MEDICINE

## 2023-10-18 PROCEDURE — 1160F RVW MEDS BY RX/DR IN RCRD: CPT | Mod: HCNC,CPTII,S$GLB, | Performed by: FAMILY MEDICINE

## 2023-10-18 PROCEDURE — 99214 PR OFFICE/OUTPT VISIT, EST, LEVL IV, 30-39 MIN: ICD-10-PCS | Mod: HCNC,S$GLB,, | Performed by: FAMILY MEDICINE

## 2023-10-18 PROCEDURE — 1101F PR PT FALLS ASSESS DOC 0-1 FALLS W/OUT INJ PAST YR: ICD-10-PCS | Mod: HCNC,CPTII,S$GLB, | Performed by: FAMILY MEDICINE

## 2023-10-18 PROCEDURE — 3074F PR MOST RECENT SYSTOLIC BLOOD PRESSURE < 130 MM HG: ICD-10-PCS | Mod: HCNC,CPTII,S$GLB, | Performed by: FAMILY MEDICINE

## 2023-10-18 PROCEDURE — G0008 FLU VACCINE - QUADRIVALENT - ADJUVANTED: ICD-10-PCS | Mod: HCNC,S$GLB,, | Performed by: FAMILY MEDICINE

## 2023-10-18 PROCEDURE — 1126F PR PAIN SEVERITY QUANTIFIED, NO PAIN PRESENT: ICD-10-PCS | Mod: HCNC,CPTII,S$GLB, | Performed by: FAMILY MEDICINE

## 2023-10-18 PROCEDURE — 99999 PR PBB SHADOW E&M-EST. PATIENT-LVL V: ICD-10-PCS | Mod: PBBFAC,HCNC,, | Performed by: FAMILY MEDICINE

## 2023-10-18 PROCEDURE — 1101F PT FALLS ASSESS-DOCD LE1/YR: CPT | Mod: HCNC,CPTII,S$GLB, | Performed by: FAMILY MEDICINE

## 2023-10-18 PROCEDURE — 90694 VACC AIIV4 NO PRSRV 0.5ML IM: CPT | Mod: HCNC,S$GLB,, | Performed by: FAMILY MEDICINE

## 2023-10-18 PROCEDURE — 1159F PR MEDICATION LIST DOCUMENTED IN MEDICAL RECORD: ICD-10-PCS | Mod: HCNC,CPTII,S$GLB, | Performed by: FAMILY MEDICINE

## 2023-10-18 PROCEDURE — 1157F PR ADVANCE CARE PLAN OR EQUIV PRESENT IN MEDICAL RECORD: ICD-10-PCS | Mod: HCNC,CPTII,S$GLB, | Performed by: FAMILY MEDICINE

## 2023-10-18 PROCEDURE — 3078F PR MOST RECENT DIASTOLIC BLOOD PRESSURE < 80 MM HG: ICD-10-PCS | Mod: HCNC,CPTII,S$GLB, | Performed by: FAMILY MEDICINE

## 2023-10-18 PROCEDURE — 3078F DIAST BP <80 MM HG: CPT | Mod: HCNC,CPTII,S$GLB, | Performed by: FAMILY MEDICINE

## 2023-10-18 PROCEDURE — 3288F PR FALLS RISK ASSESSMENT DOCUMENTED: ICD-10-PCS | Mod: HCNC,CPTII,S$GLB, | Performed by: FAMILY MEDICINE

## 2023-10-18 PROCEDURE — 99999 PR PBB SHADOW E&M-EST. PATIENT-LVL V: CPT | Mod: PBBFAC,HCNC,, | Performed by: FAMILY MEDICINE

## 2023-10-18 PROCEDURE — 1159F MED LIST DOCD IN RCRD: CPT | Mod: HCNC,CPTII,S$GLB, | Performed by: FAMILY MEDICINE

## 2023-10-18 PROCEDURE — G0008 ADMIN INFLUENZA VIRUS VAC: HCPCS | Mod: HCNC,S$GLB,, | Performed by: FAMILY MEDICINE

## 2023-10-18 PROCEDURE — 1126F AMNT PAIN NOTED NONE PRSNT: CPT | Mod: HCNC,CPTII,S$GLB, | Performed by: FAMILY MEDICINE

## 2023-10-18 NOTE — PROGRESS NOTES
Verified pt by name and . NKDA. Per physician orders pt was administered Influenza 0.5 ML IM to right deltoid using aseptic technique. Pt tolerated well. No adverse effects or pain reported. MD notified.

## 2023-10-18 NOTE — PROGRESS NOTES
Chief Complaint  Chief Complaint   Patient presents with    Follow-up     6 month follow up/reg check up       HPI  Pauline Cronin is a 83 y.o. female with multiple medical diagnoses as listed in the medical history and problem list that presents for 6 month follow up.  Their last appointment with primary care was 04/18/2023.        Patient has a diagnosis of pancreatic adenocarcinoma which has been followed by heme/onc. She was not a candidate for surgery and trialed chemo before stopping treatment. She presents today with her only complaint being constipation. She was started on Lactulose which has been helping, and she does not need any change to treatment. She reports slightly decreased appetite and increased fatigue but no other acute medical issues.       PAST MEDICAL HISTORY:  Past Medical History:   Diagnosis Date    Anticoagulant long-term use     Atrial fibrillation     Crohn disease diagnosed in her 20's    GERD (gastroesophageal reflux disease)     Hyperlipidemia     Hypertension     Pancreatic adenocarcinoma 3/21/2023    Thyroid disease     s/p I 131       PAST SURGICAL HISTORY:  Past Surgical History:   Procedure Laterality Date    APPENDECTOMY      CARDIAC SURGERY  2014    pacemaker    COLONOSCOPY  ~2008    normal findings per patient report    ENDOSCOPIC ULTRASOUND OF UPPER GASTROINTESTINAL TRACT N/A 3/14/2023    Procedure: ULTRASOUND, UPPER GI TRACT, ENDOSCOPIC;  Surgeon: Andrei Swartz MD;  Location: [facility];  Service: Endoscopy;  Laterality: N/A;  Approval to hold Eliquis rec'd from Dr. Barbosa (see telephone encounter 3/3/23)-DS  Medtronic AICD/PPM  3/3/23-Instructions via portal-DS    ERCP N/A 3/21/2023    Procedure: ERCP (ENDOSCOPIC RETROGRADE CHOLANGIOPANCREATOGRAPHY);  Surgeon: Celina Toney MD;  Location: [address]);  Service: Endoscopy;  Laterality: N/A;    ESOPHAGOGASTRODUODENOSCOPY N/A 7/17/2018    Procedure: EGD (ESOPHAGOGASTRODUODENOSCOPY);  Surgeon: Alondra Casiano  MD;  Location: [facility] ENDO;  Service: Endoscopy;  Laterality: N/A;    HYSTERECTOMY      INSERTION OF IMPLANTABLE CARDIOVERTER-DEFIBRILLATOR (ICD) GENERATOR WITH TWO EXISTING LEADS Left 5/10/2021    Procedure: INSERTION, PULSE GENERATOR WITH 2 EXISTING LEADS, ICD;  Surgeon: Bo Leone MD;  Location: [facility] CATH LAB;  Service: Cardiology;  Laterality: Left;    INSERTION OF PACEMAKER      REPLACEMENT OF PACEMAKER GENERATOR  05/10/2021    RETROGRADE PYELOGRAPHY Right 2020    Procedure: PYELOGRAM, RETROGRADE;  Surgeon: Jovan Kruse MD;  Location: [facility] OR;  Service: Urology;  Laterality: Right;    SMALL INTESTINE SURGERY  in her 20's    for crohn's    TONSILLECTOMY      UPPER GASTROINTESTINAL ENDOSCOPY  ~    normal findings per patient report       SOCIAL HISTORY:  Social History     Socioeconomic History    Marital status:    Tobacco Use    Smoking status: Former     Current packs/day: 0.00     Types: Cigarettes     Quit date:      Years since quittin.8    Smokeless tobacco: Never   Substance and Sexual Activity    Alcohol use: No    Drug use: No    Sexual activity: Never     Social Determinants of Health     Financial Resource Strain: Unknown (2023)    Overall Financial Resource Strain (CARDIA)     Difficulty of Paying Living Expenses: Patient refused   Food Insecurity: Unknown (2023)    Hunger Vital Sign     Worried About Running Out of Food in the Last Year: Patient refused     Ran Out of Food in the Last Year: Patient refused   Transportation Needs: No Transportation Needs (2023)    PRAPARE - Transportation     Lack of Transportation (Medical): No     Lack of Transportation (Non-Medical): No   Physical Activity: Inactive (2023)    Exercise Vital Sign     Days of Exercise per Week: 0 days     Minutes of Exercise per Session: 0 min   Stress: Stress Concern Present (2023)    [employer] [place] [employer] - Occupational Stress Questionnaire     Feeling  of Stress : Very much   Social Connections: Socially Isolated (8/29/2023)    Social Connection and Isolation Panel [NHANES]     Frequency of Communication with Friends and Family: Never     Frequency of Social Gatherings with Friends and Family: Never     Attends Yazidism Services: Never     Active Member of Clubs or Organizations: No     Attends Club or Organization Meetings: Never     Marital Status:    Housing Stability: High Risk (8/29/2023)    Housing Stability Vital Sign     Unable to Pay for Housing in the Last Year: No     Number of Places Lived in the Last Year: 3     Unstable Housing in the Last Year: No       FAMILY HISTORY:  Family History   Problem Relation Age of Onset    Cancer Mother     Breast cancer Mother     Crohn's disease Other     Colon cancer Neg Hx     Colon polyps Neg Hx     Esophageal cancer Neg Hx     Stomach cancer Neg Hx     Ulcerative colitis Neg Hx        ALLERGIES AND MEDICATIONS: updated and reviewed.  Review of patient's allergies indicates:   Allergen Reactions    Cleocin [clindamycin hcl]     Lisinopril Other (See Comments)     cough     Current Outpatient Medications   Medication Sig Dispense Refill    acetaminophen (TYLENOL) 325 MG tablet Take 650 mg by mouth daily as needed (Headache).      amLODIPine (NORVASC) 2.5 MG tablet Take 1 tablet (2.5 mg total) by mouth once daily. 90 tablet 3    atorvastatin (LIPITOR) 20 MG tablet Take 1 tablet by mouth once daily 90 tablet 3    colestipoL (COLESTID) 1 gram Tab Take 1 tablet (1 g total) by mouth 2 (two) times daily. 60 tablet 5    diphenoxylate-atropine 2.5-0.025 mg (LOMOTIL) 2.5-0.025 mg per tablet TAKE 1 TABLET BY MOUTH 4 TIMES DAILY AS NEEDED FOR DIARRHEA 60 tablet 2    ELIQUIS 5 mg Tab Take 1 tablet (5 mg total) by mouth 2 (two) times daily. 180 tablet 3    esomeprazole (NEXIUM) 40 MG capsule Take 1 capsule (40 mg total) by mouth once daily. 90 capsule 1    folic acid (FOLVITE) 1 MG tablet Take 1 tablet by mouth once  daily 90 tablet 1    lactulose (CHRONULAC) 20 gram/30 mL Soln Take 30 mLs (20 g total) by mouth 2 (two) times daily as needed (constipation). 480 mL 0    levothyroxine (SYNTHROID, LEVOTHROID) 175 MCG tablet Take 1 tablet by mouth once daily 90 tablet 2    LIDOcaine-prilocaine (EMLA) cream Apply topically as needed (place to port site 45-60 minutes prior to port access). 30 g 6    lipase-protease-amylase 24,000-76,000-120,000 units (CREON) 24,000-76,000 -120,000 unit capsule Take 2 capsules by mouth 3 (three) times daily with meals. 180 capsule 11    MELATONIN ORAL Take 1 tablet by mouth nightly as needed (Insomnia).      metoprolol succinate (TOPROL-XL) 25 MG 24 hr tablet Take 3 tablets (75 mg total) by mouth once daily. 270 tablet 3    OLANZapine (ZYPREXA) 5 MG tablet Take 1 tablet (5 mg total) by mouth every evening. 30 tablet 5    prochlorperazine (COMPAZINE) 10 MG tablet Take 1 tablet (10 mg total) by mouth 4 (four) times daily as needed (nausea). 90 tablet 11    sulfaSALAzine (AZULFIDINE) 500 mg Tab Take 1 tablet by mouth twice daily 180 tablet 3    tolterodine (DETROL LA) 4 MG 24 hr capsule Take 1 capsule (4 mg total) by mouth once daily. 90 capsule 3    traMADoL (ULTRAM) 50 mg tablet Take 1 tablet (50 mg total) by mouth every 6 (six) hours as needed for Pain. 25 tablet 0     No current facility-administered medications for this visit.         ROS  Review of Systems   Constitutional:  Positive for appetite change and fatigue.   HENT: Negative.     Eyes: Negative.    Respiratory: Negative.     Cardiovascular: Negative.    Gastrointestinal:  Positive for constipation.   Endocrine: Negative.    Genitourinary: Negative.    Musculoskeletal: Negative.    Neurological: Negative.    Hematological:  Bruises/bleeds easily (On blood thinners).   Psychiatric/Behavioral: Negative.             Physical Exam  Vitals:    10/18/23 0918   BP: 126/72   BP Location: Right arm   Patient Position: Sitting   BP Method: Medium  (Manual)   Pulse: 87   Resp: 18   Temp: 97.7 °F (36.5 °C)   TempSrc: Temporal   SpO2: 97%   Weight: 64.4 kg (142 lb 1.4 oz)   Height: 5' (1.524 m)    Body mass index is 27.75 kg/m².  Weight: 64.4 kg (142 lb 1.4 oz)   Height: 5' (152.4 cm)   Physical Exam      Health Maintenance         Date Due Completion Date    COVID-19 Vaccine (3 - Pfizer risk series) 01/06/2022 12/9/2021    DEXA Scan 03/23/2025 3/23/2022    Lipid Panel 03/01/2028 3/1/2023    TETANUS VACCINE 10/15/2032 10/15/2022              Assessment and Plan:  Malignant neoplasm of pancreas, unspecified location of malignancy  Patient was evaluated by hospice, says she is not ready for hospice enrollment at this time because she wants to continue treating her other chronic conditions, but will remain in communication s her condition progresses  Thrombocytopenia, unspecified   chronic, asymptomatic  Need for vaccination  -     Influenza (FLUAD) - Quadrivalent (Adjuvanted) *Preferred* (65+) (PF)    Palliative care encounter      I hereby acknowledge that I am relying upon documentation authored by a medical student working under my supervision and further I hereby attest that I have verified the student documentation or findings by personally re-performing the physical exam and medical decision making activities of the Evaluation and Management service to be billed.  Ga Waldrop

## 2023-12-16 ENCOUNTER — CLINICAL SUPPORT (OUTPATIENT)
Dept: CARDIOLOGY | Facility: CLINIC | Age: 83
End: 2023-12-16
Payer: MEDICARE

## 2023-12-16 DIAGNOSIS — Z95.0 PACEMAKER: Primary | Chronic | ICD-10-CM

## 2023-12-16 PROCEDURE — 93294 REM INTERROG EVL PM/LDLS PM: CPT | Mod: HCNC,S$GLB,, | Performed by: INTERNAL MEDICINE

## 2023-12-16 PROCEDURE — 93294 PR INTERROG EVAL, REMOTE, UP TO 90 DAYS,PACEMAKER: ICD-10-PCS | Mod: HCNC,S$GLB,, | Performed by: INTERNAL MEDICINE

## 2023-12-16 PROCEDURE — 93296 REM INTERROG EVL PM/IDS: CPT | Mod: HCNC,S$GLB,, | Performed by: INTERNAL MEDICINE

## 2023-12-16 PROCEDURE — 93296 PR INTERROG EVAL, REMOTE, UP TO 90 DAYS, PACER/DEFIB,TECH REVIEW: ICD-10-PCS | Mod: HCNC,S$GLB,, | Performed by: INTERNAL MEDICINE

## 2023-12-24 ENCOUNTER — HOSPITAL ENCOUNTER (EMERGENCY)
Facility: HOSPITAL | Age: 83
Discharge: HOME | End: 2023-12-24
Attending: EMERGENCY MEDICINE
Payer: MEDICARE

## 2023-12-24 VITALS
DIASTOLIC BLOOD PRESSURE: 64 MMHG | OXYGEN SATURATION: 96 % | WEIGHT: 130 LBS | SYSTOLIC BLOOD PRESSURE: 146 MMHG | BODY MASS INDEX: 25.39 KG/M2 | HEART RATE: 69 BPM | RESPIRATION RATE: 18 BRPM | TEMPERATURE: 98 F

## 2023-12-24 DIAGNOSIS — K83.1 OBSTRUCTIVE JAUNDICE: ICD-10-CM

## 2023-12-24 DIAGNOSIS — E87.6 HYPOKALEMIA: Primary | ICD-10-CM

## 2023-12-24 LAB
ALBUMIN SERPL BCP-MCNC: 1.7 G/DL (ref 3.5–5.2)
ALP SERPL-CCNC: 407 U/L (ref 55–135)
ALT SERPL W/O P-5'-P-CCNC: 35 U/L (ref 10–44)
ANION GAP SERPL CALC-SCNC: 16 MMOL/L (ref 8–16)
AST SERPL-CCNC: 192 U/L (ref 10–40)
BASOPHILS # BLD AUTO: 0.03 K/UL (ref 0–0.2)
BASOPHILS NFR BLD: 0.4 % (ref 0–1.9)
BILIRUB SERPL-MCNC: 8.6 MG/DL (ref 0.1–1)
BUN SERPL-MCNC: 8 MG/DL (ref 8–23)
CALCIUM SERPL-MCNC: 6.7 MG/DL (ref 8.7–10.5)
CHLORIDE SERPL-SCNC: 101 MMOL/L (ref 95–110)
CO2 SERPL-SCNC: 24 MMOL/L (ref 23–29)
CREAT SERPL-MCNC: 0.8 MG/DL (ref 0.5–1.4)
DIFFERENTIAL METHOD: ABNORMAL
EOSINOPHIL # BLD AUTO: 0 K/UL (ref 0–0.5)
EOSINOPHIL NFR BLD: 0.4 % (ref 0–8)
ERYTHROCYTE [DISTWIDTH] IN BLOOD BY AUTOMATED COUNT: 17.8 % (ref 11.5–14.5)
EST. GFR  (NO RACE VARIABLE): >60 ML/MIN/1.73 M^2
GLUCOSE SERPL-MCNC: 83 MG/DL (ref 70–110)
HCT VFR BLD AUTO: 34.3 % (ref 37–48.5)
HGB BLD-MCNC: 11.6 G/DL (ref 12–16)
IMM GRANULOCYTES # BLD AUTO: 0.03 K/UL (ref 0–0.04)
IMM GRANULOCYTES NFR BLD AUTO: 0.4 % (ref 0–0.5)
LACTATE SERPL-SCNC: 1 MMOL/L (ref 0.5–2.2)
LYMPHOCYTES # BLD AUTO: 0.9 K/UL (ref 1–4.8)
LYMPHOCYTES NFR BLD: 13.1 % (ref 18–48)
MCH RBC QN AUTO: 29.5 PG (ref 27–31)
MCHC RBC AUTO-ENTMCNC: 33.8 G/DL (ref 32–36)
MCV RBC AUTO: 87 FL (ref 82–98)
MONOCYTES # BLD AUTO: 0.9 K/UL (ref 0.3–1)
MONOCYTES NFR BLD: 13.7 % (ref 4–15)
NEUTROPHILS # BLD AUTO: 4.8 K/UL (ref 1.8–7.7)
NEUTROPHILS NFR BLD: 72 % (ref 38–73)
NRBC BLD-RTO: 0 /100 WBC
PLATELET # BLD AUTO: 155 K/UL (ref 150–450)
PMV BLD AUTO: 11.7 FL (ref 9.2–12.9)
POCT GLUCOSE: 87 MG/DL (ref 70–110)
POTASSIUM SERPL-SCNC: 2.5 MMOL/L (ref 3.5–5.1)
PROT SERPL-MCNC: 6.6 G/DL (ref 6–8.4)
RBC # BLD AUTO: 3.93 M/UL (ref 4–5.4)
SODIUM SERPL-SCNC: 141 MMOL/L (ref 136–145)
WBC # BLD AUTO: 6.71 K/UL (ref 3.9–12.7)

## 2023-12-24 PROCEDURE — 96361 HYDRATE IV INFUSION ADD-ON: CPT | Mod: HCNC

## 2023-12-24 PROCEDURE — 80053 COMPREHEN METABOLIC PANEL: CPT | Mod: HCNC | Performed by: EMERGENCY MEDICINE

## 2023-12-24 PROCEDURE — 83605 ASSAY OF LACTIC ACID: CPT | Mod: HCNC | Performed by: EMERGENCY MEDICINE

## 2023-12-24 PROCEDURE — 96374 THER/PROPH/DIAG INJ IV PUSH: CPT | Mod: HCNC

## 2023-12-24 PROCEDURE — 82962 GLUCOSE BLOOD TEST: CPT | Mod: HCNC

## 2023-12-24 PROCEDURE — 63600175 PHARM REV CODE 636 W HCPCS: Mod: HCNC | Performed by: EMERGENCY MEDICINE

## 2023-12-24 PROCEDURE — 85025 COMPLETE CBC W/AUTO DIFF WBC: CPT | Mod: HCNC | Performed by: EMERGENCY MEDICINE

## 2023-12-24 PROCEDURE — 25000003 PHARM REV CODE 250: Mod: HCNC | Performed by: EMERGENCY MEDICINE

## 2023-12-24 PROCEDURE — 99284 EMERGENCY DEPT VISIT MOD MDM: CPT | Mod: 25,HCNC

## 2023-12-24 RX ORDER — ONDANSETRON 2 MG/ML
4 INJECTION INTRAMUSCULAR; INTRAVENOUS
Status: COMPLETED | OUTPATIENT
Start: 2023-12-24 | End: 2023-12-24

## 2023-12-24 RX ADMIN — ONDANSETRON 4 MG: 2 INJECTION INTRAMUSCULAR; INTRAVENOUS at 02:12

## 2023-12-24 RX ADMIN — POTASSIUM BICARBONATE 40 MEQ: 391 TABLET, EFFERVESCENT ORAL at 03:12

## 2023-12-24 RX ADMIN — POTASSIUM BICARBONATE 40 MEQ: 391 TABLET, EFFERVESCENT ORAL at 04:12

## 2023-12-24 RX ADMIN — SODIUM CHLORIDE, POTASSIUM CHLORIDE, SODIUM LACTATE AND CALCIUM CHLORIDE 1000 ML: 600; 310; 30; 20 INJECTION, SOLUTION INTRAVENOUS at 02:12

## 2023-12-24 NOTE — DISCHARGE INSTRUCTIONS
Drink plenty of fluids to stay hydrated.  Take your potassium as instructed by your doctor  Follow up with your doctor for repeat blood work next week  Follow up with gastroenterology  Return to ED for fevers, abdominal pain, nausea, vomiting or any other concerns

## 2023-12-24 NOTE — ED PROVIDER NOTES
Encounter Date: 12/24/2023       History     Chief Complaint   Patient presents with    Jaundice     Starting yesterday, Hx pancreatic CA. Dry mouth     HPI  83-year-old female with a medical history of HTN, HLD, AFib (on Eliquis), Crohn's disease, HFpEF, hypothyroid and pancreatic adenocarcinoma with biliary stent no longer being treated (per note on 9/8/23 pt decided to stop chemo) but per daughter-in-law pt has not started hospice care as she does not want to stop taking her medications presents with jaundice and dry mouth.   Patient states she has had decreased appetite because she gets nauseated after eating.  No emesis.  No abdominal pain.    No fevers. Today patient and daughter-in-law noted that patient was jaundiced and they decided to come to the emergency department.  Per patient she is here to get hydrated so she will feel better for Christmas Lisandra and Catarina day.  She does not want to stay in the hospital.      Review of patient's allergies indicates:   Allergen Reactions    Cleocin [clindamycin hcl]     Lisinopril Other (See Comments)     cough     Past Medical History:   Diagnosis Date    Anticoagulant long-term use     Atrial fibrillation     Crohn disease diagnosed in her 20's    GERD (gastroesophageal reflux disease)     Hyperlipidemia     Hypertension     Pancreatic adenocarcinoma 3/21/2023    Thyroid disease     s/p I 131     Past Surgical History:   Procedure Laterality Date    APPENDECTOMY      CARDIAC SURGERY  2014    pacemaker    COLONOSCOPY  ~2008    normal findings per patient report    ENDOSCOPIC ULTRASOUND OF UPPER GASTROINTESTINAL TRACT N/A 3/14/2023    Procedure: ULTRASOUND, UPPER GI TRACT, ENDOSCOPIC;  Surgeon: Andrei Swartz MD;  Location: [facility];  Service: Endoscopy;  Laterality: N/A;  Approval to hold Eliquis rec'd from Dr. Barbosa (see telephone encounter 3/3/23)-DS  Medtronic AICD/PPM  3/3/23-Instructions via portal-DS    ERCP N/A 3/21/2023    Procedure: ERCP (ENDOSCOPIC  RETROGRADE CHOLANGIOPANCREATOGRAPHY);  Surgeon: Celina Toney MD;  Location: [facility] ENDO ([employer] FLR);  Service: Endoscopy;  Laterality: N/A;    ESOPHAGOGASTRODUODENOSCOPY N/A 2018    Procedure: EGD (ESOPHAGOGASTRODUODENOSCOPY);  Surgeon: Alondra Casiano MD;  Location: [facility] ENDO;  Service: Endoscopy;  Laterality: N/A;    HYSTERECTOMY      INSERTION OF IMPLANTABLE CARDIOVERTER-DEFIBRILLATOR (ICD) GENERATOR WITH TWO EXISTING LEADS Left 5/10/2021    Procedure: INSERTION, PULSE GENERATOR WITH 2 EXISTING LEADS, ICD;  Surgeon: Bo Leone MD;  Location: [facility] CATH LAB;  Service: Cardiology;  Laterality: Left;    INSERTION OF PACEMAKER      REPLACEMENT OF PACEMAKER GENERATOR  05/10/2021    RETROGRADE PYELOGRAPHY Right 2020    Procedure: PYELOGRAM, RETROGRADE;  Surgeon: Jovan Krues MD;  Location: [facility];  Service: Urology;  Laterality: Right;    SMALL INTESTINE SURGERY  in her 20's    for crohn's    TONSILLECTOMY      UPPER GASTROINTESTINAL ENDOSCOPY  ~    normal findings per patient report     Family History   Problem Relation Age of Onset    Cancer Mother     Breast cancer Mother     Crohn's disease Other     Colon cancer Neg Hx     Colon polyps Neg Hx     Esophageal cancer Neg Hx     Stomach cancer Neg Hx     Ulcerative colitis Neg Hx      Social History     Tobacco Use    Smoking status: Former     Current packs/day: 0.00     Types: Cigarettes     Quit date:      Years since quittin.0    Smokeless tobacco: Never   Substance Use Topics    Alcohol use: No    Drug use: No     Review of Systems   Constitutional:  Positive for fatigue. Negative for fever.   HENT:  Negative for sore throat.    Respiratory:  Negative for shortness of breath.    Cardiovascular:  Negative for chest pain and leg swelling.   Gastrointestinal:  Positive for nausea. Negative for abdominal distention, abdominal pain, diarrhea and vomiting.   Genitourinary:  Negative for dysuria.   Skin:  Positive for color change.    Neurological:  Positive for weakness. Negative for light-headedness and headaches.       Physical Exam     Initial Vitals   BP Pulse Resp Temp SpO2   12/24/23 1316 12/24/23 1315 12/24/23 1315 12/24/23 1315 12/24/23 1315   (!) 103/57 77 18 98 °F (36.7 °C) 95 %      MAP       --                Physical Exam    Nursing note and vitals reviewed.  Constitutional:   Thin F no acute distress   HENT:   Head: Normocephalic and atraumatic.   Dry mm   Eyes: Conjunctivae are normal. Scleral icterus is present.   Neck: Neck supple. No JVD present.   Cardiovascular:  Normal rate, regular rhythm and intact distal pulses.           Pulmonary/Chest: Breath sounds normal. No respiratory distress. She has no wheezes. She has no rales.   Abdominal: Abdomen is soft. She exhibits no distension. There is no abdominal tenderness. There is no rebound.   Musculoskeletal:         General: No tenderness or edema.      Cervical back: Neck supple.     Neurological: She is alert and oriented to person, place, and time.   Skin: Skin is warm and dry.         ED Course   Procedures  Labs Reviewed   CBC W/ AUTO DIFFERENTIAL - Abnormal; Notable for the following components:       Result Value    RBC 3.93 (*)     Hemoglobin 11.6 (*)     Hematocrit 34.3 (*)     RDW 17.8 (*)     Lymph # 0.9 (*)     Lymph % 13.1 (*)     All other components within normal limits   COMPREHENSIVE METABOLIC PANEL - Abnormal; Notable for the following components:    Potassium 2.5 (*)     Calcium 6.7 (*)     Albumin 1.7 (*)     Total Bilirubin 8.6 (*)     Alkaline Phosphatase 407 (*)      (*)     All other components within normal limits   LACTIC ACID, PLASMA   POCT GLUCOSE          Imaging Results    None          Medications   ondansetron injection 4 mg (4 mg Intravenous Given 12/24/23 1436)   lactated ringers bolus 1,000 mL (0 mLs Intravenous Stopped 12/24/23 1539)   potassium bicarbonate disintegrating tablet 40 mEq (40 mEq Oral Given 12/24/23 1544)   potassium  bicarbonate disintegrating tablet 40 mEq (40 mEq Oral Given 12/24/23 2163)     Medical Decision Making  83-year-old female with a medical history of HTN, HLD, AFib (on Eliquis), Crohn's disease, HFpEF, hypothyroid and pancreatic adenocarcinoma with biliary stent presents with new on-set jaundice. Ddx: biliary stent obstruction, worsening malignancy, dehydration, electrolyte abnormality, AILEEN. Have considered cholangitis but unlikely given pt afebrile and has no abd pain. Routine blood work and UA. Antiemetic and hydrate with 1L LR    3:20 PM  No leukocytosis. Markedly elevated lfts with bilirubin of 8.6 hypokalemia- will replete. Corrected calcium 8.5. Discussed results with pt and her daughter in law. Recommended CT abd and likely admission for eval of stent obstruction vs obstruction from malignancy but pt feeling better and wants to go home to be with her family on christmas eve and kenny day. Pt has potassium and zofran at home. Discharged home. Routine return precautions provided. Follow up PCP next week for repeat blood work and with GI.    Amount and/or Complexity of Data Reviewed  Labs: ordered. Decision-making details documented in ED Course.    Risk  Prescription drug management.                                      Clinical Impression:  Final diagnoses:  [E87.6] Hypokalemia (Primary)  [K83.1] Obstructive jaundice          ED Disposition Condition    Discharge Stable          ED Prescriptions    None       Follow-up Information       Follow up With Specialties Details Why Contact Info Additional Information    Ga Waldrop MD Family Medicine Schedule an appointment as soon as possible for a visit   [address]  [address]  [facility] [address] [phone]       [facility] Gastroenterology Schedule an appointment as soon as possible for a visit   [address]  [facility] [address] [phone] GI Center & Urology - Main Building, 4th Floor Please  ludy in [facility] and take [facility] elevator             Saritha Hough MD  12/24/23 0389

## 2023-12-24 NOTE — ED TRIAGE NOTES
Patient comes into the emergency department by POV with complaints of jaundice. Patient states that hx of pancreatic Cx, new onset jaundice this morning with fatigue and weakness. Patient also reports tingling in her feet and hands, daughter says it chronic but that it feels worse than usual for patient.

## 2023-12-25 NOTE — PROGRESS NOTES
Subjective:      Patient ID: Pauline Cronin is a 83 y.o. female.    Chief Complaint: No chief complaint on file.    HPI:    ROS Medtronic pacemaker remote interrogation report downloaded and prepared by medical assistant and interpreted by me and full report scanned into Epic media.  Battery OK with AGATA 9.5 years.  Leads OK.  53 monitored episodes of a fib--pt is on Eliquis and metoprolol.   Normal device function.    Past Medical History:   Diagnosis Date    Anticoagulant long-term use     Atrial fibrillation     Crohn disease diagnosed in her 20's    GERD (gastroesophageal reflux disease)     Hyperlipidemia     Hypertension     Pancreatic adenocarcinoma 3/21/2023    Thyroid disease     s/p I 131        Past Surgical History:   Procedure Laterality Date    APPENDECTOMY      CARDIAC SURGERY  2014    pacemaker    COLONOSCOPY  ~2008    normal findings per patient report    ENDOSCOPIC ULTRASOUND OF UPPER GASTROINTESTINAL TRACT N/A 3/14/2023    Procedure: ULTRASOUND, UPPER GI TRACT, ENDOSCOPIC;  Surgeon: Andrei Swartz MD;  Location: [facility] ENDO;  Service: Endoscopy;  Laterality: N/A;  Approval to hold Eliquis rec'd from Dr. Barbosa (see telephone encounter 3/3/23)-DS  Medtronic AICD/PPM  3/3/23-Instructions via portal-DS    ERCP N/A 3/21/2023    Procedure: ERCP (ENDOSCOPIC RETROGRADE CHOLANGIOPANCREATOGRAPHY);  Surgeon: Celina Toney MD;  Location: [facility] ([address] [place] [address]);  Service: Endoscopy;  Laterality: N/A;    ESOPHAGOGASTRODUODENOSCOPY N/A 7/17/2018    Procedure: EGD (ESOPHAGOGASTRODUODENOSCOPY);  Surgeon: Alondra Casiano MD;  Location: [facility] ENDO;  Service: Endoscopy;  Laterality: N/A;    HYSTERECTOMY      INSERTION OF IMPLANTABLE CARDIOVERTER-DEFIBRILLATOR (ICD) GENERATOR WITH TWO EXISTING LEADS Left 5/10/2021    Procedure: INSERTION, PULSE GENERATOR WITH 2 EXISTING LEADS, ICD;  Surgeon: Bo Leone MD;  Location: [facility] CATH LAB;  Service: Cardiology;  Laterality: Left;    INSERTION OF PACEMAKER       REPLACEMENT OF PACEMAKER GENERATOR  05/10/2021    RETROGRADE PYELOGRAPHY Right 2020    Procedure: PYELOGRAM, RETROGRADE;  Surgeon: Jovan Kruse MD;  Location: [facility];  Service: Urology;  Laterality: Right;    SMALL INTESTINE SURGERY  in her 20's    for crohn's    TONSILLECTOMY      UPPER GASTROINTESTINAL ENDOSCOPY  ~    normal findings per patient report       Family History   Problem Relation Age of Onset    Cancer Mother     Breast cancer Mother     Crohn's disease Other     Colon cancer Neg Hx     Colon polyps Neg Hx     Esophageal cancer Neg Hx     Stomach cancer Neg Hx     Ulcerative colitis Neg Hx        Social History     Socioeconomic History    Marital status:    Tobacco Use    Smoking status: Former     Current packs/day: 0.00     Types: Cigarettes     Quit date:      Years since quittin.0    Smokeless tobacco: Never   Substance and Sexual Activity    Alcohol use: No    Drug use: No    Sexual activity: Never     Social Determinants of Health     Financial Resource Strain: Patient Declined (2023)    Overall Financial Resource Strain (CARDIA)     Difficulty of Paying Living Expenses: Patient declined   Food Insecurity: Patient Declined (2023)    Hunger Vital Sign     Worried About Running Out of Food in the Last Year: Patient declined     Ran Out of Food in the Last Year: Patient declined   Transportation Needs: No Transportation Needs (2023)    PRAPARE - Transportation     Lack of Transportation (Medical): No     Lack of Transportation (Non-Medical): No   Physical Activity: Inactive (2023)    Exercise Vital Sign     Days of Exercise per Week: 0 days     Minutes of Exercise per Session: 0 min   Stress: Stress Concern Present (2023)    [employer] - Occupational Stress Questionnaire     Feeling of Stress : Very much   Social Connections: Socially Isolated (2023)    Social Connection and Isolation Panel [NHANES]      Frequency of Communication with Friends and Family: Never     Frequency of Social Gatherings with Friends and Family: Never     Attends Latter-day Services: Never     Active Member of Clubs or Organizations: No     Attends Club or Organization Meetings: Never     Marital Status:    Housing Stability: High Risk (8/29/2023)    Housing Stability Vital Sign     Unable to Pay for Housing in the Last Year: No     Number of Places Lived in the Last Year: 3     Unstable Housing in the Last Year: No       Current Outpatient Medications on File Prior to Visit   Medication Sig Dispense Refill    acetaminophen (TYLENOL) 325 MG tablet Take 650 mg by mouth daily as needed (Headache).      amLODIPine (NORVASC) 2.5 MG tablet Take 1 tablet (2.5 mg total) by mouth once daily. 90 tablet 3    atorvastatin (LIPITOR) 20 MG tablet Take 1 tablet by mouth once daily 90 tablet 3    colestipoL (COLESTID) 1 gram Tab Take 1 tablet (1 g total) by mouth 2 (two) times daily. 60 tablet 5    diphenoxylate-atropine 2.5-0.025 mg (LOMOTIL) 2.5-0.025 mg per tablet TAKE 1 TABLET BY MOUTH 4 TIMES DAILY AS NEEDED FOR DIARRHEA 60 tablet 2    ELIQUIS 5 mg Tab Take 1 tablet (5 mg total) by mouth 2 (two) times daily. 180 tablet 3    esomeprazole (NEXIUM) 40 MG capsule Take 1 capsule (40 mg total) by mouth once daily. 90 capsule 1    folic acid (FOLVITE) 1 MG tablet Take 1 tablet by mouth once daily 90 tablet 1    lactulose (CHRONULAC) 20 gram/30 mL Soln Take 30 mLs (20 g total) by mouth 2 (two) times daily as needed (constipation). 480 mL 0    levothyroxine (SYNTHROID, LEVOTHROID) 175 MCG tablet Take 1 tablet by mouth once daily 90 tablet 2    LIDOcaine-prilocaine (EMLA) cream Apply topically as needed (place to port site 45-60 minutes prior to port access). 30 g 6    lipase-protease-amylase 24,000-76,000-120,000 units (CREON) 24,000-76,000 -120,000 unit capsule Take 2 capsules by mouth 3 (three) times daily with meals. 180 capsule 11    MELATONIN ORAL  Take 1 tablet by mouth nightly as needed (Insomnia).      metoprolol succinate (TOPROL-XL) 25 MG 24 hr tablet Take 3 tablets (75 mg total) by mouth once daily. 270 tablet 3    OLANZapine (ZYPREXA) 5 MG tablet Take 1 tablet (5 mg total) by mouth every evening. 30 tablet 5    prochlorperazine (COMPAZINE) 10 MG tablet Take 1 tablet (10 mg total) by mouth 4 (four) times daily as needed (nausea). 90 tablet 11    sulfaSALAzine (AZULFIDINE) 500 mg Tab Take 1 tablet by mouth twice daily 180 tablet 3    tolterodine (DETROL LA) 4 MG 24 hr capsule Take 1 capsule (4 mg total) by mouth once daily. 90 capsule 3    traMADoL (ULTRAM) 50 mg tablet Take 1 tablet (50 mg total) by mouth every 6 (six) hours as needed for Pain. 25 tablet 0     No current facility-administered medications on file prior to visit.       Review of patient's allergies indicates:   Allergen Reactions    Cleocin [clindamycin hcl]     Lisinopril Other (See Comments)     cough     Objective:   There were no vitals filed for this visit.     Physical Exam     Assessment:     1. Pacemaker      Plan:   Diagnoses and all orders for this visit:    Pacemaker         No follow-ups on file.

## 2023-12-26 ENCOUNTER — PATIENT OUTREACH (OUTPATIENT)
Dept: EMERGENCY MEDICINE | Facility: HOSPITAL | Age: 83
End: 2023-12-26
Payer: MEDICARE

## 2023-12-26 NOTE — PROGRESS NOTES
Patient was seen in the ED on 12/24/23 for hypokalemia. They have an appointment scheduled with Dr. Ga Waldrop on 1/19/24 at 10:15. ED navigator will remind patient of appointment.

## 2023-12-28 NOTE — TELEPHONE ENCOUNTER
Care Due:                  Date            Visit Type   Department     Provider  --------------------------------------------------------------------------------                                EP -                              PRIMARY      [facility]  Last Visit: 10-      CARE ([facility])   PRIMARY CARE   Ga Waldrop                               -                              PRIMARY      [facility]  Next Visit: 01-      CARE ([facility])   PRIMARY CARE   Ga Waldrop                                                            Last  Test          Frequency    Reason                     Performed    Due Date  --------------------------------------------------------------------------------    CBC w/ Diff.  3 months...  sulfaSALAzine............  Not Found    Overdue    CMP.........  3 months...  sulfaSALAzine............  12- 03-    Lipid Panel.  12 months..  colestipoL...............  03- 02-    [employer] Embedded Care Due Messages. Reference number: [id number].   12/28/2023 3:31:21 PM CST

## 2023-12-29 ENCOUNTER — HOSPITAL ENCOUNTER (OUTPATIENT)
Facility: HOSPITAL | Age: 83
Discharge: HOME | End: 2023-12-30
Attending: EMERGENCY MEDICINE | Admitting: EMERGENCY MEDICINE
Payer: MEDICARE

## 2023-12-29 DIAGNOSIS — E87.6 HYPOKALEMIA: Primary | ICD-10-CM

## 2023-12-29 DIAGNOSIS — E16.2 HYPOGLYCEMIA: ICD-10-CM

## 2023-12-29 DIAGNOSIS — R07.9 CHEST PAIN: ICD-10-CM

## 2023-12-29 DIAGNOSIS — T85.590A: ICD-10-CM

## 2023-12-29 LAB
ALBUMIN SERPL BCP-MCNC: 1.6 G/DL (ref 3.5–5.2)
ALLENS TEST: NORMAL
ALP SERPL-CCNC: 340 U/L (ref 55–135)
ALT SERPL W/O P-5'-P-CCNC: 27 U/L (ref 10–44)
ANION GAP SERPL CALC-SCNC: 15 MMOL/L (ref 8–16)
AST SERPL-CCNC: 132 U/L (ref 10–40)
BACTERIA #/AREA URNS AUTO: ABNORMAL /HPF
BASOPHILS # BLD AUTO: 0.04 K/UL (ref 0–0.2)
BASOPHILS NFR BLD: 0.9 % (ref 0–1.9)
BILIRUB SERPL-MCNC: 6 MG/DL (ref 0.1–1)
BILIRUB UR QL STRIP: NEGATIVE
BUN SERPL-MCNC: 13 MG/DL (ref 8–23)
CALCIUM SERPL-MCNC: 6.3 MG/DL (ref 8.7–10.5)
CHLORIDE SERPL-SCNC: 95 MMOL/L (ref 95–110)
CLARITY UR REFRACT.AUTO: ABNORMAL
CO2 SERPL-SCNC: 25 MMOL/L (ref 23–29)
COLOR UR AUTO: ABNORMAL
CREAT SERPL-MCNC: 0.8 MG/DL (ref 0.5–1.4)
DIFFERENTIAL METHOD BLD: ABNORMAL
EOSINOPHIL # BLD AUTO: 0 K/UL (ref 0–0.5)
EOSINOPHIL NFR BLD: 0.4 % (ref 0–8)
ERYTHROCYTE [DISTWIDTH] IN BLOOD BY AUTOMATED COUNT: 18.3 % (ref 11.5–14.5)
EST. GFR  (NO RACE VARIABLE): >60 ML/MIN/1.73 M^2
GLUCOSE SERPL-MCNC: 237 MG/DL (ref 70–110)
GLUCOSE UR QL STRIP: NEGATIVE
HCT VFR BLD AUTO: 30.8 % (ref 37–48.5)
HGB BLD-MCNC: 10.4 G/DL (ref 12–16)
HGB UR QL STRIP: NEGATIVE
IMM GRANULOCYTES # BLD AUTO: 0.02 K/UL (ref 0–0.04)
IMM GRANULOCYTES NFR BLD AUTO: 0.4 % (ref 0–0.5)
INFLUENZA A, MOLECULAR: NOT DETECTED
INFLUENZA B, MOLECULAR: NOT DETECTED
KETONES UR QL STRIP: NEGATIVE
LDH SERPL L TO P-CCNC: 1.17 MMOL/L (ref 0.5–2.2)
LEUKOCYTE ESTERASE UR QL STRIP: ABNORMAL
LYMPHOCYTES # BLD AUTO: 0.8 K/UL (ref 1–4.8)
LYMPHOCYTES NFR BLD: 16.5 % (ref 18–48)
MAGNESIUM SERPL-MCNC: 0.8 MG/DL (ref 1.6–2.6)
MAGNESIUM SERPL-MCNC: 0.9 MG/DL (ref 1.6–2.6)
MCH RBC QN AUTO: 29.2 PG (ref 27–31)
MCHC RBC AUTO-ENTMCNC: 33.8 G/DL (ref 32–36)
MCV RBC AUTO: 87 FL (ref 82–98)
MICROSCOPIC COMMENT: ABNORMAL
MONOCYTES # BLD AUTO: 0.4 K/UL (ref 0.3–1)
MONOCYTES NFR BLD: 8.9 % (ref 4–15)
NEUTROPHILS # BLD AUTO: 3.4 K/UL (ref 1.8–7.7)
NEUTROPHILS NFR BLD: 72.9 % (ref 38–73)
NITRITE UR QL STRIP: NEGATIVE
NRBC BLD-RTO: 0 /100 WBC
PH UR STRIP: 5 [PH] (ref 5–8)
PLATELET # BLD AUTO: 142 K/UL (ref 150–450)
PMV BLD AUTO: 11.2 FL (ref 9.2–12.9)
POCT GLUCOSE: 152 MG/DL (ref 70–110)
POCT GLUCOSE: 254 MG/DL (ref 70–110)
POCT GLUCOSE: 34 MG/DL (ref 70–110)
POTASSIUM SERPL-SCNC: 2.4 MMOL/L (ref 3.5–5.1)
PROT SERPL-MCNC: 6.1 G/DL (ref 6–8.4)
PROT UR QL STRIP: NEGATIVE
RBC # BLD AUTO: 3.56 M/UL (ref 4–5.4)
RBC #/AREA URNS AUTO: 2 /HPF (ref 0–4)
RSV AG BY MOLECULAR METHOD: NOT DETECTED
SAMPLE: NORMAL
SARS-COV-2 RNA RESP QL NAA+PROBE: NOT DETECTED
SITE: NORMAL
SODIUM SERPL-SCNC: 135 MMOL/L (ref 136–145)
SP GR UR STRIP: 1.01 (ref 1–1.03)
SQUAMOUS #/AREA URNS AUTO: 11 /HPF
URN SPEC COLLECT METH UR: ABNORMAL
WBC # BLD AUTO: 4.6 K/UL (ref 3.9–12.7)
WBC #/AREA URNS AUTO: 6 /HPF (ref 0–5)

## 2023-12-29 PROCEDURE — G0378 HOSPITAL OBSERVATION PER HR: HCPCS | Mod: HCNC

## 2023-12-29 PROCEDURE — 93010 ELECTROCARDIOGRAM REPORT: CPT | Mod: HCNC,,, | Performed by: INTERNAL MEDICINE

## 2023-12-29 PROCEDURE — 63600175 PHARM REV CODE 636 W HCPCS: Mod: HCNC | Performed by: STUDENT IN AN ORGANIZED HEALTH CARE EDUCATION/TRAINING PROGRAM

## 2023-12-29 PROCEDURE — 63600175 PHARM REV CODE 636 W HCPCS: Mod: HCNC

## 2023-12-29 PROCEDURE — 96366 THER/PROPH/DIAG IV INF ADDON: CPT | Mod: HCNC

## 2023-12-29 PROCEDURE — 93005 ELECTROCARDIOGRAM TRACING: CPT | Mod: HCNC

## 2023-12-29 PROCEDURE — 83605 ASSAY OF LACTIC ACID: CPT | Mod: HCNC

## 2023-12-29 PROCEDURE — 25500020 PHARM REV CODE 255: Mod: HCNC | Performed by: EMERGENCY MEDICINE

## 2023-12-29 PROCEDURE — 80053 COMPREHEN METABOLIC PANEL: CPT | Mod: HCNC

## 2023-12-29 PROCEDURE — 99900035 HC TECH TIME PER 15 MIN (STAT): Mod: HCNC

## 2023-12-29 PROCEDURE — 96367 TX/PROPH/DG ADDL SEQ IV INF: CPT | Mod: HCNC

## 2023-12-29 PROCEDURE — 83735 ASSAY OF MAGNESIUM: CPT | Mod: HCNC

## 2023-12-29 PROCEDURE — 99285 EMERGENCY DEPT VISIT HI MDM: CPT | Mod: 25,HCNC

## 2023-12-29 PROCEDURE — 25000003 PHARM REV CODE 250: Mod: HCNC | Performed by: STUDENT IN AN ORGANIZED HEALTH CARE EDUCATION/TRAINING PROGRAM

## 2023-12-29 PROCEDURE — 87040 BLOOD CULTURE FOR BACTERIA: CPT | Mod: 59,HCNC

## 2023-12-29 PROCEDURE — 25000003 PHARM REV CODE 250: Mod: HCNC

## 2023-12-29 PROCEDURE — 85025 COMPLETE CBC W/AUTO DIFF WBC: CPT | Mod: HCNC

## 2023-12-29 PROCEDURE — 81001 URINALYSIS AUTO W/SCOPE: CPT | Mod: HCNC

## 2023-12-29 PROCEDURE — 82962 GLUCOSE BLOOD TEST: CPT | Mod: HCNC

## 2023-12-29 PROCEDURE — 0241U SARS-COV2 (COVID) WITH FLU/RSV BY PCR: CPT | Mod: HCNC

## 2023-12-29 PROCEDURE — 96365 THER/PROPH/DIAG IV INF INIT: CPT | Mod: HCNC

## 2023-12-29 PROCEDURE — 96376 TX/PRO/DX INJ SAME DRUG ADON: CPT

## 2023-12-29 RX ORDER — IBUPROFEN 200 MG
16 TABLET ORAL
Status: DISCONTINUED | OUTPATIENT
Start: 2023-12-29 | End: 2023-12-30 | Stop reason: HOSPADM

## 2023-12-29 RX ORDER — IBUPROFEN 200 MG
24 TABLET ORAL
Status: DISCONTINUED | OUTPATIENT
Start: 2023-12-29 | End: 2023-12-30 | Stop reason: HOSPADM

## 2023-12-29 RX ORDER — FLUCONAZOLE 150 MG/1
150 TABLET ORAL ONCE
Status: ON HOLD | COMMUNITY
Start: 2023-12-28 | End: 2023-12-30 | Stop reason: HOSPADM

## 2023-12-29 RX ORDER — PANTOPRAZOLE SODIUM 40 MG/1
40 TABLET, DELAYED RELEASE ORAL DAILY
Status: DISCONTINUED | OUTPATIENT
Start: 2023-12-30 | End: 2023-12-30 | Stop reason: HOSPADM

## 2023-12-29 RX ORDER — OXYBUTYNIN CHLORIDE 10 MG/1
10 TABLET, EXTENDED RELEASE ORAL DAILY
Status: DISCONTINUED | OUTPATIENT
Start: 2023-12-30 | End: 2023-12-30 | Stop reason: HOSPADM

## 2023-12-29 RX ORDER — CHOLESTYRAMINE 4 G/9G
2 POWDER, FOR SUSPENSION ORAL 2 TIMES DAILY
Status: DISCONTINUED | OUTPATIENT
Start: 2023-12-29 | End: 2023-12-30 | Stop reason: HOSPADM

## 2023-12-29 RX ORDER — CALCIUM GLUCONATE 20 MG/ML
1 INJECTION, SOLUTION INTRAVENOUS ONCE
Status: COMPLETED | OUTPATIENT
Start: 2023-12-29 | End: 2023-12-29

## 2023-12-29 RX ORDER — OLANZAPINE 2.5 MG/1
5 TABLET ORAL NIGHTLY
Status: DISCONTINUED | OUTPATIENT
Start: 2023-12-29 | End: 2023-12-30 | Stop reason: HOSPADM

## 2023-12-29 RX ORDER — ESOMEPRAZOLE MAGNESIUM 40 MG/1
40 CAPSULE, DELAYED RELEASE ORAL
Qty: 90 CAPSULE | Refills: 3 | Status: SHIPPED | OUTPATIENT
Start: 2023-12-29

## 2023-12-29 RX ORDER — FOLIC ACID 1 MG/1
1000 TABLET ORAL DAILY
Status: DISCONTINUED | OUTPATIENT
Start: 2023-12-30 | End: 2023-12-30 | Stop reason: HOSPADM

## 2023-12-29 RX ORDER — SODIUM CHLORIDE 0.9 % (FLUSH) 0.9 %
10 SYRINGE (ML) INJECTION EVERY 12 HOURS PRN
Status: DISCONTINUED | OUTPATIENT
Start: 2023-12-29 | End: 2023-12-30 | Stop reason: HOSPADM

## 2023-12-29 RX ORDER — MAGNESIUM SULFATE HEPTAHYDRATE 40 MG/ML
2 INJECTION, SOLUTION INTRAVENOUS
Status: COMPLETED | OUTPATIENT
Start: 2023-12-29 | End: 2023-12-29

## 2023-12-29 RX ORDER — SULFASALAZINE 500 MG/1
500 TABLET ORAL 2 TIMES DAILY
Status: DISCONTINUED | OUTPATIENT
Start: 2023-12-29 | End: 2023-12-30 | Stop reason: HOSPADM

## 2023-12-29 RX ORDER — ATORVASTATIN CALCIUM 20 MG/1
20 TABLET, FILM COATED ORAL DAILY
Status: DISCONTINUED | OUTPATIENT
Start: 2023-12-30 | End: 2023-12-30 | Stop reason: HOSPADM

## 2023-12-29 RX ORDER — POTASSIUM CHLORIDE 20 MEQ/1
40 TABLET, EXTENDED RELEASE ORAL ONCE
Status: COMPLETED | OUTPATIENT
Start: 2023-12-29 | End: 2023-12-29

## 2023-12-29 RX ORDER — LACTULOSE 10 G/15ML
10 SOLUTION ORAL EVERY 6 HOURS PRN
Status: DISCONTINUED | OUTPATIENT
Start: 2023-12-29 | End: 2023-12-30 | Stop reason: HOSPADM

## 2023-12-29 RX ORDER — AMOXICILLIN 500 MG/1
500 CAPSULE ORAL 3 TIMES DAILY
Status: ON HOLD | COMMUNITY
Start: 2023-12-28 | End: 2023-12-30 | Stop reason: HOSPADM

## 2023-12-29 RX ORDER — ONDANSETRON 2 MG/ML
4 INJECTION INTRAMUSCULAR; INTRAVENOUS EVERY 8 HOURS PRN
Status: DISCONTINUED | OUTPATIENT
Start: 2023-12-29 | End: 2023-12-30 | Stop reason: HOSPADM

## 2023-12-29 RX ORDER — NALOXONE HCL 0.4 MG/ML
0.02 VIAL (ML) INJECTION
Status: DISCONTINUED | OUTPATIENT
Start: 2023-12-29 | End: 2023-12-30 | Stop reason: HOSPADM

## 2023-12-29 RX ORDER — ACETAMINOPHEN 500 MG
1000 TABLET ORAL
Status: COMPLETED | OUTPATIENT
Start: 2023-12-29 | End: 2023-12-29

## 2023-12-29 RX ORDER — TRAMADOL HYDROCHLORIDE 50 MG/1
50 TABLET ORAL EVERY 6 HOURS PRN
Status: DISCONTINUED | OUTPATIENT
Start: 2023-12-29 | End: 2023-12-30 | Stop reason: HOSPADM

## 2023-12-29 RX ORDER — GLUCAGON 1 MG
1 KIT INJECTION
Status: DISCONTINUED | OUTPATIENT
Start: 2023-12-29 | End: 2023-12-30 | Stop reason: HOSPADM

## 2023-12-29 RX ORDER — LACTULOSE 10 G/15ML
SOLUTION ORAL
COMMUNITY
Start: 2023-10-10

## 2023-12-29 RX ORDER — POTASSIUM CHLORIDE 7.45 MG/ML
10 INJECTION INTRAVENOUS
Status: DISPENSED | OUTPATIENT
Start: 2023-12-29 | End: 2023-12-29

## 2023-12-29 RX ORDER — POTASSIUM CHLORIDE 7.45 MG/ML
10 INJECTION INTRAVENOUS
Status: COMPLETED | OUTPATIENT
Start: 2023-12-29 | End: 2023-12-29

## 2023-12-29 RX ORDER — TALC
6 POWDER (GRAM) TOPICAL NIGHTLY PRN
Status: DISCONTINUED | OUTPATIENT
Start: 2023-12-30 | End: 2023-12-30 | Stop reason: HOSPADM

## 2023-12-29 RX ORDER — ACETAMINOPHEN 325 MG/1
650 TABLET ORAL DAILY PRN
Status: DISCONTINUED | OUTPATIENT
Start: 2023-12-29 | End: 2023-12-30 | Stop reason: HOSPADM

## 2023-12-29 RX ORDER — MAGNESIUM SULFATE HEPTAHYDRATE 40 MG/ML
2 INJECTION, SOLUTION INTRAVENOUS ONCE
Status: DISCONTINUED | OUTPATIENT
Start: 2023-12-29 | End: 2023-12-29

## 2023-12-29 RX ADMIN — POTASSIUM CHLORIDE 40 MEQ: 1500 TABLET, EXTENDED RELEASE ORAL at 05:12

## 2023-12-29 RX ADMIN — ACETAMINOPHEN 1000 MG: 500 TABLET ORAL at 03:12

## 2023-12-29 RX ADMIN — DEXTROSE MONOHYDRATE 500 ML: 100 INJECTION, SOLUTION INTRAVENOUS at 07:12

## 2023-12-29 RX ADMIN — IOHEXOL 100 ML: 350 INJECTION, SOLUTION INTRAVENOUS at 10:12

## 2023-12-29 RX ADMIN — POTASSIUM CHLORIDE 10 MEQ: 7.46 INJECTION, SOLUTION INTRAVENOUS at 12:12

## 2023-12-29 RX ADMIN — APIXABAN 5 MG: 5 TABLET, FILM COATED ORAL at 08:12

## 2023-12-29 RX ADMIN — POTASSIUM CHLORIDE 10 MEQ: 7.46 INJECTION, SOLUTION INTRAVENOUS at 09:12

## 2023-12-29 RX ADMIN — POTASSIUM CHLORIDE 10 MEQ: 7.46 INJECTION, SOLUTION INTRAVENOUS at 08:12

## 2023-12-29 RX ADMIN — CALCIUM GLUCONATE 1 G: 20 INJECTION, SOLUTION INTRAVENOUS at 05:12

## 2023-12-29 RX ADMIN — MAGNESIUM SULFATE HEPTAHYDRATE 2 G: 40 INJECTION, SOLUTION INTRAVENOUS at 06:12

## 2023-12-29 RX ADMIN — POTASSIUM BICARBONATE 40 MEQ: 391 TABLET, EFFERVESCENT ORAL at 10:12

## 2023-12-29 RX ADMIN — MAGNESIUM SULFATE HEPTAHYDRATE 2 G: 40 INJECTION, SOLUTION INTRAVENOUS at 04:12

## 2023-12-29 RX ADMIN — SODIUM CHLORIDE, POTASSIUM CHLORIDE, SODIUM LACTATE AND CALCIUM CHLORIDE 1365 ML: 600; 310; 30; 20 INJECTION, SOLUTION INTRAVENOUS at 08:12

## 2023-12-29 RX ADMIN — OLANZAPINE 5 MG: 5 TABLET, FILM COATED ORAL at 08:12

## 2023-12-29 RX ADMIN — VANCOMYCIN HYDROCHLORIDE 1000 MG: 1 INJECTION, POWDER, LYOPHILIZED, FOR SOLUTION INTRAVENOUS at 08:12

## 2023-12-29 RX ADMIN — PANCRELIPASE 2 CAPSULE: 24000; 76000; 120000 CAPSULE, DELAYED RELEASE PELLETS ORAL at 06:12

## 2023-12-29 RX ADMIN — CHOLESTYRAMINE 8 G: 4 POWDER, FOR SUSPENSION ORAL at 09:12

## 2023-12-29 RX ADMIN — SULFASALAZINE 500 MG: 500 TABLET ORAL at 09:12

## 2023-12-29 RX ADMIN — PIPERACILLIN SODIUM AND TAZOBACTAM SODIUM 4.5 G: 4; .5 INJECTION, POWDER, FOR SOLUTION INTRAVENOUS at 10:12

## 2023-12-29 NOTE — ASSESSMENT & PLAN NOTE
- CBD stent placed 3/2023  - In the ED, patient hypothermic 95.2 and tachycardic 109 but .68  - Labs wbc 4.6, Hb 10.4, Plt 142, Na 135, K 2.4, Cr 0.8, Ca 6.3, Mg 0.8, Gluc 34, TB 6.0, , ALT 27  - septic w/u: UA negative, CXR without acute process, CT abdo with biliary stent with mild intrahepatic biliary ducts prominence. LFTs decreased from most recent labs 12/24  - f/u blood cultures  - low threshold to start broad spectrum abx if acutely worsens  - discuss with [facility]  - pt is no longer pursuing chemotherapy and oncology had rec hospice per their last note. However, patient wanted to continue her other medications and so did not proceed with this. Discussed with patient that certain medications can be continued if requested and that does not necessarily preclude hospice and she is to re-consider hospice with her daughter.

## 2023-12-29 NOTE — ASSESSMENT & PLAN NOTE
Hypomagnesemia  Hypocalcemia  Patient has hypokalemia which is Acute and currently uncontrolled. Most recent potassium levels reviewed-   Lab Results   Component Value Date    K 2.4 (LL) 12/29/2023   . Will continue potassium replacement per protocol and recheck repeat levels after replacement completed.   - Replete K  - Replete Mg  - Replete Ca, Corrected Ca 8.2  - Monitor glucose levels

## 2023-12-29 NOTE — ED NOTES
Critical lab - Mag at 1442 was resulted and lab did not communicate with this RN at that time. Saw Mag results at 1522 and informed provider. Lab contacted about incident.

## 2023-12-29 NOTE — HPI
Pauline Cronin is a 83 y.o. female with past medical history of pancreatic adenocarcinoma, Crohn's disease, p.afib, SSS s/p PPM, HTN, HLD, who presents with weakness. Patient states over the past day she has had diffuse weakness and has been unable to walk. She also has had numbness and tingling in her extremities. She has had extreme fatigue. She denies fever or chills but notes her temperature was low in the ED. She reports not eating very much due to poor appetite. She denies shortness of breath, chest pain, abdominal pain. She reports history of diarrhea with her chron's but lately she has not been having many bowel movements.

## 2023-12-29 NOTE — SUBJECTIVE & OBJECTIVE
Past Medical History:   Diagnosis Date    Anticoagulant long-term use     Atrial fibrillation     Crohn disease diagnosed in her 20's    GERD (gastroesophageal reflux disease)     Hyperlipidemia     Hypertension     Pancreatic adenocarcinoma 3/21/2023    Thyroid disease     s/p I 131       Past Surgical History:   Procedure Laterality Date    APPENDECTOMY      CARDIAC SURGERY  2014    pacemaker    COLONOSCOPY  ~2008    normal findings per patient report    ENDOSCOPIC ULTRASOUND OF UPPER GASTROINTESTINAL TRACT N/A 3/14/2023    Procedure: ULTRASOUND, UPPER GI TRACT, ENDOSCOPIC;  Surgeon: Andrei Swartz MD;  Location: [facility];  Service: Endoscopy;  Laterality: N/A;  Approval to hold Eliquis rec'd from Dr. Barbosa (see telephone encounter 3/3/23)-DS  [employer] AICD/PPM  3/3/23-Instructions via portal-DS    ERCP N/A 3/21/2023    Procedure: ERCP (ENDOSCOPIC RETROGRADE CHOLANGIOPANCREATOGRAPHY);  Surgeon: Celina Toney MD;  Location: [facility] ([address] [place] [address]);  Service: Endoscopy;  Laterality: N/A;    ESOPHAGOGASTRODUODENOSCOPY N/A 7/17/2018    Procedure: EGD (ESOPHAGOGASTRODUODENOSCOPY);  Surgeon: Alondra Casiano MD;  Location: [facility] ENDO;  Service: Endoscopy;  Laterality: N/A;    HYSTERECTOMY      INSERTION OF IMPLANTABLE CARDIOVERTER-DEFIBRILLATOR (ICD) GENERATOR WITH TWO EXISTING LEADS Left 5/10/2021    Procedure: INSERTION, PULSE GENERATOR WITH 2 EXISTING LEADS, ICD;  Surgeon: Bo Leone MD;  Location: [facility] CATH LAB;  Service: Cardiology;  Laterality: Left;    INSERTION OF PACEMAKER      REPLACEMENT OF PACEMAKER GENERATOR  05/10/2021    RETROGRADE PYELOGRAPHY Right 2/18/2020    Procedure: PYELOGRAM, RETROGRADE;  Surgeon: Jovan Kruse MD;  Location: [facility] OR;  Service: Urology;  Laterality: Right;    SMALL INTESTINE SURGERY  in her 20's    for crohn's    TONSILLECTOMY      UPPER GASTROINTESTINAL ENDOSCOPY  ~2008    normal findings per patient report       Review of patient's allergies indicates:    Allergen Reactions    Cleocin [clindamycin hcl]     Lisinopril Other (See Comments)     cough       No current facility-administered medications on file prior to encounter.     Current Outpatient Medications on File Prior to Encounter   Medication Sig    amoxicillin (AMOXIL) 500 MG capsule Take 500 mg by mouth 3 (three) times daily.    CONSTULOSE 10 gram/15 mL solution Take by mouth.    fluconazole (DIFLUCAN) 150 MG Tab Take 150 mg by mouth once.    acetaminophen (TYLENOL) 325 MG tablet Take 650 mg by mouth daily as needed (Headache).    amLODIPine (NORVASC) 2.5 MG tablet Take 1 tablet (2.5 mg total) by mouth once daily.    atorvastatin (LIPITOR) 20 MG tablet Take 1 tablet by mouth once daily    colestipoL (COLESTID) 1 gram Tab Take 1 tablet (1 g total) by mouth 2 (two) times daily.    diphenoxylate-atropine 2.5-0.025 mg (LOMOTIL) 2.5-0.025 mg per tablet TAKE 1 TABLET BY MOUTH 4 TIMES DAILY AS NEEDED FOR DIARRHEA    ELIQUIS 5 mg Tab Take 1 tablet (5 mg total) by mouth 2 (two) times daily.    esomeprazole (NEXIUM) 40 MG capsule Take 1 capsule by mouth once daily    folic acid (FOLVITE) 1 MG tablet Take 1 tablet by mouth once daily    lactulose (CHRONULAC) 20 gram/30 mL Soln Take 30 mLs (20 g total) by mouth 2 (two) times daily as needed (constipation).    levothyroxine (SYNTHROID, LEVOTHROID) 175 MCG tablet Take 1 tablet by mouth once daily    LIDOcaine-prilocaine (EMLA) cream Apply topically as needed (place to port site 45-60 minutes prior to port access).    lipase-protease-amylase 24,000-76,000-120,000 units (CREON) 24,000-76,000 -120,000 unit capsule Take 2 capsules by mouth 3 (three) times daily with meals.    MELATONIN ORAL Take 1 tablet by mouth nightly as needed (Insomnia).    metoprolol succinate (TOPROL-XL) 25 MG 24 hr tablet Take 3 tablets (75 mg total) by mouth once daily.    OLANZapine (ZYPREXA) 5 MG tablet Take 1 tablet (5 mg total) by mouth every evening.    prochlorperazine (COMPAZINE) 10 MG tablet  Take 1 tablet (10 mg total) by mouth 4 (four) times daily as needed (nausea).    sulfaSALAzine (AZULFIDINE) 500 mg Tab Take 1 tablet by mouth twice daily    tolterodine (DETROL LA) 4 MG 24 hr capsule Take 1 capsule (4 mg total) by mouth once daily.    traMADoL (ULTRAM) 50 mg tablet Take 1 tablet (50 mg total) by mouth every 6 (six) hours as needed for Pain.    [DISCONTINUED] esomeprazole (NEXIUM) 40 MG capsule Take 1 capsule (40 mg total) by mouth once daily.     Family History       Problem Relation (Age of Onset)    Breast cancer Mother    Cancer Mother    Crohn's disease Other          Tobacco Use    Smoking status: Former     Current packs/day: 0.00     Types: Cigarettes     Quit date:      Years since quittin.0    Smokeless tobacco: Never   Substance and Sexual Activity    Alcohol use: No    Drug use: No    Sexual activity: Never     Review of Systems   Constitutional:  Positive for fatigue. Negative for chills and fever.   HENT:  Negative for congestion and sore throat.    Respiratory:  Negative for cough, chest tightness and shortness of breath.    Cardiovascular:  Negative for chest pain and leg swelling.   Gastrointestinal:  Negative for abdominal pain, diarrhea, nausea and vomiting.   Genitourinary:  Negative for dysuria, frequency and urgency.   Musculoskeletal:  Negative for arthralgias and myalgias.   Neurological:  Positive for weakness. Negative for dizziness and light-headedness.   Psychiatric/Behavioral:  Negative for agitation and confusion.      Objective:     Vital Signs (Most Recent):  Temp: 97.9 °F (36.6 °C) (23 1520)  Pulse: 69 (23 1601)  Resp: 20 (23 1001)  BP: (!) 118/56 (23 1601)  SpO2: 97 % (23 1601) Vital Signs (24h Range):  Temp:  [95.2 °F (35.1 °C)-97.9 °F (36.6 °C)] 97.9 °F (36.6 °C)  Pulse:  [] 69  Resp:  [17-20] 20  SpO2:  [96 %-99 %] 97 %  BP: (118-141)/(56-68) 118/56     Weight: 59 kg (130 lb)  Body mass index is 25.39 kg/m².      Physical Exam  Constitutional:       General: She is not in acute distress.     Appearance: Normal appearance. She is ill-appearing. She is not toxic-appearing or diaphoretic.   HENT:      Head: Normocephalic and atraumatic.   Cardiovascular:      Rate and Rhythm: Normal rate and regular rhythm.      Heart sounds: No murmur heard.     No friction rub. No gallop.   Pulmonary:      Effort: Pulmonary effort is normal. No respiratory distress.      Breath sounds: Normal breath sounds. No wheezing or rales.   Abdominal:      General: Abdomen is flat. There is no distension.      Palpations: Abdomen is soft.      Tenderness: There is no abdominal tenderness. There is no guarding or rebound.   Musculoskeletal:      Right lower leg: No edema.      Left lower leg: No edema.   Skin:     General: Skin is warm and dry.   Neurological:      General: No focal deficit present.      Mental Status: She is alert and oriented to person, place, and time.                Significant Labs: All pertinent labs within the past 24 hours have been reviewed.    Significant Imaging: I have reviewed all pertinent imaging results/findings within the past 24 hours.

## 2023-12-29 NOTE — ED PROVIDER NOTES
Encounter Date: 12/29/2023       History     Chief Complaint   Patient presents with    Fatigue     From home. Pt. Refused IV with EMS, initial CBG 31. Pt. Received oral glucose and now 61     Pauline Cronin is a 83 y.o. female with PMH of pancreatic cancer, Crohn's disease, atrial fibrillation on Eliquis, presenting [facility] for fatigue.  Patient states she had a recent admission for jaundice and concern for obstruction of her pancreatic stent.  Patient states for the last day she has had generalized weakness and feeling like she might pass out.  Also reports numbness in her extremities.  Denies any fevers, nausea/vomiting, cough, runny nose sore throat, abdominal pain, chest pain, shortness of breath, dysuria.  States that her urine has been darker than normal.  EMS found patient to be hypoglycemic in the 30s.  Patient was given oral glucose with improvement into the 60s.  Upon presentation to the emergency department, patient has glucose in the 30s.        Review of patient's allergies indicates:   Allergen Reactions    Cleocin [clindamycin hcl]     Lisinopril Other (See Comments)     cough     Past Medical History:   Diagnosis Date    Anticoagulant long-term use     Atrial fibrillation     Crohn disease diagnosed in her 20's    GERD (gastroesophageal reflux disease)     Hyperlipidemia     Hypertension     Pancreatic adenocarcinoma 3/21/2023    Thyroid disease     s/p I 131     Past Surgical History:   Procedure Laterality Date    APPENDECTOMY      CARDIAC SURGERY  2014    pacemaker    COLONOSCOPY  ~2008    normal findings per patient report    ENDOSCOPIC ULTRASOUND OF UPPER GASTROINTESTINAL TRACT N/A 3/14/2023    Procedure: ULTRASOUND, UPPER GI TRACT, ENDOSCOPIC;  Surgeon: Andrei Swartz MD;  Location: [facility];  Service: Endoscopy;  Laterality: N/A;  Approval to hold Eliquis rec'd from Dr. Barbosa (see telephone encounter 3/3/23)-DS  [employer] AICD/PPM  3/3/23-Instructions via portal-DS    ERCP N/A  3/21/2023    Procedure: ERCP (ENDOSCOPIC RETROGRADE CHOLANGIOPANCREATOGRAPHY);  Surgeon: Celina Toney MD;  [facility]  Service: Endoscopy;  Laterality: N/A;    ESOPHAGOGASTRODUODENOSCOPY N/A 2018    Procedure: EGD (ESOPHAGOGASTRODUODENOSCOPY);  Surgeon: Alondra Casiano MD;  Location: [facility] ENDO;  Service: Endoscopy;  Laterality: N/A;    HYSTERECTOMY      INSERTION OF IMPLANTABLE CARDIOVERTER-DEFIBRILLATOR (ICD) GENERATOR WITH TWO EXISTING LEADS Left 5/10/2021    Procedure: INSERTION, PULSE GENERATOR WITH 2 EXISTING LEADS, ICD;  Surgeon: Bo Leone MD;  Location: [facility] CATH LAB;  Service: Cardiology;  Laterality: Left;    INSERTION OF PACEMAKER      REPLACEMENT OF PACEMAKER GENERATOR  05/10/2021    RETROGRADE PYELOGRAPHY Right 2020    Procedure: PYELOGRAM, RETROGRADE;  Surgeon: Jovan Kruse MD;  Location: [facility];  Service: Urology;  Laterality: Right;    SMALL INTESTINE SURGERY  in her 20's    for crohn's    TONSILLECTOMY      UPPER GASTROINTESTINAL ENDOSCOPY  ~    normal findings per patient report     Family History   Problem Relation Age of Onset    Cancer Mother     Breast cancer Mother     Crohn's disease Other     Colon cancer Neg Hx     Colon polyps Neg Hx     Esophageal cancer Neg Hx     Stomach cancer Neg Hx     Ulcerative colitis Neg Hx      Social History     Tobacco Use    Smoking status: Former     Current packs/day: 0.00     Types: Cigarettes     Quit date:      Years since quittin.0    Smokeless tobacco: Never   Substance Use Topics    Alcohol use: No    Drug use: No     Review of Systems   Constitutional:  Positive for appetite change and fever.   HENT:  Negative for congestion, rhinorrhea and sore throat.    Eyes:  Negative for visual disturbance.   Respiratory:  Negative for cough and shortness of breath.    Cardiovascular:  Negative for chest pain and leg swelling.   Gastrointestinal:  Negative for abdominal pain, diarrhea, nausea and  vomiting.   Genitourinary:  Negative for dysuria and hematuria.   Neurological:  Positive for numbness. Negative for weakness.       Physical Exam     Initial Vitals [12/29/23 0726]   BP Pulse Resp Temp SpO2   128/68 109 18 (S) (!) 95.2 °F (35.1 °C) 96 %      MAP       --         Physical Exam    Nursing note and vitals reviewed.  Constitutional: She appears well-developed and well-nourished. She is cooperative.  Non-toxic appearance. She does not appear ill.   HENT:   Head: Normocephalic and atraumatic.   Mouth/Throat: Mucous membranes are normal. Mucous membranes are not dry.   Eyes: Conjunctivae are normal. Pupils are equal, round, and reactive to light.   Neck: Trachea normal and phonation normal.   Cardiovascular:  Normal rate, regular rhythm, normal heart sounds, intact distal pulses and normal pulses.     Exam reveals no gallop, no S3, no S4 and no friction rub.       No murmur heard.  Pulmonary/Chest: Breath sounds normal. No respiratory distress. She has no wheezes. She has no rhonchi. She has no rales.   Abdominal: Abdomen is soft. She exhibits no distension. There is no abdominal tenderness.   Musculoskeletal:      Right lower leg: No edema.      Left lower leg: No edema.     Neurological: She is alert.   Skin: Skin is warm, dry and intact. Capillary refill takes less than 2 seconds.        Diffuse jaundice   Psychiatric: She has a normal mood and affect. Her speech is normal.         ED Course   Procedures  Labs Reviewed   CBC W/ AUTO DIFFERENTIAL - Abnormal; Notable for the following components:       Result Value    RBC 3.56 (*)     Hemoglobin 10.4 (*)     Hematocrit 30.8 (*)     RDW 18.3 (*)     Platelets 142 (*)     Lymph # 0.8 (*)     Lymph % 16.5 (*)     All other components within normal limits   COMPREHENSIVE METABOLIC PANEL - Abnormal; Notable for the following components:    Sodium 135 (*)     Potassium 2.4 (*)     Glucose 237 (*)     Calcium 6.3 (*)     Albumin 1.6 (*)     Total Bilirubin 6.0  (*)     Alkaline Phosphatase 340 (*)      (*)     All other components within normal limits   URINALYSIS, REFLEX TO URINE CULTURE - Abnormal; Notable for the following components:    Appearance, UA Hazy (*)     Leukocytes, UA Trace (*)     All other components within normal limits    Narrative:     Specimen Source->Urine   MAGNESIUM - Abnormal; Notable for the following components:    Magnesium 0.8 (*)     All other components within normal limits    Narrative:     add on mg per  /order#[id number] @ 12/29/2023  11:18    URINALYSIS MICROSCOPIC - Abnormal; Notable for the following components:    WBC, UA 6 (*)     All other components within normal limits    Narrative:     Specimen Source->Urine   POCT GLUCOSE - Abnormal; Notable for the following components:    POCT Glucose 34 (*)     All other components within normal limits   POCT GLUCOSE - Abnormal; Notable for the following components:    POCT Glucose 254 (*)     All other components within normal limits   POCT GLUCOSE - Abnormal; Notable for the following components:    POCT Glucose 152 (*)     All other components within normal limits   CULTURE, BLOOD    Narrative:     Aerobic and anaerobic   CULTURE, BLOOD    Narrative:     Aerobic and anaerobic   SARS-COV2 (COVID) WITH FLU/RSV BY PCR   MAGNESIUM   MAGNESIUM   ISTAT LACTATE   POCT GLUCOSE MONITORING CONTINUOUS        ECG Results              EKG 12-lead (Final result)  Result time 12/29/23 10:03:23      Final result by Interface, Lab In [facility] (12/29/23 10:03:23)                   Narrative:    Test Reason : E16.2,    Vent. Rate : 072 BPM     Atrial Rate : 072 BPM     P-R Int : 160 ms          QRS Dur : 140 ms      QT Int : 508 ms       P-R-T Axes : 063 -42 134 degrees     QTc Int : 556 ms    A paced and prolonged AV conduction  Left axis deviation  Left bundle branch block  Abnormal ECG  When compared with ECG of 02-SEP-2023 10:27,  No change  Confirmed by Joaquin MCKINLEY MD ([id number]) on 12/29/2023  10:03:12 AM    Referred By: System System           Confirmed By:Joaquin MCKINLEY MD                                  Imaging Results              CT Abdomen Pelvis With IV Contrast NO Oral Contrast (Final result)  Result time 12/29/23 11:50:06      Final result by Ting Oro MD (12/29/23 11:50:06)                   Impression:      Biliary stent with mild intrahepatic biliary ducts prominence.  To correlate with liver enzymes.    Cholelithiasis with mild distension of the gallbladder.    Pancreatic head mass with atrophy of the pancreas and mild prominence of the pancreatic duct, similar to the prior study.    Smaller cystic lesion abutting the pancreas and stomach.      Electronically signed by: Ting Oro MD  Date:    12/29/2023  Time:    11:50               Narrative:    EXAMINATION:  CT ABDOMEN PELVIS WITH IV CONTRAST    CLINICAL HISTORY:  Abdominal abscess/infection suspected;    TECHNIQUE:  Low dose axial images, sagittal and coronal reformations were obtained from the lung bases to the pubic symphysis following the IV administration of 100 mL of Omnipaque 350 .  Oral contrast was not given. Axial and coronal images reformatted.    COMPARISON:  09/02/2023 CT abdomen and pelvis    FINDINGS:  Sternotomy wires.  The cardiac silhouette is enlarged.  No pericardial effusion.  There are coronary artery calcifications.    No pleural effusion.    There is a biliary stent in good position.  Slight prominence of the intrahepatic biliary ducts.  The hepatic duct measures 9 mm.  No liver lesions.  The hepatic vasculature is normal.  The gallbladder is mildly distended, several small stones are seen within.    The distal esophagus and stomach appear normal.    Lobulated pancreatic head mass about 2.2 x 2.3 cm, with diffuse pancreas atrophy and mild prominence of the pancreatic duct, similar to the prior exam.  Previously seen fluid collection or pancreatic cyst superior to the pancreas measures 2.6 cm,  previously measuring 4.7 cm, it is decreased in size.    The spleen, bilateral adrenal glands, bilateral kidneys and ureters appear normal.  There is a small calcific density along the path of the distal right ureter, similar the prior study most suggestive of an adjacent phlebolith rather than a ureteral stone.  The bladder appears normal.  The uterus is removed.  The ovaries do not appear enlarged.    The abdominal aorta tapers normally, no para-aortic lymphadenopathy.  There is mild atherosclerotic plaque.    Moderate stool retention, no inflammatory changes of the bowel seen, no bowel dilation.  The small bowel is not dilated.  The appendix is not readily identified.  No mesenteric inflammatory process.  No adenopathy.  No free air, no free fluid.    The abdominal wall is intact.  The inguinal regions appear normal.    The osseous structures demonstrate no osseous lesions.                                       X-Ray Chest AP Portable (Final result)  Result time 12/29/23 08:20:59      Final result by Ting Oro MD (12/29/23 08:20:59)                   Impression:      No acute intrathoracic process seen.      Electronically signed by: Ting Oro MD  Date:    12/29/2023  Time:    08:20               Narrative:    EXAMINATION:  XR CHEST AP PORTABLE    CLINICAL HISTORY:  Sepsis;    TECHNIQUE:  Single frontal view of the chest was performed.    COMPARISON:  09/02/2023    FINDINGS:  Cardio stimulator device in the left upper chest, unchanged.    Port in the right upper chest, distal tip in the SVC unchanged.    Sternotomy wires.    The cardiac silhouette is normal in size.  The pulmonary vascularity is normal.    No focal airspace disease.    No pleural effusion, no pneumothorax.    The osseous structures appear within normal limits.                                       Medications   magnesium sulfate 2g in water 50mL IVPB (premix) (has no administration in time range)   vancomycin (VANCOCIN)  1,000 mg in dextrose 5 % (D5W) 250 mL IVPB (Vial-Mate) (0 mg Intravenous Stopped 12/29/23 1021)   piperacillin-tazobactam (ZOSYN) 4.5 g in dextrose 5 % in water (D5W) 100 mL IVPB (MB+) (0 g Intravenous Stopped 12/29/23 1119)   lactated ringers bolus 1,365 mL (0 mLs Intravenous Stopped 12/29/23 0936)   dextrose 10% bolus 500 mL 500 mL (0 mLs Intravenous Stopped 12/29/23 0845)   potassium bicarbonate disintegrating tablet 40 mEq (40 mEq Oral Given 12/29/23 1021)   iohexoL (OMNIPAQUE 350) injection 100 mL (100 mLs Intravenous Given 12/29/23 1058)   potassium chloride 10 mEq in 100 mL IVPB (0 mEq Intravenous Stopped 12/29/23 1411)   acetaminophen tablet 1,000 mg (1,000 mg Oral Given 12/29/23 1506)     Medical Decision Making  83-year-old female with history of pancreatic cancer presenting for generalized weakness.  Initially, patient is hypothermic/hypoglycemic concerning for sepsis.  Sepsis workup started.  Will give broad-spectrum antibiotics of vanc/Zosyn, will give D10 bolus for hypoglycemia, will give 30 cc/kilogram of ideal body weight for fluid resuscitation.      Suspect infection as source of patient's hypoglycemia/weakness due to hypothermia.  This patient has generalized jaundice and recent admission was due to concern for obstruction of the pancreatic duct, concerning for abdominal pathology patient's infection.  Will obtain CT abdomen.    Labs significant for hypokalemia/hypomagnesemia.  Will provide p.o./IV replacement of electrolytes.  CT abdomen is concerning for biliary stent obstruction.  Discussed options of hospice care versus medical management with patient.  She would not like to pursue chemotherapy but would like to pursue treatments for hypokalemia, hypomagnesemia.  Additionally, patient is interested in options in terms of her stent obstruction.  Emergent GI consultation is not necessary at this time since patient has stable bilirubin/LFTs.      Discussed patient's case with Hospital Medicine  who agreed to admit patient to their service for further management of obstructed stent.  Patient is stable at time of transfer.    Amount and/or Complexity of Data Reviewed  Independent Historian: caregiver  External Data Reviewed: labs, radiology and notes.  Labs: ordered. Decision-making details documented in ED Course.  Radiology: ordered. Decision-making details documented in ED Course.  ECG/medicine tests: ordered and independent interpretation performed.    Risk  OTC drugs.  Prescription drug management.               ED Course as of 12/29/23 1525   Fri Dec 29, 2023   0812 POCT Glucose(!!): 34 [ES]   0841 Hemoglobin(!): 10.4  Normocytic anemia consistent with baseline. [ES]   0841 POC Lactate: 1.17 [ES]   0909 X-Ray Chest AP Portable  Independent interpretation:  No evidence of pneumonia. [ES]   0945 BILIRUBIN TOTAL(!): 6.0 [ES]   0945 ALP(!): 340 [ES]   0945 AST(!): 132 [ES]   0945 Calcium(!!): 6.3 [ES]   0946 POCT Glucose(!): 254 [ES]   1025 SARS-CoV2 (COVID-19) Qualitative PCR: Not Detected [ES]   1025 Influenza A, Molecular: Not Detected [ES]   1025 Influenza B, Molecular: Not Detected [ES]   1025 RSV Ag by Molecular Method: Not Detected [ES]   1211 RBC, UA: 2 [ES]   1211 WBC, UA(!): 6 [ES]   1211 Bacteria, UA: Occasional  Low suspicion for UTI [ES]      ED Course User Index  [ES] Margo Rojas MD                           Clinical Impression:  Final diagnoses:  [E16.2] Hypoglycemia  [T85.590A] Biliary stent obstruction          ED Disposition Condition    Observation Stable                Margo Rojas MD  Resident  12/29/23 1525

## 2023-12-29 NOTE — TELEPHONE ENCOUNTER
Refill Routing Note   Medication(s) are not appropriate for processing by [facility] for the following reason(s):        No active prescription written by provider  ED/Hospital Visit since last OV with provider    [facility] action(s):  Defer     Requires labs : Yes             Appointments  past 12m or future 3m with PCP    Date Provider   Last Visit   10/18/2023 Ga Waldrop MD   Next Visit   1/19/2024 Ga Waldrop MD   ED visits in past 90 days: 1        Note composed:5:57 AM 12/29/2023

## 2023-12-29 NOTE — ED TRIAGE NOTES
Patient is a 83 year old female that presents to the ED via EMS with c/o fatigue upon EMS arrival pt's BG was 31. She received oral glucose in route and BG was 61. Pt has a hx of pancreatic cancer not on chemo. She is noted to be jaundice.

## 2023-12-29 NOTE — H&P
[facility] - Emergency Dept  [facility] Medicine  History & Physical    Patient Name: Pauline Cronin  MRN: [id number]  Patient Class: OP- Observation  Admission Date: 12/29/2023  Attending Physician: Keith Mar*   Primary Care Provider: Ga Waldrop MD         Patient information was obtained from patient, past medical records, and ER records.     Subjective:     Principal Problem:Hypokalemia    Chief Complaint:   Chief Complaint   Patient presents with    Fatigue     From home. Pt. Refused IV with EMS, initial CBG 31. Pt. Received oral glucose and now 61        HPI: Pauline Cronin is a 83 y.o. female with past medical history of pancreatic adenocarcinoma, Crohn's disease, p.afib, SSS s/p PPM, HTN, HLD, who presents with weakness. Patient states over the past day she has had diffuse weakness and has been unable to walk. She also has had numbness and tingling in her extremities. She has had extreme fatigue. She denies fever or chills but notes her temperature was low in the ED. She reports not eating very much due to poor appetite. She denies shortness of breath, chest pain, abdominal pain. She reports history of diarrhea with her chron's but lately she has not been having many bowel movements. In the ED, patient was hypothermic 95.2 and tachycardic 109 but .68, Labs with wbc 4.6, Hb 10.4, Plt 142, Na 135, K 2.4, Cr 0.8, Ca 6.3, Mg 0.8, Gluc 34, TB 6.0, , ALT 27, Ua negative, CXR without acute process, CT abdo with biliary stent with mild intrahepatic biliary ducts prominence. Patient admitted to hospital medicine.     Past Medical History:   Diagnosis Date    Anticoagulant long-term use     Atrial fibrillation     Crohn disease diagnosed in her 20's    GERD (gastroesophageal reflux disease)     Hyperlipidemia     Hypertension     Pancreatic adenocarcinoma 3/21/2023    Thyroid disease     s/p I 131       Past Surgical History:   Procedure Laterality Date    APPENDECTOMY      CARDIAC  SURGERY  2014    pacemaker    COLONOSCOPY  ~2008    normal findings per patient report    ENDOSCOPIC ULTRASOUND OF UPPER GASTROINTESTINAL TRACT N/A 3/14/2023    Procedure: ULTRASOUND, UPPER GI TRACT, ENDOSCOPIC;  Surgeon: Andrei Swartz MD;  Location: [facility] ENDO;  Service: Endoscopy;  Laterality: N/A;  Approval to hold Eliquis rec'd from Dr. Barbosa (see telephone encounter 3/3/23)-DS  [employer] AICD/PPM  3/3/23-Instructions via portal-DS    ERCP N/A 3/21/2023    Procedure: ERCP (ENDOSCOPIC RETROGRADE CHOLANGIOPANCREATOGRAPHY);  Surgeon: Celina Toney MD;  [facility]  Service: Endoscopy;  Laterality: N/A;    ESOPHAGOGASTRODUODENOSCOPY N/A 7/17/2018    Procedure: EGD (ESOPHAGOGASTRODUODENOSCOPY);  Surgeon: Alondra Casiano MD;  Location: [facility] ENDO;  Service: Endoscopy;  Laterality: N/A;    HYSTERECTOMY      INSERTION OF IMPLANTABLE CARDIOVERTER-DEFIBRILLATOR (ICD) GENERATOR WITH TWO EXISTING LEADS Left 5/10/2021    Procedure: INSERTION, PULSE GENERATOR WITH 2 EXISTING LEADS, ICD;  Surgeon: Bo Leone MD;  Location: [facility] CATH LAB;  Service: Cardiology;  Laterality: Left;    INSERTION OF PACEMAKER      REPLACEMENT OF PACEMAKER GENERATOR  05/10/2021    RETROGRADE PYELOGRAPHY Right 2/18/2020    Procedure: PYELOGRAM, RETROGRADE;  Surgeon: Jovan Kruse MD;  Location: [facility];  Service: Urology;  Laterality: Right;    SMALL INTESTINE SURGERY  in her 20's    for crohn's    TONSILLECTOMY      UPPER GASTROINTESTINAL ENDOSCOPY  ~2008    normal findings per patient report       Review of patient's allergies indicates:   Allergen Reactions    Cleocin [clindamycin hcl]     Lisinopril Other (See Comments)     cough       No current facility-administered medications on file prior to encounter.     Current Outpatient Medications on File Prior to Encounter   Medication Sig    amoxicillin (AMOXIL) 500 MG capsule Take 500 mg by mouth 3 (three) times daily.    CONSTULOSE 10 gram/15 mL solution Take  by mouth.    fluconazole (DIFLUCAN) 150 MG Tab Take 150 mg by mouth once.    acetaminophen (TYLENOL) 325 MG tablet Take 650 mg by mouth daily as needed (Headache).    amLODIPine (NORVASC) 2.5 MG tablet Take 1 tablet (2.5 mg total) by mouth once daily.    atorvastatin (LIPITOR) 20 MG tablet Take 1 tablet by mouth once daily    colestipoL (COLESTID) 1 gram Tab Take 1 tablet (1 g total) by mouth 2 (two) times daily.    diphenoxylate-atropine 2.5-0.025 mg (LOMOTIL) 2.5-0.025 mg per tablet TAKE 1 TABLET BY MOUTH 4 TIMES DAILY AS NEEDED FOR DIARRHEA    ELIQUIS 5 mg Tab Take 1 tablet (5 mg total) by mouth 2 (two) times daily.    esomeprazole (NEXIUM) 40 MG capsule Take 1 capsule by mouth once daily    folic acid (FOLVITE) 1 MG tablet Take 1 tablet by mouth once daily    lactulose (CHRONULAC) 20 gram/30 mL Soln Take 30 mLs (20 g total) by mouth 2 (two) times daily as needed (constipation).    levothyroxine (SYNTHROID, LEVOTHROID) 175 MCG tablet Take 1 tablet by mouth once daily    LIDOcaine-prilocaine (EMLA) cream Apply topically as needed (place to port site 45-60 minutes prior to port access).    lipase-protease-amylase 24,000-76,000-120,000 units (CREON) 24,000-76,000 -120,000 unit capsule Take 2 capsules by mouth 3 (three) times daily with meals.    MELATONIN ORAL Take 1 tablet by mouth nightly as needed (Insomnia).    metoprolol succinate (TOPROL-XL) 25 MG 24 hr tablet Take 3 tablets (75 mg total) by mouth once daily.    OLANZapine (ZYPREXA) 5 MG tablet Take 1 tablet (5 mg total) by mouth every evening.    prochlorperazine (COMPAZINE) 10 MG tablet Take 1 tablet (10 mg total) by mouth 4 (four) times daily as needed (nausea).    sulfaSALAzine (AZULFIDINE) 500 mg Tab Take 1 tablet by mouth twice daily    tolterodine (DETROL LA) 4 MG 24 hr capsule Take 1 capsule (4 mg total) by mouth once daily.    traMADoL (ULTRAM) 50 mg tablet Take 1 tablet (50 mg total) by mouth every 6 (six) hours as needed for Pain.    [DISCONTINUED]  esomeprazole (NEXIUM) 40 MG capsule Take 1 capsule (40 mg total) by mouth once daily.     Family History       Problem Relation (Age of Onset)    Breast cancer Mother    Cancer Mother    Crohn's disease Other          Tobacco Use    Smoking status: Former     Current packs/day: 0.00     Types: Cigarettes     Quit date:      Years since quittin.0    Smokeless tobacco: Never   Substance and Sexual Activity    Alcohol use: No    Drug use: No    Sexual activity: Never     Review of Systems   Constitutional:  Positive for fatigue. Negative for chills and fever.   HENT:  Negative for congestion and sore throat.    Respiratory:  Negative for cough, chest tightness and shortness of breath.    Cardiovascular:  Negative for chest pain and leg swelling.   Gastrointestinal:  Negative for abdominal pain, diarrhea, nausea and vomiting.   Genitourinary:  Negative for dysuria, frequency and urgency.   Musculoskeletal:  Negative for arthralgias and myalgias.   Neurological:  Positive for weakness. Negative for dizziness and light-headedness.   Psychiatric/Behavioral:  Negative for agitation and confusion.      Objective:     Vital Signs (Most Recent):  Temp: 97.9 °F (36.6 °C) (23 1520)  Pulse: 69 (23 1601)  Resp: 20 (23 1001)  BP: (!) 118/56 (23 1601)  SpO2: 97 % (23 1601) Vital Signs (24h Range):  Temp:  [95.2 °F (35.1 °C)-97.9 °F (36.6 °C)] 97.9 °F (36.6 °C)  Pulse:  [] 69  Resp:  [17-20] 20  SpO2:  [96 %-99 %] 97 %  BP: (118-141)/(56-68) 118/56     Weight: 59 kg (130 lb)  Body mass index is 25.39 kg/m².     Physical Exam  Constitutional:       General: She is not in acute distress.     Appearance: Normal appearance. She is ill-appearing. She is not toxic-appearing or diaphoretic.   HENT:      Head: Normocephalic and atraumatic.   Cardiovascular:      Rate and Rhythm: Normal rate and regular rhythm.      Heart sounds: No murmur heard.     No friction rub. No gallop.   Pulmonary:       Effort: Pulmonary effort is normal. No respiratory distress.      Breath sounds: Normal breath sounds. No wheezing or rales.   Abdominal:      General: Abdomen is flat. There is no distension.      Palpations: Abdomen is soft.      Tenderness: There is no abdominal tenderness. There is no guarding or rebound.   Musculoskeletal:      Right lower leg: No edema.      Left lower leg: No edema.   Skin:     General: Skin is warm and dry.   Neurological:      General: No focal deficit present.      Mental Status: She is alert and oriented to person, place, and time.                Significant Labs: All pertinent labs within the past 24 hours have been reviewed.    Significant Imaging: I have reviewed all pertinent imaging results/findings within the past 24 hours.  Assessment/Plan:     * Hypokalemia  Hypomagnesemia  Hypocalcemia  Patient has hypokalemia which is Acute and currently uncontrolled. Most recent potassium levels reviewed-   Lab Results   Component Value Date    K 2.4 (LL) 12/29/2023   . Will continue potassium replacement per protocol and recheck repeat levels after replacement completed.   - Replete K  - Replete Mg  - Replete Ca, Corrected Ca 8.2  - Monitor glucose levels    Biliary obstruction  - CBD stent placed 3/2023  - In the ED, patient hypothermic 95.2 and tachycardic 109 but .68  - Labs wbc 4.6, Hb 10.4, Plt 142, Na 135, K 2.4, Cr 0.8, Ca 6.3, Mg 0.8, Gluc 34, TB 6.0, , ALT 27  - septic w/u: UA negative, CXR without acute process, CT abdo with biliary stent with mild intrahepatic biliary ducts prominence. LFTs decreased from most recent labs 12/24  - f/u blood cultures  - low threshold to start broad spectrum abx if acutely worsens  - discuss with [facility]  - pt is no longer pursuing chemotherapy and oncology had rec hospice per their last note. However, patient wanted to continue her other medications and so did not proceed with this. Discussed with patient that certain medications can be  continued if requested and that does not necessarily preclude hospice and she is to re-consider hospice with her daughter.       Hypothyroidism  Continue synthroid      Hyperlipidemia  Continue statin      Paroxysmal atrial fibrillation  Continue metoprolol  Continue eliquis      VTE Risk Mitigation (From admission, onward)           Ordered     apixaban tablet 5 mg  2 times daily         12/29/23 1606     IP VTE HIGH RISK PATIENT  Once         12/29/23 1606     Place sequential compression device  Until discontinued         12/29/23 1606                       On 12/29/2023, patient should be placed in hospital observation services under my care.             Keith Mar MD  Department of Hospital Medicine  [facility] - Emergency Dept

## 2023-12-30 VITALS
WEIGHT: 147 LBS | DIASTOLIC BLOOD PRESSURE: 63 MMHG | OXYGEN SATURATION: 97 % | HEIGHT: 60 IN | HEART RATE: 84 BPM | BODY MASS INDEX: 28.86 KG/M2 | RESPIRATION RATE: 16 BRPM | SYSTOLIC BLOOD PRESSURE: 139 MMHG | TEMPERATURE: 98 F

## 2023-12-30 LAB
ALBUMIN SERPL BCP-MCNC: 1.5 G/DL (ref 3.5–5.2)
ALP SERPL-CCNC: 358 U/L (ref 55–135)
ALT SERPL W/O P-5'-P-CCNC: 28 U/L (ref 10–44)
ANION GAP SERPL CALC-SCNC: 8 MMOL/L (ref 8–16)
AST SERPL-CCNC: 133 U/L (ref 10–40)
BASOPHILS # BLD AUTO: 0.05 K/UL (ref 0–0.2)
BASOPHILS NFR BLD: 1 % (ref 0–1.9)
BILIRUB SERPL-MCNC: 5.3 MG/DL (ref 0.1–1)
BUN SERPL-MCNC: 9 MG/DL (ref 8–23)
CALCIUM SERPL-MCNC: 6.9 MG/DL (ref 8.7–10.5)
CHLORIDE SERPL-SCNC: 102 MMOL/L (ref 95–110)
CO2 SERPL-SCNC: 28 MMOL/L (ref 23–29)
CREAT SERPL-MCNC: 0.7 MG/DL (ref 0.5–1.4)
DIFFERENTIAL METHOD BLD: ABNORMAL
EOSINOPHIL # BLD AUTO: 0.1 K/UL (ref 0–0.5)
EOSINOPHIL NFR BLD: 1.6 % (ref 0–8)
ERYTHROCYTE [DISTWIDTH] IN BLOOD BY AUTOMATED COUNT: 18.3 % (ref 11.5–14.5)
EST. GFR  (NO RACE VARIABLE): >60 ML/MIN/1.73 M^2
GLUCOSE SERPL-MCNC: 153 MG/DL (ref 70–110)
HCT VFR BLD AUTO: 29.4 % (ref 37–48.5)
HGB BLD-MCNC: 10 G/DL (ref 12–16)
IMM GRANULOCYTES # BLD AUTO: 0.01 K/UL (ref 0–0.04)
IMM GRANULOCYTES NFR BLD AUTO: 0.2 % (ref 0–0.5)
LYMPHOCYTES # BLD AUTO: 0.8 K/UL (ref 1–4.8)
LYMPHOCYTES NFR BLD: 15.3 % (ref 18–48)
MAGNESIUM SERPL-MCNC: 1.9 MG/DL (ref 1.6–2.6)
MCH RBC QN AUTO: 29.7 PG (ref 27–31)
MCHC RBC AUTO-ENTMCNC: 34 G/DL (ref 32–36)
MCV RBC AUTO: 87 FL (ref 82–98)
MONOCYTES # BLD AUTO: 0.6 K/UL (ref 0.3–1)
MONOCYTES NFR BLD: 12.1 % (ref 4–15)
NEUTROPHILS # BLD AUTO: 3.5 K/UL (ref 1.8–7.7)
NEUTROPHILS NFR BLD: 69.8 % (ref 38–73)
NRBC BLD-RTO: 0 /100 WBC
PLATELET # BLD AUTO: 134 K/UL (ref 150–450)
PMV BLD AUTO: 11.5 FL (ref 9.2–12.9)
POCT GLUCOSE: 103 MG/DL (ref 70–110)
POCT GLUCOSE: 128 MG/DL (ref 70–110)
POCT GLUCOSE: 155 MG/DL (ref 70–110)
POCT GLUCOSE: 53 MG/DL (ref 70–110)
POCT GLUCOSE: 83 MG/DL (ref 70–110)
POCT GLUCOSE: 86 MG/DL (ref 70–110)
POTASSIUM SERPL-SCNC: 3 MMOL/L (ref 3.5–5.1)
PROT SERPL-MCNC: 5.9 G/DL (ref 6–8.4)
RBC # BLD AUTO: 3.37 M/UL (ref 4–5.4)
SODIUM SERPL-SCNC: 138 MMOL/L (ref 136–145)
WBC # BLD AUTO: 5.04 K/UL (ref 3.9–12.7)

## 2023-12-30 PROCEDURE — 63600175 PHARM REV CODE 636 W HCPCS: Mod: HCNC | Performed by: STUDENT IN AN ORGANIZED HEALTH CARE EDUCATION/TRAINING PROGRAM

## 2023-12-30 PROCEDURE — 25000003 PHARM REV CODE 250: Mod: HCNC | Performed by: PHYSICIAN ASSISTANT

## 2023-12-30 PROCEDURE — 85025 COMPLETE CBC W/AUTO DIFF WBC: CPT | Mod: HCNC | Performed by: STUDENT IN AN ORGANIZED HEALTH CARE EDUCATION/TRAINING PROGRAM

## 2023-12-30 PROCEDURE — 96376 TX/PRO/DX INJ SAME DRUG ADON: CPT

## 2023-12-30 PROCEDURE — 80053 COMPREHEN METABOLIC PANEL: CPT | Mod: HCNC | Performed by: STUDENT IN AN ORGANIZED HEALTH CARE EDUCATION/TRAINING PROGRAM

## 2023-12-30 PROCEDURE — 83735 ASSAY OF MAGNESIUM: CPT | Mod: HCNC | Performed by: STUDENT IN AN ORGANIZED HEALTH CARE EDUCATION/TRAINING PROGRAM

## 2023-12-30 PROCEDURE — 25000003 PHARM REV CODE 250: Mod: HCNC | Performed by: STUDENT IN AN ORGANIZED HEALTH CARE EDUCATION/TRAINING PROGRAM

## 2023-12-30 PROCEDURE — G0378 HOSPITAL OBSERVATION PER HR: HCPCS | Mod: HCNC

## 2023-12-30 RX ORDER — HEPARIN 100 UNIT/ML
100 SYRINGE INTRAVENOUS
Status: DISCONTINUED | OUTPATIENT
Start: 2023-12-30 | End: 2023-12-30 | Stop reason: HOSPADM

## 2023-12-30 RX ORDER — POTASSIUM CHLORIDE 20 MEQ/1
20 TABLET, EXTENDED RELEASE ORAL DAILY
Qty: 30 TABLET | Refills: 0 | Status: SHIPPED | OUTPATIENT
Start: 2023-12-30 | End: 2024-01-29

## 2023-12-30 RX ORDER — BACLOFEN 20 MG
1 TABLET ORAL DAILY
Qty: 30 EACH | Refills: 0 | Status: SHIPPED | OUTPATIENT
Start: 2023-12-30 | End: 2024-01-29

## 2023-12-30 RX ORDER — POTASSIUM CHLORIDE 7.45 MG/ML
10 INJECTION INTRAVENOUS
Status: COMPLETED | OUTPATIENT
Start: 2023-12-30 | End: 2023-12-30

## 2023-12-30 RX ORDER — HEPARIN SODIUM,PORCINE/PF 10 UNIT/ML
10 SYRINGE (ML) INTRAVENOUS
Status: DISCONTINUED | OUTPATIENT
Start: 2023-12-30 | End: 2023-12-30

## 2023-12-30 RX ADMIN — ATORVASTATIN CALCIUM 20 MG: 20 TABLET, FILM COATED ORAL at 09:12

## 2023-12-30 RX ADMIN — DEXTROSE MONOHYDRATE 125 ML: 100 INJECTION, SOLUTION INTRAVENOUS at 01:12

## 2023-12-30 RX ADMIN — POTASSIUM CHLORIDE 10 MEQ: 7.46 INJECTION, SOLUTION INTRAVENOUS at 12:12

## 2023-12-30 RX ADMIN — Medication 16 G: at 01:12

## 2023-12-30 RX ADMIN — LEVOTHYROXINE SODIUM 175 MCG: 150 TABLET ORAL at 06:12

## 2023-12-30 RX ADMIN — METOPROLOL SUCCINATE 75 MG: 50 TABLET, EXTENDED RELEASE ORAL at 09:12

## 2023-12-30 RX ADMIN — FOLIC ACID 1000 MCG: 1 TABLET ORAL at 09:12

## 2023-12-30 RX ADMIN — SULFASALAZINE 500 MG: 500 TABLET ORAL at 09:12

## 2023-12-30 RX ADMIN — POTASSIUM CHLORIDE 10 MEQ: 7.46 INJECTION, SOLUTION INTRAVENOUS at 02:12

## 2023-12-30 RX ADMIN — OXYBUTYNIN CHLORIDE 10 MG: 10 TABLET, EXTENDED RELEASE ORAL at 09:12

## 2023-12-30 RX ADMIN — POTASSIUM CHLORIDE 10 MEQ: 7.46 INJECTION, SOLUTION INTRAVENOUS at 10:12

## 2023-12-30 RX ADMIN — PANCRELIPASE 2 CAPSULE: 24000; 76000; 120000 CAPSULE, DELAYED RELEASE PELLETS ORAL at 11:12

## 2023-12-30 RX ADMIN — CHOLESTYRAMINE 8 G: 4 POWDER, FOR SUSPENSION ORAL at 09:12

## 2023-12-30 RX ADMIN — Medication 6 MG: at 12:12

## 2023-12-30 RX ADMIN — PANTOPRAZOLE SODIUM 40 MG: 40 TABLET, DELAYED RELEASE ORAL at 09:12

## 2023-12-30 RX ADMIN — POTASSIUM CHLORIDE 10 MEQ: 7.46 INJECTION, SOLUTION INTRAVENOUS at 01:12

## 2023-12-30 RX ADMIN — APIXABAN 5 MG: 5 TABLET, FILM COATED ORAL at 09:12

## 2023-12-30 RX ADMIN — HEPARIN 100 UNITS: 100 SYRINGE at 05:12

## 2023-12-30 RX ADMIN — POTASSIUM CHLORIDE 10 MEQ: 7.46 INJECTION, SOLUTION INTRAVENOUS at 11:12

## 2023-12-30 NOTE — NURSING
Nurses Note -- 4 Eyes      12/29/2023   11:55 PM      Skin assessed during: Admit      [] No Altered Skin Integrity Present    []Prevention Measures Documented      [x] Yes- Altered Skin Integrity Present or Discovered   [] LDA Added if Not in Epic (Describe Wound)   [x] New Altered Skin Integrity was Present on Admit and Documented in LDA   [] Wound Image Taken    Wound Care Consulted? Yes    Attending Nurse:  Maribell Benz RN/Staff Member:   Shanice ORTEGA

## 2023-12-30 NOTE — PLAN OF CARE
Pt was being followed by Arvind with [facility] but Pt unsure with hospice level of care. John asked to send HH orders to [facility] as this is [facility]'s sister HH company and they can follow Pt at the home. John asked nursing if Pt needs a ride home, Sw following.

## 2023-12-30 NOTE — PLAN OF CARE
[facility]      HOME HEALTH ORDERS  FACE TO FACE ENCOUNTER    Patient Name: Pauline Cronin  YOB: 1940    PCP: Ga Waldrop MD   PCP Address: [address] / [place] [address]  PCP Phone Number: [phone]  PCP Fax: [phone]    Encounter Date: 12/29/23    Admit to Home Health    Diagnoses:  Active Hospital Problems    Diagnosis  POA    *Hypokalemia [E87.6]  Yes    Biliary obstruction [K83.1]  Yes    Paroxysmal atrial fibrillation [I48.0]  Yes     Chronic     Followed by Dr. Leone      Hyperlipidemia [E78.5]  Yes     Chronic    Hypothyroidism [E03.9]  Yes     Chronic      Resolved Hospital Problems   No resolved problems to display.       Follow Up Appointments:  Future Appointments   Date Time Provider Department Center   1/19/2024 10:15 AM [facility]       Allergies:  Review of patient's allergies indicates:   Allergen Reactions    Cleocin [clindamycin hcl]     Lisinopril Other (See Comments)     cough       Medications: Review discharge medications with patient and family and provide education.    Current Facility-Administered Medications   Medication Dose Route Frequency Provider Last Rate Last Admin    acetaminophen tablet 650 mg  650 mg Oral Daily PRN Keith Mar MD        apixaban tablet 5 mg  5 mg Oral BID Keith Mar MD   5 mg at 12/30/23 0913    atorvastatin tablet 20 mg  20 mg Oral Daily Keith Mar MD   20 mg at 12/30/23 0913    cholestyramine 4 gram packet 8 g  2 packet Oral BID Keith Mar MD   8 g at 12/30/23 0912    dextrose 10% bolus 125 mL 125 mL  12.5 g Intravenous PRN Keith Mar MD   Stopped at 12/30/23 0128    dextrose 10% bolus 250 mL 250 mL  25 g Intravenous PRN Keith Mar MD        folic acid tablet 1,000 mcg  1,000 mcg Oral Daily Keith Mar MD   1,000 mcg at 12/30/23  0913    glucagon (human recombinant) injection 1 mg  1 mg Intramuscular PRN Keith Mar MD        glucose chewable tablet 16 g  16 g Oral PRN Keith Mar MD   16 g at 12/30/23 0108    glucose chewable tablet 24 g  24 g Oral PRN Keith Mar MD        lactulose 20 gram/30 mL solution Soln 10 g  10 g Oral Q6H PRN Keith Mar MD        levothyroxine tablet 175 mcg  175 mcg Oral Before breakfast Keith Mar MD   175 mcg at 12/30/23 0601    lipase-protease-amylase 24,000-76,000-120,000 units capsule 2 capsule  2 capsule Oral TID WM Keith Mar MD   2 capsule at 12/29/23 1827    melatonin tablet 6 mg  6 mg Oral Nightly PRN Rachana Chambers PA-C   6 mg at 12/30/23 0041    metoprolol succinate 24 hr tablet 75 mg  75 mg Oral Daily Keith Mar MD   75 mg at 12/30/23 0913    naloxone 0.4 mg/mL injection 0.02 mg  0.02 mg Intravenous PRN Keith Mar MD        OLANZapine tablet 5 mg  5 mg Oral QHS Keith Mar MD   5 mg at 12/29/23 2029    ondansetron injection 4 mg  4 mg Intravenous Q8H PRN Keith Mar MD        oxybutynin 24 hr tablet 10 mg  10 mg Oral Daily Keith Mar MD   10 mg at 12/30/23 0912    pantoprazole EC tablet 40 mg  40 mg Oral Daily Keith Mar MD   40 mg at 12/30/23 0913    potassium chloride 10 mEq in 100 mL IVPB  10 mEq Intravenous Q1H Keith Mar  mL/hr at 12/30/23 1026 10 mEq at 12/30/23 1026    sodium chloride 0.9% flush 10 mL  10 mL Intravenous Q12H PRN Keith Mar MD        sulfaSALAzine tablet 500 mg  500 mg Oral BID Keith Mar MD   500 mg at 12/30/23 0912    traMADoL tablet 50 mg  50 mg Oral Q6H PRN Keith Mar MD         Current Discharge Medication List        START taking these medications     Details   magnesium oxide 500 mg Tab Take 1 tablet by mouth once daily. for 30 doses  Qty: 30 each, Refills: 0      potassium chloride SA (K-DUR,KLOR-CON) 20 MEQ tablet Take 1 tablet (20 mEq total) by mouth once daily.  Qty: 30 tablet, Refills: 0           CONTINUE these medications which have NOT CHANGED    Details   CONSTULOSE 10 gram/15 mL solution Take by mouth.      acetaminophen (TYLENOL) 325 MG tablet Take 650 mg by mouth daily as needed (Headache).      amLODIPine (NORVASC) 2.5 MG tablet Take 1 tablet (2.5 mg total) by mouth once daily.  Qty: 90 tablet, Refills: 3    Comments: .      atorvastatin (LIPITOR) 20 MG tablet Take 1 tablet by mouth once daily  Qty: 90 tablet, Refills: 3      colestipoL (COLESTID) 1 gram Tab Take 1 tablet (1 g total) by mouth 2 (two) times daily.  Qty: 60 tablet, Refills: 5      diphenoxylate-atropine 2.5-0.025 mg (LOMOTIL) 2.5-0.025 mg per tablet TAKE 1 TABLET BY MOUTH 4 TIMES DAILY AS NEEDED FOR DIARRHEA  Qty: 60 tablet, Refills: 2    Associated Diagnoses: Chronic diarrhea      ELIQUIS 5 mg Tab Take 1 tablet (5 mg total) by mouth 2 (two) times daily.  Qty: 180 tablet, Refills: 3    Comments: PATIENT NEEDS APPOINTMENT FOR FURTHER REFILLS.  Associated Diagnoses: Paroxysmal atrial fibrillation      esomeprazole (NEXIUM) 40 MG capsule Take 1 capsule by mouth once daily  Qty: 90 capsule, Refills: 3      folic acid (FOLVITE) 1 MG tablet Take 1 tablet by mouth once daily  Qty: 90 tablet, Refills: 1      lactulose (CHRONULAC) 20 gram/30 mL Soln Take 30 mLs (20 g total) by mouth 2 (two) times daily as needed (constipation).  Qty: 480 mL, Refills: 0      levothyroxine (SYNTHROID, LEVOTHROID) 175 MCG tablet Take 1 tablet by mouth once daily  Qty: 90 tablet, Refills: 2    Associated Diagnoses: Hypothyroidism, unspecified type      LIDOcaine-prilocaine (EMLA) cream Apply topically as needed (place to port site 45-60 minutes prior to port access).  Qty: 30 g, Refills: 6    Associated Diagnoses:  Pancreatic adenocarcinoma      lipase-protease-amylase 24,000-76,000-120,000 units (CREON) 24,000-76,000 -120,000 unit capsule Take 2 capsules by mouth 3 (three) times daily with meals.  Qty: 180 capsule, Refills: 11    Associated Diagnoses: Exocrine pancreatic insufficiency      MELATONIN ORAL Take 1 tablet by mouth nightly as needed (Insomnia).      metoprolol succinate (TOPROL-XL) 25 MG 24 hr tablet Take 3 tablets (75 mg total) by mouth once daily.  Qty: 270 tablet, Refills: 3    Comments: .      OLANZapine (ZYPREXA) 5 MG tablet Take 1 tablet (5 mg total) by mouth every evening.  Qty: 30 tablet, Refills: 5    Associated Diagnoses: Malignant neoplasm of pancreas, unspecified location of malignancy; Chemotherapy-induced nausea      prochlorperazine (COMPAZINE) 10 MG tablet Take 1 tablet (10 mg total) by mouth 4 (four) times daily as needed (nausea).  Qty: 90 tablet, Refills: 11    Associated Diagnoses: Malignant neoplasm of pancreas, unspecified location of malignancy      sulfaSALAzine (AZULFIDINE) 500 mg Tab Take 1 tablet by mouth twice daily  Qty: 180 tablet, Refills: 3      tolterodine (DETROL LA) 4 MG 24 hr capsule Take 1 capsule (4 mg total) by mouth once daily.  Qty: 90 capsule, Refills: 3      traMADoL (ULTRAM) 50 mg tablet Take 1 tablet (50 mg total) by mouth every 6 (six) hours as needed for Pain.  Qty: 25 tablet, Refills: 0    Comments: Quantity prescribed more than 7 day supply? No  Associated Diagnoses: Neoplasm related pain           STOP taking these medications       amoxicillin (AMOXIL) 500 MG capsule Comments:   Reason for Stopping:         fluconazole (DIFLUCAN) 150 MG Tab Comments:   Reason for Stopping:                 I have seen and examined this patient within the last 30 days. My clinical findings that support the need for the home health skilled services and home bound status are the following:no   Weakness/numbness causing balance and gait disturbance due to Weakness/Debility making it  taxing to leave home.     Diet:   regular diet  Encourage PO    Labs:  Report Lab results to PCP.    Activities:   activity as tolerated    Nursing:   Agency to admit patient within 24 hours of hospital discharge unless specified on physician order or at patient request    SN to complete comprehensive assessment including routine vital signs. Instruct on disease process and s/s of complications to report to MD. Review/verify medication list sent home with the patient at time of discharge  and instruct patient/caregiver as needed. Frequency may be adjusted depending on start of care date.     Skilled nurse to perform up to 3 visits PRN for symptoms related to diagnosis    Notify MD if SBP > 160 or < 90; DBP > 90 or < 50; HR > 120 or < 50; Temp > 101; O2 < 88%    Ok to schedule additional visits based on staff availability and patient request on consecutive days within the home health episode.    When multiple disciplines ordered:    Start of Care occurs on Sunday - Wednesday schedule remaining discipline evaluations as ordered on separate consecutive days following the start of care.    Thursday SOC -schedule subsequent evaluations Friday and Monday the following week.     Friday - Saturday SOC - schedule subsequent discipline evaluations on consecutive days starting Monday of the following week.    For all post-discharge communication and subsequent orders please contact patient's primary care physician.     Home Health Aide:  Nursing Three times weekly    Wound Care Orders  no    I certify that this patient is confined to her home and needs intermittent skilled nursing care.

## 2023-12-30 NOTE — PLAN OF CARE
[facility] - Med Surg  Discharge Final Note    Primary Care Provider: Ga Waldrop MD    Expected Discharge Date: 12/30/2023    Final Discharge Note (most recent)       Final Note - 12/30/23 1019          Final Note    Assessment Type Final Discharge Note     Anticipated Discharge Disposition [facility] [place] [facility]     Hospital Resources/Appts/Education Provided Post-Acute resouces added to AVS        Post-Acute Status    Post-Acute Authorization Home Health;Hospice     Home Health Status Referrals Sent     Hospice Status Referrals Sent     Discharge Delays None known at this time                     Important Message from Medicare

## 2023-12-30 NOTE — NURSING
Check BG since I did not see one was not in documentation  since noon while patient was in Ed results was 53. Pt was asymptomatic.I attempted to get her to take a glucose tablet. SHe placed it in her mouth then took it out ans said she was nauseated. I then notify medical team on call via secure chat and informed will given 125 ML of D10  as ordered. Md agreed wot give IV instead of PO.

## 2023-12-30 NOTE — NURSING
Re check BG after 125 ML of dextrose was given result was 83. Patient stated she did not receive any meal while in Er and I offered to feed her herminia cereal and milk which she ate both corn flakes and rice krispies crunch cereal with a container of whole milk. . Plus she drank a container of apple juice.

## 2023-12-30 NOTE — PLAN OF CARE
[facility] - Emergency Dept  Initial Discharge Assessment       Primary Care Provider: Ga Waldrop MD    Admission Diagnosis: Biliary stent obstruction [T85.590A]    Admission Date: 12/29/2023  Expected Discharge Date:     Transition of Care Barriers: (P) None    Payor: [employer] MANAGED MEDICARE / Plan: [employer] MEDICARE HMO / Product Type: Capitation /     Extended Emergency Contact Information  Primary Emergency Contact: Karen Mejia  Address: [address] [place] [address] [place] [address] [facility]  Home Phone: [phone]  Mobile Phone: [phone]  Relation: Relative    Discharge Plan A: (P) Home Health  Discharge Plan B: (P) Hospice/home      [facility] [address] [place] [address] [address]  [address] [place] [address]  Phone: [phone] Fax: [phone]      Initial Assessment (most recent)       Adult Discharge Assessment - 12/29/23 1916          Discharge Assessment    Assessment Type Discharge Planning Assessment (P)      Confirmed/corrected address, phone number and insurance Yes (P)      Confirmed Demographics Correct on Facesheet (P)      Source of Information patient;family;health record (P)      Does patient/caregiver understand observation status Yes (P)      Communicated UMA with patient/caregiver Yes (P)      People in Home child(alexandr), adult (P)      Do you expect to return to your current living situation? Yes (P)      Do you have help at home or someone to help you manage your care at home? Yes (P)      Prior to hospitilization cognitive status: Alert/Oriented (P)      Current cognitive status: Alert/Oriented (P)      Equipment Currently Used at Home walker, rolling (P)      Patient currently being followed by outpatient case management? No (P)      Do you currently have service(s) that help you manage your care at home? No (P)      Do you take prescription medications? Yes (P)      Do you have prescription coverage? Yes (P)      Do you  have any problems affording any of your prescribed medications? No (P)      Is the patient taking medications as prescribed? yes (P)      How do you get to doctors appointments? family or friend will provide (P)      Are you on dialysis? No (P)      Discharge Plan A Home Health (P)      Discharge Plan B Hospice/home (P)      DME Needed Upon Discharge  none (P)      Discharge Plan discussed with: Adult children;Patient (P)      Transition of Care Barriers None (P)         Physical Activity    On average, how many days per week do you engage in moderate to strenuous exercise (like a brisk walk)? 0 days (P)      On average, how many minutes do you engage in exercise at this level? 0 min (P)         Financial Resource Strain    How hard is it for you to pay for the very basics like food, housing, medical care, and heating? Not hard at all (P)         Housing Stability    In the last 12 months, was there a time when you were not able to pay the mortgage or rent on time? No (P)      In the last 12 months, was there a time when you did not have a steady place to sleep or slept in a shelter (including now)? No (P)         Transportation Needs    In the past 12 months, has lack of transportation kept you from medical appointments or from getting medications? No (P)      In the past 12 months, has lack of transportation kept you from meetings, work, or from getting things needed for daily living? No (P)         Food Insecurity    Within the past 12 months, you worried that your food would run out before you got the money to buy more. Never true (P)         Stress    Do you feel stress - tense, restless, nervous, or anxious, or unable to sleep at night because your mind is troubled all the time - these days? To some extent (P)         Social Connections    How often do you get together with friends or relatives? More than three times a week (P)      How often do you attend meetings of the clubs or organizations you belong to?  Never (P)      Are you , , , , never , or living with a partner?  (P)         Alcohol Use    Q1: How often do you have a drink containing alcohol? Never (P)      Q2: How many drinks containing alcohol do you have on a typical day when you are drinking? Patient does not drink (P)

## 2023-12-30 NOTE — PLAN OF CARE
Initial Discharge Planning Case Management Assessment:    Patient admitted on 12-29-23  Chart reviewed today  Care plan discussed with treatment team    Current dispo: tbd  Home health vs home hospice  Transportation: has reliable  Consults following: case mgt  Case management  to follow

## 2023-12-31 NOTE — HOSPITAL COURSE
In the ED, patient was hypothermic 95.2 and tachycardic 109 but .68, Labs with wbc 4.6, Hb 10.4, Plt 142, Na 135, K 2.4, Cr 0.8, Ca 6.3, Mg 0.8, Gluc 34, TB 6.0, , ALT 27, Ua negative, CXR without acute process, CT abdo with biliary stent with mild intrahepatic biliary ducts prominence. Patient admitted to hospital medicine. Electrolytes improved following replacement and symptoms then resolved. Patient had no further abdominal symptoms and afebrile. LFTs stable and TB down-trending. Tolerating PO but with poor appetite. Pt is no longer pursuing chemotherapy and oncology had rec hospice per their last note. However, patient wanted to continue her other medications and so did not proceed with this. Discussed with patient that certain medications can be continued if requested and that does not necessarily preclude hospice and patient plans to re-consider hospice with her daughter. Patient deemed ready for discharge. Plan discussed with pt, who was agreeable and amenable; medications were discussed and reviewed, outpatient follow-up arranged, ER precautions were given, all questions were answered to the pt's satisfaction, and Pauline Cronin  was subsequently discharged.

## 2023-12-31 NOTE — PROGRESS NOTES
Patient being discharged per md orders. Patient port deaccessed using heparin and aseptic technique. Patient tolerated well. Telemetry removed. Patient awaiting transportation.

## 2023-12-31 NOTE — DISCHARGE SUMMARY
[facility] Medicine  Discharge Summary      Patient Name: Pauline Cronin  MRN: [id number]  RICK: [id number]  Patient Class: OP- Observation  Admission Date: 12/29/2023  Hospital Length of Stay: 0 days  Discharge Date and Time: 12/30/23  Attending Physician: No att. providers found   Discharging Provider: Keith Mar MD  Primary Care Provider: Ga Waldrop MD  [facility] Medicine Team: [facility] Keith Mar MD  Primary Care Team: [facility]    HPI:   Pauline Cronin is a 83 y.o. female with past medical history of pancreatic adenocarcinoma, Crohn's disease, p.afib, SSS s/p PPM, HTN, HLD, who presents with weakness. Patient states over the past day she has had diffuse weakness and has been unable to walk. She also has had numbness and tingling in her extremities. She has had extreme fatigue. She denies fever or chills but notes her temperature was low in the ED. She reports not eating very much due to poor appetite. She denies shortness of breath, chest pain, abdominal pain. She reports history of diarrhea with her chron's but lately she has not been having many bowel movements.          Hospital Course:   In the ED, patient was hypothermic 95.2 and tachycardic 109 but .68, Labs with wbc 4.6, Hb 10.4, Plt 142, Na 135, K 2.4, Cr 0.8, Ca 6.3, Mg 0.8, Gluc 34, TB 6.0, , ALT 27, Ua negative, CXR without acute process, CT abdo with biliary stent with mild intrahepatic biliary ducts prominence. Patient admitted to hospital medicine. Electrolytes improved following replacement and symptoms then resolved. Patient had no further abdominal symptoms and afebrile. LFTs stable and TB down-trending. Tolerating PO but with poor appetite. Pt is no longer pursuing chemotherapy and oncology had rec hospice per their last note. However, patient wanted to continue her other medications and so did not proceed with this. Discussed with patient that certain medications can  be continued if requested and that does not necessarily preclude hospice and patient plans to re-consider hospice with her daughter. Patient deemed ready for discharge. Plan discussed with pt, who was agreeable and amenable; medications were discussed and reviewed, outpatient follow-up arranged, ER precautions were given, all questions were answered to the pt's satisfaction, and Pauline Cronin  was subsequently discharged.       Goals of Care Treatment Preferences:  Code Status: DNR    Health care agent: Karen Mejia  Health care agent number: No value filed.    Living Will: Yes     What is most important right now is to focus on remaining as independent as possible, symptom/pain control, quality of life, even if it means sacrificing a little time.  Accordingly, we have decided that the best plan to meet the patient's goals includes continuing with treatment.      Consults:     No new Assessment & Plan notes have been filed under this hospital service since the last note was generated.  Service: Hospital Medicine    Final Active Diagnoses:    Diagnosis Date Noted POA    PRINCIPAL PROBLEM:  Hypokalemia [E87.6] 08/26/2023 Yes    Biliary obstruction [K83.1] 03/17/2023 Yes    Paroxysmal atrial fibrillation [I48.0] 04/19/2018 Yes     Chronic    Hyperlipidemia [E78.5] 04/19/2018 Yes     Chronic    Hypothyroidism [E03.9] 04/19/2018 Yes     Chronic      Problems Resolved During this Admission:       Discharged Condition: good    Disposition: Home or Self Care    Follow Up:    Patient Instructions:      Notify your health care provider if you experience any of the following:  temperature >100.4     Notify your health care provider if you experience any of the following:  persistent nausea and vomiting or diarrhea     Notify your health care provider if you experience any of the following:  severe uncontrolled pain     Notify your health care provider if you experience any of the following:  redness, tenderness, or signs of  infection (pain, swelling, redness, odor or green/yellow discharge around incision site)     Notify your health care provider if you experience any of the following:  difficulty breathing or increased cough     Notify your health care provider if you experience any of the following:  severe persistent headache     Notify your health care provider if you experience any of the following:  worsening rash     Notify your health care provider if you experience any of the following:  persistent dizziness, light-headedness, or visual disturbances     Notify your health care provider if you experience any of the following:  increased confusion or weakness     Activity as tolerated       Significant Diagnostic Studies: N/A    Pending Diagnostic Studies:       None           Medications:  Reconciled Home Medications:      Medication List        START taking these medications      magnesium oxide 500 mg Tab  Take 1 tablet by mouth once daily. for 30 doses     potassium chloride SA 20 MEQ tablet  Commonly known as: K-DUR,KLOR-CON  Take 1 tablet (20 mEq total) by mouth once daily.            CHANGE how you take these medications      esomeprazole 40 MG capsule  Commonly known as: NEXIUM  Take 1 capsule by mouth once daily  What changed: when to take this            CONTINUE taking these medications      acetaminophen 325 MG tablet  Commonly known as: TYLENOL  Take 650 mg by mouth daily as needed (Headache).     amLODIPine 2.5 MG tablet  Commonly known as: NORVASC  Take 1 tablet (2.5 mg total) by mouth once daily.     atorvastatin 20 MG tablet  Commonly known as: LIPITOR  Take 1 tablet by mouth once daily     colestipoL 1 gram Tab  Commonly known as: COLESTID  Take 1 tablet (1 g total) by mouth 2 (two) times daily.     CREON 24,000-76,000 -120,000 unit capsule  Generic drug: lipase-protease-amylase 24,000-76,000-120,000 units  Take 2 capsules by mouth 3 (three) times daily with meals.     diphenoxylate-atropine 2.5-0.025 mg  2.5-0.025 mg per tablet  Commonly known as: LOMOTIL  TAKE 1 TABLET BY MOUTH 4 TIMES DAILY AS NEEDED FOR DIARRHEA     ELIQUIS 5 mg Tab  Generic drug: apixaban  Take 1 tablet (5 mg total) by mouth 2 (two) times daily.     folic acid 1 MG tablet  Commonly known as: FOLVITE  Take 1 tablet by mouth once daily     * CONSTULOSE 10 gram/15 mL solution  Generic drug: lactulose  Take by mouth.     * lactulose 20 gram/30 mL Soln  Commonly known as: CHRONULAC  Take 30 mLs (20 g total) by mouth 2 (two) times daily as needed (constipation).     levothyroxine 175 MCG tablet  Commonly known as: SYNTHROID, LEVOTHROID  Take 1 tablet by mouth once daily     LIDOcaine-prilocaine cream  Commonly known as: EMLA  Apply topically as needed (place to port site 45-60 minutes prior to port access).     MELATONIN ORAL  Take 1 tablet by mouth nightly as needed (Insomnia).     metoprolol succinate 25 MG 24 hr tablet  Commonly known as: TOPROL-XL  Take 3 tablets (75 mg total) by mouth once daily.     OLANZapine 5 MG tablet  Commonly known as: ZyPREXA  Take 1 tablet (5 mg total) by mouth every evening.     prochlorperazine 10 MG tablet  Commonly known as: COMPAZINE  Take 1 tablet (10 mg total) by mouth 4 (four) times daily as needed (nausea).     sulfaSALAzine 500 mg Tab  Commonly known as: AZULFIDINE  Take 1 tablet by mouth twice daily     tolterodine 4 MG 24 hr capsule  Commonly known as: DETROL LA  Take 1 capsule (4 mg total) by mouth once daily.     traMADoL 50 mg tablet  Commonly known as: ULTRAM  Take 1 tablet (50 mg total) by mouth every 6 (six) hours as needed for Pain.           * This list has 2 medication(s) that are the same as other medications prescribed for you. Read the directions carefully, and ask your doctor or other care provider to review them with you.                STOP taking these medications      amoxicillin 500 MG capsule  Commonly known as: AMOXIL     fluconazole 150 MG Tab  Commonly known as: DIFLUCAN               Indwelling Lines/Drains at time of discharge:   Lines/Drains/Airways       None                   Time spent on the discharge of patient: 35 minutes         Keith Mar MD  Department of Hospital Medicine  [facility]

## 2024-01-02 ENCOUNTER — TELEPHONE (OUTPATIENT)
Dept: PRIMARY CARE CLINIC | Facility: CLINIC | Age: 84
End: 2024-01-02
Payer: MEDICARE

## 2024-01-02 RX ORDER — FLUCONAZOLE 150 MG/1
150 TABLET ORAL ONCE
Qty: 1 TABLET | Refills: 0 | Status: SHIPPED | OUTPATIENT
Start: 2024-01-02 | End: 2024-01-02

## 2024-01-02 NOTE — TELEPHONE ENCOUNTER
Called pt's daughter in law regarding message. Pt's daughter in law stated pt has yeast infection from taking amoxicillin. Pt has vaginal itching & burning. Pt 's symptoms started on 1/1/24. Offered e-visit. Pt's daughter in law declined. Pt's daughter in law is requesting rx.

## 2024-01-02 NOTE — TELEPHONE ENCOUNTER
----- Message from Tamie Santamaria sent at 1/2/2024  2:18 PM CST -----  Please call patient back has a yeast infection patient is on antibiotic please call patient and let her know if DR Waldrop will call her in something

## 2024-01-03 LAB
BACTERIA BLD CULT: NORMAL
BACTERIA BLD CULT: NORMAL

## 2024-02-15 NOTE — ASSESSMENT & PLAN NOTE
Hgb 8.9 on admission. Recently decreased from 14.2 four months ago. No evidence of bleeding on exam. Outpatient serologic evaluation significant for normal B12 and folate. Iron low at 19, ferritin low normal.     -- Trend CBC  -- Plan to start iron supplementation on discharge    yes

## 2024-05-17 NOTE — HOSPITAL COURSE
Chart reviewed and case reviewed in IDR.  Patient admitted with chest pain.  Stress test completed and negative.  Patient with elevated BP's meds are being adjusted.  Met with the patient and reviewed her concerns about her medications and her physicians.  Offered to call the patient advocate to assist with concerns.  Patient declined.  Addressed questions to the best of this CM's ability.  Patient active with [facility], but had only seen them a couple time.  She is agreeable to resuming care with them at discharge.  Call placed to Rufus, liaison with [facility] and confirmed they are active.  Per Rufus, they will need resumption of care orders at discharge.  JAMES received and in the chart.  Will continue to follow for further transition of care planning needs.       Anat Gibbs RN.  P:  [phone]   Patient admitted for Afib with RVR and hypotension. Received 2.3 L in the ED with initial improvement in her BP and HR. She had an acute respiratory decompensation the following day, with CXR and exam consistent with volume overload - treated with diuresis and breathing treatments with rapid improvement in oxygen requirements. Cardiology evaluated due to elevated troponin and concerning history - felt Afib paroxysm was 2/2 recent chemotherapy administration and recommended continued medical management and increasing metoprolol if needed for better rate control. Her blood counts declined in setting of recent chemotherapy and required a transfusion on 5/6. Counts stable on 5/7. Patient to be discharged with home oxygen and home health PT/OT. Given instructions on PRN lasix for home use. Has close follow up with palliative care and oncology.

## 2024-09-19 ENCOUNTER — PATIENT MESSAGE (OUTPATIENT)
Dept: PRIMARY CARE CLINIC | Facility: CLINIC | Age: 84
End: 2024-09-19
Payer: MEDICARE

## (undated) DEVICE — FIBER 1000 MICRON HOLMIUM

## (undated) DEVICE — GUIDEWIRE NITINOL HYBRID 150CM

## (undated) DEVICE — GUIDEWIRE ANGIO .035INX145CM

## (undated) DEVICE — DRESSING TRANS 2X2 TEGADERM

## (undated) DEVICE — SHEATH FLEXOR URETERAL 28CM

## (undated) DEVICE — Device

## (undated) DEVICE — CATH URETERAL DUAL LUMEN 10FR

## (undated) DEVICE — SOL IRR NACL .9% 3000ML

## (undated) DEVICE — GLOVE BIOGEL SKINSENSE PI 7.5

## (undated) DEVICE — SET IRR URLGY 2LINE UNIV SPIKE

## (undated) DEVICE — BRUSH SCRUB SURGICALW/BETADINE

## (undated) DEVICE — SOL 9P NACL IRR PIC IL